# Patient Record
Sex: MALE | Race: WHITE | NOT HISPANIC OR LATINO | Employment: OTHER | URBAN - METROPOLITAN AREA
[De-identification: names, ages, dates, MRNs, and addresses within clinical notes are randomized per-mention and may not be internally consistent; named-entity substitution may affect disease eponyms.]

---

## 2020-12-09 ENCOUNTER — APPOINTMENT (OUTPATIENT)
Dept: RADIOLOGY | Facility: MEDICAL CENTER | Age: 75
DRG: 871 | End: 2020-12-09
Attending: EMERGENCY MEDICINE
Payer: MEDICARE

## 2020-12-09 ENCOUNTER — HOSPITAL ENCOUNTER (INPATIENT)
Facility: MEDICAL CENTER | Age: 75
LOS: 4 days | DRG: 871 | End: 2020-12-13
Attending: EMERGENCY MEDICINE | Admitting: STUDENT IN AN ORGANIZED HEALTH CARE EDUCATION/TRAINING PROGRAM
Payer: MEDICARE

## 2020-12-09 DIAGNOSIS — E86.0 SEVERE DEHYDRATION: ICD-10-CM

## 2020-12-09 DIAGNOSIS — R53.81 DEBILITY: ICD-10-CM

## 2020-12-09 DIAGNOSIS — U07.1 PNEUMONIA DUE TO COVID-19 VIRUS: ICD-10-CM

## 2020-12-09 DIAGNOSIS — R09.02 HYPOXEMIA: ICD-10-CM

## 2020-12-09 DIAGNOSIS — R41.0 CONFUSION: ICD-10-CM

## 2020-12-09 DIAGNOSIS — J12.82 PNEUMONIA DUE TO COVID-19 VIRUS: ICD-10-CM

## 2020-12-09 PROBLEM — A41.9 SEPSIS WITH ACUTE HYPOXIC RESPIRATORY FAILURE (HCC): Status: ACTIVE | Noted: 2020-12-09

## 2020-12-09 PROBLEM — A41.89 SEPSIS DUE TO COVID-19 (HCC): Status: ACTIVE | Noted: 2020-12-09

## 2020-12-09 PROBLEM — A41.9 SEPSIS WITH ACUTE HYPOXIC RESPIRATORY FAILURE (HCC): Status: RESOLVED | Noted: 2020-12-09 | Resolved: 2020-12-09

## 2020-12-09 PROBLEM — N17.9 ACUTE KIDNEY FAILURE (HCC): Status: ACTIVE | Noted: 2020-12-09

## 2020-12-09 PROBLEM — J96.01 SEPSIS WITH ACUTE HYPOXIC RESPIRATORY FAILURE (HCC): Status: ACTIVE | Noted: 2020-12-09

## 2020-12-09 PROBLEM — J96.01 SEPSIS WITH ACUTE HYPOXIC RESPIRATORY FAILURE (HCC): Status: RESOLVED | Noted: 2020-12-09 | Resolved: 2020-12-09

## 2020-12-09 PROBLEM — J96.91 RESPIRATORY FAILURE WITH HYPOXIA (HCC): Status: ACTIVE | Noted: 2020-12-09

## 2020-12-09 PROBLEM — R65.20 SEPSIS WITH ACUTE HYPOXIC RESPIRATORY FAILURE (HCC): Status: ACTIVE | Noted: 2020-12-09

## 2020-12-09 PROBLEM — R65.20 SEPSIS WITH ACUTE HYPOXIC RESPIRATORY FAILURE (HCC): Status: RESOLVED | Noted: 2020-12-09 | Resolved: 2020-12-09

## 2020-12-09 LAB
ALBUMIN SERPL BCP-MCNC: 3.6 G/DL (ref 3.2–4.9)
ALBUMIN/GLOB SERPL: 0.9 G/DL
ALP SERPL-CCNC: 93 U/L (ref 30–99)
ALT SERPL-CCNC: 90 U/L (ref 2–50)
ANION GAP SERPL CALC-SCNC: 17 MMOL/L (ref 7–16)
AST SERPL-CCNC: 68 U/L (ref 12–45)
BASOPHILS # BLD AUTO: 0.3 % (ref 0–1.8)
BASOPHILS # BLD: 0.05 K/UL (ref 0–0.12)
BILIRUB SERPL-MCNC: 1.4 MG/DL (ref 0.1–1.5)
BUN SERPL-MCNC: 78 MG/DL (ref 8–22)
CALCIUM SERPL-MCNC: 9.9 MG/DL (ref 8.5–10.5)
CHLORIDE SERPL-SCNC: 95 MMOL/L (ref 96–112)
CK SERPL-CCNC: 117 U/L (ref 0–154)
CO2 SERPL-SCNC: 23 MMOL/L (ref 20–33)
COVID ORDER STATUS COVID19: NORMAL
CREAT SERPL-MCNC: 2.02 MG/DL (ref 0.5–1.4)
CRP SERPL HS-MCNC: 5.98 MG/DL (ref 0–0.75)
D DIMER PPP IA.FEU-MCNC: 6.2 UG/ML (FEU) (ref 0–0.5)
EOSINOPHIL # BLD AUTO: 0.04 K/UL (ref 0–0.51)
EOSINOPHIL NFR BLD: 0.3 % (ref 0–6.9)
ERYTHROCYTE [DISTWIDTH] IN BLOOD BY AUTOMATED COUNT: 42.4 FL (ref 35.9–50)
FLUAV RNA SPEC QL NAA+PROBE: NEGATIVE
FLUBV RNA SPEC QL NAA+PROBE: NEGATIVE
GLOBULIN SER CALC-MCNC: 3.8 G/DL (ref 1.9–3.5)
GLUCOSE SERPL-MCNC: 195 MG/DL (ref 65–99)
HCT VFR BLD AUTO: 58 % (ref 42–52)
HGB BLD-MCNC: 20.2 G/DL (ref 14–18)
IMM GRANULOCYTES # BLD AUTO: 0.26 K/UL (ref 0–0.11)
IMM GRANULOCYTES NFR BLD AUTO: 1.8 % (ref 0–0.9)
INR PPP: 1.34 (ref 0.87–1.13)
LACTATE BLD-SCNC: 2 MMOL/L (ref 0.5–2)
LACTATE BLD-SCNC: 2.7 MMOL/L (ref 0.5–2)
LACTATE BLD-SCNC: 2.8 MMOL/L (ref 0.5–2)
LYMPHOCYTES # BLD AUTO: 0.75 K/UL (ref 1–4.8)
LYMPHOCYTES NFR BLD: 5.1 % (ref 22–41)
MAGNESIUM SERPL-MCNC: 2.6 MG/DL (ref 1.5–2.5)
MCH RBC QN AUTO: 31.7 PG (ref 27–33)
MCHC RBC AUTO-ENTMCNC: 34.8 G/DL (ref 33.7–35.3)
MCV RBC AUTO: 91.1 FL (ref 81.4–97.8)
MONOCYTES # BLD AUTO: 0.85 K/UL (ref 0–0.85)
MONOCYTES NFR BLD AUTO: 5.8 % (ref 0–13.4)
NEUTROPHILS # BLD AUTO: 12.68 K/UL (ref 1.82–7.42)
NEUTROPHILS NFR BLD: 86.7 % (ref 44–72)
NRBC # BLD AUTO: 0 K/UL
NRBC BLD-RTO: 0 /100 WBC
PHOSPHATE SERPL-MCNC: 3.7 MG/DL (ref 2.5–4.5)
PLATELET # BLD AUTO: 465 K/UL (ref 164–446)
PMV BLD AUTO: 10.5 FL (ref 9–12.9)
POTASSIUM SERPL-SCNC: 4.3 MMOL/L (ref 3.6–5.5)
PROT SERPL-MCNC: 7.4 G/DL (ref 6–8.2)
PROTHROMBIN TIME: 17 SEC (ref 12–14.6)
RBC # BLD AUTO: 6.37 M/UL (ref 4.7–6.1)
RSV RNA SPEC QL NAA+PROBE: NEGATIVE
SARS-COV-2 RNA RESP QL NAA+PROBE: DETECTED
SODIUM SERPL-SCNC: 135 MMOL/L (ref 135–145)
SPECIMEN SOURCE: ABNORMAL
WBC # BLD AUTO: 14.6 K/UL (ref 4.8–10.8)

## 2020-12-09 PROCEDURE — 0241U HCHG SARS-COV-2 COVID-19 NFCT DS RESP RNA 4 TRGT MIC: CPT

## 2020-12-09 PROCEDURE — 93005 ELECTROCARDIOGRAM TRACING: CPT | Performed by: STUDENT IN AN ORGANIZED HEALTH CARE EDUCATION/TRAINING PROGRAM

## 2020-12-09 PROCEDURE — 96375 TX/PRO/DX INJ NEW DRUG ADDON: CPT

## 2020-12-09 PROCEDURE — 86140 C-REACTIVE PROTEIN: CPT

## 2020-12-09 PROCEDURE — 82550 ASSAY OF CK (CPK): CPT

## 2020-12-09 PROCEDURE — 87040 BLOOD CULTURE FOR BACTERIA: CPT

## 2020-12-09 PROCEDURE — 85610 PROTHROMBIN TIME: CPT

## 2020-12-09 PROCEDURE — 96365 THER/PROPH/DIAG IV INF INIT: CPT

## 2020-12-09 PROCEDURE — 700111 HCHG RX REV CODE 636 W/ 250 OVERRIDE (IP): Performed by: EMERGENCY MEDICINE

## 2020-12-09 PROCEDURE — 96367 TX/PROPH/DG ADDL SEQ IV INF: CPT

## 2020-12-09 PROCEDURE — 84100 ASSAY OF PHOSPHORUS: CPT

## 2020-12-09 PROCEDURE — 83520 IMMUNOASSAY QUANT NOS NONAB: CPT

## 2020-12-09 PROCEDURE — 85379 FIBRIN DEGRADATION QUANT: CPT

## 2020-12-09 PROCEDURE — 84145 PROCALCITONIN (PCT): CPT

## 2020-12-09 PROCEDURE — 99285 EMERGENCY DEPT VISIT HI MDM: CPT

## 2020-12-09 PROCEDURE — 700105 HCHG RX REV CODE 258: Performed by: EMERGENCY MEDICINE

## 2020-12-09 PROCEDURE — 83605 ASSAY OF LACTIC ACID: CPT | Mod: 91

## 2020-12-09 PROCEDURE — 71045 X-RAY EXAM CHEST 1 VIEW: CPT

## 2020-12-09 PROCEDURE — 99223 1ST HOSP IP/OBS HIGH 75: CPT | Mod: AI | Performed by: STUDENT IN AN ORGANIZED HEALTH CARE EDUCATION/TRAINING PROGRAM

## 2020-12-09 PROCEDURE — C9803 HOPD COVID-19 SPEC COLLECT: HCPCS | Performed by: EMERGENCY MEDICINE

## 2020-12-09 PROCEDURE — 770021 HCHG ROOM/CARE - ISO PRIVATE

## 2020-12-09 PROCEDURE — 80053 COMPREHEN METABOLIC PANEL: CPT

## 2020-12-09 PROCEDURE — 36415 COLL VENOUS BLD VENIPUNCTURE: CPT

## 2020-12-09 PROCEDURE — 83735 ASSAY OF MAGNESIUM: CPT

## 2020-12-09 PROCEDURE — 85025 COMPLETE CBC W/AUTO DIFF WBC: CPT

## 2020-12-09 RX ORDER — SODIUM CHLORIDE, SODIUM LACTATE, POTASSIUM CHLORIDE, CALCIUM CHLORIDE 600; 310; 30; 20 MG/100ML; MG/100ML; MG/100ML; MG/100ML
1000 INJECTION, SOLUTION INTRAVENOUS ONCE
Status: COMPLETED | OUTPATIENT
Start: 2020-12-09 | End: 2020-12-10

## 2020-12-09 RX ORDER — ASCORBIC ACID 500 MG
1000 TABLET ORAL DAILY
Status: DISCONTINUED | OUTPATIENT
Start: 2020-12-10 | End: 2020-12-13 | Stop reason: HOSPADM

## 2020-12-09 RX ORDER — DEXAMETHASONE 6 MG/1
6 TABLET ORAL DAILY
Status: DISCONTINUED | OUTPATIENT
Start: 2020-12-10 | End: 2020-12-13 | Stop reason: HOSPADM

## 2020-12-09 RX ORDER — VITAMIN B COMPLEX
1000 TABLET ORAL DAILY
Status: DISCONTINUED | OUTPATIENT
Start: 2020-12-10 | End: 2020-12-13 | Stop reason: HOSPADM

## 2020-12-09 RX ORDER — ZINC SULFATE 50(220)MG
220 CAPSULE ORAL DAILY
Status: DISCONTINUED | OUTPATIENT
Start: 2020-12-10 | End: 2020-12-13 | Stop reason: HOSPADM

## 2020-12-09 RX ORDER — AZITHROMYCIN 500 MG/1
500 INJECTION, POWDER, LYOPHILIZED, FOR SOLUTION INTRAVENOUS ONCE
Status: COMPLETED | OUTPATIENT
Start: 2020-12-09 | End: 2020-12-09

## 2020-12-09 RX ORDER — ONDANSETRON 4 MG/1
4 TABLET, ORALLY DISINTEGRATING ORAL EVERY 6 HOURS PRN
Status: DISCONTINUED | OUTPATIENT
Start: 2020-12-09 | End: 2020-12-13 | Stop reason: HOSPADM

## 2020-12-09 RX ORDER — DEXAMETHASONE SODIUM PHOSPHATE 10 MG/ML
10 INJECTION, SOLUTION INTRAMUSCULAR; INTRAVENOUS ONCE
Status: COMPLETED | OUTPATIENT
Start: 2020-12-09 | End: 2020-12-09

## 2020-12-09 RX ORDER — ONDANSETRON 2 MG/ML
4 INJECTION INTRAMUSCULAR; INTRAVENOUS EVERY 6 HOURS PRN
Status: DISCONTINUED | OUTPATIENT
Start: 2020-12-09 | End: 2020-12-13 | Stop reason: HOSPADM

## 2020-12-09 RX ADMIN — DEXAMETHASONE SODIUM PHOSPHATE 10 MG: 10 INJECTION, SOLUTION INTRAMUSCULAR; INTRAVENOUS at 18:26

## 2020-12-09 RX ADMIN — AZITHROMYCIN MONOHYDRATE 500 MG: 500 INJECTION, POWDER, LYOPHILIZED, FOR SOLUTION INTRAVENOUS at 21:12

## 2020-12-09 RX ADMIN — SODIUM CHLORIDE 3 G: 900 INJECTION INTRAVENOUS at 20:36

## 2020-12-09 RX ADMIN — SODIUM CHLORIDE, POTASSIUM CHLORIDE, SODIUM LACTATE AND CALCIUM CHLORIDE 1000 ML: 600; 310; 30; 20 INJECTION, SOLUTION INTRAVENOUS at 21:09

## 2020-12-09 ASSESSMENT — COGNITIVE AND FUNCTIONAL STATUS - GENERAL
SUGGESTED CMS G CODE MODIFIER MOBILITY: CH
MOBILITY SCORE: 24
SUGGESTED CMS G CODE MODIFIER DAILY ACTIVITY: CH
DAILY ACTIVITIY SCORE: 24

## 2020-12-09 ASSESSMENT — ENCOUNTER SYMPTOMS
WEAKNESS: 1
DIZZINESS: 1
COUGH: 1
SHORTNESS OF BREATH: 1
DIARRHEA: 1

## 2020-12-09 ASSESSMENT — LIFESTYLE VARIABLES
ON A TYPICAL DAY WHEN YOU DRINK ALCOHOL HOW MANY DRINKS DO YOU HAVE: 1
AVERAGE NUMBER OF DAYS PER WEEK YOU HAVE A DRINK CONTAINING ALCOHOL: 2
EVER FELT BAD OR GUILTY ABOUT YOUR DRINKING: NO
HAVE PEOPLE ANNOYED YOU BY CRITICIZING YOUR DRINKING: NO
TOTAL SCORE: 0
EVER HAD A DRINK FIRST THING IN THE MORNING TO STEADY YOUR NERVES TO GET RID OF A HANGOVER: NO
ALCOHOL_USE: YES
CONSUMPTION TOTAL: NEGATIVE
TOTAL SCORE: 0
HOW MANY TIMES IN THE PAST YEAR HAVE YOU HAD 5 OR MORE DRINKS IN A DAY: 0
HAVE YOU EVER FELT YOU SHOULD CUT DOWN ON YOUR DRINKING: NO
DOES PATIENT WANT TO STOP DRINKING: NO
TOTAL SCORE: 0

## 2020-12-09 ASSESSMENT — PATIENT HEALTH QUESTIONNAIRE - PHQ9
SUM OF ALL RESPONSES TO PHQ9 QUESTIONS 1 AND 2: 0
1. LITTLE INTEREST OR PLEASURE IN DOING THINGS: NOT AT ALL
2. FEELING DOWN, DEPRESSED, IRRITABLE, OR HOPELESS: NOT AT ALL

## 2020-12-09 ASSESSMENT — FIBROSIS 4 INDEX: FIB4 SCORE: 1.13

## 2020-12-10 ENCOUNTER — APPOINTMENT (OUTPATIENT)
Dept: RADIOLOGY | Facility: MEDICAL CENTER | Age: 75
DRG: 871 | End: 2020-12-10
Attending: STUDENT IN AN ORGANIZED HEALTH CARE EDUCATION/TRAINING PROGRAM
Payer: MEDICARE

## 2020-12-10 LAB
ALBUMIN SERPL BCP-MCNC: 2.8 G/DL (ref 3.2–4.9)
ALBUMIN/GLOB SERPL: 0.9 G/DL
ALP SERPL-CCNC: 79 U/L (ref 30–99)
ALT SERPL-CCNC: 83 U/L (ref 2–50)
ANION GAP SERPL CALC-SCNC: 15 MMOL/L (ref 7–16)
APPEARANCE UR: CLEAR
AST SERPL-CCNC: 59 U/L (ref 12–45)
BILIRUB SERPL-MCNC: 1.1 MG/DL (ref 0.1–1.5)
BILIRUB UR QL STRIP.AUTO: NEGATIVE
BUN SERPL-MCNC: 77 MG/DL (ref 8–22)
CALCIUM SERPL-MCNC: 8.9 MG/DL (ref 8.5–10.5)
CHLORIDE SERPL-SCNC: 100 MMOL/L (ref 96–112)
CO2 SERPL-SCNC: 19 MMOL/L (ref 20–33)
COLOR UR: YELLOW
CREAT SERPL-MCNC: 1.76 MG/DL (ref 0.5–1.4)
CREAT UR-MCNC: 127.86 MG/DL
GLOBULIN SER CALC-MCNC: 3.2 G/DL (ref 1.9–3.5)
GLUCOSE SERPL-MCNC: 224 MG/DL (ref 65–99)
GLUCOSE UR STRIP.AUTO-MCNC: NEGATIVE MG/DL
KETONES UR STRIP.AUTO-MCNC: ABNORMAL MG/DL
LACTATE BLD-SCNC: 1.9 MMOL/L (ref 0.5–2)
LACTATE BLD-SCNC: 2.4 MMOL/L (ref 0.5–2)
LACTATE BLD-SCNC: 2.6 MMOL/L (ref 0.5–2)
LEUKOCYTE ESTERASE UR QL STRIP.AUTO: NEGATIVE
MICRO URNS: ABNORMAL
NITRITE UR QL STRIP.AUTO: NEGATIVE
PH UR STRIP.AUTO: 5 [PH] (ref 5–8)
POTASSIUM SERPL-SCNC: 4.5 MMOL/L (ref 3.6–5.5)
PROCALCITONIN SERPL-MCNC: 0.36 NG/ML
PROT SERPL-MCNC: 6 G/DL (ref 6–8.2)
PROT UR QL STRIP: NEGATIVE MG/DL
RBC UR QL AUTO: NEGATIVE
SODIUM SERPL-SCNC: 134 MMOL/L (ref 135–145)
SODIUM UR-SCNC: <20 MMOL/L
SP GR UR STRIP.AUTO: 1.02
UROBILINOGEN UR STRIP.AUTO-MCNC: 1 MG/DL

## 2020-12-10 PROCEDURE — 700111 HCHG RX REV CODE 636 W/ 250 OVERRIDE (IP): Performed by: STUDENT IN AN ORGANIZED HEALTH CARE EDUCATION/TRAINING PROGRAM

## 2020-12-10 PROCEDURE — 700111 HCHG RX REV CODE 636 W/ 250 OVERRIDE (IP): Performed by: INTERNAL MEDICINE

## 2020-12-10 PROCEDURE — 82570 ASSAY OF URINE CREATININE: CPT

## 2020-12-10 PROCEDURE — 93970 EXTREMITY STUDY: CPT

## 2020-12-10 PROCEDURE — 80053 COMPREHEN METABOLIC PANEL: CPT

## 2020-12-10 PROCEDURE — 700102 HCHG RX REV CODE 250 W/ 637 OVERRIDE(OP): Performed by: INTERNAL MEDICINE

## 2020-12-10 PROCEDURE — 76775 US EXAM ABDO BACK WALL LIM: CPT

## 2020-12-10 PROCEDURE — 93970 EXTREMITY STUDY: CPT | Mod: 26 | Performed by: INTERNAL MEDICINE

## 2020-12-10 PROCEDURE — 83605 ASSAY OF LACTIC ACID: CPT | Mod: 91

## 2020-12-10 PROCEDURE — 84300 ASSAY OF URINE SODIUM: CPT

## 2020-12-10 PROCEDURE — 770021 HCHG ROOM/CARE - ISO PRIVATE

## 2020-12-10 PROCEDURE — 700105 HCHG RX REV CODE 258: Performed by: STUDENT IN AN ORGANIZED HEALTH CARE EDUCATION/TRAINING PROGRAM

## 2020-12-10 PROCEDURE — 81003 URINALYSIS AUTO W/O SCOPE: CPT

## 2020-12-10 PROCEDURE — A9270 NON-COVERED ITEM OR SERVICE: HCPCS | Performed by: STUDENT IN AN ORGANIZED HEALTH CARE EDUCATION/TRAINING PROGRAM

## 2020-12-10 PROCEDURE — 36415 COLL VENOUS BLD VENIPUNCTURE: CPT

## 2020-12-10 PROCEDURE — A9270 NON-COVERED ITEM OR SERVICE: HCPCS | Performed by: INTERNAL MEDICINE

## 2020-12-10 PROCEDURE — 700102 HCHG RX REV CODE 250 W/ 637 OVERRIDE(OP): Performed by: STUDENT IN AN ORGANIZED HEALTH CARE EDUCATION/TRAINING PROGRAM

## 2020-12-10 PROCEDURE — 700105 HCHG RX REV CODE 258: Performed by: INTERNAL MEDICINE

## 2020-12-10 RX ORDER — SODIUM CHLORIDE, SODIUM LACTATE, POTASSIUM CHLORIDE, CALCIUM CHLORIDE 600; 310; 30; 20 MG/100ML; MG/100ML; MG/100ML; MG/100ML
INJECTION, SOLUTION INTRAVENOUS CONTINUOUS
Status: ACTIVE | OUTPATIENT
Start: 2020-12-10 | End: 2020-12-10

## 2020-12-10 RX ORDER — AZITHROMYCIN 250 MG/1
500 TABLET, FILM COATED ORAL EVERY EVENING
Status: COMPLETED | OUTPATIENT
Start: 2020-12-10 | End: 2020-12-11

## 2020-12-10 RX ADMIN — SODIUM CHLORIDE, POTASSIUM CHLORIDE, SODIUM LACTATE AND CALCIUM CHLORIDE: 600; 310; 30; 20 INJECTION, SOLUTION INTRAVENOUS at 04:08

## 2020-12-10 RX ADMIN — AZITHROMYCIN MONOHYDRATE 500 MG: 250 TABLET ORAL at 17:09

## 2020-12-10 RX ADMIN — CEFTRIAXONE 2 G: 2 INJECTION, POWDER, FOR SOLUTION INTRAMUSCULAR; INTRAVENOUS at 11:36

## 2020-12-10 RX ADMIN — SODIUM CHLORIDE, POTASSIUM CHLORIDE, SODIUM LACTATE AND CALCIUM CHLORIDE: 600; 310; 30; 20 INJECTION, SOLUTION INTRAVENOUS at 11:37

## 2020-12-10 RX ADMIN — ENOXAPARIN SODIUM 40 MG: 40 INJECTION SUBCUTANEOUS at 05:46

## 2020-12-10 RX ADMIN — OXYCODONE HYDROCHLORIDE AND ACETAMINOPHEN 1000 MG: 500 TABLET ORAL at 05:46

## 2020-12-10 RX ADMIN — ZINC SULFATE 220 MG (50 MG) CAPSULE 220 MG: CAPSULE at 05:46

## 2020-12-10 RX ADMIN — Medication 1000 UNITS: at 05:46

## 2020-12-10 RX ADMIN — DEXAMETHASONE 6 MG: 6 TABLET ORAL at 05:46

## 2020-12-10 ASSESSMENT — ENCOUNTER SYMPTOMS
PALPITATIONS: 0
COUGH: 0
NECK PAIN: 0
WEIGHT LOSS: 0
DIARRHEA: 0
DIZZINESS: 1
EYE PAIN: 0
FEVER: 0
VOMITING: 0
NAUSEA: 0
CHILLS: 0
DEPRESSION: 0
SEIZURES: 0
SHORTNESS OF BREATH: 0
ORTHOPNEA: 0
STRIDOR: 0
INSOMNIA: 0
BACK PAIN: 0
EYE DISCHARGE: 0
HEADACHES: 0
SPUTUM PRODUCTION: 0
ABDOMINAL PAIN: 0
EYE REDNESS: 0
FOCAL WEAKNESS: 0
NERVOUS/ANXIOUS: 0
BLURRED VISION: 0
HEARTBURN: 0
MYALGIAS: 0

## 2020-12-10 NOTE — ASSESSMENT & PLAN NOTE
This is Severe Sepsis Present on admission  SIRS criteria identified on my evaluation include: Tachycardia, with heart rate greater than 90 BPM, Tachypnea, with respirations greater than 20 per minute and Leukocytosis, with WBC greater than 12,000  Source of infection is Pulmonary COVID 19   Clinical indicators of end organ dysfunction include Creatinine >2.0 (Without ESRD or CKD)  Sepsis protocol initiated  Fluid resuscitation ordered per protocol  IV antibiotics as appropriate for source of sepsis  Reassessment: I have reassessed the patient's hemodynamic status  End organ dysfunction include(s):  Acute respiratory failure and Acute kidney failure   Check pro calcitonin if elevated start on IV antibiotics as appropriate for source of sepsis  While organ dysfunction may be noted elsewhere in this problem list or in the chart, degree of organ dysfunction does not meet CMS criteria for severe sepsis  Continue with COVID isolation   Encourage prone position   Continue with oxygen supplementation to keep O2 sat>92%   Blood cultures in process  Received 1 dose of Unasyn and Zithromax by ERP   Decadron 6 mg po daily for 10 days   Immune support   Avoid NSAID's   If patient develops respiratory distress place on high flow oxygen if no improvement will consult ICU for possible intubation   If patient becomes hypotensive avoid excessive fluid resuscitation- suggest starting pressors early.

## 2020-12-10 NOTE — ED PROVIDER NOTES
ED Provider Note    Scribed for Clifford Lagos M.D. by Filiberto Balderrama. 12/9/2020, 5:42 PM.    Primary care provider: Pcp Unknown  Means of arrival: EMS  History obtained from: Patient  History limited by: None    CHIEF COMPLAINT  Chief Complaint   Patient presents with   • Weakness     generalized weakness increasing over last few days   • Dizziness   • Coronavirus Screening     dx with covid 2 wks ago    • Loss of Appetite       HPI  Lewis Mohr is a 75 y.o. male who presents to the Emergency Department for shortness of breath. The patient states that he was prompted to come in when he began to get out of breath when waking to the bathroom. He was diagnosed with Covid two weeks ago and has felt increasingly worse since. At this time the patient endorses a productive cough, loss of appetite, and body aches, but denies any fever, vomiting, or chills. Patient is not on O2 at baseline. No exacerbating or alleviating factors were stated.  Cording the daughter the patient was much more confused today apparently has not been eating or drinking for the past couple of days.    REVIEW OF SYSTEMS  As above otherwise all other systems are negative per the patient.  There is no daughter at bedside.  This was obtained from the nurse    PAST MEDICAL HISTORY       SURGICAL HISTORY  patient denies any surgical history    SOCIAL HISTORY  Social History     Tobacco Use   • Smoking status: Never Smoker   • Smokeless tobacco: Never Used   Substance Use Topics   • Alcohol use: Yes   • Drug use: Never      Social History     Substance and Sexual Activity   Drug Use Never       FAMILY HISTORY  History reviewed. No pertinent family history.    CURRENT MEDICATIONS  Home Medications     Reviewed by Marleni Parada R.N. (Registered Nurse) on 12/09/20 at 1713  Med List Status: Complete   Medication Last Dose Status   Multiple Vitamins-Minerals (ICAPS AREDS 2 PO) 12/9/2020 Active                ALLERGIES  No Known Allergies    PHYSICAL  EXAM  VITAL SIGNS: /95   Pulse (!) 108   Temp 36.4 °C (97.5 °F) (Tympanic)   Resp (!) 24   Wt 86.2 kg (190 lb)   SpO2 98%   Nonhypoxic on 4 L via nasal cannula on room air he was 76% which is interpreted as hypoxemic  Constitutional: Well developed, Well nourished, No acute distress, Non-toxic appearance.   HENT: Normocephalic, Atraumatic, Bilateral external ears normal, oropharynx dry mucous membranes, No oral exudates, Nose normal.   Eyes:conjunctiva is normal, there are no signs of exudate.   Neck: Supple, no meningeal signs.  Lymphatic: No lymphadenopathy noted.   Cardiovascular: Regular rate and rhythm without murmurs gallops or rubs.   Thorax & Lungs: No respiratory distress. Breathing comfortably. Lungs diminished with rhonchi auscultation bilaterally, there are no wheezes no rales. Chest wall is nontender.  Abdomen: Soft, nontender, nondistended. Bowel sounds are present.   Skin: Warm, Dry, No erythema,   Back: No tenderness, No CVA tenderness.  Musculoskeletal: Good range of motion in all major joints. No tenderness to palpation or major deformities noted. Intact distal pulses, no clubbing, no cyanosis, no edema,   Neurologic: Alert & oriented x 3, Moving all extremities. No gross abnormalities.    Psychiatric: Affect normal, Judgment normal, Mood normal.     LABS  Results for orders placed or performed during the hospital encounter of 12/09/20   LACTIC ACID   Result Value Ref Range    Lactic Acid 2.8 (H) 0.5 - 2.0 mmol/L   LACTIC ACID   Result Value Ref Range    Lactic Acid 2.7 (H) 0.5 - 2.0 mmol/L   CBC WITH DIFFERENTIAL   Result Value Ref Range    WBC 14.6 (H) 4.8 - 10.8 K/uL    RBC 6.37 (H) 4.70 - 6.10 M/uL    Hemoglobin 20.2 (H) 14.0 - 18.0 g/dL    Hematocrit 58.0 (H) 42.0 - 52.0 %    MCV 91.1 81.4 - 97.8 fL    MCH 31.7 27.0 - 33.0 pg    MCHC 34.8 33.7 - 35.3 g/dL    RDW 42.4 35.9 - 50.0 fL    Platelet Count 465 (H) 164 - 446 K/uL    MPV 10.5 9.0 - 12.9 fL    Neutrophils-Polys 86.70 (H)  44.00 - 72.00 %    Lymphocytes 5.10 (L) 22.00 - 41.00 %    Monocytes 5.80 0.00 - 13.40 %    Eosinophils 0.30 0.00 - 6.90 %    Basophils 0.30 0.00 - 1.80 %    Immature Granulocytes 1.80 (H) 0.00 - 0.90 %    Nucleated RBC 0.00 /100 WBC    Neutrophils (Absolute) 12.68 (H) 1.82 - 7.42 K/uL    Lymphs (Absolute) 0.75 (L) 1.00 - 4.80 K/uL    Monos (Absolute) 0.85 0.00 - 0.85 K/uL    Eos (Absolute) 0.04 0.00 - 0.51 K/uL    Baso (Absolute) 0.05 0.00 - 0.12 K/uL    Immature Granulocytes (abs) 0.26 (H) 0.00 - 0.11 K/uL    NRBC (Absolute) 0.00 K/uL   COMP METABOLIC PANEL   Result Value Ref Range    Sodium 135 135 - 145 mmol/L    Potassium 4.3 3.6 - 5.5 mmol/L    Chloride 95 (L) 96 - 112 mmol/L    Co2 23 20 - 33 mmol/L    Anion Gap 17.0 (H) 7.0 - 16.0    Glucose 195 (H) 65 - 99 mg/dL    Bun 78 (HH) 8 - 22 mg/dL    Creatinine 2.02 (H) 0.50 - 1.40 mg/dL    Calcium 9.9 8.5 - 10.5 mg/dL    AST(SGOT) 68 (H) 12 - 45 U/L    ALT(SGPT) 90 (H) 2 - 50 U/L    Alkaline Phosphatase 93 30 - 99 U/L    Total Bilirubin 1.4 0.1 - 1.5 mg/dL    Albumin 3.6 3.2 - 4.9 g/dL    Total Protein 7.4 6.0 - 8.2 g/dL    Globulin 3.8 (H) 1.9 - 3.5 g/dL    A-G Ratio 0.9 g/dL   COVID/SARS CoV-2 PCR    Specimen: Nasopharyngeal; Respirate   Result Value Ref Range    COVID Order Status Received    ESTIMATED GFR   Result Value Ref Range    GFR If  39 (A) >60 mL/min/1.73 m 2    GFR If Non  32 (A) >60 mL/min/1.73 m 2   CoV-2, Flu A/B, And RSV by PCR   Result Value Ref Range    Influenza virus A RNA Negative Negative    Influenza virus B, PCR Negative Negative    RSV, PCR Negative Negative    SARS-CoV-2 by PCR DETECTED (AA)     SARS-CoV-2 Source NP Swab    Prothrombin time (INR)   Result Value Ref Range    PT 17.0 (H) 12.0 - 14.6 sec    INR 1.34 (H) 0.87 - 1.13   D-Dimer   Result Value Ref Range    D-Dimer Screen 6.20 (H) 0.00 - 0.50 ug/mL (FEU)   CRP Quantitative (Non-Cardiac)   Result Value Ref Range    Stat C-Reactive Protein 5.98 (H)  0.00 - 0.75 mg/dL      All labs reviewed by me.    RADIOLOGY  DX-CHEST-PORTABLE (1 VIEW)   Final Result      New patchy peripheral bilateral parenchymal opacities which could represent pneumonia.        The radiologist's interpretation of all radiological studies have been reviewed by me.    COURSE & MEDICAL DECISION MAKING  Pertinent Labs & Imaging studies reviewed. (See chart for details)    PPE Note: I personally donned full PPE for all patient encounters during this visit, including being clean-shaven with an N95 respirator mask, gloves, and goggles.     Scribe remained outside the patient's room and did not have any contact with the patient for the duration of patient encounter.      5:42 PM - Patient seen and examined at bedside. Patient will be treated with Decadron 10 mg and zithromax 500 mg. Ordered estimated GFR, Covid/Sars, blood culture, CMP, CBC with differential, lactic acid, CoV to evaluate his symptoms. The differential diagnoses include but are not limited to: Covid pneumonia with worsening hypoxemia    8:05 PM Patient will be treated with unasyn 3 g and lactated ringer bolus.    8:18 PM I discussed the patient's case and the above findings with Dr. Bauer (hospitalist) who agrees to evaluate the patient for hospitalization.      HYDRATION: Based on the patient's presentation of Dehydration the patient was given IV fluids. IV Hydration was used because oral hydration was not adequate alone. Upon recheck following hydration, the patient was About the same mental status wise tachycardia is improving.     Decision Making:  Presents for evaluation.  He did take off his oxygen when I was first evaluating him and did not realize that he did so.  The patient is slightly confused most likely from mild hypoxemia.  I did initiate after the chest x-ray with antibiotics because of potential for bacterial infection due to his elevated white blood cell count does not correlate consistent consistently with Covid  pneumonia but most likely this is Covid pneumonia causing his hypoxemia.  I started the patient on Decadron as well.  Laboratory studies did result with an elevated BUN and creatinine which appears to be more prerenal azotemia secondary to severe dehydration so start the patient with a liter bolus of lactated Ringer's.  At this point I do feel the patient should be admitted the hospital for further treatment and care.  DISPOSITION:  Patient will be hospitalized by Dr. Bauer in guarded condition.      FINAL IMPRESSION  1. Pneumonia due to COVID-19 virus    2. Severe dehydration    3. Hypoxemia    4. Confusion          Filiberto BAILEY (Scribe), am scribing for, and in the presence of, Clifford Lagos M.D..    Electronically signed by: Filiberto Balderrama (Scribe), 12/9/2020    IClifford M.D. personally performed the services described in this documentation, as scribed by Filiberto Balderrama in my presence, and it is both accurate and complete.  C  The note accurately reflects work and decisions made by me.  Clifford Lagos M.D.  12/9/2020  9:48 PM

## 2020-12-10 NOTE — ASSESSMENT & PLAN NOTE
Hypoxic respiratory failure secondary to COVID 19  Continue with oxygen supplementation to maintain spO2>90  Elevated procalcitonin  Started on IV ceftriaxone and azithromycin  Currently on 2-3 L oxygen  Plan as above

## 2020-12-10 NOTE — ASSESSMENT & PLAN NOTE
Likely pre renal   Avoid nephrotoxic medications   Urinary electrolytes   Improving  Follow CMP in the morning

## 2020-12-10 NOTE — ED TRIAGE NOTES
Pt to rm R2 .  Chief Complaint   Patient presents with   • Weakness     generalized weakness increasing over last few days   • Dizziness   • Coronavirus Screening     dx with covid 2 wks ago    pt 86%ra pta.

## 2020-12-10 NOTE — PROGRESS NOTES
Phone call received from felipa Coon's daughter. All her questions were answered to her satisfaction.

## 2020-12-10 NOTE — ED NOTES
Pharmacy Medication Reconciliation      Medication reconciliation updated and complete per pt  Allergies have been verified   No oral ABX within the last 14 days  Patient home pharmacy:[address]

## 2020-12-10 NOTE — PROGRESS NOTES
2 RN SKIN CHECK     2 RN skin check completed with 2nd RN.   Devices in place - NC  Skin assessed under devices - intact.  Confirmed pressure ulcers found on - none.  New potential pressure ulcers noted on - none. Wound consult placed - no.     The following interventions in place - education on prevention of skin breakdown provided.  Skin is CDI with generalized rash/redness he states started when his COVID symptoms started and has gradually improved.

## 2020-12-10 NOTE — PROGRESS NOTES
Hospital Medicine Daily Progress Note    Date of Service  12/10/2020    Chief Complaint  75 y.o. male admitted 12/9/2020 with dizziness, weakness, loss of appetite    Hospital Course  75 year old male diagnosed with covid 19 two weeks ago who presented 12/9/2020 with diarrhea that started two days ago non bloody and watery in nature approximately 4-5 episodes daily. Today he complained of feeling so weak that he almost passed out in the bathroom. He denies any one sick at home. He denies any loss of taste or smell. In the ED, he was found to be hypoxic saturating 86% on room air requiring oxygen supplementation. On review of his labs he had a leukocyte count of 14.6, hemoglobin of 20.2, hematocrit of 58 platelet of 465 consistent with dehydration. Furthermore, CMP showed sodium of 135, chloride of 95, anion gap of 17, BUN of 78, Creatine of 2.02, and elevated LFT's AST 68 and ALT of 90. Lactic acid was 2.8.      Patient was examined bedside is AAO x 4 not in any apparent distress. Breathing is unlabored however is requiring 4-5 liters of oxygen via nasal cannula.     Interval Problem Update  He stated that he is feeling a little bit better.  Currently on 4 L oxygen.    Consultants/Specialty  None    Code Status  Full Code    Disposition  Remain on the floor    Review of Systems  Review of Systems   Constitutional: Positive for malaise/fatigue. Negative for chills, fever and weight loss.   HENT: Negative for congestion and nosebleeds.    Eyes: Negative for blurred vision, pain, discharge and redness.   Respiratory: Negative for cough, sputum production, shortness of breath and stridor.    Cardiovascular: Negative for chest pain, palpitations and orthopnea.   Gastrointestinal: Negative for abdominal pain, diarrhea, heartburn, nausea and vomiting.   Genitourinary: Negative for dysuria, frequency and urgency.   Musculoskeletal: Negative for back pain, myalgias and neck pain.   Skin: Negative for itching and rash.    Neurological: Positive for dizziness. Negative for focal weakness, seizures and headaches.   Psychiatric/Behavioral: Negative for depression. The patient is not nervous/anxious and does not have insomnia.         Physical Exam  Temp:  [35.7 °C (96.3 °F)-36.4 °C (97.5 °F)] 35.7 °C (96.3 °F)  Pulse:  [] 71  Resp:  [18-24] 18  BP: (107-147)/(69-95) 141/84  SpO2:  [85 %-98 %] 94 %    Physical Exam  Vitals signs reviewed.   Constitutional:       General: He is not in acute distress.     Appearance: Normal appearance.   HENT:      Head: Normocephalic and atraumatic.      Nose: No congestion or rhinorrhea.   Eyes:      Extraocular Movements: Extraocular movements intact.      Pupils: Pupils are equal, round, and reactive to light.   Neck:      Musculoskeletal: Normal range of motion and neck supple.   Cardiovascular:      Rate and Rhythm: Normal rate and regular rhythm.      Pulses: Normal pulses.   Pulmonary:      Effort: Pulmonary effort is normal. No respiratory distress.      Breath sounds: Normal breath sounds.   Abdominal:      General: Bowel sounds are normal. There is no distension.      Palpations: Abdomen is soft.      Tenderness: There is no abdominal tenderness.   Musculoskeletal:         General: No swelling or tenderness.   Skin:     General: Skin is warm.      Findings: No erythema.   Neurological:      General: No focal deficit present.      Mental Status: He is alert.         Fluids    Intake/Output Summary (Last 24 hours) at 12/10/2020 0717  Last data filed at 12/10/2020 0600  Gross per 24 hour   Intake --   Output 300 ml   Net -300 ml       Laboratory  Recent Labs     12/09/20  1724   WBC 14.6*   RBC 6.37*   HEMOGLOBIN 20.2*   HEMATOCRIT 58.0*   MCV 91.1   MCH 31.7   MCHC 34.8   RDW 42.4   PLATELETCT 465*   MPV 10.5     Recent Labs     12/09/20  1724 12/10/20  0036   SODIUM 135 134*   POTASSIUM 4.3 4.5   CHLORIDE 95* 100   CO2 23 19*   GLUCOSE 195* 224*   BUN 78* 77*   CREATININE 2.02* 1.76*    CALCIUM 9.9 8.9     Recent Labs     12/09/20  1724   INR 1.34*               Imaging  US-RENAL   Final Result      1.  No hydronephrosis is identified.      2.  Simple and mildly complex left renal cysts. One of the cysts has a thin septation. No additional follow-up is recommended.      DX-CHEST-PORTABLE (1 VIEW)   Final Result      New patchy peripheral bilateral parenchymal opacities which could represent pneumonia.      US-EXTREMITY VENOUS LOWER BILAT    (Results Pending)        Assessment/Plan  * Sepsis due to COVID-19 (HCC)  Assessment & Plan  This is Severe Sepsis Present on admission  SIRS criteria identified on my evaluation include: Tachycardia, with heart rate greater than 90 BPM, Tachypnea, with respirations greater than 20 per minute and Leukocytosis, with WBC greater than 12,000  Source of infection is Pulmonary COVID 19   Clinical indicators of end organ dysfunction include Creatinine >2.0 (Without ESRD or CKD)  Sepsis protocol initiated  Fluid resuscitation ordered per protocol  IV antibiotics as appropriate for source of sepsis  Reassessment: I have reassessed the patient's hemodynamic status  End organ dysfunction include(s):  Acute respiratory failure and Acute kidney failure   Check pro calcitonin if elevated start on IV antibiotics as appropriate for source of sepsis  While organ dysfunction may be noted elsewhere in this problem list or in the chart, degree of organ dysfunction does not meet CMS criteria for severe sepsis  Continue with COVID isolation   Patient receiving 1 liter of LR, continue with IVF LR at 84 ml/hr, monitor for fluid overload   30 ml/kg deleterious in COVID 19   Encourage prone position   Continue with oxygen supplementation to keep O2 sat>92%   Trend Lactate   Check UA, send urine culture if positive   Blood cultures in process  Received 1 dose of Unasyn and Zithromax by ERP   Decadron 6 mg po daily for 10 days   Immune support   Zinc 220 mg daily   Vitamin C 1000 mg BID    Vitamin D 1000 mg BID   Tylenol every 6 hours for fever/myalgias   Avoid NSAID's   If patient develops respiratory distress place on high flow oxygen if no improvement will consult ICU for possible intubation   If patient becomes hypotensive avoid excessive fluid resuscitation- suggest starting pressors early.         Acute kidney failure (HCC)  Assessment & Plan  Likely pre renal   IVF 1 liter of LR, continue with IVF LR at 100 ml/hr, monitor for fluid overload   Monitor BMP daily   Avoid nephrotoxic medications   Urinary electrolytes   Improving  Follow CMP in the morning    Respiratory failure with hypoxia (HCC)  Assessment & Plan  Hypoxic respiratory failure secondary to COVID 19  Continue with oxygen supplementation to maintain spO2>90  Elevated procalcitonin  Started on IV ceftriaxone and azithromycin  Currently on 4 L oxygen  Plan as above       VTE prophylaxis: lovenox

## 2020-12-10 NOTE — PROGRESS NOTES
Pt arrived to unit, A&Ox4, calm and cooperative with care. Admission completed as documented. Steady gait, on 4L NC maintaining sats in the mid 90s. Oriented to room and call bell, denies further needs at this time.

## 2020-12-10 NOTE — H&P
Hospital Medicine History & Physical Note    Date of Service  12/9/2020    Primary Care Physician  Pcp Unknown    Consultants  ID for [email or web address]     Code Status  Full Code    Chief Complaint  Chief Complaint   Patient presents with   • Weakness     generalized weakness increasing over last few days   • Dizziness   • Coronavirus Screening     dx with covid 2 wks ago    • Loss of Appetite       History of Presenting Illness  75 year old male diagnosed with covid 19 two weeks ago who presented 12/9/2020 with diarrhea that started two days ago non bloody and watery in nature approximately 4-5 episodes daily. Today he complained of feeling so weak that he almost passed out in the bathroom. He denies any one sick at home. He denies any loss of taste or smell. In the ED, he was found to be hypoxic saturating 86% on room air requiring oxygen supplementation. On review of his labs he had a leukocyte count of 14.6, hemoglobin of 20.2, hematocrit of 58 platelet of 465 consistent with dehydration. Furthermore, CMP showed sodium of 135, chloride of 95, anion gap of 17, BUN of 78, Creatine of 2.02, and elevated LFT's AST 68 and ALT of 90. Lactic acid was 2.8.     Patient was examined bedside is AAO x 4 not in any apparent distress. Breathing is unlabored however is requiring 4-5 liters of oxygen via nasal cannula.         Review of Systems  Review of Systems   Constitutional: Positive for malaise/fatigue.   Respiratory: Positive for cough and shortness of breath.    Gastrointestinal: Positive for diarrhea.   Neurological: Positive for dizziness and weakness.   All other systems reviewed and are negative.      Past Medical History   has no past medical history on file.    Surgical History   has no past surgical history on file.     Family History  family history is not on file.     Social History   reports that he has never smoked. He has never used smokeless tobacco. He reports current alcohol use. He reports that he does not use  drugs.    Allergies  No Known Allergies    Medications  Prior to Admission Medications   Prescriptions Last Dose Informant Patient Reported? Taking?   Multiple Vitamins-Minerals (ICAPS AREDS 2 PO) 12/9/2020 at am Patient Yes Yes   Sig: Take 2 Tabs by mouth every morning.      Facility-Administered Medications: None       Physical Exam  Temp:  [36.4 °C (97.5 °F)] 36.4 °C (97.5 °F)  Pulse:  [106-108] 108  Resp:  [18-24] 24  BP: (147)/(95) 147/95  SpO2:  [88 %-98 %] 98 %    Physical Exam  Constitutional:       Appearance: Normal appearance.   HENT:      Head: Normocephalic and atraumatic.      Mouth/Throat:      Mouth: Mucous membranes are dry.   Eyes:      Extraocular Movements: Extraocular movements intact.      Pupils: Pupils are equal, round, and reactive to light.   Neck:      Musculoskeletal: Neck supple.   Cardiovascular:      Rate and Rhythm: Tachycardia present.      Heart sounds: Normal heart sounds.   Pulmonary:      Breath sounds: Normal breath sounds.      Comments: Tachypnea   Abdominal:      General: Bowel sounds are normal.      Palpations: Abdomen is soft.   Musculoskeletal:         General: No swelling or tenderness.   Skin:     General: Skin is dry.      Comments: Cool     Neurological:      General: No focal deficit present.      Mental Status: He is alert and oriented to person, place, and time.   Psychiatric:         Mood and Affect: Mood normal.         Behavior: Behavior normal.         Laboratory:  Recent Labs     12/09/20  1724   WBC 14.6*   RBC 6.37*   HEMOGLOBIN 20.2*   HEMATOCRIT 58.0*   MCV 91.1   MCH 31.7   MCHC 34.8   RDW 42.4   PLATELETCT 465*   MPV 10.5     Recent Labs     12/09/20  1724   SODIUM 135   POTASSIUM 4.3   CHLORIDE 95*   CO2 23   GLUCOSE 195*   BUN 78*   CREATININE 2.02*   CALCIUM 9.9     Recent Labs     12/09/20  1724   ALTSGPT 90*   ASTSGOT 68*   ALKPHOSPHAT 93   TBILIRUBIN 1.4   GLUCOSE 195*         No results for input(s): NTPROBNP in the last 72 hours.      No results  for input(s): TROPONINT in the last 72 hours.    Imaging:  DX-CHEST-PORTABLE (1 VIEW)   Final Result      New patchy peripheral bilateral parenchymal opacities which could represent pneumonia.        EKG:     Assessment/Plan:  I anticipate this patient will require at least two midnights for appropriate medical management, necessitating inpatient admission.    * Sepsis due to COVID-19 (HCC)  Assessment & Plan  This is Severe Sepsis Present on admission  SIRS criteria identified on my evaluation include: Tachycardia, with heart rate greater than 90 BPM, Tachypnea, with respirations greater than 20 per minute and Leukocytosis, with WBC greater than 12,000  Source of infection is Pulmonary COVID 19   Clinical indicators of end organ dysfunction include Creatinine >2.0 (Without ESRD or CKD)  Sepsis protocol initiated  Fluid resuscitation ordered per protocol  IV antibiotics as appropriate for source of sepsis  Reassessment: I have reassessed the patient's hemodynamic status  End organ dysfunction include(s):  Acute respiratory failure and Acute kidney failure   Check pro calcitonin if elevated start on IV antibiotics as appropriate for source of sepsis  While organ dysfunction may be noted elsewhere in this problem list or in the chart, degree of organ dysfunction does not meet CMS criteria for severe sepsis  Continue with COVID isolation   Patient receiving 1 liter of LR, continue with IVF LR at 84 ml/hr, monitor for fluid overload   30 ml/kg deleterious in COVID 19   Encourage prone position   Continue with oxygen supplementation to keep O2 sat>92%   Trend Lactate   Check UA, send urine culture if positive   Blood cultures in process  Received 1 dose of Unasyn and Zithromax by ERP   Check  Pro-calcitonin, if elevated continue with antibiotics   De escalate based on culture and sensitivity   CRP, D Dimer, PT/PTT, IL-6  CPK, magnesium, phosphorus   Decadron 6 mg po daily for 10 days   Immune support   Zinc 220 mg daily    Vitamin C 1000 mg BID   Vitamin D 1000 mg BID   Tylenol every 6 hours for fever/myalgias   Avoid NSAID's   If patient develops respiratory distress place on high flow oxygen if no improvement will consult ICU for possible intubation   If patient becomes hypotensive avoid excessive fluid resuscitation- suggest starting pressors early.         Acute kidney failure (HCC)  Assessment & Plan  Likely pre renal   IVF 1 liter of LR, continue with IVF LR at 100 ml/hr, monitor for fluid overload   Monitor BMP daily   Avoid nephrotoxic medications   Urinary electrolytes     Respiratory failure with hypoxia (HCC)  Assessment & Plan  Hypoxic respiratory failure secondary to COVID 19  Continue with oxygen supplementation to maintain spO2>90    VTE: Lovenox

## 2020-12-11 LAB
ALBUMIN SERPL BCP-MCNC: 2.9 G/DL (ref 3.2–4.9)
ALBUMIN/GLOB SERPL: 1.1 G/DL
ALP SERPL-CCNC: 66 U/L (ref 30–99)
ALT SERPL-CCNC: 79 U/L (ref 2–50)
ANION GAP SERPL CALC-SCNC: 7 MMOL/L (ref 7–16)
AST SERPL-CCNC: 37 U/L (ref 12–45)
BASOPHILS # BLD AUTO: 0.3 % (ref 0–1.8)
BASOPHILS # BLD: 0.04 K/UL (ref 0–0.12)
BILIRUB SERPL-MCNC: 0.6 MG/DL (ref 0.1–1.5)
BUN SERPL-MCNC: 48 MG/DL (ref 8–22)
CALCIUM SERPL-MCNC: 9 MG/DL (ref 8.5–10.5)
CHLORIDE SERPL-SCNC: 99 MMOL/L (ref 96–112)
CO2 SERPL-SCNC: 27 MMOL/L (ref 20–33)
CREAT SERPL-MCNC: 1.22 MG/DL (ref 0.5–1.4)
EOSINOPHIL # BLD AUTO: 0 K/UL (ref 0–0.51)
EOSINOPHIL NFR BLD: 0 % (ref 0–6.9)
ERYTHROCYTE [DISTWIDTH] IN BLOOD BY AUTOMATED COUNT: 39.9 FL (ref 35.9–50)
GLOBULIN SER CALC-MCNC: 2.7 G/DL (ref 1.9–3.5)
GLUCOSE SERPL-MCNC: 185 MG/DL (ref 65–99)
HCT VFR BLD AUTO: 44.3 % (ref 42–52)
HGB BLD-MCNC: 15 G/DL (ref 14–18)
IMM GRANULOCYTES # BLD AUTO: 0.3 K/UL (ref 0–0.11)
IMM GRANULOCYTES NFR BLD AUTO: 1.9 % (ref 0–0.9)
LYMPHOCYTES # BLD AUTO: 0.47 K/UL (ref 1–4.8)
LYMPHOCYTES NFR BLD: 3 % (ref 22–41)
MCH RBC QN AUTO: 30.7 PG (ref 27–33)
MCHC RBC AUTO-ENTMCNC: 33.9 G/DL (ref 33.7–35.3)
MCV RBC AUTO: 90.8 FL (ref 81.4–97.8)
MONOCYTES # BLD AUTO: 1.08 K/UL (ref 0–0.85)
MONOCYTES NFR BLD AUTO: 6.9 % (ref 0–13.4)
NEUTROPHILS # BLD AUTO: 13.75 K/UL (ref 1.82–7.42)
NEUTROPHILS NFR BLD: 87.9 % (ref 44–72)
NRBC # BLD AUTO: 0 K/UL
NRBC BLD-RTO: 0 /100 WBC
PLATELET # BLD AUTO: 430 K/UL (ref 164–446)
PMV BLD AUTO: 10.7 FL (ref 9–12.9)
POTASSIUM SERPL-SCNC: 4.3 MMOL/L (ref 3.6–5.5)
PROT SERPL-MCNC: 5.6 G/DL (ref 6–8.2)
RBC # BLD AUTO: 4.88 M/UL (ref 4.7–6.1)
SODIUM SERPL-SCNC: 133 MMOL/L (ref 135–145)
WBC # BLD AUTO: 15.6 K/UL (ref 4.8–10.8)

## 2020-12-11 PROCEDURE — A9270 NON-COVERED ITEM OR SERVICE: HCPCS | Performed by: INTERNAL MEDICINE

## 2020-12-11 PROCEDURE — 700102 HCHG RX REV CODE 250 W/ 637 OVERRIDE(OP): Performed by: INTERNAL MEDICINE

## 2020-12-11 PROCEDURE — 770021 HCHG ROOM/CARE - ISO PRIVATE

## 2020-12-11 PROCEDURE — 36415 COLL VENOUS BLD VENIPUNCTURE: CPT

## 2020-12-11 PROCEDURE — 80053 COMPREHEN METABOLIC PANEL: CPT

## 2020-12-11 PROCEDURE — 700111 HCHG RX REV CODE 636 W/ 250 OVERRIDE (IP): Performed by: INTERNAL MEDICINE

## 2020-12-11 PROCEDURE — 84145 PROCALCITONIN (PCT): CPT

## 2020-12-11 PROCEDURE — 700111 HCHG RX REV CODE 636 W/ 250 OVERRIDE (IP): Performed by: STUDENT IN AN ORGANIZED HEALTH CARE EDUCATION/TRAINING PROGRAM

## 2020-12-11 PROCEDURE — 85025 COMPLETE CBC W/AUTO DIFF WBC: CPT

## 2020-12-11 PROCEDURE — A9270 NON-COVERED ITEM OR SERVICE: HCPCS | Performed by: STUDENT IN AN ORGANIZED HEALTH CARE EDUCATION/TRAINING PROGRAM

## 2020-12-11 PROCEDURE — 700102 HCHG RX REV CODE 250 W/ 637 OVERRIDE(OP): Performed by: STUDENT IN AN ORGANIZED HEALTH CARE EDUCATION/TRAINING PROGRAM

## 2020-12-11 RX ADMIN — OXYCODONE HYDROCHLORIDE AND ACETAMINOPHEN 1000 MG: 500 TABLET ORAL at 05:46

## 2020-12-11 RX ADMIN — Medication 1000 UNITS: at 05:47

## 2020-12-11 RX ADMIN — AZITHROMYCIN MONOHYDRATE 500 MG: 250 TABLET ORAL at 16:53

## 2020-12-11 RX ADMIN — ENOXAPARIN SODIUM 40 MG: 40 INJECTION SUBCUTANEOUS at 05:47

## 2020-12-11 RX ADMIN — DEXAMETHASONE 6 MG: 6 TABLET ORAL at 05:46

## 2020-12-11 RX ADMIN — ZINC SULFATE 220 MG (50 MG) CAPSULE 220 MG: CAPSULE at 05:46

## 2020-12-11 RX ADMIN — CEFTRIAXONE 2 G: 2 INJECTION, POWDER, FOR SOLUTION INTRAMUSCULAR; INTRAVENOUS at 05:47

## 2020-12-11 ASSESSMENT — ENCOUNTER SYMPTOMS
ABDOMINAL PAIN: 0
HEADACHES: 0
ORTHOPNEA: 0
EYE PAIN: 0
VOMITING: 0
EYE REDNESS: 0
CHILLS: 0
SHORTNESS OF BREATH: 0
NECK PAIN: 0
FOCAL WEAKNESS: 0
BACK PAIN: 0
SPUTUM PRODUCTION: 0
PALPITATIONS: 0
DIZZINESS: 1
EYE DISCHARGE: 0
WEIGHT LOSS: 0
STRIDOR: 0

## 2020-12-11 NOTE — PROGRESS NOTES
Hospital Medicine Daily Progress Note    Date of Service  12/11/2020    Chief Complaint  75 y.o. male admitted 12/9/2020 with dizziness, weakness, loss of appetite    Hospital Course  75 year old male diagnosed with covid 19 two weeks ago who presented 12/9/2020 with diarrhea that started two days ago non bloody and watery in nature approximately 4-5 episodes daily. Today he complained of feeling so weak that he almost passed out in the bathroom. He denies any one sick at home. He denies any loss of taste or smell. In the ED, he was found to be hypoxic saturating 86% on room air requiring oxygen supplementation. On review of his labs he had a leukocyte count of 14.6, hemoglobin of 20.2, hematocrit of 58 platelet of 465 consistent with dehydration. Furthermore, CMP showed sodium of 135, chloride of 95, anion gap of 17, BUN of 78, Creatine of 2.02, and elevated LFT's AST 68 and ALT of 90. Lactic acid was 2.8.      Patient was examined bedside is AAO x 4 not in any apparent distress. Breathing is unlabored however is requiring 4-5 liters of oxygen via nasal cannula.     Interval Problem Update  He stated that he is feeling a little bit better.  Currently on 2-3 L oxygen.    Consultants/Specialty  None    Code Status  Full Code    Disposition  Remain on the floor    Review of Systems  Review of Systems   Constitutional: Positive for malaise/fatigue. Negative for chills and weight loss.   HENT: Negative for congestion and nosebleeds.    Eyes: Negative for pain, discharge and redness.   Respiratory: Negative for sputum production, shortness of breath and stridor.    Cardiovascular: Negative for palpitations and orthopnea.   Gastrointestinal: Negative for abdominal pain and vomiting.   Genitourinary: Negative for frequency and urgency.   Musculoskeletal: Negative for back pain and neck pain.   Skin: Negative for itching and rash.   Neurological: Positive for dizziness. Negative for focal weakness and headaches.         Physical Exam  Temp:  [35.8 °C (96.5 °F)-36.4 °C (97.6 °F)] 36.3 °C (97.3 °F)  Pulse:  [64-78] 66  Resp:  [18-20] 18  BP: (138-156)/() 156/86  SpO2:  [90 %-96 %] 95 %    Physical Exam  Vitals signs reviewed.   Constitutional:       General: He is not in acute distress.     Appearance: Normal appearance.   HENT:      Head: Normocephalic and atraumatic.      Nose: No congestion or rhinorrhea.   Eyes:      Extraocular Movements: Extraocular movements intact.      Pupils: Pupils are equal, round, and reactive to light.   Neck:      Musculoskeletal: Normal range of motion and neck supple.   Cardiovascular:      Rate and Rhythm: Normal rate.      Pulses: Normal pulses.   Pulmonary:      Effort: Pulmonary effort is normal.      Breath sounds: Normal breath sounds.   Abdominal:      General: Bowel sounds are normal.      Palpations: Abdomen is soft.   Musculoskeletal:         General: No swelling.   Skin:     General: Skin is warm.   Neurological:      General: No focal deficit present.      Mental Status: He is alert.         Fluids    Intake/Output Summary (Last 24 hours) at 12/11/2020 0737  Last data filed at 12/10/2020 2146  Gross per 24 hour   Intake 1990 ml   Output 100 ml   Net 1890 ml       Laboratory  Recent Labs     12/09/20  1724   WBC 14.6*   RBC 6.37*   HEMOGLOBIN 20.2*   HEMATOCRIT 58.0*   MCV 91.1   MCH 31.7   MCHC 34.8   RDW 42.4   PLATELETCT 465*   MPV 10.5     Recent Labs     12/09/20  1724 12/10/20  0036   SODIUM 135 134*   POTASSIUM 4.3 4.5   CHLORIDE 95* 100   CO2 23 19*   GLUCOSE 195* 224*   BUN 78* 77*   CREATININE 2.02* 1.76*   CALCIUM 9.9 8.9     Recent Labs     12/09/20  1724   INR 1.34*               Imaging  US-EXTREMITY VENOUS LOWER BILAT   Final Result      US-RENAL   Final Result      1.  No hydronephrosis is identified.      2.  Simple and mildly complex left renal cysts. One of the cysts has a thin septation. No additional follow-up is recommended.      DX-CHEST-PORTABLE (1 VIEW)    Final Result      New patchy peripheral bilateral parenchymal opacities which could represent pneumonia.           Assessment/Plan  * Sepsis due to COVID-19 (HCC)  Assessment & Plan  This is Severe Sepsis Present on admission  SIRS criteria identified on my evaluation include: Tachycardia, with heart rate greater than 90 BPM, Tachypnea, with respirations greater than 20 per minute and Leukocytosis, with WBC greater than 12,000  Source of infection is Pulmonary COVID 19   Clinical indicators of end organ dysfunction include Creatinine >2.0 (Without ESRD or CKD)  Sepsis protocol initiated  Fluid resuscitation ordered per protocol  IV antibiotics as appropriate for source of sepsis  Reassessment: I have reassessed the patient's hemodynamic status  End organ dysfunction include(s):  Acute respiratory failure and Acute kidney failure   Check pro calcitonin if elevated start on IV antibiotics as appropriate for source of sepsis  While organ dysfunction may be noted elsewhere in this problem list or in the chart, degree of organ dysfunction does not meet CMS criteria for severe sepsis  Continue with COVID isolation   Encourage prone position   Continue with oxygen supplementation to keep O2 sat>92%   Blood cultures in process  Received 1 dose of Unasyn and Zithromax by ERP   Decadron 6 mg po daily for 10 days   Immune support   Avoid NSAID's   If patient develops respiratory distress place on high flow oxygen if no improvement will consult ICU for possible intubation   If patient becomes hypotensive avoid excessive fluid resuscitation- suggest starting pressors early.         Acute kidney failure (HCC)  Assessment & Plan  Likely pre renal   Avoid nephrotoxic medications   Urinary electrolytes   Improving  Follow CMP in the morning    Respiratory failure with hypoxia (HCC)  Assessment & Plan  Hypoxic respiratory failure secondary to COVID 19  Continue with oxygen supplementation to maintain spO2>90  Elevated  procalcitonin  Started on IV ceftriaxone and azithromycin  Currently on 2-3 L oxygen  Plan as above       VTE prophylaxis: lovenox

## 2020-12-11 NOTE — DOCUMENTATION QUERY
[facility]                                                                       Query Response Note      PATIENT:               NADIR SIN  ACCT #:                  [id number]  MRN:                     [id number]  :                      1945  ADMIT DATE:       2020 5:01 PM  DISCH DATE:          RESPONDING  PROVIDER #:        [id number]           QUERY TEXT:    COVID pneumonia is documented in the ED MD Notes. Additional related findings including an elevated procalcitonin and the utilization of antibiotics in the treatment plan are also noted in the Medical Record. Can the diagnosis of pneumonia be clarified?     NOTE:  If an appropriate response is not listed below, please respond with a new note.    The patient's Clinical Indicators include:  Per ED MD Notes   -Pneumonia due to COVID-19 virus    Per Progress Notes  -Elevated procalcitonin   -Started on IV ceftriaxone and azithromycin   -Currently on 4 L oxygen    Results Review:   WBC 14.6  Lactic acid 2.8 - 1.9  Procalcitonin 0.36  CXR: New patchy peripheral bilateral parenchymal opacities which could represent pneumonia.    Treatment:   Unasyn & azithromycin in ED, IV LR 1000ml bolus, Rocephin, azithromycin, Decadron, vitamin D, Zinc, ascorbic acid, supplemental O2 at 2-4L via N/C, proning, close clinical monitoring, & trend inflammatory markers    Risk Factors:   Severe Sepsis, COVID 19 infection, acute respiratory failure, acute kidney injury, & elevated procalcitonin    Thank You,  Falguni Vela RN BSN  Clinical   Connect via [employer]  Options provided:   -- COVID 19 pneumonia with suspected/likely superimposed bacterial pneumonia   -- COVID 19 pneumonia with suspected/likely aspiration pneumonia   -- COVID 19 pneumonia with suspected/likely other type of pneumonia, (Please specify other type of pneumonia)   -- COVID 19 pneumonia only   --  Pneumonia ruled out   -- Finding of no clinical significance   -- Unable to determine      Query created by: Falguni Vela on 12/11/2020 8:13 AM    RESPONSE TEXT:    COVID 19 pneumonia with suspected/likely superimposed bacterial pneumonia          Electronically signed by:  MENA HAMILTON MD 12/11/2020 10:17 AM

## 2020-12-11 NOTE — PROGRESS NOTES
Spoke with pt's daughter, Kaleigh, and updated her regarding pt's condition. Her questions were answered to her satisfaction.

## 2020-12-12 LAB
ALBUMIN SERPL BCP-MCNC: 3 G/DL (ref 3.2–4.9)
ALBUMIN/GLOB SERPL: 1.1 G/DL
ALP SERPL-CCNC: 68 U/L (ref 30–99)
ALT SERPL-CCNC: 87 U/L (ref 2–50)
ANION GAP SERPL CALC-SCNC: 5 MMOL/L (ref 7–16)
AST SERPL-CCNC: 43 U/L (ref 12–45)
BASOPHILS # BLD AUTO: 0.1 % (ref 0–1.8)
BASOPHILS # BLD: 0.02 K/UL (ref 0–0.12)
BILIRUB SERPL-MCNC: 0.6 MG/DL (ref 0.1–1.5)
BUN SERPL-MCNC: 40 MG/DL (ref 8–22)
CALCIUM SERPL-MCNC: 9.2 MG/DL (ref 8.5–10.5)
CHLORIDE SERPL-SCNC: 100 MMOL/L (ref 96–112)
CO2 SERPL-SCNC: 30 MMOL/L (ref 20–33)
CREAT SERPL-MCNC: 1 MG/DL (ref 0.5–1.4)
EOSINOPHIL # BLD AUTO: 0 K/UL (ref 0–0.51)
EOSINOPHIL NFR BLD: 0 % (ref 0–6.9)
ERYTHROCYTE [DISTWIDTH] IN BLOOD BY AUTOMATED COUNT: 40.6 FL (ref 35.9–50)
GLOBULIN SER CALC-MCNC: 2.8 G/DL (ref 1.9–3.5)
GLUCOSE SERPL-MCNC: 159 MG/DL (ref 65–99)
HCT VFR BLD AUTO: 44.7 % (ref 42–52)
HGB BLD-MCNC: 15.2 G/DL (ref 14–18)
IL6 SERPL-MCNC: 28.4 PG/ML
IMM GRANULOCYTES # BLD AUTO: 0.24 K/UL (ref 0–0.11)
IMM GRANULOCYTES NFR BLD AUTO: 1.8 % (ref 0–0.9)
LYMPHOCYTES # BLD AUTO: 0.59 K/UL (ref 1–4.8)
LYMPHOCYTES NFR BLD: 4.4 % (ref 22–41)
MCH RBC QN AUTO: 31.4 PG (ref 27–33)
MCHC RBC AUTO-ENTMCNC: 34 G/DL (ref 33.7–35.3)
MCV RBC AUTO: 92.4 FL (ref 81.4–97.8)
MONOCYTES # BLD AUTO: 1.36 K/UL (ref 0–0.85)
MONOCYTES NFR BLD AUTO: 10.1 % (ref 0–13.4)
NEUTROPHILS # BLD AUTO: 11.28 K/UL (ref 1.82–7.42)
NEUTROPHILS NFR BLD: 83.6 % (ref 44–72)
NRBC # BLD AUTO: 0 K/UL
NRBC BLD-RTO: 0 /100 WBC
PLATELET # BLD AUTO: 428 K/UL (ref 164–446)
PMV BLD AUTO: 10.4 FL (ref 9–12.9)
POTASSIUM SERPL-SCNC: 4.4 MMOL/L (ref 3.6–5.5)
PROCALCITONIN SERPL-MCNC: 0.33 NG/ML
PROT SERPL-MCNC: 5.8 G/DL (ref 6–8.2)
RBC # BLD AUTO: 4.84 M/UL (ref 4.7–6.1)
SODIUM SERPL-SCNC: 135 MMOL/L (ref 135–145)
WBC # BLD AUTO: 13.5 K/UL (ref 4.8–10.8)

## 2020-12-12 PROCEDURE — 80053 COMPREHEN METABOLIC PANEL: CPT

## 2020-12-12 PROCEDURE — 700102 HCHG RX REV CODE 250 W/ 637 OVERRIDE(OP): Performed by: STUDENT IN AN ORGANIZED HEALTH CARE EDUCATION/TRAINING PROGRAM

## 2020-12-12 PROCEDURE — A9270 NON-COVERED ITEM OR SERVICE: HCPCS | Performed by: STUDENT IN AN ORGANIZED HEALTH CARE EDUCATION/TRAINING PROGRAM

## 2020-12-12 PROCEDURE — 36415 COLL VENOUS BLD VENIPUNCTURE: CPT

## 2020-12-12 PROCEDURE — 700111 HCHG RX REV CODE 636 W/ 250 OVERRIDE (IP): Performed by: STUDENT IN AN ORGANIZED HEALTH CARE EDUCATION/TRAINING PROGRAM

## 2020-12-12 PROCEDURE — 85025 COMPLETE CBC W/AUTO DIFF WBC: CPT

## 2020-12-12 PROCEDURE — 700111 HCHG RX REV CODE 636 W/ 250 OVERRIDE (IP): Performed by: INTERNAL MEDICINE

## 2020-12-12 PROCEDURE — 770021 HCHG ROOM/CARE - ISO PRIVATE

## 2020-12-12 RX ORDER — DEXAMETHASONE 6 MG/1
6 TABLET ORAL DAILY
Qty: 7 TAB | Refills: 0 | Status: SHIPPED | OUTPATIENT
Start: 2020-12-12 | End: 2020-12-15

## 2020-12-12 RX ORDER — CEFDINIR 300 MG/1
300 CAPSULE ORAL 2 TIMES DAILY
Qty: 4 CAP | Refills: 0 | Status: SHIPPED | OUTPATIENT
Start: 2020-12-12 | End: 2020-12-14

## 2020-12-12 RX ADMIN — OXYCODONE HYDROCHLORIDE AND ACETAMINOPHEN 1000 MG: 500 TABLET ORAL at 06:03

## 2020-12-12 RX ADMIN — ENOXAPARIN SODIUM 40 MG: 40 INJECTION SUBCUTANEOUS at 06:03

## 2020-12-12 RX ADMIN — Medication 1000 UNITS: at 06:03

## 2020-12-12 RX ADMIN — ZINC SULFATE 220 MG (50 MG) CAPSULE 220 MG: CAPSULE at 06:03

## 2020-12-12 RX ADMIN — CEFTRIAXONE 2 G: 2 INJECTION, POWDER, FOR SOLUTION INTRAMUSCULAR; INTRAVENOUS at 06:03

## 2020-12-12 RX ADMIN — DEXAMETHASONE 6 MG: 6 TABLET ORAL at 10:25

## 2020-12-12 NOTE — CARE PLAN
Problem: Respiratory:  Goal: Respiratory status will improve  Outcome: PROGRESSING SLOWER THAN EXPECTED  Intervention: Administer and titrate oxygen therapy  Note: Unable to wean pt off O2 as pt desaturates to 86% off of it. Plan was to go home today possibly but MD aware of condition and ok for pt to stay one more night to improve oxygenation. O2 qual tomorrow if applicable

## 2020-12-12 NOTE — DISCHARGE PLANNING
Anticipated Discharge Disposition:   Home with possible home oxygen, possible home monitoring    Action:   Discussed discharge planning needs during rounds. Per MD, possble need for home oxygen, pending ome oxygen eval and order.     Barriers to Discharge:   Medical clearance  DME order, pending home oxygen eval.    Plan:   Hospital Care Management will continue to follow and assist with discharge planning needs.

## 2020-12-12 NOTE — DISCHARGE SUMMARY
Discharge Summary    CHIEF COMPLAINT ON ADMISSION  Chief Complaint   Patient presents with   • Weakness     generalized weakness increasing over last few days   • Dizziness   • Coronavirus Screening     dx with covid 2 wks ago    • Loss of Appetite       Reason for Admission  EMS     Admission Date  12/9/2020    CODE STATUS  Full Code    HPI & HOSPITAL COURSE  75 year old male diagnosed with covid 19 two weeks ago who presented 12/9/2020 with diarrhea that started two days ago non bloody and watery in nature approximately 4-5 episodes daily. Today he complained of feeling so weak that he almost passed out in the bathroom. He denies any one sick at home. He denies any loss of taste or smell. In the ED, he was found to be hypoxic saturating 86% on room air requiring oxygen supplementation. On review of his labs he had a leukocyte count of 14.6, hemoglobin of 20.2, hematocrit of 58 platelet of 465 consistent with dehydration. Furthermore, CMP showed sodium of 135, chloride of 95, anion gap of 17, BUN of 78, Creatine of 2.02, and elevated LFT's AST 68 and ALT of 90. Lactic acid was 2.8.      Patient was examined bedside is AAO x 4 not in any apparent distress. Breathing is unlabored however is requiring 4-5 liters of oxygen via nasal cannula.   His pro calcitonin level came back high and he was treated with antibiotic for pneumonia.  His symptoms continue into improved on a daily basis and today he is satting well on room air at rest.  Home oxygen evaluation will be done and will provide the patient with oxygen if needed.  He stated that he felt like he is back to his baseline and would like to be discharged home.  He is instructed to come back to ER if his symptoms get worse.  He will complete his antibiotic for 2 more days.  I saw and examined the patient upon discharge.  Please call [phone] to schedule PCP appointment for patient.    Required specialty appointments include:     As below  Therefore, he is  discharged in fair and stable condition to home with close outpatient follow-up.    The patient met 2-midnight criteria for an inpatient stay at the time of discharge.    Discharge Date  12/13/20      FOLLOW UP ITEMS POST DISCHARGE      DISCHARGE DIAGNOSES  Principal Problem:    Sepsis due to COVID-19 (HCC) POA: Unknown  Active Problems:    Respiratory failure with hypoxia (HCC) POA: Unknown    Acute kidney failure (HCC) POA: Unknown  Resolved Problems:    Sepsis with acute hypoxic respiratory failure (HCC) POA: Unknown      FOLLOW UP  No future appointments.  PCP    In 1 week        MEDICATIONS ON DISCHARGE     Medication List      START taking these medications      Instructions   cefdinir 300 MG Caps  Commonly known as: OMNICEF   Take 1 Cap by mouth 2 times a day for 2 days.  Dose: 300 mg     dexamethasone 6 MG Tabs  Commonly known as: DECADRON   Take 1 Tab by mouth every day for 7 days.  Dose: 6 mg        CONTINUE taking these medications      Instructions   ICAPS AREDS 2 PO   Take 2 Tabs by mouth every morning.  Dose: 2 Tab            Allergies  No Known Allergies    DIET  Orders Placed This Encounter   Procedures   • Diet Order Diet: Regular     Standing Status:   Standing     Number of Occurrences:   1     Order Specific Question:   Diet:     Answer:   Regular [1]       ACTIVITY  As tolerated.  Weight bearing as tolerated    CONSULTATIONS      PROCEDURES      LABORATORY  Lab Results   Component Value Date    SODIUM 135 12/12/2020    POTASSIUM 4.4 12/12/2020    CHLORIDE 100 12/12/2020    CO2 30 12/12/2020    GLUCOSE 159 (H) 12/12/2020    BUN 40 (H) 12/12/2020    CREATININE 1.00 12/12/2020        Lab Results   Component Value Date    WBC 13.5 (H) 12/12/2020    HEMOGLOBIN 15.2 12/12/2020    HEMATOCRIT 44.7 12/12/2020    PLATELETCT 428 12/12/2020        Total time of the discharge process exceeds 39 minutes.

## 2020-12-13 VITALS
OXYGEN SATURATION: 96 % | RESPIRATION RATE: 16 BRPM | SYSTOLIC BLOOD PRESSURE: 131 MMHG | BODY MASS INDEX: 27.49 KG/M2 | TEMPERATURE: 96.9 F | DIASTOLIC BLOOD PRESSURE: 89 MMHG | WEIGHT: 192 LBS | HEIGHT: 70 IN | HEART RATE: 82 BPM

## 2020-12-13 PROCEDURE — A9270 NON-COVERED ITEM OR SERVICE: HCPCS | Performed by: INTERNAL MEDICINE

## 2020-12-13 PROCEDURE — 700111 HCHG RX REV CODE 636 W/ 250 OVERRIDE (IP): Performed by: STUDENT IN AN ORGANIZED HEALTH CARE EDUCATION/TRAINING PROGRAM

## 2020-12-13 PROCEDURE — 700102 HCHG RX REV CODE 250 W/ 637 OVERRIDE(OP): Performed by: STUDENT IN AN ORGANIZED HEALTH CARE EDUCATION/TRAINING PROGRAM

## 2020-12-13 PROCEDURE — 700102 HCHG RX REV CODE 250 W/ 637 OVERRIDE(OP): Performed by: INTERNAL MEDICINE

## 2020-12-13 PROCEDURE — A9270 NON-COVERED ITEM OR SERVICE: HCPCS | Performed by: STUDENT IN AN ORGANIZED HEALTH CARE EDUCATION/TRAINING PROGRAM

## 2020-12-13 RX ORDER — CEFDINIR 300 MG/1
300 CAPSULE ORAL EVERY 12 HOURS
Status: DISCONTINUED | OUTPATIENT
Start: 2020-12-13 | End: 2020-12-13 | Stop reason: HOSPADM

## 2020-12-13 RX ADMIN — ZINC SULFATE 220 MG (50 MG) CAPSULE 220 MG: CAPSULE at 05:49

## 2020-12-13 RX ADMIN — DEXAMETHASONE 6 MG: 6 TABLET ORAL at 05:49

## 2020-12-13 RX ADMIN — ENOXAPARIN SODIUM 40 MG: 40 INJECTION SUBCUTANEOUS at 05:50

## 2020-12-13 RX ADMIN — CEFDINIR 300 MG: 300 CAPSULE ORAL at 17:21

## 2020-12-13 RX ADMIN — Medication 1000 UNITS: at 05:49

## 2020-12-13 RX ADMIN — CEFDINIR 300 MG: 300 CAPSULE ORAL at 10:47

## 2020-12-13 RX ADMIN — OXYCODONE HYDROCHLORIDE AND ACETAMINOPHEN 1000 MG: 500 TABLET ORAL at 05:49

## 2020-12-13 NOTE — DISCHARGE PLANNING
Received Choice form at 0488  Agency[facility]Facility Name: [facility]  Referral sent per Choice form @ 8645

## 2020-12-13 NOTE — DISCHARGE PLANNING
Anticipated Discharge Disposition: home with 02    Action: obtained [facility] for dme: [facility]. Pt's discharge address is: [facility] [id number], [place] [id number]. Pt does not have PCP. Faxed choice form to [facility] at [phone].     Barriers to Discharge: 02 acceptance, delivery    Plan: follow up with [facility] for 02 tank delivery

## 2020-12-13 NOTE — FACE TO FACE
Face to Face Note  -  Durable Medical Equipment    Vel Downing M.D. - NPI: [id number]  I certify that this patient is under my care and that they had a durable medical equipment(DME)face to face encounter by myself that meets the physician DME face-to-face encounter requirements with this patient on:    Date of encounter:   Patient:                    MRN:                       YOB: 2018  Lewis Mohr  [id number]  1945     The encounter with the patient was in whole, or in part, for the following medical condition, which is the primary reason for durable medical equipment:  covid    I certify that, based on my findings, the following durable medical equipment is medically necessary:  Oxygen.    HOME O2 Saturation Measurements:(Values must be present for Home Oxygen orders)  Room air sat at rest: 88  Room air sat with amb: 86  With liters of O2: 1, O2 sat at rest with O2: 93  With Liters of O2: 2, O2 sat with amb with O2 : 91  Is the patient mobile?: Yes    My Clinical findings support the need for the above equipment due to:  Hypoxia    Supporting Symptoms: hypoxia    If patient feels more short of breath, they can go up to 6 liters per minute and contact healthcare provider.

## 2020-12-13 NOTE — DISCHARGE PLANNING
Agency/Facility Name: [facility]  Spoke To: Angie  Outcome: Quote for ambulatory transport from [facility] to home:   [address], [place] [address]  Quote is $82.55    @7424  Agency/Facility Name: [facility]  Spoke To: Mamie  Outcome: Order will be process next.

## 2020-12-14 LAB
BACTERIA BLD CULT: NORMAL
SIGNIFICANT IND 70042: NORMAL
SITE SITE: NORMAL
SOURCE SOURCE: NORMAL

## 2020-12-14 NOTE — DISCHARGE PLANNING
[facility] faxed the TCF and approved services to [facility] at [phone] via manual fax.  Fax #[phone]

## 2020-12-14 NOTE — DISCHARGE PLANNING
Received Transport Form @ 9412  Spoke to Angie @ [facility]    Transport is scheduled for 12/13/2020 @4889 - 3843 going to home: [address], [place] [address]    Reservation #[id number]    CHARLIE Leong notified of transport time via Teams.      CHARLIE Leong will call and cancel transport if oxygen is not delivered.

## 2020-12-14 NOTE — PROGRESS NOTES
Discharge instructions given and explained to patient. Patient is to call [facility] when he gets home so they can deliver oxygen concentrator.  Per gentleman that delivered oxygen tank, the tank will run out in 10 hours if kept at 1L/min.  Phone number given to patient and patient verbalized understanding.      Patient is to follow up with PCP or Urgent Care in 1 week.  Informed patient that his medications are ready to be picked up at [facility] and that both prescribed medications are due tomorrow morning.  [facility] given to patient as well.  Patiend to return to ED with any complications or SOB.  Patient verbalized understanding of all discharge instructions.

## 2020-12-14 NOTE — DISCHARGE INSTRUCTIONS
Follow up with a Primary Care Physician or go to Urgent Care in 1 week for follow up.    Set oxygen tank at 1-2 liters.    May increase to 6 liters if short of breath.    Patient may purchase pulse oximeter (sold at [employer] or other medical supply stores) to monitor oxygen saturation.    Contact [facility] to check when cleared to go back into public.          COVID-19  COVID-19 is a respiratory infection that is caused by a virus called severe acute respiratory syndrome coronavirus 2 (SARS-CoV-2). The disease is also known as coronavirus disease or novel coronavirus. In some people, the virus may not cause any symptoms. In others, it may cause a serious infection. The infection can get worse quickly and can lead to complications, such as:  · Pneumonia, or infection of the lungs.  · Acute respiratory distress syndrome or ARDS. This is fluid build-up in the lungs.  · Acute respiratory failure. This is a condition in which there is not enough oxygen passing from the lungs to the body.  · Sepsis or septic shock. This is a serious bodily reaction to an infection.  · Blood clotting problems.  · Secondary infections due to bacteria or fungus.  The virus that causes COVID-19 is contagious. This means that it can spread from person to person through droplets from coughs and sneezes (respiratory secretions).  What are the causes?  This illness is caused by a virus. You may catch the virus by:  · Breathing in droplets from an infected person's cough or sneeze.  · Touching something, like a table or a doorknob, that was exposed to the virus (contaminated) and then touching your mouth, nose, or eyes.  What increases the risk?  Risk for infection  You are more likely to be infected with this virus if you:  · Live in or travel to an area with a COVID-19 outbreak.  · Come in contact with a sick person who recently traveled to an area with a COVID-19 outbreak.  · Provide care for or live with a person who is  infected with COVID-19.  Risk for serious illness  You are more likely to become seriously ill from the virus if you:  · Are 65 years of age or older.  · Have a long-term disease that lowers your body's ability to fight infection (immunocompromised).  · Live in a nursing home or long-term care facility.  · Have a long-term (chronic) disease such as:  ? Chronic lung disease, including chronic obstructive pulmonary disease or asthma  ? Heart disease.  ? Diabetes.  ? Chronic kidney disease.  ? Liver disease.  · Are obese.  What are the signs or symptoms?  Symptoms of this condition can range from mild to severe. Symptoms may appear any time from 2 to 14 days after being exposed to the virus. They include:  · A fever.  · A cough.  · Difficulty breathing.  · Chills.  · Muscle pains.  · A sore throat.  · Loss of taste or smell.  Some people may also have stomach problems, such as nausea, vomiting, or diarrhea.  Other people may not have any symptoms of COVID-19.  How is this diagnosed?  This condition may be diagnosed based on:  · Your signs and symptoms, especially if:  ? You live in an area with a COVID-19 outbreak.  ? You recently traveled to or from an area where the virus is common.  ? You provide care for or live with a person who was diagnosed with COVID-19.  · A physical exam.  · Lab tests, which may include:  ? A nasal swab to take a sample of fluid from your nose.  ? A throat swab to take a sample of fluid from your throat.  ? A sample of mucus from your lungs (sputum).  ? Blood tests.  · Imaging tests, which may include, X-rays, CT scan, or ultrasound.  How is this treated?  At present, there is no medicine to treat COVID-19. Medicines that treat other diseases are being used on a trial basis to see if they are effective against COVID-19.  Your health care provider will talk with you about ways to treat your symptoms. For most people, the infection is mild and can be managed at home with rest, fluids, and  over-the-counter medicines.  Treatment for a serious infection usually takes places in a hospital intensive care unit (ICU). It may include one or more of the following treatments. These treatments are given until your symptoms improve.  · Receiving fluids and medicines through an IV.  · Supplemental oxygen. Extra oxygen is given through a tube in the nose, a face mask, or a anguiano.  · Positioning you to lie on your stomach (prone position). This makes it easier for oxygen to get into the lungs.  · Continuous positive airway pressure (CPAP) or bi-level positive airway pressure (BPAP) machine. This treatment uses mild air pressure to keep the airways open. A tube that is connected to a motor delivers oxygen to the body.  · Ventilator. This treatment moves air into and out of the lungs by using a tube that is placed in your windpipe.  · Tracheostomy. This is a procedure to create a hole in the neck so that a breathing tube can be inserted.  · Extracorporeal membrane oxygenation (ECMO). This procedure gives the lungs a chance to recover by taking over the functions of the heart and lungs. It supplies oxygen to the body and removes carbon dioxide.  Follow these instructions at home:  Lifestyle  · If you are sick, stay home except to get medical care. Your health care provider will tell you how long to stay home. Call your health care provider before you go for medical care.  · Rest at home as told by your health care provider.  · Do not use any products that contain nicotine or tobacco, such as cigarettes, e-cigarettes, and chewing tobacco. If you need help quitting, ask your health care provider.  · Return to your normal activities as told by your health care provider. Ask your health care provider what activities are safe for you.  General instructions  · Take over-the-counter and prescription medicines only as told by your health care provider.  · Drink enough fluid to keep your urine pale yellow.  · Keep all follow-up  visits as told by your health care provider. This is important.  How is this prevented?    There is no vaccine to help prevent COVID-19 infection. However, there are steps you can take to protect yourself and others from this virus.  To protect yourself:   · Do not travel to areas where COVID-19 is a risk. The areas where COVID-19 is reported change often. To identify high-risk areas and travel restrictions, check the CDC travel website: wwwnc.cdc.gov/travel/notices  · If you live in, or must travel to, an area where COVID-19 is a risk, take precautions to avoid infection.  ? Stay away from people who are sick.  ? Wash your hands often with soap and water for 20 seconds. If soap and water are not available, use an alcohol-based hand .  ? Avoid touching your mouth, face, eyes, or nose.  ? Avoid going out in public, follow guidance from your state and local health authorities.  ? If you must go out in public, wear a cloth face covering or face mask.  ? Disinfect objects and surfaces that are frequently touched every day. This may include:  § Counters and tables.  § Doorknobs and light switches.  § Sinks and faucets.  § Electronics, such as phones, remote controls, keyboards, computers, and tablets.  To protect others:  If you have symptoms of COVID-19, take steps to prevent the virus from spreading to others.  · If you think you have a COVID-19 infection, contact your health care provider right away. Tell your health care team that you think you may have a COVID-19 infection.  · Stay home. Leave your house only to seek medical care. Do not use public transport.  · Do not travel while you are sick.  · Wash your hands often with soap and water for 20 seconds. If soap and water are not available, use alcohol-based hand .  · Stay away from other members of your household. Let healthy household members care for children and pets, if possible. If you have to care for children or pets, wash your hands often  and wear a mask. If possible, stay in your own room, separate from others. Use a different bathroom.  · Make sure that all people in your household wash their hands well and often.  · Cough or sneeze into a tissue or your sleeve or elbow. Do not cough or sneeze into your hand or into the air.  · Wear a cloth face covering or face mask.  Where to find more information  · Centers for Disease Control and Prevention: www.cdc.gov/coronavirus/2019-ncov/index.html  · World Health Organization: www.who.int/health-topics/coronavirus  Contact a health care provider if:  · You live in or have traveled to an area where COVID-19 is a risk and you have symptoms of the infection.  · You have had contact with someone who has COVID-19 and you have symptoms of the infection.  Get help right away if:  · You have trouble breathing.  · You have pain or pressure in your chest.  · You have confusion.  · You have bluish lips and fingernails.  · You have difficulty waking from sleep.  · You have symptoms that get worse.  These symptoms may represent a serious problem that is an emergency. Do not wait to see if the symptoms will go away. Get medical help right away. Call your local emergency services (911 in the [place] Do not drive yourself to the hospital. Let the emergency medical personnel know if you think you have COVID-19.  Summary  · COVID-19 is a respiratory infection that is caused by a virus. It is also known as coronavirus disease or novel coronavirus. It can cause serious infections, such as pneumonia, acute respiratory distress syndrome, acute respiratory failure, or sepsis.  · The virus that causes COVID-19 is contagious. This means that it can spread from person to person through droplets from coughs and sneezes.  · You are more likely to develop a serious illness if you are 65 years of age or older, have a weak immunity, live in a nursing home, or have chronic disease.  · There is no medicine to treat COVID-19. Your health  care provider will talk with you about ways to treat your symptoms.  · Take steps to protect yourself and others from infection. Wash your hands often and disinfect objects and surfaces that are frequently touched every day. Stay away from people who are sick and wear a mask if you are sick.  This information is not intended to replace advice given to you by your health care provider. Make sure you discuss any questions you have with your health care provider.  Document Released: 01/23/2020 Document Revised: 05/14/2020 Document Reviewed: 01/23/2020  [employer]vier Patient Education © 2020 [employer]        COVID-19 Frequently Asked Questions  COVID-19 (coronavirus disease) is an infection that is caused by a large family of viruses. Some viruses cause illness in people and others cause illness in animals like camels, cats, and bats. In some cases, the viruses that cause illness in animals can spread to humans.  Where did the coronavirus come from?  In December 2019, [place] told the World Health Organization (WHO) of several cases of lung disease (human respiratory illness). These cases were linked to an open seafood and livestock market in the city of [facility]. The link to the seafood and livestock market suggests that the virus may have spread from animals to humans. However, since that first outbreak in December, the virus has also been shown to spread from person to person.  What is the name of the disease and the virus?  Disease name  Early on, this disease was called novel coronavirus. This is because scientists determined that the disease was caused by a new (novel) respiratory virus. The World Health Organization (WHO) has now named the disease COVID-19, or coronavirus disease.  Virus name  The virus that causes the disease is called severe acute respiratory syndrome coronavirus 2 (SARS-CoV-2).  More information on disease and virus naming  World Health Organization (WHO):  www.who.int/emergencies/diseases/novel-coronavirus-2019/technical-guidance/naming-the-coronavirus-disease-(covid-2019)-and-the-virus-that-causes-it  Who is at risk for complications from coronavirus disease?  Some people may be at higher risk for complications from coronavirus disease. This includes older adults and people who have chronic diseases, such as heart disease, diabetes, and lung disease.  If you are at higher risk for complications, take these extra precautions:  · Avoid close contact with people who are sick or have a fever or cough. Stay at least 3-6 ft (1-2 m) away from them, if possible.  · Wash your hands often with soap and water for at least 20 seconds.  · Avoid touching your face, mouth, nose, or eyes.  · Keep supplies on hand at home, such as food, medicine, and cleaning supplies.  · Stay home as much as possible.  · Avoid social gatherings and travel.  How does coronavirus disease spread?  The virus that causes coronavirus disease spreads easily from person to person (is contagious). There are also cases of community-spread disease. This means the disease has spread to:  · People who have no known contact with other infected people.  · People who have not traveled to areas where there are known cases.  It appears to spread from one person to another through droplets from coughing or sneezing.  Can I get the virus from touching surfaces or objects?  There is still a lot that we do not know about the virus that causes coronavirus disease. Scientists are basing a lot of information on what they know about similar viruses, such as:  · Viruses cannot generally survive on surfaces for long. They need a human body (host) to survive.  · It is more likely that the virus is spread by close contact with people who are sick (direct contact), such as through:  ? Shaking hands or hugging.  ? Breathing in respiratory droplets that travel through the air. This can happen when an infected person coughs or  sneezes on or near other people.  · It is less likely that the virus is spread when a person touches a surface or object that has the virus on it (indirect contact). The virus may be able to enter the body if the person touches a surface or object and then touches his or her face, eyes, nose, or mouth.  Can a person spread the virus without having symptoms of the disease?  It may be possible for the virus to spread before a person has symptoms of the disease, but this is most likely not the main way the virus is spreading. It is more likely for the virus to spread by being in close contact with people who are sick and breathing in the respiratory droplets of a sick person's cough or sneeze.  What are the symptoms of coronavirus disease?  Symptoms vary from person to person and can range from mild to severe. Symptoms may include:  · Fever.  · Cough.  · Tiredness, weakness, or fatigue.  · Fast breathing or feeling short of breath.  These symptoms can appear anywhere from 2 to 14 days after you have been exposed to the virus. If you develop symptoms, call your health care provider. People with severe symptoms may need hospital care.  If I am exposed to the virus, how long does it take before symptoms start?  Symptoms of coronavirus disease may appear anywhere from 2 to 14 days after a person has been exposed to the virus. If you develop symptoms, call your health care provider.  Should I be tested for this virus?  Your health care provider will decide whether to test you based on your symptoms, history of exposure, and your risk factors.  How does a health care provider test for this virus?  Health care providers will collect samples to send for testing. Samples may include:  · Taking a swab of fluid from the nose.  · Taking fluid from the lungs by having you cough up mucus (sputum) into a sterile cup.  · Taking a blood sample.  · Taking a stool or urine sample.  Is there a treatment or vaccine for this  virus?  Currently, there is no vaccine to prevent coronavirus disease. Also, there are no medicines like antibiotics or antivirals to treat the virus. A person who becomes sick is given supportive care, which means rest and fluids. A person may also relieve his or her symptoms by using over-the-counter medicines that treat sneezing, coughing, and runny nose. These are the same medicines that a person takes for the common cold.  If you develop symptoms, call your health care provider. People with severe symptoms may need hospital care.  What can I do to protect myself and my family from this virus?         You can protect yourself and your family by taking the same actions that you would take to prevent the spread of other viruses. Take the following actions:  · Wash your hands often with soap and water for at least 20 seconds. If soap and water are not available, use alcohol-based hand .  · Avoid touching your face, mouth, nose, or eyes.  · Cough or sneeze into a tissue, sleeve, or elbow. Do not cough or sneeze into your hand or the air.  ? If you cough or sneeze into a tissue, throw it away immediately and wash your hands.  · Disinfect objects and surfaces that you frequently touch every day.  · Avoid close contact with people who are sick or have a fever or cough. Stay at least 3-6 ft (1-2 m) away from them, if possible.  · Stay home if you are sick, except to get medical care. Call your health care provider before you get medical care.  · Make sure your vaccines are up to date. Ask your health care provider what vaccines you need.  What should I do if I need to travel?  Follow travel recommendations from your local health authority, the CDC, and WHO.  Travel information and advice  · Centers for Disease Control and Prevention (CDC): www.cdc.gov/coronavirus/2019-ncov/travelers/index.html  · World Health Organization (WHO): www.who.int/emergencies/diseases/novel-coronavirus-2019/travel-advice  Know the  risks and take action to protect your health  · You are at higher risk of getting coronavirus disease if you are traveling to areas with an outbreak or if you are exposed to travelers from areas with an outbreak.  · Wash your hands often and practice good hygiene to lower the risk of catching or spreading the virus.  What should I do if I am sick?  General instructions to stop the spread of infection  · Wash your hands often with soap and water for at least 20 seconds. If soap and water are not available, use alcohol-based hand .  · Cough or sneeze into a tissue, sleeve, or elbow. Do not cough or sneeze into your hand or the air.  · If you cough or sneeze into a tissue, throw it away immediately and wash your hands.  · Stay home unless you must get medical care. Call your health care provider or local health authority before you get medical care.  · Avoid public areas. Do not take public transportation, if possible.  · If you can, wear a mask if you must go out of the house or if you are in close contact with someone who is not sick.  Keep your home clean  · Disinfect objects and surfaces that are frequently touched every day. This may include:  ? Counters and tables.  ? Doorknobs and light switches.  ? Sinks and faucets.  ? Electronics such as phones, remote controls, keyboards, computers, and tablets.  · Wash dishes in hot, soapy water or use a . Air-dry your dishes.  · Wash laundry in hot water.  Prevent infecting other household members  · Let healthy household members care for children and pets, if possible. If you have to care for children or pets, wash your hands often and wear a mask.  · Sleep in a different bedroom or bed, if possible.  · Do not share personal items, such as razors, toothbrushes, deodorant, alonso, brushes, towels, and washcloths.  Where to find more information  Centers for Disease Control and Prevention (CDC)  · Information and news updates:  www.cdc.gov/coronavirus/2019-ncov  World Health Organization (WHO)  · Information and news updates: www.who.int/emergencies/diseases/novel-coronavirus-2019  · Coronavirus health topic: www.who.int/health-topics/coronavirus  · Questions and answers on COVID-19: www.who.int/news-room/q-a-detail/p-f-hoaxjasvmytvs  · Global tracker: [employer]  American Academy of Pediatrics (AAP)  · Information for families: www.healthychildren.org/English/health-issues/conditions/chest-lungs/Pages/2019-Novel-Coronavirus.aspx  The coronavirus situation is changing rapidly. Check your local health authority website or the CDC and WHO websites for updates and news.  When should I contact a health care provider?  · Contact your health care provider if you have symptoms of an infection, such as fever or cough, and you:  ? Have been near anyone who is known to have coronavirus disease.  ? Have come into contact with a person who is suspected to have coronavirus disease.  ? Have traveled outside of the country.  When should I get emergency medical care?  · Get help right away by calling your local emergency services (911 in the [place] if you have:  ? Trouble breathing.  ? Pain or pressure in your chest.  ? Confusion.  ? Blue-tinged lips and fingernails.  ? Difficulty waking from sleep.  ? Symptoms that get worse.  Let the emergency medical personnel know if you think you have coronavirus disease.  Summary  · A new respiratory virus is spreading from person to person and causing COVID-19 (coronavirus disease).  · The virus that causes COVID-19 appears to spread easily. It spreads from one person to another through droplets from coughing or sneezing.  · Older adults and those with chronic diseases are at higher risk of disease. If you are at higher risk for complications, take extra precautions.  · There is currently no vaccine to prevent coronavirus disease. There are no medicines, such as antibiotics or antivirals, to treat the  virus.  · You can protect yourself and your family by washing your hands often, avoiding touching your face, and covering your coughs and sneezes.  This information is not intended to replace advice given to you by your health care provider. Make sure you discuss any questions you have with your health care provider.  Document Released: 04/14/2020 Document Revised: 04/14/2020 Document Reviewed: 04/14/2020  [employer] Patient Education © 2020 [employer]      Discharge Instructions    Discharged to home by medical transportation with self. Discharged via wheelchair, hospital escort: Refused.  Special equipment needed: Oxygen    Be sure to schedule a follow-up appointment with your primary care doctor or any specialists as instructed.     Discharge Plan:   Diet Plan: Discussed  Activity Level: Discussed  Confirmed Follow up Appointment: Patient to Call and Schedule Appointment  Confirmed Symptoms Management: Discussed  Medication Reconciliation Updated: Yes  Influenza Vaccine Indication: Indicated: 65 years and older    I understand that a diet low in cholesterol, fat, and sodium is recommended for good health. Unless I have been given specific instructions below for another diet, I accept this instruction as my diet prescription.   Other diet: regular    Special Instructions: None    · Is patient discharged on Warfarin / Coumadin?   No     Depression / Suicide Risk    As [facility] are [facility], it is important to learn how to keep safe from harming yourself.    Recognize the warning signs:  · Abrupt changes in personality, positive or negative- including increase in energy   · Giving away possessions  · Change in eating patterns- significant weight changes-  positive or negative  · Change in sleeping patterns- unable to sleep or sleeping all the time   · Unwillingness or inability to communicate  · Depression  · Unusual sadness, discouragement and loneliness  · Talk of wanting to  die  · Neglect of personal appearance   · Rebelliousness- reckless behavior  · Withdrawal from people/activities they love  · Confusion- inability to concentrate     If you or a loved one observes any of these behaviors or has concerns about self-harm, here's what you can do:  · Talk about it- your feelings and reasons for harming yourself  · Remove any means that you might use to hurt yourself (examples: pills, rope, extension cords, firearm)  · Get professional help from the community (Mental Health, Substance Abuse, psychological counseling)  · Do not be alone:Call your Safe Contact- someone whom you trust who will be there for you.  · Call your local CRISIS HOTLINE 500-4295 or [phone]  · Call your local Children's Mobile Crisis Response Team [place] (353) 104-1211 or www.[employer] [facility]  · Call the toll free National Suicide Prevention Hotlines   · National Suicide Prevention Lifeline 407-521-WXZG (1115)  · National Hope Line Network 800-SUICIDE (832-8871)

## 2020-12-14 NOTE — CARE PLAN
Problem: Respiratory:  Goal: Respiratory status will improve  Outcome: MET     Problem: Fluid Volume:  Goal: Will maintain balanced intake and output  Outcome: MET     Problem: Communication  Goal: The ability to communicate needs accurately and effectively will improve  Outcome: MET     Problem: Safety  Goal: Will remain free from injury  Outcome: MET  Goal: Will remain free from falls  Outcome: MET     Problem: Infection  Goal: Will remain free from infection  Outcome: MET     Problem: Venous Thromboembolism (VTW)/Deep Vein Thrombosis (DVT) Prevention:  Goal: Patient will participate in Venous Thrombosis (VTE)/Deep Vein Thrombosis (DVT)Prevention Measures  Outcome: MET     Problem: Bowel/Gastric:  Goal: Normal bowel function is maintained or improved  Outcome: MET  Goal: Will not experience complications related to bowel motility  Outcome: MET     Problem: Knowledge Deficit  Goal: Knowledge of disease process/condition, treatment plan, diagnostic tests, and medications will improve  Outcome: MET  Goal: Knowledge of the prescribed therapeutic regimen will improve  Outcome: MET     Problem: Discharge Barriers/Planning  Goal: Patient's continuum of care needs will be met  Outcome: MET

## 2020-12-15 ENCOUNTER — HOSPITAL ENCOUNTER (INPATIENT)
Facility: MEDICAL CENTER | Age: 75
LOS: 1 days | DRG: 177 | End: 2020-12-16
Attending: EMERGENCY MEDICINE | Admitting: STUDENT IN AN ORGANIZED HEALTH CARE EDUCATION/TRAINING PROGRAM
Payer: MEDICARE

## 2020-12-15 ENCOUNTER — APPOINTMENT (OUTPATIENT)
Dept: RADIOLOGY | Facility: MEDICAL CENTER | Age: 75
DRG: 177 | End: 2020-12-15
Attending: EMERGENCY MEDICINE
Payer: MEDICARE

## 2020-12-15 DIAGNOSIS — R09.02 HYPOXIA: ICD-10-CM

## 2020-12-15 DIAGNOSIS — U07.1 COVID-19: ICD-10-CM

## 2020-12-15 DIAGNOSIS — D72.829 LEUKOCYTOSIS, UNSPECIFIED TYPE: ICD-10-CM

## 2020-12-15 LAB
ALBUMIN SERPL BCP-MCNC: 3.6 G/DL (ref 3.2–4.9)
ALBUMIN/GLOB SERPL: 1.1 G/DL
ALP SERPL-CCNC: 88 U/L (ref 30–99)
ALT SERPL-CCNC: 128 U/L (ref 2–50)
ANION GAP SERPL CALC-SCNC: 9 MMOL/L (ref 7–16)
AST SERPL-CCNC: 38 U/L (ref 12–45)
BASOPHILS # BLD AUTO: 0.4 % (ref 0–1.8)
BASOPHILS # BLD: 0.08 K/UL (ref 0–0.12)
BILIRUB SERPL-MCNC: 0.7 MG/DL (ref 0.1–1.5)
BUN SERPL-MCNC: 28 MG/DL (ref 8–22)
CALCIUM SERPL-MCNC: 9.3 MG/DL (ref 8.5–10.5)
CHLORIDE SERPL-SCNC: 100 MMOL/L (ref 96–112)
CK SERPL-CCNC: 50 U/L (ref 0–154)
CO2 SERPL-SCNC: 25 MMOL/L (ref 20–33)
CREAT SERPL-MCNC: 1.22 MG/DL (ref 0.5–1.4)
CRP SERPL HS-MCNC: 1.82 MG/DL (ref 0–0.75)
D DIMER PPP IA.FEU-MCNC: 1.96 UG/ML (FEU) (ref 0–0.5)
EOSINOPHIL # BLD AUTO: 0 K/UL (ref 0–0.51)
EOSINOPHIL NFR BLD: 0 % (ref 0–6.9)
ERYTHROCYTE [DISTWIDTH] IN BLOOD BY AUTOMATED COUNT: 41.1 FL (ref 35.9–50)
GLOBULIN SER CALC-MCNC: 3.2 G/DL (ref 1.9–3.5)
GLUCOSE SERPL-MCNC: 173 MG/DL (ref 65–99)
HCT VFR BLD AUTO: 53.1 % (ref 42–52)
HGB BLD-MCNC: 17.8 G/DL (ref 14–18)
IMM GRANULOCYTES # BLD AUTO: 0.44 K/UL (ref 0–0.11)
IMM GRANULOCYTES NFR BLD AUTO: 2.2 % (ref 0–0.9)
LACTATE BLD-SCNC: 1.6 MMOL/L (ref 0.5–2)
LYMPHOCYTES # BLD AUTO: 0.77 K/UL (ref 1–4.8)
LYMPHOCYTES NFR BLD: 3.8 % (ref 22–41)
MAGNESIUM SERPL-MCNC: 2.3 MG/DL (ref 1.5–2.5)
MCH RBC QN AUTO: 30.8 PG (ref 27–33)
MCHC RBC AUTO-ENTMCNC: 33.5 G/DL (ref 33.7–35.3)
MCV RBC AUTO: 92 FL (ref 81.4–97.8)
MONOCYTES # BLD AUTO: 1.17 K/UL (ref 0–0.85)
MONOCYTES NFR BLD AUTO: 5.7 % (ref 0–13.4)
NEUTROPHILS # BLD AUTO: 17.95 K/UL (ref 1.82–7.42)
NEUTROPHILS NFR BLD: 87.9 % (ref 44–72)
NRBC # BLD AUTO: 0 K/UL
NRBC BLD-RTO: 0 /100 WBC
PHOSPHATE SERPL-MCNC: 3 MG/DL (ref 2.5–4.5)
PLATELET # BLD AUTO: 590 K/UL (ref 164–446)
PMV BLD AUTO: 10.2 FL (ref 9–12.9)
POTASSIUM SERPL-SCNC: 4.1 MMOL/L (ref 3.6–5.5)
PROCALCITONIN SERPL-MCNC: 0.26 NG/ML
PROT SERPL-MCNC: 6.8 G/DL (ref 6–8.2)
RBC # BLD AUTO: 5.77 M/UL (ref 4.7–6.1)
SODIUM SERPL-SCNC: 134 MMOL/L (ref 135–145)
TROPONIN T SERPL-MCNC: 16 NG/L (ref 6–19)
WBC # BLD AUTO: 20.4 K/UL (ref 4.8–10.8)

## 2020-12-15 PROCEDURE — 84484 ASSAY OF TROPONIN QUANT: CPT

## 2020-12-15 PROCEDURE — 71045 X-RAY EXAM CHEST 1 VIEW: CPT

## 2020-12-15 PROCEDURE — 84145 PROCALCITONIN (PCT): CPT

## 2020-12-15 PROCEDURE — 96372 THER/PROPH/DIAG INJ SC/IM: CPT

## 2020-12-15 PROCEDURE — 99285 EMERGENCY DEPT VISIT HI MDM: CPT

## 2020-12-15 PROCEDURE — 87150 DNA/RNA AMPLIFIED PROBE: CPT

## 2020-12-15 PROCEDURE — 84100 ASSAY OF PHOSPHORUS: CPT

## 2020-12-15 PROCEDURE — 700102 HCHG RX REV CODE 250 W/ 637 OVERRIDE(OP): Performed by: STUDENT IN AN ORGANIZED HEALTH CARE EDUCATION/TRAINING PROGRAM

## 2020-12-15 PROCEDURE — 770014 HCHG ROOM/CARE - WARD (150)

## 2020-12-15 PROCEDURE — 85379 FIBRIN DEGRADATION QUANT: CPT

## 2020-12-15 PROCEDURE — 80053 COMPREHEN METABOLIC PANEL: CPT

## 2020-12-15 PROCEDURE — A9270 NON-COVERED ITEM OR SERVICE: HCPCS | Performed by: STUDENT IN AN ORGANIZED HEALTH CARE EDUCATION/TRAINING PROGRAM

## 2020-12-15 PROCEDURE — 87077 CULTURE AEROBIC IDENTIFY: CPT

## 2020-12-15 PROCEDURE — 86140 C-REACTIVE PROTEIN: CPT

## 2020-12-15 PROCEDURE — 87040 BLOOD CULTURE FOR BACTERIA: CPT

## 2020-12-15 PROCEDURE — 85025 COMPLETE CBC W/AUTO DIFF WBC: CPT

## 2020-12-15 PROCEDURE — 82550 ASSAY OF CK (CPK): CPT

## 2020-12-15 PROCEDURE — 99223 1ST HOSP IP/OBS HIGH 75: CPT | Mod: AI | Performed by: STUDENT IN AN ORGANIZED HEALTH CARE EDUCATION/TRAINING PROGRAM

## 2020-12-15 PROCEDURE — 83735 ASSAY OF MAGNESIUM: CPT

## 2020-12-15 PROCEDURE — C9803 HOPD COVID-19 SPEC COLLECT: HCPCS | Performed by: STUDENT IN AN ORGANIZED HEALTH CARE EDUCATION/TRAINING PROGRAM

## 2020-12-15 PROCEDURE — 700111 HCHG RX REV CODE 636 W/ 250 OVERRIDE (IP): Performed by: STUDENT IN AN ORGANIZED HEALTH CARE EDUCATION/TRAINING PROGRAM

## 2020-12-15 PROCEDURE — 36415 COLL VENOUS BLD VENIPUNCTURE: CPT

## 2020-12-15 PROCEDURE — 83605 ASSAY OF LACTIC ACID: CPT

## 2020-12-15 RX ORDER — ACETAMINOPHEN 325 MG/1
650 TABLET ORAL EVERY 6 HOURS PRN
Status: DISCONTINUED | OUTPATIENT
Start: 2020-12-15 | End: 2020-12-16 | Stop reason: HOSPADM

## 2020-12-15 RX ORDER — LABETALOL HYDROCHLORIDE 5 MG/ML
10 INJECTION, SOLUTION INTRAVENOUS EVERY 6 HOURS PRN
Status: DISCONTINUED | OUTPATIENT
Start: 2020-12-15 | End: 2020-12-16 | Stop reason: HOSPADM

## 2020-12-15 RX ORDER — ANTIOX #8/OM3/DHA/EPA/LUT/ZEAX 250-2.5 MG
1 CAPSULE ORAL 2 TIMES DAILY
Status: ON HOLD | COMMUNITY
End: 2020-12-16

## 2020-12-15 RX ORDER — DEXAMETHASONE 6 MG/1
6 TABLET ORAL DAILY
COMMUNITY
End: 2023-01-01

## 2020-12-15 RX ORDER — DEXAMETHASONE 4 MG/1
6 TABLET ORAL DAILY
Status: DISCONTINUED | OUTPATIENT
Start: 2020-12-15 | End: 2020-12-16 | Stop reason: HOSPADM

## 2020-12-15 RX ORDER — ONDANSETRON 4 MG/1
4 TABLET, ORALLY DISINTEGRATING ORAL EVERY 6 HOURS PRN
Status: DISCONTINUED | OUTPATIENT
Start: 2020-12-15 | End: 2020-12-16 | Stop reason: HOSPADM

## 2020-12-15 RX ORDER — CEFDINIR 300 MG/1
300 CAPSULE ORAL 2 TIMES DAILY
Status: ON HOLD | COMMUNITY
End: 2020-12-16

## 2020-12-15 RX ORDER — ONDANSETRON 2 MG/ML
4 INJECTION INTRAMUSCULAR; INTRAVENOUS EVERY 6 HOURS PRN
Status: DISCONTINUED | OUTPATIENT
Start: 2020-12-15 | End: 2020-12-16 | Stop reason: HOSPADM

## 2020-12-15 RX ADMIN — DEXAMETHASONE 6 MG: 4 TABLET ORAL at 16:37

## 2020-12-15 RX ADMIN — ENOXAPARIN SODIUM 40 MG: 40 INJECTION SUBCUTANEOUS at 16:39

## 2020-12-15 ASSESSMENT — ENCOUNTER SYMPTOMS
COUGH: 0
SPEECH CHANGE: 0
NAUSEA: 0
SHORTNESS OF BREATH: 1
VOMITING: 0
FEVER: 0
DIZZINESS: 0
MYALGIAS: 0
SENSORY CHANGE: 0
PALPITATIONS: 0

## 2020-12-15 ASSESSMENT — FIBROSIS 4 INDEX: FIB4 SCORE: 0.79

## 2020-12-15 NOTE — H&P
Hospital Medicine History & Physical Note    Date of Service  12/15/2020    Primary Care Physician  Pcp Unknown        Code Status  Prior    Chief Complaint  Chief Complaint   Patient presents with   • Shortness of Breath     pt bib ems diagnosed w/covid on 12/9 has sob since then but still not better. able to speak in full sentences.       History of Presenting Illness  75 y.o. male with recent COVID-19 infection on 12/9 who presented 12/15/2020 with worsening shortness of breath.  Patient reports since discharge from the hospital he is progressively feeling short of breath and he couldn't use oxygen as it doesn't work.  Patient denies fever, cough, nausea/vomiting, diarrhea, dizziness.  He denies weakness and numbness.  As per chart review patient was discharged on 12/13 with home oxygen and on oral antibiotics and steroid.  On arrival to ED labs significant for neutrophil leukocytosis 20.4.  BMP noted for sodium 134, BUN/creatinine 28/1.22, lactate 1.6, troponin 16.  Chest x-ray noted with bilateral peripheral opacities consistent with COVID-19 pneumonia.    Admitted for hypoxic respiratory failure secondary to COVID-19 infection.    Review of Systems  Review of Systems   Constitutional: Negative for fever.   Respiratory: Positive for shortness of breath. Negative for cough.    Cardiovascular: Negative for chest pain and palpitations.   Gastrointestinal: Negative for nausea and vomiting.   Musculoskeletal: Negative for myalgias.   Neurological: Negative for dizziness, sensory change and speech change.       Past Medical History   has no past medical history on file.    Surgical History   has no past surgical history on file.     Family History  family history is not on file.     Social History   reports that he has never smoked. He has never used smokeless tobacco. He reports current alcohol use. He reports that he does not use drugs.    Allergies  No Known Allergies    Medications  Prior to Admission Medications    Prescriptions Last Dose Informant Patient Reported? Taking?   Multiple Vitamins-Minerals (PRESERVISION AREDS 2) Cap 12/15/2020 at 0800  Yes Yes   Sig: Take 1 Cap by mouth 2 Times a Day.   cefdinir (OMNICEF) 300 MG Cap 12/15/2020 at finished  Yes Yes   Sig: Take 300 mg by mouth 2 times a day.   dexamethasone (DECADRON) 6 MG Tab 12/15/2020 at 0800  Yes Yes   Sig: Take 6 mg by mouth every day.      Facility-Administered Medications: None       Physical Exam  Temp:  [36.1 °C (97 °F)-36.9 °C (98.4 °F)] 36.9 °C (98.4 °F)  Pulse:  [83-94] 84  Resp:  [14-16] 14  BP: (121-133)/(81-89) 121/84  SpO2:  [95 %-100 %] 95 %    Physical Exam  Constitutional:       Appearance: Normal appearance.   HENT:      Head: Normocephalic.      Right Ear: Tympanic membrane normal.      Nose: Nose normal.      Mouth/Throat:      Mouth: Mucous membranes are moist.   Eyes:      Pupils: Pupils are equal, round, and reactive to light.   Cardiovascular:      Rate and Rhythm: Normal rate and regular rhythm.      Pulses: Normal pulses.      Heart sounds: Normal heart sounds. No murmur.   Pulmonary:      Effort: No respiratory distress.      Breath sounds: Normal breath sounds.   Abdominal:      General: Abdomen is flat. There is no distension.   Musculoskeletal:         General: No swelling.   Skin:     General: Skin is warm.   Neurological:      General: No focal deficit present.      Mental Status: He is alert and oriented to person, place, and time.         Laboratory:  Recent Labs     12/15/20  1143   WBC 20.4*   RBC 5.77   HEMOGLOBIN 17.8   HEMATOCRIT 53.1*   MCV 92.0   MCH 30.8   MCHC 33.5*   RDW 41.1   PLATELETCT 590*   MPV 10.2     Recent Labs     12/15/20  1143   SODIUM 134*   POTASSIUM 4.1   CHLORIDE 100   CO2 25   GLUCOSE 173*   BUN 28*   CREATININE 1.22   CALCIUM 9.3     Recent Labs     12/15/20  1143   ALTSGPT 128*   ASTSGOT 38   ALKPHOSPHAT 88   TBILIRUBIN 0.7   GLUCOSE 173*         No results for input(s): NTPROBNP in the last 72  hours.      Recent Labs     12/15/20  1317   TROPONINT 16       Imaging:  DX-CHEST-PORTABLE (1 VIEW)   Final Result      1.  There is no significant radiographic interval change in the bilateral peripheral parenchymal opacities which would be consistent with Covid 19 pneumonia.            Assessment/Plan:  I anticipate this patient will require at least two midnights for appropriate medical management, necessitating inpatient admission.    Hypoxic respiratory rate secondary to COVID-19 pneumonia  Assessment & Plan  Patient with recent COVID-19 infection  COVID Isolation  NC @ 2 LPM keep O2 Sat >92%  CXR bilateral interstitial infiltrates-.  Follow COVID result   Follow blood cultures,Sputum culture,UA  Check  Lactate,procalcitonin, CRP, ESR,Diff sonny,interleukin 6 ,D-Dimer, Troponin ,PT/PTT, CMP,mag,phos,cpk  Send UA with reflex culture, Urine Antigens for Strep PNA and Legionella    Continue supportive care with oxygen supplementation, proning, judicious use of IV fluids   will Start empirically on Rocephin and Zithromax if there is elevated procalcitonin  dexamethasone 6 mg PO daily   Currently does not meet criteria for remdesivir    If patient develops respiratory distress recommend HFNC if possible.  If patient becomes hypotensive avoid excessive fluid resuscitation- suggest starting pressors early.  Vital signs as per unit protocol  Monitor inflammatory markers every 48 hours as indicated  Labs in the morning - CBC, BMP,LFTS  DVT Prophylaxis: Lovenox 40 mg once daily(Prophylactic anticoagulation not recommended unless high risk )    Leucocytosis  Assessment & Plan  Afebrile  Significant leukocytosis likely secondary to Decadron  No new changes on chest x-ray  We will check UA  We will follow procalcitonin level if elevated will start on broad-spectrum antibiotic    Acute kidney failure (HCC)- (present on admission)  Assessment & Plan  Mild elevation in BUN/continue  We will continue to monitor kidney  function

## 2020-12-15 NOTE — ED PROVIDER NOTES
ED Provider Note    Scribed for Kacy Salinas M.D. by Isi Hinton. 12/15/2020, 12:49 PM.    Primary care provider: Pcp Unknown  Means of arrival: Walk In  History obtained from: Patient  History limited by: None     CHIEF COMPLAINT  Chief Complaint   Patient presents with   • Shortness of Breath     pt bib ems diagnosed w/covid on 12/9 has sob since then but still not better. able to speak in full sentences.       JAYLIN Mohr is a 75 y.o. male who presents to the Emergency Department for shortness of breath onset 6 days ago. The patient states that he was exposed to Covid-19 and tested positive on 12/09. He reports he was hospitalized to the [facility] ED on the day of his diagnosis, and was discharged with supplemental oxygen to use at home. Patient says over the last few days he has been experiencing worsening shortness of breath and no longer has anymore oxygen, so he was prompted to come into the [facility] ED this afternoon to have his symptoms further evaluated. No alleviating or exacerbating factors were noted. Patient has associated decreased appetite and diarrhea, but denies nausea, vomiting, chest pain, or syncope. He also denies taking any recent travels outside of the country. The patient drinks alcohol, but does not use drugs or smoke. Patient has no history of lung or heart problems.  Patient is requesting antiviral medication with remdesivir and also wants an mRNA vaccine.    REVIEW OF SYSTEMS  Pertinent positives include shortness of breath, decreased appetite and diarrhea. Pertinent negatives include no nausea, vomiting, diarrhea. All other systems reviewed and negative.     PAST MEDICAL HISTORY   None noted     SURGICAL HISTORY  patient denies any surgical history    SOCIAL HISTORY  Social History     Tobacco Use   • Smoking status: Never Smoker   • Smokeless tobacco: Never Used   Substance Use Topics   • Alcohol use: Yes   • Drug use: Never      Social History     Substance and Sexual Activity  "  Drug Use Never       FAMILY HISTORY  No family history noted.    CURRENT MEDICATIONS  Home Medications     Reviewed by Aria Jones on 12/15/20 at 1503  Med List Status: Complete   Medication Last Dose Status   cefdinir (OMNICEF) 300 MG Cap 12/15/2020 Active   dexamethasone (DECADRON) 6 MG Tab 12/15/2020 Active   Multiple Vitamins-Minerals (PRESERVISION AREDS 2) Cap 12/15/2020 Active                ALLERGIES  No Known Allergies    PHYSICAL EXAM  VITAL SIGNS: /81   Pulse 94   Temp 36.7 °C (98.1 °F) (Oral)   Resp 16   Ht 1.803 m (5' 11\")   Wt 87.1 kg (192 lb)   SpO2 100%   BMI 26.78 kg/m²     Constitutional: Well developed, No acute distress, Non-toxic appearance. Upset   HENT: Normocephalic, Atraumatic, Bilateral external ears normal, Nose normal.   Eyes: PERRL, EOMI, Conjunctiva normal  Neck: Normal range of motion, No tenderness, Supple, No stridor.   Cardiovascular: Normal heart rate, Normal rhythm  Thorax & Lungs: Coarse breath sounds, Mild respiratory distress, No wheezing, No chest tenderness.   Abdomen: Benign abdominal exam, no rebound, no tenderness, no distention  Skin: Warm, Dry, No erythema, No rash.   Back: No tenderness, No CVA tenderness.   Extremities: Intact distal pulses, No edema, No calf tenderness   Neurologic: Alert & oriented x 3, Normal motor function, Normal sensory function, No focal deficits noted.  Psychiatric: Appropriate                                           DIAGNOSTIC STUDIES / PROCEDURES\    LABS  Results for orders placed or performed during the hospital encounter of 12/15/20   CBC WITH DIFFERENTIAL   Result Value Ref Range    WBC 20.4 (H) 4.8 - 10.8 K/uL    RBC 5.77 4.70 - 6.10 M/uL    Hemoglobin 17.8 14.0 - 18.0 g/dL    Hematocrit 53.1 (H) 42.0 - 52.0 %    MCV 92.0 81.4 - 97.8 fL    MCH 30.8 27.0 - 33.0 pg    MCHC 33.5 (L) 33.7 - 35.3 g/dL    RDW 41.1 35.9 - 50.0 fL    Platelet Count 590 (H) 164 - 446 K/uL    MPV 10.2 9.0 - 12.9 fL    Neutrophils-Polys 87.90 " (H) 44.00 - 72.00 %    Lymphocytes 3.80 (L) 22.00 - 41.00 %    Monocytes 5.70 0.00 - 13.40 %    Eosinophils 0.00 0.00 - 6.90 %    Basophils 0.40 0.00 - 1.80 %    Immature Granulocytes 2.20 (H) 0.00 - 0.90 %    Nucleated RBC 0.00 /100 WBC    Neutrophils (Absolute) 17.95 (H) 1.82 - 7.42 K/uL    Lymphs (Absolute) 0.77 (L) 1.00 - 4.80 K/uL    Monos (Absolute) 1.17 (H) 0.00 - 0.85 K/uL    Eos (Absolute) 0.00 0.00 - 0.51 K/uL    Baso (Absolute) 0.08 0.00 - 0.12 K/uL    Immature Granulocytes (abs) 0.44 (H) 0.00 - 0.11 K/uL    NRBC (Absolute) 0.00 K/uL   COMP METABOLIC PANEL   Result Value Ref Range    Sodium 134 (L) 135 - 145 mmol/L    Potassium 4.1 3.6 - 5.5 mmol/L    Chloride 100 96 - 112 mmol/L    Co2 25 20 - 33 mmol/L    Anion Gap 9.0 7.0 - 16.0    Glucose 173 (H) 65 - 99 mg/dL    Bun 28 (H) 8 - 22 mg/dL    Creatinine 1.22 0.50 - 1.40 mg/dL    Calcium 9.3 8.5 - 10.5 mg/dL    AST(SGOT) 38 12 - 45 U/L    ALT(SGPT) 128 (H) 2 - 50 U/L    Alkaline Phosphatase 88 30 - 99 U/L    Total Bilirubin 0.7 0.1 - 1.5 mg/dL    Albumin 3.6 3.2 - 4.9 g/dL    Total Protein 6.8 6.0 - 8.2 g/dL    Globulin 3.2 1.9 - 3.5 g/dL    A-G Ratio 1.1 g/dL   ESTIMATED GFR   Result Value Ref Range    GFR If African American >60 >60 mL/min/1.73 m 2    GFR If Non African American 58 (A) >60 mL/min/1.73 m 2   Troponin   Result Value Ref Range    Troponin T 16 6 - 19 ng/L   Lactic Acid   Result Value Ref Range    Lactic Acid 1.6 0.5 - 2.0 mmol/L   CRP Quantitative (Non-Cardiac)   Result Value Ref Range    Stat C-Reactive Protein 1.82 (H) 0.00 - 0.75 mg/dL      All labs reviewed by me.    EKG  12 lead EKG interpreted by me.     RADIOLOGY  DX-CHEST-PORTABLE (1 VIEW)   Final Result      1.  There is no significant radiographic interval change in the bilateral peripheral parenchymal opacities which would be consistent with Covid 19 pneumonia.        The radiologist's interpretation of all radiological studies have been reviewed by me.    COURSE & MEDICAL  DECISION MAKING  Nursing notes, VS, PMSFHx reviewed in chart.     Patient with a history of Covid presents with worsening difficulty breathing.  Patient is now out of oxygen at home.  Patient is found to be hypoxic when ambulating.  Plan at this time is repeat x-ray and laboratory studies.  Patient is currently taking antibiotics as they thought there was a possible coinfection and he states has been taking his medication as directed.    12:49 PM Patient seen and examined at bedside. Discussed plan of care with patient. I informed them that labs and imaging will be ordered to evaluate symptoms. The patient is understanding and agreeable with plan of care. The patient presents with shortness of breath and the differential diagnosis includes but is not limited to Covid-19 vs. Secondary Infection vs. Sepsis. Ordered for DX-Chest-Portable (1 View), Lactic Acid, Blood Culture, CRP Quantitative (Non-Cardiac), Procalcitonin, Troponin, Estimated GFR, CBC w/ Differential, and CMP to evaluate.     Laboratory studies returned and show an elevated white count.  Is unclear to me if this leukocytosis is related to Decadron versus a worsening of a secondary lung infection.  Fortunately chest x-ray does not show any significant interval changes.  Laboratory studies do not show any significant dehydration or electrolyte abnormalities.  Lactic acid was normal and troponin was normal.     The patient requiring oxygen with ambulation and being out of oxygen at home patient will be hospitalized for further management of his Covid.  I also feel his white count needs to be trended to ensure he is not developing a secondary infection.  At this point I will hold antibiotics and will consult the hospitalist.    2:54 PM Paged Hospitalist    2:55 PM Patient was reevaluated at bedside. Discussed lab and radiology results with the patient and informed them that there were acute and abnormal findings as noted above. The plan of care was discussed  with the patient. He is understanding and agreeable to the plan of care. The patient had the opportunity to ask any questions. The plan for hospitalization was discussed with the patient due to their current condition. Patient is understanding and agreeable to the plan for hospitalization.    3:03 PM I discussed the patient's case and the above findings with Dr. Carmona (Hospitalist) who agrees to the plan of       PPE Note: I verified that the patient was wearing a mask and I was wearing appropriate PPE every time I entered the room. The patient's mask was on the patient at all times during my encounter except for a brief view of the oropharynx.     DISPOSITION:  Patient will be hospitalized by Dr. Carmona in guarded condition.      FINAL IMPRESSION  1. COVID-19    2. Hypoxia    3. Leukocytosis, unspecified type          I, Isi Hinton (Scribe), am scribing for, and in the presence of, Kacy Salinas M.D..    Electronically signed by: Isi Hinton (Scribe), 12/15/2020    IKacy M.D. personally performed the services described in this documentation, as scribed by Isi Hinton in my presence, and it is both accurate and complete. C    The note accurately reflects work and decisions made by me.  Kacy Salinas M.D.  12/15/2020  7:41 PM

## 2020-12-15 NOTE — ED NOTES
ambualted patient approx 50ft on room air. The monitor registered oxygen levels from 85%-95%, patient heartrate sustained in the 105-110 range, denies any symptoms while walking.  Steady gait, back in chair.     While sitting patient on room air at 88%-93%; HR in the mid 80's

## 2020-12-15 NOTE — ASSESSMENT & PLAN NOTE
Afebrile  Significant leukocytosis likely secondary to Decadron  No new changes on chest x-ray  We will check UA  We will follow procalcitonin level if elevated will start on broad-spectrum antibiotic

## 2020-12-15 NOTE — ED NOTES
Med rec updated and complete. Allergies reviewed. Pt stated that he finished a 2 day course of cefdinir today.      Home pharmacy [address]

## 2020-12-15 NOTE — ED TRIAGE NOTES
"Chief Complaint   Patient presents with   • Shortness of Breath     pt bib ems diagnosed w/covid on 12/9 has sob since then but still not better. able to speak in full sentences.     Denies chest pain/cough. Arrived w/supplemental 02 @ 2l/nc sating 100% but pt sating 93% on ra. Pt asking for \"Covid shot\". Placed by hallway near [facility].  "

## 2020-12-15 NOTE — ED NOTES
Patient roomed in tent.  Agree with triage note.  Lewis Mohr     Pt is alert and oriented, speaking in full sentences, follows commands and responds appropriately to questions. NAD. Resp are even and unlabored.     Patient and staff wearing appropriate PPE

## 2020-12-15 NOTE — ASSESSMENT & PLAN NOTE
Patient with recent COVID-19 infection  COVID Isolation  NC @ 2 LPM keep O2 Sat >92%  CXR bilateral interstitial infiltrates-.  Follow COVID result   Follow blood cultures,Sputum culture,UA  Check  Lactate,procalcitonin, CRP, ESR,Diff sonny,interleukin 6 ,D-Dimer, Troponin ,PT/PTT, CMP,mag,phos,cpk  Send UA with reflex culture, Urine Antigens for Strep PNA and Legionella    Continue supportive care with oxygen supplementation, proning, judicious use of IV fluids   will Start empirically on Rocephin and Zithromax if there is elevated procalcitonin  dexamethasone 6 mg PO daily   Currently does not meet criteria for remdesivir    If patient develops respiratory distress recommend HFNC if possible.  If patient becomes hypotensive avoid excessive fluid resuscitation- suggest starting pressors early.  Vital signs as per unit protocol  Monitor inflammatory markers every 48 hours as indicated  Labs in the morning - CBC, BMP,LFTS  DVT Prophylaxis: Lovenox 40 mg once daily(Prophylactic anticoagulation not recommended unless high risk )

## 2020-12-16 VITALS
RESPIRATION RATE: 18 BRPM | DIASTOLIC BLOOD PRESSURE: 87 MMHG | WEIGHT: 192 LBS | HEART RATE: 65 BPM | HEIGHT: 71 IN | TEMPERATURE: 97 F | BODY MASS INDEX: 26.88 KG/M2 | OXYGEN SATURATION: 94 % | SYSTOLIC BLOOD PRESSURE: 129 MMHG

## 2020-12-16 PROBLEM — U07.1 COVID-19: Status: RESOLVED | Noted: 2020-12-09 | Resolved: 2020-12-16

## 2020-12-16 LAB
ALBUMIN SERPL BCP-MCNC: 3.3 G/DL (ref 3.2–4.9)
ALBUMIN/GLOB SERPL: 1.1 G/DL
ALP SERPL-CCNC: 82 U/L (ref 30–99)
ALT SERPL-CCNC: 105 U/L (ref 2–50)
ANION GAP SERPL CALC-SCNC: 10 MMOL/L (ref 7–16)
AST SERPL-CCNC: 29 U/L (ref 12–45)
BILIRUB SERPL-MCNC: 0.6 MG/DL (ref 0.1–1.5)
BUN SERPL-MCNC: 30 MG/DL (ref 8–22)
CALCIUM SERPL-MCNC: 9.7 MG/DL (ref 8.5–10.5)
CHLORIDE SERPL-SCNC: 97 MMOL/L (ref 96–112)
CO2 SERPL-SCNC: 24 MMOL/L (ref 20–33)
CREAT SERPL-MCNC: 0.97 MG/DL (ref 0.5–1.4)
ERYTHROCYTE [DISTWIDTH] IN BLOOD BY AUTOMATED COUNT: 42 FL (ref 35.9–50)
GLOBULIN SER CALC-MCNC: 3 G/DL (ref 1.9–3.5)
GLUCOSE SERPL-MCNC: 217 MG/DL (ref 65–99)
HCT VFR BLD AUTO: 49.6 % (ref 42–52)
HGB BLD-MCNC: 16.3 G/DL (ref 14–18)
MCH RBC QN AUTO: 31 PG (ref 27–33)
MCHC RBC AUTO-ENTMCNC: 32.9 G/DL (ref 33.7–35.3)
MCV RBC AUTO: 94.3 FL (ref 81.4–97.8)
PLATELET # BLD AUTO: 489 K/UL (ref 164–446)
PMV BLD AUTO: 11.6 FL (ref 9–12.9)
POTASSIUM SERPL-SCNC: 4.7 MMOL/L (ref 3.6–5.5)
PROT SERPL-MCNC: 6.3 G/DL (ref 6–8.2)
RBC # BLD AUTO: 5.26 M/UL (ref 4.7–6.1)
SODIUM SERPL-SCNC: 131 MMOL/L (ref 135–145)
WBC # BLD AUTO: 20.2 K/UL (ref 4.8–10.8)

## 2020-12-16 PROCEDURE — 90662 IIV NO PRSV INCREASED AG IM: CPT | Performed by: STUDENT IN AN ORGANIZED HEALTH CARE EDUCATION/TRAINING PROGRAM

## 2020-12-16 PROCEDURE — 700102 HCHG RX REV CODE 250 W/ 637 OVERRIDE(OP): Performed by: STUDENT IN AN ORGANIZED HEALTH CARE EDUCATION/TRAINING PROGRAM

## 2020-12-16 PROCEDURE — 99239 HOSP IP/OBS DSCHRG MGMT >30: CPT | Performed by: STUDENT IN AN ORGANIZED HEALTH CARE EDUCATION/TRAINING PROGRAM

## 2020-12-16 PROCEDURE — 85027 COMPLETE CBC AUTOMATED: CPT

## 2020-12-16 PROCEDURE — A9270 NON-COVERED ITEM OR SERVICE: HCPCS | Performed by: STUDENT IN AN ORGANIZED HEALTH CARE EDUCATION/TRAINING PROGRAM

## 2020-12-16 PROCEDURE — 87040 BLOOD CULTURE FOR BACTERIA: CPT

## 2020-12-16 PROCEDURE — 80053 COMPREHEN METABOLIC PANEL: CPT

## 2020-12-16 PROCEDURE — 3E02340 INTRODUCTION OF INFLUENZA VACCINE INTO MUSCLE, PERCUTANEOUS APPROACH: ICD-10-PCS | Performed by: STUDENT IN AN ORGANIZED HEALTH CARE EDUCATION/TRAINING PROGRAM

## 2020-12-16 PROCEDURE — 700111 HCHG RX REV CODE 636 W/ 250 OVERRIDE (IP): Performed by: STUDENT IN AN ORGANIZED HEALTH CARE EDUCATION/TRAINING PROGRAM

## 2020-12-16 PROCEDURE — 36415 COLL VENOUS BLD VENIPUNCTURE: CPT

## 2020-12-16 PROCEDURE — 90471 IMMUNIZATION ADMIN: CPT

## 2020-12-16 RX ADMIN — DEXAMETHASONE 6 MG: 4 TABLET ORAL at 04:16

## 2020-12-16 RX ADMIN — ENOXAPARIN SODIUM 40 MG: 40 INJECTION SUBCUTANEOUS at 04:17

## 2020-12-16 RX ADMIN — INFLUENZA A VIRUS A/MICHIGAN/45/2015 X-275 (H1N1) ANTIGEN (FORMALDEHYDE INACTIVATED), INFLUENZA A VIRUS A/SINGAPORE/INFIMH-16-0019/2016 IVR-186 (H3N2) ANTIGEN (FORMALDEHYDE INACTIVATED), INFLUENZA B VIRUS B/PHUKET/3073/2013 ANTIGEN (FORMALDEHYDE INACTIVATED), AND INFLUENZA B VIRUS B/MARYLAND/15/2016 BX-69A ANTIGEN (FORMALDEHYDE INACTIVATED) 0.7 ML: 60; 60; 60; 60 INJECTION, SUSPENSION INTRAMUSCULAR at 12:39

## 2020-12-16 NOTE — PROGRESS NOTES
Assessment completed.  Pt A&Ox4. Respirations even, unlabored on 1L nasal cannula, SpO2 94%. Pt denies any pain at this time.  POC discussed. Belongings within reach.  Needs met, will continue to monitor.

## 2020-12-16 NOTE — PROGRESS NOTES
Pt arrived to unit via wheelchair with ER tech. Pt ambulated from wheelchair to bed with steady gait. Pt oriented to unit and staff

## 2020-12-16 NOTE — DISCHARGE SUMMARY
Discharge Summary    CHIEF COMPLAINT ON ADMISSION  Chief Complaint   Patient presents with   • Shortness of Breath     pt bib ems diagnosed w/covid on 12/9 has sob since then but still not better. able to speak in full sentences.       Reason for Admission  EMS     Admission Date  12/15/2020    CODE STATUS  Full Code    HPI & HOSPITAL COURSE  75 y.o. male with recent COVID-19 infection on 12/9 who presented 12/15/2020 with worsening shortness of breath.  Patient reports since discharge from the hospital he is progressively feeling short of breath and he couldn't use oxygen as it doesn't work.  Patient denies fever, cough, nausea/vomiting, diarrhea, dizziness.  He denies weakness and numbness.  As per chart review patient was discharged on 12/13 with home oxygen and on oral antibiotics and steroid.  On arrival to ED labs significant for neutrophil leukocytosis .Chest x-ray noted with bilateral peripheral opacities consistent with COVID-19 pneumonia.  During hospital course pt noted saturating above 94% on ambulation. Remain afebrile and asymptomatic .  Repeated labs white count remained stable likely secondary to Decadron, no active infection.  Advised to follow up with PCP and come  to ED if develop any new symptoms. Discharge plan was discussed with patient . Patient agree with discharge plan .    Admitted for hypoxic respiratory failure secondary to COVID-19 infection      Therefore, he is discharged in good and stable condition to home with close outpatient follow-up.    The patient recovered much more quickly than anticipated on admission.    Discharge Date  12/16/2020      DISCHARGE DIAGNOSES  Active Problems:    Acute kidney failure (HCC) POA: Yes    Leucocytosis secondary to steroid use .  Resolved Problems:    Hypoxic respiratory rate secondary to COVID-19 pneumonia POA: Unknown      FOLLOW UP  Advised to follow up with Primary care   MEDICATIONS ON DISCHARGE     Medication List      CONTINUE taking these  medications      Instructions   dexamethasone 6 MG Tabs  Commonly known as: DECADRON   Take 6 mg by mouth every day.  Dose: 6 mg        STOP taking these medications    cefdinir 300 MG Caps  Commonly known as: OMNICEF     PreserVision AREDS 2 Caps            Allergies  No Known Allergies    DIET  Orders Placed This Encounter   Procedures   • Diet Order Diet: Regular     Standing Status:   Standing     Number of Occurrences:   1     Order Specific Question:   Diet:     Answer:   Regular [1]       ACTIVITY  As tolerated.  Weight bearing as tolerated          LABORATORY  Lab Results   Component Value Date    SODIUM 134 (L) 12/15/2020    POTASSIUM 4.1 12/15/2020    CHLORIDE 100 12/15/2020    CO2 25 12/15/2020    GLUCOSE 173 (H) 12/15/2020    BUN 28 (H) 12/15/2020    CREATININE 1.22 12/15/2020        Lab Results   Component Value Date    WBC 20.4 (H) 12/15/2020    HEMOGLOBIN 17.8 12/15/2020    HEMATOCRIT 53.1 (H) 12/15/2020    PLATELETCT 590 (H) 12/15/2020        Total time of the discharge process exceeds  31minutes.

## 2020-12-16 NOTE — PROGRESS NOTES
Assessment completed.  Pt A&Ox4.  Respirations even, unlabored on 2L O2, SpO2 96%.  Pt denies any pain at this time.  POC discussed. Belongings within reach.  Needs met, will continue to monitor.

## 2020-12-16 NOTE — PROGRESS NOTES
CBC resulted, physician notified. IV dc'd.  Discharge instructions given to patient; patient verbalizes understanding, all questions answered.  Copy of DC summary provided, signed copy in chart. Pt awaiting ride

## 2020-12-16 NOTE — DISCHARGE INSTRUCTIONS
COVID-19  COVID-19 is a respiratory infection that is caused by a virus called severe acute respiratory syndrome coronavirus 2 (SARS-CoV-2). The disease is also known as coronavirus disease or novel coronavirus. In some people, the virus may not cause any symptoms. In others, it may cause a serious infection. The infection can get worse quickly and can lead to complications, such as:  · Pneumonia, or infection of the lungs.  · Acute respiratory distress syndrome or ARDS. This is fluid build-up in the lungs.  · Acute respiratory failure. This is a condition in which there is not enough oxygen passing from the lungs to the body.  · Sepsis or septic shock. This is a serious bodily reaction to an infection.  · Blood clotting problems.  · Secondary infections due to bacteria or fungus.  The virus that causes COVID-19 is contagious. This means that it can spread from person to person through droplets from coughs and sneezes (respiratory secretions).  What are the causes?  This illness is caused by a virus. You may catch the virus by:  · Breathing in droplets from an infected person's cough or sneeze.  · Touching something, like a table or a doorknob, that was exposed to the virus (contaminated) and then touching your mouth, nose, or eyes.  What increases the risk?  Risk for infection  You are more likely to be infected with this virus if you:  · Live in or travel to an area with a COVID-19 outbreak.  · Come in contact with a sick person who recently traveled to an area with a COVID-19 outbreak.  · Provide care for or live with a person who is infected with COVID-19.  Risk for serious illness  You are more likely to become seriously ill from the virus if you:  · Are 65 years of age or older.  · Have a long-term disease that lowers your body's ability to fight infection (immunocompromised).  · Live in a nursing home or long-term care facility.  · Have a long-term (chronic) disease such as:  ? Chronic lung disease, including  chronic obstructive pulmonary disease or asthma  ? Heart disease.  ? Diabetes.  ? Chronic kidney disease.  ? Liver disease.  · Are obese.  What are the signs or symptoms?  Symptoms of this condition can range from mild to severe. Symptoms may appear any time from 2 to 14 days after being exposed to the virus. They include:  · A fever.  · A cough.  · Difficulty breathing.  · Chills.  · Muscle pains.  · A sore throat.  · Loss of taste or smell.  Some people may also have stomach problems, such as nausea, vomiting, or diarrhea.  Other people may not have any symptoms of COVID-19.  How is this diagnosed?  This condition may be diagnosed based on:  · Your signs and symptoms, especially if:  ? You live in an area with a COVID-19 outbreak.  ? You recently traveled to or from an area where the virus is common.  ? You provide care for or live with a person who was diagnosed with COVID-19.  · A physical exam.  · Lab tests, which may include:  ? A nasal swab to take a sample of fluid from your nose.  ? A throat swab to take a sample of fluid from your throat.  ? A sample of mucus from your lungs (sputum).  ? Blood tests.  · Imaging tests, which may include, X-rays, CT scan, or ultrasound.  How is this treated?  At present, there is no medicine to treat COVID-19. Medicines that treat other diseases are being used on a trial basis to see if they are effective against COVID-19.  Your health care provider will talk with you about ways to treat your symptoms. For most people, the infection is mild and can be managed at home with rest, fluids, and over-the-counter medicines.  Treatment for a serious infection usually takes places in a hospital intensive care unit (ICU). It may include one or more of the following treatments. These treatments are given until your symptoms improve.  · Receiving fluids and medicines through an IV.  · Supplemental oxygen. Extra oxygen is given through a tube in the nose, a face mask, or a  anguiano.  · Positioning you to lie on your stomach (prone position). This makes it easier for oxygen to get into the lungs.  · Continuous positive airway pressure (CPAP) or bi-level positive airway pressure (BPAP) machine. This treatment uses mild air pressure to keep the airways open. A tube that is connected to a motor delivers oxygen to the body.  · Ventilator. This treatment moves air into and out of the lungs by using a tube that is placed in your windpipe.  · Tracheostomy. This is a procedure to create a hole in the neck so that a breathing tube can be inserted.  · Extracorporeal membrane oxygenation (ECMO). This procedure gives the lungs a chance to recover by taking over the functions of the heart and lungs. It supplies oxygen to the body and removes carbon dioxide.  Follow these instructions at home:  Lifestyle  · If you are sick, stay home except to get medical care. Your health care provider will tell you how long to stay home. Call your health care provider before you go for medical care.  · Rest at home as told by your health care provider.  · Do not use any products that contain nicotine or tobacco, such as cigarettes, e-cigarettes, and chewing tobacco. If you need help quitting, ask your health care provider.  · Return to your normal activities as told by your health care provider. Ask your health care provider what activities are safe for you.  General instructions  · Take over-the-counter and prescription medicines only as told by your health care provider.  · Drink enough fluid to keep your urine pale yellow.  · Keep all follow-up visits as told by your health care provider. This is important.  How is this prevented?    There is no vaccine to help prevent COVID-19 infection. However, there are steps you can take to protect yourself and others from this virus.  To protect yourself:   · Do not travel to areas where COVID-19 is a risk. The areas where COVID-19 is reported change often. To identify  high-risk areas and travel restrictions, check the [facility] travel website: wwwnc.cdc.gov/travel/notices  · If you live in, or must travel to, an area where COVID-19 is a risk, take precautions to avoid infection.  ? Stay away from people who are sick.  ? Wash your hands often with soap and water for 20 seconds. If soap and water are not available, use an alcohol-based hand .  ? Avoid touching your mouth, face, eyes, or nose.  ? Avoid going out in public, follow guidance from your state and local health authorities.  ? If you must go out in public, wear a cloth face covering or face mask.  ? Disinfect objects and surfaces that are frequently touched every day. This may include:  § Counters and tables.  § Doorknobs and light switches.  § Sinks and faucets.  § Electronics, such as phones, remote controls, keyboards, computers, and tablets.  To protect others:  If you have symptoms of COVID-19, take steps to prevent the virus from spreading to others.  · If you think you have a COVID-19 infection, contact your health care provider right away. Tell your health care team that you think you may have a COVID-19 infection.  · Stay home. Leave your house only to seek medical care. Do not use public transport.  · Do not travel while you are sick.  · Wash your hands often with soap and water for 20 seconds. If soap and water are not available, use alcohol-based hand .  · Stay away from other members of your household. Let healthy household members care for children and pets, if possible. If you have to care for children or pets, wash your hands often and wear a mask. If possible, stay in your own room, separate from others. Use a different bathroom.  · Make sure that all people in your household wash their hands well and often.  · Cough or sneeze into a tissue or your sleeve or elbow. Do not cough or sneeze into your hand or into the air.  · Wear a cloth face covering or face mask.  Where to find more  information  · Centers for Disease Control and Prevention: www.cdc.gov/coronavirus/2019-ncov/index.html  · World Health Organization: www.who.int/health-topics/coronavirus  Contact a health care provider if:  · You live in or have traveled to an area where COVID-19 is a risk and you have symptoms of the infection.  · You have had contact with someone who has COVID-19 and you have symptoms of the infection.  Get help right away if:  · You have trouble breathing.  · You have pain or pressure in your chest.  · You have confusion.  · You have bluish lips and fingernails.  · You have difficulty waking from sleep.  · You have symptoms that get worse.  These symptoms may represent a serious problem that is an emergency. Do not wait to see if the symptoms will go away. Get medical help right away. Call your local emergency services (911 in the [place] Do not drive yourself to the hospital. Let the emergency medical personnel know if you think you have COVID-19.  Summary  · COVID-19 is a respiratory infection that is caused by a virus. It is also known as coronavirus disease or novel coronavirus. It can cause serious infections, such as pneumonia, acute respiratory distress syndrome, acute respiratory failure, or sepsis.  · The virus that causes COVID-19 is contagious. This means that it can spread from person to person through droplets from coughs and sneezes.  · You are more likely to develop a serious illness if you are 65 years of age or older, have a weak immunity, live in a nursing home, or have chronic disease.  · There is no medicine to treat COVID-19. Your health care provider will talk with you about ways to treat your symptoms.  · Take steps to protect yourself and others from infection. Wash your hands often and disinfect objects and surfaces that are frequently touched every day. Stay away from people who are sick and wear a mask if you are sick.  This information is not intended to replace advice given to you by  your health care provider. Make sure you discuss any questions you have with your health care provider.  Document Released: 01/23/2020 Document Revised: 05/14/2020 Document Reviewed: 01/23/2020  [employer] Patient Education © 2020 [employer]          COVID-19 Frequently Asked Questions  COVID-19 (coronavirus disease) is an infection that is caused by a large family of viruses. Some viruses cause illness in people and others cause illness in animals like camels, cats, and bats. In some cases, the viruses that cause illness in animals can spread to humans.  Where did the coronavirus come from?  In December 2019, [place] told the World Health Organization (WHO) of several cases of lung disease (human respiratory illness). These cases were linked to an open seafood and livestock market in the city of [facility]. The link to the seafood and livestock market suggests that the virus may have spread from animals to humans. However, since that first outbreak in December, the virus has also been shown to spread from person to person.  What is the name of the disease and the virus?  Disease name  Early on, this disease was called novel coronavirus. This is because scientists determined that the disease was caused by a new (novel) respiratory virus. The World Health Organization (WHO) has now named the disease COVID-19, or coronavirus disease.  Virus name  The virus that causes the disease is called severe acute respiratory syndrome coronavirus 2 (SARS-CoV-2).  More information on disease and virus naming  World Health Organization (WHO): www.who.int/emergencies/diseases/novel-coronavirus-2019/technical-guidance/naming-the-coronavirus-disease-(covid-2019)-and-the-virus-that-causes-it  Who is at risk for complications from coronavirus disease?  Some people may be at higher risk for complications from coronavirus disease. This includes older adults and people who have chronic diseases, such as heart disease, diabetes, and lung  disease.  If you are at higher risk for complications, take these extra precautions:  · Avoid close contact with people who are sick or have a fever or cough. Stay at least 3-6 ft (1-2 m) away from them, if possible.  · Wash your hands often with soap and water for at least 20 seconds.  · Avoid touching your face, mouth, nose, or eyes.  · Keep supplies on hand at home, such as food, medicine, and cleaning supplies.  · Stay home as much as possible.  · Avoid social gatherings and travel.  How does coronavirus disease spread?  The virus that causes coronavirus disease spreads easily from person to person (is contagious). There are also cases of community-spread disease. This means the disease has spread to:  · People who have no known contact with other infected people.  · People who have not traveled to areas where there are known cases.  It appears to spread from one person to another through droplets from coughing or sneezing.  Can I get the virus from touching surfaces or objects?  There is still a lot that we do not know about the virus that causes coronavirus disease. Scientists are basing a lot of information on what they know about similar viruses, such as:  · Viruses cannot generally survive on surfaces for long. They need a human body (host) to survive.  · It is more likely that the virus is spread by close contact with people who are sick (direct contact), such as through:  ? Shaking hands or hugging.  ? Breathing in respiratory droplets that travel through the air. This can happen when an infected person coughs or sneezes on or near other people.  · It is less likely that the virus is spread when a person touches a surface or object that has the virus on it (indirect contact). The virus may be able to enter the body if the person touches a surface or object and then touches his or her face, eyes, nose, or mouth.  Can a person spread the virus without having symptoms of the disease?  It may be possible for  the virus to spread before a person has symptoms of the disease, but this is most likely not the main way the virus is spreading. It is more likely for the virus to spread by being in close contact with people who are sick and breathing in the respiratory droplets of a sick person's cough or sneeze.  What are the symptoms of coronavirus disease?  Symptoms vary from person to person and can range from mild to severe. Symptoms may include:  · Fever.  · Cough.  · Tiredness, weakness, or fatigue.  · Fast breathing or feeling short of breath.  These symptoms can appear anywhere from 2 to 14 days after you have been exposed to the virus. If you develop symptoms, call your health care provider. People with severe symptoms may need hospital care.  If I am exposed to the virus, how long does it take before symptoms start?  Symptoms of coronavirus disease may appear anywhere from 2 to 14 days after a person has been exposed to the virus. If you develop symptoms, call your health care provider.  Should I be tested for this virus?  Your health care provider will decide whether to test you based on your symptoms, history of exposure, and your risk factors.  How does a health care provider test for this virus?  Health care providers will collect samples to send for testing. Samples may include:  · Taking a swab of fluid from the nose.  · Taking fluid from the lungs by having you cough up mucus (sputum) into a sterile cup.  · Taking a blood sample.  · Taking a stool or urine sample.  Is there a treatment or vaccine for this virus?  Currently, there is no vaccine to prevent coronavirus disease. Also, there are no medicines like antibiotics or antivirals to treat the virus. A person who becomes sick is given supportive care, which means rest and fluids. A person may also relieve his or her symptoms by using over-the-counter medicines that treat sneezing, coughing, and runny nose. These are the same medicines that a person takes for  the common cold.  If you develop symptoms, call your health care provider. People with severe symptoms may need hospital care.  What can I do to protect myself and my family from this virus?         You can protect yourself and your family by taking the same actions that you would take to prevent the spread of other viruses. Take the following actions:  · Wash your hands often with soap and water for at least 20 seconds. If soap and water are not available, use alcohol-based hand .  · Avoid touching your face, mouth, nose, or eyes.  · Cough or sneeze into a tissue, sleeve, or elbow. Do not cough or sneeze into your hand or the air.  ? If you cough or sneeze into a tissue, throw it away immediately and wash your hands.  · Disinfect objects and surfaces that you frequently touch every day.  · Avoid close contact with people who are sick or have a fever or cough. Stay at least 3-6 ft (1-2 m) away from them, if possible.  · Stay home if you are sick, except to get medical care. Call your health care provider before you get medical care.  · Make sure your vaccines are up to date. Ask your health care provider what vaccines you need.  What should I do if I need to travel?  Follow travel recommendations from your local health authority, the CDC, and WHO.  Travel information and advice  · Centers for Disease Control and Prevention (CDC): www.cdc.gov/coronavirus/2019-ncov/travelers/index.html  · World Health Organization (WHO): www.who.int/emergencies/diseases/novel-coronavirus-2019/travel-advice  Know the risks and take action to protect your health  · You are at higher risk of getting coronavirus disease if you are traveling to areas with an outbreak or if you are exposed to travelers from areas with an outbreak.  · Wash your hands often and practice good hygiene to lower the risk of catching or spreading the virus.  What should I do if I am sick?  General instructions to stop the spread of infection  · Wash your  hands often with soap and water for at least 20 seconds. If soap and water are not available, use alcohol-based hand .  · Cough or sneeze into a tissue, sleeve, or elbow. Do not cough or sneeze into your hand or the air.  · If you cough or sneeze into a tissue, throw it away immediately and wash your hands.  · Stay home unless you must get medical care. Call your health care provider or local health authority before you get medical care.  · Avoid public areas. Do not take public transportation, if possible.  · If you can, wear a mask if you must go out of the house or if you are in close contact with someone who is not sick.  Keep your home clean  · Disinfect objects and surfaces that are frequently touched every day. This may include:  ? Counters and tables.  ? Doorknobs and light switches.  ? Sinks and faucets.  ? Electronics such as phones, remote controls, keyboards, computers, and tablets.  · Wash dishes in hot, soapy water or use a . Air-dry your dishes.  · Wash laundry in hot water.  Prevent infecting other household members  · Let healthy household members care for children and pets, if possible. If you have to care for children or pets, wash your hands often and wear a mask.  · Sleep in a different bedroom or bed, if possible.  · Do not share personal items, such as razors, toothbrushes, deodorant, alonso, brushes, towels, and washcloths.  Where to find more information  Centers for Disease Control and Prevention (CDC)  · Information and news updates: www.cdc.gov/coronavirus/2019-ncov  World Health Organization (WHO)  · Information and news updates: www.who.int/emergencies/diseases/novel-coronavirus-2019  · Coronavirus health topic: www.who.int/health-topics/coronavirus  · Questions and answers on COVID-19: www.who.int/news-room/q-a-detail/e-u-tiaecpyveffem  · Global tracker: who.TripsByTips  American Academy of Pediatrics (AAP)  · Information for families:  www.healthychildren.org/English/health-issues/conditions/chest-lungs/Pages/2019-Novel-Coronavirus.aspx  The coronavirus situation is changing rapidly. Check your local health authority website or the CDC and WHO websites for updates and news.  When should I contact a health care provider?  · Contact your health care provider if you have symptoms of an infection, such as fever or cough, and you:  ? Have been near anyone who is known to have coronavirus disease.  ? Have come into contact with a person who is suspected to have coronavirus disease.  ? Have traveled outside of the country.  When should I get emergency medical care?  · Get help right away by calling your local emergency services (911 in the [place] if you have:  ? Trouble breathing.  ? Pain or pressure in your chest.  ? Confusion.  ? Blue-tinged lips and fingernails.  ? Difficulty waking from sleep.  ? Symptoms that get worse.  Let the emergency medical personnel know if you think you have coronavirus disease.  Summary  · A new respiratory virus is spreading from person to person and causing COVID-19 (coronavirus disease).  · The virus that causes COVID-19 appears to spread easily. It spreads from one person to another through droplets from coughing or sneezing.  · Older adults and those with chronic diseases are at higher risk of disease. If you are at higher risk for complications, take extra precautions.  · There is currently no vaccine to prevent coronavirus disease. There are no medicines, such as antibiotics or antivirals, to treat the virus.  · You can protect yourself and your family by washing your hands often, avoiding touching your face, and covering your coughs and sneezes.  This information is not intended to replace advice given to you by your health care provider. Make sure you discuss any questions you have with your health care provider.  Document Released: 04/14/2020 Document Revised: 04/14/2020 Document Reviewed: 04/14/2020  [place]  Patient Education © 2020 [employer]      COVID-19: How to Protect Yourself and Others  Know how it spreads  · There is currently no vaccine to prevent coronavirus disease 2019 (COVID-19).  · The best way to prevent illness is to avoid being exposed to this virus.  · The virus is thought to spread mainly from person-to-person.  ? Between people who are in close contact with one another (within about 6 feet).  ? Through respiratory droplets produced when an infected person coughs, sneezes or talks.  ? These droplets can land in the mouths or noses of people who are nearby or possibly be inhaled into the lungs.  ? Some recent studies have suggested that COVID-19 may be spread by people who are not showing symptoms.  Everyone should  Clean your hands often  · Wash your hands often with soap and water for at least 20 seconds especially after you have been in a public place, or after blowing your nose, coughing, or sneezing.  · If soap and water are not readily available, use a hand  that contains at least 60% alcohol. Cover all surfaces of your hands and rub them together until they feel dry.  · Avoid touching your eyes, nose, and mouth with unwashed hands.  Avoid close contact  · Stay home if you are sick.  · Avoid close contact with people who are sick.  · Put distance between yourself and other people.  ? Remember that some people without symptoms may be able to spread virus.  ? This is especially important for people who are at higher risk of getting very sick.www.cdc.gov/coronavirus/2019-ncov/need-extra-precautions/people-at-higher-risk.html  Cover your mouth and nose with a cloth face cover when around others  · You could spread COVID-19 to others even if you do not feel sick.  · Everyone should wear a cloth face cover when they have to go out in public, for example to the grocery store or to  other necessities.  ? Cloth face coverings should not be placed on young children under age 2, anyone  who has trouble breathing, or is unconscious, incapacitated or otherwise unable to remove the mask without assistance.  · The cloth face cover is meant to protect other people in case you are infected.  · Do NOT use a facemask meant for a healthcare worker.  · Continue to keep about 6 feet between yourself and others. The cloth face cover is not a substitute for social distancing.  Cover coughs and sneezes  · If you are in a private setting and do not have on your cloth face covering, remember to always cover your mouth and nose with a tissue when you cough or sneeze or use the inside of your elbow.  · Throw used tissues in the trash.  · Immediately wash your hands with soap and water for at least 20 seconds. If soap and water are not readily available, clean your hands with a hand  that contains at least 60% alcohol.  Clean and disinfect  · Clean AND disinfect frequently touched surfaces daily. This includes tables, doorknobs, light switches, countertops, handles, desks, phones, keyboards, toilets, faucets, and sinks. www.cdc.gov/coronavirus/2019-ncov/prevent-getting-sick/disinfecting-your-home.html  · If surfaces are dirty, clean them: Use detergent or soap and water prior to disinfection.  · Then, use a household disinfectant. You can see a list of EPA-registered household disinfectants here.  cdc.gov/coronavirus  05/05/2020  This information is not intended to replace advice given to you by your health care provider. Make sure you discuss any questions you have with your health care provider.  Document Released: 04/14/2020 Document Revised: 05/13/2020 Document Reviewed: 04/14/2020  Elsevier Patient Education © 2020 [employer]          Discharge Instructions    Discharged to home by car with relative. Discharged via wheelchair, hospital escort: Yes.  Special equipment needed: Not Applicable    Be sure to schedule a follow-up appointment with your primary care doctor or any specialists as instructed.      Discharge Plan:   Diet Plan: Discussed  Activity Level: Discussed  Confirmed Follow up Appointment: No (Comments)  Confirmed Symptoms Management: Discussed  Medication Reconciliation Updated: Yes  Influenza Vaccine Indication: Indicated: 65 years and older    I understand that a diet low in cholesterol, fat, and sodium is recommended for good health. Unless I have been given specific instructions below for another diet, I accept this instruction as my diet prescription.   Other diet: Heart healthy     Special Instructions: Chronic Obstructive Pulmonary Disease (COPD) is a long-term, progressive lung disease that makes it harder to breathe. It includes chronic bronchitis, emphysema, and refractory (non-reversible) asthma. With COPD, the airways in your lungs become inflamed and thicken, and the tissue where oxygen is exchanged is destroyed. The flow of air in and out of your lungs decreases. When that happens, less oxygen gets into your body tissues, and it becomes harder to get rid of the waste gas carbon dioxide. As the disease gets worse, shortness of breath makes it harder to remain active. There is no cure for COPD, but it is preventable and treatable.    COPD Patient Discharge Instructions    • Diet  o Follow a low fat, low cholesterol, low salt diet unless instructed otherwise by your Doctor. Read the labels on the back of food products and track your intake of fat, cholesterol and salt.  • No smoking  o Discontinuing smoking will have the biggest impact on preventing progression of disease.  o To participate in [employer]’s Quit Tobacco Program, call 714-4373 or visit [email or web address][email or web address]/QuitTobacco  • Oxygen  o If your doctor has order that you wear oxygen at home, it is important to wear it as ordered.  o Do not smoke, vape, or use e-cigarettes with oxygen on.  o Store in an appropriate location: upright in its lopez or laid flat down, away from open flames and stoves.   o Do not use oil-based creams and  moisturizers (ie: petroleum products, oil-based lip moisturizers) or aerosol sprays (ie: hair sprays or deodorants) when using your oxygen equipment.  o Be careful with tubing placement to prevent yourself and others from tripping.  • Medications  o Refer to your personalized Action Plan to manage your symptoms.  • Warning signs of an exacerbation  o Breathing fast and shallow, worsening shortness of breath (like you just finished exercising)  o Chest tightness  o Increases in sputum production  o Changes in sputum color  o Lower oxygen levels than baseline  • When to call your doctor  o If the warning signs of an exacerbation do not improve  o Refer to your personalized Action Plan   • Pulmonary Rehab  o Your doctor has ordered you a referral to Pulmonary Rehab. Call [phone] to schedule an appointment  • Attend your follow-up appointment with your PCP and/or Pulmonologist  See the educational handout provided by your COPD Navigator for more information. This also explains more about COPD, symptoms of an exacerbation, and some of the tests that you have undergone. and None    · Is patient discharged on Warfarin / Coumadin?   No     Depression / Suicide Risk    As you are discharged from this [facility] facility, it is important to learn how to keep safe from harming yourself.    Recognize the warning signs:  · Abrupt changes in personality, positive or negative- including increase in energy   · Giving away possessions  · Change in eating patterns- significant weight changes-  positive or negative  · Change in sleeping patterns- unable to sleep or sleeping all the time   · Unwillingness or inability to communicate  · Depression  · Unusual sadness, discouragement and loneliness  · Talk of wanting to die  · Neglect of personal appearance   · Rebelliousness- reckless behavior  · Withdrawal from people/activities they love  · Confusion- inability to concentrate     If you or a loved one observes any of these  behaviors or has concerns about self-harm, here's what you can do:  · Talk about it- your feelings and reasons for harming yourself  · Remove any means that you might use to hurt yourself (examples: pills, rope, extension cords, firearm)  · Get professional help from the community (Mental Health, Substance Abuse, psychological counseling)  · Do not be alone:Call your Safe Contact- someone whom you trust who will be there for you.  · Call your local CRISIS HOTLINE 831-0610 or 191-452-9598  · Call your local Children's Mobile Crisis Response Team [place] (074) 047-3052 or www.Well.ca  · Call the toll free National Suicide Prevention Hotlines   · National Suicide Prevention Lifeline 489-863-JPQU (3607)  · National Hope Line Network 800-SUICIDE (810-2540)

## 2020-12-17 ENCOUNTER — TELEPHONE (OUTPATIENT)
Dept: HOSPITALIST | Facility: MEDICAL CENTER | Age: 75
End: 2020-12-17

## 2020-12-17 NOTE — TELEPHONE ENCOUNTER
-received a call from microbiology lab about a positive blood culutre for gram positive cocci in 1 out of 4 bottles. PCR was negative for Stap aureus or MRSA. Other 3 bottles still incubating but has remained negative.   - I tried to call contacts for patient (patient himself, and emergency contact, Jaymierafi Banda) to check on him, but both did not  and only goes through voicemail.   - suspect 1 of 4 positive blood cultures with GPC, likely coagulase-negative staph and is contaminant. Follow rest of blood cultures.     UPDATE:  - I was able to reach pt's contact - Jaymie Solo. She states patient is stable, although remains winded with exertion but about the same as when he got discharged from the [facility]. No fevers.   - I counseled her to keep monitoring, and to seek immediate medical attention, or return to the ED for recurrent or worsening symptoms.

## 2020-12-18 LAB
BACTERIA BLD CULT: ABNORMAL
BACTERIA BLD CULT: ABNORMAL
SIGNIFICANT IND 70042: ABNORMAL
SITE SITE: ABNORMAL
SOURCE SOURCE: ABNORMAL

## 2020-12-21 LAB
BACTERIA BLD CULT: NORMAL
SIGNIFICANT IND 70042: NORMAL
SITE SITE: NORMAL
SOURCE SOURCE: NORMAL

## 2021-01-15 DIAGNOSIS — Z23 NEED FOR VACCINATION: ICD-10-CM

## 2021-09-03 NOTE — PROGRESS NOTES
Patient called in for med refill on traMADol Noreen James tablet 50 mg         Please send to [address] [place] [address] [address] [facility], [facility] [id number] - F [phone]       Please call to advise Report received from Elliott GUERRA. Assumed care, assessment complete at [id number]. Pt is A & O x 4.  Pt denies having any pain at this time. Fall precautions and appropriate signs in place. Pt oriented to unit routine, call light/phone system and RN extension number provided. Pt educated regarding fall precautions. Pt denies any additional needs at this time. Call light within reach.

## 2023-01-01 ENCOUNTER — HOSPITAL ENCOUNTER (OUTPATIENT)
Dept: RADIOLOGY | Facility: MEDICAL CENTER | Age: 78
End: 2023-12-27
Attending: STUDENT IN AN ORGANIZED HEALTH CARE EDUCATION/TRAINING PROGRAM

## 2023-01-01 ENCOUNTER — APPOINTMENT (OUTPATIENT)
Dept: RADIOLOGY | Facility: MEDICAL CENTER | Age: 78
DRG: 871 | End: 2023-01-01
Attending: EMERGENCY MEDICINE
Payer: COMMERCIAL

## 2023-01-01 ENCOUNTER — APPOINTMENT (OUTPATIENT)
Dept: RADIOLOGY | Facility: MEDICAL CENTER | Age: 78
DRG: 025 | End: 2023-01-01
Attending: NEUROLOGICAL SURGERY
Payer: COMMERCIAL

## 2023-01-01 ENCOUNTER — ANESTHESIA (OUTPATIENT)
Dept: SURGERY | Facility: MEDICAL CENTER | Age: 78
DRG: 025 | End: 2023-01-01
Payer: COMMERCIAL

## 2023-01-01 ENCOUNTER — APPOINTMENT (OUTPATIENT)
Dept: CARDIOLOGY | Facility: MEDICAL CENTER | Age: 78
DRG: 025 | End: 2023-01-01
Attending: INTERNAL MEDICINE
Payer: COMMERCIAL

## 2023-01-01 ENCOUNTER — PHARMACY VISIT (OUTPATIENT)
Dept: PHARMACY | Facility: MEDICAL CENTER | Age: 78
End: 2023-01-01
Payer: COMMERCIAL

## 2023-01-01 ENCOUNTER — APPOINTMENT (OUTPATIENT)
Dept: RADIOLOGY | Facility: MEDICAL CENTER | Age: 78
DRG: 025 | End: 2023-01-01
Attending: STUDENT IN AN ORGANIZED HEALTH CARE EDUCATION/TRAINING PROGRAM
Payer: COMMERCIAL

## 2023-01-01 ENCOUNTER — APPOINTMENT (OUTPATIENT)
Dept: MEDICAL GROUP | Facility: MEDICAL CENTER | Age: 78
End: 2023-01-01
Attending: NURSE PRACTITIONER
Payer: COMMERCIAL

## 2023-01-01 ENCOUNTER — HOSPITAL ENCOUNTER (INPATIENT)
Facility: MEDICAL CENTER | Age: 78
LOS: 6 days | DRG: 025 | End: 2024-01-02
Attending: STUDENT IN AN ORGANIZED HEALTH CARE EDUCATION/TRAINING PROGRAM | Admitting: INTERNAL MEDICINE
Payer: COMMERCIAL

## 2023-01-01 ENCOUNTER — HOSPITAL ENCOUNTER (INPATIENT)
Facility: MEDICAL CENTER | Age: 78
LOS: 2 days | DRG: 871 | End: 2023-04-20
Attending: EMERGENCY MEDICINE | Admitting: HOSPITALIST
Payer: COMMERCIAL

## 2023-01-01 ENCOUNTER — ANESTHESIA EVENT (OUTPATIENT)
Dept: SURGERY | Facility: MEDICAL CENTER | Age: 78
DRG: 025 | End: 2023-01-01
Payer: COMMERCIAL

## 2023-01-01 ENCOUNTER — HOSPITAL ENCOUNTER (INPATIENT)
Facility: REHABILITATION | Age: 78
End: 2023-01-01
Attending: PHYSICAL MEDICINE & REHABILITATION | Admitting: PHYSICAL MEDICINE & REHABILITATION
Payer: COMMERCIAL

## 2023-01-01 ENCOUNTER — APPOINTMENT (OUTPATIENT)
Dept: RADIOLOGY | Facility: MEDICAL CENTER | Age: 78
DRG: 025 | End: 2023-01-01
Attending: INTERNAL MEDICINE
Payer: COMMERCIAL

## 2023-01-01 VITALS
TEMPERATURE: 97.5 F | HEART RATE: 80 BPM | SYSTOLIC BLOOD PRESSURE: 164 MMHG | OXYGEN SATURATION: 95 % | HEIGHT: 70 IN | RESPIRATION RATE: 17 BRPM | DIASTOLIC BLOOD PRESSURE: 93 MMHG | BODY MASS INDEX: 28.88 KG/M2 | WEIGHT: 201.72 LBS

## 2023-01-01 DIAGNOSIS — R42 LIGHTHEADEDNESS: ICD-10-CM

## 2023-01-01 DIAGNOSIS — J18.9 PNEUMONIA OF RIGHT LUNG DUE TO INFECTIOUS ORGANISM, UNSPECIFIED PART OF LUNG: ICD-10-CM

## 2023-01-01 DIAGNOSIS — J96.01 ACUTE RESPIRATORY FAILURE WITH HYPOXIA (HCC): ICD-10-CM

## 2023-01-01 DIAGNOSIS — I10 PRIMARY HYPERTENSION: ICD-10-CM

## 2023-01-01 DIAGNOSIS — J18.9 PNEUMONIA OF RIGHT LOWER LOBE DUE TO INFECTIOUS ORGANISM: ICD-10-CM

## 2023-01-01 DIAGNOSIS — S00.83XA TRAUMATIC HEMATOMA OF FOREHEAD, INITIAL ENCOUNTER: ICD-10-CM

## 2023-01-01 DIAGNOSIS — I61.4 INTRACEREBELLAR AND POSTERIOR FOSSA HEMORRHAGE (HCC): ICD-10-CM

## 2023-01-01 DIAGNOSIS — I62.9 INTRACRANIAL HEMORRHAGE (HCC): ICD-10-CM

## 2023-01-01 DIAGNOSIS — I61.8 INTRACEREBELLAR AND POSTERIOR FOSSA HEMORRHAGE (HCC): ICD-10-CM
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
## 2023-01-01 DIAGNOSIS — E87.6 HYPOKALEMIA: ICD-10-CM

## 2023-01-01 DIAGNOSIS — A41.9 SEPSIS, DUE TO UNSPECIFIED ORGANISM, UNSPECIFIED WHETHER ACUTE ORGAN DYSFUNCTION PRESENT (HCC): ICD-10-CM

## 2023-01-01 LAB
ALBUMIN SERPL BCP-MCNC: 3.3 G/DL (ref 3.2–4.9)
ALBUMIN SERPL BCP-MCNC: 3.4 G/DL (ref 3.2–4.9)
ALBUMIN SERPL BCP-MCNC: 3.6 G/DL (ref 3.2–4.9)
ALBUMIN SERPL BCP-MCNC: 3.7 G/DL (ref 3.2–4.9)
ALBUMIN SERPL BCP-MCNC: 3.7 G/DL (ref 3.2–4.9)
ALBUMIN SERPL BCP-MCNC: 3.9 G/DL (ref 3.2–4.9)
ALBUMIN/GLOB SERPL: 0.9 G/DL
ALBUMIN/GLOB SERPL: 1.3 G/DL
ALBUMIN/GLOB SERPL: 1.3 G/DL
ALBUMIN/GLOB SERPL: 1.4 G/DL
ALBUMIN/GLOB SERPL: 1.4 G/DL
ALBUMIN/GLOB SERPL: 1.5 G/DL
ALP SERPL-CCNC: 102 U/L (ref 30–99)
ALP SERPL-CCNC: 119 U/L (ref 30–99)
ALP SERPL-CCNC: 88 U/L (ref 30–99)
ALP SERPL-CCNC: 90 U/L (ref 30–99)
ALP SERPL-CCNC: 93 U/L (ref 30–99)
ALP SERPL-CCNC: 98 U/L (ref 30–99)
ALT SERPL-CCNC: 14 U/L (ref 2–50)
ALT SERPL-CCNC: 15 U/L (ref 2–50)
ALT SERPL-CCNC: 16 U/L (ref 2–50)
ALT SERPL-CCNC: 19 U/L (ref 2–50)
ALT SERPL-CCNC: 30 U/L (ref 2–50)
ALT SERPL-CCNC: 38 U/L (ref 2–50)
AMPHET UR QL SCN: NEGATIVE
ANION GAP SERPL CALC-SCNC: 10 MMOL/L (ref 7–16)
ANION GAP SERPL CALC-SCNC: 11 MMOL/L (ref 7–16)
ANION GAP SERPL CALC-SCNC: 12 MMOL/L (ref 7–16)
ANION GAP SERPL CALC-SCNC: 13 MMOL/L (ref 7–16)
ANION GAP SERPL CALC-SCNC: 15 MMOL/L (ref 7–16)
ANION GAP SERPL CALC-SCNC: 7 MMOL/L (ref 7–16)
ANION GAP SERPL CALC-SCNC: 8 MMOL/L (ref 7–16)
ANION GAP SERPL CALC-SCNC: 9 MMOL/L (ref 7–16)
ANION GAP SERPL CALC-SCNC: 9 MMOL/L (ref 7–16)
APTT PPP: 27.9 SEC (ref 24.7–36)
AST SERPL-CCNC: 13 U/L (ref 12–45)
AST SERPL-CCNC: 20 U/L (ref 12–45)
AST SERPL-CCNC: 20 U/L (ref 12–45)
AST SERPL-CCNC: 21 U/L (ref 12–45)
AST SERPL-CCNC: 28 U/L (ref 12–45)
AST SERPL-CCNC: 28 U/L (ref 12–45)
BACTERIA BLD CULT: ABNORMAL
BACTERIA BLD CULT: NORMAL
BACTERIA BLD CULT: NORMAL
BACTERIA UR CULT: NORMAL
BARBITURATES UR QL SCN: NEGATIVE
BASE EXCESS BLDA CALC-SCNC: -3 MMOL/L (ref -4–3)
BASOPHILS # BLD AUTO: 0.1 % (ref 0–1.8)
BASOPHILS # BLD AUTO: 0.2 % (ref 0–1.8)
BASOPHILS # BLD AUTO: 0.3 % (ref 0–1.8)
BASOPHILS # BLD AUTO: 0.5 % (ref 0–1.8)
BASOPHILS # BLD: 0.02 K/UL (ref 0–0.12)
BASOPHILS # BLD: 0.03 K/UL (ref 0–0.12)
BASOPHILS # BLD: 0.04 K/UL (ref 0–0.12)
BASOPHILS # BLD: 0.05 K/UL (ref 0–0.12)
BENZODIAZ UR QL SCN: NEGATIVE
BILIRUB SERPL-MCNC: 0.4 MG/DL (ref 0.1–1.5)
BILIRUB SERPL-MCNC: 0.4 MG/DL (ref 0.1–1.5)
BILIRUB SERPL-MCNC: 0.5 MG/DL (ref 0.1–1.5)
BILIRUB SERPL-MCNC: 0.6 MG/DL (ref 0.1–1.5)
BILIRUB SERPL-MCNC: 0.7 MG/DL (ref 0.1–1.5)
BILIRUB SERPL-MCNC: 1.2 MG/DL (ref 0.1–1.5)
BODY TEMPERATURE: ABNORMAL DEGREES
BUN SERPL-MCNC: 15 MG/DL (ref 8–22)
BUN SERPL-MCNC: 16 MG/DL (ref 8–22)
BUN SERPL-MCNC: 18 MG/DL (ref 8–22)
BUN SERPL-MCNC: 18 MG/DL (ref 8–22)
BUN SERPL-MCNC: 19 MG/DL (ref 8–22)
BUN SERPL-MCNC: 20 MG/DL (ref 8–22)
BUN SERPL-MCNC: 23 MG/DL (ref 8–22)
BUN SERPL-MCNC: 24 MG/DL (ref 8–22)
BUN SERPL-MCNC: 27 MG/DL (ref 8–22)
BZE UR QL SCN: NEGATIVE
CA-I BLD ISE-SCNC: 1.26 MMOL/L (ref 1.1–1.3)
CALCIUM ALBUM COR SERPL-MCNC: 10.1 MG/DL (ref 8.5–10.5)
CALCIUM ALBUM COR SERPL-MCNC: 8.8 MG/DL (ref 8.5–10.5)
CALCIUM ALBUM COR SERPL-MCNC: 8.9 MG/DL (ref 8.5–10.5)
CALCIUM ALBUM COR SERPL-MCNC: 9.1 MG/DL (ref 8.5–10.5)
CALCIUM ALBUM COR SERPL-MCNC: 9.3 MG/DL (ref 8.5–10.5)
CALCIUM ALBUM COR SERPL-MCNC: 9.9 MG/DL (ref 8.5–10.5)
CALCIUM SERPL-MCNC: 8.6 MG/DL (ref 8.5–10.5)
CALCIUM SERPL-MCNC: 8.6 MG/DL (ref 8.5–10.5)
CALCIUM SERPL-MCNC: 8.8 MG/DL (ref 8.5–10.5)
CALCIUM SERPL-MCNC: 8.8 MG/DL (ref 8.5–10.5)
CALCIUM SERPL-MCNC: 9.1 MG/DL (ref 8.5–10.5)
CALCIUM SERPL-MCNC: 9.3 MG/DL (ref 8.5–10.5)
CALCIUM SERPL-MCNC: 9.4 MG/DL (ref 8.5–10.5)
CALCIUM SERPL-MCNC: 9.4 MG/DL (ref 8.5–10.5)
CALCIUM SERPL-MCNC: 9.5 MG/DL (ref 8.5–10.5)
CANNABINOIDS UR QL SCN: NEGATIVE
CFT BLD TEG: 4.6 MIN (ref 4.6–9.1)
CFT P HPASE BLD TEG: 4 MIN (ref 4.3–8.3)
CHLORIDE SERPL-SCNC: 101 MMOL/L (ref 96–112)
CHLORIDE SERPL-SCNC: 103 MMOL/L (ref 96–112)
CHLORIDE SERPL-SCNC: 103 MMOL/L (ref 96–112)
CHLORIDE SERPL-SCNC: 105 MMOL/L (ref 96–112)
CHLORIDE SERPL-SCNC: 107 MMOL/L (ref 96–112)
CHLORIDE SERPL-SCNC: 112 MMOL/L (ref 96–112)
CHLORIDE SERPL-SCNC: 114 MMOL/L (ref 96–112)
CHOLEST SERPL-MCNC: 284 MG/DL (ref 100–199)
CLOT ANGLE BLD TEG: 74.8 DEGREES (ref 63–78)
CLOT LYSIS 30M P MA LENFR BLD TEG: 0 % (ref 0–2.6)
CO2 BLDA-SCNC: 26 MMOL/L (ref 20–33)
CO2 SERPL-SCNC: 20 MMOL/L (ref 20–33)
CO2 SERPL-SCNC: 21 MMOL/L (ref 20–33)
CO2 SERPL-SCNC: 22 MMOL/L (ref 20–33)
CO2 SERPL-SCNC: 24 MMOL/L (ref 20–33)
CO2 SERPL-SCNC: 24 MMOL/L (ref 20–33)
CO2 SERPL-SCNC: 27 MMOL/L (ref 20–33)
CO2 SERPL-SCNC: 27 MMOL/L (ref 20–33)
CREAT SERPL-MCNC: 0.94 MG/DL (ref 0.5–1.4)
CREAT SERPL-MCNC: 0.95 MG/DL (ref 0.5–1.4)
CREAT SERPL-MCNC: 1.02 MG/DL (ref 0.5–1.4)
CREAT SERPL-MCNC: 1.04 MG/DL (ref 0.5–1.4)
CREAT SERPL-MCNC: 1.05 MG/DL (ref 0.5–1.4)
CREAT SERPL-MCNC: 1.08 MG/DL (ref 0.5–1.4)
CREAT SERPL-MCNC: 1.18 MG/DL (ref 0.5–1.4)
CT.EXTRINSIC BLD ROTEM: 1.1 MIN (ref 0.8–2.1)
D DIMER PPP IA.FEU-MCNC: 1 UG/ML (FEU) (ref 0–0.5)
EKG IMPRESSION: NORMAL
EKG IMPRESSION: NORMAL
EOSINOPHIL # BLD AUTO: 0 K/UL (ref 0–0.51)
EOSINOPHIL # BLD AUTO: 0.01 K/UL (ref 0–0.51)
EOSINOPHIL # BLD AUTO: 0.02 K/UL (ref 0–0.51)
EOSINOPHIL # BLD AUTO: 0.09 K/UL (ref 0–0.51)
EOSINOPHIL NFR BLD: 0 % (ref 0–6.9)
EOSINOPHIL NFR BLD: 0.1 % (ref 0–6.9)
EOSINOPHIL NFR BLD: 0.2 % (ref 0–6.9)
EOSINOPHIL NFR BLD: 0.4 % (ref 0–6.9)
ERYTHROCYTE [DISTWIDTH] IN BLOOD BY AUTOMATED COUNT: 42.1 FL (ref 35.9–50)
ERYTHROCYTE [DISTWIDTH] IN BLOOD BY AUTOMATED COUNT: 42.6 FL (ref 35.9–50)
ERYTHROCYTE [DISTWIDTH] IN BLOOD BY AUTOMATED COUNT: 43.2 FL (ref 35.9–50)
ERYTHROCYTE [DISTWIDTH] IN BLOOD BY AUTOMATED COUNT: 44.1 FL (ref 35.9–50)
ERYTHROCYTE [DISTWIDTH] IN BLOOD BY AUTOMATED COUNT: 45.3 FL (ref 35.9–50)
ERYTHROCYTE [DISTWIDTH] IN BLOOD BY AUTOMATED COUNT: 46.2 FL (ref 35.9–50)
ERYTHROCYTE [DISTWIDTH] IN BLOOD BY AUTOMATED COUNT: 46.7 FL (ref 35.9–50)
ERYTHROCYTE [DISTWIDTH] IN BLOOD BY AUTOMATED COUNT: 46.7 FL (ref 35.9–50)
ETEST SENSITIVITY ETEST: NORMAL
ETHANOL BLD-MCNC: <10.1 MG/DL
FENTANYL UR QL: POSITIVE
GFR SERPLBLD CREATININE-BSD FMLA CKD-EPI: 64 ML/MIN/1.73 M 2
GFR SERPLBLD CREATININE-BSD FMLA CKD-EPI: 71 ML/MIN/1.73 M 2
GFR SERPLBLD CREATININE-BSD FMLA CKD-EPI: 73 ML/MIN/1.73 M 2
GFR SERPLBLD CREATININE-BSD FMLA CKD-EPI: 74 ML/MIN/1.73 M 2
GFR SERPLBLD CREATININE-BSD FMLA CKD-EPI: 76 ML/MIN/1.73 M 2
GFR SERPLBLD CREATININE-BSD FMLA CKD-EPI: 83 ML/MIN/1.73 M 2
GFR SERPLBLD CREATININE-BSD FMLA CKD-EPI: 84 ML/MIN/1.73 M 2
GLOBULIN SER CALC-MCNC: 2.4 G/DL (ref 1.9–3.5)
GLOBULIN SER CALC-MCNC: 2.7 G/DL (ref 1.9–3.5)
GLOBULIN SER CALC-MCNC: 2.7 G/DL (ref 1.9–3.5)
GLOBULIN SER CALC-MCNC: 2.8 G/DL (ref 1.9–3.5)
GLOBULIN SER CALC-MCNC: 2.8 G/DL (ref 1.9–3.5)
GLOBULIN SER CALC-MCNC: 3.6 G/DL (ref 1.9–3.5)
GLUCOSE BLD STRIP.AUTO-MCNC: 101 MG/DL (ref 65–99)
GLUCOSE BLD STRIP.AUTO-MCNC: 106 MG/DL (ref 65–99)
GLUCOSE BLD STRIP.AUTO-MCNC: 112 MG/DL (ref 65–99)
GLUCOSE BLD STRIP.AUTO-MCNC: 118 MG/DL (ref 65–99)
GLUCOSE BLD STRIP.AUTO-MCNC: 120 MG/DL (ref 65–99)
GLUCOSE BLD STRIP.AUTO-MCNC: 121 MG/DL (ref 65–99)
GLUCOSE BLD STRIP.AUTO-MCNC: 124 MG/DL (ref 65–99)
GLUCOSE BLD STRIP.AUTO-MCNC: 125 MG/DL (ref 65–99)
GLUCOSE BLD STRIP.AUTO-MCNC: 131 MG/DL (ref 65–99)
GLUCOSE BLD STRIP.AUTO-MCNC: 133 MG/DL (ref 65–99)
GLUCOSE BLD STRIP.AUTO-MCNC: 133 MG/DL (ref 65–99)
GLUCOSE BLD STRIP.AUTO-MCNC: 134 MG/DL (ref 65–99)
GLUCOSE BLD STRIP.AUTO-MCNC: 138 MG/DL (ref 65–99)
GLUCOSE BLD STRIP.AUTO-MCNC: 141 MG/DL (ref 65–99)
GLUCOSE BLD STRIP.AUTO-MCNC: 146 MG/DL (ref 65–99)
GLUCOSE BLD STRIP.AUTO-MCNC: 148 MG/DL (ref 65–99)
GLUCOSE BLD STRIP.AUTO-MCNC: 150 MG/DL (ref 65–99)
GLUCOSE BLD STRIP.AUTO-MCNC: 159 MG/DL (ref 65–99)
GLUCOSE BLD STRIP.AUTO-MCNC: 179 MG/DL (ref 65–99)
GLUCOSE BLD STRIP.AUTO-MCNC: 184 MG/DL (ref 65–99)
GLUCOSE BLD STRIP.AUTO-MCNC: 204 MG/DL (ref 65–99)
GLUCOSE SERPL-MCNC: 112 MG/DL (ref 65–99)
GLUCOSE SERPL-MCNC: 121 MG/DL (ref 65–99)
GLUCOSE SERPL-MCNC: 123 MG/DL (ref 65–99)
GLUCOSE SERPL-MCNC: 124 MG/DL (ref 65–99)
GLUCOSE SERPL-MCNC: 127 MG/DL (ref 65–99)
GLUCOSE SERPL-MCNC: 150 MG/DL (ref 65–99)
GLUCOSE SERPL-MCNC: 154 MG/DL (ref 65–99)
GLUCOSE SERPL-MCNC: 191 MG/DL (ref 65–99)
GLUCOSE SERPL-MCNC: 221 MG/DL (ref 65–99)
HCO3 BLDA-SCNC: 24.2 MMOL/L (ref 17–25)
HCT VFR BLD AUTO: 38.4 % (ref 42–52)
HCT VFR BLD AUTO: 41.3 % (ref 42–52)
HCT VFR BLD AUTO: 41.8 % (ref 42–52)
HCT VFR BLD AUTO: 42.6 % (ref 42–52)
HCT VFR BLD AUTO: 42.9 % (ref 42–52)
HCT VFR BLD AUTO: 44 % (ref 42–52)
HCT VFR BLD AUTO: 45 % (ref 42–52)
HCT VFR BLD AUTO: 45.5 % (ref 42–52)
HCT VFR BLD CALC: 47 % (ref 42–52)
HDLC SERPL-MCNC: 46 MG/DL
HGB BLD-MCNC: 12.6 G/DL (ref 14–18)
HGB BLD-MCNC: 13.2 G/DL (ref 14–18)
HGB BLD-MCNC: 13.8 G/DL (ref 14–18)
HGB BLD-MCNC: 13.9 G/DL (ref 14–18)
HGB BLD-MCNC: 14.4 G/DL (ref 14–18)
HGB BLD-MCNC: 14.4 G/DL (ref 14–18)
HGB BLD-MCNC: 15.4 G/DL (ref 14–18)
HGB BLD-MCNC: 15.5 G/DL (ref 14–18)
HGB BLD-MCNC: 16 G/DL (ref 14–18)
IMM GRANULOCYTES # BLD AUTO: 0.04 K/UL (ref 0–0.11)
IMM GRANULOCYTES # BLD AUTO: 0.07 K/UL (ref 0–0.11)
IMM GRANULOCYTES # BLD AUTO: 0.08 K/UL (ref 0–0.11)
IMM GRANULOCYTES # BLD AUTO: 0.32 K/UL (ref 0–0.11)
IMM GRANULOCYTES NFR BLD AUTO: 0.3 % (ref 0–0.9)
IMM GRANULOCYTES NFR BLD AUTO: 0.4 % (ref 0–0.9)
IMM GRANULOCYTES NFR BLD AUTO: 0.6 % (ref 0–0.9)
IMM GRANULOCYTES NFR BLD AUTO: 1.4 % (ref 0–0.9)
INR PPP: 1.13 (ref 0.87–1.13)
LACTATE SERPL-SCNC: 2.3 MMOL/L (ref 0.5–2)
LACTATE SERPL-SCNC: 2.6 MMOL/L (ref 0.5–2)
LDLC SERPL CALC-MCNC: 221 MG/DL
LIPASE SERPL-CCNC: 19 U/L (ref 11–82)
LV EJECT FRACT  99904: 62
LV EJECT FRACT MOD 2C 99903: 60.93
LV EJECT FRACT MOD 4C 99902: 62.79
LV EJECT FRACT MOD BP 99901: 61.96
LYMPHOCYTES # BLD AUTO: 0.55 K/UL (ref 1–4.8)
LYMPHOCYTES # BLD AUTO: 1.18 K/UL (ref 1–4.8)
LYMPHOCYTES # BLD AUTO: 1.27 K/UL (ref 1–4.8)
LYMPHOCYTES # BLD AUTO: 1.64 K/UL (ref 1–4.8)
LYMPHOCYTES # BLD AUTO: 1.67 K/UL (ref 1–4.8)
LYMPHOCYTES # BLD AUTO: 1.83 K/UL (ref 1–4.8)
LYMPHOCYTES NFR BLD: 13.4 % (ref 22–41)
LYMPHOCYTES NFR BLD: 16.9 % (ref 22–41)
LYMPHOCYTES NFR BLD: 17.6 % (ref 22–41)
LYMPHOCYTES NFR BLD: 4.1 % (ref 22–41)
LYMPHOCYTES NFR BLD: 5.1 % (ref 22–41)
LYMPHOCYTES NFR BLD: 7.2 % (ref 22–41)
MAGNESIUM SERPL-MCNC: 2 MG/DL (ref 1.5–2.5)
MAGNESIUM SERPL-MCNC: 2.1 MG/DL (ref 1.5–2.5)
MCF BLD TEG: 63.4 MM (ref 52–69)
MCF.PLATELET INHIB BLD ROTEM: 23.6 MM (ref 15–32)
MCH RBC QN AUTO: 29.6 PG (ref 27–33)
MCH RBC QN AUTO: 29.7 PG (ref 27–33)
MCH RBC QN AUTO: 29.8 PG (ref 27–33)
MCH RBC QN AUTO: 29.9 PG (ref 27–33)
MCH RBC QN AUTO: 30.1 PG (ref 27–33)
MCH RBC QN AUTO: 30.4 PG (ref 27–33)
MCH RBC QN AUTO: 30.7 PG (ref 27–33)
MCH RBC QN AUTO: 30.9 PG (ref 27–33)
MCHC RBC AUTO-ENTMCNC: 32 G/DL (ref 32.3–36.5)
MCHC RBC AUTO-ENTMCNC: 32.4 G/DL (ref 33.7–35.3)
MCHC RBC AUTO-ENTMCNC: 32.7 G/DL (ref 32.3–36.5)
MCHC RBC AUTO-ENTMCNC: 32.8 G/DL (ref 33.7–35.3)
MCHC RBC AUTO-ENTMCNC: 33.3 G/DL (ref 32.3–36.5)
MCHC RBC AUTO-ENTMCNC: 33.6 G/DL (ref 33.7–35.3)
MCHC RBC AUTO-ENTMCNC: 34.1 G/DL (ref 32.3–36.5)
MCHC RBC AUTO-ENTMCNC: 34.2 G/DL (ref 32.3–36.5)
MCV RBC AUTO: 88.1 FL (ref 81.4–97.8)
MCV RBC AUTO: 90.1 FL (ref 81.4–97.8)
MCV RBC AUTO: 90.2 FL (ref 81.4–97.8)
MCV RBC AUTO: 90.8 FL (ref 81.4–97.8)
MCV RBC AUTO: 91.5 FL (ref 81.4–97.8)
MCV RBC AUTO: 91.6 FL (ref 81.4–97.8)
MCV RBC AUTO: 92.1 FL (ref 81.4–97.8)
MCV RBC AUTO: 93.4 FL (ref 81.4–97.8)
METHADONE UR QL SCN: NEGATIVE
MONOCYTES # BLD AUTO: 0.5 K/UL (ref 0–0.85)
MONOCYTES # BLD AUTO: 0.82 K/UL (ref 0–0.85)
MONOCYTES # BLD AUTO: 1.05 K/UL (ref 0–0.85)
MONOCYTES # BLD AUTO: 1.1 K/UL (ref 0–0.85)
MONOCYTES # BLD AUTO: 1.13 K/UL (ref 0–0.85)
MONOCYTES # BLD AUTO: 1.53 K/UL (ref 0–0.85)
MONOCYTES NFR BLD AUTO: 3.7 % (ref 0–13.4)
MONOCYTES NFR BLD AUTO: 4.8 % (ref 0–13.4)
MONOCYTES NFR BLD AUTO: 8.6 % (ref 0–13.4)
MONOCYTES NFR BLD AUTO: 8.6 % (ref 0–13.4)
MONOCYTES NFR BLD AUTO: 9.2 % (ref 0–13.4)
MONOCYTES NFR BLD AUTO: 9.7 % (ref 0–13.4)
NEUTROPHILS # BLD AUTO: 12.25 K/UL (ref 1.82–7.42)
NEUTROPHILS # BLD AUTO: 14.82 K/UL (ref 1.82–7.42)
NEUTROPHILS # BLD AUTO: 20.39 K/UL (ref 1.82–7.42)
NEUTROPHILS # BLD AUTO: 6.91 K/UL (ref 1.82–7.42)
NEUTROPHILS # BLD AUTO: 7.84 K/UL (ref 1.82–7.42)
NEUTROPHILS # BLD AUTO: 9.38 K/UL (ref 1.82–7.42)
NEUTROPHILS NFR BLD: 72.4 % (ref 44–72)
NEUTROPHILS NFR BLD: 73 % (ref 44–72)
NEUTROPHILS NFR BLD: 76.7 % (ref 44–72)
NEUTROPHILS NFR BLD: 83.6 % (ref 44–72)
NEUTROPHILS NFR BLD: 88.1 % (ref 44–72)
NEUTROPHILS NFR BLD: 91.4 % (ref 44–72)
NRBC # BLD AUTO: 0 K/UL
NRBC BLD-RTO: 0 /100 WBC
NRBC BLD-RTO: 0 /100 WBC (ref 0–0.2)
NT-PROBNP SERPL IA-MCNC: 2078 PG/ML (ref 0–125)
NT-PROBNP SERPL IA-MCNC: 2202 PG/ML (ref 0–125)
OPIATES UR QL SCN: NEGATIVE
OXYCODONE UR QL SCN: NEGATIVE
PA AA BLD-ACNC: 6.7 % (ref 0–11)
PA ADP BLD-ACNC: 42.9 % (ref 0–17)
PCO2 BLDA: 51 MMHG (ref 26–37)
PCP UR QL SCN: NEGATIVE
PH BLDA: 7.29 [PH] (ref 7.4–7.5)
PLATELET # BLD AUTO: 252 K/UL (ref 164–446)
PLATELET # BLD AUTO: 253 K/UL (ref 164–446)
PLATELET # BLD AUTO: 260 K/UL (ref 164–446)
PLATELET # BLD AUTO: 274 K/UL (ref 164–446)
PLATELET # BLD AUTO: 274 K/UL (ref 164–446)
PLATELET # BLD AUTO: 281 K/UL (ref 164–446)
PLATELET # BLD AUTO: 294 K/UL (ref 164–446)
PLATELET # BLD AUTO: 377 K/UL (ref 164–446)
PMV BLD AUTO: 10 FL (ref 9–12.9)
PMV BLD AUTO: 10.1 FL (ref 9–12.9)
PMV BLD AUTO: 10.1 FL (ref 9–12.9)
PMV BLD AUTO: 10.2 FL (ref 9–12.9)
PMV BLD AUTO: 10.2 FL (ref 9–12.9)
PMV BLD AUTO: 10.4 FL (ref 9–12.9)
PMV BLD AUTO: 10.4 FL (ref 9–12.9)
PMV BLD AUTO: 9.4 FL (ref 9–12.9)
PO2 BLDA: 217 MMHG (ref 64–87)
POTASSIUM BLD-SCNC: 4.1 MMOL/L (ref 3.6–5.5)
POTASSIUM SERPL-SCNC: 3.2 MMOL/L (ref 3.6–5.5)
POTASSIUM SERPL-SCNC: 3.6 MMOL/L (ref 3.6–5.5)
POTASSIUM SERPL-SCNC: 3.6 MMOL/L (ref 3.6–5.5)
POTASSIUM SERPL-SCNC: 3.7 MMOL/L (ref 3.6–5.5)
POTASSIUM SERPL-SCNC: 3.8 MMOL/L (ref 3.6–5.5)
POTASSIUM SERPL-SCNC: 3.8 MMOL/L (ref 3.6–5.5)
POTASSIUM SERPL-SCNC: 4.2 MMOL/L (ref 3.6–5.5)
POTASSIUM SERPL-SCNC: 4.2 MMOL/L (ref 3.6–5.5)
POTASSIUM SERPL-SCNC: 4.5 MMOL/L (ref 3.6–5.5)
PROPOXYPH UR QL SCN: NEGATIVE
PROT SERPL-MCNC: 6 G/DL (ref 6–8.2)
PROT SERPL-MCNC: 6.1 G/DL (ref 6–8.2)
PROT SERPL-MCNC: 6.4 G/DL (ref 6–8.2)
PROT SERPL-MCNC: 6.5 G/DL (ref 6–8.2)
PROT SERPL-MCNC: 6.7 G/DL (ref 6–8.2)
PROT SERPL-MCNC: 6.9 G/DL (ref 6–8.2)
PROTHROMBIN TIME: 14.6 SEC (ref 12–14.6)
RBC # BLD AUTO: 4.23 M/UL (ref 4.7–6.1)
RBC # BLD AUTO: 4.42 M/UL (ref 4.7–6.1)
RBC # BLD AUTO: 4.57 M/UL (ref 4.7–6.1)
RBC # BLD AUTO: 4.65 M/UL (ref 4.7–6.1)
RBC # BLD AUTO: 4.78 M/UL (ref 4.7–6.1)
RBC # BLD AUTO: 4.87 M/UL (ref 4.7–6.1)
RBC # BLD AUTO: 4.99 M/UL (ref 4.7–6.1)
RBC # BLD AUTO: 5.05 M/UL (ref 4.7–6.1)
SAO2 % BLDA: 100 % (ref 93–99)
SIGNIFICANT IND 70042: ABNORMAL
SIGNIFICANT IND 70042: ABNORMAL
SIGNIFICANT IND 70042: NORMAL
SITE SITE: ABNORMAL
SITE SITE: ABNORMAL
SITE SITE: NORMAL
SODIUM BLD-SCNC: 137 MMOL/L (ref 135–145)
SODIUM SERPL-SCNC: 134 MMOL/L (ref 135–145)
SODIUM SERPL-SCNC: 136 MMOL/L (ref 135–145)
SODIUM SERPL-SCNC: 138 MMOL/L (ref 135–145)
SODIUM SERPL-SCNC: 138 MMOL/L (ref 135–145)
SODIUM SERPL-SCNC: 139 MMOL/L (ref 135–145)
SODIUM SERPL-SCNC: 139 MMOL/L (ref 135–145)
SODIUM SERPL-SCNC: 140 MMOL/L (ref 135–145)
SODIUM SERPL-SCNC: 141 MMOL/L (ref 135–145)
SODIUM SERPL-SCNC: 145 MMOL/L (ref 135–145)
SODIUM SERPL-SCNC: 145 MMOL/L (ref 135–145)
SODIUM SERPL-SCNC: 146 MMOL/L (ref 135–145)
SODIUM SERPL-SCNC: 146 MMOL/L (ref 135–145)
SODIUM SERPL-SCNC: 147 MMOL/L (ref 135–145)
SODIUM SERPL-SCNC: 147 MMOL/L (ref 135–145)
SODIUM SERPL-SCNC: 148 MMOL/L (ref 135–145)
SODIUM SERPL-SCNC: 149 MMOL/L (ref 135–145)
SOURCE SOURCE: ABNORMAL
SOURCE SOURCE: ABNORMAL
SOURCE SOURCE: NORMAL
SPECIMEN DRAWN FROM PATIENT: ABNORMAL
TEG ALGORITHM TGALG: ABNORMAL
TRIGL SERPL-MCNC: 85 MG/DL (ref 0–149)
TROPONIN T SERPL-MCNC: 27 NG/L (ref 6–19)
WBC # BLD AUTO: 10.8 K/UL (ref 4.8–10.8)
WBC # BLD AUTO: 12.2 K/UL (ref 4.8–10.8)
WBC # BLD AUTO: 13.4 K/UL (ref 4.8–10.8)
WBC # BLD AUTO: 14.8 K/UL (ref 4.8–10.8)
WBC # BLD AUTO: 17.7 K/UL (ref 4.8–10.8)
WBC # BLD AUTO: 23.1 K/UL (ref 4.8–10.8)
WBC # BLD AUTO: 9.1 K/UL (ref 4.8–10.8)
WBC # BLD AUTO: 9.5 K/UL (ref 4.8–10.8)

## 2023-01-01 PROCEDURE — 700105 HCHG RX REV CODE 258: Performed by: NURSE PRACTITIONER

## 2023-01-01 PROCEDURE — 96365 THER/PROPH/DIAG IV INF INIT: CPT

## 2023-01-01 PROCEDURE — 02HV33Z INSERTION OF INFUSION DEVICE INTO SUPERIOR VENA CAVA, PERCUTANEOUS APPROACH: ICD-10-PCS | Performed by: INTERNAL MEDICINE

## 2023-01-01 PROCEDURE — 99239 HOSP IP/OBS DSCHRG MGMT >30: CPT | Performed by: NURSE PRACTITIONER

## 2023-01-01 PROCEDURE — 700102 HCHG RX REV CODE 250 W/ 637 OVERRIDE(OP): Performed by: NURSE PRACTITIONER

## 2023-01-01 PROCEDURE — 99233 SBSQ HOSP IP/OBS HIGH 50: CPT | Performed by: NURSE PRACTITIONER

## 2023-01-01 PROCEDURE — 99291 CRITICAL CARE FIRST HOUR: CPT | Performed by: INTERNAL MEDICINE

## 2023-01-01 PROCEDURE — 74177 CT ABD & PELVIS W/CONTRAST: CPT

## 2023-01-01 PROCEDURE — 700102 HCHG RX REV CODE 250 W/ 637 OVERRIDE(OP): Performed by: HOSPITALIST

## 2023-01-01 PROCEDURE — 96375 TX/PRO/DX INJ NEW DRUG ADDON: CPT

## 2023-01-01 PROCEDURE — 700101 HCHG RX REV CODE 250: Performed by: STUDENT IN AN ORGANIZED HEALTH CARE EDUCATION/TRAINING PROGRAM

## 2023-01-01 PROCEDURE — A9270 NON-COVERED ITEM OR SERVICE: HCPCS | Performed by: HOSPITALIST

## 2023-01-01 PROCEDURE — 94669 MECHANICAL CHEST WALL OSCILL: CPT

## 2023-01-01 PROCEDURE — 700105 HCHG RX REV CODE 258: Performed by: INTERNAL MEDICINE

## 2023-01-01 PROCEDURE — 700102 HCHG RX REV CODE 250 W/ 637 OVERRIDE(OP): Performed by: EMERGENCY MEDICINE

## 2023-01-01 PROCEDURE — 160009 HCHG ANES TIME/MIN: Performed by: NEUROLOGICAL SURGERY

## 2023-01-01 PROCEDURE — 85025 COMPLETE CBC W/AUTO DIFF WBC: CPT

## 2023-01-01 PROCEDURE — 770006 HCHG ROOM/CARE - MED/SURG/GYN SEMI*

## 2023-01-01 PROCEDURE — 700102 HCHG RX REV CODE 250 W/ 637 OVERRIDE(OP): Performed by: INTERNAL MEDICINE

## 2023-01-01 PROCEDURE — 85027 COMPLETE CBC AUTOMATED: CPT

## 2023-01-01 PROCEDURE — 700101 HCHG RX REV CODE 250: Performed by: ANESTHESIOLOGY

## 2023-01-01 PROCEDURE — 700111 HCHG RX REV CODE 636 W/ 250 OVERRIDE (IP): Performed by: NURSE PRACTITIONER

## 2023-01-01 PROCEDURE — 72125 CT NECK SPINE W/O DYE: CPT

## 2023-01-01 PROCEDURE — 700101 HCHG RX REV CODE 250: Performed by: NEUROLOGICAL SURGERY

## 2023-01-01 PROCEDURE — 99291 CRITICAL CARE FIRST HOUR: CPT | Performed by: PSYCHIATRY & NEUROLOGY

## 2023-01-01 PROCEDURE — 92526 ORAL FUNCTION THERAPY: CPT

## 2023-01-01 PROCEDURE — 36556 INSERT NON-TUNNEL CV CATH: CPT | Performed by: INTERNAL MEDICINE

## 2023-01-01 PROCEDURE — 700111 HCHG RX REV CODE 636 W/ 250 OVERRIDE (IP): Performed by: HOSPITALIST

## 2023-01-01 PROCEDURE — 85730 THROMBOPLASTIN TIME PARTIAL: CPT

## 2023-01-01 PROCEDURE — 51798 US URINE CAPACITY MEASURE: CPT

## 2023-01-01 PROCEDURE — 93005 ELECTROCARDIOGRAM TRACING: CPT | Performed by: EMERGENCY MEDICINE

## 2023-01-01 PROCEDURE — 700117 HCHG RX CONTRAST REV CODE 255: Performed by: EMERGENCY MEDICINE

## 2023-01-01 PROCEDURE — 99291 CRITICAL CARE FIRST HOUR: CPT | Mod: 25 | Performed by: INTERNAL MEDICINE

## 2023-01-01 PROCEDURE — 700105 HCHG RX REV CODE 258: Performed by: STUDENT IN AN ORGANIZED HEALTH CARE EDUCATION/TRAINING PROGRAM

## 2023-01-01 PROCEDURE — 700105 HCHG RX REV CODE 258: Performed by: NEUROLOGICAL SURGERY

## 2023-01-01 PROCEDURE — 99292 CRITICAL CARE ADDL 30 MIN: CPT | Performed by: INTERNAL MEDICINE

## 2023-01-01 PROCEDURE — A9270 NON-COVERED ITEM OR SERVICE: HCPCS | Performed by: INTERNAL MEDICINE

## 2023-01-01 PROCEDURE — 700101 HCHG RX REV CODE 250: Performed by: INTERNAL MEDICINE

## 2023-01-01 PROCEDURE — A9270 NON-COVERED ITEM OR SERVICE: HCPCS | Performed by: STUDENT IN AN ORGANIZED HEALTH CARE EDUCATION/TRAINING PROGRAM

## 2023-01-01 PROCEDURE — 83735 ASSAY OF MAGNESIUM: CPT

## 2023-01-01 PROCEDURE — 700111 HCHG RX REV CODE 636 W/ 250 OVERRIDE (IP): Performed by: EMERGENCY MEDICINE

## 2023-01-01 PROCEDURE — 71275 CT ANGIOGRAPHY CHEST: CPT

## 2023-01-01 PROCEDURE — 0WC10ZZ EXTIRPATION OF MATTER FROM CRANIAL CAVITY, OPEN APPROACH: ICD-10-PCS | Performed by: NEUROLOGICAL SURGERY

## 2023-01-01 PROCEDURE — 110454 HCHG SHELL REV 250: Performed by: NEUROLOGICAL SURGERY

## 2023-01-01 PROCEDURE — 85384 FIBRINOGEN ACTIVITY: CPT | Mod: 91

## 2023-01-01 PROCEDURE — 85014 HEMATOCRIT: CPT

## 2023-01-01 PROCEDURE — 97167 OT EVAL HIGH COMPLEX 60 MIN: CPT

## 2023-01-01 PROCEDURE — 770022 HCHG ROOM/CARE - ICU (200)

## 2023-01-01 PROCEDURE — 84132 ASSAY OF SERUM POTASSIUM: CPT

## 2023-01-01 PROCEDURE — 92612 ENDOSCOPY SWALLOW (FEES) VID: CPT

## 2023-01-01 PROCEDURE — 82962 GLUCOSE BLOOD TEST: CPT | Mod: 91

## 2023-01-01 PROCEDURE — 80048 BASIC METABOLIC PNL TOTAL CA: CPT

## 2023-01-01 PROCEDURE — 700117 HCHG RX CONTRAST REV CODE 255: Performed by: STUDENT IN AN ORGANIZED HEALTH CARE EDUCATION/TRAINING PROGRAM

## 2023-01-01 PROCEDURE — 700105 HCHG RX REV CODE 258: Performed by: ANESTHESIOLOGY

## 2023-01-01 PROCEDURE — 87077 CULTURE AEROBIC IDENTIFY: CPT

## 2023-01-01 PROCEDURE — 80307 DRUG TEST PRSMV CHEM ANLYZR: CPT

## 2023-01-01 PROCEDURE — 80053 COMPREHEN METABOLIC PANEL: CPT

## 2023-01-01 PROCEDURE — 36415 COLL VENOUS BLD VENIPUNCTURE: CPT

## 2023-01-01 PROCEDURE — 93306 TTE W/DOPPLER COMPLETE: CPT | Mod: 26 | Performed by: INTERNAL MEDICINE

## 2023-01-01 PROCEDURE — 99233 SBSQ HOSP IP/OBS HIGH 50: CPT | Performed by: PSYCHIATRY & NEUROLOGY

## 2023-01-01 PROCEDURE — 83880 ASSAY OF NATRIURETIC PEPTIDE: CPT

## 2023-01-01 PROCEDURE — 93005 ELECTROCARDIOGRAM TRACING: CPT | Performed by: STUDENT IN AN ORGANIZED HEALTH CARE EDUCATION/TRAINING PROGRAM

## 2023-01-01 PROCEDURE — 700102 HCHG RX REV CODE 250 W/ 637 OVERRIDE(OP): Performed by: STUDENT IN AN ORGANIZED HEALTH CARE EDUCATION/TRAINING PROGRAM

## 2023-01-01 PROCEDURE — 92610 EVALUATE SWALLOWING FUNCTION: CPT

## 2023-01-01 PROCEDURE — 97163 PT EVAL HIGH COMPLEX 45 MIN: CPT

## 2023-01-01 PROCEDURE — 99223 1ST HOSP IP/OBS HIGH 75: CPT | Performed by: HOSPITALIST

## 2023-01-01 PROCEDURE — 82803 BLOOD GASES ANY COMBINATION: CPT

## 2023-01-01 PROCEDURE — 84484 ASSAY OF TROPONIN QUANT: CPT

## 2023-01-01 PROCEDURE — 160029 HCHG SURGERY MINUTES - 1ST 30 MINS LEVEL 4: Performed by: NEUROLOGICAL SURGERY

## 2023-01-01 PROCEDURE — 99232 SBSQ HOSP IP/OBS MODERATE 35: CPT | Performed by: PSYCHIATRY & NEUROLOGY

## 2023-01-01 PROCEDURE — 700105 HCHG RX REV CODE 258: Performed by: HOSPITALIST

## 2023-01-01 PROCEDURE — 80061 LIPID PANEL: CPT

## 2023-01-01 PROCEDURE — 87181 SC STD AGAR DILUTION PER AGT: CPT

## 2023-01-01 PROCEDURE — 99233 SBSQ HOSP IP/OBS HIGH 50: CPT | Performed by: INTERNAL MEDICINE

## 2023-01-01 PROCEDURE — 82962 GLUCOSE BLOOD TEST: CPT

## 2023-01-01 PROCEDURE — 700105 HCHG RX REV CODE 258: Performed by: EMERGENCY MEDICINE

## 2023-01-01 PROCEDURE — 84295 ASSAY OF SERUM SODIUM: CPT

## 2023-01-01 PROCEDURE — 160002 HCHG RECOVERY MINUTES (STAT): Performed by: NEUROLOGICAL SURGERY

## 2023-01-01 PROCEDURE — 92523 SPEECH SOUND LANG COMPREHEN: CPT

## 2023-01-01 PROCEDURE — 99291 CRITICAL CARE FIRST HOUR: CPT

## 2023-01-01 PROCEDURE — 94760 N-INVAS EAR/PLS OXIMETRY 1: CPT

## 2023-01-01 PROCEDURE — 93005 ELECTROCARDIOGRAM TRACING: CPT

## 2023-01-01 PROCEDURE — C1751 CATH, INF, PER/CENT/MIDLINE: HCPCS

## 2023-01-01 PROCEDURE — 70486 CT MAXILLOFACIAL W/O DYE: CPT

## 2023-01-01 PROCEDURE — 93306 TTE W/DOPPLER COMPLETE: CPT

## 2023-01-01 PROCEDURE — A9270 NON-COVERED ITEM OR SERVICE: HCPCS | Performed by: EMERGENCY MEDICINE

## 2023-01-01 PROCEDURE — 82330 ASSAY OF CALCIUM: CPT

## 2023-01-01 PROCEDURE — 700111 HCHG RX REV CODE 636 W/ 250 OVERRIDE (IP): Performed by: STUDENT IN AN ORGANIZED HEALTH CARE EDUCATION/TRAINING PROGRAM

## 2023-01-01 PROCEDURE — 160041 HCHG SURGERY MINUTES - EA ADDL 1 MIN LEVEL 4: Performed by: NEUROLOGICAL SURGERY

## 2023-01-01 PROCEDURE — 700111 HCHG RX REV CODE 636 W/ 250 OVERRIDE (IP): Mod: JZ | Performed by: NEUROLOGICAL SURGERY

## 2023-01-01 PROCEDURE — 99223 1ST HOSP IP/OBS HIGH 75: CPT | Performed by: PHYSICAL MEDICINE & REHABILITATION

## 2023-01-01 PROCEDURE — 99285 EMERGENCY DEPT VISIT HI MDM: CPT

## 2023-01-01 PROCEDURE — 160048 HCHG OR STATISTICAL LEVEL 1-5: Performed by: NEUROLOGICAL SURGERY

## 2023-01-01 PROCEDURE — 97530 THERAPEUTIC ACTIVITIES: CPT

## 2023-01-01 PROCEDURE — 83605 ASSAY OF LACTIC ACID: CPT

## 2023-01-01 PROCEDURE — A9270 NON-COVERED ITEM OR SERVICE: HCPCS | Performed by: NURSE PRACTITIONER

## 2023-01-01 PROCEDURE — 84295 ASSAY OF SERUM SODIUM: CPT | Mod: 91

## 2023-01-01 PROCEDURE — 87040 BLOOD CULTURE FOR BACTERIA: CPT

## 2023-01-01 PROCEDURE — C1713 ANCHOR/SCREW BN/BN,TIS/BN: HCPCS | Performed by: NEUROLOGICAL SURGERY

## 2023-01-01 PROCEDURE — 36556 INSERT NON-TUNNEL CV CATH: CPT

## 2023-01-01 PROCEDURE — 96368 THER/DIAG CONCURRENT INF: CPT

## 2023-01-01 PROCEDURE — 160035 HCHG PACU - 1ST 60 MINS PHASE I: Performed by: NEUROLOGICAL SURGERY

## 2023-01-01 PROCEDURE — RXMED WILLOW AMBULATORY MEDICATION CHARGE: Performed by: NURSE PRACTITIONER

## 2023-01-01 PROCEDURE — 83690 ASSAY OF LIPASE: CPT

## 2023-01-01 PROCEDURE — 99232 SBSQ HOSP IP/OBS MODERATE 35: CPT | Performed by: STUDENT IN AN ORGANIZED HEALTH CARE EDUCATION/TRAINING PROGRAM

## 2023-01-01 PROCEDURE — 85576 BLOOD PLATELET AGGREGATION: CPT | Mod: 91

## 2023-01-01 PROCEDURE — 70496 CT ANGIOGRAPHY HEAD: CPT

## 2023-01-01 PROCEDURE — 85610 PROTHROMBIN TIME: CPT

## 2023-01-01 PROCEDURE — 87086 URINE CULTURE/COLONY COUNT: CPT

## 2023-01-01 PROCEDURE — 82077 ASSAY SPEC XCP UR&BREATH IA: CPT

## 2023-01-01 PROCEDURE — 700111 HCHG RX REV CODE 636 W/ 250 OVERRIDE (IP): Performed by: ANESTHESIOLOGY

## 2023-01-01 PROCEDURE — G0390 TRAUMA RESPONS W/HOSP CRITI: HCPCS

## 2023-01-01 PROCEDURE — 71045 X-RAY EXAM CHEST 1 VIEW: CPT

## 2023-01-01 PROCEDURE — 85347 COAGULATION TIME ACTIVATED: CPT

## 2023-01-01 PROCEDURE — 85379 FIBRIN DEGRADATION QUANT: CPT

## 2023-01-01 PROCEDURE — 70450 CT HEAD/BRAIN W/O DYE: CPT

## 2023-01-01 DEVICE — IMPLANTABLE DEVICE: Type: IMPLANTABLE DEVICE | Site: CRANIAL | Status: FUNCTIONAL

## 2023-01-01 DEVICE — SCREW STRYK NC 1.5X5MM (6NCX40=240) (80EA/PK) CONSIGNED QTY 240 PRE-LOAD: Type: IMPLANTABLE DEVICE | Site: CRANIAL | Status: FUNCTIONAL

## 2023-01-01 DEVICE — DURASEAL SEALANT SYSTEM 5ML - (5/BX): Type: IMPLANTABLE DEVICE | Site: CRANIAL | Status: FUNCTIONAL

## 2023-01-01 DEVICE — GRAFT DURAMATRIX ONLAY PLUS 1IN X 3IN OR 2.75CM X 7.5CM: Type: IMPLANTABLE DEVICE | Site: CRANIAL | Status: FUNCTIONAL

## 2023-01-01 RX ORDER — HYDRALAZINE HYDROCHLORIDE 20 MG/ML
10 INJECTION INTRAMUSCULAR; INTRAVENOUS EVERY 6 HOURS PRN
Status: DISCONTINUED | OUTPATIENT
Start: 2023-01-01 | End: 2023-01-01 | Stop reason: HOSPADM

## 2023-01-01 RX ORDER — CEFTRIAXONE 2 G/1
2000 INJECTION, POWDER, FOR SOLUTION INTRAMUSCULAR; INTRAVENOUS ONCE
Status: COMPLETED | OUTPATIENT
Start: 2023-01-01 | End: 2023-01-01

## 2023-01-01 RX ORDER — ONDANSETRON 2 MG/ML
4 INJECTION INTRAMUSCULAR; INTRAVENOUS EVERY 4 HOURS PRN
Status: DISCONTINUED | OUTPATIENT
Start: 2023-01-01 | End: 2023-01-01 | Stop reason: HOSPADM

## 2023-01-01 RX ORDER — LABETALOL HYDROCHLORIDE 5 MG/ML
10 INJECTION, SOLUTION INTRAVENOUS EVERY 4 HOURS PRN
Status: DISCONTINUED | OUTPATIENT
Start: 2023-01-01 | End: 2024-01-01 | Stop reason: HOSPADM

## 2023-01-01 RX ORDER — SODIUM CHLORIDE, SODIUM LACTATE, POTASSIUM CHLORIDE, AND CALCIUM CHLORIDE .6; .31; .03; .02 G/100ML; G/100ML; G/100ML; G/100ML
1000 INJECTION, SOLUTION INTRAVENOUS
Status: COMPLETED | OUTPATIENT
Start: 2023-01-01 | End: 2023-01-01

## 2023-01-01 RX ORDER — DOCUSATE SODIUM 100 MG/1
100 CAPSULE, LIQUID FILLED ORAL 2 TIMES DAILY
Status: DISCONTINUED | OUTPATIENT
Start: 2023-01-01 | End: 2024-01-01 | Stop reason: HOSPADM

## 2023-01-01 RX ORDER — ONDANSETRON 2 MG/ML
4 INJECTION INTRAMUSCULAR; INTRAVENOUS ONCE
Status: COMPLETED | OUTPATIENT
Start: 2023-01-01 | End: 2023-01-01

## 2023-01-01 RX ORDER — IBUPROFEN 200 MG
400 TABLET ORAL EVERY 6 HOURS PRN
COMMUNITY
End: 2024-01-01

## 2023-01-01 RX ORDER — AZITHROMYCIN 250 MG/1
500 TABLET, FILM COATED ORAL DAILY
Status: DISCONTINUED | OUTPATIENT
Start: 2023-01-01 | End: 2023-01-01

## 2023-01-01 RX ORDER — SODIUM CHLORIDE 9 MG/ML
1000 INJECTION, SOLUTION INTRAVENOUS ONCE
Status: ACTIVE | OUTPATIENT
Start: 2023-01-01 | End: 2023-01-01

## 2023-01-01 RX ORDER — SODIUM CHLORIDE, SODIUM LACTATE, POTASSIUM CHLORIDE, CALCIUM CHLORIDE 600; 310; 30; 20 MG/100ML; MG/100ML; MG/100ML; MG/100ML
INJECTION, SOLUTION INTRAVENOUS
Status: DISCONTINUED | OUTPATIENT
Start: 2023-01-01 | End: 2023-01-01 | Stop reason: SURG

## 2023-01-01 RX ORDER — ACETAMINOPHEN 650 MG/1
650 SUPPOSITORY RECTAL EVERY 4 HOURS PRN
Status: DISCONTINUED | OUTPATIENT
Start: 2023-01-01 | End: 2024-01-01 | Stop reason: HOSPADM

## 2023-01-01 RX ORDER — SODIUM CHLORIDE 9 MG/ML
1000 INJECTION, SOLUTION INTRAVENOUS ONCE
Status: COMPLETED | OUTPATIENT
Start: 2023-01-01 | End: 2023-01-01

## 2023-01-01 RX ORDER — AMLODIPINE BESYLATE 10 MG/1
10 TABLET ORAL DAILY
Qty: 30 TABLET | Refills: 0 | Status: SHIPPED | OUTPATIENT
Start: 2023-01-01 | End: 2024-01-01

## 2023-01-01 RX ORDER — KETOROLAC TROMETHAMINE 30 MG/ML
15 INJECTION, SOLUTION INTRAMUSCULAR; INTRAVENOUS EVERY 6 HOURS PRN
Status: DISCONTINUED | OUTPATIENT
Start: 2023-01-01 | End: 2023-01-01 | Stop reason: HOSPADM

## 2023-01-01 RX ORDER — LIDOCAINE HYDROCHLORIDE 20 MG/ML
INJECTION, SOLUTION EPIDURAL; INFILTRATION; INTRACAUDAL; PERINEURAL PRN
Status: DISCONTINUED | OUTPATIENT
Start: 2023-01-01 | End: 2023-01-01 | Stop reason: SURG

## 2023-01-01 RX ORDER — SODIUM CHLORIDE, SODIUM LACTATE, POTASSIUM CHLORIDE, CALCIUM CHLORIDE 600; 310; 30; 20 MG/100ML; MG/100ML; MG/100ML; MG/100ML
INJECTION, SOLUTION INTRAVENOUS CONTINUOUS
Status: DISCONTINUED | OUTPATIENT
Start: 2023-01-01 | End: 2023-01-01 | Stop reason: HOSPADM

## 2023-01-01 RX ORDER — HYDRALAZINE HYDROCHLORIDE 20 MG/ML
10-20 INJECTION INTRAMUSCULAR; INTRAVENOUS EVERY 6 HOURS PRN
Status: DISCONTINUED | OUTPATIENT
Start: 2023-01-01 | End: 2024-01-01 | Stop reason: HOSPADM

## 2023-01-01 RX ORDER — DIPHENHYDRAMINE HYDROCHLORIDE 50 MG/ML
12.5 INJECTION INTRAMUSCULAR; INTRAVENOUS
Status: DISCONTINUED | OUTPATIENT
Start: 2023-01-01 | End: 2023-01-01 | Stop reason: HOSPADM

## 2023-01-01 RX ORDER — POLYETHYLENE GLYCOL 3350 17 G/17G
1 POWDER, FOR SOLUTION ORAL
Status: DISCONTINUED | OUTPATIENT
Start: 2023-01-01 | End: 2023-01-01 | Stop reason: HOSPADM

## 2023-01-01 RX ORDER — AMLODIPINE BESYLATE 5 MG/1
5 TABLET ORAL
Status: DISCONTINUED | OUTPATIENT
Start: 2023-01-01 | End: 2023-01-01

## 2023-01-01 RX ORDER — LISINOPRIL 20 MG/1
40 TABLET ORAL
Status: DISCONTINUED | OUTPATIENT
Start: 2023-01-01 | End: 2024-01-01 | Stop reason: HOSPADM

## 2023-01-01 RX ORDER — 3% SODIUM CHLORIDE 3 G/100ML
500 INJECTION, SOLUTION INTRAVENOUS CONTINUOUS
Status: DISCONTINUED | OUTPATIENT
Start: 2023-01-01 | End: 2023-01-01

## 2023-01-01 RX ORDER — AMLODIPINE BESYLATE 10 MG/1
10 TABLET ORAL
Status: DISCONTINUED | OUTPATIENT
Start: 2023-01-01 | End: 2024-01-01 | Stop reason: HOSPADM

## 2023-01-01 RX ORDER — SODIUM CHLORIDE, SODIUM LACTATE, POTASSIUM CHLORIDE, AND CALCIUM CHLORIDE .6; .31; .03; .02 G/100ML; G/100ML; G/100ML; G/100ML
30 INJECTION, SOLUTION INTRAVENOUS ONCE
Status: COMPLETED | OUTPATIENT
Start: 2023-01-01 | End: 2023-01-01

## 2023-01-01 RX ORDER — PROCHLORPERAZINE EDISYLATE 5 MG/ML
10 INJECTION INTRAMUSCULAR; INTRAVENOUS EVERY 6 HOURS PRN
Status: DISCONTINUED | OUTPATIENT
Start: 2023-01-01 | End: 2024-01-01 | Stop reason: HOSPADM

## 2023-01-01 RX ORDER — PHENYLEPHRINE HCL IN 0.9% NACL 0.5 MG/5ML
SYRINGE (ML) INTRAVENOUS PRN
Status: DISCONTINUED | OUTPATIENT
Start: 2023-01-01 | End: 2023-01-01 | Stop reason: SURG

## 2023-01-01 RX ORDER — CEFAZOLIN SODIUM 1 G/3ML
INJECTION, POWDER, FOR SOLUTION INTRAMUSCULAR; INTRAVENOUS
Status: DISCONTINUED | OUTPATIENT
Start: 2023-01-01 | End: 2023-01-01 | Stop reason: HOSPADM

## 2023-01-01 RX ORDER — AMOXICILLIN AND CLAVULANATE POTASSIUM 875; 125 MG/1; MG/1
1 TABLET, FILM COATED ORAL 2 TIMES DAILY
Qty: 14 TABLET | Refills: 0 | Status: ACTIVE | OUTPATIENT
Start: 2023-01-01 | End: 2024-01-01

## 2023-01-01 RX ORDER — ONDANSETRON 2 MG/ML
4 INJECTION INTRAMUSCULAR; INTRAVENOUS EVERY 4 HOURS PRN
Status: DISCONTINUED | OUTPATIENT
Start: 2023-01-01 | End: 2024-01-01 | Stop reason: HOSPADM

## 2023-01-01 RX ORDER — 3% SODIUM CHLORIDE 3 G/100ML
0-60 INJECTION, SOLUTION INTRAVENOUS CONTINUOUS
Status: DISCONTINUED | OUTPATIENT
Start: 2023-01-01 | End: 2023-01-01

## 2023-01-01 RX ORDER — POLYETHYLENE GLYCOL 3350 17 G/17G
1 POWDER, FOR SOLUTION ORAL 2 TIMES DAILY
Status: DISCONTINUED | OUTPATIENT
Start: 2023-01-01 | End: 2024-01-01 | Stop reason: HOSPADM

## 2023-01-01 RX ORDER — EPHEDRINE SULFATE 50 MG/ML
5 INJECTION, SOLUTION INTRAVENOUS
Status: DISCONTINUED | OUTPATIENT
Start: 2023-01-01 | End: 2023-01-01 | Stop reason: HOSPADM

## 2023-01-01 RX ORDER — POTASSIUM CHLORIDE 20 MEQ/1
40 TABLET, EXTENDED RELEASE ORAL DAILY
Status: DISCONTINUED | OUTPATIENT
Start: 2023-01-01 | End: 2023-01-01 | Stop reason: HOSPADM

## 2023-01-01 RX ORDER — HYDROMORPHONE HYDROCHLORIDE 1 MG/ML
0.4 INJECTION, SOLUTION INTRAMUSCULAR; INTRAVENOUS; SUBCUTANEOUS
Status: DISCONTINUED | OUTPATIENT
Start: 2023-01-01 | End: 2023-01-01 | Stop reason: HOSPADM

## 2023-01-01 RX ORDER — OXYCODONE HYDROCHLORIDE 5 MG/1
5 TABLET ORAL
Status: DISCONTINUED | OUTPATIENT
Start: 2023-01-01 | End: 2023-01-01 | Stop reason: HOSPADM

## 2023-01-01 RX ORDER — ONDANSETRON 4 MG/1
4 TABLET, ORALLY DISINTEGRATING ORAL EVERY 4 HOURS PRN
Status: DISCONTINUED | OUTPATIENT
Start: 2023-01-01 | End: 2024-01-01 | Stop reason: HOSPADM

## 2023-01-01 RX ORDER — POTASSIUM CHLORIDE 20 MEQ/1
40 TABLET, EXTENDED RELEASE ORAL DAILY
Qty: 6 TABLET | Refills: 0 | Status: SHIPPED | OUTPATIENT
Start: 2023-01-01 | End: 2024-01-01

## 2023-01-01 RX ORDER — DEXAMETHASONE SODIUM PHOSPHATE 4 MG/ML
INJECTION, SOLUTION INTRA-ARTICULAR; INTRALESIONAL; INTRAMUSCULAR; INTRAVENOUS; SOFT TISSUE PRN
Status: DISCONTINUED | OUTPATIENT
Start: 2023-01-01 | End: 2023-01-01 | Stop reason: SURG

## 2023-01-01 RX ORDER — ONDANSETRON 4 MG/1
4 TABLET, ORALLY DISINTEGRATING ORAL EVERY 4 HOURS PRN
Status: DISCONTINUED | OUTPATIENT
Start: 2023-01-01 | End: 2023-01-01 | Stop reason: HOSPADM

## 2023-01-01 RX ORDER — ONDANSETRON 2 MG/ML
INJECTION INTRAMUSCULAR; INTRAVENOUS PRN
Status: DISCONTINUED | OUTPATIENT
Start: 2023-01-01 | End: 2023-01-01 | Stop reason: SURG

## 2023-01-01 RX ORDER — LISINOPRIL 20 MG/1
20 TABLET ORAL
Status: DISCONTINUED | OUTPATIENT
Start: 2023-01-01 | End: 2023-01-01

## 2023-01-01 RX ORDER — BUPIVACAINE HYDROCHLORIDE AND EPINEPHRINE 5; 5 MG/ML; UG/ML
INJECTION, SOLUTION EPIDURAL; INTRACAUDAL; PERINEURAL
Status: DISCONTINUED | OUTPATIENT
Start: 2023-01-01 | End: 2023-01-01 | Stop reason: HOSPADM

## 2023-01-01 RX ORDER — OXYCODONE HCL 5 MG/5 ML
5 SOLUTION, ORAL ORAL
Status: DISCONTINUED | OUTPATIENT
Start: 2023-01-01 | End: 2023-01-01 | Stop reason: HOSPADM

## 2023-01-01 RX ORDER — CEFAZOLIN SODIUM 1 G/3ML
INJECTION, POWDER, FOR SOLUTION INTRAMUSCULAR; INTRAVENOUS PRN
Status: DISCONTINUED | OUTPATIENT
Start: 2023-01-01 | End: 2023-01-01 | Stop reason: SURG

## 2023-01-01 RX ORDER — LISINOPRIL 10 MG/1
10 TABLET ORAL TWICE DAILY
Status: DISCONTINUED | OUTPATIENT
Start: 2023-01-01 | End: 2023-01-01

## 2023-01-01 RX ORDER — AZITHROMYCIN 250 MG/1
500 TABLET, FILM COATED ORAL ONCE
Status: DISCONTINUED | OUTPATIENT
Start: 2023-01-01 | End: 2023-01-01

## 2023-01-01 RX ORDER — LIDOCAINE HYDROCHLORIDE 40 MG/ML
SOLUTION TOPICAL PRN
Status: DISCONTINUED | OUTPATIENT
Start: 2023-01-01 | End: 2023-01-01 | Stop reason: SURG

## 2023-01-01 RX ORDER — ENALAPRILAT 1.25 MG/ML
1.25 INJECTION INTRAVENOUS EVERY 6 HOURS PRN
Status: DISCONTINUED | OUTPATIENT
Start: 2023-01-01 | End: 2024-01-01 | Stop reason: HOSPADM

## 2023-01-01 RX ORDER — ACETAMINOPHEN 325 MG/1
650 TABLET ORAL EVERY 4 HOURS PRN
Status: DISCONTINUED | OUTPATIENT
Start: 2023-01-01 | End: 2024-01-01 | Stop reason: HOSPADM

## 2023-01-01 RX ORDER — ONDANSETRON 2 MG/ML
4 INJECTION INTRAMUSCULAR; INTRAVENOUS
Status: DISCONTINUED | OUTPATIENT
Start: 2023-01-01 | End: 2023-01-01 | Stop reason: HOSPADM

## 2023-01-01 RX ORDER — HEPARIN SODIUM 5000 [USP'U]/ML
5000 INJECTION, SOLUTION INTRAVENOUS; SUBCUTANEOUS EVERY 8 HOURS
Status: DISCONTINUED | OUTPATIENT
Start: 2023-01-01 | End: 2023-01-01 | Stop reason: HOSPADM

## 2023-01-01 RX ORDER — HALOPERIDOL 5 MG/ML
1 INJECTION INTRAMUSCULAR
Status: DISCONTINUED | OUTPATIENT
Start: 2023-01-01 | End: 2023-01-01 | Stop reason: HOSPADM

## 2023-01-01 RX ORDER — ROCURONIUM BROMIDE 10 MG/ML
INJECTION, SOLUTION INTRAVENOUS PRN
Status: DISCONTINUED | OUTPATIENT
Start: 2023-01-01 | End: 2023-01-01 | Stop reason: SURG

## 2023-01-01 RX ORDER — BISACODYL 10 MG
10 SUPPOSITORY, RECTAL RECTAL
Status: DISCONTINUED | OUTPATIENT
Start: 2023-01-01 | End: 2023-01-01 | Stop reason: HOSPADM

## 2023-01-01 RX ORDER — POTASSIUM CHLORIDE 20 MEQ/1
60 TABLET, EXTENDED RELEASE ORAL ONCE
Status: COMPLETED | OUTPATIENT
Start: 2023-01-01 | End: 2023-01-01

## 2023-01-01 RX ORDER — ACETAMINOPHEN 325 MG/1
650 TABLET ORAL EVERY 6 HOURS PRN
Status: DISCONTINUED | OUTPATIENT
Start: 2023-01-01 | End: 2023-01-01 | Stop reason: HOSPADM

## 2023-01-01 RX ORDER — AMLODIPINE BESYLATE 5 MG/1
5 TABLET ORAL ONCE
Status: COMPLETED | OUTPATIENT
Start: 2023-01-01 | End: 2023-01-01

## 2023-01-01 RX ORDER — OXYCODONE HCL 5 MG/5 ML
10 SOLUTION, ORAL ORAL
Status: DISCONTINUED | OUTPATIENT
Start: 2023-01-01 | End: 2023-01-01 | Stop reason: HOSPADM

## 2023-01-01 RX ORDER — AMOXICILLIN 250 MG
2 CAPSULE ORAL 2 TIMES DAILY
Status: DISCONTINUED | OUTPATIENT
Start: 2023-01-01 | End: 2023-01-01 | Stop reason: HOSPADM

## 2023-01-01 RX ORDER — AMLODIPINE BESYLATE 10 MG/1
10 TABLET ORAL
Status: DISCONTINUED | OUTPATIENT
Start: 2023-01-01 | End: 2023-01-01 | Stop reason: HOSPADM

## 2023-01-01 RX ORDER — HYDROMORPHONE HYDROCHLORIDE 1 MG/ML
0.2 INJECTION, SOLUTION INTRAMUSCULAR; INTRAVENOUS; SUBCUTANEOUS
Status: DISCONTINUED | OUTPATIENT
Start: 2023-01-01 | End: 2023-01-01 | Stop reason: HOSPADM

## 2023-01-01 RX ORDER — HYDROMORPHONE HYDROCHLORIDE 1 MG/ML
0.1 INJECTION, SOLUTION INTRAMUSCULAR; INTRAVENOUS; SUBCUTANEOUS
Status: DISCONTINUED | OUTPATIENT
Start: 2023-01-01 | End: 2023-01-01 | Stop reason: HOSPADM

## 2023-01-01 RX ADMIN — HEPARIN SODIUM 5000 UNITS: 5000 INJECTION, SOLUTION INTRAVENOUS; SUBCUTANEOUS at 18:47

## 2023-01-01 RX ADMIN — CEFAZOLIN 2 G: 2 INJECTION, POWDER, FOR SOLUTION INTRAMUSCULAR; INTRAVENOUS at 23:20

## 2023-01-01 RX ADMIN — HYDRALAZINE HYDROCHLORIDE 10 MG: 20 INJECTION INTRAMUSCULAR; INTRAVENOUS at 18:25

## 2023-01-01 RX ADMIN — SODIUM CHLORIDE 5 MG/HR: 9 INJECTION, SOLUTION INTRAVENOUS at 00:25

## 2023-01-01 RX ADMIN — ROCURONIUM BROMIDE 100 MG: 50 INJECTION, SOLUTION INTRAVENOUS at 02:17

## 2023-01-01 RX ADMIN — ACETAMINOPHEN 650 MG: 325 TABLET, FILM COATED ORAL at 04:19

## 2023-01-01 RX ADMIN — ACETAMINOPHEN 650 MG: 325 TABLET, FILM COATED ORAL at 21:24

## 2023-01-01 RX ADMIN — AMLODIPINE BESYLATE 10 MG: 10 TABLET ORAL at 04:20

## 2023-01-01 RX ADMIN — POLYETHYLENE GLYCOL 3350 1 PACKET: 17 POWDER, FOR SOLUTION ORAL at 11:37

## 2023-01-01 RX ADMIN — AZITHROMYCIN MONOHYDRATE 500 MG: 250 TABLET ORAL at 12:31

## 2023-01-01 RX ADMIN — OXYCODONE 5 MG: 5 TABLET ORAL at 17:03

## 2023-01-01 RX ADMIN — LISINOPRIL 40 MG: 20 TABLET ORAL at 05:04

## 2023-01-01 RX ADMIN — FENTANYL CITRATE 50 MCG: 50 INJECTION, SOLUTION INTRAMUSCULAR; INTRAVENOUS at 03:24

## 2023-01-01 RX ADMIN — LIDOCAINE HYDROCHLORIDE 60 MG: 20 INJECTION, SOLUTION EPIDURAL; INFILTRATION; INTRACAUDAL at 02:17

## 2023-01-01 RX ADMIN — SODIUM CHLORIDE 1000 ML: 9 INJECTION, SOLUTION INTRAVENOUS at 07:54

## 2023-01-01 RX ADMIN — SODIUM CHLORIDE 7.5 MG/HR: 9 INJECTION, SOLUTION INTRAVENOUS at 21:00

## 2023-01-01 RX ADMIN — SODIUM CHLORIDE: 3 INJECTION, SOLUTION INTRAVENOUS at 13:10

## 2023-01-01 RX ADMIN — AMLODIPINE BESYLATE 10 MG: 10 TABLET ORAL at 05:16

## 2023-01-01 RX ADMIN — SODIUM CHLORIDE 7.5 MG/HR: 9 INJECTION, SOLUTION INTRAVENOUS at 07:02

## 2023-01-01 RX ADMIN — AMLODIPINE BESYLATE 10 MG: 10 TABLET ORAL at 04:50

## 2023-01-01 RX ADMIN — HYDRALAZINE HYDROCHLORIDE 20 MG: 20 INJECTION, SOLUTION INTRAMUSCULAR; INTRAVENOUS at 19:11

## 2023-01-01 RX ADMIN — ACETAMINOPHEN 650 MG: 325 TABLET, FILM COATED ORAL at 14:01

## 2023-01-01 RX ADMIN — AZITHROMYCIN MONOHYDRATE 500 MG: 250 TABLET ORAL at 05:03

## 2023-01-01 RX ADMIN — SODIUM CHLORIDE 7.5 MG/HR: 9 INJECTION, SOLUTION INTRAVENOUS at 10:22

## 2023-01-01 RX ADMIN — ACETAMINOPHEN 650 MG: 325 TABLET, FILM COATED ORAL at 21:33

## 2023-01-01 RX ADMIN — FENTANYL CITRATE 100 MCG: 50 INJECTION, SOLUTION INTRAMUSCULAR; INTRAVENOUS at 02:15

## 2023-01-01 RX ADMIN — SODIUM CHLORIDE, POTASSIUM CHLORIDE, SODIUM LACTATE AND CALCIUM CHLORIDE: 600; 310; 30; 20 INJECTION, SOLUTION INTRAVENOUS at 02:08

## 2023-01-01 RX ADMIN — DOCUSATE SODIUM 50 MG AND SENNOSIDES 8.6 MG 2 TABLET: 8.6; 5 TABLET, FILM COATED ORAL at 17:03

## 2023-01-01 RX ADMIN — AMPICILLIN AND SULBACTAM 3 G: 1; 2 INJECTION, POWDER, FOR SOLUTION INTRAMUSCULAR; INTRAVENOUS at 12:32

## 2023-01-01 RX ADMIN — INSULIN HUMAN 2 UNITS: 100 INJECTION, SOLUTION PARENTERAL at 06:05

## 2023-01-01 RX ADMIN — AMPICILLIN AND SULBACTAM 3 G: 1; 2 INJECTION, POWDER, FOR SOLUTION INTRAMUSCULAR; INTRAVENOUS at 21:16

## 2023-01-01 RX ADMIN — LISINOPRIL 10 MG: 10 TABLET ORAL at 04:20

## 2023-01-01 RX ADMIN — FENTANYL CITRATE 50 MCG: 50 INJECTION, SOLUTION INTRAMUSCULAR; INTRAVENOUS at 03:32

## 2023-01-01 RX ADMIN — ACETAMINOPHEN 650 MG: 325 TABLET, FILM COATED ORAL at 18:08

## 2023-01-01 RX ADMIN — Medication 100 MCG: at 02:40

## 2023-01-01 RX ADMIN — DOCUSATE SODIUM 50 MG AND SENNOSIDES 8.6 MG 2 TABLET: 8.6; 5 TABLET, FILM COATED ORAL at 05:02

## 2023-01-01 RX ADMIN — AMLODIPINE BESYLATE 5 MG: 5 TABLET ORAL at 14:56

## 2023-01-01 RX ADMIN — HYDRALAZINE HYDROCHLORIDE 10 MG: 20 INJECTION, SOLUTION INTRAMUSCULAR; INTRAVENOUS at 02:34

## 2023-01-01 RX ADMIN — POTASSIUM CHLORIDE 60 MEQ: 1500 TABLET, EXTENDED RELEASE ORAL at 08:08

## 2023-01-01 RX ADMIN — HEPARIN SODIUM 5000 UNITS: 5000 INJECTION, SOLUTION INTRAVENOUS; SUBCUTANEOUS at 05:16

## 2023-01-01 RX ADMIN — AMPICILLIN AND SULBACTAM 3 G: 1; 2 INJECTION, POWDER, FOR SOLUTION INTRAMUSCULAR; INTRAVENOUS at 17:11

## 2023-01-01 RX ADMIN — LIDOCAINE HYDROCHLORIDE 4 ML: 40 SOLUTION TOPICAL at 02:18

## 2023-01-01 RX ADMIN — LISINOPRIL 10 MG: 10 TABLET ORAL at 15:55

## 2023-01-01 RX ADMIN — KETOROLAC TROMETHAMINE 15 MG: 30 INJECTION, SOLUTION INTRAMUSCULAR; INTRAVENOUS at 21:30

## 2023-01-01 RX ADMIN — DOCUSATE SODIUM 100 MG: 100 CAPSULE, LIQUID FILLED ORAL at 17:54

## 2023-01-01 RX ADMIN — SODIUM CHLORIDE 2.5 MG/HR: 9 INJECTION, SOLUTION INTRAVENOUS at 01:27

## 2023-01-01 RX ADMIN — SODIUM CHLORIDE 500 ML: 3 INJECTION, SOLUTION INTRAVENOUS at 21:08

## 2023-01-01 RX ADMIN — SODIUM CHLORIDE 500 ML: 3 INJECTION, SOLUTION INTRAVENOUS at 15:10

## 2023-01-01 RX ADMIN — AMLODIPINE BESYLATE 10 MG: 10 TABLET ORAL at 10:21

## 2023-01-01 RX ADMIN — SODIUM CHLORIDE: 3 INJECTION, SOLUTION INTRAVENOUS at 02:08

## 2023-01-01 RX ADMIN — POTASSIUM CHLORIDE 40 MEQ: 1500 TABLET, EXTENDED RELEASE ORAL at 14:02

## 2023-01-01 RX ADMIN — SODIUM CHLORIDE 5 MG/HR: 9 INJECTION, SOLUTION INTRAVENOUS at 06:15

## 2023-01-01 RX ADMIN — SODIUM CHLORIDE, POTASSIUM CHLORIDE, SODIUM LACTATE AND CALCIUM CHLORIDE 1000 ML: 600; 310; 30; 20 INJECTION, SOLUTION INTRAVENOUS at 15:00

## 2023-01-01 RX ADMIN — INSULIN HUMAN 1 UNITS: 100 INJECTION, SOLUTION PARENTERAL at 12:33

## 2023-01-01 RX ADMIN — SODIUM CHLORIDE 7.5 MG/HR: 9 INJECTION, SOLUTION INTRAVENOUS at 02:08

## 2023-01-01 RX ADMIN — OXYCODONE 5 MG: 5 TABLET ORAL at 03:42

## 2023-01-01 RX ADMIN — HYDRALAZINE HYDROCHLORIDE 20 MG: 20 INJECTION, SOLUTION INTRAMUSCULAR; INTRAVENOUS at 10:08

## 2023-01-01 RX ADMIN — POLYETHYLENE GLYCOL 3350 1 PACKET: 17 POWDER, FOR SOLUTION ORAL at 17:54

## 2023-01-01 RX ADMIN — ONDANSETRON 4 MG: 2 INJECTION INTRAMUSCULAR; INTRAVENOUS at 03:30

## 2023-01-01 RX ADMIN — HEPARIN SODIUM 5000 UNITS: 5000 INJECTION, SOLUTION INTRAVENOUS; SUBCUTANEOUS at 14:00

## 2023-01-01 RX ADMIN — INSULIN HUMAN 1 UNITS: 100 INJECTION, SOLUTION PARENTERAL at 18:14

## 2023-01-01 RX ADMIN — ACETAMINOPHEN 650 MG: 325 TABLET, FILM COATED ORAL at 13:46

## 2023-01-01 RX ADMIN — CEFAZOLIN 2 G: 1 INJECTION, POWDER, FOR SOLUTION INTRAMUSCULAR; INTRAVENOUS at 02:30

## 2023-01-01 RX ADMIN — SODIUM CHLORIDE, POTASSIUM CHLORIDE, SODIUM LACTATE AND CALCIUM CHLORIDE 2322 ML: 600; 310; 30; 20 INJECTION, SOLUTION INTRAVENOUS at 12:33

## 2023-01-01 RX ADMIN — SODIUM CHLORIDE 2.5 MG/HR: 9 INJECTION, SOLUTION INTRAVENOUS at 18:07

## 2023-01-01 RX ADMIN — AMPICILLIN AND SULBACTAM 3 G: 1; 2 INJECTION, POWDER, FOR SOLUTION INTRAMUSCULAR; INTRAVENOUS at 23:19

## 2023-01-01 RX ADMIN — HYDRALAZINE HYDROCHLORIDE 10 MG: 20 INJECTION INTRAMUSCULAR; INTRAVENOUS at 07:52

## 2023-01-01 RX ADMIN — AMPICILLIN AND SULBACTAM 3 G: 1; 2 INJECTION, POWDER, FOR SOLUTION INTRAMUSCULAR; INTRAVENOUS at 10:15

## 2023-01-01 RX ADMIN — IOHEXOL 80 ML: 350 INJECTION, SOLUTION INTRAVENOUS at 00:21

## 2023-01-01 RX ADMIN — SODIUM CHLORIDE 7.5 MG/HR: 9 INJECTION, SOLUTION INTRAVENOUS at 14:00

## 2023-01-01 RX ADMIN — AMPICILLIN AND SULBACTAM 3 G: 1; 2 INJECTION, POWDER, FOR SOLUTION INTRAMUSCULAR; INTRAVENOUS at 04:56

## 2023-01-01 RX ADMIN — ENALAPRILAT 1.25 MG: 2.5 INJECTION INTRAVENOUS at 16:16

## 2023-01-01 RX ADMIN — SODIUM CHLORIDE 500 ML: 3 INJECTION, SOLUTION INTRAVENOUS at 03:52

## 2023-01-01 RX ADMIN — ACETAMINOPHEN 650 MG: 325 TABLET, FILM COATED ORAL at 03:53

## 2023-01-01 RX ADMIN — IOHEXOL 100 ML: 350 INJECTION, SOLUTION INTRAVENOUS at 10:03

## 2023-01-01 RX ADMIN — RIVAROXABAN 10 MG: 10 TABLET, FILM COATED ORAL at 17:03

## 2023-01-01 RX ADMIN — SUGAMMADEX 200 MG: 100 INJECTION, SOLUTION INTRAVENOUS at 03:35

## 2023-01-01 RX ADMIN — AMLODIPINE BESYLATE 10 MG: 10 TABLET ORAL at 05:44

## 2023-01-01 RX ADMIN — AMPICILLIN AND SULBACTAM 3 G: 1; 2 INJECTION, POWDER, FOR SOLUTION INTRAMUSCULAR; INTRAVENOUS at 04:13

## 2023-01-01 RX ADMIN — CEFAZOLIN 2 G: 2 INJECTION, POWDER, FOR SOLUTION INTRAMUSCULAR; INTRAVENOUS at 12:40

## 2023-01-01 RX ADMIN — DOCUSATE SODIUM 100 MG: 100 CAPSULE, LIQUID FILLED ORAL at 11:38

## 2023-01-01 RX ADMIN — SODIUM CHLORIDE 7.5 MG/HR: 9 INJECTION, SOLUTION INTRAVENOUS at 17:33

## 2023-01-01 RX ADMIN — AMLODIPINE BESYLATE 5 MG: 5 TABLET ORAL at 13:47

## 2023-01-01 RX ADMIN — PROPOFOL 120 MG: 10 INJECTION, EMULSION INTRAVENOUS at 02:17

## 2023-01-01 RX ADMIN — SODIUM CHLORIDE 30 ML/HR: 3 INJECTION, SOLUTION INTRAVENOUS at 01:13

## 2023-01-01 RX ADMIN — DEXAMETHASONE SODIUM PHOSPHATE 8 MG: 4 INJECTION INTRA-ARTICULAR; INTRALESIONAL; INTRAMUSCULAR; INTRAVENOUS; SOFT TISSUE at 02:55

## 2023-01-01 RX ADMIN — AMPICILLIN AND SULBACTAM 3 G: 1; 2 INJECTION, POWDER, FOR SOLUTION INTRAMUSCULAR; INTRAVENOUS at 16:32

## 2023-01-01 RX ADMIN — CEFTRIAXONE SODIUM 2000 MG: 2 INJECTION, POWDER, FOR SOLUTION INTRAMUSCULAR; INTRAVENOUS at 10:09

## 2023-01-01 RX ADMIN — AMLODIPINE BESYLATE 5 MG: 5 TABLET ORAL at 05:17

## 2023-01-01 RX ADMIN — LISINOPRIL 10 MG: 10 TABLET ORAL at 17:53

## 2023-01-01 RX ADMIN — ONDANSETRON 4 MG: 2 INJECTION INTRAMUSCULAR; INTRAVENOUS at 00:45

## 2023-01-01 SDOH — HEALTH STABILITY: MENTAL HEALTH
STRESS IS WHEN SOMEONE FEELS TENSE, NERVOUS, ANXIOUS, OR CAN'T SLEEP AT NIGHT BECAUSE THEIR MIND IS TROUBLED. HOW STRESSED ARE YOU?: ONLY A LITTLE

## 2023-01-01 SDOH — ECONOMIC STABILITY: HOUSING INSECURITY: IN THE LAST 12 MONTHS, HOW MANY PLACES HAVE YOU LIVED?: 1

## 2023-01-01 SDOH — ECONOMIC STABILITY: HOUSING INSECURITY
IN THE LAST 12 MONTHS, WAS THERE A TIME WHEN YOU DID NOT HAVE A STEADY PLACE TO SLEEP OR SLEPT IN A SHELTER (INCLUDING NOW)?: NO

## 2023-01-01 SDOH — HEALTH STABILITY: PHYSICAL HEALTH: ON AVERAGE, HOW MANY MINUTES DO YOU ENGAGE IN EXERCISE AT THIS LEVEL?: 30 MIN

## 2023-01-01 SDOH — ECONOMIC STABILITY: HOUSING INSECURITY
IN THE LAST 12 MONTHS, WAS THERE A TIME WHEN YOU DID NOT HAVE A STEADY PLACE TO SLEEP OR SLEPT IN A SHELTER (INCLUDING NOW)?: YES

## 2023-01-01 SDOH — ECONOMIC STABILITY: TRANSPORTATION INSECURITY

## 2023-01-01 SDOH — ECONOMIC STABILITY: INCOME INSECURITY: IN THE LAST 12 MONTHS, WAS THERE A TIME WHEN YOU WERE NOT ABLE TO PAY THE MORTGAGE OR RENT ON TIME?: YES

## 2023-01-01 SDOH — HEALTH STABILITY: PHYSICAL HEALTH: ON AVERAGE, HOW MANY DAYS PER WEEK DO YOU ENGAGE IN MODERATE TO STRENUOUS EXERCISE (LIKE A BRISK WALK)?: 2 DAYS

## 2023-01-01 ASSESSMENT — SOCIAL DETERMINANTS OF HEALTH (SDOH)
HOW OFTEN DO YOU ATTEND CHURCH OR RELIGIOUS SERVICES?: NEVER
HOW OFTEN DO YOU HAVE A DRINK CONTAINING ALCOHOL: 2-4 TIMES A MONTH
HOW OFTEN DO YOU ATTEND CHURCH OR RELIGIOUS SERVICES?: NEVER
HOW MANY DRINKS CONTAINING ALCOHOL DO YOU HAVE ON A TYPICAL DAY WHEN YOU ARE DRINKING: 3 OR 4
HOW OFTEN DO YOU ATTENT MEETINGS OF THE CLUB OR ORGANIZATION YOU BELONG TO?: NEVER
HOW OFTEN DO YOU GET TOGETHER WITH FRIENDS OR RELATIVES?: THREE TIMES A WEEK
IN A TYPICAL WEEK, HOW MANY TIMES DO YOU TALK ON THE PHONE WITH FAMILY, FRIENDS, OR NEIGHBORS?: THREE TIMES A WEEK
ARE YOU MARRIED, WIDOWED, DIVORCED, SEPARATED, NEVER MARRIED, OR LIVING WITH A PARTNER?: PATIENT DECLINED
HOW OFTEN DO YOU HAVE SIX OR MORE DRINKS ON ONE OCCASION: LESS THAN MONTHLY
ARE YOU MARRIED, WIDOWED, DIVORCED, SEPARATED, NEVER MARRIED, OR LIVING WITH A PARTNER?: PATIENT DECLINED
DO YOU BELONG TO ANY CLUBS OR ORGANIZATIONS SUCH AS CHURCH GROUPS UNIONS, FRATERNAL OR ATHLETIC GROUPS, OR SCHOOL GROUPS?: NO
DO YOU BELONG TO ANY CLUBS OR ORGANIZATIONS SUCH AS CHURCH GROUPS UNIONS, FRATERNAL OR ATHLETIC GROUPS, OR SCHOOL GROUPS?: NO
HOW OFTEN DO YOU GET TOGETHER WITH FRIENDS OR RELATIVES?: THREE TIMES A WEEK
HOW OFTEN DO YOU ATTENT MEETINGS OF THE CLUB OR ORGANIZATION YOU BELONG TO?: NEVER
IN A TYPICAL WEEK, HOW MANY TIMES DO YOU TALK ON THE PHONE WITH FAMILY, FRIENDS, OR NEIGHBORS?: THREE TIMES A WEEK

## 2023-01-01 ASSESSMENT — PAIN DESCRIPTION - PAIN TYPE
TYPE: ACUTE PAIN
TYPE: ACUTE PAIN;SURGICAL PAIN
TYPE: ACUTE PAIN

## 2023-01-01 ASSESSMENT — COGNITIVE AND FUNCTIONAL STATUS - GENERAL
EATING MEALS: A LITTLE
SUGGESTED CMS G CODE MODIFIER DAILY ACTIVITY: CK
WALKING IN HOSPITAL ROOM: TOTAL
MOBILITY SCORE: 24
DRESSING REGULAR LOWER BODY CLOTHING: A LOT
MOBILITY SCORE: 7
CLIMB 3 TO 5 STEPS WITH RAILING: TOTAL
TOILETING: A LOT
DAILY ACTIVITIY SCORE: 24
CLIMB 3 TO 5 STEPS WITH RAILING: TOTAL
TURNING FROM BACK TO SIDE WHILE IN FLAT BAD: A LOT
MOVING FROM LYING ON BACK TO SITTING ON SIDE OF FLAT BED: UNABLE
STANDING UP FROM CHAIR USING ARMS: A LOT
SUGGESTED CMS G CODE MODIFIER MOBILITY: CM
MOVING TO AND FROM BED TO CHAIR: UNABLE
SUGGESTED CMS G CODE MODIFIER MOBILITY: CM
DRESSING REGULAR UPPER BODY CLOTHING: A LITTLE
PERSONAL GROOMING: A LITTLE
DAILY ACTIVITIY SCORE: 15
STANDING UP FROM CHAIR USING ARMS: TOTAL
MOVING TO AND FROM BED TO CHAIR: A LOT
SUGGESTED CMS G CODE MODIFIER DAILY ACTIVITY: CH
HELP NEEDED FOR BATHING: A LOT
WALKING IN HOSPITAL ROOM: TOTAL
MOBILITY SCORE: 9
SUGGESTED CMS G CODE MODIFIER MOBILITY: CH
MOVING FROM LYING ON BACK TO SITTING ON SIDE OF FLAT BED: UNABLE
TURNING FROM BACK TO SIDE WHILE IN FLAT BAD: A LOT

## 2023-01-01 ASSESSMENT — ENCOUNTER SYMPTOMS
EYES NEGATIVE: 1
CHILLS: 0
NEUROLOGICAL NEGATIVE: 1
COUGH: 0
GASTROINTESTINAL NEGATIVE: 1
SHORTNESS OF BREATH: 0
CHILLS: 0
HEMOPTYSIS: 0
SPUTUM PRODUCTION: 0
FEVER: 0
MUSCULOSKELETAL NEGATIVE: 1
WEAKNESS: 1
WEAKNESS: 0
NAUSEA: 0
WEAKNESS: 0
PSYCHIATRIC NEGATIVE: 1
DIZZINESS: 0
DIZZINESS: 0
SHORTNESS OF BREATH: 0
NAUSEA: 0
FEVER: 0
ABDOMINAL PAIN: 0
PALPITATIONS: 0
VOMITING: 0
NECK PAIN: 0
ABDOMINAL PAIN: 0
HEADACHES: 1
FOCAL WEAKNESS: 1
PALPITATIONS: 0
NERVOUS/ANXIOUS: 0
FEVER: 1
WEAKNESS: 0
SHORTNESS OF BREATH: 1
CHILLS: 0
PALPITATIONS: 0
CARDIOVASCULAR NEGATIVE: 1
COUGH: 1
SHORTNESS OF BREATH: 0
SPUTUM PRODUCTION: 0
COUGH: 0
BACK PAIN: 0
DIZZINESS: 0
SENSORY CHANGE: 0
CHILLS: 1
VOMITING: 0
SHORTNESS OF BREATH: 0
NAUSEA: 0
MYALGIAS: 0
MYALGIAS: 0
FEVER: 0
FEVER: 0
BLURRED VISION: 0
VOMITING: 0
HEADACHES: 1
SHORTNESS OF BREATH: 0
CHILLS: 0
DIAPHORESIS: 0
SHORTNESS OF BREATH: 0
ABDOMINAL PAIN: 0
CHILLS: 1
WEAKNESS: 1
VOMITING: 0
CHILLS: 0
WEAKNESS: 1
NAUSEA: 0
VOMITING: 0
COUGH: 0
VOMITING: 0
HEADACHES: 1
NAUSEA: 0
ABDOMINAL PAIN: 0
ABDOMINAL PAIN: 0

## 2023-01-01 ASSESSMENT — GAIT ASSESSMENTS
GAIT LEVEL OF ASSIST: UNABLE TO PARTICIPATE
GAIT LEVEL OF ASSIST: UNABLE TO PARTICIPATE

## 2023-01-01 ASSESSMENT — ACTIVITIES OF DAILY LIVING (ADL): TOILETING: INDEPENDENT

## 2023-01-01 ASSESSMENT — PATIENT HEALTH QUESTIONNAIRE - PHQ9
2. FEELING DOWN, DEPRESSED, IRRITABLE, OR HOPELESS: NOT AT ALL
SUM OF ALL RESPONSES TO PHQ9 QUESTIONS 1 AND 2: 0
1. LITTLE INTEREST OR PLEASURE IN DOING THINGS: NOT AT ALL

## 2023-01-01 ASSESSMENT — COPD QUESTIONNAIRES
DURING THE PAST 4 WEEKS HOW MUCH DID YOU FEEL SHORT OF BREATH: NONE/LITTLE OF THE TIME
DO YOU EVER COUGH UP ANY MUCUS OR PHLEGM?: YES, EVERY DAY
HAVE YOU SMOKED AT LEAST 100 CIGARETTES IN YOUR ENTIRE LIFE: YES
DO YOU EVER COUGH UP ANY MUCUS OR PHLEGM?: YES, A FEW DAYS A WEEK OR MONTH
DURING THE PAST 4 WEEKS HOW MUCH DID YOU FEEL SHORT OF BREATH: SOME OF THE TIME
HAVE YOU SMOKED AT LEAST 100 CIGARETTES IN YOUR ENTIRE LIFE: YES
COPD SCREENING SCORE: 5
COPD SCREENING SCORE: 7

## 2023-01-01 ASSESSMENT — PAIN SCALES - WONG BAKER
WONGBAKER_NUMERICALRESPONSE: HURTS A LITTLE MORE
WONGBAKER_NUMERICALRESPONSE: HURTS JUST A LITTLE BIT

## 2023-01-01 ASSESSMENT — LIFESTYLE VARIABLES
AUDIT-C TOTAL SCORE: 4
HOW MANY STANDARD DRINKS CONTAINING ALCOHOL DO YOU HAVE ON A TYPICAL DAY: 3 OR 4
SKIP TO QUESTIONS 9-10: 0
HOW OFTEN DO YOU HAVE SIX OR MORE DRINKS ON ONE OCCASION: LESS THAN MONTHLY
HOW OFTEN DO YOU HAVE A DRINK CONTAINING ALCOHOL: 2-4 TIMES A MONTH

## 2023-01-01 ASSESSMENT — FIBROSIS 4 INDEX
FIB4 SCORE: 1.635259635065353796
FIB4 SCORE: 0.83
FIB4 SCORE: 1.22

## 2023-04-18 PROBLEM — E27.8 ADRENAL MASS, LEFT (HCC): Status: ACTIVE | Noted: 2023-01-01

## 2023-04-18 PROBLEM — J96.01 ACUTE RESPIRATORY FAILURE WITH HYPOXIA (HCC): Status: ACTIVE | Noted: 2023-01-01

## 2023-04-18 PROBLEM — A41.9 SEPSIS (HCC): Status: ACTIVE | Noted: 2023-01-01

## 2023-04-18 PROBLEM — J18.9 PNEUMONIA: Status: ACTIVE | Noted: 2023-01-01

## 2023-04-18 NOTE — ASSESSMENT & PLAN NOTE
This is Sepsis Present on admission  SIRS criteria identified on my evaluation include: Leukocytosis, with WBC greater than 12,000  Source is pneumonia  Sepsis protocol initiated  Fluid resuscitation ordered per protocol  Crystalloid Fluid Administration: ordered  IV antibiotics as appropriate for source of sepsis  Reassessment: I have reassessed the patient's hemodynamic status      Sepsis resolved

## 2023-04-18 NOTE — H&P
"Hospital Medicine History & Physical Note    Date of Service  4/18/2023    Primary Care Physician  He does not have one    Consultants  none    Code Status  Full Code    Chief Complaint  Chief Complaint   Patient presents with    RUQ Pain     Started this morning       History of Presenting Illness  Lewis Mohr is a 77 y.o. male who presented 4/18/2023 with right sided pleuritic chest pain.  Mr. Mohr has a remote past medical history of kidney stones otherwise no known chronic conditions and does not take any prescription medications.  He was admitted here in December 2020 for COVID infection.  He has been his usual state of excellent health until this morning after he got up he noticed a \"aching\" in the right chest which progressed and became a \"serious pain\".  He states the pain in the right chest was worse with breathing.  He denied cough, shortness of breath, fever, and sweating.  He states he has not had troubles with choking or coughing with eating either.  He presented to emergency room with these complaints here a CT of the chest was negative for pulmonary embolism though did show diffuse pneumonitis in the right lower lung base and his white blood cell count is markedly elevated at 23,000.  He is hypoxic at 86% and usually is on room air.  He will be admitted for working diagnosis of sepsis with respiratory failure secondary to pneumonia will be treated with IV antibiotics and supplemental oxygen.    I discussed the plan of care with Dr. Dodson.    Review of Systems  Review of Systems   Constitutional:  Negative for chills, diaphoresis and fever.   Respiratory:  Negative for cough, hemoptysis, sputum production and shortness of breath.    Cardiovascular:  Positive for chest pain. Negative for leg swelling.   Gastrointestinal:  Negative for vomiting.        No choking nor cough with swallowing   All other systems reviewed and are negative.    Past Medical History  Remote history of kidney " stones    Surgical History  He has never had a surgery     Family History  No family hx of coronary disease    Social History   reports that he has never smoked. He has never used smokeless tobacco. He reports current alcohol use. He reports that he does not use drugs.    Allergies  No Known Allergies    Medications  Prior to Admission Medications   Prescriptions Last Dose Informant Patient Reported? Taking?   aspirin EC (ECOTRIN) 81 MG Tablet Delayed Response 4/17/2023 at AM Patient Yes Yes   Sig: Take 81 mg by mouth every day.   ibuprofen (MOTRIN) 200 MG Tab 4/17/2023 at 0400 Patient Yes Yes   Sig: Take 400 mg by mouth every 6 hours as needed. Indications: Pain   multivitamin Tab 4/17/2023 at AM Patient Yes Yes   Sig: Take 1 Tablet by mouth every day.      Facility-Administered Medications: None       Physical Exam  Temp:  [36.9 °C (98.4 °F)] 36.9 °C (98.4 °F)  Pulse:  [76-79] 76  Resp:  [21-35] 21  BP: (133-158)/(62-77) 133/62  SpO2:  [88 %-97 %] 97 %  Blood Pressure : 133/62   Temperature: 36.9 °C (98.4 °F)   Pulse: 76   Respiration: (!) 21   Pulse Oximetry: 97 %       Physical Exam  Vitals and nursing note reviewed.   Constitutional:       General: He is not in acute distress.     Appearance: He is ill-appearing.   HENT:      Mouth/Throat:      Mouth: Mucous membranes are dry.   Cardiovascular:      Rate and Rhythm: Normal rate and regular rhythm.   Pulmonary:      Comments: Nasal cannula oxygen  Normal work of breathing  Few crackles right base  Abdominal:      General: There is no distension.      Palpations: Abdomen is soft.      Tenderness: There is no abdominal tenderness.   Musculoskeletal:      Cervical back: Neck supple.      Right lower leg: No edema.      Left lower leg: No edema.   Skin:     General: Skin is warm and dry.   Neurological:      General: No focal deficit present.      Mental Status: He is alert and oriented to person, place, and time.   Psychiatric:         Mood and Affect: Mood normal.          Behavior: Behavior normal.         Thought Content: Thought content normal.         Judgment: Judgment normal.       Laboratory:  Recent Labs     04/18/23  0813   WBC 23.1*   RBC 4.65*   HEMOGLOBIN 13.8*   HEMATOCRIT 42.6   MCV 91.6   MCH 29.7   MCHC 32.4*   RDW 46.2   PLATELETCT 294   MPV 10.0     Recent Labs     04/18/23  0813   SODIUM 136   POTASSIUM 4.2   CHLORIDE 101   CO2 20   GLUCOSE 150*   BUN 27*   CREATININE 1.18   CALCIUM 9.5     Recent Labs     04/18/23  0813   ALTSGPT 16   ASTSGOT 13   ALKPHOSPHAT 119*   TBILIRUBIN 1.2   LIPASE 19   GLUCOSE 150*         Recent Labs     04/18/23  0813   NTPROBNP 2202*         Recent Labs     04/18/23  0813   TROPONINT 27*       Imaging:  CT-ABDOMEN-PELVIS WITH   Final Result      1.  Right lower lobe opacity suspicious for pneumonia.   2.  Hepatic steatosis.   3.  Left adrenal nodule measuring 2.1 cm. Consider biochemical assays to determine functional status and exclude pheochromocytoma. Recommend further evaluation with adrenal protocol CT, although chemical-shift MRI may be considered.      CT-CTA CHEST PULMONARY ARTERY W/ RECONS   Final Result      1.  No CT evidence of pulmonary emboli.      2.  Extensive pneumonitis throughout the right lower lobe and to a lesser extent the right middle lobe.            DX-CHEST-PORTABLE (1 VIEW)   Final Result      1.  Possible small right pleural effusion with basilar atelectasis and/or consolidation.   2.  Cardiomegaly and probable mild vascular congestion.              Assessment/Plan:  Justification for Admission Status  I anticipate this patient will require at least two midnights for appropriate medical management, necessitating inpatient admission because IV antibiotics until 48-72 hour blood cultures come back.    Patient will need a Med/Surg bed on MEDICAL service .  The need is secondary to as above.    * Sepsis (HCC)- (present on admission)  Assessment & Plan  This is Sepsis Present on admission  SIRS criteria  identified on my evaluation include: Leukocytosis, with WBC greater than 12,000  Source is pneumonia  Sepsis protocol initiated  Fluid resuscitation ordered per protocol  Crystalloid Fluid Administration: ordered  IV antibiotics as appropriate for source of sepsis  Reassessment: I have reassessed the patient's hemodynamic status          Pneumonia- (present on admission)  Assessment & Plan  Right lower lobe pneumonia seen on CT of the chest.  IV Unasyn and Azithromycin.  With sepsis and hypoxia.      Acute respiratory failure with hypoxia (HCC)- (present on admission)  Assessment & Plan  86% on room air  Secondary to pneumonia and sepsis  Supplemental oxygen ordered    Adrenal mass, left (HCC)- (present on admission)  Assessment & Plan  This should be followed up as an outpatient.        VTE prophylaxis: Xarelto 10 mg daily as prophylaxis

## 2023-04-18 NOTE — PROGRESS NOTES
4 Eyes Skin Assessment Completed by Dez RN and CHARLIE Dean.    Head WDL  Ears WDL  Nose WDL  Mouth WDL  Neck WDL  Breast/Chest WDL  Shoulder Blades WDL  Spine WDL  (R) Arm/Elbow/Hand WDL  (L) Arm/Elbow/Hand WDL  Abdomen WDL  Groin WDL  Scrotum/Coccyx/Buttocks WDL  (R) Leg WDL  (L) Leg WDL  (R) Heel/Foot/Toe WDL  (L) Heel/Foot/Toe WDL          Devices In Places n/a        Interventions In Place N/A    Possible Skin Injury No    Pictures Uploaded Into Epic N/A  Wound Consult Placed N/A  RN Wound Prevention Protocol Ordered No

## 2023-04-18 NOTE — ED PROVIDER NOTES
ED Provider Note    CHIEF COMPLAINT  Chief Complaint   Patient presents with    RUQ Pain     Started this morning       EXTERNAL RECORDS REVIEWED  Previous hospitalization ED records.    HPI/ROS  LIMITATION TO HISTORY   Select: : None  OUTSIDE HISTORIAN(S):  History obtained from EMS who brought the patient to the emergency department.  I spoke directly to the paramedic.    Lewis Mohr is a 77 y.o. male who presents to the emergency department with right-sided chest pain, abdominal pain, and shortness of breath.  Patient states he had a headache yesterday but otherwise felt okay.  He went to the office today and had all of a sudden right-sided chest pain.  This is associated with pain in his abdomen.  No shortness of breath.  It hurts to take a deep breath.  He denies any antecedent cough or cold symptoms.  No fever.  No nausea or vomiting.  No sputum production.  Denies any history of PE or DVT.  No travel surgery or immobilization.  The patient denies any history of fevers or chills.  Denies high blood pressure.  Denies any tearing pain ration of the back.  No pain or swelling in his legs.  No other acute concerns or complaints.    PAST MEDICAL HISTORY     Patient denies any history, COPD by chart review.    SURGICAL HISTORY  patient denies any surgical history    FAMILY HISTORY  No family history on file.    SOCIAL HISTORY  Social History     Tobacco Use    Smoking status: Never    Smokeless tobacco: Never   Vaping Use    Vaping Use: Never used   Substance and Sexual Activity    Alcohol use: Yes    Drug use: Never    Sexual activity: Not on file       CURRENT MEDICATIONS  Home Medications       Reviewed by Dagmar Fernandez R.N. (Registered Nurse) on 04/18/23 at 0824  Med List Status: Partial     Medication Last Dose Status   ibuprofen (MOTRIN) 200 MG Tab 4/17/2023 Active                    ALLERGIES  No Known Allergies    PHYSICAL EXAM  VITAL SIGNS: BP (!) 158/77   Pulse 79   Temp 36.9 °C (98.4 °F)  "(Temporal)   Resp (!) 35   Ht 1.778 m (5' 10\")   Wt 83.9 kg (185 lb)   SpO2 95%   BMI 26.54 kg/m²    Constitutional: Awake alert ill-appearing moderate distress.  HENT: Normocephalic, Atraumatic   Eyes: PERRL, EOMI, Conjunctiva normal, No discharge.   Neck: Normal range of motion, No tenderness, Supple, No stridor.   Cardiovascular: Normal heart rate, Normal rhythm, No murmurs, No rubs, No gallops.   Thorax & Lungs: Decreased breath sounds in both bases more on the right than left.  Crackles in the right.  Abdomen: Bowel sounds normal, Soft, No tenderness  Skin: Warm, Dry, No erythema, No rash.   Back: No tenderness, No CVA tenderness.   Musculoskeletal: Good range of motion in all major joints.  Asymmetric edema good pulses.  Neurologic: Alert, No focal deficits noted.   Psychiatric: Affect normal      DIAGNOSTIC STUDIES / PROCEDURES    Results for orders placed or performed during the hospital encounter of 04/18/23   CBC WITH DIFFERENTIAL   Result Value Ref Range    WBC 23.1 (H) 4.8 - 10.8 K/uL    RBC 4.65 (L) 4.70 - 6.10 M/uL    Hemoglobin 13.8 (L) 14.0 - 18.0 g/dL    Hematocrit 42.6 42.0 - 52.0 %    MCV 91.6 81.4 - 97.8 fL    MCH 29.7 27.0 - 33.0 pg    MCHC 32.4 (L) 33.7 - 35.3 g/dL    RDW 46.2 35.9 - 50.0 fL    Platelet Count 294 164 - 446 K/uL    MPV 10.0 9.0 - 12.9 fL    Neutrophils-Polys 88.10 (H) 44.00 - 72.00 %    Lymphocytes 5.10 (L) 22.00 - 41.00 %    Monocytes 4.80 0.00 - 13.40 %    Eosinophils 0.40 0.00 - 6.90 %    Basophils 0.20 0.00 - 1.80 %    Immature Granulocytes 1.40 (H) 0.00 - 0.90 %    Nucleated RBC 0.00 /100 WBC    Neutrophils (Absolute) 20.39 (H) 1.82 - 7.42 K/uL    Lymphs (Absolute) 1.18 1.00 - 4.80 K/uL    Monos (Absolute) 1.10 (H) 0.00 - 0.85 K/uL    Eos (Absolute) 0.09 0.00 - 0.51 K/uL    Baso (Absolute) 0.04 0.00 - 0.12 K/uL    Immature Granulocytes (abs) 0.32 (H) 0.00 - 0.11 K/uL    NRBC (Absolute) 0.00 K/uL   COMP METABOLIC PANEL   Result Value Ref Range    Sodium 136 135 - 145 " mmol/L    Potassium 4.2 3.6 - 5.5 mmol/L    Chloride 101 96 - 112 mmol/L    Co2 20 20 - 33 mmol/L    Anion Gap 15.0 7.0 - 16.0    Glucose 150 (H) 65 - 99 mg/dL    Bun 27 (H) 8 - 22 mg/dL    Creatinine 1.18 0.50 - 1.40 mg/dL    Calcium 9.5 8.5 - 10.5 mg/dL    AST(SGOT) 13 12 - 45 U/L    ALT(SGPT) 16 2 - 50 U/L    Alkaline Phosphatase 119 (H) 30 - 99 U/L    Total Bilirubin 1.2 0.1 - 1.5 mg/dL    Albumin 3.3 3.2 - 4.9 g/dL    Total Protein 6.9 6.0 - 8.2 g/dL    Globulin 3.6 (H) 1.9 - 3.5 g/dL    A-G Ratio 0.9 g/dL   LIPASE   Result Value Ref Range    Lipase 19 11 - 82 U/L   LACTIC ACID   Result Value Ref Range    Lactic Acid 2.6 (H) 0.5 - 2.0 mmol/L   D-DIMER   Result Value Ref Range    D-Dimer 1.00 (H) 0.00 - 0.50 ug/mL (FEU)   proBrain Natriuretic Peptide, NT   Result Value Ref Range    NT-proBNP 2202 (H) 0 - 125 pg/mL   TROPONIN   Result Value Ref Range    Troponin T 27 (H) 6 - 19 ng/L   CORRECTED CALCIUM   Result Value Ref Range    Correct Calcium 10.1 8.5 - 10.5 mg/dL   ESTIMATED GFR   Result Value Ref Range    GFR (CKD-EPI) 64 >60 mL/min/1.73 m 2   EKG   Result Value Ref Range    Report       [facility] Emergency Dept.    Test Date:  2023  Pt Name:    NADIR SIN                  Department: ER  MRN:        [id number]                      Room:        04  Gender:     Male                         Technician: [id number]  :        1945                   Requested By:ER TRIAGE PROTOCOL  Order #:    [id number]                    Judith MD: RAZA HOWELL. AMD    Measurements  Intervals                                Axis  Rate:       73                           P:          33  IL:         195                          QRS:        7  QRSD:       107                          T:          38  QT:         406  QTc:        448    Interpretive Statements  Sinus rhythm  Probable left atrial enlargement  No previous ECG available for comparison  Electronically Signed On 2023 8:27:32 PDT by  RAZA HOWELL. [facility]             RADIOLOGY  I have independently interpreted the diagnostic imaging associated with this visit and am waiting the final reading from the radiologist.   My preliminary interpretation is as follows: Chest x-ray shows poor inspiratory effort.  There is some atherosclerosis of the aorta and widening mediastinum suspect this is based on his respiration.  This with is a right lower lobe opacity.  Radiologist interpretation:     CT-ABDOMEN-PELVIS WITH   Final Result      1.  Right lower lobe opacity suspicious for pneumonia.   2.  Hepatic steatosis.   3.  Left adrenal nodule measuring 2.1 cm. Consider biochemical assays to determine functional status and exclude pheochromocytoma. Recommend further evaluation with adrenal protocol CT, although chemical-shift MRI may be considered.      CT-CTA CHEST PULMONARY ARTERY W/ RECONS   Final Result      1.  No CT evidence of pulmonary emboli.      2.  Extensive pneumonitis throughout the right lower lobe and to a lesser extent the right middle lobe.            DX-CHEST-PORTABLE (1 VIEW)   Final Result      1.  Possible small right pleural effusion with basilar atelectasis and/or consolidation.   2.  Cardiomegaly and probable mild vascular congestion.            COURSE & MEDICAL DECISION MAKING    ED Observation Status? Yes; I am placing the patient in to an observation status due to a diagnostic uncertainty as well as therapeutic intensity. Patient placed in observation status at 8:28 AM, 4/18/2023.     Observation plan is as follows: The patient is admitted to ED opts was worked up for his chest pain and abdominal pain.  Disposition is unclear.    Upon Reevaluation, the patient's condition has: not improved; and will be escalated to hospitalization.    Patient discharged from ED Observation status at 1030 (Time) 4/8 (Date).     INITIAL ASSESSMENT, COURSE AND PLAN  Care Narrative:     Patient presents emergency department via EMS for chest pain and  abdominal pain.  The patient had abrupt onset right-sided chest pain and abdominal pain.  The patient denies any cough or fever or PE risk factors.    Broad differential diagnosis was considered including but not limited to ACS, pneumonia, pneumothorax, pleural effusion, pulmonary embolism, dissection, as well as intra-abdominal causes like biliary causes, perforated peptic ulcer, perforated viscus.      Patient was hospitalized a couple years ago for COVID.  Chart was reviewed based on labs and previous imaging for comparison.      Patient seen and examined worked up for the above differential diagnosis with labs, EKG chest x-ray and imaging.  If his D-dimer is positive he will get a chest CT for PE.      The patient has a white count 23,000 he has been cultured and started on antibiotics.  Initially Rocephin.  At the time of this order both intra-abdominal and intra thoracic causes have been considered.  CTs have been ordered.    Chest CT does not show pulmonary embolism but does show a large infiltrate.  Of added azithromycin.  Intra-abdominal sources were considered and an abdominal CT was performed.  This shows pneumonia but really nothing else.    The patient will be hospitalized for further work-up and treatment.  Discussed case with Dr. Puga and care transfer the time he is treated for sepsis with fluids, antibiotics per protocol.      ADDITIONAL PROBLEM LIST  COPD.  History of tobacco use.    DISPOSITION AND DISCUSSIONS  I have discussed management of the patient with the following physicians and WILTON's: Dr. Puga from the hospitalist.    Discussion of management with other [facility] or appropriate source(s): Miladis from the pharmacy.      FINAL DIAGNOSIS  1. Pneumonia of right lung due to infectious organism, unspecified part of lung    2. Acute respiratory failure with hypoxia (HCC)    3. Sepsis, due to unspecified organism, unspecified whether acute organ dysfunction present (HCC)           Electronically  signed by: Oliver Dodson M.D., 4/18/2023 8:25 AM

## 2023-04-18 NOTE — ED NOTES
"Pt is feeling \"more relaxed,\" O2 turned down to 2L and is 96%, breathing easier and with less pain. Going to CT.  "

## 2023-04-18 NOTE — ED TRIAGE NOTES
BIB [facility] to rm 4  Chief Complaint   Patient presents with    RUQ Pain     Started this morning     Pt has increased pain with breathing and on palpation. Denies any n/v, denies any black or bloody stools, has not eaten anything since yesterday morning, but has been drinking liquids.   86% on RA, placed on 4L O2 and is now 94%. Was given 50mcg fentanyl and 2mg morphin by EMS.

## 2023-04-18 NOTE — ASSESSMENT & PLAN NOTE
Right lower lobe pneumonia seen on CT of the chest.  IV Unasyn and Azithromycin.  With sepsis and hypoxia.    Cultures positive for strep pneumonia  Continue IV Unasyn  Sensitivities pending

## 2023-04-19 PROBLEM — R07.81 PLEURITIC CHEST PAIN: Status: ACTIVE | Noted: 2023-01-01

## 2023-04-19 NOTE — PROGRESS NOTES
Microbiology called in about 2210 hr saying that in both bottle for blood culture, grew Strepto, but they have not idea of what kind of Streptococci. Patient is receiving Unasyn iv . Q6 hr.

## 2023-04-19 NOTE — PROGRESS NOTES
Report received from NOC RN and assumed patient care at 0700. Patient is A&Ox 4 and on room air.  Patient reporting a pain level of 0/10. Call light within reach and bed in lowest position. Reinforced the need to call for assistance. Plan of care discussed and patient does not have any further needs at this time.

## 2023-04-19 NOTE — PROGRESS NOTES
"Hospital Medicine Daily Progress Note    Date of Service  4/19/2023    Chief Complaint  Lewis Mohr is a 77 y.o. male admitted 4/18/2023 with right-sided pleuritic chest pain    Hospital Course  Lewis Mohr is a 77 y.o. male who presented 4/18/2023 with right sided pleuritic chest pain.  Mr. Mohr has a remote past medical history of kidney stones otherwise no known chronic conditions and does not take any prescription medications.  He was admitted here in December 2020 for COVID infection.  He has been his usual state of excellent health until this morning after he got up he noticed a \"aching\" in the right chest which progressed and became a \"serious pain\".  He states the pain in the right chest was worse with breathing.  He denied cough, shortness of breath, fever, and sweating.  He states he has not had troubles with choking or coughing with eating either.  He presented to emergency room with these complaints here a CT of the chest was negative for pulmonary embolism though did show diffuse pneumonitis in the right lower lung base and his white blood cell count is markedly elevated at 23,000.  He is hypoxic at 86% and usually is on room air.  He will be admitted for working diagnosis of sepsis with respiratory failure secondary to pneumonia will be treated with IV antibiotics and supplemental oxygen    Interval Problem Update  4/19  Blood cultures back positive for strep pneumoniae  Continue IV Unasyn every 6 hours  New blood cultures ordered   Still endorses pleuritic chest pain  Pressure remains elevated increase Norvasc to 10 mg daily  Refusing Xarelto Will order heparin for DVT Profy    I have discussed this patient's plan of care and discharge plan at IDT rounds today with Case Management, Nursing, Nursing leadership, and other members of the IDT team.    Consultants/Specialty  None    Code Status  Full Code    Disposition  Patient is not medically cleared for discharge.   Anticipate discharge to to home with " close outpatient follow-up.  I have placed the appropriate orders for post-discharge needs.    Review of Systems  Review of Systems   Constitutional:  Positive for chills and fever.   HENT: Negative.     Eyes: Negative.    Respiratory:  Positive for cough and shortness of breath. Negative for sputum production.         Pleuritic chest pain   Cardiovascular: Negative.    Gastrointestinal: Negative.    Genitourinary: Negative.    Musculoskeletal: Negative.    Skin: Negative.    Neurological: Negative.    Endo/Heme/Allergies: Negative.    Psychiatric/Behavioral: Negative.     All other systems reviewed and are negative.     Physical Exam  Temp:  [36.1 °C (96.9 °F)-36.6 °C (97.8 °F)] 36.4 °C (97.5 °F)  Pulse:  [64-78] 69  Resp:  [17-19] 17  BP: (158-199)/(83-99) 158/89  SpO2:  [90 %-93 %] 93 %    Physical Exam  Vitals and nursing note reviewed.   Constitutional:       Appearance: Normal appearance.   HENT:      Head: Normocephalic and atraumatic.   Eyes:      Extraocular Movements: Extraocular movements intact.      Pupils: Pupils are equal, round, and reactive to light.   Cardiovascular:      Rate and Rhythm: Normal rate and regular rhythm.      Pulses: Normal pulses.      Heart sounds: Normal heart sounds.   Pulmonary:      Effort: Pulmonary effort is normal.      Breath sounds: Normal breath sounds. No wheezing, rhonchi or rales.      Comments: Crackly at right base  Abdominal:      General: Abdomen is flat. Bowel sounds are normal.      Tenderness: There is no guarding or rebound.   Musculoskeletal:         General: Normal range of motion.      Cervical back: Normal range of motion.   Skin:     General: Skin is warm and dry.   Neurological:      General: No focal deficit present.      Mental Status: He is alert and oriented to person, place, and time.   Psychiatric:         Mood and Affect: Mood normal.         Behavior: Behavior normal.      Comments: Unable to determine at this time.    Patient makes many  statements that seem highly unlikely       Fluids    Intake/Output Summary (Last 24 hours) at 4/19/2023 1327  Last data filed at 4/19/2023 1000  Gross per 24 hour   Intake 120 ml   Output --   Net 120 ml       Laboratory  Recent Labs     04/18/23  0813 04/19/23  0737   WBC 23.1* 14.8*   RBC 4.65* 4.23*   HEMOGLOBIN 13.8* 12.6*   HEMATOCRIT 42.6 38.4*   MCV 91.6 90.8   MCH 29.7 29.8   MCHC 32.4* 32.8*   RDW 46.2 44.1   PLATELETCT 294 281   MPV 10.0 10.1     Recent Labs     04/18/23  0813 04/19/23  0737   SODIUM 136 134*   POTASSIUM 4.2 3.6   CHLORIDE 101 103   CO2 20 22   GLUCOSE 150* 127*   BUN 27* 18   CREATININE 1.18 1.05   CALCIUM 9.5 9.4                   Imaging  CT-ABDOMEN-PELVIS WITH   Final Result      1.  Right lower lobe opacity suspicious for pneumonia.   2.  Hepatic steatosis.   3.  Left adrenal nodule measuring 2.1 cm. Consider biochemical assays to determine functional status and exclude pheochromocytoma. Recommend further evaluation with adrenal protocol CT, although chemical-shift MRI may be considered.      CT-CTA CHEST PULMONARY ARTERY W/ RECONS   Final Result      1.  No CT evidence of pulmonary emboli.      2.  Extensive pneumonitis throughout the right lower lobe and to a lesser extent the right middle lobe.            DX-CHEST-PORTABLE (1 VIEW)   Final Result      1.  Possible small right pleural effusion with basilar atelectasis and/or consolidation.   2.  Cardiomegaly and probable mild vascular congestion.           Assessment/Plan  * Streptococcal pneumonia- (present on admission)  Assessment & Plan  Right lower lobe pneumonia seen on CT of the chest.  IV Unasyn and Azithromycin.  With sepsis and hypoxia.    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    Acute respiratory failure with hypoxia (HCC)- (present on admission)  Assessment & Plan  86% on room air  Secondary to pneumonia and sepsis  Supplemental oxygen ordered    Sepsis (HCC)- (present on admission)  Assessment & Plan  This is Sepsis Present on admission  SIRS criteria identified on my evaluation include: Leukocytosis, with WBC greater than 12,000  Source is pneumonia  Sepsis protocol initiated  Fluid resuscitation ordered per protocol  Crystalloid Fluid Administration: ordered  IV antibiotics as appropriate for source of sepsis  Reassessment: I have reassessed the patient's hemodynamic status      Sepsis resolved             VTE prophylaxis: heparin ppx    I have performed a physical exam and reviewed and updated ROS and Plan today (4/19/2023). In review of yesterday's note (4/18/2023), there are no changes except as documented above.

## 2023-04-19 NOTE — ASSESSMENT & PLAN NOTE
Pleuritic chest pain  Patient points to his mid sternal area more to the right  Seems to be some element of costochondritis with the chest pain  He is pressing on his pain to relieve  Ordered Toradol

## 2023-04-19 NOTE — CARE PLAN
Problem: Care Map:  Optimal Outcome for the Pneumonia Patient  Goal: Collection and monitoring of appropriate tests and labs  4/18/2023 2148 by Jan Peterson R.N.  Outcome: Progressing  4/18/2023 2147 by Jan Peterson R.N.  Outcome: Progressing     Problem: Respiratory  Goal: Patient will achieve/maintain optimum respiratory ventilation and gas exchange  4/18/2023 2148 by Jan Peterson R.N.  Outcome: Progressing  4/18/2023 2147 by Jan Peterson R.N.  Outcome: Progressing     Problem: Risk for Aspiration  Goal: Patient's risk for aspiration will be absent or decrease  4/18/2023 2148 by Jan Peterson R.N.  Outcome: Progressing  4/18/2023 2147 by Jan Peterson R.N.  Outcome: Progressing     Problem: Hemodynamics - Pneumonia  Goal: Patient's hemodynamics, fluid balance and neurologic status will be stable or improve  4/18/2023 2148 by Jan Peterson R.N.  Outcome: Progressing  4/18/2023 2147 by Jan Peterson R.N.  Outcome: Progressing     Problem: Self Care  Goal: Patient will have the ability to perform ADLs independently or with assistance (bathe, groom, dress, toilet and feed)  Outcome: Progressing     Problem: Discharge Planning - Pneumonia  Goal: Patient will verbalize understanding of lifestyle changes and therapeutic needs post discharge  4/18/2023 2148 by Jan Peterson R.N.  Outcome: Progressing  4/18/2023 2147 by Jan Peterson R.N.  Outcome: Progressing   The patient is Stable - Low risk of patient condition declining or worsening    Shift Goals  Clinical Goals: Abx iv, fluids  Patient Goals: feel better, go home soon  Family Goals: NA    Progress made toward(s) clinical / shift goals:  Patient is alert and oriented and wanting to go home tomorrow morning. Day RN explained that is up to the physician to decide when he could go home. Right now, patient just finished receiving a bolus of LR and also, scheduled IV antibiotics. Bed in lowest and locked  position.      P:

## 2023-04-19 NOTE — PROGRESS NOTES
"Patient was saying that \"he really needs to leave this afternoon because he needs to be in a meeting that worth a million\" advised to speak to the attending physician  if he can leave on a pass.  "

## 2023-04-20 PROBLEM — R07.81 PLEURITIC CHEST PAIN: Status: RESOLVED | Noted: 2023-01-01 | Resolved: 2023-01-01

## 2023-04-20 PROBLEM — J96.01 ACUTE RESPIRATORY FAILURE WITH HYPOXIA (HCC): Status: RESOLVED | Noted: 2023-01-01 | Resolved: 2023-01-01

## 2023-04-20 PROBLEM — A41.9 SEPSIS (HCC): Status: RESOLVED | Noted: 2023-01-01 | Resolved: 2023-01-01

## 2023-04-20 PROBLEM — J18.9 PNEUMONIA DUE TO INFECTIOUS ORGANISM: Status: RESOLVED | Noted: 2023-01-01 | Resolved: 2023-01-01

## 2023-04-20 NOTE — CARE PLAN
The patient is Stable - Low risk of patient condition declining or worsening    Shift Goals  Clinical Goals: IV antibiotics, comfort, safety  Patient Goals: dicharge, comfort, rest  Family Goals: NA    Progress made toward(s) clinical / shift goals: patient remains free from falls, patient maintains tolerable pain level      Problem: Respiratory  Goal: Patient will achieve/maintain optimum respiratory ventilation and gas exchange  Outcome: Progressing     Problem: Self Care  Goal: Patient will have the ability to perform ADLs independently or with assistance (bathe, groom, dress, toilet and feed)  Outcome: Progressing     Problem: Pain - Standard  Goal: Alleviation of pain or a reduction in pain to the patient’s comfort goal  Outcome: Progressing       Patient is not progressing towards the following goals:

## 2023-04-20 NOTE — PROGRESS NOTES
"Received alert and oriented x 4. Check vitals sign and recorded accordingly and due med given per MAR. Monitor sign and symptoms of respiratory distress and treatment given accordingly per MAR.Medicated per MAR and reassessed every 2 hours per protocol. Call light within reach. Needs attended. Will continue to monitor.BP (!) 159/83   Pulse 80   Temp 37.2 °C (99 °F) (Temporal)   Resp 17   Ht 1.778 m (5' 10\")   Wt 91.5 kg (201 lb 11.5 oz)   SpO2 92%   BMI 28.94 kg/m² . C/o headache throbbing pain and right chest pain when inhaling. Meds given as ordered and will observed.  "

## 2023-04-20 NOTE — DISCHARGE INSTRUCTIONS
We have set you up with a [facility] call them to follow-up in 1 week.  Also please request pneumonia vaccine from primary care.  This will protect you in the future!

## 2023-04-20 NOTE — PROGRESS NOTES
Discharge Instructions    Discharged to home by car with friend. Discharged via walking, hospital escort: Yes.  Special equipment needed: Not Applicable    Be sure to schedule a follow-up appointment with your primary care doctor or any specialists as instructed.     Discharge Plan:   Diet Plan: Discussed  Activity Level: Discussed  Confirmed Follow up Appointment: Patient to Call and Schedule Appointment  Confirmed Symptoms Management: Discussed  Medication Reconciliation Updated: Yes    I understand that a diet low in cholesterol, fat, and sodium is recommended for good health. Unless I have been given specific instructions below for another diet, I accept this instruction as my diet prescription.   Other diet: regular  regular  Special Instructions: None    -Is this patient being discharged with medication to prevent blood clots?  Yes, Aspirin   Aspirin, ASA oral tablets  What is this medicine?  ASPIRIN (AS pir in) is a pain reliever. It is used to treat mild pain and fever. This medicine is also used as directed by a doctor to prevent and to treat heart attacks, to prevent strokes and blood clots, and to treat arthritis or inflammation.  This medicine may be used for other purposes; ask your health care provider or pharmacist if you have questions.  COMMON BRAND NAME(S): Aspir-Low, Aspir-Kathy, Aspirtab, Nicky Advanced Aspirin, Nicky Aspirin, Nicky Aspirin Extra Strength, Nicky Aspirin Plus, Nicky Extra Strength, Nicky Extra Strength Plus, Nicky Genuine Aspirin, Nicky Womens Aspirin, Bufferin, Bufferin Extra Strength, Bufferin Low Dose  What should I tell my health care provider before I take this medicine?  They need to know if you have any of these conditions:  anemia  asthma  bleeding problems  child with chickenpox, the flu, or other viral infection  diabetes  gout  if you frequently drink alcohol containing drinks  kidney disease  liver disease  low level of vitamin K  lupus  smoke tobacco  stomach ulcers or  other problems  an unusual or allergic reaction to aspirin, tartrazine dye, other medicines, dyes, or preservatives  pregnant or trying to get pregnant  breast-feeding  How should I use this medicine?  Take this medicine by mouth with a glass of water. Follow the directions on the package or prescription label. You can take this medicine with or without food. If it upsets your stomach, take it with food. Do not take your medicine more often than directed.  Talk to your pediatrician regarding the use of this medicine in children. While this drug may be prescribed for children as young as 12 years of age for selected conditions, precautions do apply. Children and teenagers should not use this medicine to treat chicken pox or flu symptoms unless directed by a doctor.  Patients over 65 years old may have a stronger reaction and need a smaller dose.  Overdosage: If you think you have taken too much of this medicine contact a poison control center or emergency room at once.  NOTE: This medicine is only for you. Do not share this medicine with others.  What if I miss a dose?  If you are taking this medicine on a regular schedule and miss a dose, take it as soon as you can. If it is almost time for your next dose, take only that dose. Do not take double or extra doses.  What may interact with this medicine?  Do not take this medicine with any of the following medications:  cidofovir  ketorolac  probenecid  This medicine may also interact with the following medications:  alcohol  alendronate  bismuth subsalicylate  flavocoxid  herbal supplements like feverfew, garlic, aylin, ginkgo biloba, horse chestnut  medicines for diabetes or glaucoma like acetazolamide, methazolamide  medicines for gout  medicines that treat or prevent blood clots like enoxaparin, heparin, ticlopidine, warfarin  other aspirin and aspirin-like medicines  NSAIDs, medicines for pain and inflammation, like ibuprofen or  naproxen  pemetrexed  sulfinpyrazone  varicella live vaccine  This list may not describe all possible interactions. Give your health care provider a list of all the medicines, herbs, non-prescription drugs, or dietary supplements you use. Also tell them if you smoke, drink alcohol, or use illegal drugs. Some items may interact with your medicine.  What should I watch for while using this medicine?  If you are treating yourself for pain, tell your doctor or health care professional if the pain lasts more than 10 days, if it gets worse, or if there is a new or different kind of pain. Tell your doctor if you see redness or swelling. Also, check with your doctor if you have a fever that lasts for more than 3 days. Only take this medicine to prevent heart attacks or blood clotting if prescribed by your doctor or health care professional.  Do not take aspirin or aspirin-like medicines with this medicine. Too much aspirin can be dangerous. Always read the labels carefully.  This medicine can irritate your stomach or cause bleeding problems. Do not smoke cigarettes or drink alcohol while taking this medicine. Do not lie down for 30 minutes after taking this medicine to prevent irritation to your throat.  If you are scheduled for any medical or dental procedure, tell your healthcare provider that you are taking this medicine. You may need to stop taking this medicine before the procedure.  This medicine may be used to treat migraines. If you take migraine medicines for 10 or more days a month, your migraines may get worse. Keep a diary of headache days and medicine use. Contact your healthcare professional if your migraine attacks occur more frequently.  What side effects may I notice from receiving this medicine?  Side effects that you should report to your doctor or health care professional as soon as possible:  allergic reactions like skin rash, itching or hives, swelling of the face, lips, or tongue  breathing  problems  changes in hearing, ringing in the ears  confusion  general ill feeling or flu-like symptoms  pain on swallowing  redness, blistering, peeling or loosening of the skin, including inside the mouth or nose  signs and symptoms of bleeding such as bloody or black, tarry stools; red or dark-brown urine; spitting up blood or brown material that looks like coffee grounds; red spots on the skin; unusual bruising or bleeding from the eye, gums, or nose  trouble passing urine or change in the amount of urine  unusually weak or tired  yellowing of the eyes or skin  Side effects that usually do not require medical attention (report to your doctor or health care professional if they continue or are bothersome):  diarrhea or constipation  headache  nausea, vomiting  stomach gas, heartburn  This list may not describe all possible side effects. Call your doctor for medical advice about side effects. You may report side effects to FDA at 2-319-FDA-7007.  Where should I keep my medicine?  Keep out of the reach of children.  Store at room temperature between 15 and 30 degrees C (59 and 86 degrees F). Protect from heat and moisture. Do not use this medicine if it has a strong vinegar smell. Throw away any unused medicine after the expiration date.  NOTE: This sheet is a summary. It may not cover all possible information. If you have questions about this medicine, talk to your doctor, pharmacist, or health care provider.  © 2020 [employer]/Gold Standard (2018-01-30 10:42:13)    Is patient discharged on Warfarin / Coumadin?   No         Patient discharge with his meds to beds, all questions answered, all belongings given to patient including phone and ipad.

## 2023-04-20 NOTE — CARE PLAN
The patient is Stable - Low risk of patient condition declining or worsening    Shift Goals  Clinical Goals: iv abx continued and pain control  Patient Goals: pain control and sleep  Family Goals: NA    Progress made toward(s) clinical / shift goals:  IV abx continued and encouraged IS as instructed by RT  Problem: Care Map:  Optimal Outcome for the Pneumonia Patient  Goal: Collection and monitoring of appropriate tests and labs  Description: Target End Date:  end of day 1  Outcome: Progressing     Problem: Respiratory  Goal: Patient will achieve/maintain optimum respiratory ventilation and gas exchange  Description: Target End Date:  Prior to discharge or change in level of care    Document on Assessment flowsheet    1.  Assess and monitor rate, rhythm, depth and effort of respiration  2.  Breath sounds assessed qshift and/or as needed  3.  Assess O2 saturation, administer/titrate oxygen as ordered  4.  Position patient for maximum ventilatory efficiency  5.  Turn, cough, and deep breath with splinting to improve effectiveness  6.  Collaborate with RT to administer medication/treatments per order  7.  Encourage use of incentive spirometer and encourage patient to cough after use and utilize splinting techniques if applicable  8.  Airway suctioning  9.  Monitor sputum production for changes in color, consistency and frequency  10. Perform frequent oral hygiene  11. Alternate physical activity with rest periods  Outcome: Progressing     Problem: Risk for Aspiration  Goal: Patient's risk for aspiration will be absent or decrease  Description: Target End Date:  Prior to discharge or change in level of care    1.   Complete dysphagia screening on admission  2.   NPO until dysphagia screening complete or medically cleared  3.   Collaborate with Speech Therapy, Clinical Dietitian and interdisciplinary team  4.   Implement aspiration precautions  5.   Assist patient up to chair for meals  6.   Elevate head of bed 90  degrees if patient is unable to get out of bed  7.   Encourage small bites  8.   Ensure foods/liquids are of appropriate consistency  9.   Assess for any signs/symptoms of aspiration  10. Assess breath sounds and vital signs after oral intake  Outcome: Progressing     Problem: Hemodynamics - Pneumonia  Goal: Patient's hemodynamics, fluid balance and neurologic status will be stable or improve  Description: Target End Date:  Prior to discharge or change in level of care    Document on Assessment and I/O flowsheet templates    1.  Monitor vital signs, pulse oximetry and cardiac monitor per provider order and/or policy  2.  Manage IV fluids and IV infusions  3.  Monitor intake and output  4.  Daily weights per unit policy or provider order  5.  Assess peripheral pulses and capillary refill  6.  Monitor body temperature and assist with comfort measures to reduce fever and chills  7.  Position patient for maximum circulation/cardiac output  8.  Assess for peripheral, sacral, periorbital and abdominal edema  9. Assess for signs of deterioration - tachycardia, tachypnea, dyspnea, hypertension, hypoxemia, pallor, changes in consciousness or restlessness  Outcome: Progressing     Problem: Self Care  Goal: Patient will have the ability to perform ADLs independently or with assistance (bathe, groom, dress, toilet and feed)  Description: Target End Date:  Prior to discharge or change in level of care    Document on ADL flowsheet    1.  Assess the capability and level of deficiency to perform ADLs  2.  Encourage family/care giver involvement  3.  Provide assistive devices  4.  Consider PT/OT evaluations  5.  Maintain support, give positive feedback, encourage self-care allowing extra time and verbal cuing as needed  6.  Avoid doing something for patients they can do themselves, but provide assistance as needed  7.  Assist in anticipating/planning individual needs  8.  Collaborate with Case Management and  to meet  discharge needs  Outcome: Progressing     Problem: Hemodynamics  Goal: Patient's hemodynamics, fluid balance and neurologic status will be stable or improve  Description: Target End Date:  Prior to discharge or change in level of care    Document on Assessment and I/O flowsheet templates    1.  Monitor vital signs, pulse oximetry and cardiac monitor per provider order and/or policy  2.  Maintain blood pressure per provider order  3.  Hemodynamic monitoring per provider order  4.  Manage IV fluids and IV infusions  5.  Monitor intake and output  6.  Daily weights per unit policy or provider order  7.  Assess peripheral pulses and capillary refill  8.  Assess color and body temperature  9.  Position patient for maximum circulation/cardiac output  10. Monitor for signs/symptoms of excessive bleeding  11. Assess mental status, restlessness and changes in level of consciousness  12. Monitor temperature and report fever or hypothermia to provider immediately. Consideration of targeted temperature management.  Outcome: Progressing       Patient is not progressing towards the following goals:

## 2023-04-20 NOTE — DISCHARGE SUMMARY
"Discharge Summary    CHIEF COMPLAINT ON ADMISSION  Chief Complaint   Patient presents with    RUQ Pain     Started this morning       Reason for Admission  EMS     Admission Date  4/18/2023    CODE STATUS  Full Code    HPI & HOSPITAL COURSE  Lewis Mohr is a 77 y.o. male who presented 4/18/2023 with right sided pleuritic chest pain.  Mr. Mohr has a remote past medical history of kidney stones otherwise no known chronic conditions and does not take any prescription medications.  He was admitted here in December 2020 for COVID infection.  He has been his usual state of excellent health until this morning after he got up he noticed a \"aching\" in the right chest which progressed and became a \"serious pain\".  He states the pain in the right chest was worse with breathing.  He denied cough, shortness of breath, fever, and sweating.  He states he has not had troubles with choking or coughing with eating either.  He presented to emergency room with these complaints here a CT of the chest was negative for pulmonary embolism though did show diffuse pneumonitis in the right lower lung base and his white blood cell count is markedly elevated at 23,000.  He is hypoxic at 86% and usually is on room air.  He will be admitted for working diagnosis of sepsis with respiratory failure secondary to pneumonia will be treated with IV antibiotics and supplemental oxygen.    Patient was treated aggressively with IV antibiotics and supplemental oxygen.  His respiratory status improved subsequent blood cultures came back  came back negative.  Leukocytosis improved and patient no longer had an elevated white count.    On day of discharge patient did have hypokalemia of 3.2.  He was replaced with p.o. K-Dur.  I have provided him a prescription for 3 days of 40 mg potassium and instructed him to follow-up with primary care to get repeat laboratory analysis.     Therefore, he is discharged in good and stable condition to home with close " outpatient follow-up.    The patient met 2-midnight criteria for an inpatient stay at the time of discharge.    Discharge Date  4/20/2023    FOLLOW UP ITEMS POST DISCHARGE  Make appointment for primary care and follow-up on lab work and get pneumococcal vaccine    DISCHARGE DIAGNOSES  Principal Problem (Resolved):    Streptococcal pneumonia POA: Yes  Active Problems:    Adrenal mass, left (HCC) POA: Yes  Resolved Problems:    Sepsis (HCC) POA: Yes    Acute respiratory failure with hypoxia (HCC) POA: Yes    Pleuritic chest pain POA: Unknown      FOLLOW UP  No future appointments.  No follow-up provider specified.    MEDICATIONS ON DISCHARGE     Medication List        START taking these medications        Instructions   amLODIPine 10 MG Tabs  Start taking on: April 21, 2023  Commonly known as: NORVASC   Take 1 Tablet by mouth every day.  Dose: 10 mg     amoxicillin-clavulanate 875-125 MG Tabs  Commonly known as: AUGMENTIN   Take 1 Tablet by mouth 2 times a day.  Dose: 1 Tablet     potassium chloride SA 20 MEQ Tbcr  Start taking on: April 21, 2023  Commonly known as: Kdur   Take 2 Tablets by mouth every day.  Dose: 40 mEq            CONTINUE taking these medications        Instructions   aspirin EC 81 MG Tbec  Commonly known as: ECOTRIN   Take 81 mg by mouth every day.  Dose: 81 mg     ibuprofen 200 MG Tabs  Commonly known as: MOTRIN   Take 400 mg by mouth every 6 hours as needed. Indications: Pain  Dose: 400 mg     multivitamin Tabs   Take 1 Tablet by mouth every day.  Dose: 1 Tablet              Allergies  No Known Allergies    DIET  Orders Placed This Encounter   Procedures    Diet Order Diet: Regular     Standing Status:   Standing     Number of Occurrences:   1     Order Specific Question:   Diet:     Answer:   Regular [1]    Discontinue Diet Tray     Standing Status:   Standing     Number of Occurrences:   1       ACTIVITY  As tolerated.  Weight bearing as  tolerated    CONSULTATIONS  None    PROCEDURES  None    LABORATORY  Lab Results   Component Value Date    SODIUM 139 04/20/2023    POTASSIUM 3.2 (L) 04/20/2023    CHLORIDE 105 04/20/2023    CO2 21 04/20/2023    GLUCOSE 112 (H) 04/20/2023    BUN 20 04/20/2023    CREATININE 1.05 04/20/2023        Lab Results   Component Value Date    WBC 9.1 04/20/2023    HEMOGLOBIN 14.4 04/20/2023    HEMATOCRIT 42.9 04/20/2023    PLATELETCT 377 04/20/2023        Total time of the discharge process was 43 minutes.

## 2023-12-27 PROBLEM — I62.9 INTRACRANIAL HEMORRHAGE (HCC): Status: ACTIVE | Noted: 2023-01-01

## 2023-12-27 PROBLEM — I10 PRIMARY HYPERTENSION: Status: ACTIVE | Noted: 2023-01-01

## 2023-12-27 NOTE — THERAPY
Physical Therapy   Initial Evaluation     Patient Name: Marty Mohr  Age:  78 y.o., Sex:  male  Medical Record #: [id number]  Today's Date: 12/27/2023     Precautions  Precautions: Fall Risk  Comments: SBP <160    Assessment  Patient is a 78 y.o. 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 PT eval complete, pt currently presents below his functional baseline due to impaired strength, coordination, balance, alertness, activity tolerance, vision, and overall mobility. Pt may be a poor historian, however he states that he was fully independent at baseline with mobility. Pt is now at Max A for bed mobility with standing activity deferred due to poor OOB tolerance including nausea. Pt with LLE deficits consistent throughout examination. Pt has a roommate he lives with per EMR, however he will benefit from post acute placement at this time to maximize functional independence and decrease caregiver burden. Will follow while admitted for skilled PT intervention.     Plan    Physical Therapy Initial Treatment Plan   Treatment Plan : Bed Mobility, Gait Training, Neuro Re-Education / Balance, Self Care / Home Evaluation, Stair Training, Therapeutic Activities, Therapeutic Exercise  Treatment Frequency: 4 Times per Week  Duration: Until Therapy Goals Met    DC Equipment Recommendations: Unable to determine at this time  Discharge Recommendations: Recommend post-acute placement for additional physical therapy services prior to discharge home         Vitals   Vitals Comments BP within parameters throughout   Pain 0 - 10 Group   Location Head   Location Orientation Right;Posterior   Therapist Pain Assessment During Activity   Prior Living Situation   Housing / Facility 2 Story Apartment / Condo   Lives with - Patient's Self Care Capacity Unrelated Adult  (roommate)   Comments pt unable to provide clear living hx, lives with roommate per EMR    Prior Level of Functional Mobility   Bed Mobility Independent   Transfer Status Independent   Ambulation Independent   Ambulation Distance community distances   Assistive Devices Used None   Stairs Independent   Comments pt states he was independent with mobility at baseline, will need to confirm with roommate or pt's dtr   Cognition    Cognition / Consciousness X   Speech/ Communication   (mumbled, difficult to understand at times)   Level of Consciousness Responds to voice   Ability To Follow Commands 1 Step   Safety Awareness Impaired   New Learning Impaired   Attention Impaired   Sequencing Impaired   Comments needing consistent cues at EOB to open his eyes and remain alert   Active ROM Lower Body    Active ROM Lower Body  WDL   Strength Lower Body   Lower Body Strength  X   Comments RLE 5/5, LLE 4/5   Sensation Lower Body   Lower Extremity Sensation   WDL   Comments pt reports normal, slight possibility of pt not understanding the question   Coordination Lower Body    Coordination Lower Body  X   Comments LLE delayed/impaired with heel to shin test   Vision   Vision Comments pt unable to see out of his R eye. pt with difficulty tracking with his L eye   Balance Assessment   Sitting Balance (Static) Poor +   Sitting Balance (Dynamic) Poor   Weight Shift Sitting Poor   Comments standing NT due to poor OOB tolerance   Bed Mobility    Supine to Sit Maximal Assist   Sit to Supine Maximal Assist   Scooting Maximal Assist   Gait Analysis   Gait Level Of Assist Unable to Participate   Level of Assist with Stairs Unable to Participate   Functional Mobility   Sit to Stand Unable to Participate   Bed, Chair, Wheelchair Transfer Unable to Participate   Mobility EOB only   ICU Target Mobility Level   ICU Mobility - Targeted Level Level 2   How much difficulty does the patient currently have...   Turning over in bed (including adjusting bedclothes, sheets and blankets)? 2   Sitting down on and standing up from a chair with  arms (e.g., wheelchair, bedside commode, etc.) 1   Moving from lying on back to sitting on the side of the bed? 1   How much help from another person does the patient currently need...   Moving to and from a bed to a chair (including a wheelchair)? 1   Need to walk in a hospital room? 1   Climbing 3-5 steps with a railing? 1   6 clicks Mobility Score 7   Activity Tolerance   Sitting Edge of Bed 7 min   Short Term Goals    Short Term Goal # 1 pt will move supine<>eob with spv in 6 tx for bed mobility   Short Term Goal # 2 pt will complete spt with fww and spv in 6 tx for functional mobility.   Short Term Goal # 3 pt will ambulate 150 ft with fww and spv in 6 tx for household distances.   Education Group   Education Provided Role of Physical Therapist   Role of Physical Therapist Patient Response Patient;Acceptance;Explanation;Verbal Demonstration   Physical Therapy Initial Treatment Plan    Treatment Plan  Bed Mobility;Gait Training;Neuro Re-Education / Balance;Self Care / Home Evaluation;Stair Training;Therapeutic Activities;Therapeutic Exercise   Treatment Frequency 4 Times per Week   Duration Until Therapy Goals Met   Problem List    Problems Impaired Bed Mobility;Impaired Transfers;Impaired Ambulation;Functional ROM Deficit;Functional Strength Deficit;Impaired Balance;Impaired Coordination;Decreased Activity Tolerance   Anticipated Discharge Equipment and Recommendations   DC Equipment Recommendations Unable to determine at this time   Discharge Recommendations Recommend post-acute placement for additional physical therapy services prior to discharge home   Interdisciplinary Plan of Care Collaboration   IDT Collaboration with  Nursing   Patient Position at End of Therapy In Bed;Bed Alarm On;Call Light within Reach   Collaboration Comments RN updated   Session Information   Date / Session Number  12/27- 1 (1/4, 1/2)

## 2023-12-27 NOTE — OR NURSING
Awake to touch, responded with nods of yes to some of MD Patten's questions. When asked to squeeze and and wiggle toes pt responding appropriately. Pupils PERRL.  Dressings clean, dry and intact.  Vitals stable.  On monitors with alarms audible.      Update provided by MD Patten and CHARLIE Henriquez. Number added in demographics.

## 2023-12-27 NOTE — ANESTHESIA PROCEDURE NOTES
Arterial Line    Performed by: Clifford Gibbons M.D.  Authorized by: Clifford Gibbons M.D.    Start Time:  12/27/2023 2:21 AM  End Time:  12/27/2023 2:23 AM  Localization: ultrasound guidance  Image captured, interpreted and electronically stored.  Patient Location:  OR  Indication: continuous blood pressure monitoring        Catheter Size:  20 G  Seldinger Technique?: Yes    Laterality:  Left  Site:  Radial artery  Line Secured:  Antimicrobial disc, tape and transparent dressing  Events: patient tolerated procedure well with no complications     1 attempt, wire and catheter advanced easily without resistance, wire removed. No apparent complications.

## 2023-12-27 NOTE — ASSESSMENT & PLAN NOTE
Nicardipine gtt for SBP < 160  12/28 started on norvasc 10mg daily   Off nicardipine  UDS negative

## 2023-12-27 NOTE — PROGRESS NOTES
Neurosurgery Progress Note    Subjective:  Left EO to voice, right eye sig ecchymosis/swelling    Exam:  Awakens to voice  Oriented x 2, slurred/mumbles  FC x 4, =   Inc c/d    BP  Min: 128/77  Max: 228/113  Pulse  Av.2  Min: 60  Max: 81  Resp  Av  Min: 8  Max: 53  Temp  Av.1 °C (96.9 °F)  Min: 35.2 °C (95.4 °F)  Max: 37 °C (98.6 °F)  Monitored Temp 2  Av.6 °C (96.1 °F)  Min: 35.4 °C (95.7 °F)  Max: 35.9 °C (96.6 °F)  SpO2  Av %  Min: 92 %  Max: 99 %    No data recorded    Recent Labs     23  0021   WBC 13.4*   RBC 5.05   HEMOGLOBIN 15.5   HEMATOCRIT 45.5   MCV 90.1   MCH 30.7   MCHC 34.1   RDW 43.2   PLATELETCT 274   MPV 10.4     Recent Labs     23  0406 23  0600   SODIUM 139 138   POTASSIUM 4.5 4.2   CHLORIDE 105 105   CO2 22 22   GLUCOSE 191* 221*   BUN 18 19   CREATININE 1.02 1.05   CALCIUM 8.8 8.6     Recent Labs     23  0021   APTT 27.9   INR 1.13     Recent Labs     23  0021   REACTMIN 4.6   CLOTKINET 1.1   CLOTANGL 74.8   MAXCLOTS 63.4   [id number] 0.0   PRCINADP 42.9*   PRCINAA 6.7       Intake/Output                         23 0700 - 23 0659 23 07 - 23 0659     [id number] 7359-9105 Total 8921-9909 9565-6419 Total                 Intake    I.V.  --  1430.7 1430.7  --  -- --    Pre-Hospital Volume -- 0 0 -- -- --    Trauma Resuscitation Volume -- 0 0 -- -- --    Cardene Volume -- 245.1 245.1 -- -- --    Volume (mL) (3% sodium chloride (Hypertonic Saline) 500mL infusion) -- 111.3 111.3 -- -- --    Volume (mL) (3% sodium chloride (Hypertonic Saline) 500mL infusion 500 mL) -- 116 116 -- -- --    Volume (mL) (Lactated Ringers) -- 900 900 -- -- --    Volume (mL) (lactated ringers infusion) -- 58.3 58.3 -- -- --    Blood  --  0 0  --  -- --    PRBC Total Volume (Non-Barcoded) -- 0 0 -- -- --    FFP Total Volume (Non-Barcoded) -- 0 0 -- -- --    Platelets Total Volume (Non-Barcoded) -- 0 0 -- -- --    Cryoprecipitate (Pooled) Total Volume  (Non-Barcoded) -- 0 0 -- -- --    Total Intake -- 1430.7 1430.7 -- -- --       Output    Urine  --  125 125  --  -- --    Output (mL) (Urethral Catheter Temperature probe) -- 125 125 -- -- --    Other  --  0 0  --  -- --    Pre-Hospital Output -- 0 0 -- -- --    Trauma Resuscitation Output -- 0 0 -- -- --    Blood  --  100 100  --  -- --    Est. Blood Loss -- 100 100 -- -- --    Total Output -- 225 225 -- -- --       Net I/O     -- 1205.7 1205.7 -- -- --              Intake/Output Summary (Last 24 hours) at 12/27/2023 0830  Last data filed at 12/27/2023 0600  Gross per 24 hour   Intake 1430.74 ml   Output 225 ml   Net 1205.74 ml             Respiratory Therapy Consult   Continuous RT    acetaminophen  650 mg Q4HRS PRN    Or    acetaminophen  650 mg Q4HRS PRN    ondansetron  4 mg Q4HRS PRN    Or    ondansetron  4 mg Q4HRS PRN    MD Alert...Adult ICU Electrolyte Replacement per Pharmacy   PHARMACY TO DOSE    niCARdipine (Cardene) 25 mg in  mL Standard Infusion  0-15 mg/hr Continuous    3% sodium chloride  500 mL Continuous    insulin regular  1-6 Units Q6HRS    And    dextrose bolus  25 g Q15 MIN PRN    ceFAZolin  2 g Q8HRS    NS  1,000 mL Once       Assessment and Plan:  Hospital day # 1 ICH  POD# zero Rt SOC and evac ICH   Chemical prophylactic DVT therapy: No  Start date/time: TBD    Neuro as above  Q1 hr neuro checks  Keep SBP <160  Pt/ot/mobilize as justo  3% gtt

## 2023-12-27 NOTE — ANESTHESIA PREPROCEDURE EVALUATION
Case: [id number] Anesthesia Start Date/Time: 12/27/23 0208    Procedure: CRANIOTOMY (Right)    Location: [facility] OR 05 / SURGERY [facility]    Surgeons: Alex Patten M.D.            Relevant Problems   CARDIAC   (positive) Primary hypertension      Other   (positive) Cephalohematoma   (positive) Intracranial hemorrhage (HCC)       Physical Exam    Airway   Mallampati: II  TM distance: >3 FB  Neck ROM: full       Cardiovascular - normal exam  Rhythm: regular  Rate: normal  (-) murmur     Dental - normal exam           Pulmonary - normal exam  Breath sounds clear to auscultation     Abdominal    Neurological - abnormal exam    Comments: Drowsy, slightly confused.                Anesthesia Plan    ASA 4- EMERGENT   ASA physical status 4 criteria: CVA or TIA - recent (< 3 months)ASA physical status emergent criteria: neurologic compromise requiring immediate intervention    Plan - general       Airway plan will be ETT          Induction: intravenous    Postoperative Plan: Postoperative administration of opioids is intended.    Pertinent diagnostic labs and testing reviewed    Informed Consent:  Emergent - Consent given by clinician  Anesthetic plan and risks discussed with patient.    Use of blood products discussed with: patient whom consented to blood products.

## 2023-12-27 NOTE — ED NOTES
Second PIV est, all labs collected and sent  Nicardipine infusion started, awaiting 3% from pharm at this time  Pt sleeping in NAD, BP running Q10 minutes  Fall risk precautions in place, bed alarm on and armed, rails up, bed in locked position and in direct view of nurses station  Pt pending admission at this time  Urinal placed to void

## 2023-12-27 NOTE — CONSULTS
Critical Care Consultation    Date of consult: 12/27/2023    Referring Physician  Yordan Bojorquez    Reason for Consultation  Cerebellar ICH    History of Presenting Illness  75 y.o. male who presented 12/26/2023 with no significant pass medical hx. That was found in vomit and fell forward striking his face with bruising to right orbit area. He was confused and brought in by EMS to [facility]. He was found to be hypertensive to 200's and CT scan of the head found a cerebellar ICH 4.6x2.9x.6cm with surrounding edema and effacement of 4th ventricle, CTA with no vascular malformation, CT maxillofacial with right frontal hematoma without fracture and CT spine with no fracture. His INR was normal pending teg. He had neurology and Dr Patten of neurosurgery has been consulted. He has requested to be full code. He will be admitted to ICU for hypertension rx and hypertonic saline. History is limited by his dysarthric speech.     Code Status  Full Code    Review of Systems  Review of Systems   Unable to perform ROS: Language       Past Medical History   has a past medical history of Patient denies medical problems.    Surgical History   has no past surgical history on file.    Family History  family history is not on file.    Social History   reports current alcohol use. He reports that he does not currently use drugs.    Medications  Home Medications       Reviewed by Dee Reyna R.N. (Registered Nurse) on 12/27/23 at 0038  Med List Status: Not Addressed     Medication Last Dose Status        Patient Hany Taking any Medications                         Current Facility-Administered Medications   Medication Dose Route Frequency Provider Last Rate Last Admin    Respiratory Therapy Consult   Nebulization Continuous RT Donnie Babin M.D.        acetaminophen (Tylenol) tablet 650 mg  650 mg Oral Q4HRS PRN Donnie Babin M.D.        Or    acetaminophen (Tylenol) suppository 650 mg  650 mg Rectal Q4HRS PRN Donnie Babin  M.D.        ondansetron (Zofran ODT) dispertab 4 mg  4 mg Oral Q4HRS PRN Donnie Babin M.D.        Or    ondansetron (Zofran) syringe/vial injection 4 mg  4 mg Intravenous Q4HRS PRN Donnie Babin M.D. MD Alert...ICU Electrolyte Replacement per Pharmacy   Other PHARMACY TO DOSE Donnie Babin M.D.        niCARdipine (Cardene) 25 mg in  mL Standard Infusion  0-15 mg/hr Intravenous Continuous Donnie Babin M.D.        3% sodium chloride (Hypertonic Saline) 500mL infusion 500 mL  500 mL Intravenous Continuous Donnie Babin M.D.        insulin regular (HumuLIN R,NovoLIN R) injection  1-6 Units Subcutaneous Q6HRS Donnie Babin M.D.        And    dextrose 10 % BOLUS 25 g  25 g Intravenous Q15 MIN PRN Donnie Babin M.D.        NS (Bolus) 0.9 % infusion 1,000 mL  1,000 mL Intravenous Jerrod Bojorquez         No current outpatient medications on file.       Allergies  No Known Allergies    Vital Signs last 24 hours  Temp:  [35.2 °C (95.4 °F)] 35.2 °C (95.4 °F)  Pulse:  [60-76] 76  Resp:  [14-24] 20  BP: (183-228)/() 183/91  SpO2:  [92 %-96 %] 92 %    Physical Exam  Physical Exam  Vitals and nursing note reviewed.   Constitutional:       Comments: Patient laying in blue 14 w/ c collar on with obvious right orbital hematoma   HENT:      Nose: Nose normal.      Mouth/Throat:      Mouth: Mucous membranes are moist.   Eyes:      Pupils: Pupils are equal, round, and reactive to light.      Comments: Right upper lid echymosis and swelling   Neck:      Comments: No midline tenderness full range of movement normal  Cardiovascular:      Rate and Rhythm: Normal rate.      Heart sounds: No murmur heard.  Pulmonary:      Effort: No respiratory distress.      Breath sounds: No stridor. No wheezing or rhonchi.   Abdominal:      General: There is no distension.      Palpations: There is no mass.      Tenderness: There is no abdominal tenderness.      Hernia: No hernia is present.    Musculoskeletal:         General: No swelling or tenderness.      Cervical back: No rigidity or tenderness.   Skin:     Coloration: Skin is pale. Skin is not jaundiced.      Comments: Vomit on chest   Neurological:      Comments: Patient is alert he opens eyes and follows commands, has reactive symmetric pupils, no facial droop, dysartharic speech, no pronator drift, no limb drift, sensory intact.    Psychiatric:         Mood and Affect: Mood normal.         Fluids    Intake/Output Summary (Last 24 hours) at 12/27/2023 0129  Last data filed at 12/27/2023 0025  Gross per 24 hour   Intake 0 ml   Output 0 ml   Net 0 ml       Laboratory  Recent Results (from the past 48 hour(s))   CBC WITH DIFFERENTIAL    Collection Time: 12/27/23 12:21 AM   Result Value Ref Range    WBC 13.4 (H) 4.8 - 10.8 K/uL    RBC 5.05 4.70 - 6.10 M/uL    Hemoglobin 15.5 14.0 - 18.0 g/dL    Hematocrit 45.5 42.0 - 52.0 %    MCV 90.1 81.4 - 97.8 fL    MCH 30.7 27.0 - 33.0 pg    MCHC 34.1 32.3 - 36.5 g/dL    RDW 43.2 35.9 - 50.0 fL    Platelet Count 274 164 - 446 K/uL    MPV 10.4 9.0 - 12.9 fL    Neutrophils-Polys 91.40 (H) 44.00 - 72.00 %    Lymphocytes 4.10 (L) 22.00 - 41.00 %    Monocytes 3.70 0.00 - 13.40 %    Eosinophils 0.00 0.00 - 6.90 %    Basophils 0.20 0.00 - 1.80 %    Immature Granulocytes 0.60 0.00 - 0.90 %    Nucleated RBC 0.00 0.00 - 0.20 /100 WBC    Neutrophils (Absolute) 12.25 (H) 1.82 - 7.42 K/uL    Lymphs (Absolute) 0.55 (L) 1.00 - 4.80 K/uL    Monos (Absolute) 0.50 0.00 - 0.85 K/uL    Eos (Absolute) 0.00 0.00 - 0.51 K/uL    Baso (Absolute) 0.03 0.00 - 0.12 K/uL    Immature Granulocytes (abs) 0.08 0.00 - 0.11 K/uL    NRBC (Absolute) 0.00 K/uL   APTT    Collection Time: 12/27/23 12:21 AM   Result Value Ref Range    APTT 27.9 24.7 - 36.0 sec   Prothrombin Time    Collection Time: 12/27/23 12:21 AM   Result Value Ref Range    PT 14.6 12.0 - 14.6 sec    INR 1.13 0.87 - 1.13       Imaging  CT-CTA HEAD WITH & W/O-POST PROCESS   Final  Result      1.  Large RIGHT cerebellar intraparenchymal hemorrhage with associated mass effect and 5 mm posterior fossa midline shift.   2.  No intracranial aneurysm, focal high-grade stenosis, or abrupt large vessel cut off.   3.  RIGHT forehead scalp hematoma.      These findings were electronically conveyed to and received by EMELY FRIAS on 12/27/2023 12:27 AM.         CT-MAXILLOFACIAL W/O PLUS RECONS   Final Result      1.  Large RIGHT forehead scalp hematoma.   2.  No facial fracture.      CT-CSPINE WITHOUT PLUS RECONS   Final Result      1.  Moderate degenerative change of cervical spine.   2.  No fracture or subluxation.      DX-CHEST-LIMITED (1 VIEW)    (Results Pending)       Assessment/Plan  * Intracranial hemorrhage (HCC)- (present on admission)  Assessment & Plan  Neurosurgery consult: Dr Patten  Correct and reverse coagulopathy  Avoid platelet transfusion (aspirin/plavix) unless planned neurosurgery (patch trial) consider DDAVP  SBP goal < 140 unless chronic hypertension and or poor exam < 160  Aspiration precautions  Head of bed 30 degress   Maintain CPP > 60-70  Neuro check Q 1 and pupilometer  Seizure prophylaxis: n/a  Maintain euvolemia  Sodium goal: 150-155    ICH score: 2 is not used to predict mortality  CTA with no vascular portion  Patient hypertensive likely etiology of bleed  Patient will be going to OR tonight for occipital crani and decompression      Cephalohematoma  Assessment & Plan  Due to fall  Monitor, consider ace wrap  No occult fracture seen    Primary hypertension  Assessment & Plan  Nicardipine gtt for SBP < 160  Check UDS and alcohol level        Discussed patient condition and risk of morbidity and/or mortality with RN, RT, Pharmacy, Code status disscussed, Charge nurse / hot rounds, and Patient.      The patient remains critically ill ICH titration of hypertonic saline and nicardipine gtt.  Critical care time = 110 minutes in directly providing and coordinating critical  care and extensive data review.  No time overlap and excludes procedures.

## 2023-12-27 NOTE — ED NOTES
Pt BIBA from his place of work after he did not return home on time and roommate found him at work, unconscious in a puddle of vomit. Per EMS, pt was awake but confused on their arrival. Per pt, he suddenly felt very lightheaded before he fell face forward and struck his head. Pt hypertensive in the field and on arrival. Pt initially activated as a TBI alert but upgraded to trauma green due to positive intracranial hemorrhage on CT. Pt is lethargic but responsive to questions.

## 2023-12-27 NOTE — CARE PLAN
The patient is Watcher - Medium risk of patient condition declining or worsening    Shift Goals  Clinical Goals: SBP<160, q1 neuro  Patient Goals: get better, rest  Family Goals: polo    Progress made toward(s) clinical / shift goals:        Problem: Pain - Standard  Goal: Alleviation of pain or a reduction in pain to the patient’s comfort goal  Outcome: Progressing     Problem: Fall Risk  Goal: Patient will remain free from falls  Outcome: Progressing     Problem: Knowledge Deficit - Standard  Goal: Patient and family/care givers will demonstrate understanding of plan of care, disease process/condition, diagnostic tests and medications  Outcome: Progressing     Problem: Skin Integrity  Goal: Skin integrity is maintained or improved  Outcome: Progressing       Patient is not progressing towards the following goals:

## 2023-12-27 NOTE — CONSULTS
Neurology initial consultation note  Neurohospitalist Service, [facility]    Referring Physician: Dr. Yordan Bojorquez    STROKE:   Chief Complaint   Patient presents with    Trauma Green         HPI: Marty Mohr is a 75 y.o. male with no significant past medical history who was found down in his vomit with bruises in the right orbital area.  He was confused, lethargic and hypertensive in the range of above 200s.  He underwent a brain CT which revealed relatively large right cerebellar hemorrhage with surrounding edema and effacement of fourth ventricle.  His trauma workup including CT maxillofacial and CT of the spine were unrevealing.  He is not on any blood thinner.      Review of systems: In addition to what is detailed in the HPI above, all other systems reviewed and are negative.    Past Medical History:    has a past medical history of Patient denies medical problems.    FHx:  family history is not on file.    SHx:   reports current alcohol use. He reports that he does not currently use drugs.    Allergies:  No Known Allergies    Medications:    Current Facility-Administered Medications:     niCARdipine (Cardene) 25 mg in  mL Standard Infusion, 0-15 mg/hr, Intravenous, Continuous, Yordan Bojorquez, Last Rate: 75 mL/hr at 12/27/23 0107, 7.5 mg/hr at 12/27/23 0107    3% sodium chloride (Hypertonic Saline) 500mL infusion, 0-60 mL/hr, Intravenous, Continuous, Yordan Bojorquez    Respiratory Therapy Consult, , Nebulization, Continuous RT, Donnie Babin M.D.    acetaminophen (Tylenol) tablet 650 mg, 650 mg, Oral, Q4HRS PRN **OR** acetaminophen (Tylenol) suppository 650 mg, 650 mg, Rectal, Q4HRS PRN, Donnie Babin M.D.    ondansetron (Zofran ODT) dispertab 4 mg, 4 mg, Oral, Q4HRS PRN **OR** ondansetron (Zofran) syringe/vial injection 4 mg, 4 mg, Intravenous, Q4HRS PRN, Donnie Babin M.D.    MD Alert...ICU Electrolyte Replacement per Pharmacy, , Other, PHARMACY TO DOSE, Donnie  LAUREN Babin M.D.    niCARdipine (Cardene) 25 mg in  mL Standard Infusion, 0-15 mg/hr, Intravenous, Continuous, Donnie Babin M.D.    3% sodium chloride (Hypertonic Saline) 500mL infusion 500 mL, 500 mL, Intravenous, Continuous, Donnie Babin M.D.    insulin regular (HumuLIN R,NovoLIN R) injection, 1-6 Units, Subcutaneous, Q6HRS **AND** POC blood glucose manual result, , , Q6H **AND** NOTIFY MD and PharmD, , , Once **AND** Administer 20 grams of glucose (approximately 8 ounces of fruit juice) every 15 minutes PRN FSBG less than 70 mg/dL, , , PRN **AND** dextrose 10 % BOLUS 25 g, 25 g, Intravenous, Q15 MIN PRN, Donnie Babin M.D.    NS (Bolus) 0.9 % infusion 1,000 mL, 1,000 mL, Intravenous, Once, Yordan Bojorquez  No current outpatient medications on file.    Physical Examination:    Vitals:    12/27/23 0050 12/27/23 0054 12/27/23 0101 12/27/23 0104   BP:  (!) 191/101  (!) 183/91   Pulse: 72 74 76    Resp:  (!) 24 20    Temp:       SpO2: 95% 93% 92%    Weight:       Height:           General:   Patient is awake but somewhat lethargic and in no acute distress  Neck: Full range of motion  Eyes: Midline, Pupils reactive to light.  Right frontal orbital hematoma noted.  CV: RRR  Lungs: No respiratory distress  Extremities: No cyanosis, warm, no significant edema.    NEUROLOGICAL EXAM:   Mental status: Awake, alert and oriented to place and year but he thinks it is January, follows commands  Speech and language: speech is slow and fluent. The patient is able to name and repeat.  Cranial nerve exam: Pupils are equal, round and reactive to light bilaterally. Visual fields are full. Extraocular muscles are intact. Sensation in the face is intact to light touch. Face is symmetric. Hearing to finger rub equal. Palate elevates symmetrically. Shoulder shrug is full. Tongue is midline.  Motor exam: Sustain antigravity in all 4 extremities with no downward drift. Tone is normal. No abnormal movements were seen on  exam.  Sensory exam: No sensory deficits identified   Coordination: no gross ataxia noted on exam  Plantar reflexes: Equivocal  Gait: deferred given patient preference    NIH Stroke Scale:    1a. Level of Consciousness (Alert, drowsy, etc): 1= Drowsy    1b. LOC Questions (Month, age): 0= Answers both correctly    1c. LOC Commands (Open/close eyes make fist/let go): 0= Obeys both correctly    2.   Best Gaze (Eyes open - patient follows examiner's finger on face): 0= Normal    3.   Visual Fields (introduce visual stimulus/threat to patient's field quadrants): 0= No visual loss  4.   Facial Paresis (Show teeth, raise eyebrows and squeeze eyes shut): 0= Normal     5a. Motor Arm - Left (Elevate arm to 90 degrees if patient is sitting, 45 degrees if  supine): 0= No drift    5b. Motor Arm - Right (Elevate arm to 90 degrees if patient is sitting, 45 degrees if supine): 0= No drift    6a. Motor Leg - Left (Elevate leg 30 degrees with patient supine): 0= No drift    6b. Motor Leg - Right  (Elevate leg 30 degrees with patient supine): 0= No drift    7.   Limb Ataxia (Finger-nose, heel down shin): 0= No ataxia    8.   Sensory (Pin prick to face, arm, trunk and leg - compare side to side): 0= Normal    9.  Best Language (Name item, describe a picture and read sentences): 0= No aphasia    10. Dysarthria (Evaluate speech clarity by patient repeating listed words): 0= Normal articulation    11. Extinction and Inattention (Use information from prior testing to identify neglect or  double simultaneous stimuli testing): 0= No neglect    Total NIH Score: 1    Baseline modified Manuela Scale (MRS): 0 = No symptoms    INTRACEREBRAL HEMORRHAGE SCORE:    ICH SCORE:  Maida Coma Score:  13  Age:  75  ICH Volume (using ABC/2 Formula) greater than 30cc =no= 0   Intraventricular Hemorrhage:  yes  Infratentorial Origin of Hemorrhage:  yes  ICH TOTAL SCORE:   2    Objective Data:    Labs:  Lab Results   Component Value Date/Time    PROTHROMBTM  "14.6 12/27/2023 12:21 AM    INR 1.13 12/27/2023 12:21 AM      Lab Results   Component Value Date/Time    WBC 13.4 (H) 12/27/2023 12:21 AM    RBC 5.05 12/27/2023 12:21 AM    HEMOGLOBIN 15.5 12/27/2023 12:21 AM    HEMATOCRIT 45.5 12/27/2023 12:21 AM    MCV 90.1 12/27/2023 12:21 AM    MCH 30.7 12/27/2023 12:21 AM    MCHC 34.1 12/27/2023 12:21 AM    MPV 10.4 12/27/2023 12:21 AM    NEUTSPOLYS 91.40 (H) 12/27/2023 12:21 AM    LYMPHOCYTES 4.10 (L) 12/27/2023 12:21 AM    MONOCYTES 3.70 12/27/2023 12:21 AM    EOSINOPHILS 0.00 12/27/2023 12:21 AM    BASOPHILS 0.20 12/27/2023 12:21 AM      No results found for: \"SODIUM\", \"POTASSIUM\", \"CHLORIDE\", \"CO2\", \"GLUCOSE\", \"BUN\", \"CREATININE\", \"BUNCREATRAT\", \"GLOMRATE\"   No results found for: \"CHOLSTRLTOT\", \"LDL\", \"HDL\", \"TRIGLYCERIDE\"    No results found for: \"ALKPHOSPHAT\", \"ASTSGOT\", \"ALTSGPT\", \"TBILIRUBIN\"     Imaging/Testing:    I interpreted and/or reviewed the patient's neuroimaging    CT-CTA HEAD WITH & W/O-POST PROCESS   Final Result      1.  Large RIGHT cerebellar intraparenchymal hemorrhage with associated mass effect and 5 mm posterior fossa midline shift.   2.  No intracranial aneurysm, focal high-grade stenosis, or abrupt large vessel cut off.   3.  RIGHT forehead scalp hematoma.      These findings were electronically conveyed to and received by EMELY FRIAS on 12/27/2023 12:27 AM.         CT-MAXILLOFACIAL W/O PLUS RECONS   Final Result      1.  Large RIGHT forehead scalp hematoma.   2.  No facial fracture.      CT-CSPINE WITHOUT PLUS RECONS   Final Result      1.  Moderate degenerative change of cervical spine.   2.  No fracture or subluxation.      DX-CHEST-LIMITED (1 VIEW)    (Results Pending)       Assessment:  Marty Mohr is a 75 y.o. male with no significant past medical history who was found down in his vomit with bruises in the right orbital area.  He was confused, lethargic and hypertensive in the range of above 200s.  He underwent a brain CT which revealed " relatively large right cerebellar hemorrhage with surrounding edema and effacement of fourth ventricle.  His trauma workup including CT maxillofacial and CT of the spine were unrevealing.  He is not on any blood thinner.      Plan:  -Admit to ICU for close neuro monitoring  -q1h and PRN neuro assessment. VS per nursing/unit protocol.   -No known underlying coagulopathy, maintain INR < 1.5, platelets > 100 K  -Maintain systolic blood pressure less than 160, antihypertensives per primary team.  Cardene drip is preferred.  -Repeat brain CT in 6 hours to make sure hematoma is stable.  -Obtain stat neurosurgical consultation.  -Obtain MRI Brain w/wo contrast to rule out underlying lesion. MRA head without.  -No antiplatelet or anticoagulant.  -Keep head of bed elevated at 30 degrees.  -Hypertonic Na with the goal of Na 150-160  -Maintain bowel regimen to avoid Valsalva and increase intracranial pressure.  -Recommend aggressive BG management per primary team. Avoid IVF with Dextrose. -BG goal .   -Maintain normothermia, and normoglycemia.  -If no seizure, no need for prophylactic antiepileptic medication from neurology perspective.  -PT/OT/SLP eval and treat for early mobilization if blood pressure remains stable.  -All other medical management per primary team.   -DVT PPX: SCDs.      The plan of care above has been discussed with Dr. Yordan Bojorquez      Upon my evaluation, this patient had a high probability of imminent or life-threatening deterioration due to cerebrovascular accident which required my direct attention, intervention, and personal management.  I personally provided 52 minutes of total critical care time outside of time spent on separately billable/documented procedures. Time includes: review of laboratory data, review of radiology studies, discussion with consultants, discussion with family/patient, monitoring for potential decompensation.  Interventions were performed as documented in the chart.        Please note that this dictation was created using voice recognition software. I have made every reasonable attempt to correct obvious errors, but I expect that there are errors of grammar and possibly content that I did not discover before finalizing the note.       Scarlett Duffy MD  Acute Care Neurology Services

## 2023-12-27 NOTE — ED PROVIDER NOTES
CHIEF COMPLAINT  Chief Complaint   Patient presents with    Trauma Green       LIMITATION TO HISTORY   Select:     JAYLIN Mohr is a 75 y.o. male who presents to the Emergency Department by ambulance for evaluation of a ground-level fall patient was found minimally conscious in a puddle of vomit by roommate EMS reports they arrived patient was awake but mildly confused patient reports that he felt very lightheaded and he fell forward to the ground striking the front of his face on the ground reports headache and pain ongoing for several weeks does not report his headache is worse than normal today.    OUTSIDE HISTORIAN(S):  Select:    EXTERNAL RECORDS REVIEWED  Select:       PAST MEDICAL HISTORY  Past Medical History:   Diagnosis Date    Patient denies medical problems      .    SURGICAL HISTORY  History reviewed. No pertinent surgical history.      FAMILY HISTORY  History reviewed. No pertinent family history.       SOCIAL HISTORY  Social History     Socioeconomic History    Marital status: Single     Spouse name: Not on file    Number of children: Not on file    Years of education: Not on file    Highest education level: Not on file   Occupational History    Not on file   Tobacco Use    Smoking status: Unknown    Smokeless tobacco: Not on file   Vaping Use    Vaping Use: Never used   Substance and Sexual Activity    Alcohol use: Yes    Drug use: Not Currently    Sexual activity: Not on file   Other Topics Concern    Not on file   Social History Narrative    Not on file     Social Determinants of Health     Financial Resource Strain: Not on file   Food Insecurity: Not on file   Transportation Needs: Not on file   Physical Activity: Not on file   Stress: Not on file   Social Connections: Not on file   Intimate Partner Violence: Not on file   Housing Stability: Not on file         CURRENT MEDICATIONS  No current facility-administered medications on file prior to encounter.     No current outpatient medications  "on file prior to encounter.           ALLERGIES  No Known Allergies    PHYSICAL EXAM  VITAL SIGNS:BP (!) 183/91   Pulse 76   Temp (!) 35.2 °C (95.4 °F)   Resp 20   Ht 1.854 m (6' 1\")   Wt 90.7 kg (200 lb)   SpO2 92%   BMI 26.39 kg/m²       GENERAL: Awake and alert  HEAD: Normocephalic and there is a 3 x 3 cm hematoma right supraorbital  NECK: Cervical collar in place EYES: Pupils Equal, Round, Reactive to Light, extraocular movements intact, conjunctiva white  ENT: Mucous membranes moist, oropharynx clear  PULMONARY: Normal effort, clear to auscultation  CARDIOVASCULAR: No murmurs, clicks or rubs, peripheral pulses 2+  ABDOMINAL: Soft, non-tender, no guarding or rigidity present, no pulsatile masses  BACK: no midline tenderness, no costovertebral tenderness  NEUROLOGICAL: Patient is somnolent but arousable oriented to person place and time and recent events strength 5 out of 5 throughout speech is normal and fluent   EXTREMITIES: No edema, normal to inspection  SKIN: Warm and dry.  PSYCHIATRIC: Affect is appropriate    DIAGNOSTIC STUDIES / PROCEDURES  EKG  CRITICAL CARE  The very real possibilty of a deterioration of this patient's condition required the highest level of my preparedness for sudden, emergent intervention.  I provided critical care services, which included medication orders, frequent reevaluations of the patient's condition and response to treatment, ordering and reviewing test results, and discussing the case with various consultants.  The critical care time associated with the care of the patient was 40 minutes. Review chart for interventions. This time is exclusive of any other billable procedures.     EKG Interpretation: I independently reviewed the below EKG and did not see signs of a STEMI  12:40 AM  Rate of 69  Normal sinus rhythm  Prolonged MI interval  Otherwise normal ECG          LABS  Labs Reviewed   CBC WITH DIFFERENTIAL - Abnormal; Notable for the following components:       " Result Value    WBC 13.4 (*)     Neutrophils-Polys 91.40 (*)     Lymphocytes 4.10 (*)     Neutrophils (Absolute) 12.25 (*)     Lymphs (Absolute) 0.55 (*)     All other components within normal limits   APTT    Narrative:     Indicate which anticoagulants the patient is on:->UNKNOWN   PROTHROMBIN TIME    Narrative:     Indicate which anticoagulants the patient is on:->UNKNOWN   COMP METABOLIC PANEL   PLATELET MAPPING WITH BASIC TEG   LIPID PROFILE   SODIUM SERUM (NA)   BASIC METABOLIC PANEL   DIAGNOSTIC ALCOHOL   URINE DRUG SCREEN         RADIOLOGY  I independently reviewed and interpreted the images obtained today in the ER.  Large intraparenchymal hemorrhage    Radiologist interpretation:   CT-CTA HEAD WITH & W/O-POST PROCESS   Final Result      1.  Large RIGHT cerebellar intraparenchymal hemorrhage with associated mass effect and 5 mm posterior fossa midline shift.   2.  No intracranial aneurysm, focal high-grade stenosis, or abrupt large vessel cut off.   3.  RIGHT forehead scalp hematoma.      These findings were electronically conveyed to and received by EMELY FRIAS on 12/27/2023 12:27 AM.         CT-MAXILLOFACIAL W/O PLUS RECONS   Final Result      1.  Large RIGHT forehead scalp hematoma.   2.  No facial fracture.      CT-CSPINE WITHOUT PLUS RECONS   Final Result      1.  Moderate degenerative change of cervical spine.   2.  No fracture or subluxation.      DX-CHEST-LIMITED (1 VIEW)    (Results Pending)        COURSE & MEDICAL DECISION MAKING    ED COURSE:        INTERVENTIONS BY ME:  Medications   NS (Bolus) 0.9 % infusion 1,000 mL (has no administration in time range)   Respiratory Therapy Consult (has no administration in time range)   acetaminophen (Tylenol) tablet 650 mg (has no administration in time range)     Or   acetaminophen (Tylenol) suppository 650 mg (has no administration in time range)   ondansetron (Zofran ODT) dispertab 4 mg (has no administration in time range)     Or   ondansetron  (Zofran) syringe/vial injection 4 mg (has no administration in time range)   MD Alert...ICU Electrolyte Replacement per Pharmacy (has no administration in time range)   niCARdipine (Cardene) 25 mg in  mL Standard Infusion (has no administration in time range)   3% sodium chloride (Hypertonic Saline) 500mL infusion 500 mL (has no administration in time range)   insulin regular (HumuLIN R,NovoLIN R) injection (has no administration in time range)     And   dextrose 10 % BOLUS 25 g (has no administration in time range)   iohexol (OMNIPAQUE) 350 mg/mL (IV) (80 mL Intravenous Given 12/27/23 0021)   ondansetron (Zofran) syringe/vial injection 4 mg (4 mg Intravenous Given 12/27/23 0045)       Response on recheck:  12:20 AM discussed results with patient, he states he would want intubation and CPR if medically and indicated, this he has no spouse no living children he does have siblings   12:50 AM cervical collar removed   1:04 AM neurologic examination unchanged blood pressure 183/91, patient on Cardene drip  1:37 AM neurosurgery planning on taking patient to the OR        INITIAL ASSESSMENT, COURSE AND PLAN  Care Narrative:     Patient presenting as a TBI activation as he was found down and had a hematoma on his right side of his forehead, history and CT scan is most consistent with a spontaneous intraparenchymal hemorrhage is unknown when his last known well, his neurologic examination was largely intact given his large intraparenchymal hemorrhage he had no signs of herniation his GCS is 14 he is oriented to person place and time and demonstrated capacity to make medical decisions, patient placed on Cardene drip, hypertonic saline started neurosurgeon consulted who evaluate patient for surgery and patient admitted to the intensive care unit at time of dictation patient is maintaining his airway is not hypoxic do not see indication for intubation which is considered.  Head to toe trauma evaluation notable only for  trauma to the right side of the forehead     ADDITIONAL PROBLEM LIST    DISPOSITION AND DISCUSSIONS  I have discussed management of the patient with the following physicians and WILTON's: Spoke to neurologist Dr. JASMIN Angel at 12:30 AM who agreed this looks spontaneous and nontraumatic recommended antihypertensives and hypertonic saline with goal sodium 1 50-1 55.  Spoke to neurosurgeon Dr. Patten , evaluate the patient regarding operative intervention at 12:40 AM     Discussion of management with other [facility] or appropriate source(s): Pharmacy regarding Cardene        FINAL DIAGNOSIS  1. Intracerebellar and posterior fossa hemorrhage (HCC)    2. Intracranial hemorrhage (HCC)    3. Fall from ground level    4. Lightheadedness    5. Traumatic hematoma of forehead, initial encounter             Electronically signed by: Yordan Bojorquez DO ,1:16 AM 12/27/23

## 2023-12-27 NOTE — CONSULTS
DATE OF SERVICE:  12/27/2023     NEUROSURGICAL CONSULTATION     HISTORY OF PRESENT ILLNESS:  The patient was found down, lethargic, brought to   the ER.  CT scan of the head shows a sizeable right cerebellar hematoma with   mass effect on the fourth ventricle.  The patient denies a history of bleeding   disorder or being on blood thinners.     PHYSICAL EXAMINATION:    NEUROLOGIC:  The patient is lethargic, but will open his eyes.  He will follow   commands.  Moves everything, oriented x2.     IMPRESSION AND PLAN:  The right posterior fossa hemorrhage with mass effect on   the fourth ventricle.  The patient is at significant risk for neurologic   deterioration.  I told him to keep that from happening, to keep him from   progressing to coma.  The patient's hematoma should be evacuated in the   posterior fossa, decompressed.  The patient has a reasonable understanding and   agrees to proceed, understanding major risks and complications such as death,   paralysis, recurrent hemorrhage of 1-2%, which in my opinion is much less   than the risk of doing nothing. The procedure will be undertaken as emergency   procedure.        ______________________________  MD GEOVANNA DOSS/KARLA/BREANA    DD:  12/27/2023 02:16  DT:  12/27/2023 03:08    Job#:  [id number]

## 2023-12-27 NOTE — ASSESSMENT & PLAN NOTE
Etiology likely hypertensive  12/27  S/p suboccipital craniectomy, decompression posterior fossa by Dr. Patten.   Pt was extubated prior to ICU arrival, doing well from resp standpoint.   12/29 off hypertonic saline.   Goal eunatremia 140-145.   Continue water fluid restriction 1200cc/day  Goal -160  Neuro check Q4H  Tolerating oral intake.   Mobility PT/OT  Maintain euvolemia

## 2023-12-27 NOTE — DISCHARGE PLANNING
[facility] Transitional [facility] Coordination    Referral from: Dr. Babin    Insurance Provider on Facesheet: None listed @ this time.  Please reach out to a PFA for a screening.    Potential Rehab Diagnosis: ICH    Chart review indicates patient may have on going medical management and may have therapy needs to possibly meet inpatient rehab facility criteria with the goal of returning to community.    D/C support will need to be verified: Daughter    Physiatry consultation pended per protocol.  Operative day.  TX pending.     Thank you for the referral.

## 2023-12-27 NOTE — OP REPORT
DATE OF SERVICE:  12/27/2023     PREOPERATIVE DIAGNOSIS:  Large right cerebellar intracerebral hematoma, likely   hypertensive.     POSTOPERATIVE DIAGNOSIS:  Large right cerebellar intracerebral hematoma,   likely hypertensive.     OPERATIONS:    1.  Right suboccipital craniectomy and evacuation of a right sizable   cerebellar intracerebral hematoma, decompression of posterior fossa contents.  2.  A 4 cm titanium mesh cranioplasty.     SURGEON:  Alex Patten MD     ASSISTANT:  No assistant.     ANESTHESIA:  General endotracheal.     ANESTHESIOLOGIST:  Clifford Gibbons MD     PREPARATION:  ChloraPrep.     MEDICATIONS:  The patient given Ancef prior to incision.     INDICATIONS:  The patient presented with a large right posterior fossa   hematoma with mass effect on the fourth ventricle.  The patient did not have   overt hydrocephalus, but had some early dilatation of the temporal horns.  The   patient was lethargic.  He was felt to be at significant risk for neurologic   deterioration. To prevent a catastrophic deterioration, the patient was felt   to be a candidate for emergency evacuation of the clot.  I discussed this with   the patient. He was agreeable to proceed.  Procedure was undertaken as   emergency procedure.     DESCRIPTION OF PROCEDURE:  The patient was brought to the operating room.    Peripheral venous lines in place.  General anesthesia was induced.  The   patient intubated.  Schwartz catheter was placed.  Radial arterial line was   placed.  The patient was placed in left lateral decubitus position.  Axillary   roll was placed.  Head was maintained in 3-point fixation with the Prabhakar   head lopez with the right suboccipital area most superiorly.  Head was   slightly elevated.  A linear incision was made midway between the posterior   midline and the external auditory meatus at the level of the transverse sinus   was marked.  Linear incision was made after double prep by myself with   ChloraPrep  and draped.  A planned incision was infiltrated with local and   incised with a scalpel.  Ginger clips were applied to the scalp margins for   hemostasis.  Dissection was carried down to the bone.  Self-retaining   retractor was placed.  I made several bur holes just below the level of the   transverse sinus with the  then using the M2 acorn bit, bone was   further thinned and then elevated with Kerrisons and the large Leksell.    Ultrasound was brought in and the clot was identified.  The dura was then   opened in a cruciate fashion.  A small cortical incision was made, carried   down to the clot, which evacuated quite well with suction and biopsy forceps.    A large amount of clot was removed.  Once this was accomplished, the   posterior fossa cerebellum was sunken, decompressed and nicely pulsatile.  I   did not see any obvious areas of abnormality, no evidence of vascular   malformation or tumor.  Minor bleeders controlled with bipolar electrocautery.    Irrigation was placed in the resection cavity.  Ultrasound was brought in,   which showed essentially complete removal of the clot. Hemostasis was perfect.    The dura was then closed with a 4-0 Nurolon in a loose fashion.  Then, we   placed a piece of Duragen epidurally and then a layer of fibrin glue.    Cranioplasty was secured with 5 mm screws.  The suboccipital muscle fascia was   closed with 2-0 Vicryl, subcuticular layer closed with 2-0 Vicryl, galea was   closed with 2-0 Vicryl, scalp closed with staples.  Sterile dressing was   placed.  The patient was laid supine on the bed, head of bed elevated,   extubated, taken to recovery room in guarded condition.  The patient tolerated   the procedure well without apparent complication.     ESTIMATED BLOOD LOSS:  Less than 100 mL.        ______________________________  MD GEOVANNA DOSS/BAO/YURIY    DD:  12/27/2023 03:57  DT:  12/27/2023 05:23    Job#:  [id number]

## 2023-12-27 NOTE — PROGRESS NOTES
Critical Care Progress Note    Date of admission  2023    Chief Complaint  75 y.o. male who presented 2023 with no significant pass medical hx. That was found in vomit and fell forward striking his face with bruising to right orbit area. He was confused and brought in by EMS to [facility]. He was found to be hypertensive to 200's and CT scan of the head found a cerebellar ICH 4.6x2.9x.6cm with surrounding edema and effacement of 4th ventricle, CTA with no vascular malformation, CT maxillofacial with right frontal hematoma without fracture and CT spine with no fracture. His INR was normal pending teg. He had neurology and Dr Patten of neurosurgery has been consulted. He has requested to be full code. He will be admitted to ICU for hypertension rx and hypertonic saline. History is limited by his dysarthric speech.      Hospital Course  No notes on file    Interval Problem Update  Reviewed last 24 hour events:  Remains critically ill   Alert, oriented, RASS 0 to -1  On 3% hypertonic saline, sodium goal 150-160  On cardene gtt for goal SBP <160  On 5L NC, sat >92%  Afebrile  NPO, pending speech eval   Creatinine 1.05  Art line was removed this am.   Mobility - PT/OT as tolerated.     Pt evidently also has 2 MRNs due to wrong  entered at admission     Review of Systems  Review of Systems   Constitutional:  Positive for chills.   Respiratory:  Negative for shortness of breath.    Cardiovascular:  Negative for chest pain.   Gastrointestinal:  Negative for abdominal pain, nausea and vomiting.   Neurological:  Negative for weakness.   All other systems reviewed and are negative.       Vital Signs for last 24 hours   Temp:  [35.2 °C (95.4 °F)-37 °C (98.6 °F)] 35.8 °C (96.5 °F)  Pulse:  [60-81] 67  Resp:  [8-25] 21  BP: (128-228)/() 141/75  SpO2:  [92 %-99 %] 96 %    Hemodynamic parameters for last 24 hours       Respiratory Information for the last 24 hours       Physical Exam   Physical Exam  Vitals and nursing  note reviewed.   Constitutional:       General: He is not in acute distress.     Appearance: He is ill-appearing. He is not toxic-appearing.      Comments: drowsy   HENT:      Head: Normocephalic.      Mouth/Throat:      Mouth: Mucous membranes are moist.   Eyes:      Comments: Right eye ecchymosis   Cardiovascular:      Rate and Rhythm: Normal rate and regular rhythm.      Pulses: Normal pulses.      Heart sounds: Normal heart sounds. No murmur heard.  Pulmonary:      Effort: Pulmonary effort is normal. No respiratory distress.      Breath sounds: Normal breath sounds. No wheezing, rhonchi or rales.   Abdominal:      General: Bowel sounds are normal. There is no distension.      Palpations: Abdomen is soft.      Tenderness: There is no abdominal tenderness. There is no guarding.   Musculoskeletal:         General: No swelling or tenderness.      Right lower leg: No edema.      Left lower leg: No edema.   Skin:     General: Skin is warm and dry.      Capillary Refill: Capillary refill takes less than 2 seconds.      Coloration: Skin is not jaundiced.   Neurological:      General: No focal deficit present.      Mental Status: He is alert and oriented to person, place, and time.      Cranial Nerves: No cranial nerve deficit.         Medications  Current Facility-Administered Medications   Medication Dose Route Frequency Provider Last Rate Last Admin    Respiratory Therapy Consult   Nebulization Continuous RT Donnie Babin M.D.        acetaminophen (Tylenol) tablet 650 mg  650 mg Oral Q4HRS PRN Donnie Babin M.D.        Or    acetaminophen (Tylenol) suppository 650 mg  650 mg Rectal Q4HRS PRN Donnie Babin M.D.        ondansetron (Zofran ODT) dispertab 4 mg  4 mg Oral Q4HRS PRN Donnie Babin M.D.        Or    ondansetron (Zofran) syringe/vial injection 4 mg  4 mg Intravenous Q4HRS PRN Donnie Babin M.D.        MD Alert...ICU Electrolyte Replacement per Pharmacy   Other PHARMACY TO DOSE Donnie AGUILAR  JANNETH Babin        niCARdipine (Cardene) 25 mg in  mL Standard Infusion  0-15 mg/hr Intravenous Continuous Donnie Babin M.D. 50 mL/hr at 12/27/23 0615 5 mg/hr at 12/27/23 0615    3% sodium chloride (Hypertonic Saline) 500mL infusion 500 mL  500 mL Intravenous Continuous Donnie Babin M.D. 40 mL/hr at 12/27/23 0710 Rate Change at 12/27/23 0710    insulin regular (HumuLIN R,NovoLIN R) injection  1-6 Units Subcutaneous Q6HRS Donnie Babin M.D.   2 Units at 12/27/23 0605    And    dextrose 10 % BOLUS 25 g  25 g Intravenous Q15 MIN PRN Donnie Babin M.D.        ceFAZolin (Ancef) 2 g in  mL IVPB  2 g Intravenous Q8HRS Alex Patten M.D.        NS (Bolus) 0.9 % infusion 1,000 mL  1,000 mL Intravenous Once Yordan Bojorquez           Fluids    Intake/Output Summary (Last 24 hours) at 12/27/2023 0721  Last data filed at 12/27/2023 0600  Gross per 24 hour   Intake 1430.74 ml   Output 225 ml   Net 1205.74 ml       Laboratory          Recent Labs     12/27/23  0406 12/27/23  0600   SODIUM 139 138   POTASSIUM 4.5 4.2   CHLORIDE 105 105   CO2 22 22   BUN 18 19   CREATININE 1.02 1.05   CALCIUM 8.8 8.6     Recent Labs     12/27/23  0406 12/27/23  0600   ALTSGPT 19  --    ASTSGOT 20  --    ALKPHOSPHAT 102*  --    TBILIRUBIN 0.4  --    GLUCOSE 191* 221*     Recent Labs     12/27/23  0021 12/27/23  0406   WBC 13.4*  --    NEUTSPOLYS 91.40*  --    LYMPHOCYTES 4.10*  --    MONOCYTES 3.70  --    EOSINOPHILS 0.00  --    BASOPHILS 0.20  --    ASTSGOT  --  20   ALTSGPT  --  19   ALKPHOSPHAT  --  102*   TBILIRUBIN  --  0.4     Recent Labs     12/27/23  0021   RBC 5.05   HEMOGLOBIN 15.5   HEMATOCRIT 45.5   PLATELETCT 274   PROTHROMBTM 14.6   APTT 27.9   INR 1.13       Imaging  X-Ray:  I have personally reviewed the images and compared with prior images.    Assessment/Plan  * Intracranial hemorrhage (HCC)- (present on admission)  Assessment & Plan  12/27  S/p suboccipital craniectomy, decompression posterior fossa  by Dr. Patten.   Pt was extubated prior to ICU arrival   On 3% hypertonic to keep sodium 150-160  Goal -160  Neurocheck Q1H   Aspiration precautions  Head of bed 30 degress   Maintain CPP > 60-70  Neuro check Q 1  Maintain euvolemia    CTA with no vascular portion  Patient hypertensive likely etiology of bleed      Cephalohematoma  Assessment & Plan  Due to fall  Monitor, consider ace wrap  No occult fracture seen    Primary hypertension  Assessment & Plan  Nicardipine gtt for SBP < 160  Check UDS and alcohol level         VTE:  Contraindicated  Ulcer: Not Indicated  Lines: Schwartz Catheter  Ongoing indication addressed    I have performed a physical exam and reviewed and updated ROS and Plan today (12/27/2023). In review of yesterday's note (12/26/2023), there are no changes except as documented above.     Discussed patient condition and risk of morbidity and/or mortality with RN, RT, and Pharmacy  The patient remains critically ill.  Critical care time = 65 minutes in directly providing and coordinating critical care and extensive data review.  No time overlap and excludes procedures.

## 2023-12-27 NOTE — ANESTHESIA PROCEDURE NOTES
Airway    Date/Time: 12/27/2023 2:18 AM    Performed by: Clifford Gibbons M.D.  Authorized by: Clifford Gibbons M.D.    Location:  OR  Urgency:  Elective  Indications for Airway Management:  Anesthesia      Spontaneous Ventilation: absent    Sedation Level:  Deep  Preoxygenated: Yes    Patient Position:  Sniffing  Mask Difficulty Assessment:  0 - not attempted  Final Airway Type:  Endotracheal airway  Final Endotracheal Airway:  ETT  Cuffed: Yes    Technique Used for Successful ETT Placement:  Direct laryngoscopy    Insertion Site:  Oral  Blade Type:  Faiza  Laryngoscope Blade/Videolaryngoscope Blade Size:  3  ETT Size (mm):  7.5  Measured from:  Lips  Placement Verified by: auscultation and capnometry    Cormack-Lehane Classification:  Grade I - full view of glottis  Number of Attempts at Approach:  1  Number of Other Approaches Attempted:  0

## 2023-12-27 NOTE — ANESTHESIA TIME REPORT
Anesthesia Start and Stop Event Times       Date Time Event    12/27/2023 0200 Ready for Procedure     0208 Anesthesia Start     0352 Anesthesia Stop          Responsible Staff  12/27/23      Name Role Begin End    Clifford Gibbons M.D. Anesth [phone]          Overtime Reason:  no overtime (within assigned shift)    Comments:

## 2023-12-27 NOTE — DISCHARGE PLANNING
"Medical Social Work    MSW received a voalte message from nursing staff requesting assistance locating family for pt as he's in the OR.  MSW reviewed pt's chart and located minimal information.  Per RN pt stated that his daughter is Jaymie Banda but no contact information was provided for her.  MSW located a possible number for pt's daughter: [phone]; however, there was no answer; general voice message left requesting a return phone call.  MSW attempted a next of kin search with \"no results found\".  MSW contacted KJ with [employer] who states that pt was picked up at [address] [place] [address] and  who called 911 called from: [phone].  MSW contacted the number and spoke with pt's roommate, Varun.  Varun states that he attempted to call pt's daughter on the number listed above and e-mailed her but hasn't heard back from her.  Varun was provided with this workers number to have pt's daughter call.  Emotional support provided to pt's friend.  Update provided to [facility] nursing staff.       Daughter: Jaymie Banda ([phone])    "

## 2023-12-27 NOTE — THERAPY
"Occupational Therapy   Initial Evaluation     Patient Name: Marty Mohr  Age:  78 y.o., Sex:  male  Medical Record #: [id number]  Today's Date: 12/27/2023     Precautions  Precautions: (P) Fall Risk  Comments: (P) SBP <160    Assessment  Patient is 78 y.o. male who presents to acute w/ large R cerebellar ICH s/p R suboccipital craniectomy, hematoma, evacuation and mesh cranioplasty 12/27. Pt w/ R eye swollen shut impacting coordination and vision. Pt required max A for bed mobility, mod A seated grooming. B UE's appears to be WFL for AROM. Pt demo'd impaired balance, functional mobility, and activity tolerance impacting functional independence.     Plan    Occupational Therapy Initial Treatment Plan   Treatment Interventions: (P) Self Care / Activities of Daily Living, Neuro Re-Education / Balance, Therapeutic Exercises, Therapeutic Activity, Adaptive Equipment  Treatment Frequency: (P) 4 Times per Week  Duration: (P) Until Therapy Goals Met    DC Equipment Recommendations: (P) Unable to determine at this time  Discharge Recommendations: (P) Recommend post-acute placement for additional occupational therapy services prior to discharge home     Subjective    \"Why does my head hurt?\"     Objective       12/27/23 1128   Charge Group   OT Evaluation OT Evaluation High   Total Time Spent   OT Evaluation (Minutes) 18   Initial Contact Note    Initial Contact Note Order Received and Verified, Occupational Therapy Evaluation in Progress with Full Report to Follow.   Prior Living Situation   Housing / Facility 2 Story Apartment / Condo   Lives with - Patient's Self Care Capacity Unrelated Adult   Comments Per chart pt lives w/ a roommate, at this time pt is questionable historian   Prior Level of ADL Function   Self Feeding Independent   Grooming / Hygiene Independent   Bathing Independent   Dressing Independent   Toileting Independent   Prior Level of IADL Function   Comments will need to verify IADL participation "   Precautions   Precautions Fall Risk   Comments SBP <160   Vitals   Vitals Comments BP within parpameters throughout session   Pain 0 - 10 Group   Therapist Pain Assessment Post Activity Pain Same as Prior to Activity;Nurse Notified  (c/o headache and eye pain, agreeable to session)   Cognition    Cognition / Consciousness X   Speech/ Communication Slurred;Delayed Responses   Level of Consciousness Responds to voice   Ability To Follow Commands 1 Step   Safety Awareness Impaired   New Learning Impaired   Attention Impaired   Sequencing Impaired   Comments pt mumbled words, cooperative and pleasent, cues to open eyes   Active ROM Upper Body   Active ROM Upper Body  WDL   Strength Upper Body   Comments R UE 4/5, L UE 5/5   Coordination Upper Body   Comments impaired on bilateral UE's may be from  impaired vision as R eye was swollen   Balance Assessment   Sitting Balance (Static) Poor +   Sitting Balance (Dynamic) Poor   Weight Shift Sitting Poor   Bed Mobility    Supine to Sit Maximal Assist   Sit to Supine Maximal Assist   Scooting Maximal Assist   ADL Assessment   Grooming Moderate Assist;Seated  (cues to not poke at incision or eye)   Upper Body Dressing Moderate Assist   Lower Body Dressing Maximal Assist   Toileting Maximal Assist   How much help from another person does the patient currently need...   Putting on and taking off regular lower body clothing? 2   Bathing (including washing, rinsing, and drying)? 2   Toileting, which includes using a toilet, bedpan, or urinal? 2   Putting on and taking off regular upper body clothing? 3   Taking care of personal grooming such as brushing teeth? 3   Eating meals? 3   6 Clicks Daily Activity Score 15   Functional Mobility   Sit to Stand Unable to Participate   Bed, Chair, Wheelchair Transfer Unable to Participate   Mobility EOB only   ICU Target Mobility Level   ICU Mobility - Targeted Level Level 2   Activity Tolerance   Sitting Edge of Bed 7 min   Patient / Family  Goals   Patient / Family Goal #1 To go home   Short Term Goals   Short Term Goal # 1 Pt will demo LB dressing w/ min A   Short Term Goal # 2 Pt will demo seated grooming CGA   Short Term Goal # 3 Pt will demo ADL txf w/ min A   Education Group   Role of Occupational Therapist Patient Response Patient;Acceptance;Explanation;Demonstration;Verbal Demonstration;Action Demonstration   Occupational Therapy Initial Treatment Plan    Treatment Interventions Self Care / Activities of Daily Living;Neuro Re-Education / Balance;Therapeutic Exercises;Therapeutic Activity;Adaptive Equipment   Treatment Frequency 4 Times per Week   Duration Until Therapy Goals Met   Problem List   Problem List Decreased Active Daily Living Skills;Decreased Homemaking Skills;Decreased Functional Mobility;Decreased Activity Tolerance;Impaired Postural Control / Balance   Anticipated Discharge Equipment and Recommendations   DC Equipment Recommendations Unable to determine at this time   Discharge Recommendations Recommend post-acute placement for additional occupational therapy services prior to discharge home   Interdisciplinary Plan of Care Collaboration   IDT Collaboration with  Nursing   Patient Position at End of Therapy In Bed;Bed Alarm On;Call Light within Reach;Tray Table within Reach;Phone within Reach   Collaboration Comments report given   Session Information   Date / Session Number  12/27, 1 (1/3, 1/2)

## 2023-12-27 NOTE — THERAPY
"Speech Language Pathology   Clinical Swallow Evaluation     Patient Name: Marty Mohr  AGE:  78 y.o., SEX:  male  Medical Record #: [id number]  Date of Service: 12/27/2023      History of Present Illness  79 y/o M admit 12/27 w/ a large R cerebellar ICH s/p R suboccipital craniectomy, hematoma evacuation, and mesh cranioplasty 12/27. Pt extubated this AM @ 8. Other circumstances of hospitalization: GLF w/ bruising to the right orbit area.    Head CT w/ \"Large RIGHT cerebellar intraparenchymal hemorrhage with associated mass effect and 5 mm posterior fossa midline shift\"      PMHx: HTN    General Information:  Vitals  O2 (LPM): 5  O2 Delivery Device: Oxymask  Level of Consciousness: Drowsy (fluctuating ANABELLE)  Patient Behaviors: Confused  Orientation: Self, General place  Follows Directives: Yes - simple commands only    Prior Living Situation & Level of Function:  Housing / Facility: 2 Story Apartment / Condo  Comments: Per chart pt lives w/ a roommate, at this time pt is questionable historian     Communication: unknown, pt is a poor historian  Swallowing: unknown, pt is a poor historian     Oral Mechanism Evaluation:  Dentition: Natural dentition, Some missing dentition   Facial Symmetry: Equal (Impression impacted by right eye ecchymosis and swelling, but no gross facial droop appreciated)  Facial Sensation: Pt did not follow commands to assess     Labial Observations:  (no gross droop, weak labial seal)   Lingual Observations: Pt did not follow commands to assess (Appeared to have generalized lingual weakness)  Motor Speech: Moderate-severe flaccid dsyarthria  Comments: pt's waxing and waning ANABELLE impacting assessment      Laryngeal Function:  Secretion Management: Excess secretions, suctioning required  Voice Quality:  (Mod dysphonia c/b reduced vocal intensity and hoarse quality)  Max Phonation Time (seconds): not assessed d/t pt mentation  S/Z Ratio: not assessed  Cough: Perceptually weak     Subjective  Pt seen " A&Ox2, alert for brief periods but overall w/ waxing and waning alertness. Pt noted w/ mod dysphonia and mild dystussia- using suction for productive cough. Speech was dysarthric    Assessment  Current Method of Nutrition: NPO until cleared by speech pathology (No NG)  Positioning: Espino's (60-90 degrees)  Bolus Administration: SLP  O2 (LPM): 5 O2 Delivery Device: Oxymask  Factor(s) Affecting Performance: Impaired endurance, Impaired mental status, Impaired command following  Tracheostomy : No     Swallowing Trials:  Swallowing Trials  Ice: WFL  Thin Liquid (TN0): WFL (Further trials deferred for instrumentation)    Comments: Pt restless in bed largely unintelligible d/t dsyarthria but redirected w/ mod verbal cueing. Oral acceptance c/b disorganized bolus acceptance. A suspected swallow trigger was appreciated to palpation. No upper airway wetness, throat clearing or coughing was noted, however pt is at elevated risk for aspiration d/t suspected laryngeal injury in the context of dysphonia and poor ANABELLE. Further trials deferred for instrumentation.     Clinical Impressions  Clinical signs of acute oropharyngeal dysphagia in the context of neurological deficits s/p large R cerebellar ICH. Pt also w/ laryngeal dysfunction as evidenced by dysphonia & dystussia, as well as fluctuating ANABELLE and confusion impacting swallow safety. Would recommend for him to remain NPO, w/ bedside re-evaluations. Consider instrumentation (e.g., FEES) if symptoms of dysphagia persist and pt's participation improves for same.   Pt also pending a Speech-Language/Cognitive Evaluation     Recommendations  Diet Consistency: NPO w/ alternative nutrition/hydration, ice chips appropriate for oral gratification and swallow rehabilitation  Instrumentation: FEES  Medication: Crush with applesauce, as appropriate  Supervision: 1:1 feeding with constant supervision (for ice chips)  Positioning: Fully upright and midline during oral intake, Meals sitting  "upright in a chair, as tolerated  Risk Management : Small bites/sips, Slow rate of intake  Oral Care: Q4h  Consult Referral(s): Dietician    SLP Treatment Plan  Treatment Plan: Dysphagia Treatment  SLP Frequency: 4x Per Week  Estimated Duration: Until Therapy Goals Met    Anticipated Discharge Needs  Discharge Recommendations: Recommend post-acute placement for additional speech therapy services prior to discharge home   Therapy Recommendations Upon DC: Dysphagia Training, Cognitive-Linguistic Training      Patient / Family Goals  Patient / Family Goal #1: \"I need to take this home\" (referring to the Francis)  Short Term Goals  Short Term Goal # 1: Pt will participate in prefeeding trials and demo sustained alertness for 2-3 minute at a time  Short Term Goal # 2: If still demo'ing signs of dysphagia as alertness improves, pt will participate in instrumentation to define swallow physiology and determine [facility]   "

## 2023-12-27 NOTE — PROGRESS NOTES
4 Eyes Skin Assessment Completed by CHARLIE Shay and CHARLIE Olvera.    Head Bruising, Swelling, Edema, and Incision  Ears WDL  Nose Redness and Blanching  Mouth Discoloration  Neck WDL  Breast/Chest WDL  Shoulder Blades WDL  Spine WDL  (R) Arm/Elbow/Hand Redness and Blanching  (L) Arm/Elbow/Hand Redness and Blanching  Abdomen WDL  Groin WDL  Scrotum/Coccyx/Buttocks WDL  (R) Leg Redness, Non-Blanching, and Scab (right knee non-blanching)  (L) Leg WDL  (R) Heel/Foot/Toe WDL  (L) Heel/Foot/Toe WDL          Devices In Places ECG, Blood Pressure Cuff, Pulse Ox, Schwartz, Arterial Line, SCD's, and Oxy Mask      Interventions In Place Heel Mepilex, Sacral Mepilex, TAP System, Q2 Turns, and Low Air Loss Mattress    Possible Skin Injury Yes    Pictures Uploaded Into Epic Yes  Wound Consult Placed Yes  RN Wound Prevention Protocol Ordered Yes

## 2023-12-28 NOTE — DISCHARGE PLANNING
Insurance Provider on Facesheet: None listed @ this time.  Please reach out to a PFA for a screening.  Gtts are a barrier as well.     1017-Physiatry to consult.

## 2023-12-28 NOTE — CONSULTS
Physical Medicine and Rehabilitation Consultation          Date of initial consultation: 12/28/2023  Consulting provider: Donnie Babin M.D.   Reason for consultation: assess for acute inpatient rehab appropriateness  LOS: 1 Day(s)    Chief complaint: Dysarthria    HPI: The patient is a 78 y.o. right hand dominant male with no known past medical history;  who presented on 12/26/2023 11:49 PM minimally conscious, found in a puddle of vomit by his roommates.  Patient admitted to ground-level fall with frontal head strike, and presented with headache.  Patient was seen by neurology, found to have NIH score of 1.  CTA found large right cerebellar intraparenchymal hemorrhage with associated mass effect and 5 mm posterior fossa midline shift, as well as a right scalp hematoma on his forehead.  Patient was seen by neurosurgery who placed the patient at significant risk for neurologic deterioration and he was taken to the OR by Dr. Alex Patten MD on 12/27 for right suboccipital craniectomy and evacuation of right cerebellar intracerebral hematoma with decompression of posterior fossa contents.    The patient currently reports difficulty speaking, appears to have some altered mentation.  Patient reports that he is in the process of buying a house and able to begin toilet soon, but is unable to provide additional details.  Patient's responses have questionable accuracy.  Patient denies all other ROS including headache.  No timeline has been established for cranioplasty.    ROS  Pertinent positives are mentioned in the HPI, all others reviewed and are negative.    Social Hx:  1 SH  15 ERIC  With: Roommate.  Second floor apartment.    THERAPY:  Restrictions: Fall risk, swallow precautions  PT: Functional mobility   12/27: Unable to participate in gait, sit to stand or transfers    OT: ADLs  12/27: Max assist lower body dressing and toileting    SLP:   12/28: N.p.o. pending fees    IMAGING:  CTA head 12/27/2023  1.  Large  Discussed lid hygiene, warm compress and eyelid wash. "RIGHT cerebellar intraparenchymal hemorrhage with associated mass effect and 5 mm posterior fossa midline shift.  2.  No intracranial aneurysm, focal high-grade stenosis, or abrupt large vessel cut off.  3.  RIGHT forehead scalp hematoma.    PROCEDURES:  Alex Patten MD 12/27/2023  1.  Right suboccipital craniectomy and evacuation of a right sizable   cerebellar intracerebral hematoma, decompression of posterior fossa contents.  2.  A 4 cm titanium mesh cranioplasty.    PMH:  Past Medical History:   Diagnosis Date    Patient denies medical problems        PSH:  Past Surgical History:   Procedure Laterality Date    CRANIOTOMY Right 12/27/2023    Procedure: CRANIOTOMY;  Surgeon: Alex Patten M.D.;  Location: SURGERY [facility];  Service: Neurosurgery       FHX:  Non-pertinent to today's issues    Medications:  Current Facility-Administered Medications   Medication Dose    prochlorperazine (Compazine) injection 10 mg  10 mg    hydrALAZINE (Apresoline) injection 10-20 mg  10-20 mg    amLODIPine (Norvasc) tablet 10 mg  10 mg    Respiratory Therapy Consult      acetaminophen (Tylenol) tablet 650 mg  650 mg    Or    acetaminophen (Tylenol) suppository 650 mg  650 mg    ondansetron (Zofran ODT) dispertab 4 mg  4 mg    Or    ondansetron (Zofran) syringe/vial injection 4 mg  4 mg    MD Alert...ICU Electrolyte Replacement per Pharmacy      niCARdipine (Cardene) 25 mg in  mL Standard Infusion  0-15 mg/hr    3% sodium chloride (Hypertonic Saline) 500mL infusion 500 mL  500 mL    insulin regular (HumuLIN R,NovoLIN R) injection  1-6 Units    And    dextrose 10 % BOLUS 25 g  25 g       Allergies:  No Known Allergies      Physical Exam:  Vitals: BP (!) 154/71   Pulse 78   Temp 35.8 °C (96.5 °F) (Temporal)   Resp (!) 24   Ht 1.854 m (6' 1\")   Wt 96 kg (211 lb 10.3 oz)   SpO2 93%   Gen: NAD  Head: Surgical head, right posterior area shaved and covered with surgical dressings.  Right periocular ecchymoses  Eyes/ Nose/ " Mouth: PERRLA, moist mucous membranes  Cardio: RRR, good distal perfusion, warm extremities  Pulm: normal respiratory effort, no cyanosis   Abd: Soft NTND, negative borborygmi   Ext: No peripheral edema. No calf tenderness. No clubbing.    Mental status: Follows commands  Speech: Dysarthric speech, mumbles frequently    Motor:      Upper Extremity  Myotome R L   Shoulder flexion C5 5 5   Elbow flexion C5 5 5   Wrist extension C6 5 5   Elbow extension C7 5 5   Finger flexion C8 5 5   Finger abduction T1 5 5     Lower Extremity Myotome R L   Hip flexion L2 5 5   Knee extension L3 5 5   Ankle dorsiflexion L4 5 5   Toe extension L5 5 5   Ankle plantarflexion S1 5 5       Coordination:   Impaired to finger severo bilaterally, right worse than left.    Labs: Reviewed and significant for   Recent Labs     12/27/23  0021 12/28/23  0600   RBC 5.05 4.78   HEMOGLOBIN 15.5 14.4   HEMATOCRIT 45.5 44.0   PLATELETCT 274 260   PROTHROMBTM 14.6  --    APTT 27.9  --    INR 1.13  --      Recent Labs     12/27/23  0406 12/27/23  0600 12/27/23  1223 12/27/23  1758 12/28/23  0000 12/28/23  0600   SODIUM 139 138   < > 145 146* 146*   POTASSIUM 4.5 4.2  --   --   --  3.8   CHLORIDE 105 105  --   --   --  112   CO2 22 22  --   --   --  24   GLUCOSE 191* 221*  --   --   --  154*   BUN 18 19  --   --   --  16   CREATININE 1.02 1.05  --   --   --  1.08   CALCIUM 8.8 8.6  --   --   --  8.6    < > = values in this interval not displayed.     Recent Results (from the past 24 hour(s))   Sodium Serum (NA)    Collection Time: 12/27/23 12:23 PM   Result Value Ref Range    Sodium 141 135 - 145 mmol/L   POCT glucose device results    Collection Time: 12/27/23  5:53 PM   Result Value Ref Range    POC Glucose, Blood 131 (H) 65 - 99 mg/dL   Sodium Serum (NA)    Collection Time: 12/27/23  5:58 PM   Result Value Ref Range    Sodium 145 135 - 145 mmol/L   Sodium Serum (NA)    Collection Time: 12/28/23 12:00 AM   Result Value Ref Range    Sodium 146 (H) 135 -  145 mmol/L   POCT glucose device results    Collection Time: 12/28/23 12:07 AM   Result Value Ref Range    POC Glucose, Blood 134 (H) 65 - 99 mg/dL   POCT glucose device results    Collection Time: 12/28/23  5:59 AM   Result Value Ref Range    POC Glucose, Blood 148 (H) 65 - 99 mg/dL   Magnesium    Collection Time: 12/28/23  6:00 AM   Result Value Ref Range    Magnesium 2.0 1.5 - 2.5 mg/dL   Complete Metabolic Panel    Collection Time: 12/28/23  6:00 AM   Result Value Ref Range    Sodium 146 (H) 135 - 145 mmol/L    Potassium 3.8 3.6 - 5.5 mmol/L    Chloride 112 96 - 112 mmol/L    Co2 24 20 - 33 mmol/L    Anion Gap 10.0 7.0 - 16.0    Glucose 154 (H) 65 - 99 mg/dL    Bun 16 8 - 22 mg/dL    Creatinine 1.08 0.50 - 1.40 mg/dL    Calcium 8.6 8.5 - 10.5 mg/dL    Correct Calcium 8.8 8.5 - 10.5 mg/dL    AST(SGOT) 20 12 - 45 U/L    ALT(SGPT) 14 2 - 50 U/L    Alkaline Phosphatase 93 30 - 99 U/L    Total Bilirubin 0.4 0.1 - 1.5 mg/dL    Albumin 3.7 3.2 - 4.9 g/dL    Total Protein 6.4 6.0 - 8.2 g/dL    Globulin 2.7 1.9 - 3.5 g/dL    A-G Ratio 1.4 g/dL   CBC with Differential    Collection Time: 12/28/23  6:00 AM   Result Value Ref Range    WBC 17.7 (H) 4.8 - 10.8 K/uL    RBC 4.78 4.70 - 6.10 M/uL    Hemoglobin 14.4 14.0 - 18.0 g/dL    Hematocrit 44.0 42.0 - 52.0 %    MCV 92.1 81.4 - 97.8 fL    MCH 30.1 27.0 - 33.0 pg    MCHC 32.7 32.3 - 36.5 g/dL    RDW 46.7 35.9 - 50.0 fL    Platelet Count 260 164 - 446 K/uL    MPV 10.4 9.0 - 12.9 fL    Neutrophils-Polys 83.60 (H) 44.00 - 72.00 %    Lymphocytes 7.20 (L) 22.00 - 41.00 %    Monocytes 8.60 0.00 - 13.40 %    Eosinophils 0.10 0.00 - 6.90 %    Basophils 0.10 0.00 - 1.80 %    Immature Granulocytes 0.40 0.00 - 0.90 %    Nucleated RBC 0.00 0.00 - 0.20 /100 WBC    Neutrophils (Absolute) 14.82 (H) 1.82 - 7.42 K/uL    Lymphs (Absolute) 1.27 1.00 - 4.80 K/uL    Monos (Absolute) 1.53 (H) 0.00 - 0.85 K/uL    Eos (Absolute) 0.01 0.00 - 0.51 K/uL    Baso (Absolute) 0.02 0.00 - 0.12 K/uL     Immature Granulocytes (abs) 0.07 0.00 - 0.11 K/uL    NRBC (Absolute) 0.00 K/uL   ESTIMATED GFR    Collection Time: 12/28/23  6:00 AM   Result Value Ref Range    GFR (CKD-EPI) 71 >60 mL/min/1.73 m 2         ASSESSMENT:  Patient is a 78 y.o. male admitted with large right cerebellar intraparenchymal hemorrhage now s/p right suboccipital neurectomy on 12/27    [facility] Code / Diagnosis to Support: 0001.4 - Stroke: No Paresis    Rehabilitation: Impaired ADLs and mobility  Potential candidate for IPR, however largely depends on this discharge situation.  Patient lives up a flight of stairs with a roommate clear if this person can provide support on discharge    All cases are subject to administrative review and recommendations may change    Disposition recommendations:  -Potential candidate for IPR, versus SNF.  -TCC to investigate discharge support.  -PMR to follow in the periphery for rehab appropriateness, please reach out with questions or request for medical management    Medical Complexity:    Right cerebellar intraparenchymal hemorrhage  -Status post right suboccipital craniectomy by Dr. Alex Patten MD on 12/27  -Currently with residual functional deficits of right worse than left coordination intermittent, moderate dysarthria, altered mental status, but no strength deficit.  -Systolic BP goal less than 160  -On nicardipine  -On 3% sodium drip  -Continue PT OT and SLP while in house  -Unclear if cranioplasty will occur on this admission, or at a later date    Hypertension  -Amlodipine 10 daily  -Nicardipine drip  -Hydralazine as needed    Leukocytosis  -Potential stress reaction, primary team monitoring closely.  Patient is not on steroids  -Not currently on antibiotics    DVT PPX: SCDs      Thank you for allowing us to participate in the care of this patient.     Patient was seen for >80 minutes on unit/floor of which > 50% of time was spent on counseling and coordination of care regarding the above, including  prognosis, risk reduction, benefits of treatment, and options for next stage of care.    Juan A Wilson, DO   Physical Medicine and Rehabilitation     Please note that this dictation was created using voice recognition software. I have made every reasonable attempt to correct obvious errors, but there may be errors of grammar and possibly content that I did not discover before finalizing the note.

## 2023-12-28 NOTE — PROGRESS NOTES
Agitated this morning, refusing to answer orientation questions. Moving all extremities 4/5 strength.

## 2023-12-28 NOTE — THERAPY
Speech Language Pathology   Daily Treatment     Patient Name: Marty Mohr  AGE:  78 y.o., SEX:  male  Medical Record #: [id number]  Date of Service: 12/28/2023      Precautions:  Precautions: Fall Risk         Subjective  Pt confused, awake, followed directions, impulsive.       Assessment  Pt seen this date for swallow reassessment. Pt presenting with left labial weakness. PO trials of ice, thins by tsp/cup, liquidized, puree and regular assessed. Timely swallow initiation and presumed complete hyolaryngeal elevation to palpation appreciated. Functional mastication and mild oral residue appreciated with regular. Wet vocal quality appreciated with thins by cup, wet vocal quality and throat clearing appreciated with consecutive thins by cup, which is concerning for airway invasion.       Clinical Impressions  Pt presenting with s/sx of oropharyngeal dysphagia characterized by lingual residue, wet vocal quality and throat clearing with thins. Given cerebellar ICH and s/sx of aspiration, recommend diagnostic swallow study prior to initiation of a PO diet.     Recommendations  Treatment Completed: Dysphagia Treatment       Dysphagia Treatment  Diet Consistency: NPO pending FEES, okay for ice chips after oral care  Instrumentation: FEES  Medication: Crush with applesauce, as appropriate  Supervision: 1:1 feeding with constant supervision  Positioning: Fully upright and midline during oral intake  Risk Management : Small bites/sips, No straws, Slow rate of intake  Oral Care: Q2h       SLP Treatment Plan  Treatment Plan: Dysphagia Treatment, Patient/Family/Caregiver Training  SLP Frequency: 4x Per Week  Estimated Duration: Until Therapy Goals Met      Anticipated Discharge Needs  Discharge Recommendations: Recommend post-acute placement for additional speech therapy services prior to discharge home  Therapy Recommendations Upon DC: Dysphagia Training, Cognitive-Linguistic Training, Patient / Family / Caregiver  "Education      Patient / Family Goals  Patient / Family Goal #1: \"I need to take this home\" (referring to the [facility])  Goal #1 Outcome: Goal not met  Short Term Goals  Short Term Goal # 1: Pt will participate in prefeeding trials and demo sustained alertness for 2-3 minute at a time  Goal Outcome # 1: Goal met, new goal added  Short Term Goal # 2: If still demo'ing signs of dysphagia as alertness improves, pt will participate in instrumentation to define swallow physiology and determine ST POC      Tony Sung, SLP  "

## 2023-12-28 NOTE — PROGRESS NOTES
Critical Care Progress Note    Date of admission  2023    Chief Complaint  75 y.o. male who presented 2023 with no significant pass medical hx. That was found in vomit and fell forward striking his face with bruising to right orbit area. He was confused and brought in by EMS to [facility]. He was found to be hypertensive to 200's and CT scan of the head found a cerebellar ICH 4.6x2.9x.6cm with surrounding edema and effacement of 4th ventricle, CTA with no vascular malformation, CT maxillofacial with right frontal hematoma without fracture and CT spine with no fracture. His INR was normal pending teg. He had neurology and Dr Patten of neurosurgery has been consulted. He has requested to be full code. He will be admitted to ICU for hypertension rx and hypertonic saline. History is limited by his dysarthric speech.      Hospital Course  No notes on file    Interval Problem Update  Reviewed last 24 hour events:  Remains critically ill   Alert, oriented, RASS 0  On 3% hypertonic saline and increasing requiring to 60cc/hr. , sodium goal 150-160  Central line in place  Remains on cardene gtt for goal SBP <160  On 5L NC, sat >92%  Afebrile  Speech approves for ice chips pending FEES  NPO, pending speech eval   Creatinine 1.05  Art line was removed this am.   Mobility - PT/OT as tolerated.     Pt evidently also has 2 MRNs due to wrong  entered at admission     Review of Systems  Review of Systems   Constitutional:  Negative for chills.   Respiratory:  Negative for shortness of breath.    Cardiovascular:  Negative for chest pain.   Gastrointestinal:  Negative for abdominal pain, nausea and vomiting.   Neurological:  Negative for weakness.   All other systems reviewed and are negative.       Vital Signs for last 24 hours   Pulse:  [63-85] 85  Resp:  [0-67] 21  BP: (122-183)/(60-95) 151/77  SpO2:  [88 %-100 %] 94 %    Hemodynamic parameters for last 24 hours       Respiratory Information for the last 24 hours        Physical Exam   Physical Exam  Vitals and nursing note reviewed.   Constitutional:       General: He is not in acute distress.     Appearance: He is ill-appearing. He is not toxic-appearing.   HENT:      Head: Normocephalic.      Mouth/Throat:      Mouth: Mucous membranes are moist.   Eyes:      Comments: Right periorbital ecchymosis   Cardiovascular:      Rate and Rhythm: Normal rate and regular rhythm.      Pulses: Normal pulses.      Heart sounds: Normal heart sounds. No murmur heard.  Pulmonary:      Effort: Pulmonary effort is normal. No respiratory distress.      Breath sounds: Normal breath sounds. No wheezing, rhonchi or rales.   Abdominal:      General: Bowel sounds are normal. There is no distension.      Palpations: Abdomen is soft.      Tenderness: There is no abdominal tenderness. There is no guarding.   Musculoskeletal:         General: No swelling or tenderness.      Right lower leg: No edema.      Left lower leg: No edema.   Skin:     General: Skin is warm and dry.      Capillary Refill: Capillary refill takes less than 2 seconds.      Coloration: Skin is not jaundiced.   Neurological:      General: No focal deficit present.      Mental Status: He is alert and oriented to person, place, and time.      Cranial Nerves: No cranial nerve deficit.         Medications  Current Facility-Administered Medications   Medication Dose Route Frequency Provider Last Rate Last Admin    Respiratory Therapy Consult   Nebulization Continuous RT Donnie Babin M.D.        acetaminophen (Tylenol) tablet 650 mg  650 mg Oral Q4HRS PRN Donnie Babin M.D.   650 mg at 12/28/23 0353    Or    acetaminophen (Tylenol) suppository 650 mg  650 mg Rectal Q4HRS PRN Donnie Babin M.D.        ondansetron (Zofran ODT) dispertab 4 mg  4 mg Oral Q4HRS PRN Donnie Babin M.D.        Or    ondansetron (Zofran) syringe/vial injection 4 mg  4 mg Intravenous Q4HRS PRN Donnie Babin M.D.        MD Alert...ICU Electrolyte  Replacement per Pharmacy   Other PHARMACY TO DOSE Donnie Babin M.D.        niCARdipine (Cardene) 25 mg in  mL Standard Infusion  0-15 mg/hr Intravenous Continuous Donnie Babin M.D. 75 mL/hr at 12/28/23 0702 7.5 mg/hr at 12/28/23 0702    3% sodium chloride (Hypertonic Saline) 500mL infusion 500 mL  500 mL Intravenous Continuous Donnie Babin M.D. 60 mL/hr at 12/28/23 0721 Rate Change at 12/28/23 0721    insulin regular (HumuLIN R,NovoLIN R) injection  1-6 Units Subcutaneous Q6HRS Donnie Babin M.D.   1 Units at 12/27/23 1233    And    dextrose 10 % BOLUS 25 g  25 g Intravenous Q15 MIN PRN Donnie Babin M.D.           Fluids    Intake/Output Summary (Last 24 hours) at 12/28/2023 0922  Last data filed at 12/28/2023 0612  Gross per 24 hour   Intake 1942.18 ml   Output 2250 ml   Net -307.82 ml         Laboratory  Recent Labs     12/27/23  0254   ISTATAPH 7.285*   ISTATAPCO2 51.0*   ISTATAPO2 217*   ISTATATCO2 26   UHRDJPI0SCI 100*   ISTATARTHCO3 24.2   ISTATARTBE -3   ISTATTEMP see below   ISTATSPEC Arterial         Recent Labs     12/27/23  0406 12/27/23  0600 12/27/23  1223 12/27/23  1758 12/28/23  0000 12/28/23  0600   SODIUM 139 138   < > 145 146* 146*   POTASSIUM 4.5 4.2  --   --   --  3.8   CHLORIDE 105 105  --   --   --  112   CO2 22 22  --   --   --  24   BUN 18 19  --   --   --  16   CREATININE 1.02 1.05  --   --   --  1.08   MAGNESIUM  --   --   --   --   --  2.0   CALCIUM 8.8 8.6  --   --   --  8.6    < > = values in this interval not displayed.       Recent Labs     12/27/23  0406 12/27/23  0600 12/28/23  0600   ALTSGPT 19  --  14   ASTSGOT 20  --  20   ALKPHOSPHAT 102*  --  93   TBILIRUBIN 0.4  --  0.4   GLUCOSE 191* 221* 154*       Recent Labs     12/27/23  0021 12/27/23  0406 12/28/23  0600   WBC 13.4*  --  17.7*   NEUTSPOLYS 91.40*  --  83.60*   LYMPHOCYTES 4.10*  --  7.20*   MONOCYTES 3.70  --  8.60   EOSINOPHILS 0.00  --  0.10   BASOPHILS 0.20  --  0.10   ASTSGOT  --  20 20    ALTSGPT  --  19 14   ALKPHOSPHAT  --  102* 93   TBILIRUBIN  --  0.4 0.4       Recent Labs     12/27/23  0021 12/28/23  0600   RBC 5.05 4.78   HEMOGLOBIN 15.5 14.4   HEMATOCRIT 45.5 44.0   PLATELETCT 274 260   PROTHROMBTM 14.6  --    APTT 27.9  --    INR 1.13  --          Imaging  X-Ray:  I have personally reviewed the images and compared with prior images.    Assessment/Plan  * Intracranial hemorrhage (HCC)- (present on admission)  Assessment & Plan  12/27  S/p suboccipital craniectomy, decompression posterior fossa by Dr. Patten.   Pt was extubated prior to ICU arrival, doing well from resp standpoint.   On 3% hypertonic to keep sodium 150-160. Will keep on hypertonic for duration of edema window.   Goal -160  Neurocheck Q1H   Aspiration precautions  Head of bed 30 degress   Maintain CPP > 60-70  Neuro check Q 1H  Maintain euvolemia    CTA with no vascular portion  Patient hypertensive likely etiology of bleed      Cephalohematoma  Assessment & Plan  Due to fall  Monitor, consider ace wrap  No occult fracture seen    Primary hypertension  Assessment & Plan  Nicardipine gtt for SBP < 160  Start norvasc 10mg daily   Uptitrate oral meds to wean off nicardipine.   UDS negative          VTE:  Contraindicated  Ulcer: Not Indicated  Lines: Schwartz Catheter  Ongoing indication addressed    I have performed a physical exam and reviewed and updated ROS and Plan today (12/28/2023). In review of yesterday's note (12/27/2023), there are no changes except as documented above.     Discussed patient condition and risk of morbidity and/or mortality with RN, RT, and Pharmacy  The patient remains critically ill.  Critical care time = 60 minutes in directly providing and coordinating critical care and extensive data review.  No time overlap and excludes procedures.

## 2023-12-28 NOTE — DISCHARGE PLANNING
Case Management Discharge Planning    Patient initially listed as self-pay; Per PFA, he has active [facility] coverage & chart updated.

## 2023-12-28 NOTE — CARE PLAN
The patient is Watcher - Medium risk of patient condition declining or worsening    Shift Goals  Clinical Goals: SBP < 160, CT  Patient Goals: Drink water  Family Goals: Updates    Progress made toward(s) clinical / shift goals:    Problem: Pain - Standard  Goal: Alleviation of pain or a reduction in pain to the patient’s comfort goal  Outcome: Progressing     Problem: Fall Risk  Goal: Patient will remain free from falls  Outcome: Progressing     Problem: Knowledge Deficit - Standard  Goal: Patient and family/care givers will demonstrate understanding of plan of care, disease process/condition, diagnostic tests and medications  Outcome: Progressing     Problem: Skin Integrity  Goal: Skin integrity is maintained or improved  Outcome: Progressing

## 2023-12-28 NOTE — DOCUMENTATION QUERY
"                                                                         Formerly [facility]                                                                       Query Response Note      PATIENT:               SHAKIRA SIN  ACCT #:                  [id number]  MRN:                     [id number]  :                      1945  ADMIT DATE:       2023 11:49 PM  DISCH DATE:          RESPONDING  PROVIDER #:        [id number]           QUERY TEXT:    After further study, please indicate documentation clarification of the following:    The patient's Clinical Indicators include:  77yo with dx of right cerebellar hematoma with mass effect, hypertensive,     PN \"CT scan of the head found a cerebellar ICH 4.6x2.9x.6cm with surrounding edema and effacement of 4th ventricle\"   Op report \"The patient did not have overt hydrocephalus but had some early dilatation of the temporal horns.  The patient was lethargic.\"      Risk factors: Large right cerebellar intracerebral hematoma, hypertensive  Treatments: specialty consultation, craniectomy and evacuation of hematoma, decompression    Contact me with questions.     Thank you,  Shena Manzo, SALVADORP, CDI  [email or web address]  Options provided:   -- Traumatic brain compression without herniation, POA   -- Traumatic brain compression with herniation, POA   -- Other explanation:other explanation-, please specify   -- Unable to determine      Query created by: Shena Manzo on 2023 9:02 AM    RESPONSE TEXT:    Traumatic brain compression without herniation, POA          Electronically signed by:  FERNANDO REED DO 2023 9:21 AM              "

## 2023-12-28 NOTE — PROGRESS NOTES
Neurosurgery Progress Note    Subjective:  Left EO to voice, right eye sig ecchymosis/swelling    Exam:  Awakens to voice  Oriented x 2-3, slurred/mumbles  FC x 4, Perrl 3mm bilat  Inc c/d    BP  Min: 122/60  Max: 183/79  Pulse  Av.1  Min: 63  Max: 85  Resp  Av.5  Min: 0  Max: 67  Monitored Temp 2  Av.1 °C (98.7 °F)  Min: 36.5 °C (97.7 °F)  Max: 37.5 °C (99.5 °F)  SpO2  Av.8 %  Min: 88 %  Max: 100 %    No data recorded    Recent Labs     23  0021 23  0600   WBC 13.4* 17.7*   RBC 5.05 4.78   HEMOGLOBIN 15.5 14.4   HEMATOCRIT 45.5 44.0   MCV 90.1 92.1   MCH 30.7 30.1   MCHC 34.1 32.7   RDW 43.2 46.7   PLATELETCT 274 260   MPV 10.4 10.4       Recent Labs     23  0406 23  0600 23  1223 23  1758 23  0000 23  0600   SODIUM 139 138   < > 145 146* 146*   POTASSIUM 4.5 4.2  --   --   --  3.8   CHLORIDE 105 105  --   --   --  112   CO2 22 22  --   --   --  24   GLUCOSE 191* 221*  --   --   --  154*   BUN 18 19  --   --   --  16   CREATININE 1.02 1.05  --   --   --  1.08   CALCIUM 8.8 8.6  --   --   --  8.6    < > = values in this interval not displayed.       Recent Labs     23  0021   APTT 27.9   INR 1.13       Recent Labs     23  0021   REACTMIN 4.6   CLOTKINET 1.1   CLOTANGL 74.8   MAXCLOTS 63.4   CGV37BMS 0.0   PRCINADP 42.9*   PRCINAA 6.7         Intake/Output                         23 0700 - 23 0659 23 0700 - 23 0659     [id number] 2130-8571 Total 9927-5745 6821-8923 Total                 Intake    I.V.  208  1538.8 1746.7  --  -- --    Cardene Volume 208 558.9 766.9 -- -- --    Volume (mL) (3% sodium chloride (Hypertonic Saline) 500mL infusion 500 mL) -- 979.9 979.9 -- -- --    IV Piggyback  97.3  98.1 195.5  --  -- --    Volume (mL) (ceFAZolin (Ancef) 2 g in  mL IVPB) 97.3 98.1 195.5 -- -- --    Total Intake 305.3 1636.9 1942.2 -- -- --       Output    Urine  450  1800 2250  --  -- --    Output (mL) (Urethral  Catheter Temperature probe) 450 1800 2250 -- -- --    Total Output 450 1800 2250 -- -- --       Net I/O     -144.7 -163.1 -307.8 -- -- --              Intake/Output Summary (Last 24 hours) at 12/28/2023 1016  Last data filed at 12/28/2023 0612  Gross per 24 hour   Intake 1942.18 ml   Output 2250 ml   Net -307.82 ml               prochlorperazine  10 mg Q6HRS PRN    hydrALAZINE  10-20 mg Q6HRS PRN    amLODIPine  10 mg Q DAY    Respiratory Therapy Consult   Continuous RT    acetaminophen  650 mg Q4HRS PRN    Or    acetaminophen  650 mg Q4HRS PRN    ondansetron  4 mg Q4HRS PRN    Or    ondansetron  4 mg Q4HRS PRN    MD Alert...Adult ICU Electrolyte Replacement per Pharmacy   PHARMACY TO DOSE    niCARdipine (Cardene) 25 mg in  mL Standard Infusion  0-15 mg/hr Continuous    3% sodium chloride  500 mL Continuous    insulin regular  1-6 Units Q6HRS    And    dextrose bolus  25 g Q15 MIN PRN       Assessment and Plan:  Hospital day # 2 ICH  POD# 1 Rt SOC and evac ICH   Chemical prophylactic DVT therapy: No  Start date/time: TBD      Q1 hr neuro checks  Keep SBP <160- on nicardipine  Pt/ot/mobilize as justo  3% gtt  CT this am stable

## 2023-12-28 NOTE — THERAPY
"Speech Language Pathology   Flexible Endoscopic Evaluation of Swallowing (FEES)        Patient Name: Marty Mohr  AGE:  78 y.o., SEX:  male  Medical Record #: [id number]  Date of Service: 12/28/2023      History of Present Illness  79 YO male admitted 12/26 2/2 GLF.    PMHx: HTN. No SLP hx at [facility].     CMHx: intracranial hemorrhage, cephalohematoma, primary HTN.     CXR: 12/28 \"New right subclavian line tip overlies the cavoatrial junction and no pneumothorax is identified. Similar hypoventilatory appearance of the chest with basilar atelectasis, some areas of scarring probable.\"    Head CT 12/27 \"1.  Large RIGHT cerebellar intraparenchymal hemorrhage with associated mass effect and 5 mm posterior fossa midline shift.  2.  No intracranial aneurysm, focal high-grade stenosis, or abrupt large vessel cut off.  3.  RIGHT forehead scalp hematoma.\"    Head CT 12/28 \"1. Postop right cerebellar hemorrhage evacuation. Only a minimal amount of blood product and edema remains. 2. Resolved shift of the cerebellum.\"    Pertinent Information  Current Method of Nutrition: NPO until cleared by speech pathology  Patient Behaviors: Confused   Dentition: Natural dentition, Some missing dentition   Feeding Tube: None   Tracheostomy: No        Factor(s) Affecting Performance: Impaired mental status, Impaired command following     Discussed the risks, benefits, and alternatives of the FEES procedure. Patient/family acknowledged and agreed to proceed.      Assessment  Flexible Endoscopic Evaluation of Swallowing (FEES) completed at bedside today. The endoscope was passed transnasally via Right nare to evaluate the anatomy and physiology of swallowing. Pt tolerated the procedure with no apparent distress (Of note, did attempt to \"blow\" scope out of nose throughout diagnostic.).    Anatomic Findings: Significant diffuse erythema and edema. 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   Vocal Fold " Motion: Bilateral movement  Secretion Management: Adequate  PO Trials: Ice Chips, Thin Liquid, Pudding, Regular Solid      Consistency PAS Score Timing Residue Comments   Thin Liquid 4 During swallow Vallecular Residue: Mild (5%-25%)  Pyriform Sinus Residue: Mild (5%-25%) PAS 1: tsp x1, straw x3, cup x1  PAS 2: cup x1  PAS 4: tsp x1   Pudding 1 N/A Vallecular Residue: Mild (5%-25%)  Pyriform Sinus Residue: Trace (1%-5%)    Regular Solid 1 N/A Vallecular Residue: Mild (5%-25%)  Piriform Sinus Residue: None (0%)      Penetration-Aspiration Scale (PAS)  1     No contrast enters airway  2     Contrast enters the airway, remains above the vocal folds, and is ejected from the airway (not seen in the airway at the end of the swallow).  3     Contrast enters the airway, remains above the vocal folds, and is not ejected from the airway (is seen in the airway after the swallow).  4     Contrast enters the airway, contacts the vocal folds, and is ejected from the airway.  5     Contrast enters the airway, contacts the vocal folds, and is not ejected from the airway  6     Contrast enters the airway, crosses the plane of the vocal folds, and is ejected from the airway.  7     Contrast enters the airway, crosses the plane of the vocal folds, and is not ejected from the airway despite effort.  8     Contrast enters the airway, crosses the plane of the vocal folds, is not ejected from the airway and there is no response to aspiration.    Oral phase:  Adequate oral acceptance and containment. Functional mastication with trace-mild oral residue appreciated. Swallow initiation at the level of the vallecula.     Pharyngeal phase:  Timely vocal fold adduction with delayed epiglottal inversion resulted in intermittent penetration during the swallow. Pt appeared sensate to this and either coughed or swallowed again in response. Mild vallecular residue appreciated with pudding and regular, indicating reduced base of tongue retraction.  "    Compensatory Strategies:  - Cleansing swallow was ineffective in reducing vallecular residue  - Liquid wash was effective in reducing vallecular residue   - Cough/throat clear was effective in ejecting penetrated material from laryngeal vestibule.     Severity Rating:  Severity Rating: HORACE  HORACE: Functional      Clinical Impressions  The pt presents with a functional oropharyngeal swallow. Swallow safety and efficiency are grossly intact.     Recommendations  Diet Consistency: Regular Solids/Thin Liquids  Medication: Whole with liquid, As tolerated  Supervision: Distant supervision - check on patient 2-3 times per meal  Positioning: Fully upright and midline during oral intake, Meals sitting upright in a chair, as tolerated  Strategies: Small bites/sips, Slow rate of intake, Reduce environmental distractions, alternate liquids and solids, straws okay   Oral Care: BID  Additional Instrumentation: None  Formal cognitive-linguistic evaluation      SLP Treatment Plan  Treatment Plan: Dysphagia Treatment, Patient/Family/Caregiver Training  SLP Frequency: 4x Per Week  Estimated Duration: Until Therapy Goals Met      Anticipated Discharge Needs  Discharge Recommendations: Recommend post-acute placement for additional speech therapy services prior to discharge home   Therapy Recommendations Upon DC: Dysphagia Training, Cognitive-Linguistic Training, Patient / Family / Caregiver Education       Patient / Family Goals  Patient / Family Goal #1: \"I need to take this home\" (referring to the Francis)  Goal #1 Outcome: Goal not met  Short Term Goal # 1: Pt will participate in prefeeding trials and demo sustained alertness for 2-3 minute at a time  Goal Outcome # 1: Goal met, new goal added  Short Term Goal # 2: If still demo'ing signs of dysphagia as alertness improves, pt will participate in instrumentation to define swallow physiology and determine ST POC  Goal Outcome # 2 : Goal met, new goal aded  Short Term Goal # 2 B " : Pt will consume a diet of reg/thins with no s/sx of aspiration and min cues.      Tony Sung, SLP

## 2023-12-28 NOTE — PROCEDURES
Central Line Insertion    Date/Time: 12/28/2023 8:22 AM    Performed by: Dani Bautista D.O.  Authorized by: Dani Bautista D.O.    Consent:     Consent obtained:  Verbal    Consent given by:  Patient    Risks discussed:  Arterial puncture, incorrect placement, infection, bleeding, pneumothorax and nerve damage    Alternatives discussed:  Alternative treatment  Pre-procedure details:     Hand hygiene: Hand hygiene performed prior to insertion      Sterile barrier technique: All elements of maximal sterile technique followed      Skin preparation:  2% chlorhexidine    Skin preparation agent: Skin preparation agent completely dried prior to procedure    Sedation:     Sedation type:  None  Anesthesia:     Anesthesia method:  Local infiltration    Local anesthetic:  Lidocaine 1% w/o epi  Procedure details:     Location:  R subclavian    Patient position:  Trendelenburg    Procedural supplies:  Triple lumen    Catheter size:  7 Fr    Landmarks identified: yes      Ultrasound guidance: yes      Sterile ultrasound techniques: Sterile gel and sterile probe covers were used      Number of attempts:  1    Successful placement: yes    Post-procedure details:     Post-procedure:  Dressing applied and line sutured    Guidewire: guidewire removal confirmed      Assessment:  Blood return through all ports, free fluid flow, no pneumothorax on x-ray and placement verified by x-ray    Patient tolerance of procedure:  Tolerated well, no immediate complications

## 2023-12-28 NOTE — THERAPY
Physical Therapy   Daily Treatment     Patient Name: Marty Mohr  Age:  78 y.o., Sex:  male  Medical Record #: [id number]  Today's Date: 12/28/2023     Precautions  Precautions: Fall Risk  Comments: SBP <160    Assessment  Pt seen for PT tx session with mobility detailed down below. Pt is more alert today and able to open both as well as sit up straight. Pt also progressed to standing activity, however he did fatigue somewhat quickly with OOB activity. Pt is tangential at times, needing to be cued to stay on task, although he is receptive to cues and participates as best he can. Pt stated that he lives upstairs in his apartment and will need to practice stair negotiation prior to DC home. Continue to recommend placement, will follow.     Plan    Treatment Plan Status: Continue Current Treatment Plan  Type of Treatment: Bed Mobility, Gait Training, Neuro Re-Education / Balance, Self Care / Home Evaluation, Stair Training, Therapeutic Activities, Therapeutic Exercise  Treatment Frequency: 4 Times per Week  Treatment Duration: Until Therapy Goals Met    DC Equipment Recommendations: Unable to determine at this time  Discharge Recommendations: Recommend post-acute placement for additional physical therapy services prior to discharge home        Vitals   O2 (LPM) 4   O2 Delivery Device Silicone Nasal Cannula   Vitals Comments BP within parameters throughout   Pain 0 - 10 Group   Location Head   Location Orientation Right;Posterior   Pain Rating Scale (NPRS)   (not quantified)   Description Aching   Therapist Pain Assessment During Activity   Cognition    Cognition / Consciousness X   Speech/ Communication Slurred  (tangential)   Level of Consciousness Alert   Ability To Follow Commands 1 Step   Safety Awareness Impaired   New Learning Impaired   Attention Impaired   Sequencing Impaired   Comments needing intermittent cueing to reorient pt to stay on task   Sitting Lower Body Exercises   Sitting Lower Body Exercises Yes    Long Arc Quad 1 set of 10   Balance   Sitting Balance (Static) Poor +   Sitting Balance (Dynamic) Poor   Standing Balance (Static) Trace +   Standing Balance (Dynamic) Trace +   Weight Shift Sitting Poor   Weight Shift Standing Poor   Bed Mobility    Supine to Sit Minimal Assist   Sit to Supine Minimal Assist   Scooting Minimal Assist   Skilled Intervention Verbal Cuing   Gait Analysis   Gait Level Of Assist Unable to Participate   Level of Assist with Stairs Unable to Participate   Functional Mobility   Sit to Stand Moderate Assist   Mobility STS x3 from EOB with 2 person assist   Skilled Intervention Verbal Cuing;Tactile Cuing;Sequencing   Comments cues for hand positioning and sequencing. pt fatigued with increased time OOB, however he was able to sidestep at EOB to reposition   ICU Target Mobility Level   ICU Mobility - Targeted Level Level 3A   How much difficulty does the patient currently have...   Turning over in bed (including adjusting bedclothes, sheets and blankets)? 2   Sitting down on and standing up from a chair with arms (e.g., wheelchair, bedside commode, etc.) 1   Moving from lying on back to sitting on the side of the bed? 2   How much help from another person does the patient currently need...   Moving to and from a bed to a chair (including a wheelchair)? 2   Need to walk in a hospital room? 1   Climbing 3-5 steps with a railing? 1   6 clicks Mobility Score 9   Activity Tolerance   Sitting Edge of Bed 10 min   Standing 2 min   Short Term Goals    Short Term Goal # 1 pt will move supine<>eob with spv in 6 tx for bed mobility   Goal Outcome # 1 Progressing as expected   Short Term Goal # 2 pt will complete spt with fww and spv in 6 tx for functional mobility.   Goal Outcome # 2 Progressing as expected   Short Term Goal # 3 pt will ambulate 150 ft with fww and spv in 6 tx for household distances.   Goal Outcome # 3 Goal not met   Short Term Goal # 4 pt will negotiate a FOS with spv in 6 tx for  home access.   Physical Therapy Treatment Plan   Physical Therapy Treatment Plan Continue Current Treatment Plan   Anticipated Discharge Equipment and Recommendations   DC Equipment Recommendations Unable to determine at this time   Discharge Recommendations Recommend post-acute placement for additional physical therapy services prior to discharge home   Interdisciplinary Plan of Care Collaboration   IDT Collaboration with  Nursing;Therapy Tech   Patient Position at End of Therapy In Bed;Bed Alarm On;Call Light within Reach   Collaboration Comments RN updated   Session Information   Date / Session Number  12/28- 2 (2/4, 1/2)

## 2023-12-29 PROBLEM — E87.0 HYPERNATREMIA: Status: ACTIVE | Noted: 2023-01-01

## 2023-12-29 PROBLEM — I61.4 CEREBELLAR HEMORRHAGE (HCC): Status: ACTIVE | Noted: 2023-01-01

## 2023-12-29 PROBLEM — E78.5 DYSLIPIDEMIA: Status: ACTIVE | Noted: 2023-01-01

## 2023-12-29 NOTE — THERAPY
"Speech Language Pathology   Communication Evaluation     Patient Name: Marty Mohr  AGE:  78 y.o., SEX:  male  Medical Record #: [id number]  Date of Service: 12/29/2023      History of Present Illness   Pt admitted following ICH. CT Scan this admit  1. Postop right cerebellar hemorrhage evacuation. Only a minimal amount of blood product and edema remains.  2. Resolved shift of the cerebellum.      PMHx:  See EMR.     General Information  Vitals  O2 Delivery Device: (P) Silicone Nasal Cannula  Level of Consciousness: (P) Alert  Patient Behaviors: (P)  (WNL)  Orientation: (P) Situation, General place, Current month, Current year         Prior Living Situation & Level of Function  Comments: (P) Pt stated lives indep.      Subjective  (P) \"I can't remember.\"      Communication Domain(s)  Expressive Language: (P) Moderate  Receptive Language: (P) Mild  Cognitive-Linguistic: (P) Mild (severe STM deficit)  Reading: (P) WFL         Assessment  The patient was seen this date for a cognitive linguistic evaluation. Cognitstat scoring: Orientation-Average 12/12; Attention-Average 8/8; Xygrbsywat-Oklqncd-18/12; Memory-2/12-Below Severe; Similarities-Mild 4/8, and Judgement-Average 6/6. Initially, pt with some word finding difficulty that improved as eval progressed. Pt accurate naming 10/10 pictures of common objects. Pt not able to write his name probably r/t ataxia of his RUE. Pt orally reading 1/4 inch bold printed phrases and sentences. Speech is difficult to understand with approx 50% intelligibility with imprecise articulation. Intensity of speech is WNL.      Clinical Impressions  Possible fluctuating cognition that is improving per notes. Pt with significant and severe memory difficulty and recalled 2 out of 4 words with choice of 2 cues after 10 minutes. Pt with possible suspiciousness stating \"You are trying to trick me. You didn't give me those words.\" SLP provided educ and support. Pt with HA at \"4\" after the eval with " "RN.       NOTE: It is not within the scope of practice of Speech-Language Pathologists to determine patient capacity. Please defer to the physician or psych to complete this assessment.       Recommendations  Supervision Needs Upons Discharge: (P) Direct assistance with IADLs (see below)  IADLs: (P) Medication management, Financial management         SLP Treatment Plan  Treatment Plan: (P) Cognitive Treatment, Speech-Language Treatment  SLP Frequency: (P) 4x Per Week  Estimated Duration: (P) Until Therapy Goals Met      Anticipated Discharge Needs  Discharge Recommendations: (P) Recommend post-acute placement for additional speech therapy services prior to discharge home  Therapy Recommendations Upon DC: (P) Cognitive-Linguistic Training, Writing Training, Expression Training      Patient / Family Goals  Patient / Family Goal #1: (P) \"I can't remember.\"  Goal #1 Outcome: (P) Goal not met  Short Term Goal # 1: (P) 12/29 Pt will trace at the letter level with RUE with fair legibility.  Goal Outcome # 1: (P) Goal not met  Short Term Goal # 2: (P) 12/29/23 Pt will use strategies for STM recall, with assist for writing, to recall func information r/t hospitalization with max assist. (goal not met)  Goal Outcome # 2 : Goal met, new goal aded  Short Term Goal # 2 B : Pt will consume a diet of reg/thins with no s/sx of aspiration and min cues.      Esther Chavez, SLP  "

## 2023-12-29 NOTE — DISCHARGE PLANNING
Case Management Discharge Planning    Case Management Discharge Planning    Admission Date: 12/26/2023  GMLOS: 6.6  ALOS: 2    6-Clicks ADL Score: 15  6-Clicks Mobility Score: 9  PT and/or OT Eval ordered: Yes  Post-acute Referrals Ordered: Yes  Post-acute Choice Obtained: No  Has referral(s) been sent to post-acute provider:  Yes ([facility], per protocol)      Anticipated Discharge Disposition: D/T to SNF with Medicare cert in anticipation of skilled care (03)    DME Needed: No    Action(s) Taken: Chart reviewed & case discussed in ICU rounds:  POD#2 s/p suboccipital craniectomy for evacuation of ICH and titanium mesh cranioplasty.   AAOx4, following commands, +expressive aphasia.   +O2 4L nc/Q4H neurochecks.  Neurology & Neurosurgery following.   Possible transfer out of ICU later today.    PT/OT/SLP recommending post-acute placement.    Per Brad, Clinical Admission Coord @ [facility], patient not appropriate for IRF 2/2 lack of DC support.    CM met at bedside with patient to complete assessment and discuss DCP.   Patient requested for CM to return when he is feeling better or call his daughter, Jaymie, for any questions.    T/C placed to Jaymie/daughter #[phone] who provided following info:   Patient lives with a roommate, Varun #[phone] and uses a PO Box for mailing address; Jaymie unable to provide home address & re-directed CM to call Varun for home address.   Prior to admission he was independent with ADLs, driving, and ambulating w/o assistive devices.  No DME used.  No Hx of ETOH or drug abuse.   Patient is retired, collecting [employer] benefits (amount not known to Jaymie).   Local support consist of daughter/Jaymie and friends.   Patient has another daughter, Kendra Neri #[phone], who lives in [place].    No PCP.    PT/OT/SLP recommendation of post-acute placement discussed with Jamyie who is agreeable to SNF placement however, patient being AAOx4 would be the one deciding.    [place]  referrals submitted via Epic.    CM called patient's roommate, Varun #[phone], who provided home address:  [address] [place] [address] [address], [place] [address]  (2nd floor apartment w/o elevator access).     Escalations Completed: None    Medically Clear: No    Next Steps: CM will f/u on medical clearance, SNF referrals statuses    Barriers to Discharge: Medical clearance, pending SNF acceptance    Is the patient up for discharge tomorrow: No    **1215 Hrs - PASRR completed; PASRR [id number]      Care Transition Team Assessment    Information Source  Orientation Level: Oriented X4  Information Given By: Relative, Other (Comments) (Daughter & chart review)  Informant's Name: Jaymie Banda  Who is responsible for making decisions for patient? : Patient    Readmission Evaluation  Is this a readmission?: No    Elopement Risk  Legal Hold: No  Ambulatory or Self Mobile in Wheelchair: No-Not an Elopement Risk  Elopement Risk: Not at Risk for Elopement    Interdisciplinary Discharge Planning  Lives with - Patient's Self Care Capacity: Unrelated Adult  Housing / Facility: 2 Story Apartment / Condo    Discharge Preparedness  What is your plan after discharge?: Skilled nursing facility  What are your discharge supports?: Child, Other (comment) (Daughter & friends)  Prior Functional Level: Ambulatory, Drives Self, Independent with Activities of Daily Living    Functional Assesment  Prior Functional Level: Ambulatory, Drives Self, Independent with Activities of Daily Living    Finances  Financial Barriers to Discharge: No  Prescription Coverage: Yes    Vision / Hearing Impairment  Vision Impairment : Yes  Right Eye Vision: Wears Glasses, Impaired  Left Eye Vision: Wears Glasses, Impaired  Hearing Impairment : Yes  Hearing Impairment: Hearing Device Not Available  Does Pt Need Special Equipment for the Hearing Impaired?: No     Domestic Abuse  Have you ever been the victim of abuse or violence?: No  Physical Abuse or Sexual Abuse:  No  Verbal Abuse or Emotional Abuse: No  Possible Abuse/Neglect Reported to:: Not Applicable    Psychological Assessment  History of Substance Abuse: None  History of Psychiatric Problems: No  Non-compliant with Treatment: No  Newly Diagnosed Illness: Yes    Discharge Risks or Barriers  Discharge risks or barriers?: Complex medical needs  Patient risk factors: Complex medical needs    Anticipated Discharge Information  Discharge Disposition: D/T to SNF with Medicare cert in anticipation of skilled care (03)

## 2023-12-29 NOTE — CONSULTS
Hospital Medicine Consultation    Date of Service  2023    Referring Physician  Dani Bautista D.O.    Consulting Physician  Dez Kimball D.O.    Reason for Consultation  Transfer of care out of ICU    History of Presenting Illness  78 y.o. male who presented 2023 after a room mate found him down on the floor with vomit around him.  Apparently he had fallen forward and hit his head.  He was found to be hypertensive to 200's and CT scan of the head found a cerebellar ICH 4.6x2.9x.6cm with surrounding edema and effacement of 4th ventricle, CTA with no vascular malformation, CT maxillofacial with right frontal hematoma without fracture and CT spine with no fracture.  Neurosurgeon consulted and on  took the patient for right suboccipital craniotomy for evacuation and decompression.    : Patient with mild word finding difficulty.  States some difficulty with his leg coordination.  Has good fine motor skills, strength and sensory to bilateral hands.      Review of Systems  Review of Systems   Constitutional:  Negative for fever and malaise/fatigue.   HENT:  Negative for congestion and hearing loss.    Eyes:  Negative for blurred vision.   Respiratory:  Negative for shortness of breath.    Cardiovascular:  Negative for chest pain, palpitations and leg swelling.   Gastrointestinal:  Negative for abdominal pain and nausea.   Genitourinary:  Negative for dysuria.   Musculoskeletal:  Negative for back pain and neck pain.   Neurological:  Positive for focal weakness, weakness (coordination of legs) and headaches. Negative for dizziness and sensory change.   Psychiatric/Behavioral:  The patient is not nervous/anxious.        Past Medical History   has a past medical history of Patient denies medical problems.    Surgical History   has a past surgical history that includes craniotomy (Right, 2023).    Family History  Parents  in their 80's.     Social History   reports current alcohol use. He  reports that he does not currently use drugs. Prior smoker.  Worked in construction.  Has two daughters, one here locally and one in [place]. He is single.    Medications  Current Facility-Administered Medications   Medication Dose Route Frequency Provider Last Rate Last Admin    lisinopril (Prinivil) tablet 10 mg  10 mg Oral TWICE DAILY Dez Kimball D.O.        enalaprilat (Vasotec) injection 1.25 mg 1 mL  1.25 mg Intravenous Q6HRS PRN Dez Kimball D.O.        prochlorperazine (Compazine) injection 10 mg  10 mg Intravenous Q6HRS PRN Kailee Granda, A.P.R.N.        hydrALAZINE (Apresoline) injection 10-20 mg  10-20 mg Intravenous Q6HRS PRN Kailee Granda, A.P.R.N.   20 mg at 12/29/23 1008    amLODIPine (Norvasc) tablet 10 mg  10 mg Oral Q DAY Dani Bautista D.O.   10 mg at 12/29/23 0544    Respiratory Therapy Consult   Nebulization Continuous RT Donnie Babin M.D.        acetaminophen (Tylenol) tablet 650 mg  650 mg Oral Q4HRS PRN Donnie Babin M.D.   650 mg at 12/28/23 0353    Or    acetaminophen (Tylenol) suppository 650 mg  650 mg Rectal Q4HRS PRN Donnie Babin M.D.        ondansetron (Zofran ODT) dispertab 4 mg  4 mg Oral Q4HRS PRN Donnie Babin M.D.        Or    ondansetron (Zofran) syringe/vial injection 4 mg  4 mg Intravenous Q4HRS PRN Donnie Babin M.D. MD Alert...ICU Electrolyte Replacement per Pharmacy   Other PHARMACY TO DOSE Donnie Babin M.D.        niCARdipine (Cardene) 25 mg in  mL Standard Infusion  0-15 mg/hr Intravenous Continuous Donnie Babin M.D.   Stopped at 12/28/23 2235    insulin regular (HumuLIN R,NovoLIN R) injection  1-6 Units Subcutaneous Q6HRS Donnie Babin M.D.   1 Units at 12/28/23 1814    And    dextrose 10 % BOLUS 25 g  25 g Intravenous Q15 MIN PRN Donnie Babin M.D.           Allergies  No Known Allergies    Physical Exam  Pulse:  [62-93] 91  Resp:  [12-59] 55  BP: (122-172)/(56-99) 158/73  SpO2:  [90 %-98 %] 95 %    Physical  Exam  Vitals reviewed.   Constitutional:       Appearance: Normal appearance. He is not diaphoretic.   HENT:      Head: Normocephalic.      Comments: Right periorbital hematoma.  Right occipital surgical site with clean dressing and no swelling.     Nose: Nose normal.      Mouth/Throat:      Mouth: Mucous membranes are moist.      Pharynx: No oropharyngeal exudate.   Eyes:      General: No scleral icterus.        Right eye: No discharge.         Left eye: No discharge.      Extraocular Movements: Extraocular movements intact.      Conjunctiva/sclera: Conjunctivae normal.   Cardiovascular:      Rate and Rhythm: Normal rate and regular rhythm.      Pulses:           Radial pulses are 2+ on the right side and 2+ on the left side.        Dorsalis pedis pulses are 2+ on the right side and 2+ on the left side.      Heart sounds: No murmur heard.  Pulmonary:      Effort: Pulmonary effort is normal. No respiratory distress.      Breath sounds: Normal breath sounds. No wheezing or rales.   Abdominal:      General: Bowel sounds are normal. There is no distension.      Palpations: Abdomen is soft.      Tenderness: There is no abdominal tenderness.   Musculoskeletal:         General: No swelling or tenderness.      Cervical back: Neck supple. No muscular tenderness.      Right lower leg: No edema.      Left lower leg: No edema.   Lymphadenopathy:      Cervical: No cervical adenopathy.   Skin:     Coloration: Skin is not jaundiced or pale.   Neurological:      General: No focal deficit present.      Mental Status: He is alert and oriented to person, place, and time. Mental status is at baseline.      Cranial Nerves: No cranial nerve deficit.      Coordination: Coordination abnormal.   Psychiatric:         Mood and Affect: Mood normal.         Behavior: Behavior normal.         Fluids  Date 12/29/23 0700 - 12/30/23 0659   Shift 4345-2688 8008-8464 4962-3374 24 Hour Total   INTAKE   P.O. 130   130   Shift Total 130   130   OUTPUT    Urine 1300   1300   Shift Total 1300   1300   Weight (kg) 96 96 96 96       Laboratory  Recent Labs     12/27/23  0021 12/28/23  0600 12/29/23  0422   WBC 13.4* 17.7* 12.2*   RBC 5.05 4.78 4.42*   HEMOGLOBIN 15.5 14.4 13.2*   HEMATOCRIT 45.5 44.0 41.3*   MCV 90.1 92.1 93.4   MCH 30.7 30.1 29.9   MCHC 34.1 32.7 32.0*   RDW 43.2 46.7 46.7   PLATELETCT 274 260 252   MPV 10.4 10.4 10.2     Recent Labs     12/27/23  0600 12/27/23  1223 12/28/23  0600 12/28/23  1305 12/29/23  0027 12/29/23  0422 12/29/23  0559   SODIUM 138   < > 146*   < > 149* 148* 147*   POTASSIUM 4.2  --  3.8  --   --  3.6  --    CHLORIDE 105  --  112  --   --  114*  --    CO2 22  --  24  --   --  27  --    GLUCOSE 221*  --  154*  --   --  124*  --    BUN 19  --  16  --   --  15  --    CREATININE 1.05  --  1.08  --   --  0.95  --    CALCIUM 8.6  --  8.6  --   --  8.8  --     < > = values in this interval not displayed.     Recent Labs     12/27/23  0021   APTT 27.9   INR 1.13          Recent Labs     12/27/23  0406   TRIGLYCERIDE 85   HDL 46   *        Imaging  EC-ECHOCARDIOGRAM COMPLETE W/O CONT   Final Result      DX-CHEST-PORTABLE (1 VIEW)   Final Result      New right subclavian line tip overlies the cavoatrial junction and no pneumothorax is identified      Similar hypoventilatory appearance of the chest with basilar atelectasis, some areas of scarring probable.      CT-HEAD W/O   Final Result         1. Postop right cerebellar hemorrhage evacuation. Only a minimal amount of blood product and edema remains.   2. Resolved shift of the cerebellum.         DX-CHEST-LIMITED (1 VIEW)   Final Result      1.  Hypoinflation and mild pulmonary vascular congestion.   2.  No pneumonia or pneumothorax.      CT-CTA HEAD WITH & W/O-POST PROCESS   Final Result      1.  Large RIGHT cerebellar intraparenchymal hemorrhage with associated mass effect and 5 mm posterior fossa midline shift.   2.  No intracranial aneurysm, focal high-grade stenosis, or abrupt  large vessel cut off.   3.  RIGHT forehead scalp hematoma.      These findings were electronically conveyed to and received by EMELY FRIAS on 12/27/2023 12:27 AM.         CT-MAXILLOFACIAL W/O PLUS RECONS   Final Result      1.  Large RIGHT forehead scalp hematoma.   2.  No facial fracture.      CT-CSPINE WITHOUT PLUS RECONS   Final Result      1.  Moderate degenerative change of cervical spine.   2.  No fracture or subluxation.          Assessment/Plan  * Intracranial hemorrhage (HCC)- (present on admission)  Assessment & Plan  S/p craniotomy  BP control  PT/OT/ST  Physiatry consulting  neurochecks    Dyslipidemia  Assessment & Plan  LDL:221  Diet and lifestyle changes need to occur  Recommend statin therapy    Hypernatremia  Assessment & Plan  12/29 Na:148  Allowing for some hypernatremia to help prevent cerebral edema.  Will loosen his fluid restriction slightly.    Primary hypertension  Assessment & Plan  12/29 added lisinopril 10mg BID  Continue amlodipine 10mg  PRN BP to keep SBP <160  Monitor vitals.

## 2023-12-29 NOTE — CARE PLAN
The patient is Stable - Low risk of patient condition declining or worsening    Shift Goals  Clinical Goals: stable neuro, sbp <160  Patient Goals: sleep  Family Goals: polo    Progress made toward(s) clinical / shift goals:    Problem: Knowledge Deficit - Standard  Goal: Patient and family/care givers will demonstrate understanding of plan of care, disease process/condition, diagnostic tests and medications  Outcome: Progressing     Problem: Skin Integrity  Goal: Skin integrity is maintained or improved  Outcome: Progressing     Problem: Neuro Status  Goal: Neuro status will remain stable or improve  Outcome: Progressing     Problem: Pain - Standard  Goal: Alleviation of pain or a reduction in pain to the patient’s comfort goal  Outcome: Met     Problem: Fall Risk  Goal: Patient will remain free from falls  Outcome: Met       Patient is not progressing towards the following goals:

## 2023-12-29 NOTE — PROGRESS NOTES
Neurosurgery Progress Note    Subjective:  No changes overnight    Exam:  Awakens to voice  Oriented x 3-4, slurred/mumbled speech  FC x 4, Perrl 3mm bilat  Inc c/d    BP  Min: 122/67  Max: 160/75  Pulse  Av.2  Min: 62  Max: 94  Resp  Av.4  Min: 12  Max: 61  Monitored Temp 2  Av.1 °C (98.8 °F)  Min: 36.6 °C (97.9 °F)  Max: 37.6 °C (99.7 °F)  SpO2  Av.3 %  Min: 88 %  Max: 98 %    No data recorded    Recent Labs     23  0021 23  0600 23  0422   WBC 13.4* 17.7* 12.2*   RBC 5.05 4.78 4.42*   HEMOGLOBIN 15.5 14.4 13.2*   HEMATOCRIT 45.5 44.0 41.3*   MCV 90.1 92.1 93.4   MCH 30.7 30.1 29.9   MCHC 34.1 32.7 32.0*   RDW 43.2 46.7 46.7   PLATELETCT 274 260 252   MPV 10.4 10.4 10.2       Recent Labs     23  0600 23  1223 23  0600 23  1305 23  0027 23  0422 23  0559   SODIUM 138   < > 146*   < > 149* 148* 147*   POTASSIUM 4.2  --  3.8  --   --  3.6  --    CHLORIDE 105  --  112  --   --  114*  --    CO2 22  --  24  --   --  27  --    GLUCOSE 221*  --  154*  --   --  124*  --    BUN 19  --  16  --   --  15  --    CREATININE 1.05  --  1.08  --   --  0.95  --    CALCIUM 8.6  --  8.6  --   --  8.8  --     < > = values in this interval not displayed.       Recent Labs     23  002   APTT 27.9   INR 1.13       Recent Labs     23   REACTMIN 4.6   CLOTKINET 1.1   CLOTANGL 74.8   MAXCLOTS 63.4   USO39RUZ 0.0   PRCINADP 42.9*   PRCINAA 6.7         Intake/Output                         23 07 - 23 0659 23 - 23 Total  Total                 Intake    P.O.  1000  500 1500  100  -- 100    P.O. 3647 680 0104 100 -- 100    I.V.  1776.1  829.5 2605.6  --  -- --    Cardene Volume 697.3 432.2 1129.4 -- -- --    Volume (mL) (NS (Bolus) 0.9 % infusion 1,000 mL) 500 -- 500 -- -- --    Volume (mL) (3% sodium chloride (Hypertonic Saline) 500mL infusion 500 mL) 578.8 397.4 976.2  -- -- --    Total Intake 2776.1 1329.5 4105.6 100 -- 100       Output    Urine  1995  2675 4676  400  -- 400    Output (mL) (Urethral Catheter Temperature probe) 1995 2670 4630 400 -- 400    Stool  --  -- --  --  -- --    Number of Times Stooled -- 0 x 0 x 0 x -- 0 x    Total Output 1995 2679 4613 400 -- 400       Net I/O     781.1 -1345.5 -564.4 -300 -- -300              Intake/Output Summary (Last 24 hours) at 12/29/2023 0844  Last data filed at 12/29/2023 0800  Gross per 24 hour   Intake 3497.46 ml   Output 4770 ml   Net -1272.54 ml               prochlorperazine  10 mg Q6HRS PRN    hydrALAZINE  10-20 mg Q6HRS PRN    amLODIPine  10 mg Q DAY    Respiratory Therapy Consult   Continuous RT    acetaminophen  650 mg Q4HRS PRN    Or    acetaminophen  650 mg Q4HRS PRN    ondansetron  4 mg Q4HRS PRN    Or    ondansetron  4 mg Q4HRS PRN    MD Alert...Adult ICU Electrolyte Replacement per Pharmacy   PHARMACY TO DOSE    niCARdipine (Cardene) 25 mg in  mL Standard Infusion  0-15 mg/hr Continuous    insulin regular  1-6 Units Q6HRS    And    dextrose bolus  25 g Q15 MIN PRN       Assessment and Plan:  Hospital day # 3 ICH  POD# 2 Rt SOC and evac ICH   Chemical prophylactic DVT therapy: No  Start date/time: TBD      Q4 hour neuro checks  Keep SBP <160- on nicardipine  Pt/ot/mobilize as justo  CT yesterday stable- see Dr Patten note

## 2023-12-29 NOTE — DISCHARGE PLANNING
Spoke with Jaymie, daughter regarding D/C resources/support.  She states that [facility] does not have anyone that is able to provide 24/7 physical assistance.  [facility] will no longer follow.  Please reach out to myself with any questions.

## 2023-12-29 NOTE — PROGRESS NOTES
Patient has undergone excellent neurosurgical resection of ICH and hematoma evacuation. As the patient has minimal blood and thus minimal inflammatory cascade and resolution of shift and edema within the surgical bed and surgical blood brain barrier disruption will stop hypertonic saline gtt to prevent paradoxical cerebral edema in this very area and allow for slow trend to normal sodium levels.     Donnie Babin MD  Critical Care Medicine

## 2023-12-29 NOTE — PROGRESS NOTES
Neurology Progress Note  Neurohospitalist Service, [facility]    Referring Physician: Dani Bautista D.O.    Chief Complaint   Patient presents with    Trauma Green       HPI: Refer to initial documented Neurology H&P, as detailed in the patient's chart.    Interval History: No acute events overnight.  No new complaints, no headache or visual impairment.  He feels much better he says.    Review of systems: In addition to what is detailed in the HPI and/or updated in the interval history, all other systems reviewed and are negative.    Past Medical History:    has a past medical history of Patient denies medical problems.    FHx:  family history is not on file.    SHx:   reports current alcohol use. He reports that he does not currently use drugs.    Medications:    Current Facility-Administered Medications:     prochlorperazine (Compazine) injection 10 mg, 10 mg, Intravenous, Q6HRS PRN, Kailee Granda, A.P.R.N.    hydrALAZINE (Apresoline) injection 10-20 mg, 10-20 mg, Intravenous, Q6HRS PRN, Kailee Coleer, A.P.R.N.    amLODIPine (Norvasc) tablet 10 mg, 10 mg, Oral, Q DAY, Dani Bautista D.O., 10 mg at 12/28/23 1021    Respiratory Therapy Consult, , Nebulization, Continuous RT, Donnie Babin M.D.    acetaminophen (Tylenol) tablet 650 mg, 650 mg, Oral, Q4HRS PRN, 650 mg at 12/28/23 0353 **OR** acetaminophen (Tylenol) suppository 650 mg, 650 mg, Rectal, Q4HRS PRN, Donnie Babin M.D.    ondansetron (Zofran ODT) dispertab 4 mg, 4 mg, Oral, Q4HRS PRN **OR** ondansetron (Zofran) syringe/vial injection 4 mg, 4 mg, Intravenous, Q4HRS PRN, Donnie Babin M.D.    MD Alert...ICU Electrolyte Replacement per Pharmacy, , Other, PHARMACY TO DOSE, Donnie Babin M.D.    niCARdipine (Cardene) 25 mg in  mL Standard Infusion, 0-15 mg/hr, Intravenous, Continuous, Donnie Babin M.D., Last Rate: 75 mL/hr at 12/28/23 1400, 7.5 mg/hr at 12/28/23 1400    3% sodium chloride (Hypertonic Saline) 500mL  infusion 500 mL, 500 mL, Intravenous, Continuous, Donnie Babin M.D., Last Rate: 60 mL/hr at 12/28/23 1310, New Bag at 12/28/23 1310    insulin regular (HumuLIN R,NovoLIN R) injection, 1-6 Units, Subcutaneous, Q6HRS, 1 Units at 12/27/23 1233 **AND** POC blood glucose manual result, , , Q6H **AND** NOTIFY MD and PharmD, , , Once **AND** Administer 20 grams of glucose (approximately 8 ounces of fruit juice) every 15 minutes PRN FSBG less than 70 mg/dL, , , PRN **AND** dextrose 10 % BOLUS 25 g, 25 g, Intravenous, Q15 MIN PRN, Donnie Babin M.D.    Physical Examination:    Vitals:    12/28/23 1545 12/28/23 1600 12/28/23 1615 12/28/23 1630   BP: (!) 159/78 138/65 122/67 (!) 154/99   Pulse: 88 81 87 84   Resp: 14 (!) 27 (!) 46 (!) 34   Temp:       TempSrc:  Bladder     SpO2: 93% 92% 92% 92%   Weight:       Height:           General:   Patient is awake, alert and in no acute distress  Neck: Full range of motion  Eyes: Midline, Pupils reactive to light.  Significant ecchymosis in the right frontal orbital  CV: RRR  Lungs: No respiratory distress  Extremities: No cyanosis, warm, no significant edema.     NEUROLOGICAL EXAM:   Mental status: Awake, alert and fully oriented, follows commands  Speech and language: speech is slightly dysarthric but completely comprehendible with occasional mumbling. The patient is able to name and repeat.  Cranial nerve exam: Pupils are equal, round and reactive to light bilaterally. Visual fields are full. Extraocular muscles are intact. Sensation in the face is intact to light touch. Face is symmetric. Hearing to finger rub equal. Palate elevates symmetrically. Shoulder shrug is full. Tongue is midline.  Motor exam: Sustain antigravity in all 4 extremities with no downward drift. Tone is normal. No abnormal movements were seen on exam.  Sensory exam: No sensory deficits identified   Coordination: no gross ataxia noted on exam  Plantar reflexes: Equivocal  Gait: deferred given patient  preference    Objective Data:    Labs:  Lab Results   Component Value Date/Time    PROTHROMBTM 14.6 12/27/2023 12:21 AM    INR 1.13 12/27/2023 12:21 AM      Lab Results   Component Value Date/Time    WBC 17.7 (H) 12/28/2023 06:00 AM    RBC 4.78 12/28/2023 06:00 AM    HEMOGLOBIN 14.4 12/28/2023 06:00 AM    HEMATOCRIT 44.0 12/28/2023 06:00 AM    MCV 92.1 12/28/2023 06:00 AM    MCH 30.1 12/28/2023 06:00 AM    MCHC 32.7 12/28/2023 06:00 AM    MPV 10.4 12/28/2023 06:00 AM    NEUTSPOLYS 83.60 (H) 12/28/2023 06:00 AM    LYMPHOCYTES 7.20 (L) 12/28/2023 06:00 AM    MONOCYTES 8.60 12/28/2023 06:00 AM    EOSINOPHILS 0.10 12/28/2023 06:00 AM    BASOPHILS 0.10 12/28/2023 06:00 AM      Lab Results   Component Value Date/Time    SODIUM 147 (H) 12/28/2023 01:05 PM    POTASSIUM 3.8 12/28/2023 06:00 AM    CHLORIDE 112 12/28/2023 06:00 AM    CO2 24 12/28/2023 06:00 AM    GLUCOSE 154 (H) 12/28/2023 06:00 AM    BUN 16 12/28/2023 06:00 AM    CREATININE 1.08 12/28/2023 06:00 AM      Lab Results   Component Value Date/Time    CHOLSTRLTOT 284 (H) 12/27/2023 04:06 AM     (H) 12/27/2023 04:06 AM    HDL 46 12/27/2023 04:06 AM    TRIGLYCERIDE 85 12/27/2023 04:06 AM       Lab Results   Component Value Date/Time    ALKPHOSPHAT 93 12/28/2023 06:00 AM    ASTSGOT 20 12/28/2023 06:00 AM    ALTSGPT 14 12/28/2023 06:00 AM    TBILIRUBIN 0.4 12/28/2023 06:00 AM        Imaging/Testing:    I interpreted and/or reviewed the patient's neuroimaging    DX-CHEST-PORTABLE (1 VIEW)   Final Result      New right subclavian line tip overlies the cavoatrial junction and no pneumothorax is identified      Similar hypoventilatory appearance of the chest with basilar atelectasis, some areas of scarring probable.      CT-HEAD W/O   Final Result         1. Postop right cerebellar hemorrhage evacuation. Only a minimal amount of blood product and edema remains.   2. Resolved shift of the cerebellum.         DX-CHEST-LIMITED (1 VIEW)   Final Result      1.   Hypoinflation and mild pulmonary vascular congestion.   2.  No pneumonia or pneumothorax.      CT-CTA HEAD WITH & W/O-POST PROCESS   Final Result      1.  Large RIGHT cerebellar intraparenchymal hemorrhage with associated mass effect and 5 mm posterior fossa midline shift.   2.  No intracranial aneurysm, focal high-grade stenosis, or abrupt large vessel cut off.   3.  RIGHT forehead scalp hematoma.      These findings were electronically conveyed to and received by EMELY FRIAS on 12/27/2023 12:27 AM.         CT-MAXILLOFACIAL W/O PLUS RECONS   Final Result      1.  Large RIGHT forehead scalp hematoma.   2.  No facial fracture.      CT-CSPINE WITHOUT PLUS RECONS   Final Result      1.  Moderate degenerative change of cervical spine.   2.  No fracture or subluxation.      EC-ECHOCARDIOGRAM COMPLETE W/O CONT    (Results Pending)       Assessment and Plan:  Marty Mohr is a 75 y.o. male with no significant past medical history who was found down in his vomit with bruises in the right orbital area.  He was confused, lethargic and hypertensive in the range of above 200s.    He underwent a brain CT which revealed relatively large right cerebellar hemorrhage with surrounding edema and effacement of fourth ventricle.    Etiology likely hypertensive hemorrhage.  CTA head with no vascular lesion, ICH score is 2.  His trauma workup including CT maxillofacial and CT of the spine were unrevealing.  He is status post suboccipital craniectomy.  He is post bleed day #2  He is postop day #1.  Repeat brain CT 12/2080/23 much improved with minimal blood products and resolved midline shift in cerebellum.      Plan:  -Continue close neuro monitoring in ICU  -q2h and PRN neuro assessment. VS per nursing/unit protocol.   -No known underlying coagulopathy, maintain INR < 1.5, platelets > 100 K  -Maintain systolic blood pressure less than 160, antihypertensives per primary team.  Cardene drip is preferred.  -No antiplatelet or  anticoagulant.  -Keep head of bed elevated at 30 degrees.  -Hypertonic Na with the goal of Na 145-155  -Maintain bowel regimen to avoid Valsalva and increase intracranial pressure.  -Recommend aggressive BG management per primary team. Avoid IVF with Dextrose. -BG goal .   -Maintain normothermia, and normoglycemia.  -If no seizure, no need for prophylactic antiepileptic medication from neurology perspective.  -PT/OT/SLP eval and treat for early mobilization if blood pressure remains stable.  -All other medical management per primary team.   -DVT PPX: SCDs.     The evaluation of the patient, and recommended management, was discussed with Dani Bautista D.O.    Please note that this dictation was created using voice recognition software. I have made every reasonable attempt to correct obvious errors, but I expect that there are errors of grammar and possibly content that I did not discover before finalizing the note.      Scarlett Duffy MD  [facility]

## 2023-12-29 NOTE — ASSESSMENT & PLAN NOTE
Right cerebellar hemorrhagic infarct  Neurosurgery consulted, s/p craniotomy and evacuation of hematoma 12/27 by Dr. Patten  Neurology consulted  PT/OT/ST recommend post-acute placement  IPR consulted, not a candidate due to lack of discharge support  SNF referrals in progress  LTAC referral placed due to ICU stay s/p craniotomy but minimal acute needs

## 2023-12-29 NOTE — PROGRESS NOTES
Critical Care Progress Note    Date of admission  2023    Chief Complaint  75 y.o. male who presented 2023 with no significant pass medical hx. That was found in vomit and fell forward striking his face with bruising to right orbit area. He was confused and brought in by EMS to [facility]. He was found to be hypertensive to 200's and CT scan of the head found a cerebellar ICH 4.6x2.9x.6cm with surrounding edema and effacement of 4th ventricle, CTA with no vascular malformation, CT maxillofacial with right frontal hematoma without fracture and CT spine with no fracture. His INR was normal pending teg. He had neurology and Dr Patten of neurosurgery has been consulted. He has requested to be full code. He will be admitted to ICU for hypertension rx and hypertonic saline. History is limited by his dysarthric speech.      Hospital Course  No notes on file    Interval Problem Update  Reviewed last 24 hour events:  No acute events overnight  Alert, oriented, RASS 0  Off hypertonic and cardene gtt.   -150s within goal   HR 60-80s  On 4L NC, sat >92%  Afebrile  Tolerating diabetic diet  Creatinine 1.05  Water fluid restriction  Mobility - stood up yesterday. PT/OT as tolerated.     Pt evidently also has 2 MRNs due to wrong  entered at admission     Review of Systems  Review of Systems   Constitutional:  Negative for chills.   Respiratory:  Negative for shortness of breath.    Cardiovascular:  Negative for chest pain.   Gastrointestinal:  Negative for abdominal pain, nausea and vomiting.   Neurological:  Negative for weakness.   All other systems reviewed and are negative.       Vital Signs for last 24 hours   Pulse:  [62-94] 74  Resp:  [12-67] 24  BP: (122-159)/() 159/76  SpO2:  [88 %-98 %] 93 %    Hemodynamic parameters for last 24 hours       Respiratory Information for the last 24 hours       Physical Exam   Physical Exam  Vitals and nursing note reviewed.   Constitutional:       General: He is not in  acute distress.     Appearance: He is ill-appearing. He is not toxic-appearing.   HENT:      Head: Normocephalic.      Mouth/Throat:      Mouth: Mucous membranes are moist.   Eyes:      Comments: Right periorbital ecchymosis   Cardiovascular:      Rate and Rhythm: Normal rate and regular rhythm.      Pulses: Normal pulses.      Heart sounds: Normal heart sounds. No murmur heard.  Pulmonary:      Effort: Pulmonary effort is normal. No respiratory distress.      Breath sounds: Normal breath sounds. No wheezing, rhonchi or rales.   Abdominal:      General: Bowel sounds are normal. There is no distension.      Palpations: Abdomen is soft.      Tenderness: There is no abdominal tenderness. There is no guarding.   Musculoskeletal:         General: No swelling or tenderness.      Right lower leg: No edema.      Left lower leg: No edema.   Skin:     General: Skin is warm and dry.      Capillary Refill: Capillary refill takes less than 2 seconds.      Coloration: Skin is not jaundiced.   Neurological:      General: No focal deficit present.      Mental Status: He is alert and oriented to person, place, and time.      Cranial Nerves: No cranial nerve deficit.       Medications  Current Facility-Administered Medications   Medication Dose Route Frequency Provider Last Rate Last Admin    prochlorperazine (Compazine) injection 10 mg  10 mg Intravenous Q6HRS PRN Kailee Granda, A.P.R.N.        hydrALAZINE (Apresoline) injection 10-20 mg  10-20 mg Intravenous Q6HRS PRN Kailee Granda, A.P.R.N.        amLODIPine (Norvasc) tablet 10 mg  10 mg Oral Q DAY Dani Bautista D.O.   10 mg at 12/29/23 0544    Respiratory Therapy Consult   Nebulization Continuous RT Donnie Babin M.D.        acetaminophen (Tylenol) tablet 650 mg  650 mg Oral Q4HRS PRN Donnie Babin M.D.   650 mg at 12/28/23 0353    Or    acetaminophen (Tylenol) suppository 650 mg  650 mg Rectal Q4HRS PRN Donnie Babin M.D.        ondansetron (Zofran ODT) dispertab 4  mg  4 mg Oral Q4HRS PRN Donnie Babin M.D.        Or    ondansetron (Zofran) syringe/vial injection 4 mg  4 mg Intravenous Q4HRS PRN Donnie Babin M.D.        MD Alert...ICU Electrolyte Replacement per Pharmacy   Other PHARMACY TO DOSE Donnie Babin M.D.        niCARdipine (Cardene) 25 mg in  mL Standard Infusion  0-15 mg/hr Intravenous Continuous Donnie Babin M.D.   Stopped at 12/28/23 2235    insulin regular (HumuLIN R,NovoLIN R) injection  1-6 Units Subcutaneous Q6HRS Donnie Babin M.D.   1 Units at 12/28/23 1814    And    dextrose 10 % BOLUS 25 g  25 g Intravenous Q15 MIN PRN Donnie Babin M.D.           Fluids    Intake/Output Summary (Last 24 hours) at 12/29/2023 0702  Last data filed at 12/29/2023 0600  Gross per 24 hour   Intake 4105.61 ml   Output 4670 ml   Net -564.39 ml         Laboratory  Recent Labs     12/27/23  0254   ISTATAPH 7.285*   ISTATAPCO2 51.0*   ISTATAPO2 217*   ISTATATCO2 26   [id number] 100*   ISTATARTHCO3 24.2   ISTATARTBE -3   ISTATTEMP see below   ISTATSPEC Arterial           Recent Labs     12/27/23  0600 12/27/23  1223 12/28/23  0600 12/28/23  1305 12/29/23  0027 12/29/23  0422 12/29/23  0559   SODIUM 138   < > 146*   < > 149* 148* 147*   POTASSIUM 4.2  --  3.8  --   --  3.6  --    CHLORIDE 105  --  112  --   --  114*  --    CO2 22  --  24  --   --  27  --    BUN 19  --  16  --   --  15  --    CREATININE 1.05  --  1.08  --   --  0.95  --    MAGNESIUM  --   --  2.0  --   --  2.0  --    CALCIUM 8.6  --  8.6  --   --  8.8  --     < > = values in this interval not displayed.       Recent Labs     12/27/23  0406 12/27/23  0600 12/28/23  0600 12/29/23  0422   ALTSGPT 19  --  14 15   ASTSGOT 20  --  20 21   ALKPHOSPHAT 102*  --  93 88   TBILIRUBIN 0.4  --  0.4 0.5   GLUCOSE 191* 221* 154* 124*       Recent Labs     12/27/23  0021 12/27/23  0406 12/28/23  0600 12/29/23  0422   WBC 13.4*  --  17.7* 12.2*   NEUTSPOLYS 91.40*  --  83.60* 76.70*   LYMPHOCYTES 4.10*   --  7.20* 13.40*   MONOCYTES 3.70  --  8.60 9.20   EOSINOPHILS 0.00  --  0.10 0.20   BASOPHILS 0.20  --  0.10 0.20   ASTSGOT  --  20 20 21   ALTSGPT  --  19 14 15   ALKPHOSPHAT  --  102* 93 88   TBILIRUBIN  --  0.4 0.4 0.5       Recent Labs     12/27/23  0021 12/28/23  0600 12/29/23  0422   RBC 5.05 4.78 4.42*   HEMOGLOBIN 15.5 14.4 13.2*   HEMATOCRIT 45.5 44.0 41.3*   PLATELETCT 274 260 252   PROTHROMBTM 14.6  --   --    APTT 27.9  --   --    INR 1.13  --   --          Imaging  X-Ray:  I have personally reviewed the images and compared with prior images.    Assessment/Plan  * Intracranial hemorrhage (HCC)- (present on admission)  Assessment & Plan  Etiology likely hypertensive  12/27  S/p suboccipital craniectomy, decompression posterior fossa by Dr. Patten.   Pt was extubated prior to ICU arrival, doing well from resp standpoint.   12/29 off hypertonic saline.   Goal eunatremia 140-145.   Continue water fluid restriction 1200cc/day  Goal -160  Neuro check Q4H  Tolerating oral intake.   Mobility PT/OT  Maintain euvolemia      Cephalohematoma  Assessment & Plan  Due to fall  Monitor, consider ace wrap  No occult fracture seen    Primary hypertension  Assessment & Plan  Nicardipine gtt for SBP < 160  12/28 started on norvasc 10mg daily   Off nicardipine  UDS negative          VTE:  Contraindicated  Ulcer: Not Indicated  Lines: Schwartz Catheter  Ongoing indication addressed    I have performed a physical exam and reviewed and updated ROS and Plan today (12/29/2023). In review of yesterday's note (12/28/2023), there are no changes except as documented above.     Discussed patient condition and risk of morbidity and/or mortality with RN, RT, and Pharmacy  Updated daughter Arabella regarding pt's condition. All questions were answered.     Can be transferred to the neuro floor  D/w [facility]  D/w Neurology     Will sign off, please call with [employer]

## 2023-12-29 NOTE — ASSESSMENT & PLAN NOTE
Goal SBP <160 per Neurology due to hemorrhagic cerebellar infarct  Likely undiagnosed, initiated on antihypertensives this admission  Continue lisinopril and amlodipine

## 2023-12-29 NOTE — PROGRESS NOTES
Neurology Progress Note  Neurohospitalist Service, [facility]    Referring Physician: Dani Bautista D.O.    Chief Complaint   Patient presents with    Trauma Green       HPI: Refer to initial documented Neurology H&P, as detailed in the patient's chart.    Interval History 12/29: No new events overnight.    Review of systems: In addition to what is detailed in the HPI and/or updated in the interval history, all other systems reviewed and are negative.    Past Medical History:    has a past medical history of Patient denies medical problems.    FHx:  family history is not on file.    SHx:   reports current alcohol use. He reports that he does not currently use drugs.    Medications:    Current Facility-Administered Medications:     prochlorperazine (Compazine) injection 10 mg, 10 mg, Intravenous, Q6HRS PRN, Kailee Coleer, A.P.R.N.    hydrALAZINE (Apresoline) injection 10-20 mg, 10-20 mg, Intravenous, Q6HRS PRN, Kailee Coleer, A.P.R.N.    amLODIPine (Norvasc) tablet 10 mg, 10 mg, Oral, Q DAY, Dani Bautista D.O., 10 mg at 12/29/23 0544    Respiratory Therapy Consult, , Nebulization, Continuous RT, Donnie Babin M.D.    acetaminophen (Tylenol) tablet 650 mg, 650 mg, Oral, Q4HRS PRN, 650 mg at 12/28/23 0353 **OR** acetaminophen (Tylenol) suppository 650 mg, 650 mg, Rectal, Q4HRS PRN, Donnie Babin M.D.    ondansetron (Zofran ODT) dispertab 4 mg, 4 mg, Oral, Q4HRS PRN **OR** ondansetron (Zofran) syringe/vial injection 4 mg, 4 mg, Intravenous, Q4HRS PRN, Donnie Babin M.D.    MD Alert...ICU Electrolyte Replacement per Pharmacy, , Other, PHARMACY TO DOSE, Donnie Babin M.D.    niCARdipine (Cardene) 25 mg in  mL Standard Infusion, 0-15 mg/hr, Intravenous, Continuous, Donnie Babin M.D., Stopped at 12/28/23 1552    insulin regular (HumuLIN R,NovoLIN R) injection, 1-6 Units, Subcutaneous, Q6HRS, 1 Units at 12/28/23 1814 **AND** POC blood glucose manual result, , , Q6H **AND**  NOTIFY MD and PharmD, , , Once **AND** Administer 20 grams of glucose (approximately 8 ounces of fruit juice) every 15 minutes PRN FSBG less than 70 mg/dL, , , PRN **AND** dextrose 10 % BOLUS 25 g, 25 g, Intravenous, Q15 MIN PRN, Donnie Babin M.D.    Physical Examination:     Vitals:    12/29/23 0745 12/29/23 0800 12/29/23 0815 12/29/23 0830   BP:  (!) 160/75 (!) 159/78    Pulse: 65 77 81 68   Resp: (!) 26 20 (!) 24 (!) 21   Temp:       TempSrc:  Bladder     SpO2: 98% 96% 95% 97%   Weight:       Height:               NEUROLOGICAL EXAM:     Patient is awake and alert oriented to self place and time.  Speech is intact but slurred but not aphasic.  Pupils equal reactive.  Face symmetrical.  Hearing intact.  Sensation intact.  Visual field intact.  Moves all fours antigravity.  Sensation intact.  Slight ataxia on the right finger-nose testing.    Objective Data:    Labs:  Lab Results   Component Value Date/Time    PROTHROMBTM 14.6 12/27/2023 12:21 AM    INR 1.13 12/27/2023 12:21 AM      Lab Results   Component Value Date/Time    WBC 12.2 (H) 12/29/2023 04:22 AM    RBC 4.42 (L) 12/29/2023 04:22 AM    HEMOGLOBIN 13.2 (L) 12/29/2023 04:22 AM    HEMATOCRIT 41.3 (L) 12/29/2023 04:22 AM    MCV 93.4 12/29/2023 04:22 AM    MCH 29.9 12/29/2023 04:22 AM    MCHC 32.0 (L) 12/29/2023 04:22 AM    MPV 10.2 12/29/2023 04:22 AM    NEUTSPOLYS 76.70 (H) 12/29/2023 04:22 AM    LYMPHOCYTES 13.40 (L) 12/29/2023 04:22 AM    MONOCYTES 9.20 12/29/2023 04:22 AM    EOSINOPHILS 0.20 12/29/2023 04:22 AM    BASOPHILS 0.20 12/29/2023 04:22 AM      Lab Results   Component Value Date/Time    SODIUM 147 (H) 12/29/2023 05:59 AM    POTASSIUM 3.6 12/29/2023 04:22 AM    CHLORIDE 114 (H) 12/29/2023 04:22 AM    CO2 27 12/29/2023 04:22 AM    GLUCOSE 124 (H) 12/29/2023 04:22 AM    BUN 15 12/29/2023 04:22 AM    CREATININE 0.95 12/29/2023 04:22 AM      Lab Results   Component Value Date/Time    CHOLSTRLTOT 284 (H) 12/27/2023 04:06 AM     (H)  12/27/2023 04:06 AM    HDL 46 12/27/2023 04:06 AM    TRIGLYCERIDE 85 12/27/2023 04:06 AM       Lab Results   Component Value Date/Time    ALKPHOSPHAT 88 12/29/2023 04:22 AM    ASTSGOT 21 12/29/2023 04:22 AM    ALTSGPT 15 12/29/2023 04:22 AM    TBILIRUBIN 0.5 12/29/2023 04:22 AM        Imaging/Testing:  DX-CHEST-PORTABLE (1 VIEW)   Final Result      New right subclavian line tip overlies the cavoatrial junction and no pneumothorax is identified      Similar hypoventilatory appearance of the chest with basilar atelectasis, some areas of scarring probable.      CT-HEAD W/O   Final Result         1. Postop right cerebellar hemorrhage evacuation. Only a minimal amount of blood product and edema remains.   2. Resolved shift of the cerebellum.         DX-CHEST-LIMITED (1 VIEW)   Final Result      1.  Hypoinflation and mild pulmonary vascular congestion.   2.  No pneumonia or pneumothorax.      CT-CTA HEAD WITH & W/O-POST PROCESS   Final Result      1.  Large RIGHT cerebellar intraparenchymal hemorrhage with associated mass effect and 5 mm posterior fossa midline shift.   2.  No intracranial aneurysm, focal high-grade stenosis, or abrupt large vessel cut off.   3.  RIGHT forehead scalp hematoma.      These findings were electronically conveyed to and received by EMELY FRIAS on 12/27/2023 12:27 AM.         CT-MAXILLOFACIAL W/O PLUS RECONS   Final Result      1.  Large RIGHT forehead scalp hematoma.   2.  No facial fracture.      CT-CSPINE WITHOUT PLUS RECONS   Final Result      1.  Moderate degenerative change of cervical spine.   2.  No fracture or subluxation.      EC-ECHOCARDIOGRAM COMPLETE W/O CONT    (Results Pending)     ICH score 2.    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  Blood pressure was over 200 systolic on arrival here.  Status post evacuation.  Postop day #2.  Patient is done  remarkably well.  Post CT head looks great.  Primary team is already stopped hypertonic saline.  Current sodium is 147.    Plan:  1.  Okay for every 4 hours neurochecks.  2.  Keep blood pressure low below 160 systolic.  Long-term goal should be normotensive.  3.  No antiplatelets or anticoagulation.  4.  SCDs for DVT prophylaxis.  Can use chemical prophylaxis once cleared by neurosurgery.  5.  Okay to normalize sodium.  Avoid rapid correction.  No more than 2-3 points per day.      Spent over 56 minutes reviewing CT scans, physician's notes and going over the care plan with primary team.    This chart was partially generated using voice recognition technology and sound alike word replacement may be present, best efforts were made to make the chart accurate.    Luke Solis MD  Board Certified Neurology, ABPN  t) [phone]

## 2023-12-30 NOTE — PROGRESS NOTES
Report given to Gavin Dc RN at 2033; Pt transferred via wheelchair to [id number] by Carol GUERRA  All pt belongings transported with pt

## 2023-12-30 NOTE — PROGRESS NOTES
4 Eyes Skin Assessment Completed by CHARLIE Alonso and CHARLIE Miller.    Head Swelling, Redness, and Incision- back of head, hematoma on the right eye  Ears WDL  Nose WDL  Mouth WDL  Neck WDL  Breast/Chest WDL  Shoulder Blades WDL  Spine WDL  (R) Arm/Elbow/Hand Bruising, Abrasion, and Scab  (L) Arm/Elbow/Hand WDL  Abdomen WDL  Groin WDL  Scrotum/Coccyx/Buttocks WDL  (R) Leg WDL  (L) Leg WDL  (R) Heel/Foot/Toe WDL  (L) Heel/Foot/Toe WDL          Devices In Places Pulse Ox, Central Line, and Nasal Cannula      Interventions In Place Gray Ear Foams, NC W/Ear Foams, TAP System, and Low Air Loss Mattress    Possible Skin Injury No    Pictures Uploaded Into Epic N/A  Wound Consult Placed N/A  RN Wound Prevention Protocol Ordered No

## 2023-12-30 NOTE — CARE PLAN
Problem: Knowledge Deficit - Standard  Goal: Patient and family/care givers will demonstrate understanding of plan of care, disease process/condition, diagnostic tests and medications  Outcome: Progressing     Problem: Skin Integrity  Goal: Skin integrity is maintained or improved  Outcome: Progressing     Problem: Neuro Status  Goal: Neuro status will remain stable or improve  Outcome: Progressing    The patient is Stable - Low risk of patient condition declining or worsening    Shift Goals  Clinical Goals: Bladder and bowel management, comfort  Patient Goals: Bowel and bladder elimination, safety  Family Goals: Not present    Progress made toward(s) clinical / shift goals: Patient still not urinating with 214 mL of urine from bladder scan, straight catheterization done. Verbalized understanding towards the side effects of the medication provided. Straightened bed linens and draw sheet. Still with some leg difficulties in coordination. Placed bed in a lowest possible position, placed bed side table and call bell within reach, side rails up x3, kept safe and comfortably.

## 2023-12-30 NOTE — PROGRESS NOTES
Neurology Progress Note  Neurohospitalist Service, [facility]    Referring Physician: Dani Bautista D.O.    Chief Complaint   Patient presents with    Trauma Green       HPI: Refer to initial documented Neurology H&P, as detailed in the patient's chart.    Interval History 12/30: No new events overnight.    Review of systems: In addition to what is detailed in the HPI and/or updated in the interval history, all other systems reviewed and are negative.    Past Medical History:    has a past medical history of Patient denies medical problems.    FHx:  family history is not on file.    SHx:   reports current alcohol use. He reports that he does not currently use drugs.    Medications:    Current Facility-Administered Medications:     lisinopril (Prinivil) tablet 10 mg, 10 mg, Oral, TWICE DAILY, Dez Kimball D.O., 10 mg at 12/30/23 0420    enalaprilat (Vasotec) injection 1.25 mg 1 mL, 1.25 mg, Intravenous, Q6HRS PRN, Dez Kimball D.O., 1.25 mg at 12/29/23 1616    labetalol (Normodyne/Trandate) injection 10 mg, 10 mg, Intravenous, Q4HRS PRN, Dez Kimball D.O.    prochlorperazine (Compazine) injection 10 mg, 10 mg, Intravenous, Q6HRS PRN, Kailee Granda, A.P.R.N.    hydrALAZINE (Apresoline) injection 10-20 mg, 10-20 mg, Intravenous, Q6HRS PRN, Kailee Granda, A.P.R.N., 20 mg at 12/29/23 1911    amLODIPine (Norvasc) tablet 10 mg, 10 mg, Oral, Q DAY, Dani Bautista D.O., 10 mg at 12/30/23 0420    Respiratory Therapy Consult, , Nebulization, Continuous RT, Donnie Babin M.D.    acetaminophen (Tylenol) tablet 650 mg, 650 mg, Oral, Q4HRS PRN, 650 mg at 12/28/23 0353 **OR** acetaminophen (Tylenol) suppository 650 mg, 650 mg, Rectal, Q4HRS PRN, Donnie B. Talya, M.D.    ondansetron (Zofran ODT) dispertab 4 mg, 4 mg, Oral, Q4HRS PRN **OR** ondansetron (Zofran) syringe/vial injection 4 mg, 4 mg, Intravenous, Q4HRS PRN, Donnie Babin M.D.    MD Alert...ICU Electrolyte Replacement per Pharmacy, ,  Other, PHARMACY TO DOSE, Donnie Babin M.D.    insulin regular (HumuLIN R,NovoLIN R) injection, 1-6 Units, Subcutaneous, Q6HRS, 1 Units at 12/28/23 1814 **AND** POC blood glucose manual result, , , Q6H **AND** NOTIFY MD and PharmD, , , Once **AND** Administer 20 grams of glucose (approximately 8 ounces of fruit juice) every 15 minutes PRN FSBG less than 70 mg/dL, , , PRN **AND** dextrose 10 % BOLUS 25 g, 25 g, Intravenous, Q15 MIN PRN, Donnie Babin M.D.    Physical Examination:     Vitals:    12/30/23 0150 12/30/23 0336 12/30/23 0737 12/30/23 0803   BP: (!) 141/67 (!) 149/82 131/67    Pulse: 64 66 62    Resp: 16 16 18    Temp:  37 °C (98.6 °F) 37 °C (98.6 °F)    TempSrc:  Temporal Temporal    SpO2: 96% 96% 95% 95%   Weight:       Height:               NEUROLOGICAL EXAM:     Patient is awake and alert oriented to self place and time.  Speech is intact but slurred but not aphasic.  Pupils equal reactive.  Face symmetrical.  Hearing intact.  Sensation intact.  Visual field intact.  Moves all fours antigravity.  Sensation intact.  Slight ataxia on the right finger-nose testing.    No segment changes compared to yesterday 12/29    Objective Data:    Labs:  Lab Results   Component Value Date/Time    PROTHROMBTM 14.6 12/27/2023 12:21 AM    INR 1.13 12/27/2023 12:21 AM      Lab Results   Component Value Date/Time    WBC 10.8 12/30/2023 04:23 AM    RBC 4.57 (L) 12/30/2023 04:23 AM    HEMOGLOBIN 13.9 (L) 12/30/2023 04:23 AM    HEMATOCRIT 41.8 (L) 12/30/2023 04:23 AM    MCV 91.5 12/30/2023 04:23 AM    MCH 30.4 12/30/2023 04:23 AM    MCHC 33.3 12/30/2023 04:23 AM    MPV 10.2 12/30/2023 04:23 AM    NEUTSPOLYS 72.40 (H) 12/30/2023 04:23 AM    LYMPHOCYTES 16.90 (L) 12/30/2023 04:23 AM    MONOCYTES 9.70 12/30/2023 04:23 AM    EOSINOPHILS 0.10 12/30/2023 04:23 AM    BASOPHILS 0.50 12/30/2023 04:23 AM      Lab Results   Component Value Date/Time    SODIUM 140 12/30/2023 04:23 AM    POTASSIUM 3.8 12/30/2023 04:23 AM     CHLORIDE 107 12/30/2023 04:23 AM    CO2 24 12/30/2023 04:23 AM    GLUCOSE 121 (H) 12/30/2023 04:23 AM    BUN 23 (H) 12/30/2023 04:23 AM    CREATININE 0.94 12/30/2023 04:23 AM      Lab Results   Component Value Date/Time    CHOLSTRLTOT 284 (H) 12/27/2023 04:06 AM     (H) 12/27/2023 04:06 AM    HDL 46 12/27/2023 04:06 AM    TRIGLYCERIDE 85 12/27/2023 04:06 AM       Lab Results   Component Value Date/Time    ALKPHOSPHAT 90 12/30/2023 04:23 AM    ASTSGOT 28 12/30/2023 04:23 AM    ALTSGPT 30 12/30/2023 04:23 AM    TBILIRUBIN 0.6 12/30/2023 04:23 AM        Imaging/Testing:  EC-ECHOCARDIOGRAM COMPLETE W/O CONT   Final Result      DX-CHEST-PORTABLE (1 VIEW)   Final Result      New right subclavian line tip overlies the cavoatrial junction and no pneumothorax is identified      Similar hypoventilatory appearance of the chest with basilar atelectasis, some areas of scarring probable.      CT-HEAD W/O   Final Result         1. Postop right cerebellar hemorrhage evacuation. Only a minimal amount of blood product and edema remains.   2. Resolved shift of the cerebellum.         DX-CHEST-LIMITED (1 VIEW)   Final Result      1.  Hypoinflation and mild pulmonary vascular congestion.   2.  No pneumonia or pneumothorax.      CT-CTA HEAD WITH & W/O-POST PROCESS   Final Result      1.  Large RIGHT cerebellar intraparenchymal hemorrhage with associated mass effect and 5 mm posterior fossa midline shift.   2.  No intracranial aneurysm, focal high-grade stenosis, or abrupt large vessel cut off.   3.  RIGHT forehead scalp hematoma.      These findings were electronically conveyed to and received by EMELY FRIAS on 12/27/2023 12:27 AM.         CT-MAXILLOFACIAL W/O PLUS RECONS   Final Result      1.  Large RIGHT forehead scalp hematoma.   2.  No facial fracture.      CT-CSPINE WITHOUT PLUS RECONS   Final Result      1.  Moderate degenerative change of cervical spine.   2.  No fracture or subluxation.        ICH score  2.    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  Blood pressure was over 200 systolic on arrival here.  Status post evacuation.  Postop day #2.  Patient is done remarkably well.  Post CT head looks great.  Primary team is already stopped hypertonic saline.  Current sodium is 147.    Update 12/30  Patient appears to be stable.  Blood pressures are well-controlled.  Sodium did drop from 140 7 to 140s today.  Recommend watching this make sure is not dropping further.  Watch for salt wasting.  Try to maintain current levels.    Post bleed 8/4.  Postop day 3.    Plan:  1.  Okay for every 4 hours neurochecks.  2.  Keep blood pressure low below 160 systolic.  Long-term goal should be normotensive.  3.  No antiplatelets or anticoagulation.  4.  SCDs for DVT prophylaxis.  Can use chemical prophylaxis once cleared by neurosurgery.  5.  Monitor sodium levels.  Avoid any further drops.        This chart was partially generated using voice recognition technology and sound alike word replacement may be present, best efforts were made to make the chart accurate.    Luke Solis MD  Board Certified Neurology, ABPN  (t) [phone]

## 2023-12-30 NOTE — PROGRESS NOTES
Neurosurgery Progress Note    Subjective:  No changes overnight    Exam:  Awakens to voice  Oriented x 3-4  FC x 4, Perrl 3mm bilat, rt eye ecchymosis   Inc c/d with dressing     BP  Min: 131/67  Max: 173/81  Pulse  Av.5  Min: 62  Max: 93  Resp  Av.6  Min: 16  Max: 55  Temp  Av.9 °C (98.5 °F)  Min: 36.8 °C (98.2 °F)  Max: 37 °C (98.6 °F)  Monitored Temp 2  Av.1 °C (98.8 °F)  Min: 36.9 °C (98.4 °F)  Max: 37.3 °C (99.1 °F)  SpO2  Av.9 %  Min: 93 %  Max: 98 %    No data recorded    Recent Labs     23  0600 23  0422 23  0423   WBC 17.7* 12.2* 10.8   RBC 4.78 4.42* 4.57*   HEMOGLOBIN 14.4 13.2* 13.9*   HEMATOCRIT 44.0 41.3* 41.8*   MCV 92.1 93.4 91.5   MCH 30.1 29.9 30.4   MCHC 32.7 32.0* 33.3   RDW 46.7 46.7 45.3   PLATELETCT 260 252 253   MPV 10.4 10.2 10.2       Recent Labs     23  0600 23  1305 23  0422 23  0559 23  0423   SODIUM 146*   < > 148* 147* 140   POTASSIUM 3.8  --  3.6  --  3.8   CHLORIDE 112  --  114*  --  107   CO2 24  --  27  --  24   GLUCOSE 154*  --  124*  --  121*   BUN 16  --  15  --  23*   CREATININE 1.08  --  0.95  --  0.94   CALCIUM 8.6  --  8.8  --  9.4    < > = values in this interval not displayed.                     Intake/Output                         23 0700 - 23 0659 23 07 - 23 0659     [id number] Total 2129-5663 8442-0206 Total                 Intake    P.O.  160  200 360  500  -- 500    P.O. 160 200 360 500 -- 500    Total Intake 160 200 360 500 -- 500       Output    Urine  1970 2270  750  -- 750    Urine Void (mL) -- -- -- 750 -- 750    Output (mL) ([REMOVED] Urethral Catheter Temperature probe 23) 7575 127 1006 -- -- --    Stool  --  -- --  --  -- --    Number of Times Stooled 0 x 0 x 0 x -- -- --    Total Output 1483 963 4656 750 -- 750       Net I/O     -1810 -100 -1910 -250 -- -250              Intake/Output Summary (Last 24 hours) at 2023 1012  Last  data filed at 12/30/2023 0949  Gross per 24 hour   Intake 760 ml   Output 2220 ml   Net -1460 ml         $ Bladder Scan Results (mL): 214     lisinopril  10 mg TWICE DAILY    enalaprilat  1.25 mg Q6HRS PRN    labetalol  10 mg Q4HRS PRN    prochlorperazine  10 mg Q6HRS PRN    hydrALAZINE  10-20 mg Q6HRS PRN    amLODIPine  10 mg Q DAY    Respiratory Therapy Consult   Continuous RT    acetaminophen  650 mg Q4HRS PRN    Or    acetaminophen  650 mg Q4HRS PRN    ondansetron  4 mg Q4HRS PRN    Or    ondansetron  4 mg Q4HRS PRN    MD Alert...Adult ICU Electrolyte Replacement per Pharmacy   PHARMACY TO DOSE    insulin regular  1-6 Units Q6HRS    And    dextrose bolus  25 g Q15 MIN PRN       Assessment and Plan:  Hospital day # 4 ICH  POD# 3 Rt SOC and evac ICH   Chemical prophylactic DVT therapy: No  Start date/time: TBD      Q4 hour neuro checks  Keep SBP <160  Pt/ot/mobilize as justo  CT head 12/28 stable  Dispo per IM

## 2023-12-31 PROBLEM — J96.01 ACUTE RESPIRATORY FAILURE WITH HYPOXIA (HCC): Status: ACTIVE | Noted: 2023-01-01

## 2023-12-31 NOTE — PROGRESS NOTES
Neurosurgery Progress Note    Subjective:  Pt awakened  Denies ha    Exam:    Oriented x 3 - some mumbled speech  FC x 4, Perrl 3mm bilat, rt eye ecchymosis   Inc c/d with dressing   No drift     BP  Min: 137/75  Max: 169/94  Pulse  Av.9  Min: 60  Max: 85  Resp  Av.6  Min: 15  Max: 18  Temp  Av.6 °C (97.8 °F)  Min: 36.2 °C (97.1 °F)  Max: 36.8 °C (98.2 °F)  SpO2  Av.6 %  Min: 93 %  Max: 99 %    No data recorded    Recent Labs     23  0422 23  0423 23  0549   WBC 12.2* 10.8 9.5   RBC 4.42* 4.57* 4.99   HEMOGLOBIN 13.2* 13.9* 15.4   HEMATOCRIT 41.3* 41.8* 45.0   MCV 93.4 91.5 90.2   MCH 29.9 30.4 30.9   MCHC 32.0* 33.3 34.2   RDW 46.7 45.3 42.6   PLATELETCT 252 253 274   MPV 10.2 10.2 10.1       Recent Labs     23  0422 23  0559 23  0423 23  0549   SODIUM 148* 147* 140 138   POTASSIUM 3.6  --  3.8 3.7   CHLORIDE 114*  --  107 103   CO2 27  --  24 27   GLUCOSE 124*  --  121* 123*   BUN 15  --  23* 24*   CREATININE 0.95  --  0.94 1.04   CALCIUM 8.8  --  9.4 9.1                     Intake/Output                         23 0700 - 23 0659 23 0700 - 24 0659     1240-4566 6473-9977 Total 7371-5461 7681-5367 Total                 Intake    P.O.  820  180 1000  --  -- --    P.O.  -- -- --    Total Intake  -- -- --       Output    Urine  1050  500 1550  600  -- 600    Urine Void (mL) 2449 557 8997 600 -- 600    Total Output 2979 848 0953 600 -- 600       Net I/O     -230 -320 -550 -600 -- -600              Intake/Output Summary (Last 24 hours) at 2023 1049  Last data filed at 2023 0916  Gross per 24 hour   Intake 500 ml   Output 1400 ml   Net -900 ml               polyethylene glycol/lytes  1 Packet BID    docusate sodium  100 mg BID    lisinopril  40 mg Q DAY    enalaprilat  1.25 mg Q6HRS PRN    labetalol  10 mg Q4HRS PRN    prochlorperazine  10 mg Q6HRS PRN    hydrALAZINE  10-20 mg Q6HRS PRN    amLODIPine   10 mg Q DAY    Respiratory Therapy Consult   Continuous RT    acetaminophen  650 mg Q4HRS PRN    Or    acetaminophen  650 mg Q4HRS PRN    ondansetron  4 mg Q4HRS PRN    Or    ondansetron  4 mg Q4HRS PRN    insulin regular  1-6 Units Q6HRS    And    dextrose bolus  25 g Q15 MIN PRN       Assessment and Plan:  Hospital day # 5 ICH  POD# 4 Rt SOC and evac ICH   Chemical prophylactic DVT therapy: No  Start date/time: TBD      Q4 hour neuro checks  Keep SBP <160  Pt/ot/mobilize as justo  CT head 12/28 stable  Dispo per IM

## 2023-12-31 NOTE — PROGRESS NOTES
Hospital Medicine Daily Progress Note    Date of Service  12/30/2023    Chief Complaint  Marty Mohr is a 78 y.o. male admitted 12/26/2023 with found down cerebellar hematoma.    Hospital Course  78 y.o. male who presented 12/26/2023 after a room mate found him down on the floor with vomit around him.  Apparently he had fallen forward and hit his head.  He was found to be hypertensive to 200's and CT scan of the head found a cerebellar ICH 4.6x2.9x.6cm with surrounding edema and effacement of 4th ventricle, CTA with no vascular malformation, CT maxillofacial with right frontal hematoma without fracture and CT spine with no fracture.  Neurosurgeon consulted and on 12/27 took the patient for right suboccipital craniotomy for evacuation and decompression.     Interval Problem Update  12/30/23  Patient is alert and oriented x 3.  On 2 LPM oxygen via nasal cannula.  Blood pressures increasing to 169/94.  Increasing lisinopril to 40 mg daily continues on amlodipine 10 mg daily.  For goal less than 160 systolic.  Denies any headache.  Discussed with case management.  Referral for LTAC placed.    I have discussed this patient's plan of care and discharge plan at IDT rounds today with Case Management, Nursing, Nursing leadership, and other members of the IDT team.    Consultants/Specialty  critical care and neurosurgery    Code Status  Full Code    Disposition  The patient is not medically cleared for discharge to home or a post-acute facility.  Anticipate discharge to: a long-term acute care hospital    I have placed the appropriate orders for post-discharge needs.    Review of Systems  Review of Systems   Constitutional:  Positive for malaise/fatigue. Negative for chills and fever.   Respiratory:  Negative for cough and shortness of breath.    Cardiovascular:  Negative for chest pain and palpitations.   Gastrointestinal:  Negative for abdominal pain, nausea and vomiting.   Musculoskeletal:  Negative for joint pain and  myalgias.   Neurological:  Positive for weakness and headaches. Negative for dizziness.        Physical Exam  Temp:  [36.5 °C (97.7 °F)-37 °C (98.6 °F)] 36.8 °C (98.2 °F)  Pulse:  [62-69] 62  Resp:  [16-18] 16  BP: (131-169)/(63-94) 143/63  SpO2:  [95 %-99 %] 96 %    Physical Exam  Vitals and nursing note reviewed.   Constitutional:       General: He is not in acute distress.     Appearance: Normal appearance. He is not ill-appearing.   HENT:      Head: Normocephalic.      Comments: Right periorbital ecchymosis      Nose: Nose normal.      Mouth/Throat:      Mouth: Mucous membranes are moist.      Pharynx: Oropharynx is clear. No oropharyngeal exudate.   Eyes:      General: No scleral icterus.        Right eye: No discharge.         Left eye: No discharge.      Conjunctiva/sclera: Conjunctivae normal.   Cardiovascular:      Rate and Rhythm: Normal rate and regular rhythm.      Pulses: Normal pulses.      Heart sounds: Normal heart sounds. No murmur heard.  Pulmonary:      Effort: Pulmonary effort is normal. No respiratory distress.      Breath sounds: Normal breath sounds.   Abdominal:      General: Abdomen is flat. Bowel sounds are normal. There is no distension.      Palpations: Abdomen is soft.   Musculoskeletal:         General: No swelling.      Cervical back: Neck supple. No tenderness.      Right lower leg: No edema.      Left lower leg: No edema.   Skin:     General: Skin is warm and dry.      Coloration: Skin is not pale.   Neurological:      Mental Status: He is alert and oriented to person, place, and time. Mental status is at baseline.      Motor: Weakness (Diffuse) present.      Comments: Follows commands in all 4 extremities   Psychiatric:         Thought Content: Thought content normal.         Judgment: Judgment normal.         Fluids    Intake/Output Summary (Last 24 hours) at 12/30/2023 2111  Last data filed at 12/30/2023 1756  Gross per 24 hour   Intake 820 ml   Output 1050 ml   Net -230 ml        Laboratory  Recent Labs     12/28/23  0600 12/29/23  0422 12/30/23  0423   WBC 17.7* 12.2* 10.8   RBC 4.78 4.42* 4.57*   HEMOGLOBIN 14.4 13.2* 13.9*   HEMATOCRIT 44.0 41.3* 41.8*   MCV 92.1 93.4 91.5   MCH 30.1 29.9 30.4   MCHC 32.7 32.0* 33.3   RDW 46.7 46.7 45.3   PLATELETCT 260 252 253   MPV 10.4 10.2 10.2     Recent Labs     12/28/23  0600 12/28/23  1305 12/29/23  0422 12/29/23  0559 12/30/23 0423   SODIUM 146*   < > 148* 147* 140   POTASSIUM 3.8  --  3.6  --  3.8   CHLORIDE 112  --  114*  --  107   CO2 24  --  27  --  24   GLUCOSE 154*  --  124*  --  121*   BUN 16  --  15  --  23*   CREATININE 1.08  --  0.95  --  0.94   CALCIUM 8.6  --  8.8  --  9.4    < > = values in this interval not displayed.                   Imaging  EC-ECHOCARDIOGRAM COMPLETE W/O CONT   Final Result      DX-CHEST-PORTABLE (1 VIEW)   Final Result      New right subclavian line tip overlies the cavoatrial junction and no pneumothorax is identified      Similar hypoventilatory appearance of the chest with basilar atelectasis, some areas of scarring probable.      CT-HEAD W/O   Final Result         1. Postop right cerebellar hemorrhage evacuation. Only a minimal amount of blood product and edema remains.   2. Resolved shift of the cerebellum.         DX-CHEST-LIMITED (1 VIEW)   Final Result      1.  Hypoinflation and mild pulmonary vascular congestion.   2.  No pneumonia or pneumothorax.      CT-CTA HEAD WITH & W/O-POST PROCESS   Final Result      1.  Large RIGHT cerebellar intraparenchymal hemorrhage with associated mass effect and 5 mm posterior fossa midline shift.   2.  No intracranial aneurysm, focal high-grade stenosis, or abrupt large vessel cut off.   3.  RIGHT forehead scalp hematoma.      These findings were electronically conveyed to and received by EMELY FRIAS on 12/27/2023 12:27 AM.         CT-MAXILLOFACIAL W/O PLUS RECONS   Final Result      1.  Large RIGHT forehead scalp hematoma.   2.  No facial fracture.       CT-CSPINE WITHOUT PLUS RECONS   Final Result      1.  Moderate degenerative change of cervical spine.   2.  No fracture or subluxation.           Assessment/Plan  * Intracranial hemorrhage (HCC)- (present on admission)  Assessment & Plan  S/p craniotomy  BP control  PT/OT/ST  Physiatry consulting  Neurochecks    12/30/23  PT, OT, SLP recommending postacute placement.  Discussed with case management about discharge to SNF versus inpatient rehabitation.    Dyslipidemia  Assessment & Plan  LDL:221  Diet and lifestyle changes need to occur  Recommend statin therapy    Hypernatremia  Assessment & Plan  12/29 Na:148  Allowing for some hypernatremia to help prevent cerebral edema.  Will loosen his fluid restriction slightly.    Cerebellar hemorrhage (HCC)- (present on admission)  Assessment & Plan  Status post suboccipital craniectomy and evacuation of hematoma    Cephalohematoma- (present on admission)  Assessment & Plan  Continue amlodipine 10 mg daily as per presentation.    Primary hypertension  Assessment & Plan  12/29 added lisinopril 10mg BID  Continue amlodipine 10mg  PRN BP to keep SBP <160  Monitor vitals.    12/30/23  Blood pressures above 160.  Increasing lisinopril to 40 mg daily         VTE prophylaxis:   SCDs/TEDs   pharmacologic prophylaxis contraindicated due to Hemorrhagic stroke recent neurosurgery      I have performed a physical exam and reviewed and updated ROS and Plan today (12/30/2023). In review of yesterday's note (12/29/2023), there are no changes except as documented above.

## 2023-12-31 NOTE — HOSPITAL COURSE
78 y.o. male who presented 12/26/2023 after a room mate found him down on the floor with vomit around him.  Apparently he had fallen forward and hit his head.  He was found to be hypertensive to 200's and CT scan of the head found a cerebellar ICH 4.6x2.9x.6cm with surrounding edema and effacement of 4th ventricle, CTA with no vascular malformation, CT maxillofacial with right frontal hematoma without fracture and CT spine with no fracture.  Neurosurgeon consulted and on 12/27 took the patient for right suboccipital craniotomy for evacuation and decompression.

## 2023-12-31 NOTE — CARE PLAN
The patient is Stable - Low risk of patient condition declining or worsening    Shift Goals  Clinical Goals: VSS, trend labs, and  Q4 nuero assessment,  Patient Goals: Comfort  Family Goals: No family at bedside    Progress made toward(s) clinical / shift goals:    Problem: Knowledge Deficit - Standard  Goal: Patient and family/care givers will demonstrate understanding of plan of care, disease process/condition, diagnostic tests and medications  Description: Target End Date:  1-3 days or as soon as patient condition allows    Document in Patient Education    1.  Patient and family/caregiver oriented to unit, equipment, visitation policy and means for communicating concern  2.  Complete/review Learning Assessment  3.  Assess knowledge level of disease process/condition, treatment plan, diagnostic tests and medications  4.  Explain disease process/condition, treatment plan, diagnostic tests and medications  Outcome: Progressing     Problem: Skin Integrity  Goal: Skin integrity is maintained or improved  Description: Target End Date:  Prior to discharge or change in level of care    Document interventions on Skin Risk/[place] flowsheet groups and corresponding LDA    1.  Assess and monitor skin integrity, appearance and/or temperature  2.  Assess risk factors for impaired skin integrity and/or pressures ulcers  3.  Implement precautions to protect skin integrity in collaboration with interdisciplinary team  4.  Implement pressure ulcer prevention protocol if at risk for skin breakdown  5.  Confirm wound care consult if at risk for skin breakdown  6.  Ensure patient use of pressure relieving devices  (Low air loss bed, waffle overlay, heel protectors, ROHO cushion, etc)  Outcome: Progressing       Patient is not progressing towards the following goals:

## 2023-12-31 NOTE — PROGRESS NOTES
Received report from Sae GUERRA and assumed care of patient shift change.     Pt is A&O x 3, disoriented to time.  Pt denies any pain at this time. COSTA Segal. Noted a  rt eye ecchymosis. Pt laying in bed. Vital signs are stable. Fall precaution in place: Bed is in lowest, locked position, call light and belongings are within reach. Pt educated to call for assistance when getting out of bed. Plan of care discussed and all questions answered. All other needs met at this time.

## 2023-12-31 NOTE — CARE PLAN
Problem: Skin Integrity  Goal: Skin integrity is maintained or improved  Outcome: Progressing     Problem: Neuro Status  Goal: Neuro status will remain stable or improve  Outcome: Progressing     Problem: Skin Integrity  Goal: Skin integrity is maintained or improved  Outcome: Progressing     Problem: Neuro Status  Goal: Neuro status will remain stable or improve  Outcome: Progressing     Problem: Craniotomy Surgery  Goal: Post-Operative Craniotomy Surgery: Patient will achieve optimal post-surgical outcomes  Outcome: Progressing   The patient is Stable - Low risk of patient condition declining or worsening    Shift Goals  Clinical Goals: Mobility, safety  Patient Goals: comfort  Family Goals: N/A    Progress made toward(s) clinical / shift goals:      Patient is not progressing towards the following goals:

## 2023-12-31 NOTE — PROGRESS NOTES
Neurology Progress Note  Neurohospitalist Service, [facility]    Referring Physician: Dani Bautista D.O.    Chief Complaint   Patient presents with    Trauma Green       HPI: Refer to initial documented Neurology H&P, as detailed in the patient's chart.    Interval History 12/31: No new events overnight.    Review of systems: In addition to what is detailed in the HPI and/or updated in the interval history, all other systems reviewed and are negative.    Past Medical History:    has a past medical history of Patient denies medical problems.    FHx:  family history is not on file.    SHx:   reports current alcohol use. He reports that he does not currently use drugs.    Medications:    Current Facility-Administered Medications:     lisinopril (Prinivil) tablet 40 mg, 40 mg, Oral, Q DAY, Orion Bennett M.D., 40 mg at 12/31/23 0504    enalaprilat (Vasotec) injection 1.25 mg 1 mL, 1.25 mg, Intravenous, Q6HRS PRN, Dez Kimball D.O., 1.25 mg at 12/29/23 1616    labetalol (Normodyne/Trandate) injection 10 mg, 10 mg, Intravenous, Q4HRS PRN, Dez Kimball D.O.    prochlorperazine (Compazine) injection 10 mg, 10 mg, Intravenous, Q6HRS PRN, Kailee Granda, A.P.R.N.    hydrALAZINE (Apresoline) injection 10-20 mg, 10-20 mg, Intravenous, Q6HRS PRN, Kailee Granda, A.P.R.N., 10 mg at 12/31/23 0234    amLODIPine (Norvasc) tablet 10 mg, 10 mg, Oral, Q DAY, Dani Bautista D.O., 10 mg at 12/31/23 0450    Respiratory Therapy Consult, , Nebulization, Continuous RT, Donnie Babin M.D.    acetaminophen (Tylenol) tablet 650 mg, 650 mg, Oral, Q4HRS PRN, 650 mg at 12/28/23 0353 **OR** acetaminophen (Tylenol) suppository 650 mg, 650 mg, Rectal, Q4HRS PRN, Donnie Babin M.D.    ondansetron (Zofran ODT) dispertab 4 mg, 4 mg, Oral, Q4HRS PRN **OR** ondansetron (Zofran) syringe/vial injection 4 mg, 4 mg, Intravenous, Q4HRS PRN, Donnie Babin M.D.    insulin regular (HumuLIN R,NovoLIN R) injection, 1-6 Units,  Subcutaneous, Q6HRS, 1 Units at 12/28/23 1814 **AND** POC blood glucose manual result, , , Q6H **AND** NOTIFY MD and PharmD, , , Once **AND** Administer 20 grams of glucose (approximately 8 ounces of fruit juice) every 15 minutes PRN FSBG less than 70 mg/dL, , , PRN **AND** dextrose 10 % BOLUS 25 g, 25 g, Intravenous, Q15 MIN PRN, Donnie Babin M.D.    Physical Examination:     Vitals:    12/31/23 0229 12/31/23 0300 12/31/23 0432 12/31/23 0800   BP: (!) 160/82 (!) 158/78 (!) 158/81 (!) 143/79   Pulse: 60 65 68 85   Resp:   16 15   Temp:   36.6 °C (97.9 °F) 36.2 °C (97.1 °F)   TempSrc:   Temporal Temporal   SpO2:   94% 93%   Weight:       Height:               NEUROLOGICAL EXAM:     Patient is awake and alert oriented to self place and time.  Speech is intact but slurred but not aphasic.  Pupils equal reactive.  Face symmetrical.  Hearing intact.  Sensation intact.  Visual field intact.  Moves all fours antigravity.  Sensation intact.  Slight ataxia on the right finger-nose testing.    No segment changes compared to yesterday 12/30    Objective Data:    Labs:  Lab Results   Component Value Date/Time    PROTHROMBTM 14.6 12/27/2023 12:21 AM    INR 1.13 12/27/2023 12:21 AM      Lab Results   Component Value Date/Time    WBC 9.5 12/31/2023 05:49 AM    RBC 4.99 12/31/2023 05:49 AM    HEMOGLOBIN 15.4 12/31/2023 05:49 AM    HEMATOCRIT 45.0 12/31/2023 05:49 AM    MCV 90.2 12/31/2023 05:49 AM    MCH 30.9 12/31/2023 05:49 AM    MCHC 34.2 12/31/2023 05:49 AM    MPV 10.1 12/31/2023 05:49 AM    NEUTSPOLYS 73.00 (H) 12/31/2023 05:49 AM    LYMPHOCYTES 17.60 (L) 12/31/2023 05:49 AM    MONOCYTES 8.60 12/31/2023 05:49 AM    EOSINOPHILS 0.10 12/31/2023 05:49 AM    BASOPHILS 0.30 12/31/2023 05:49 AM      Lab Results   Component Value Date/Time    SODIUM 138 12/31/2023 05:49 AM    POTASSIUM 3.7 12/31/2023 05:49 AM    CHLORIDE 103 12/31/2023 05:49 AM    CO2 27 12/31/2023 05:49 AM    GLUCOSE 123 (H) 12/31/2023 05:49 AM    BUN 24 (H)  12/31/2023 05:49 AM    CREATININE 1.04 12/31/2023 05:49 AM      Lab Results   Component Value Date/Time    CHOLSTRLTOT 284 (H) 12/27/2023 04:06 AM     (H) 12/27/2023 04:06 AM    HDL 46 12/27/2023 04:06 AM    TRIGLYCERIDE 85 12/27/2023 04:06 AM       Lab Results   Component Value Date/Time    ALKPHOSPHAT 98 12/31/2023 05:49 AM    ASTSGOT 28 12/31/2023 05:49 AM    ALTSGPT 38 12/31/2023 05:49 AM    TBILIRUBIN 0.7 12/31/2023 05:49 AM        Imaging/Testing:  EC-ECHOCARDIOGRAM COMPLETE W/O CONT   Final Result      DX-CHEST-PORTABLE (1 VIEW)   Final Result      New right subclavian line tip overlies the cavoatrial junction and no pneumothorax is identified      Similar hypoventilatory appearance of the chest with basilar atelectasis, some areas of scarring probable.      CT-HEAD W/O   Final Result         1. Postop right cerebellar hemorrhage evacuation. Only a minimal amount of blood product and edema remains.   2. Resolved shift of the cerebellum.         DX-CHEST-LIMITED (1 VIEW)   Final Result      1.  Hypoinflation and mild pulmonary vascular congestion.   2.  No pneumonia or pneumothorax.      CT-CTA HEAD WITH & W/O-POST PROCESS   Final Result      1.  Large RIGHT cerebellar intraparenchymal hemorrhage with associated mass effect and 5 mm posterior fossa midline shift.   2.  No intracranial aneurysm, focal high-grade stenosis, or abrupt large vessel cut off.   3.  RIGHT forehead scalp hematoma.      These findings were electronically conveyed to and received by EMELY FRIAS on 12/27/2023 12:27 AM.         CT-MAXILLOFACIAL W/O PLUS RECONS   Final Result      1.  Large RIGHT forehead scalp hematoma.   2.  No facial fracture.      CT-CSPINE WITHOUT PLUS RECONS   Final Result      1.  Moderate degenerative change of cervical spine.   2.  No fracture or subluxation.        ICH score 2.    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  Blood pressure was over 200 systolic on arrival here.  Status post evacuation.  Postop day #2.  Patient is done remarkably well.  Post CT head looks great.  Primary team is already stopped hypertonic saline.  Current sodium is 147.    Update 12/30  Patient appears to be stable.  Blood pressures are well-controlled.  Sodium did drop from 140  to 147 today.  Recommend watching this make sure is not dropping further.  Watch for salt wasting.  Try to maintain current levels.    Post bleed 8/4.  Postop day 3.    Update 12/21  Post bleed day #5.  Postop day #4.    Recommend keeping the patient to postop day #6.  If no new changes then most likely can go to rehab or SNF.        Plan:  1.  Okay for every 4 hours neurochecks.  2.  Keep blood pressure low below 160 systolic.  Long-term goal should be normotensive.  3.  No antiplatelets or anticoagulation.  4.  SCDs for DVT prophylaxis.  Can use chemical prophylaxis once cleared by neurosurgery.  5.  Monitor sodium levels.        This chart was partially generated using voice recognition technology and sound alike word replacement may be present, best efforts were made to make the chart accurate.    Luke Solis MD  Board Certified Neurology, ABPN  (t) [phone]

## 2024-01-01 ENCOUNTER — APPOINTMENT (OUTPATIENT)
Dept: RADIOLOGY | Facility: MEDICAL CENTER | Age: 79
DRG: 163 | End: 2024-01-01
Attending: STUDENT IN AN ORGANIZED HEALTH CARE EDUCATION/TRAINING PROGRAM
Payer: COMMERCIAL

## 2024-01-01 ENCOUNTER — APPOINTMENT (OUTPATIENT)
Dept: RADIOLOGY | Facility: MEDICAL CENTER | Age: 79
DRG: 163 | End: 2024-01-01
Attending: INTERNAL MEDICINE
Payer: COMMERCIAL

## 2024-01-01 ENCOUNTER — HOSPITAL ENCOUNTER (EMERGENCY)
Facility: MEDICAL CENTER | Age: 79
End: 2024-01-03
Attending: EMERGENCY MEDICINE
Payer: COMMERCIAL

## 2024-01-01 ENCOUNTER — APPOINTMENT (OUTPATIENT)
Dept: RADIOLOGY | Facility: MEDICAL CENTER | Age: 79
DRG: 163 | End: 2024-01-01
Attending: HOSPITALIST
Payer: COMMERCIAL

## 2024-01-01 ENCOUNTER — APPOINTMENT (OUTPATIENT)
Dept: RADIOLOGY | Facility: MEDICAL CENTER | Age: 79
DRG: 163 | End: 2024-01-01
Attending: NURSE PRACTITIONER
Payer: COMMERCIAL

## 2024-01-01 ENCOUNTER — HOSPITAL ENCOUNTER (INPATIENT)
Facility: MEDICAL CENTER | Age: 79
LOS: 41 days | DRG: 163 | End: 2024-02-14
Attending: EMERGENCY MEDICINE | Admitting: HOSPITALIST
Payer: COMMERCIAL

## 2024-01-01 ENCOUNTER — APPOINTMENT (OUTPATIENT)
Dept: RADIOLOGY | Facility: MEDICAL CENTER | Age: 79
End: 2024-01-01
Attending: EMERGENCY MEDICINE
Payer: COMMERCIAL

## 2024-01-01 ENCOUNTER — APPOINTMENT (OUTPATIENT)
Dept: RADIOLOGY | Facility: MEDICAL CENTER | Age: 79
DRG: 163 | End: 2024-01-01
Attending: EMERGENCY MEDICINE
Payer: COMMERCIAL

## 2024-01-01 VITALS
RESPIRATION RATE: 20 BRPM | HEART RATE: 100 BPM | DIASTOLIC BLOOD PRESSURE: 54 MMHG | OXYGEN SATURATION: 99 % | BODY MASS INDEX: 27.14 KG/M2 | TEMPERATURE: 96.8 F | SYSTOLIC BLOOD PRESSURE: 103 MMHG | WEIGHT: 189.6 LBS | HEIGHT: 70 IN

## 2024-01-01 VITALS
BODY MASS INDEX: 29.35 KG/M2 | WEIGHT: 205 LBS | HEART RATE: 81 BPM | HEIGHT: 70 IN | OXYGEN SATURATION: 93 % | SYSTOLIC BLOOD PRESSURE: 161 MMHG | DIASTOLIC BLOOD PRESSURE: 83 MMHG | RESPIRATION RATE: 19 BRPM | TEMPERATURE: 97.8 F

## 2024-01-01 VITALS
HEART RATE: 89 BPM | OXYGEN SATURATION: 96 % | HEIGHT: 70 IN | RESPIRATION RATE: 17 BRPM | TEMPERATURE: 98.2 F | DIASTOLIC BLOOD PRESSURE: 72 MMHG | WEIGHT: 203.71 LBS | BODY MASS INDEX: 29.16 KG/M2 | SYSTOLIC BLOOD PRESSURE: 110 MMHG

## 2024-01-01 DIAGNOSIS — J96.91 RESPIRATORY FAILURE WITH HYPOXIA, UNSPECIFIED CHRONICITY (HCC): ICD-10-CM

## 2024-01-01 DIAGNOSIS — N17.9 ACUTE RENAL FAILURE, UNSPECIFIED ACUTE RENAL FAILURE TYPE (HCC): ICD-10-CM

## 2024-01-01 DIAGNOSIS — Z86.79 HISTORY OF INTRACRANIAL HEMORRHAGE: ICD-10-CM

## 2024-01-01 DIAGNOSIS — E87.0 HYPERNATREMIA: ICD-10-CM

## 2024-01-01 DIAGNOSIS — D72.829 LEUKOCYTOSIS, UNSPECIFIED TYPE: ICD-10-CM

## 2024-01-01 DIAGNOSIS — R79.89 ELEVATED TROPONIN: ICD-10-CM

## 2024-01-01 DIAGNOSIS — R09.02 HYPOXIA: ICD-10-CM

## 2024-01-01 DIAGNOSIS — G93.40 ENCEPHALOPATHY: ICD-10-CM

## 2024-01-01 DIAGNOSIS — Z13.9 ENCOUNTER FOR MEDICAL SCREENING EXAMINATION: ICD-10-CM

## 2024-01-01 DIAGNOSIS — J96.01 ACUTE RESPIRATORY FAILURE WITH HYPOXIA (HCC): ICD-10-CM

## 2024-01-01 DIAGNOSIS — J69.0 ASPIRATION PNEUMONIA, UNSPECIFIED ASPIRATION PNEUMONIA TYPE, UNSPECIFIED LATERALITY, UNSPECIFIED PART OF LUNG (HCC): ICD-10-CM

## 2024-01-01 DIAGNOSIS — N17.9 AKI (ACUTE KIDNEY INJURY) (HCC): ICD-10-CM

## 2024-01-01 DIAGNOSIS — R55 SYNCOPE, UNSPECIFIED SYNCOPE TYPE: ICD-10-CM

## 2024-01-01 DIAGNOSIS — E86.0 DEHYDRATION: ICD-10-CM

## 2024-01-01 LAB
ALBUMIN SERPL BCP-MCNC: 1.6 G/DL (ref 3.2–4.9)
ALBUMIN SERPL BCP-MCNC: 2 G/DL (ref 3.2–4.9)
ALBUMIN SERPL BCP-MCNC: 2 G/DL (ref 3.2–4.9)
ALBUMIN SERPL BCP-MCNC: 2.2 G/DL (ref 3.2–4.9)
ALBUMIN SERPL BCP-MCNC: 2.3 G/DL (ref 3.2–4.9)
ALBUMIN SERPL BCP-MCNC: 2.5 G/DL (ref 3.2–4.9)
ALBUMIN SERPL BCP-MCNC: 2.6 G/DL (ref 3.2–4.9)
ALBUMIN SERPL BCP-MCNC: 2.7 G/DL (ref 3.2–4.9)
ALBUMIN SERPL BCP-MCNC: 2.8 G/DL (ref 3.2–4.9)
ALBUMIN SERPL BCP-MCNC: 2.8 G/DL (ref 3.2–4.9)
ALBUMIN SERPL BCP-MCNC: 2.9 G/DL (ref 3.2–4.9)
ALBUMIN SERPL BCP-MCNC: 2.9 G/DL (ref 3.2–4.9)
ALBUMIN SERPL BCP-MCNC: 3 G/DL (ref 3.2–4.9)
ALBUMIN SERPL BCP-MCNC: 3.1 G/DL (ref 3.2–4.9)
ALBUMIN SERPL BCP-MCNC: 3.2 G/DL (ref 3.2–4.9)
ALBUMIN SERPL BCP-MCNC: 3.6 G/DL (ref 3.2–4.9)
ALBUMIN SERPL BCP-MCNC: 3.6 G/DL (ref 3.2–4.9)
ALBUMIN SERPL BCP-MCNC: 3.7 G/DL (ref 3.2–4.9)
ALBUMIN SERPL BCP-MCNC: 4.4 G/DL (ref 3.2–4.9)
ALBUMIN/GLOB SERPL: 0.4 G/DL
ALBUMIN/GLOB SERPL: 0.5 G/DL
ALBUMIN/GLOB SERPL: 0.6 G/DL
ALBUMIN/GLOB SERPL: 0.7 G/DL
ALBUMIN/GLOB SERPL: 0.8 G/DL
ALBUMIN/GLOB SERPL: 0.9 G/DL
ALBUMIN/GLOB SERPL: 0.9 G/DL
ALBUMIN/GLOB SERPL: 1.1 G/DL
ALBUMIN/GLOB SERPL: 1.1 G/DL
ALBUMIN/GLOB SERPL: 1.2 G/DL
ALBUMIN/GLOB SERPL: 1.2 G/DL
ALP SERPL-CCNC: 104 U/L (ref 30–99)
ALP SERPL-CCNC: 113 U/L (ref 30–99)
ALP SERPL-CCNC: 114 U/L (ref 30–99)
ALP SERPL-CCNC: 117 U/L (ref 30–99)
ALP SERPL-CCNC: 117 U/L (ref 30–99)
ALP SERPL-CCNC: 118 U/L (ref 30–99)
ALP SERPL-CCNC: 121 U/L (ref 30–99)
ALP SERPL-CCNC: 124 U/L (ref 30–99)
ALP SERPL-CCNC: 125 U/L (ref 30–99)
ALP SERPL-CCNC: 129 U/L (ref 30–99)
ALP SERPL-CCNC: 130 U/L (ref 30–99)
ALP SERPL-CCNC: 133 U/L (ref 30–99)
ALP SERPL-CCNC: 133 U/L (ref 30–99)
ALP SERPL-CCNC: 137 U/L (ref 30–99)
ALP SERPL-CCNC: 137 U/L (ref 30–99)
ALP SERPL-CCNC: 139 U/L (ref 30–99)
ALP SERPL-CCNC: 142 U/L (ref 30–99)
ALP SERPL-CCNC: 144 U/L (ref 30–99)
ALP SERPL-CCNC: 145 U/L (ref 30–99)
ALP SERPL-CCNC: 165 U/L (ref 30–99)
ALP SERPL-CCNC: 98 U/L (ref 30–99)
ALT SERPL-CCNC: 102 U/L (ref 2–50)
ALT SERPL-CCNC: 134 U/L (ref 2–50)
ALT SERPL-CCNC: 138 U/L (ref 2–50)
ALT SERPL-CCNC: 152 U/L (ref 2–50)
ALT SERPL-CCNC: 228 U/L (ref 2–50)
ALT SERPL-CCNC: 33 U/L (ref 2–50)
ALT SERPL-CCNC: 35 U/L (ref 2–50)
ALT SERPL-CCNC: 37 U/L (ref 2–50)
ALT SERPL-CCNC: 37 U/L (ref 2–50)
ALT SERPL-CCNC: 39 U/L (ref 2–50)
ALT SERPL-CCNC: 42 U/L (ref 2–50)
ALT SERPL-CCNC: 46 U/L (ref 2–50)
ALT SERPL-CCNC: 46 U/L (ref 2–50)
ALT SERPL-CCNC: 48 U/L (ref 2–50)
ALT SERPL-CCNC: 49 U/L (ref 2–50)
ALT SERPL-CCNC: 52 U/L (ref 2–50)
ALT SERPL-CCNC: 55 U/L (ref 2–50)
ALT SERPL-CCNC: 66 U/L (ref 2–50)
ALT SERPL-CCNC: 87 U/L (ref 2–50)
ALT SERPL-CCNC: 88 U/L (ref 2–50)
ALT SERPL-CCNC: 92 U/L (ref 2–50)
AMMONIA PLAS-SCNC: 48 UMOL/L (ref 11–45)
ANION GAP SERPL CALC-SCNC: 10 MMOL/L (ref 7–16)
ANION GAP SERPL CALC-SCNC: 11 MMOL/L (ref 7–16)
ANION GAP SERPL CALC-SCNC: 12 MMOL/L (ref 7–16)
ANION GAP SERPL CALC-SCNC: 13 MMOL/L (ref 7–16)
ANION GAP SERPL CALC-SCNC: 14 MMOL/L (ref 7–16)
ANION GAP SERPL CALC-SCNC: 14 MMOL/L (ref 7–16)
ANION GAP SERPL CALC-SCNC: 15 MMOL/L (ref 7–16)
ANION GAP SERPL CALC-SCNC: 18 MMOL/L (ref 7–16)
ANION GAP SERPL CALC-SCNC: 6 MMOL/L (ref 7–16)
ANION GAP SERPL CALC-SCNC: 7 MMOL/L (ref 7–16)
ANION GAP SERPL CALC-SCNC: 8 MMOL/L (ref 7–16)
ANION GAP SERPL CALC-SCNC: 9 MMOL/L (ref 7–16)
ANISOCYTOSIS BLD QL SMEAR: ABNORMAL
APPEARANCE UR: CLEAR
ARTERIAL PATENCY WRIST A: ABNORMAL
AST SERPL-CCNC: 113 U/L (ref 12–45)
AST SERPL-CCNC: 16 U/L (ref 12–45)
AST SERPL-CCNC: 17 U/L (ref 12–45)
AST SERPL-CCNC: 19 U/L (ref 12–45)
AST SERPL-CCNC: 21 U/L (ref 12–45)
AST SERPL-CCNC: 23 U/L (ref 12–45)
AST SERPL-CCNC: 24 U/L (ref 12–45)
AST SERPL-CCNC: 24 U/L (ref 12–45)
AST SERPL-CCNC: 25 U/L (ref 12–45)
AST SERPL-CCNC: 27 U/L (ref 12–45)
AST SERPL-CCNC: 28 U/L (ref 12–45)
AST SERPL-CCNC: 30 U/L (ref 12–45)
AST SERPL-CCNC: 39 U/L (ref 12–45)
AST SERPL-CCNC: 44 U/L (ref 12–45)
AST SERPL-CCNC: 49 U/L (ref 12–45)
AST SERPL-CCNC: 51 U/L (ref 12–45)
AST SERPL-CCNC: 55 U/L (ref 12–45)
AST SERPL-CCNC: 76 U/L (ref 12–45)
AST SERPL-CCNC: 81 U/L (ref 12–45)
BACTERIA #/AREA URNS HPF: NEGATIVE /HPF
BACTERIA BLD CULT: NORMAL
BACTERIA BRONCH AEROBE CULT: ABNORMAL
BACTERIA BRONCH AEROBE CULT: ABNORMAL
BACTERIA BRONCH AEROBE CULT: NORMAL
BACTERIA SPEC RESP CULT: ABNORMAL
BACTERIA SPEC RESP CULT: ABNORMAL
BACTERIA SPEC RESP CULT: NORMAL
BACTERIA SPEC RESP CULT: NORMAL
BACTERIA UR CULT: NORMAL
BASE EXCESS BLDA CALC-SCNC: 0 MMOL/L (ref -4–3)
BASE EXCESS BLDA CALC-SCNC: 0 MMOL/L (ref -4–3)
BASE EXCESS BLDA CALC-SCNC: 1 MMOL/L (ref -4–3)
BASE EXCESS BLDA CALC-SCNC: 2 MMOL/L (ref -4–3)
BASE EXCESS BLDA CALC-SCNC: 3 MMOL/L (ref -4–3)
BASE EXCESS BLDA CALC-SCNC: 6 MMOL/L (ref -4–3)
BASOPHILS # BLD AUTO: 0.2 % (ref 0–1.8)
BASOPHILS # BLD AUTO: 0.3 % (ref 0–1.8)
BASOPHILS # BLD AUTO: 0.4 % (ref 0–1.8)
BASOPHILS # BLD AUTO: 0.5 % (ref 0–1.8)
BASOPHILS # BLD AUTO: 0.6 % (ref 0–1.8)
BASOPHILS # BLD AUTO: 0.6 % (ref 0–1.8)
BASOPHILS # BLD AUTO: 0.7 % (ref 0–1.8)
BASOPHILS # BLD: 0.02 K/UL (ref 0–0.12)
BASOPHILS # BLD: 0.03 K/UL (ref 0–0.12)
BASOPHILS # BLD: 0.04 K/UL (ref 0–0.12)
BASOPHILS # BLD: 0.05 K/UL (ref 0–0.12)
BASOPHILS # BLD: 0.06 K/UL (ref 0–0.12)
BASOPHILS # BLD: 0.07 K/UL (ref 0–0.12)
BASOPHILS # BLD: 0.08 K/UL (ref 0–0.12)
BASOPHILS # BLD: 0.09 K/UL (ref 0–0.12)
BASOPHILS # BLD: 0.09 K/UL (ref 0–0.12)
BILIRUB CONJ SERPL-MCNC: 0.3 MG/DL (ref 0.1–0.5)
BILIRUB INDIRECT SERPL-MCNC: 0.5 MG/DL (ref 0–1)
BILIRUB SERPL-MCNC: 0.2 MG/DL (ref 0.1–1.5)
BILIRUB SERPL-MCNC: 0.3 MG/DL (ref 0.1–1.5)
BILIRUB SERPL-MCNC: 0.6 MG/DL (ref 0.1–1.5)
BILIRUB SERPL-MCNC: 0.7 MG/DL (ref 0.1–1.5)
BILIRUB SERPL-MCNC: 0.7 MG/DL (ref 0.1–1.5)
BILIRUB SERPL-MCNC: 0.8 MG/DL (ref 0.1–1.5)
BILIRUB SERPL-MCNC: 0.8 MG/DL (ref 0.1–1.5)
BILIRUB SERPL-MCNC: 0.9 MG/DL (ref 0.1–1.5)
BILIRUB SERPL-MCNC: 0.9 MG/DL (ref 0.1–1.5)
BILIRUB SERPL-MCNC: <0.2 MG/DL (ref 0.1–1.5)
BILIRUB SERPL-MCNC: <0.2 MG/DL (ref 0.1–1.5)
BILIRUB UR QL STRIP.AUTO: NEGATIVE
BODY TEMPERATURE: 36.2 CENTIGRADE
BODY TEMPERATURE: 38.2 CENTIGRADE
BODY TEMPERATURE: ABNORMAL DEGREES
BREATHS SETTING VENT: 16
BREATHS SETTING VENT: 18
BREATHS SETTING VENT: 22
BREATHS SETTING VENT: 24
BUN SERPL-MCNC: 18 MG/DL (ref 8–22)
BUN SERPL-MCNC: 20 MG/DL (ref 8–22)
BUN SERPL-MCNC: 21 MG/DL (ref 8–22)
BUN SERPL-MCNC: 22 MG/DL (ref 8–22)
BUN SERPL-MCNC: 23 MG/DL (ref 8–22)
BUN SERPL-MCNC: 25 MG/DL (ref 8–22)
BUN SERPL-MCNC: 25 MG/DL (ref 8–22)
BUN SERPL-MCNC: 26 MG/DL (ref 8–22)
BUN SERPL-MCNC: 27 MG/DL (ref 8–22)
BUN SERPL-MCNC: 28 MG/DL (ref 8–22)
BUN SERPL-MCNC: 28 MG/DL (ref 8–22)
BUN SERPL-MCNC: 29 MG/DL (ref 8–22)
BUN SERPL-MCNC: 30 MG/DL (ref 8–22)
BUN SERPL-MCNC: 31 MG/DL (ref 8–22)
BUN SERPL-MCNC: 31 MG/DL (ref 8–22)
BUN SERPL-MCNC: 32 MG/DL (ref 8–22)
BUN SERPL-MCNC: 33 MG/DL (ref 8–22)
BUN SERPL-MCNC: 33 MG/DL (ref 8–22)
BUN SERPL-MCNC: 34 MG/DL (ref 8–22)
BUN SERPL-MCNC: 36 MG/DL (ref 8–22)
BUN SERPL-MCNC: 37 MG/DL (ref 8–22)
BUN SERPL-MCNC: 38 MG/DL (ref 8–22)
BUN SERPL-MCNC: 38 MG/DL (ref 8–22)
BUN SERPL-MCNC: 39 MG/DL (ref 8–22)
BUN SERPL-MCNC: 40 MG/DL (ref 8–22)
BUN SERPL-MCNC: 40 MG/DL (ref 8–22)
BUN SERPL-MCNC: 41 MG/DL (ref 8–22)
BUN SERPL-MCNC: 42 MG/DL (ref 8–22)
BUN SERPL-MCNC: 46 MG/DL (ref 8–22)
BUN SERPL-MCNC: 53 MG/DL (ref 8–22)
BUN SERPL-MCNC: 59 MG/DL (ref 8–22)
BUN SERPL-MCNC: 60 MG/DL (ref 8–22)
C DIFF DNA SPEC QL NAA+PROBE: NEGATIVE
C DIFF TOX A+B STL QL IA: NEGATIVE
C DIFF TOX GENS STL QL NAA+PROBE: NORMAL
CALCIUM ALBUM COR SERPL-MCNC: 10 MG/DL (ref 8.5–10.5)
CALCIUM ALBUM COR SERPL-MCNC: 10.1 MG/DL (ref 8.5–10.5)
CALCIUM ALBUM COR SERPL-MCNC: 10.2 MG/DL (ref 8.5–10.5)
CALCIUM ALBUM COR SERPL-MCNC: 10.2 MG/DL (ref 8.5–10.5)
CALCIUM ALBUM COR SERPL-MCNC: 10.3 MG/DL (ref 8.5–10.5)
CALCIUM ALBUM COR SERPL-MCNC: 10.3 MG/DL (ref 8.5–10.5)
CALCIUM ALBUM COR SERPL-MCNC: 10.4 MG/DL (ref 8.5–10.5)
CALCIUM ALBUM COR SERPL-MCNC: 10.5 MG/DL (ref 8.5–10.5)
CALCIUM ALBUM COR SERPL-MCNC: 10.6 MG/DL (ref 8.5–10.5)
CALCIUM ALBUM COR SERPL-MCNC: 10.7 MG/DL (ref 8.5–10.5)
CALCIUM ALBUM COR SERPL-MCNC: 10.8 MG/DL (ref 8.5–10.5)
CALCIUM ALBUM COR SERPL-MCNC: 10.8 MG/DL (ref 8.5–10.5)
CALCIUM ALBUM COR SERPL-MCNC: 9.8 MG/DL (ref 8.5–10.5)
CALCIUM SERPL-MCNC: 10 MG/DL (ref 8.5–10.5)
CALCIUM SERPL-MCNC: 10 MG/DL (ref 8.5–10.5)
CALCIUM SERPL-MCNC: 10.1 MG/DL (ref 8.5–10.5)
CALCIUM SERPL-MCNC: 10.2 MG/DL (ref 8.5–10.5)
CALCIUM SERPL-MCNC: 10.3 MG/DL (ref 8.5–10.5)
CALCIUM SERPL-MCNC: 10.9 MG/DL (ref 8.5–10.5)
CALCIUM SERPL-MCNC: 8.5 MG/DL (ref 8.5–10.5)
CALCIUM SERPL-MCNC: 8.5 MG/DL (ref 8.5–10.5)
CALCIUM SERPL-MCNC: 8.7 MG/DL (ref 8.5–10.5)
CALCIUM SERPL-MCNC: 8.8 MG/DL (ref 8.5–10.5)
CALCIUM SERPL-MCNC: 8.8 MG/DL (ref 8.5–10.5)
CALCIUM SERPL-MCNC: 8.9 MG/DL (ref 8.5–10.5)
CALCIUM SERPL-MCNC: 9 MG/DL (ref 8.5–10.5)
CALCIUM SERPL-MCNC: 9 MG/DL (ref 8.5–10.5)
CALCIUM SERPL-MCNC: 9.1 MG/DL (ref 8.5–10.5)
CALCIUM SERPL-MCNC: 9.3 MG/DL (ref 8.5–10.5)
CALCIUM SERPL-MCNC: 9.4 MG/DL (ref 8.5–10.5)
CALCIUM SERPL-MCNC: 9.5 MG/DL (ref 8.5–10.5)
CALCIUM SERPL-MCNC: 9.6 MG/DL (ref 8.5–10.5)
CALCIUM SERPL-MCNC: 9.7 MG/DL (ref 8.5–10.5)
CALCIUM SERPL-MCNC: 9.8 MG/DL (ref 8.5–10.5)
CALCIUM SERPL-MCNC: 9.9 MG/DL (ref 8.5–10.5)
CALCIUM SERPL-MCNC: 9.9 MG/DL (ref 8.5–10.5)
CHLORIDE SERPL-SCNC: 102 MMOL/L (ref 96–112)
CHLORIDE SERPL-SCNC: 102 MMOL/L (ref 96–112)
CHLORIDE SERPL-SCNC: 103 MMOL/L (ref 96–112)
CHLORIDE SERPL-SCNC: 103 MMOL/L (ref 96–112)
CHLORIDE SERPL-SCNC: 104 MMOL/L (ref 96–112)
CHLORIDE SERPL-SCNC: 105 MMOL/L (ref 96–112)
CHLORIDE SERPL-SCNC: 105 MMOL/L (ref 96–112)
CHLORIDE SERPL-SCNC: 106 MMOL/L (ref 96–112)
CHLORIDE SERPL-SCNC: 107 MMOL/L (ref 96–112)
CHLORIDE SERPL-SCNC: 108 MMOL/L (ref 96–112)
CHLORIDE SERPL-SCNC: 109 MMOL/L (ref 96–112)
CHLORIDE SERPL-SCNC: 110 MMOL/L (ref 96–112)
CHLORIDE SERPL-SCNC: 111 MMOL/L (ref 96–112)
CHLORIDE SERPL-SCNC: 112 MMOL/L (ref 96–112)
CHLORIDE SERPL-SCNC: 112 MMOL/L (ref 96–112)
CHLORIDE SERPL-SCNC: 113 MMOL/L (ref 96–112)
CHLORIDE SERPL-SCNC: 114 MMOL/L (ref 96–112)
CHLORIDE SERPL-SCNC: 115 MMOL/L (ref 96–112)
CHLORIDE SERPL-SCNC: 116 MMOL/L (ref 96–112)
CHLORIDE SERPL-SCNC: 117 MMOL/L (ref 96–112)
CHLORIDE SERPL-SCNC: 117 MMOL/L (ref 96–112)
CK SERPL-CCNC: 27 U/L (ref 0–154)
CO2 BLDA-SCNC: 26 MMOL/L (ref 20–33)
CO2 BLDA-SCNC: 27 MMOL/L (ref 20–33)
CO2 BLDA-SCNC: 28 MMOL/L (ref 20–33)
CO2 BLDA-SCNC: 29 MMOL/L (ref 20–33)
CO2 BLDA-SCNC: 29 MMOL/L (ref 20–33)
CO2 BLDA-SCNC: 30 MMOL/L (ref 20–33)
CO2 BLDA-SCNC: 31 MMOL/L (ref 20–33)
CO2 BLDA-SCNC: 31 MMOL/L (ref 20–33)
CO2 SERPL-SCNC: 20 MMOL/L (ref 20–33)
CO2 SERPL-SCNC: 21 MMOL/L (ref 20–33)
CO2 SERPL-SCNC: 22 MMOL/L (ref 20–33)
CO2 SERPL-SCNC: 23 MMOL/L (ref 20–33)
CO2 SERPL-SCNC: 24 MMOL/L (ref 20–33)
CO2 SERPL-SCNC: 25 MMOL/L (ref 20–33)
CO2 SERPL-SCNC: 26 MMOL/L (ref 20–33)
CO2 SERPL-SCNC: 27 MMOL/L (ref 20–33)
CO2 SERPL-SCNC: 28 MMOL/L (ref 20–33)
CO2 SERPL-SCNC: 29 MMOL/L (ref 20–33)
CO2 SERPL-SCNC: 30 MMOL/L (ref 20–33)
CO2 SERPL-SCNC: 30 MMOL/L (ref 20–33)
CO2 SERPL-SCNC: 31 MMOL/L (ref 20–33)
CO2 SERPL-SCNC: 32 MMOL/L (ref 20–33)
CO2 SERPL-SCNC: 33 MMOL/L (ref 20–33)
COLOR UR: YELLOW
COMMENT 1642: NORMAL
CORTIS SERPL-MCNC: 13.7 UG/DL (ref 0–23)
CORTIS SERPL-MCNC: 24.6 UG/DL (ref 0–23)
CREAT SERPL-MCNC: 0.91 MG/DL (ref 0.5–1.4)
CREAT SERPL-MCNC: 1.1 MG/DL (ref 0.5–1.4)
CREAT SERPL-MCNC: 1.1 MG/DL (ref 0.5–1.4)
CREAT SERPL-MCNC: 1.13 MG/DL (ref 0.5–1.4)
CREAT SERPL-MCNC: 1.14 MG/DL (ref 0.5–1.4)
CREAT SERPL-MCNC: 1.16 MG/DL (ref 0.5–1.4)
CREAT SERPL-MCNC: 1.18 MG/DL (ref 0.5–1.4)
CREAT SERPL-MCNC: 1.18 MG/DL (ref 0.5–1.4)
CREAT SERPL-MCNC: 1.19 MG/DL (ref 0.5–1.4)
CREAT SERPL-MCNC: 1.21 MG/DL (ref 0.5–1.4)
CREAT SERPL-MCNC: 1.22 MG/DL (ref 0.5–1.4)
CREAT SERPL-MCNC: 1.23 MG/DL (ref 0.5–1.4)
CREAT SERPL-MCNC: 1.25 MG/DL (ref 0.5–1.4)
CREAT SERPL-MCNC: 1.26 MG/DL (ref 0.5–1.4)
CREAT SERPL-MCNC: 1.27 MG/DL (ref 0.5–1.4)
CREAT SERPL-MCNC: 1.27 MG/DL (ref 0.5–1.4)
CREAT SERPL-MCNC: 1.3 MG/DL (ref 0.5–1.4)
CREAT SERPL-MCNC: 1.31 MG/DL (ref 0.5–1.4)
CREAT SERPL-MCNC: 1.31 MG/DL (ref 0.5–1.4)
CREAT SERPL-MCNC: 1.33 MG/DL (ref 0.5–1.4)
CREAT SERPL-MCNC: 1.33 MG/DL (ref 0.5–1.4)
CREAT SERPL-MCNC: 1.34 MG/DL (ref 0.5–1.4)
CREAT SERPL-MCNC: 1.34 MG/DL (ref 0.5–1.4)
CREAT SERPL-MCNC: 1.35 MG/DL (ref 0.5–1.4)
CREAT SERPL-MCNC: 1.36 MG/DL (ref 0.5–1.4)
CREAT SERPL-MCNC: 1.36 MG/DL (ref 0.5–1.4)
CREAT SERPL-MCNC: 1.38 MG/DL (ref 0.5–1.4)
CREAT SERPL-MCNC: 1.38 MG/DL (ref 0.5–1.4)
CREAT SERPL-MCNC: 1.39 MG/DL (ref 0.5–1.4)
CREAT SERPL-MCNC: 1.43 MG/DL (ref 0.5–1.4)
CREAT SERPL-MCNC: 1.44 MG/DL (ref 0.5–1.4)
CREAT SERPL-MCNC: 1.45 MG/DL (ref 0.5–1.4)
CREAT SERPL-MCNC: 1.47 MG/DL (ref 0.5–1.4)
CREAT SERPL-MCNC: 1.48 MG/DL (ref 0.5–1.4)
CREAT SERPL-MCNC: 1.49 MG/DL (ref 0.5–1.4)
CREAT SERPL-MCNC: 1.53 MG/DL (ref 0.5–1.4)
CREAT SERPL-MCNC: 1.56 MG/DL (ref 0.5–1.4)
CREAT SERPL-MCNC: 1.58 MG/DL (ref 0.5–1.4)
CREAT SERPL-MCNC: 1.59 MG/DL (ref 0.5–1.4)
CREAT SERPL-MCNC: 1.6 MG/DL (ref 0.5–1.4)
CREAT SERPL-MCNC: 1.61 MG/DL (ref 0.5–1.4)
CREAT SERPL-MCNC: 1.62 MG/DL (ref 0.5–1.4)
CREAT SERPL-MCNC: 1.64 MG/DL (ref 0.5–1.4)
CREAT SERPL-MCNC: 1.69 MG/DL (ref 0.5–1.4)
CREAT SERPL-MCNC: 1.74 MG/DL (ref 0.5–1.4)
CREAT SERPL-MCNC: 1.74 MG/DL (ref 0.5–1.4)
CREAT SERPL-MCNC: 1.75 MG/DL (ref 0.5–1.4)
CREAT SERPL-MCNC: 1.88 MG/DL (ref 0.5–1.4)
CRP SERPL HS-MCNC: 12.54 MG/DL (ref 0–0.75)
CRP SERPL HS-MCNC: 19.61 MG/DL (ref 0–0.75)
CRP SERPL HS-MCNC: 2.72 MG/DL (ref 0–0.75)
CRP SERPL HS-MCNC: 4 MG/DL (ref 0–0.75)
CRP SERPL HS-MCNC: 5.9 MG/DL (ref 0–0.75)
CRP SERPL HS-MCNC: <0.3 MG/DL (ref 0–0.75)
CYTOLOGY REG CYTOL: NORMAL
DELSYS IDSYS: ABNORMAL
EKG IMPRESSION: NORMAL
END TIDAL CARBON DIOXIDE IECO2: 29 MMHG
END TIDAL CARBON DIOXIDE IECO2: 29 MMHG
END TIDAL CARBON DIOXIDE IECO2: 30 MMHG
END TIDAL CARBON DIOXIDE IECO2: 31 MMHG
END TIDAL CARBON DIOXIDE IECO2: 33 MMHG
END TIDAL CARBON DIOXIDE IECO2: 35 MMHG
END TIDAL CARBON DIOXIDE IECO2: 38 MMHG
END TIDAL CARBON DIOXIDE IECO2: 39 MMHG
END TIDAL CARBON DIOXIDE IECO2: 44 MMHG
EOSINOPHIL # BLD AUTO: 0 K/UL (ref 0–0.51)
EOSINOPHIL # BLD AUTO: 0.01 K/UL (ref 0–0.51)
EOSINOPHIL # BLD AUTO: 0.02 K/UL (ref 0–0.51)
EOSINOPHIL # BLD AUTO: 0.03 K/UL (ref 0–0.51)
EOSINOPHIL # BLD AUTO: 0.03 K/UL (ref 0–0.51)
EOSINOPHIL # BLD AUTO: 0.05 K/UL (ref 0–0.51)
EOSINOPHIL # BLD AUTO: 0.07 K/UL (ref 0–0.51)
EOSINOPHIL # BLD AUTO: 0.1 K/UL (ref 0–0.51)
EOSINOPHIL # BLD AUTO: 0.11 K/UL (ref 0–0.51)
EOSINOPHIL # BLD AUTO: 0.11 K/UL (ref 0–0.51)
EOSINOPHIL # BLD AUTO: 0.14 K/UL (ref 0–0.51)
EOSINOPHIL # BLD AUTO: 0.21 K/UL (ref 0–0.51)
EOSINOPHIL # BLD AUTO: 0.22 K/UL (ref 0–0.51)
EOSINOPHIL # BLD AUTO: 0.24 K/UL (ref 0–0.51)
EOSINOPHIL # BLD AUTO: 0.36 K/UL (ref 0–0.51)
EOSINOPHIL NFR BLD: 0 % (ref 0–6.9)
EOSINOPHIL NFR BLD: 0.1 % (ref 0–6.9)
EOSINOPHIL NFR BLD: 0.2 % (ref 0–6.9)
EOSINOPHIL NFR BLD: 0.2 % (ref 0–6.9)
EOSINOPHIL NFR BLD: 0.3 % (ref 0–6.9)
EOSINOPHIL NFR BLD: 0.5 % (ref 0–6.9)
EOSINOPHIL NFR BLD: 0.6 % (ref 0–6.9)
EOSINOPHIL NFR BLD: 0.6 % (ref 0–6.9)
EOSINOPHIL NFR BLD: 0.7 % (ref 0–6.9)
EOSINOPHIL NFR BLD: 1.4 % (ref 0–6.9)
EOSINOPHIL NFR BLD: 2.3 % (ref 0–6.9)
EOSINOPHIL NFR BLD: 2.6 % (ref 0–6.9)
EOSINOPHIL NFR BLD: 3.6 % (ref 0–6.9)
EPI CELLS #/AREA URNS HPF: NEGATIVE /HPF
ERYTHROCYTE [DISTWIDTH] IN BLOOD BY AUTOMATED COUNT: 41.7 FL (ref 35.9–50)
ERYTHROCYTE [DISTWIDTH] IN BLOOD BY AUTOMATED COUNT: 42 FL (ref 35.9–50)
ERYTHROCYTE [DISTWIDTH] IN BLOOD BY AUTOMATED COUNT: 43.8 FL (ref 35.9–50)
ERYTHROCYTE [DISTWIDTH] IN BLOOD BY AUTOMATED COUNT: 44.4 FL (ref 35.9–50)
ERYTHROCYTE [DISTWIDTH] IN BLOOD BY AUTOMATED COUNT: 44.5 FL (ref 35.9–50)
ERYTHROCYTE [DISTWIDTH] IN BLOOD BY AUTOMATED COUNT: 44.6 FL (ref 35.9–50)
ERYTHROCYTE [DISTWIDTH] IN BLOOD BY AUTOMATED COUNT: 45.1 FL (ref 35.9–50)
ERYTHROCYTE [DISTWIDTH] IN BLOOD BY AUTOMATED COUNT: 45.1 FL (ref 35.9–50)
ERYTHROCYTE [DISTWIDTH] IN BLOOD BY AUTOMATED COUNT: 45.6 FL (ref 35.9–50)
ERYTHROCYTE [DISTWIDTH] IN BLOOD BY AUTOMATED COUNT: 45.7 FL (ref 35.9–50)
ERYTHROCYTE [DISTWIDTH] IN BLOOD BY AUTOMATED COUNT: 45.7 FL (ref 35.9–50)
ERYTHROCYTE [DISTWIDTH] IN BLOOD BY AUTOMATED COUNT: 45.8 FL (ref 35.9–50)
ERYTHROCYTE [DISTWIDTH] IN BLOOD BY AUTOMATED COUNT: 46 FL (ref 35.9–50)
ERYTHROCYTE [DISTWIDTH] IN BLOOD BY AUTOMATED COUNT: 46 FL (ref 35.9–50)
ERYTHROCYTE [DISTWIDTH] IN BLOOD BY AUTOMATED COUNT: 46.2 FL (ref 35.9–50)
ERYTHROCYTE [DISTWIDTH] IN BLOOD BY AUTOMATED COUNT: 46.3 FL (ref 35.9–50)
ERYTHROCYTE [DISTWIDTH] IN BLOOD BY AUTOMATED COUNT: 46.4 FL (ref 35.9–50)
ERYTHROCYTE [DISTWIDTH] IN BLOOD BY AUTOMATED COUNT: 46.6 FL (ref 35.9–50)
ERYTHROCYTE [DISTWIDTH] IN BLOOD BY AUTOMATED COUNT: 46.7 FL (ref 35.9–50)
ERYTHROCYTE [DISTWIDTH] IN BLOOD BY AUTOMATED COUNT: 46.8 FL (ref 35.9–50)
ERYTHROCYTE [DISTWIDTH] IN BLOOD BY AUTOMATED COUNT: 47.1 FL (ref 35.9–50)
ERYTHROCYTE [DISTWIDTH] IN BLOOD BY AUTOMATED COUNT: 47.1 FL (ref 35.9–50)
ERYTHROCYTE [DISTWIDTH] IN BLOOD BY AUTOMATED COUNT: 47.2 FL (ref 35.9–50)
ERYTHROCYTE [DISTWIDTH] IN BLOOD BY AUTOMATED COUNT: 47.5 FL (ref 35.9–50)
ERYTHROCYTE [DISTWIDTH] IN BLOOD BY AUTOMATED COUNT: 47.7 FL (ref 35.9–50)
ERYTHROCYTE [DISTWIDTH] IN BLOOD BY AUTOMATED COUNT: 47.7 FL (ref 35.9–50)
ERYTHROCYTE [DISTWIDTH] IN BLOOD BY AUTOMATED COUNT: 47.8 FL (ref 35.9–50)
ERYTHROCYTE [DISTWIDTH] IN BLOOD BY AUTOMATED COUNT: 48.2 FL (ref 35.9–50)
ERYTHROCYTE [DISTWIDTH] IN BLOOD BY AUTOMATED COUNT: 48.4 FL (ref 35.9–50)
ERYTHROCYTE [DISTWIDTH] IN BLOOD BY AUTOMATED COUNT: 48.5 FL (ref 35.9–50)
ERYTHROCYTE [DISTWIDTH] IN BLOOD BY AUTOMATED COUNT: 48.8 FL (ref 35.9–50)
ERYTHROCYTE [DISTWIDTH] IN BLOOD BY AUTOMATED COUNT: 48.9 FL (ref 35.9–50)
ERYTHROCYTE [DISTWIDTH] IN BLOOD BY AUTOMATED COUNT: 49 FL (ref 35.9–50)
ERYTHROCYTE [DISTWIDTH] IN BLOOD BY AUTOMATED COUNT: 49.4 FL (ref 35.9–50)
ERYTHROCYTE [DISTWIDTH] IN BLOOD BY AUTOMATED COUNT: 49.5 FL (ref 35.9–50)
ERYTHROCYTE [DISTWIDTH] IN BLOOD BY AUTOMATED COUNT: 49.6 FL (ref 35.9–50)
ERYTHROCYTE [DISTWIDTH] IN BLOOD BY AUTOMATED COUNT: 50.2 FL (ref 35.9–50)
ERYTHROCYTE [DISTWIDTH] IN BLOOD BY AUTOMATED COUNT: 51.1 FL (ref 35.9–50)
EST. AVERAGE GLUCOSE BLD GHB EST-MCNC: 128 MG/DL
EST. AVERAGE GLUCOSE BLD GHB EST-MCNC: 146 MG/DL
FLUAV RNA SPEC QL NAA+PROBE: NEGATIVE
FLUBV RNA SPEC QL NAA+PROBE: NEGATIVE
FUNGUS SPEC CULT: NORMAL
FUNGUS SPEC CULT: NORMAL
FUNGUS SPEC FUNGUS STN: NORMAL
GFR SERPLBLD CREATININE-BSD FMLA CKD-EPI: 36 ML/MIN/1.73 M 2
GFR SERPLBLD CREATININE-BSD FMLA CKD-EPI: 40 ML/MIN/1.73 M 2
GFR SERPLBLD CREATININE-BSD FMLA CKD-EPI: 41 ML/MIN/1.73 M 2
GFR SERPLBLD CREATININE-BSD FMLA CKD-EPI: 43 ML/MIN/1.73 M 2
GFR SERPLBLD CREATININE-BSD FMLA CKD-EPI: 44 ML/MIN/1.73 M 2
GFR SERPLBLD CREATININE-BSD FMLA CKD-EPI: 45 ML/MIN/1.73 M 2
GFR SERPLBLD CREATININE-BSD FMLA CKD-EPI: 45 ML/MIN/1.73 M 2
GFR SERPLBLD CREATININE-BSD FMLA CKD-EPI: 46 ML/MIN/1.73 M 2
GFR SERPLBLD CREATININE-BSD FMLA CKD-EPI: 47 ML/MIN/1.73 M 2
GFR SERPLBLD CREATININE-BSD FMLA CKD-EPI: 48 ML/MIN/1.73 M 2
GFR SERPLBLD CREATININE-BSD FMLA CKD-EPI: 49 ML/MIN/1.73 M 2
GFR SERPLBLD CREATININE-BSD FMLA CKD-EPI: 49 ML/MIN/1.73 M 2
GFR SERPLBLD CREATININE-BSD FMLA CKD-EPI: 50 ML/MIN/1.73 M 2
GFR SERPLBLD CREATININE-BSD FMLA CKD-EPI: 50 ML/MIN/1.73 M 2
GFR SERPLBLD CREATININE-BSD FMLA CKD-EPI: 51 ML/MIN/1.73 M 2
GFR SERPLBLD CREATININE-BSD FMLA CKD-EPI: 52 ML/MIN/1.73 M 2
GFR SERPLBLD CREATININE-BSD FMLA CKD-EPI: 53 ML/MIN/1.73 M 2
GFR SERPLBLD CREATININE-BSD FMLA CKD-EPI: 53 ML/MIN/1.73 M 2
GFR SERPLBLD CREATININE-BSD FMLA CKD-EPI: 54 ML/MIN/1.73 M 2
GFR SERPLBLD CREATININE-BSD FMLA CKD-EPI: 55 ML/MIN/1.73 M 2
GFR SERPLBLD CREATININE-BSD FMLA CKD-EPI: 56 ML/MIN/1.73 M 2
GFR SERPLBLD CREATININE-BSD FMLA CKD-EPI: 56 ML/MIN/1.73 M 2
GFR SERPLBLD CREATININE-BSD FMLA CKD-EPI: 57 ML/MIN/1.73 M 2
GFR SERPLBLD CREATININE-BSD FMLA CKD-EPI: 58 ML/MIN/1.73 M 2
GFR SERPLBLD CREATININE-BSD FMLA CKD-EPI: 58 ML/MIN/1.73 M 2
GFR SERPLBLD CREATININE-BSD FMLA CKD-EPI: 59 ML/MIN/1.73 M 2
GFR SERPLBLD CREATININE-BSD FMLA CKD-EPI: 59 ML/MIN/1.73 M 2
GFR SERPLBLD CREATININE-BSD FMLA CKD-EPI: 61 ML/MIN/1.73 M 2
GFR SERPLBLD CREATININE-BSD FMLA CKD-EPI: 61 ML/MIN/1.73 M 2
GFR SERPLBLD CREATININE-BSD FMLA CKD-EPI: 62 ML/MIN/1.73 M 2
GFR SERPLBLD CREATININE-BSD FMLA CKD-EPI: 63 ML/MIN/1.73 M 2
GFR SERPLBLD CREATININE-BSD FMLA CKD-EPI: 64 ML/MIN/1.73 M 2
GFR SERPLBLD CREATININE-BSD FMLA CKD-EPI: 64 ML/MIN/1.73 M 2
GFR SERPLBLD CREATININE-BSD FMLA CKD-EPI: 65 ML/MIN/1.73 M 2
GFR SERPLBLD CREATININE-BSD FMLA CKD-EPI: 66 ML/MIN/1.73 M 2
GFR SERPLBLD CREATININE-BSD FMLA CKD-EPI: 67 ML/MIN/1.73 M 2
GFR SERPLBLD CREATININE-BSD FMLA CKD-EPI: 69 ML/MIN/1.73 M 2
GFR SERPLBLD CREATININE-BSD FMLA CKD-EPI: 69 ML/MIN/1.73 M 2
GFR SERPLBLD CREATININE-BSD FMLA CKD-EPI: 87 ML/MIN/1.73 M 2
GLOBULIN SER CALC-MCNC: 3 G/DL (ref 1.9–3.5)
GLOBULIN SER CALC-MCNC: 3.1 G/DL (ref 1.9–3.5)
GLOBULIN SER CALC-MCNC: 3.2 G/DL (ref 1.9–3.5)
GLOBULIN SER CALC-MCNC: 3.2 G/DL (ref 1.9–3.5)
GLOBULIN SER CALC-MCNC: 3.4 G/DL (ref 1.9–3.5)
GLOBULIN SER CALC-MCNC: 3.5 G/DL (ref 1.9–3.5)
GLOBULIN SER CALC-MCNC: 3.6 G/DL (ref 1.9–3.5)
GLOBULIN SER CALC-MCNC: 3.7 G/DL (ref 1.9–3.5)
GLOBULIN SER CALC-MCNC: 3.8 G/DL (ref 1.9–3.5)
GLOBULIN SER CALC-MCNC: 3.8 G/DL (ref 1.9–3.5)
GLOBULIN SER CALC-MCNC: 3.9 G/DL (ref 1.9–3.5)
GLOBULIN SER CALC-MCNC: 4.1 G/DL (ref 1.9–3.5)
GLOBULIN SER CALC-MCNC: 4.2 G/DL (ref 1.9–3.5)
GLOBULIN SER CALC-MCNC: 4.3 G/DL (ref 1.9–3.5)
GLOBULIN SER CALC-MCNC: 4.5 G/DL (ref 1.9–3.5)
GLOBULIN SER CALC-MCNC: 4.6 G/DL (ref 1.9–3.5)
GLUCOSE BLD STRIP.AUTO-MCNC: 106 MG/DL (ref 65–99)
GLUCOSE BLD STRIP.AUTO-MCNC: 108 MG/DL (ref 65–99)
GLUCOSE BLD STRIP.AUTO-MCNC: 108 MG/DL (ref 65–99)
GLUCOSE BLD STRIP.AUTO-MCNC: 110 MG/DL (ref 65–99)
GLUCOSE BLD STRIP.AUTO-MCNC: 112 MG/DL (ref 65–99)
GLUCOSE BLD STRIP.AUTO-MCNC: 113 MG/DL (ref 65–99)
GLUCOSE BLD STRIP.AUTO-MCNC: 115 MG/DL (ref 65–99)
GLUCOSE BLD STRIP.AUTO-MCNC: 116 MG/DL (ref 65–99)
GLUCOSE BLD STRIP.AUTO-MCNC: 117 MG/DL (ref 65–99)
GLUCOSE BLD STRIP.AUTO-MCNC: 118 MG/DL (ref 65–99)
GLUCOSE BLD STRIP.AUTO-MCNC: 118 MG/DL (ref 65–99)
GLUCOSE BLD STRIP.AUTO-MCNC: 121 MG/DL (ref 65–99)
GLUCOSE BLD STRIP.AUTO-MCNC: 121 MG/DL (ref 65–99)
GLUCOSE BLD STRIP.AUTO-MCNC: 122 MG/DL (ref 65–99)
GLUCOSE BLD STRIP.AUTO-MCNC: 123 MG/DL (ref 65–99)
GLUCOSE BLD STRIP.AUTO-MCNC: 123 MG/DL (ref 65–99)
GLUCOSE BLD STRIP.AUTO-MCNC: 124 MG/DL (ref 65–99)
GLUCOSE BLD STRIP.AUTO-MCNC: 128 MG/DL (ref 65–99)
GLUCOSE BLD STRIP.AUTO-MCNC: 129 MG/DL (ref 65–99)
GLUCOSE BLD STRIP.AUTO-MCNC: 130 MG/DL (ref 65–99)
GLUCOSE BLD STRIP.AUTO-MCNC: 131 MG/DL (ref 65–99)
GLUCOSE BLD STRIP.AUTO-MCNC: 132 MG/DL (ref 65–99)
GLUCOSE BLD STRIP.AUTO-MCNC: 132 MG/DL (ref 65–99)
GLUCOSE BLD STRIP.AUTO-MCNC: 134 MG/DL (ref 65–99)
GLUCOSE BLD STRIP.AUTO-MCNC: 137 MG/DL (ref 65–99)
GLUCOSE BLD STRIP.AUTO-MCNC: 137 MG/DL (ref 65–99)
GLUCOSE BLD STRIP.AUTO-MCNC: 138 MG/DL (ref 65–99)
GLUCOSE BLD STRIP.AUTO-MCNC: 139 MG/DL (ref 65–99)
GLUCOSE BLD STRIP.AUTO-MCNC: 139 MG/DL (ref 65–99)
GLUCOSE BLD STRIP.AUTO-MCNC: 140 MG/DL (ref 65–99)
GLUCOSE BLD STRIP.AUTO-MCNC: 141 MG/DL (ref 65–99)
GLUCOSE BLD STRIP.AUTO-MCNC: 142 MG/DL (ref 65–99)
GLUCOSE BLD STRIP.AUTO-MCNC: 142 MG/DL (ref 65–99)
GLUCOSE BLD STRIP.AUTO-MCNC: 143 MG/DL (ref 65–99)
GLUCOSE BLD STRIP.AUTO-MCNC: 144 MG/DL (ref 65–99)
GLUCOSE BLD STRIP.AUTO-MCNC: 145 MG/DL (ref 65–99)
GLUCOSE BLD STRIP.AUTO-MCNC: 147 MG/DL (ref 65–99)
GLUCOSE BLD STRIP.AUTO-MCNC: 148 MG/DL (ref 65–99)
GLUCOSE BLD STRIP.AUTO-MCNC: 149 MG/DL (ref 65–99)
GLUCOSE BLD STRIP.AUTO-MCNC: 150 MG/DL (ref 65–99)
GLUCOSE BLD STRIP.AUTO-MCNC: 150 MG/DL (ref 65–99)
GLUCOSE BLD STRIP.AUTO-MCNC: 151 MG/DL (ref 65–99)
GLUCOSE BLD STRIP.AUTO-MCNC: 152 MG/DL (ref 65–99)
GLUCOSE BLD STRIP.AUTO-MCNC: 154 MG/DL (ref 65–99)
GLUCOSE BLD STRIP.AUTO-MCNC: 155 MG/DL (ref 65–99)
GLUCOSE BLD STRIP.AUTO-MCNC: 156 MG/DL (ref 65–99)
GLUCOSE BLD STRIP.AUTO-MCNC: 156 MG/DL (ref 65–99)
GLUCOSE BLD STRIP.AUTO-MCNC: 157 MG/DL (ref 65–99)
GLUCOSE BLD STRIP.AUTO-MCNC: 158 MG/DL (ref 65–99)
GLUCOSE BLD STRIP.AUTO-MCNC: 159 MG/DL (ref 65–99)
GLUCOSE BLD STRIP.AUTO-MCNC: 160 MG/DL (ref 65–99)
GLUCOSE BLD STRIP.AUTO-MCNC: 161 MG/DL (ref 65–99)
GLUCOSE BLD STRIP.AUTO-MCNC: 163 MG/DL (ref 65–99)
GLUCOSE BLD STRIP.AUTO-MCNC: 163 MG/DL (ref 65–99)
GLUCOSE BLD STRIP.AUTO-MCNC: 164 MG/DL (ref 65–99)
GLUCOSE BLD STRIP.AUTO-MCNC: 165 MG/DL (ref 65–99)
GLUCOSE BLD STRIP.AUTO-MCNC: 166 MG/DL (ref 65–99)
GLUCOSE BLD STRIP.AUTO-MCNC: 167 MG/DL (ref 65–99)
GLUCOSE BLD STRIP.AUTO-MCNC: 167 MG/DL (ref 65–99)
GLUCOSE BLD STRIP.AUTO-MCNC: 168 MG/DL (ref 65–99)
GLUCOSE BLD STRIP.AUTO-MCNC: 169 MG/DL (ref 65–99)
GLUCOSE BLD STRIP.AUTO-MCNC: 170 MG/DL (ref 65–99)
GLUCOSE BLD STRIP.AUTO-MCNC: 170 MG/DL (ref 65–99)
GLUCOSE BLD STRIP.AUTO-MCNC: 171 MG/DL (ref 65–99)
GLUCOSE BLD STRIP.AUTO-MCNC: 172 MG/DL (ref 65–99)
GLUCOSE BLD STRIP.AUTO-MCNC: 173 MG/DL (ref 65–99)
GLUCOSE BLD STRIP.AUTO-MCNC: 175 MG/DL (ref 65–99)
GLUCOSE BLD STRIP.AUTO-MCNC: 177 MG/DL (ref 65–99)
GLUCOSE BLD STRIP.AUTO-MCNC: 178 MG/DL (ref 65–99)
GLUCOSE BLD STRIP.AUTO-MCNC: 178 MG/DL (ref 65–99)
GLUCOSE BLD STRIP.AUTO-MCNC: 179 MG/DL (ref 65–99)
GLUCOSE BLD STRIP.AUTO-MCNC: 180 MG/DL (ref 65–99)
GLUCOSE BLD STRIP.AUTO-MCNC: 181 MG/DL (ref 65–99)
GLUCOSE BLD STRIP.AUTO-MCNC: 181 MG/DL (ref 65–99)
GLUCOSE BLD STRIP.AUTO-MCNC: 182 MG/DL (ref 65–99)
GLUCOSE BLD STRIP.AUTO-MCNC: 183 MG/DL (ref 65–99)
GLUCOSE BLD STRIP.AUTO-MCNC: 185 MG/DL (ref 65–99)
GLUCOSE BLD STRIP.AUTO-MCNC: 185 MG/DL (ref 65–99)
GLUCOSE BLD STRIP.AUTO-MCNC: 186 MG/DL (ref 65–99)
GLUCOSE BLD STRIP.AUTO-MCNC: 186 MG/DL (ref 65–99)
GLUCOSE BLD STRIP.AUTO-MCNC: 188 MG/DL (ref 65–99)
GLUCOSE BLD STRIP.AUTO-MCNC: 190 MG/DL (ref 65–99)
GLUCOSE BLD STRIP.AUTO-MCNC: 190 MG/DL (ref 65–99)
GLUCOSE BLD STRIP.AUTO-MCNC: 191 MG/DL (ref 65–99)
GLUCOSE BLD STRIP.AUTO-MCNC: 196 MG/DL (ref 65–99)
GLUCOSE BLD STRIP.AUTO-MCNC: 197 MG/DL (ref 65–99)
GLUCOSE BLD STRIP.AUTO-MCNC: 197 MG/DL (ref 65–99)
GLUCOSE BLD STRIP.AUTO-MCNC: 210 MG/DL (ref 65–99)
GLUCOSE BLD STRIP.AUTO-MCNC: 214 MG/DL (ref 65–99)
GLUCOSE BLD STRIP.AUTO-MCNC: 220 MG/DL (ref 65–99)
GLUCOSE BLD STRIP.AUTO-MCNC: 275 MG/DL (ref 65–99)
GLUCOSE BLD STRIP.AUTO-MCNC: 290 MG/DL (ref 65–99)
GLUCOSE BLD STRIP.AUTO-MCNC: 304 MG/DL (ref 65–99)
GLUCOSE BLD STRIP.AUTO-MCNC: 326 MG/DL (ref 65–99)
GLUCOSE BLD STRIP.AUTO-MCNC: 92 MG/DL (ref 65–99)
GLUCOSE BLD STRIP.AUTO-MCNC: 97 MG/DL (ref 65–99)
GLUCOSE SERPL-MCNC: 110 MG/DL (ref 65–99)
GLUCOSE SERPL-MCNC: 118 MG/DL (ref 65–99)
GLUCOSE SERPL-MCNC: 121 MG/DL (ref 65–99)
GLUCOSE SERPL-MCNC: 123 MG/DL (ref 65–99)
GLUCOSE SERPL-MCNC: 123 MG/DL (ref 65–99)
GLUCOSE SERPL-MCNC: 125 MG/DL (ref 65–99)
GLUCOSE SERPL-MCNC: 129 MG/DL (ref 65–99)
GLUCOSE SERPL-MCNC: 130 MG/DL (ref 65–99)
GLUCOSE SERPL-MCNC: 131 MG/DL (ref 65–99)
GLUCOSE SERPL-MCNC: 132 MG/DL (ref 65–99)
GLUCOSE SERPL-MCNC: 138 MG/DL (ref 65–99)
GLUCOSE SERPL-MCNC: 139 MG/DL (ref 65–99)
GLUCOSE SERPL-MCNC: 143 MG/DL (ref 65–99)
GLUCOSE SERPL-MCNC: 146 MG/DL (ref 65–99)
GLUCOSE SERPL-MCNC: 148 MG/DL (ref 65–99)
GLUCOSE SERPL-MCNC: 150 MG/DL (ref 65–99)
GLUCOSE SERPL-MCNC: 150 MG/DL (ref 65–99)
GLUCOSE SERPL-MCNC: 151 MG/DL (ref 65–99)
GLUCOSE SERPL-MCNC: 151 MG/DL (ref 65–99)
GLUCOSE SERPL-MCNC: 155 MG/DL (ref 65–99)
GLUCOSE SERPL-MCNC: 156 MG/DL (ref 65–99)
GLUCOSE SERPL-MCNC: 156 MG/DL (ref 65–99)
GLUCOSE SERPL-MCNC: 159 MG/DL (ref 65–99)
GLUCOSE SERPL-MCNC: 160 MG/DL (ref 65–99)
GLUCOSE SERPL-MCNC: 160 MG/DL (ref 65–99)
GLUCOSE SERPL-MCNC: 161 MG/DL (ref 65–99)
GLUCOSE SERPL-MCNC: 163 MG/DL (ref 65–99)
GLUCOSE SERPL-MCNC: 164 MG/DL (ref 65–99)
GLUCOSE SERPL-MCNC: 167 MG/DL (ref 65–99)
GLUCOSE SERPL-MCNC: 171 MG/DL (ref 65–99)
GLUCOSE SERPL-MCNC: 172 MG/DL (ref 65–99)
GLUCOSE SERPL-MCNC: 175 MG/DL (ref 65–99)
GLUCOSE SERPL-MCNC: 177 MG/DL (ref 65–99)
GLUCOSE SERPL-MCNC: 178 MG/DL (ref 65–99)
GLUCOSE SERPL-MCNC: 179 MG/DL (ref 65–99)
GLUCOSE SERPL-MCNC: 183 MG/DL (ref 65–99)
GLUCOSE SERPL-MCNC: 185 MG/DL (ref 65–99)
GLUCOSE SERPL-MCNC: 187 MG/DL (ref 65–99)
GLUCOSE SERPL-MCNC: 189 MG/DL (ref 65–99)
GLUCOSE SERPL-MCNC: 191 MG/DL (ref 65–99)
GLUCOSE SERPL-MCNC: 193 MG/DL (ref 65–99)
GLUCOSE SERPL-MCNC: 195 MG/DL (ref 65–99)
GLUCOSE SERPL-MCNC: 198 MG/DL (ref 65–99)
GLUCOSE SERPL-MCNC: 199 MG/DL (ref 65–99)
GLUCOSE SERPL-MCNC: 206 MG/DL (ref 65–99)
GLUCOSE SERPL-MCNC: 214 MG/DL (ref 65–99)
GLUCOSE SERPL-MCNC: 216 MG/DL (ref 65–99)
GLUCOSE SERPL-MCNC: 232 MG/DL (ref 65–99)
GLUCOSE SERPL-MCNC: 365 MG/DL (ref 65–99)
GLUCOSE SERPL-MCNC: 94 MG/DL (ref 65–99)
GLUCOSE UR STRIP.AUTO-MCNC: NEGATIVE MG/DL
GRAM STN SPEC: ABNORMAL
GRAM STN SPEC: NORMAL
HBA1C MFR BLD: 6.1 % (ref 4–5.6)
HBA1C MFR BLD: 6.7 % (ref 4–5.6)
HCO3 BLDA-SCNC: 24.9 MMOL/L (ref 17–25)
HCO3 BLDA-SCNC: 25.8 MMOL/L (ref 17–25)
HCO3 BLDA-SCNC: 25.9 MMOL/L (ref 17–25)
HCO3 BLDA-SCNC: 26 MMOL/L (ref 17–25)
HCO3 BLDA-SCNC: 26.1 MMOL/L (ref 17–25)
HCO3 BLDA-SCNC: 26.1 MMOL/L (ref 17–25)
HCO3 BLDA-SCNC: 26.4 MMOL/L (ref 17–25)
HCO3 BLDA-SCNC: 26.5 MMOL/L (ref 17–25)
HCO3 BLDA-SCNC: 26.7 MMOL/L (ref 17–25)
HCO3 BLDA-SCNC: 27.1 MMOL/L (ref 17–25)
HCO3 BLDA-SCNC: 27.6 MMOL/L (ref 17–25)
HCO3 BLDA-SCNC: 27.8 MMOL/L (ref 17–25)
HCO3 BLDA-SCNC: 28.1 MMOL/L (ref 17–25)
HCO3 BLDA-SCNC: 29 MMOL/L (ref 17–25)
HCO3 BLDA-SCNC: 29.7 MMOL/L (ref 17–25)
HCO3 BLDA-SCNC: 30.2 MMOL/L (ref 17–25)
HCT VFR BLD AUTO: 27.7 % (ref 42–52)
HCT VFR BLD AUTO: 28.4 % (ref 42–52)
HCT VFR BLD AUTO: 28.6 % (ref 42–52)
HCT VFR BLD AUTO: 28.9 % (ref 42–52)
HCT VFR BLD AUTO: 29 % (ref 42–52)
HCT VFR BLD AUTO: 29.5 % (ref 42–52)
HCT VFR BLD AUTO: 29.8 % (ref 42–52)
HCT VFR BLD AUTO: 30.3 % (ref 42–52)
HCT VFR BLD AUTO: 30.3 % (ref 42–52)
HCT VFR BLD AUTO: 30.5 % (ref 42–52)
HCT VFR BLD AUTO: 30.6 % (ref 42–52)
HCT VFR BLD AUTO: 30.9 % (ref 42–52)
HCT VFR BLD AUTO: 31.5 % (ref 42–52)
HCT VFR BLD AUTO: 31.6 % (ref 42–52)
HCT VFR BLD AUTO: 32 % (ref 42–52)
HCT VFR BLD AUTO: 32.2 % (ref 42–52)
HCT VFR BLD AUTO: 32.3 % (ref 42–52)
HCT VFR BLD AUTO: 32.4 % (ref 42–52)
HCT VFR BLD AUTO: 32.5 % (ref 42–52)
HCT VFR BLD AUTO: 32.9 % (ref 42–52)
HCT VFR BLD AUTO: 32.9 % (ref 42–52)
HCT VFR BLD AUTO: 33.1 % (ref 42–52)
HCT VFR BLD AUTO: 33.8 % (ref 42–52)
HCT VFR BLD AUTO: 34.2 % (ref 42–52)
HCT VFR BLD AUTO: 34.5 % (ref 42–52)
HCT VFR BLD AUTO: 34.6 % (ref 42–52)
HCT VFR BLD AUTO: 34.9 % (ref 42–52)
HCT VFR BLD AUTO: 35.5 % (ref 42–52)
HCT VFR BLD AUTO: 35.9 % (ref 42–52)
HCT VFR BLD AUTO: 37.1 % (ref 42–52)
HCT VFR BLD AUTO: 38.7 % (ref 42–52)
HCT VFR BLD AUTO: 40 % (ref 42–52)
HCT VFR BLD AUTO: 41.1 % (ref 42–52)
HCT VFR BLD AUTO: 43 % (ref 42–52)
HCT VFR BLD AUTO: 43.8 % (ref 42–52)
HCT VFR BLD AUTO: 45 % (ref 42–52)
HCT VFR BLD AUTO: 45.8 % (ref 42–52)
HCT VFR BLD AUTO: 45.9 % (ref 42–52)
HCT VFR BLD AUTO: 46.4 % (ref 42–52)
HCT VFR BLD AUTO: 47.8 % (ref 42–52)
HCT VFR BLD AUTO: 48.7 % (ref 42–52)
HCT VFR BLD AUTO: 51.9 % (ref 42–52)
HGB BLD-MCNC: 10.2 G/DL (ref 14–18)
HGB BLD-MCNC: 10.3 G/DL (ref 14–18)
HGB BLD-MCNC: 10.4 G/DL (ref 14–18)
HGB BLD-MCNC: 10.5 G/DL (ref 14–18)
HGB BLD-MCNC: 10.7 G/DL (ref 14–18)
HGB BLD-MCNC: 10.7 G/DL (ref 14–18)
HGB BLD-MCNC: 10.8 G/DL (ref 14–18)
HGB BLD-MCNC: 10.9 G/DL (ref 14–18)
HGB BLD-MCNC: 11.5 G/DL (ref 14–18)
HGB BLD-MCNC: 11.6 G/DL (ref 14–18)
HGB BLD-MCNC: 11.6 G/DL (ref 14–18)
HGB BLD-MCNC: 11.8 G/DL (ref 14–18)
HGB BLD-MCNC: 12.1 G/DL (ref 14–18)
HGB BLD-MCNC: 12.3 G/DL (ref 14–18)
HGB BLD-MCNC: 12.8 G/DL (ref 14–18)
HGB BLD-MCNC: 13.7 G/DL (ref 14–18)
HGB BLD-MCNC: 14.1 G/DL (ref 14–18)
HGB BLD-MCNC: 14.7 G/DL (ref 14–18)
HGB BLD-MCNC: 14.8 G/DL (ref 14–18)
HGB BLD-MCNC: 14.9 G/DL (ref 14–18)
HGB BLD-MCNC: 16.1 G/DL (ref 14–18)
HGB BLD-MCNC: 16.3 G/DL (ref 14–18)
HGB BLD-MCNC: 16.4 G/DL (ref 14–18)
HGB BLD-MCNC: 17.5 G/DL (ref 14–18)
HGB BLD-MCNC: 8.9 G/DL (ref 14–18)
HGB BLD-MCNC: 8.9 G/DL (ref 14–18)
HGB BLD-MCNC: 9 G/DL (ref 14–18)
HGB BLD-MCNC: 9.3 G/DL (ref 14–18)
HGB BLD-MCNC: 9.4 G/DL (ref 14–18)
HGB BLD-MCNC: 9.5 G/DL (ref 14–18)
HGB BLD-MCNC: 9.7 G/DL (ref 14–18)
HGB BLD-MCNC: 9.8 G/DL (ref 14–18)
HGB BLD-MCNC: 9.8 G/DL (ref 14–18)
HGB BLD-MCNC: 9.9 G/DL (ref 14–18)
HOROWITZ INDEX BLDA+IHG-RTO: 100 MM[HG]
HOROWITZ INDEX BLDA+IHG-RTO: 106 MM[HG]
HOROWITZ INDEX BLDA+IHG-RTO: 110 MM[HG]
HOROWITZ INDEX BLDA+IHG-RTO: 140 MM[HG]
HOROWITZ INDEX BLDA+IHG-RTO: 150 MM[HG]
HOROWITZ INDEX BLDA+IHG-RTO: 183 MM[HG]
HOROWITZ INDEX BLDA+IHG-RTO: 196 MM[HG]
HOROWITZ INDEX BLDA+IHG-RTO: 203 MM[HG]
HOROWITZ INDEX BLDA+IHG-RTO: 215 MM[HG]
HOROWITZ INDEX BLDA+IHG-RTO: 246 MM[HG]
HOROWITZ INDEX BLDA+IHG-RTO: 268 MM[HG]
HOROWITZ INDEX BLDA+IHG-RTO: 287 MM[HG]
HOROWITZ INDEX BLDA+IHG-RTO: 60 MM[HG]
IMM GRANULOCYTES # BLD AUTO: 0.02 K/UL (ref 0–0.11)
IMM GRANULOCYTES # BLD AUTO: 0.03 K/UL (ref 0–0.11)
IMM GRANULOCYTES # BLD AUTO: 0.05 K/UL (ref 0–0.11)
IMM GRANULOCYTES # BLD AUTO: 0.06 K/UL (ref 0–0.11)
IMM GRANULOCYTES # BLD AUTO: 0.07 K/UL (ref 0–0.11)
IMM GRANULOCYTES # BLD AUTO: 0.07 K/UL (ref 0–0.11)
IMM GRANULOCYTES # BLD AUTO: 0.08 K/UL (ref 0–0.11)
IMM GRANULOCYTES # BLD AUTO: 0.08 K/UL (ref 0–0.11)
IMM GRANULOCYTES # BLD AUTO: 0.1 K/UL (ref 0–0.11)
IMM GRANULOCYTES # BLD AUTO: 0.11 K/UL (ref 0–0.11)
IMM GRANULOCYTES # BLD AUTO: 0.11 K/UL (ref 0–0.11)
IMM GRANULOCYTES # BLD AUTO: 0.12 K/UL (ref 0–0.11)
IMM GRANULOCYTES # BLD AUTO: 0.13 K/UL (ref 0–0.11)
IMM GRANULOCYTES # BLD AUTO: 0.13 K/UL (ref 0–0.11)
IMM GRANULOCYTES # BLD AUTO: 0.14 K/UL (ref 0–0.11)
IMM GRANULOCYTES # BLD AUTO: 0.14 K/UL (ref 0–0.11)
IMM GRANULOCYTES # BLD AUTO: 0.16 K/UL (ref 0–0.11)
IMM GRANULOCYTES # BLD AUTO: 0.17 K/UL (ref 0–0.11)
IMM GRANULOCYTES # BLD AUTO: 0.17 K/UL (ref 0–0.11)
IMM GRANULOCYTES # BLD AUTO: 0.18 K/UL (ref 0–0.11)
IMM GRANULOCYTES # BLD AUTO: 0.19 K/UL (ref 0–0.11)
IMM GRANULOCYTES # BLD AUTO: 0.2 K/UL (ref 0–0.11)
IMM GRANULOCYTES # BLD AUTO: 0.22 K/UL (ref 0–0.11)
IMM GRANULOCYTES # BLD AUTO: 0.27 K/UL (ref 0–0.11)
IMM GRANULOCYTES # BLD AUTO: 0.28 K/UL (ref 0–0.11)
IMM GRANULOCYTES # BLD AUTO: 0.3 K/UL (ref 0–0.11)
IMM GRANULOCYTES # BLD AUTO: 0.34 K/UL (ref 0–0.11)
IMM GRANULOCYTES # BLD AUTO: 0.35 K/UL (ref 0–0.11)
IMM GRANULOCYTES # BLD AUTO: 0.43 K/UL (ref 0–0.11)
IMM GRANULOCYTES # BLD AUTO: 0.43 K/UL (ref 0–0.11)
IMM GRANULOCYTES # BLD AUTO: 0.47 K/UL (ref 0–0.11)
IMM GRANULOCYTES # BLD AUTO: 0.5 K/UL (ref 0–0.11)
IMM GRANULOCYTES # BLD AUTO: 0.52 K/UL (ref 0–0.11)
IMM GRANULOCYTES # BLD AUTO: 0.59 K/UL (ref 0–0.11)
IMM GRANULOCYTES NFR BLD AUTO: 0.3 % (ref 0–0.9)
IMM GRANULOCYTES NFR BLD AUTO: 0.5 % (ref 0–0.9)
IMM GRANULOCYTES NFR BLD AUTO: 0.5 % (ref 0–0.9)
IMM GRANULOCYTES NFR BLD AUTO: 0.6 % (ref 0–0.9)
IMM GRANULOCYTES NFR BLD AUTO: 0.7 % (ref 0–0.9)
IMM GRANULOCYTES NFR BLD AUTO: 0.8 % (ref 0–0.9)
IMM GRANULOCYTES NFR BLD AUTO: 0.9 % (ref 0–0.9)
IMM GRANULOCYTES NFR BLD AUTO: 1.1 % (ref 0–0.9)
IMM GRANULOCYTES NFR BLD AUTO: 1.1 % (ref 0–0.9)
IMM GRANULOCYTES NFR BLD AUTO: 1.4 % (ref 0–0.9)
IMM GRANULOCYTES NFR BLD AUTO: 1.6 % (ref 0–0.9)
IMM GRANULOCYTES NFR BLD AUTO: 1.6 % (ref 0–0.9)
IMM GRANULOCYTES NFR BLD AUTO: 1.7 % (ref 0–0.9)
IMM GRANULOCYTES NFR BLD AUTO: 1.9 % (ref 0–0.9)
IMM GRANULOCYTES NFR BLD AUTO: 2.1 % (ref 0–0.9)
IMM GRANULOCYTES NFR BLD AUTO: 2.4 % (ref 0–0.9)
IMM GRANULOCYTES NFR BLD AUTO: 2.5 % (ref 0–0.9)
IMM GRANULOCYTES NFR BLD AUTO: 2.7 % (ref 0–0.9)
IMM GRANULOCYTES NFR BLD AUTO: 3.2 % (ref 0–0.9)
IMM GRANULOCYTES NFR BLD AUTO: 3.7 % (ref 0–0.9)
IMM GRANULOCYTES NFR BLD AUTO: 4.2 % (ref 0–0.9)
IMM GRANULOCYTES NFR BLD AUTO: 4.3 % (ref 0–0.9)
INHALED O2 FLOW RATE: 10 L/MIN (ref 2–10)
INHALED O2 FLOW RATE: 6 L/MIN
INHALED O2 FLOW RATE: ABNORMAL L/MIN
INR PPP: 1.34 (ref 0.87–1.13)
KETONES UR STRIP.AUTO-MCNC: ABNORMAL MG/DL
LACTATE BLD-SCNC: 0.5 MMOL/L (ref 0.5–2)
LACTATE BLD-SCNC: 0.9 MMOL/L (ref 0.5–2)
LACTATE BLD-SCNC: 1 MMOL/L (ref 0.5–2)
LACTATE BLD-SCNC: 1.2 MMOL/L (ref 0.5–2)
LACTATE BLD-SCNC: 1.3 MMOL/L (ref 0.5–2)
LACTATE SERPL-SCNC: 1.3 MMOL/L (ref 0.5–2)
LACTATE SERPL-SCNC: 1.4 MMOL/L (ref 0.5–2)
LACTATE SERPL-SCNC: 1.6 MMOL/L (ref 0.5–2)
LACTATE SERPL-SCNC: 1.7 MMOL/L (ref 0.5–2)
LACTATE SERPL-SCNC: 1.9 MMOL/L (ref 0.5–2)
LACTATE SERPL-SCNC: 2 MMOL/L (ref 0.5–2)
LACTATE SERPL-SCNC: 2 MMOL/L (ref 0.5–2)
LACTATE SERPL-SCNC: 2.2 MMOL/L (ref 0.5–2)
LACTATE SERPL-SCNC: 2.3 MMOL/L (ref 0.5–2)
LACTATE SERPL-SCNC: 2.7 MMOL/L (ref 0.5–2)
LEUKOCYTE ESTERASE UR QL STRIP.AUTO: ABNORMAL
LPM ILPM: 10 LPM
LPM ILPM: 60 LPM
LYMPHOCYTES # BLD AUTO: 0.65 K/UL (ref 1–4.8)
LYMPHOCYTES # BLD AUTO: 0.74 K/UL (ref 1–4.8)
LYMPHOCYTES # BLD AUTO: 0.88 K/UL (ref 1–4.8)
LYMPHOCYTES # BLD AUTO: 0.92 K/UL (ref 1–4.8)
LYMPHOCYTES # BLD AUTO: 0.93 K/UL (ref 1–4.8)
LYMPHOCYTES # BLD AUTO: 0.96 K/UL (ref 1–4.8)
LYMPHOCYTES # BLD AUTO: 1.01 K/UL (ref 1–4.8)
LYMPHOCYTES # BLD AUTO: 1.02 K/UL (ref 1–4.8)
LYMPHOCYTES # BLD AUTO: 1.07 K/UL (ref 1–4.8)
LYMPHOCYTES # BLD AUTO: 1.07 K/UL (ref 1–4.8)
LYMPHOCYTES # BLD AUTO: 1.12 K/UL (ref 1–4.8)
LYMPHOCYTES # BLD AUTO: 1.17 K/UL (ref 1–4.8)
LYMPHOCYTES # BLD AUTO: 1.18 K/UL (ref 1–4.8)
LYMPHOCYTES # BLD AUTO: 1.19 K/UL (ref 1–4.8)
LYMPHOCYTES # BLD AUTO: 1.25 K/UL (ref 1–4.8)
LYMPHOCYTES # BLD AUTO: 1.27 K/UL (ref 1–4.8)
LYMPHOCYTES # BLD AUTO: 1.31 K/UL (ref 1–4.8)
LYMPHOCYTES # BLD AUTO: 1.35 K/UL (ref 1–4.8)
LYMPHOCYTES # BLD AUTO: 1.42 K/UL (ref 1–4.8)
LYMPHOCYTES # BLD AUTO: 1.46 K/UL (ref 1–4.8)
LYMPHOCYTES # BLD AUTO: 1.5 K/UL (ref 1–4.8)
LYMPHOCYTES # BLD AUTO: 1.52 K/UL (ref 1–4.8)
LYMPHOCYTES # BLD AUTO: 1.53 K/UL (ref 1–4.8)
LYMPHOCYTES # BLD AUTO: 1.53 K/UL (ref 1–4.8)
LYMPHOCYTES # BLD AUTO: 1.6 K/UL (ref 1–4.8)
LYMPHOCYTES # BLD AUTO: 1.71 K/UL (ref 1–4.8)
LYMPHOCYTES # BLD AUTO: 1.8 K/UL (ref 1–4.8)
LYMPHOCYTES # BLD AUTO: 1.84 K/UL (ref 1–4.8)
LYMPHOCYTES # BLD AUTO: 1.84 K/UL (ref 1–4.8)
LYMPHOCYTES # BLD AUTO: 1.89 K/UL (ref 1–4.8)
LYMPHOCYTES # BLD AUTO: 1.96 K/UL (ref 1–4.8)
LYMPHOCYTES # BLD AUTO: 2.03 K/UL (ref 1–4.8)
LYMPHOCYTES # BLD AUTO: 2.07 K/UL (ref 1–4.8)
LYMPHOCYTES # BLD AUTO: 2.43 K/UL (ref 1–4.8)
LYMPHOCYTES NFR BLD: 10 % (ref 22–41)
LYMPHOCYTES NFR BLD: 10.1 % (ref 22–41)
LYMPHOCYTES NFR BLD: 10.1 % (ref 22–41)
LYMPHOCYTES NFR BLD: 10.5 % (ref 22–41)
LYMPHOCYTES NFR BLD: 10.6 % (ref 22–41)
LYMPHOCYTES NFR BLD: 11.2 % (ref 22–41)
LYMPHOCYTES NFR BLD: 12.2 % (ref 22–41)
LYMPHOCYTES NFR BLD: 12.7 % (ref 22–41)
LYMPHOCYTES NFR BLD: 13 % (ref 22–41)
LYMPHOCYTES NFR BLD: 13.8 % (ref 22–41)
LYMPHOCYTES NFR BLD: 14.9 % (ref 22–41)
LYMPHOCYTES NFR BLD: 15.5 % (ref 22–41)
LYMPHOCYTES NFR BLD: 15.7 % (ref 22–41)
LYMPHOCYTES NFR BLD: 16 % (ref 22–41)
LYMPHOCYTES NFR BLD: 16.9 % (ref 22–41)
LYMPHOCYTES NFR BLD: 18.7 % (ref 22–41)
LYMPHOCYTES NFR BLD: 2.6 % (ref 22–41)
LYMPHOCYTES NFR BLD: 20 % (ref 22–41)
LYMPHOCYTES NFR BLD: 20.6 % (ref 22–41)
LYMPHOCYTES NFR BLD: 23.9 % (ref 22–41)
LYMPHOCYTES NFR BLD: 3.1 % (ref 22–41)
LYMPHOCYTES NFR BLD: 4.4 % (ref 22–41)
LYMPHOCYTES NFR BLD: 4.9 % (ref 22–41)
LYMPHOCYTES NFR BLD: 5.1 % (ref 22–41)
LYMPHOCYTES NFR BLD: 5.8 % (ref 22–41)
LYMPHOCYTES NFR BLD: 5.8 % (ref 22–41)
LYMPHOCYTES NFR BLD: 6.8 % (ref 22–41)
LYMPHOCYTES NFR BLD: 6.9 % (ref 22–41)
LYMPHOCYTES NFR BLD: 7.4 % (ref 22–41)
LYMPHOCYTES NFR BLD: 8.5 % (ref 22–41)
LYMPHOCYTES NFR BLD: 9.4 % (ref 22–41)
LYMPHOCYTES NFR BLD: 9.6 % (ref 22–41)
LYMPHOCYTES NFR BLD: 9.7 % (ref 22–41)
LYMPHOCYTES NFR BLD: 9.9 % (ref 22–41)
MAGNESIUM SERPL-MCNC: 2.1 MG/DL (ref 1.5–2.5)
MAGNESIUM SERPL-MCNC: 2.2 MG/DL (ref 1.5–2.5)
MAGNESIUM SERPL-MCNC: 2.2 MG/DL (ref 1.5–2.5)
MAGNESIUM SERPL-MCNC: 2.3 MG/DL (ref 1.5–2.5)
MAGNESIUM SERPL-MCNC: 2.4 MG/DL (ref 1.5–2.5)
MAGNESIUM SERPL-MCNC: 2.5 MG/DL (ref 1.5–2.5)
MAGNESIUM SERPL-MCNC: 2.6 MG/DL (ref 1.5–2.5)
MAGNESIUM SERPL-MCNC: 2.7 MG/DL (ref 1.5–2.5)
MAGNESIUM SERPL-MCNC: 2.9 MG/DL (ref 1.5–2.5)
MCH RBC QN AUTO: 29.2 PG (ref 27–33)
MCH RBC QN AUTO: 29.5 PG (ref 27–33)
MCH RBC QN AUTO: 29.6 PG (ref 27–33)
MCH RBC QN AUTO: 29.7 PG (ref 27–33)
MCH RBC QN AUTO: 29.9 PG (ref 27–33)
MCH RBC QN AUTO: 29.9 PG (ref 27–33)
MCH RBC QN AUTO: 30 PG (ref 27–33)
MCH RBC QN AUTO: 30 PG (ref 27–33)
MCH RBC QN AUTO: 30.1 PG (ref 27–33)
MCH RBC QN AUTO: 30.3 PG (ref 27–33)
MCH RBC QN AUTO: 30.4 PG (ref 27–33)
MCH RBC QN AUTO: 30.4 PG (ref 27–33)
MCH RBC QN AUTO: 30.5 PG (ref 27–33)
MCH RBC QN AUTO: 30.5 PG (ref 27–33)
MCH RBC QN AUTO: 30.6 PG (ref 27–33)
MCH RBC QN AUTO: 30.7 PG (ref 27–33)
MCH RBC QN AUTO: 30.9 PG (ref 27–33)
MCH RBC QN AUTO: 31.1 PG (ref 27–33)
MCH RBC QN AUTO: 31.1 PG (ref 27–33)
MCH RBC QN AUTO: 31.3 PG (ref 27–33)
MCHC RBC AUTO-ENTMCNC: 29.5 G/DL (ref 32.3–36.5)
MCHC RBC AUTO-ENTMCNC: 29.7 G/DL (ref 32.3–36.5)
MCHC RBC AUTO-ENTMCNC: 30.3 G/DL (ref 32.3–36.5)
MCHC RBC AUTO-ENTMCNC: 30.5 G/DL (ref 32.3–36.5)
MCHC RBC AUTO-ENTMCNC: 30.7 G/DL (ref 32.3–36.5)
MCHC RBC AUTO-ENTMCNC: 31.1 G/DL (ref 32.3–36.5)
MCHC RBC AUTO-ENTMCNC: 31.2 G/DL (ref 32.3–36.5)
MCHC RBC AUTO-ENTMCNC: 31.2 G/DL (ref 32.3–36.5)
MCHC RBC AUTO-ENTMCNC: 31.3 G/DL (ref 32.3–36.5)
MCHC RBC AUTO-ENTMCNC: 31.5 G/DL (ref 32.3–36.5)
MCHC RBC AUTO-ENTMCNC: 31.8 G/DL (ref 32.3–36.5)
MCHC RBC AUTO-ENTMCNC: 31.8 G/DL (ref 32.3–36.5)
MCHC RBC AUTO-ENTMCNC: 31.9 G/DL (ref 32.3–36.5)
MCHC RBC AUTO-ENTMCNC: 32 G/DL (ref 32.3–36.5)
MCHC RBC AUTO-ENTMCNC: 32.1 G/DL (ref 32.3–36.5)
MCHC RBC AUTO-ENTMCNC: 32.1 G/DL (ref 32.3–36.5)
MCHC RBC AUTO-ENTMCNC: 32.2 G/DL (ref 32.3–36.5)
MCHC RBC AUTO-ENTMCNC: 32.3 G/DL (ref 32.3–36.5)
MCHC RBC AUTO-ENTMCNC: 32.4 G/DL (ref 32.3–36.5)
MCHC RBC AUTO-ENTMCNC: 32.4 G/DL (ref 32.3–36.5)
MCHC RBC AUTO-ENTMCNC: 32.5 G/DL (ref 32.3–36.5)
MCHC RBC AUTO-ENTMCNC: 32.6 G/DL (ref 32.3–36.5)
MCHC RBC AUTO-ENTMCNC: 32.7 G/DL (ref 32.3–36.5)
MCHC RBC AUTO-ENTMCNC: 32.7 G/DL (ref 32.3–36.5)
MCHC RBC AUTO-ENTMCNC: 32.9 G/DL (ref 32.3–36.5)
MCHC RBC AUTO-ENTMCNC: 33.2 G/DL (ref 32.3–36.5)
MCHC RBC AUTO-ENTMCNC: 33.7 G/DL (ref 32.3–36.5)
MCHC RBC AUTO-ENTMCNC: 33.7 G/DL (ref 32.3–36.5)
MCHC RBC AUTO-ENTMCNC: 34.1 G/DL (ref 32.3–36.5)
MCHC RBC AUTO-ENTMCNC: 34.7 G/DL (ref 32.3–36.5)
MCV RBC AUTO: 100 FL (ref 81.4–97.8)
MCV RBC AUTO: 100.6 FL (ref 81.4–97.8)
MCV RBC AUTO: 88 FL (ref 81.4–97.8)
MCV RBC AUTO: 90 FL (ref 81.4–97.8)
MCV RBC AUTO: 90.3 FL (ref 81.4–97.8)
MCV RBC AUTO: 90.6 FL (ref 81.4–97.8)
MCV RBC AUTO: 90.7 FL (ref 81.4–97.8)
MCV RBC AUTO: 91.8 FL (ref 81.4–97.8)
MCV RBC AUTO: 92.3 FL (ref 81.4–97.8)
MCV RBC AUTO: 92.5 FL (ref 81.4–97.8)
MCV RBC AUTO: 92.6 FL (ref 81.4–97.8)
MCV RBC AUTO: 92.6 FL (ref 81.4–97.8)
MCV RBC AUTO: 92.8 FL (ref 81.4–97.8)
MCV RBC AUTO: 92.9 FL (ref 81.4–97.8)
MCV RBC AUTO: 93 FL (ref 81.4–97.8)
MCV RBC AUTO: 93 FL (ref 81.4–97.8)
MCV RBC AUTO: 93.2 FL (ref 81.4–97.8)
MCV RBC AUTO: 93.3 FL (ref 81.4–97.8)
MCV RBC AUTO: 93.6 FL (ref 81.4–97.8)
MCV RBC AUTO: 93.8 FL (ref 81.4–97.8)
MCV RBC AUTO: 94.1 FL (ref 81.4–97.8)
MCV RBC AUTO: 94.3 FL (ref 81.4–97.8)
MCV RBC AUTO: 94.3 FL (ref 81.4–97.8)
MCV RBC AUTO: 94.5 FL (ref 81.4–97.8)
MCV RBC AUTO: 94.7 FL (ref 81.4–97.8)
MCV RBC AUTO: 94.9 FL (ref 81.4–97.8)
MCV RBC AUTO: 95.2 FL (ref 81.4–97.8)
MCV RBC AUTO: 95.2 FL (ref 81.4–97.8)
MCV RBC AUTO: 95.4 FL (ref 81.4–97.8)
MCV RBC AUTO: 95.6 FL (ref 81.4–97.8)
MCV RBC AUTO: 95.8 FL (ref 81.4–97.8)
MCV RBC AUTO: 96.3 FL (ref 81.4–97.8)
MCV RBC AUTO: 96.4 FL (ref 81.4–97.8)
MCV RBC AUTO: 96.7 FL (ref 81.4–97.8)
MCV RBC AUTO: 97.4 FL (ref 81.4–97.8)
MCV RBC AUTO: 97.6 FL (ref 81.4–97.8)
MCV RBC AUTO: 98.6 FL (ref 81.4–97.8)
MICRO URNS: ABNORMAL
MICROCYTES BLD QL SMEAR: ABNORMAL
MODE IMODE: ABNORMAL
MONOCYTES # BLD AUTO: 0.37 K/UL (ref 0–0.85)
MONOCYTES # BLD AUTO: 0.4 K/UL (ref 0–0.85)
MONOCYTES # BLD AUTO: 0.68 K/UL (ref 0–0.85)
MONOCYTES # BLD AUTO: 0.75 K/UL (ref 0–0.85)
MONOCYTES # BLD AUTO: 0.77 K/UL (ref 0–0.85)
MONOCYTES # BLD AUTO: 0.78 K/UL (ref 0–0.85)
MONOCYTES # BLD AUTO: 0.78 K/UL (ref 0–0.85)
MONOCYTES # BLD AUTO: 0.82 K/UL (ref 0–0.85)
MONOCYTES # BLD AUTO: 0.85 K/UL (ref 0–0.85)
MONOCYTES # BLD AUTO: 0.85 K/UL (ref 0–0.85)
MONOCYTES # BLD AUTO: 0.92 K/UL (ref 0–0.85)
MONOCYTES # BLD AUTO: 0.92 K/UL (ref 0–0.85)
MONOCYTES # BLD AUTO: 0.93 K/UL (ref 0–0.85)
MONOCYTES # BLD AUTO: 0.97 K/UL (ref 0–0.85)
MONOCYTES # BLD AUTO: 0.99 K/UL (ref 0–0.85)
MONOCYTES # BLD AUTO: 1.05 K/UL (ref 0–0.85)
MONOCYTES # BLD AUTO: 1.09 K/UL (ref 0–0.85)
MONOCYTES # BLD AUTO: 1.09 K/UL (ref 0–0.85)
MONOCYTES # BLD AUTO: 1.1 K/UL (ref 0–0.85)
MONOCYTES # BLD AUTO: 1.11 K/UL (ref 0–0.85)
MONOCYTES # BLD AUTO: 1.11 K/UL (ref 0–0.85)
MONOCYTES # BLD AUTO: 1.14 K/UL (ref 0–0.85)
MONOCYTES # BLD AUTO: 1.17 K/UL (ref 0–0.85)
MONOCYTES # BLD AUTO: 1.2 K/UL (ref 0–0.85)
MONOCYTES # BLD AUTO: 1.22 K/UL (ref 0–0.85)
MONOCYTES # BLD AUTO: 1.26 K/UL (ref 0–0.85)
MONOCYTES # BLD AUTO: 1.28 K/UL (ref 0–0.85)
MONOCYTES # BLD AUTO: 1.36 K/UL (ref 0–0.85)
MONOCYTES # BLD AUTO: 1.45 K/UL (ref 0–0.85)
MONOCYTES # BLD AUTO: 1.48 K/UL (ref 0–0.85)
MONOCYTES # BLD AUTO: 1.54 K/UL (ref 0–0.85)
MONOCYTES # BLD AUTO: 1.66 K/UL (ref 0–0.85)
MONOCYTES # BLD AUTO: 1.91 K/UL (ref 0–0.85)
MONOCYTES # BLD AUTO: 2.04 K/UL (ref 0–0.85)
MONOCYTES # BLD AUTO: 2.05 K/UL (ref 0–0.85)
MONOCYTES # BLD AUTO: 2.15 K/UL (ref 0–0.85)
MONOCYTES NFR BLD AUTO: 10 % (ref 0–13.4)
MONOCYTES NFR BLD AUTO: 10.1 % (ref 0–13.4)
MONOCYTES NFR BLD AUTO: 10.3 % (ref 0–13.4)
MONOCYTES NFR BLD AUTO: 10.4 % (ref 0–13.4)
MONOCYTES NFR BLD AUTO: 10.7 % (ref 0–13.4)
MONOCYTES NFR BLD AUTO: 12.6 % (ref 0–13.4)
MONOCYTES NFR BLD AUTO: 3.7 % (ref 0–13.4)
MONOCYTES NFR BLD AUTO: 6 % (ref 0–13.4)
MONOCYTES NFR BLD AUTO: 6.1 % (ref 0–13.4)
MONOCYTES NFR BLD AUTO: 6.3 % (ref 0–13.4)
MONOCYTES NFR BLD AUTO: 6.7 % (ref 0–13.4)
MONOCYTES NFR BLD AUTO: 6.7 % (ref 0–13.4)
MONOCYTES NFR BLD AUTO: 6.9 % (ref 0–13.4)
MONOCYTES NFR BLD AUTO: 7.1 % (ref 0–13.4)
MONOCYTES NFR BLD AUTO: 7.1 % (ref 0–13.4)
MONOCYTES NFR BLD AUTO: 7.2 % (ref 0–13.4)
MONOCYTES NFR BLD AUTO: 7.2 % (ref 0–13.4)
MONOCYTES NFR BLD AUTO: 7.5 % (ref 0–13.4)
MONOCYTES NFR BLD AUTO: 7.5 % (ref 0–13.4)
MONOCYTES NFR BLD AUTO: 7.6 % (ref 0–13.4)
MONOCYTES NFR BLD AUTO: 7.7 % (ref 0–13.4)
MONOCYTES NFR BLD AUTO: 7.8 % (ref 0–13.4)
MONOCYTES NFR BLD AUTO: 7.9 % (ref 0–13.4)
MONOCYTES NFR BLD AUTO: 8 % (ref 0–13.4)
MONOCYTES NFR BLD AUTO: 8.1 % (ref 0–13.4)
MONOCYTES NFR BLD AUTO: 8.1 % (ref 0–13.4)
MONOCYTES NFR BLD AUTO: 8.2 % (ref 0–13.4)
MONOCYTES NFR BLD AUTO: 8.2 % (ref 0–13.4)
MONOCYTES NFR BLD AUTO: 8.3 % (ref 0–13.4)
MONOCYTES NFR BLD AUTO: 8.4 % (ref 0–13.4)
MONOCYTES NFR BLD AUTO: 8.7 % (ref 0–13.4)
MONOCYTES NFR BLD AUTO: 8.8 % (ref 0–13.4)
MONOCYTES NFR BLD AUTO: 9.1 % (ref 0–13.4)
MONOCYTES NFR BLD AUTO: 9.3 % (ref 0–13.4)
MONOCYTES NFR BLD AUTO: 9.5 % (ref 0–13.4)
MONOCYTES NFR BLD AUTO: 9.6 % (ref 0–13.4)
MORPHOLOGY BLD-IMP: NORMAL
NEUTROPHILS # BLD AUTO: 10.27 K/UL (ref 1.82–7.42)
NEUTROPHILS # BLD AUTO: 10.33 K/UL (ref 1.82–7.42)
NEUTROPHILS # BLD AUTO: 10.79 K/UL (ref 1.82–7.42)
NEUTROPHILS # BLD AUTO: 11.57 K/UL (ref 1.82–7.42)
NEUTROPHILS # BLD AUTO: 11.72 K/UL (ref 1.82–7.42)
NEUTROPHILS # BLD AUTO: 11.79 K/UL (ref 1.82–7.42)
NEUTROPHILS # BLD AUTO: 12.64 K/UL (ref 1.82–7.42)
NEUTROPHILS # BLD AUTO: 13.91 K/UL (ref 1.82–7.42)
NEUTROPHILS # BLD AUTO: 14.34 K/UL (ref 1.82–7.42)
NEUTROPHILS # BLD AUTO: 14.52 K/UL (ref 1.82–7.42)
NEUTROPHILS # BLD AUTO: 14.63 K/UL (ref 1.82–7.42)
NEUTROPHILS # BLD AUTO: 16.34 K/UL (ref 1.82–7.42)
NEUTROPHILS # BLD AUTO: 16.57 K/UL (ref 1.82–7.42)
NEUTROPHILS # BLD AUTO: 17.62 K/UL (ref 1.82–7.42)
NEUTROPHILS # BLD AUTO: 17.78 K/UL (ref 1.82–7.42)
NEUTROPHILS # BLD AUTO: 18.11 K/UL (ref 1.82–7.42)
NEUTROPHILS # BLD AUTO: 19.07 K/UL (ref 1.82–7.42)
NEUTROPHILS # BLD AUTO: 20.44 K/UL (ref 1.82–7.42)
NEUTROPHILS # BLD AUTO: 21.51 K/UL (ref 1.82–7.42)
NEUTROPHILS # BLD AUTO: 4.29 K/UL (ref 1.82–7.42)
NEUTROPHILS # BLD AUTO: 5.11 K/UL (ref 1.82–7.42)
NEUTROPHILS # BLD AUTO: 5.71 K/UL (ref 1.82–7.42)
NEUTROPHILS # BLD AUTO: 6.31 K/UL (ref 1.82–7.42)
NEUTROPHILS # BLD AUTO: 6.5 K/UL (ref 1.82–7.42)
NEUTROPHILS # BLD AUTO: 6.65 K/UL (ref 1.82–7.42)
NEUTROPHILS # BLD AUTO: 7.07 K/UL (ref 1.82–7.42)
NEUTROPHILS # BLD AUTO: 7.17 K/UL (ref 1.82–7.42)
NEUTROPHILS # BLD AUTO: 7.57 K/UL (ref 1.82–7.42)
NEUTROPHILS # BLD AUTO: 7.73 K/UL (ref 1.82–7.42)
NEUTROPHILS # BLD AUTO: 8.53 K/UL (ref 1.82–7.42)
NEUTROPHILS # BLD AUTO: 8.54 K/UL (ref 1.82–7.42)
NEUTROPHILS # BLD AUTO: 8.58 K/UL (ref 1.82–7.42)
NEUTROPHILS # BLD AUTO: 8.64 K/UL (ref 1.82–7.42)
NEUTROPHILS # BLD AUTO: 9.03 K/UL (ref 1.82–7.42)
NEUTROPHILS # BLD AUTO: 9.23 K/UL (ref 1.82–7.42)
NEUTROPHILS # BLD AUTO: 9.43 K/UL (ref 1.82–7.42)
NEUTROPHILS NFR BLD: 64 % (ref 44–72)
NEUTROPHILS NFR BLD: 67.1 % (ref 44–72)
NEUTROPHILS NFR BLD: 68.8 % (ref 44–72)
NEUTROPHILS NFR BLD: 68.9 % (ref 44–72)
NEUTROPHILS NFR BLD: 70 % (ref 44–72)
NEUTROPHILS NFR BLD: 70.1 % (ref 44–72)
NEUTROPHILS NFR BLD: 71.7 % (ref 44–72)
NEUTROPHILS NFR BLD: 73.4 % (ref 44–72)
NEUTROPHILS NFR BLD: 75 % (ref 44–72)
NEUTROPHILS NFR BLD: 75.2 % (ref 44–72)
NEUTROPHILS NFR BLD: 76.4 % (ref 44–72)
NEUTROPHILS NFR BLD: 76.6 % (ref 44–72)
NEUTROPHILS NFR BLD: 77.2 % (ref 44–72)
NEUTROPHILS NFR BLD: 77.3 % (ref 44–72)
NEUTROPHILS NFR BLD: 77.4 % (ref 44–72)
NEUTROPHILS NFR BLD: 78.6 % (ref 44–72)
NEUTROPHILS NFR BLD: 78.7 % (ref 44–72)
NEUTROPHILS NFR BLD: 78.9 % (ref 44–72)
NEUTROPHILS NFR BLD: 79.6 % (ref 44–72)
NEUTROPHILS NFR BLD: 79.8 % (ref 44–72)
NEUTROPHILS NFR BLD: 80.2 % (ref 44–72)
NEUTROPHILS NFR BLD: 80.6 % (ref 44–72)
NEUTROPHILS NFR BLD: 81.5 % (ref 44–72)
NEUTROPHILS NFR BLD: 81.9 % (ref 44–72)
NEUTROPHILS NFR BLD: 82.1 % (ref 44–72)
NEUTROPHILS NFR BLD: 82.2 % (ref 44–72)
NEUTROPHILS NFR BLD: 83.2 % (ref 44–72)
NEUTROPHILS NFR BLD: 83.4 % (ref 44–72)
NEUTROPHILS NFR BLD: 83.9 % (ref 44–72)
NEUTROPHILS NFR BLD: 84.3 % (ref 44–72)
NEUTROPHILS NFR BLD: 84.6 % (ref 44–72)
NEUTROPHILS NFR BLD: 84.6 % (ref 44–72)
NEUTROPHILS NFR BLD: 85.2 % (ref 44–72)
NEUTROPHILS NFR BLD: 87.1 % (ref 44–72)
NEUTROPHILS NFR BLD: 87.2 % (ref 44–72)
NEUTROPHILS NFR BLD: 87.6 % (ref 44–72)
NITRITE UR QL STRIP.AUTO: NEGATIVE
NRBC # BLD AUTO: 0 K/UL
NRBC # BLD AUTO: 0.02 K/UL
NRBC BLD-RTO: 0 /100 WBC (ref 0–0.2)
NRBC BLD-RTO: 0.1 /100 WBC (ref 0–0.2)
NT-PROBNP SERPL IA-MCNC: 399 PG/ML (ref 0–125)
NT-PROBNP SERPL IA-MCNC: 698 PG/ML (ref 0–125)
O2/TOTAL GAS SETTING VFR VENT: 100 %
O2/TOTAL GAS SETTING VFR VENT: 30 %
O2/TOTAL GAS SETTING VFR VENT: 40 %
O2/TOTAL GAS SETTING VFR VENT: 50 %
O2/TOTAL GAS SETTING VFR VENT: 50 %
O2/TOTAL GAS SETTING VFR VENT: 60 %
O2/TOTAL GAS SETTING VFR VENT: 70 %
O2/TOTAL GAS SETTING VFR VENT: 80 %
PCO2 BLDA: 33.8 MMHG (ref 26–37)
PCO2 BLDA: 37.1 MMHG (ref 26–37)
PCO2 BLDA: 39.2 MMHG (ref 26–37)
PCO2 BLDA: 39.6 MMHG (ref 26–37)
PCO2 BLDA: 40.4 MMHG (ref 26–37)
PCO2 BLDA: 41.7 MMHG (ref 26–37)
PCO2 BLDA: 41.7 MMHG (ref 26–37)
PCO2 BLDA: 42 MMHG (ref 26–37)
PCO2 BLDA: 43.9 MMHG (ref 26–37)
PCO2 BLDA: 44 MMHG (ref 26–37)
PCO2 BLDA: 44.1 MMHG (ref 26–37)
PCO2 BLDA: 44.2 MMHG (ref 26–37)
PCO2 BLDA: 46.9 MMHG (ref 26–37)
PCO2 BLDA: 52.9 MMHG (ref 26–37)
PCO2 BLDA: 58.9 MMHG (ref 26–37)
PCO2 BLDA: 72.2 MMHG (ref 26–37)
PCO2 TEMP ADJ BLDA: 33.8 MMHG (ref 26–37)
PCO2 TEMP ADJ BLDA: 35.1 MMHG (ref 26–37)
PCO2 TEMP ADJ BLDA: 36.9 MMHG (ref 26–37)
PCO2 TEMP ADJ BLDA: 38.3 MMHG (ref 26–37)
PCO2 TEMP ADJ BLDA: 39.4 MMHG (ref 26–37)
PCO2 TEMP ADJ BLDA: 40 MMHG (ref 26–37)
PCO2 TEMP ADJ BLDA: 40.1 MMHG (ref 26–37)
PCO2 TEMP ADJ BLDA: 41.3 MMHG (ref 26–37)
PCO2 TEMP ADJ BLDA: 43.1 MMHG (ref 26–37)
PCO2 TEMP ADJ BLDA: 43.7 MMHG (ref 26–37)
PCO2 TEMP ADJ BLDA: 43.9 MMHG (ref 26–37)
PCO2 TEMP ADJ BLDA: 44.4 MMHG (ref 26–37)
PCO2 TEMP ADJ BLDA: 45.1 MMHG (ref 26–37)
PCO2 TEMP ADJ BLDA: 50.9 MMHG (ref 26–37)
PCO2 TEMP ADJ BLDA: 55 MMHG (ref 26–37)
PCO2 TEMP ADJ BLDA: 69.7 MMHG (ref 26–37)
PEEP END EXPIRATORY PRESSURE IPEEP: 10 CMH20
PEEP END EXPIRATORY PRESSURE IPEEP: 10 CMH20
PEEP END EXPIRATORY PRESSURE IPEEP: 12 CMH20
PEEP END EXPIRATORY PRESSURE IPEEP: 12 CMH20
PEEP END EXPIRATORY PRESSURE IPEEP: 14 CMH20
PEEP END EXPIRATORY PRESSURE IPEEP: 8 CMH20
PH BLDA: 7.23 [PH] (ref 7.4–7.5)
PH BLDA: 7.29 [PH] (ref 7.4–7.5)
PH BLDA: 7.36 [PH] (ref 7.4–7.5)
PH BLDA: 7.38 [PH] (ref 7.4–7.5)
PH BLDA: 7.38 [PH] (ref 7.4–7.5)
PH BLDA: 7.39 [PH] (ref 7.4–7.5)
PH BLDA: 7.41 [PH] (ref 7.4–7.5)
PH BLDA: 7.42 [PH] (ref 7.4–7.5)
PH BLDA: 7.43 [PH] (ref 7.4–7.5)
PH BLDA: 7.43 [PH] (ref 7.4–7.5)
PH BLDA: 7.44 [PH] (ref 7.4–7.5)
PH BLDA: 7.5 [PH] (ref 7.4–7.5)
PH TEMP ADJ BLDA: 7.24 [PH] (ref 7.4–7.5)
PH TEMP ADJ BLDA: 7.31 [PH] (ref 7.4–7.5)
PH TEMP ADJ BLDA: 7.37 [PH] (ref 7.4–7.5)
PH TEMP ADJ BLDA: 7.38 [PH] (ref 7.4–7.5)
PH TEMP ADJ BLDA: 7.39 [PH] (ref 7.4–7.5)
PH TEMP ADJ BLDA: 7.39 [PH] (ref 7.4–7.5)
PH TEMP ADJ BLDA: 7.41 [PH] (ref 7.4–7.5)
PH TEMP ADJ BLDA: 7.42 [PH] (ref 7.4–7.5)
PH TEMP ADJ BLDA: 7.42 [PH] (ref 7.4–7.5)
PH TEMP ADJ BLDA: 7.43 [PH] (ref 7.4–7.5)
PH TEMP ADJ BLDA: 7.43 [PH] (ref 7.4–7.5)
PH TEMP ADJ BLDA: 7.44 [PH] (ref 7.4–7.5)
PH TEMP ADJ BLDA: 7.45 [PH] (ref 7.4–7.5)
PH TEMP ADJ BLDA: 7.5 [PH] (ref 7.4–7.5)
PH UR STRIP.AUTO: 5 [PH] (ref 5–8)
PHOSPHATE SERPL-MCNC: 2.2 MG/DL (ref 2.5–4.5)
PHOSPHATE SERPL-MCNC: 2.4 MG/DL (ref 2.5–4.5)
PHOSPHATE SERPL-MCNC: 2.5 MG/DL (ref 2.5–4.5)
PHOSPHATE SERPL-MCNC: 2.6 MG/DL (ref 2.5–4.5)
PHOSPHATE SERPL-MCNC: 2.7 MG/DL (ref 2.5–4.5)
PHOSPHATE SERPL-MCNC: 3 MG/DL (ref 2.5–4.5)
PHOSPHATE SERPL-MCNC: 3 MG/DL (ref 2.5–4.5)
PHOSPHATE SERPL-MCNC: 3.1 MG/DL (ref 2.5–4.5)
PHOSPHATE SERPL-MCNC: 3.2 MG/DL (ref 2.5–4.5)
PHOSPHATE SERPL-MCNC: 3.3 MG/DL (ref 2.5–4.5)
PHOSPHATE SERPL-MCNC: 3.4 MG/DL (ref 2.5–4.5)
PHOSPHATE SERPL-MCNC: 3.5 MG/DL (ref 2.5–4.5)
PLATELET # BLD AUTO: 243 K/UL (ref 164–446)
PLATELET # BLD AUTO: 245 K/UL (ref 164–446)
PLATELET # BLD AUTO: 253 K/UL (ref 164–446)
PLATELET # BLD AUTO: 263 K/UL (ref 164–446)
PLATELET # BLD AUTO: 284 K/UL (ref 164–446)
PLATELET # BLD AUTO: 285 K/UL (ref 164–446)
PLATELET # BLD AUTO: 285 K/UL (ref 164–446)
PLATELET # BLD AUTO: 295 K/UL (ref 164–446)
PLATELET # BLD AUTO: 303 K/UL (ref 164–446)
PLATELET # BLD AUTO: 305 K/UL (ref 164–446)
PLATELET # BLD AUTO: 311 K/UL (ref 164–446)
PLATELET # BLD AUTO: 322 K/UL (ref 164–446)
PLATELET # BLD AUTO: 329 K/UL (ref 164–446)
PLATELET # BLD AUTO: 333 K/UL (ref 164–446)
PLATELET # BLD AUTO: 348 K/UL (ref 164–446)
PLATELET # BLD AUTO: 361 K/UL (ref 164–446)
PLATELET # BLD AUTO: 367 K/UL (ref 164–446)
PLATELET # BLD AUTO: 369 K/UL (ref 164–446)
PLATELET # BLD AUTO: 374 K/UL (ref 164–446)
PLATELET # BLD AUTO: 374 K/UL (ref 164–446)
PLATELET # BLD AUTO: 384 K/UL (ref 164–446)
PLATELET # BLD AUTO: 390 K/UL (ref 164–446)
PLATELET # BLD AUTO: 416 K/UL (ref 164–446)
PLATELET # BLD AUTO: 437 K/UL (ref 164–446)
PLATELET # BLD AUTO: 448 K/UL (ref 164–446)
PLATELET # BLD AUTO: 451 K/UL (ref 164–446)
PLATELET # BLD AUTO: 455 K/UL (ref 164–446)
PLATELET # BLD AUTO: 461 K/UL (ref 164–446)
PLATELET # BLD AUTO: 487 K/UL (ref 164–446)
PLATELET # BLD AUTO: 498 K/UL (ref 164–446)
PLATELET # BLD AUTO: 510 K/UL (ref 164–446)
PLATELET # BLD AUTO: 518 K/UL (ref 164–446)
PLATELET # BLD AUTO: 532 K/UL (ref 164–446)
PLATELET # BLD AUTO: 537 K/UL (ref 164–446)
PLATELET # BLD AUTO: 581 K/UL (ref 164–446)
PLATELET # BLD AUTO: 597 K/UL (ref 164–446)
PLATELET # BLD AUTO: 619 K/UL (ref 164–446)
PLATELET # BLD AUTO: 643 K/UL (ref 164–446)
PLATELET # BLD AUTO: 673 K/UL (ref 164–446)
PLATELET # BLD AUTO: 747 K/UL (ref 164–446)
PLATELET # BLD AUTO: 754 K/UL (ref 164–446)
PLATELET # BLD AUTO: 943 K/UL (ref 164–446)
PLATELET BLD QL SMEAR: NORMAL
PMV BLD AUTO: 10 FL (ref 9–12.9)
PMV BLD AUTO: 10 FL (ref 9–12.9)
PMV BLD AUTO: 10.2 FL (ref 9–12.9)
PMV BLD AUTO: 10.2 FL (ref 9–12.9)
PMV BLD AUTO: 10.3 FL (ref 9–12.9)
PMV BLD AUTO: 10.4 FL (ref 9–12.9)
PMV BLD AUTO: 10.5 FL (ref 9–12.9)
PMV BLD AUTO: 10.6 FL (ref 9–12.9)
PMV BLD AUTO: 10.7 FL (ref 9–12.9)
PMV BLD AUTO: 10.8 FL (ref 9–12.9)
PMV BLD AUTO: 10.8 FL (ref 9–12.9)
PMV BLD AUTO: 10.9 FL (ref 9–12.9)
PMV BLD AUTO: 11 FL (ref 9–12.9)
PMV BLD AUTO: 11.1 FL (ref 9–12.9)
PMV BLD AUTO: 11.2 FL (ref 9–12.9)
PMV BLD AUTO: 11.3 FL (ref 9–12.9)
PMV BLD AUTO: 11.3 FL (ref 9–12.9)
PMV BLD AUTO: 11.4 FL (ref 9–12.9)
PMV BLD AUTO: 11.6 FL (ref 9–12.9)
PMV BLD AUTO: 11.7 FL (ref 9–12.9)
PMV BLD AUTO: 9.9 FL (ref 9–12.9)
PMV BLD AUTO: 9.9 FL (ref 9–12.9)
PO2 BLDA: 100 MMHG (ref 64–87)
PO2 BLDA: 107 MMHG (ref 64–87)
PO2 BLDA: 110 MMHG (ref 64–87)
PO2 BLDA: 112 MMHG (ref 64–87)
PO2 BLDA: 123 MMHG (ref 64–87)
PO2 BLDA: 196 MMHG (ref 64–87)
PO2 BLDA: 53 MMHG (ref 64–87)
PO2 BLDA: 59 MMHG (ref 64–87)
PO2 BLDA: 60 MMHG (ref 64–87)
PO2 BLDA: 60 MMHG (ref 64–87)
PO2 BLDA: 63.6 MMHG (ref 64–87)
PO2 BLDA: 69 MMHG (ref 64–87)
PO2 BLDA: 77 MMHG (ref 64–87)
PO2 BLDA: 81 MMHG (ref 64–87)
PO2 BLDA: 86 MMHG (ref 64–87)
PO2 BLDA: 86 MMHG (ref 64–87)
PO2 TEMP ADJ BLDA: 105 MMHG (ref 64–87)
PO2 TEMP ADJ BLDA: 106 MMHG (ref 64–87)
PO2 TEMP ADJ BLDA: 107 MMHG (ref 64–87)
PO2 TEMP ADJ BLDA: 123 MMHG (ref 64–87)
PO2 TEMP ADJ BLDA: 196 MMHG (ref 64–87)
PO2 TEMP ADJ BLDA: 53 MMHG (ref 64–87)
PO2 TEMP ADJ BLDA: 55 MMHG (ref 64–87)
PO2 TEMP ADJ BLDA: 55 MMHG (ref 64–87)
PO2 TEMP ADJ BLDA: 60.2 MMHG (ref 64–87)
PO2 TEMP ADJ BLDA: 62 MMHG (ref 64–87)
PO2 TEMP ADJ BLDA: 64.1 MMHG (ref 64–87)
PO2 TEMP ADJ BLDA: 72 MMHG (ref 64–87)
PO2 TEMP ADJ BLDA: 78 MMHG (ref 64–87)
PO2 TEMP ADJ BLDA: 78 MMHG (ref 64–87)
PO2 TEMP ADJ BLDA: 79 MMHG (ref 64–87)
PO2 TEMP ADJ BLDA: 91 MMHG (ref 64–87)
POTASSIUM SERPL-SCNC: 3.1 MMOL/L (ref 3.6–5.5)
POTASSIUM SERPL-SCNC: 3.3 MMOL/L (ref 3.6–5.5)
POTASSIUM SERPL-SCNC: 3.4 MMOL/L (ref 3.6–5.5)
POTASSIUM SERPL-SCNC: 3.4 MMOL/L (ref 3.6–5.5)
POTASSIUM SERPL-SCNC: 3.5 MMOL/L (ref 3.6–5.5)
POTASSIUM SERPL-SCNC: 3.6 MMOL/L (ref 3.6–5.5)
POTASSIUM SERPL-SCNC: 3.7 MMOL/L (ref 3.6–5.5)
POTASSIUM SERPL-SCNC: 3.8 MMOL/L (ref 3.6–5.5)
POTASSIUM SERPL-SCNC: 3.9 MMOL/L (ref 3.6–5.5)
POTASSIUM SERPL-SCNC: 4 MMOL/L (ref 3.6–5.5)
POTASSIUM SERPL-SCNC: 4.1 MMOL/L (ref 3.6–5.5)
POTASSIUM SERPL-SCNC: 4.2 MMOL/L (ref 3.6–5.5)
POTASSIUM SERPL-SCNC: 4.2 MMOL/L (ref 3.6–5.5)
POTASSIUM SERPL-SCNC: 4.3 MMOL/L (ref 3.6–5.5)
POTASSIUM SERPL-SCNC: 4.4 MMOL/L (ref 3.6–5.5)
POTASSIUM SERPL-SCNC: 4.6 MMOL/L (ref 3.6–5.5)
POTASSIUM SERPL-SCNC: 4.9 MMOL/L (ref 3.6–5.5)
PREALB SERPL-MCNC: 10.3 MG/DL (ref 18–38)
PREALB SERPL-MCNC: 15.8 MG/DL (ref 18–38)
PREALB SERPL-MCNC: 16.1 MG/DL (ref 18–38)
PREALB SERPL-MCNC: 18.3 MG/DL (ref 18–38)
PREALB SERPL-MCNC: 18.8 MG/DL (ref 18–38)
PREALB SERPL-MCNC: 6.4 MG/DL (ref 18–38)
PRESSURE SUPPORT SETTING VENT: 5 CM[H2O]
PROCALCITONIN SERPL-MCNC: 0.1 NG/ML
PROCALCITONIN SERPL-MCNC: 0.13 NG/ML
PROCALCITONIN SERPL-MCNC: 0.14 NG/ML
PROCALCITONIN SERPL-MCNC: 0.2 NG/ML
PROCALCITONIN SERPL-MCNC: 0.3 NG/ML
PROCALCITONIN SERPL-MCNC: 0.61 NG/ML
PROT SERPL-MCNC: 5.5 G/DL (ref 6–8.2)
PROT SERPL-MCNC: 5.6 G/DL (ref 6–8.2)
PROT SERPL-MCNC: 5.8 G/DL (ref 6–8.2)
PROT SERPL-MCNC: 5.9 G/DL (ref 6–8.2)
PROT SERPL-MCNC: 6 G/DL (ref 6–8.2)
PROT SERPL-MCNC: 6 G/DL (ref 6–8.2)
PROT SERPL-MCNC: 6.2 G/DL (ref 6–8.2)
PROT SERPL-MCNC: 6.4 G/DL (ref 6–8.2)
PROT SERPL-MCNC: 6.5 G/DL (ref 6–8.2)
PROT SERPL-MCNC: 6.7 G/DL (ref 6–8.2)
PROT SERPL-MCNC: 6.8 G/DL (ref 6–8.2)
PROT SERPL-MCNC: 6.9 G/DL (ref 6–8.2)
PROT SERPL-MCNC: 7 G/DL (ref 6–8.2)
PROT SERPL-MCNC: 7 G/DL (ref 6–8.2)
PROT SERPL-MCNC: 7.2 G/DL (ref 6–8.2)
PROT SERPL-MCNC: 7.5 G/DL (ref 6–8.2)
PROT SERPL-MCNC: 8.2 G/DL (ref 6–8.2)
PROT UR QL STRIP: 30 MG/DL
PROTHROMBIN TIME: 16.7 SEC (ref 12–14.6)
RBC # BLD AUTO: 2.97 M/UL (ref 4.7–6.1)
RBC # BLD AUTO: 2.98 M/UL (ref 4.7–6.1)
RBC # BLD AUTO: 3.03 M/UL (ref 4.7–6.1)
RBC # BLD AUTO: 3.1 M/UL (ref 4.7–6.1)
RBC # BLD AUTO: 3.12 M/UL (ref 4.7–6.1)
RBC # BLD AUTO: 3.13 M/UL (ref 4.7–6.1)
RBC # BLD AUTO: 3.13 M/UL (ref 4.7–6.1)
RBC # BLD AUTO: 3.16 M/UL (ref 4.7–6.1)
RBC # BLD AUTO: 3.16 M/UL (ref 4.7–6.1)
RBC # BLD AUTO: 3.2 M/UL (ref 4.7–6.1)
RBC # BLD AUTO: 3.21 M/UL (ref 4.7–6.1)
RBC # BLD AUTO: 3.26 M/UL (ref 4.7–6.1)
RBC # BLD AUTO: 3.26 M/UL (ref 4.7–6.1)
RBC # BLD AUTO: 3.29 M/UL (ref 4.7–6.1)
RBC # BLD AUTO: 3.41 M/UL (ref 4.7–6.1)
RBC # BLD AUTO: 3.43 M/UL (ref 4.7–6.1)
RBC # BLD AUTO: 3.44 M/UL (ref 4.7–6.1)
RBC # BLD AUTO: 3.48 M/UL (ref 4.7–6.1)
RBC # BLD AUTO: 3.49 M/UL (ref 4.7–6.1)
RBC # BLD AUTO: 3.55 M/UL (ref 4.7–6.1)
RBC # BLD AUTO: 3.61 M/UL (ref 4.7–6.1)
RBC # BLD AUTO: 3.61 M/UL (ref 4.7–6.1)
RBC # BLD AUTO: 3.65 M/UL (ref 4.7–6.1)
RBC # BLD AUTO: 3.71 M/UL (ref 4.7–6.1)
RBC # BLD AUTO: 3.73 M/UL (ref 4.7–6.1)
RBC # BLD AUTO: 3.75 M/UL (ref 4.7–6.1)
RBC # BLD AUTO: 3.8 M/UL (ref 4.7–6.1)
RBC # BLD AUTO: 3.89 M/UL (ref 4.7–6.1)
RBC # BLD AUTO: 4.08 M/UL (ref 4.7–6.1)
RBC # BLD AUTO: 4.1 M/UL (ref 4.7–6.1)
RBC # BLD AUTO: 4.17 M/UL (ref 4.7–6.1)
RBC # BLD AUTO: 4.46 M/UL (ref 4.7–6.1)
RBC # BLD AUTO: 4.53 M/UL (ref 4.7–6.1)
RBC # BLD AUTO: 4.82 M/UL (ref 4.7–6.1)
RBC # BLD AUTO: 4.85 M/UL (ref 4.7–6.1)
RBC # BLD AUTO: 4.91 M/UL (ref 4.7–6.1)
RBC # BLD AUTO: 5.27 M/UL (ref 4.7–6.1)
RBC # BLD AUTO: 5.31 M/UL (ref 4.7–6.1)
RBC # BLD AUTO: 5.37 M/UL (ref 4.7–6.1)
RBC # BLD AUTO: 5.75 M/UL (ref 4.7–6.1)
RBC # URNS HPF: ABNORMAL /HPF
RBC BLD AUTO: PRESENT
RBC UR QL AUTO: ABNORMAL
RHODAMINE-AURAMINE STN SPEC: NORMAL
RHODAMINE-AURAMINE STN SPEC: NORMAL
RSV RNA SPEC QL NAA+PROBE: NEGATIVE
SAO2 % BLDA: 100 % (ref 93–99)
SAO2 % BLDA: 87.4 % (ref 93–99)
SAO2 % BLDA: 90 % (ref 93–99)
SAO2 % BLDA: 90.8 % (ref 93–99)
SAO2 % BLDA: 91 % (ref 93–99)
SAO2 % BLDA: 91 % (ref 93–99)
SAO2 % BLDA: 93 % (ref 93–99)
SAO2 % BLDA: 95 % (ref 93–99)
SAO2 % BLDA: 96 % (ref 93–99)
SAO2 % BLDA: 97 % (ref 93–99)
SAO2 % BLDA: 98 % (ref 93–99)
SAO2 % BLDA: 99 % (ref 93–99)
SARS-COV-2 RNA RESP QL NAA+PROBE: NOTDETECTED
SCCMEC + MECA PNL NOSE NAA+PROBE: NEGATIVE
SCCMEC + MECA PNL NOSE NAA+PROBE: NEGATIVE
SIGNIFICANT IND 70042: ABNORMAL
SIGNIFICANT IND 70042: NORMAL
SITE SITE: ABNORMAL
SITE SITE: NORMAL
SODIUM SERPL-SCNC: 136 MMOL/L (ref 135–145)
SODIUM SERPL-SCNC: 136 MMOL/L (ref 135–145)
SODIUM SERPL-SCNC: 137 MMOL/L (ref 135–145)
SODIUM SERPL-SCNC: 140 MMOL/L (ref 135–145)
SODIUM SERPL-SCNC: 141 MMOL/L (ref 135–145)
SODIUM SERPL-SCNC: 142 MMOL/L (ref 135–145)
SODIUM SERPL-SCNC: 143 MMOL/L (ref 135–145)
SODIUM SERPL-SCNC: 144 MMOL/L (ref 135–145)
SODIUM SERPL-SCNC: 144 MMOL/L (ref 135–145)
SODIUM SERPL-SCNC: 145 MMOL/L (ref 135–145)
SODIUM SERPL-SCNC: 146 MMOL/L (ref 135–145)
SODIUM SERPL-SCNC: 147 MMOL/L (ref 135–145)
SODIUM SERPL-SCNC: 148 MMOL/L (ref 135–145)
SODIUM SERPL-SCNC: 149 MMOL/L (ref 135–145)
SODIUM SERPL-SCNC: 150 MMOL/L (ref 135–145)
SODIUM SERPL-SCNC: 150 MMOL/L (ref 135–145)
SODIUM SERPL-SCNC: 151 MMOL/L (ref 135–145)
SODIUM SERPL-SCNC: 151 MMOL/L (ref 135–145)
SODIUM SERPL-SCNC: 152 MMOL/L (ref 135–145)
SODIUM SERPL-SCNC: 153 MMOL/L (ref 135–145)
SODIUM SERPL-SCNC: 153 MMOL/L (ref 135–145)
SOURCE SOURCE: ABNORMAL
SOURCE SOURCE: NORMAL
SP GR UR STRIP.AUTO: 1.02
SPECIMEN DRAWN FROM PATIENT: ABNORMAL
SPECIMEN SOURCE: NORMAL
T4 FREE SERPL-MCNC: 1.51 NG/DL (ref 0.93–1.7)
TIDAL VOLUME IVT: 440 ML
TIDAL VOLUME IVT: 450 ML
TROPONIN T SERPL-MCNC: 30 NG/L (ref 6–19)
TROPONIN T SERPL-MCNC: 34 NG/L (ref 6–19)
TSH SERPL DL<=0.005 MIU/L-ACNC: 0.28 UIU/ML (ref 0.38–5.33)
UROBILINOGEN UR STRIP.AUTO-MCNC: 0.2 MG/DL
WBC # BLD AUTO: 10.1 K/UL (ref 4.8–10.8)
WBC # BLD AUTO: 10.2 K/UL (ref 4.8–10.8)
WBC # BLD AUTO: 10.9 K/UL (ref 4.8–10.8)
WBC # BLD AUTO: 11 K/UL (ref 4.8–10.8)
WBC # BLD AUTO: 11.5 K/UL (ref 4.8–10.8)
WBC # BLD AUTO: 11.5 K/UL (ref 4.8–10.8)
WBC # BLD AUTO: 11.6 K/UL (ref 4.8–10.8)
WBC # BLD AUTO: 11.8 K/UL (ref 4.8–10.8)
WBC # BLD AUTO: 12 K/UL (ref 4.8–10.8)
WBC # BLD AUTO: 12 K/UL (ref 4.8–10.8)
WBC # BLD AUTO: 13.1 K/UL (ref 4.8–10.8)
WBC # BLD AUTO: 13.6 K/UL (ref 4.8–10.8)
WBC # BLD AUTO: 13.7 K/UL (ref 4.8–10.8)
WBC # BLD AUTO: 13.8 K/UL (ref 4.8–10.8)
WBC # BLD AUTO: 14.5 K/UL (ref 4.8–10.8)
WBC # BLD AUTO: 14.6 K/UL (ref 4.8–10.8)
WBC # BLD AUTO: 14.7 K/UL (ref 4.8–10.8)
WBC # BLD AUTO: 15.2 K/UL (ref 4.8–10.8)
WBC # BLD AUTO: 15.4 K/UL (ref 4.8–10.8)
WBC # BLD AUTO: 16.3 K/UL (ref 4.8–10.8)
WBC # BLD AUTO: 17 K/UL (ref 4.8–10.8)
WBC # BLD AUTO: 17.2 K/UL (ref 4.8–10.8)
WBC # BLD AUTO: 17.3 K/UL (ref 4.8–10.8)
WBC # BLD AUTO: 17.5 K/UL (ref 4.8–10.8)
WBC # BLD AUTO: 19.5 K/UL (ref 4.8–10.8)
WBC # BLD AUTO: 20.3 K/UL (ref 4.8–10.8)
WBC # BLD AUTO: 20.5 K/UL (ref 4.8–10.8)
WBC # BLD AUTO: 20.9 K/UL (ref 4.8–10.8)
WBC # BLD AUTO: 21.7 K/UL (ref 4.8–10.8)
WBC # BLD AUTO: 22.9 K/UL (ref 4.8–10.8)
WBC # BLD AUTO: 23.3 K/UL (ref 4.8–10.8)
WBC # BLD AUTO: 23.5 K/UL (ref 4.8–10.8)
WBC # BLD AUTO: 24.7 K/UL (ref 4.8–10.8)
WBC # BLD AUTO: 5.9 K/UL (ref 4.8–10.8)
WBC # BLD AUTO: 6.6 K/UL (ref 4.8–10.8)
WBC # BLD AUTO: 7.1 K/UL (ref 4.8–10.8)
WBC # BLD AUTO: 8.3 K/UL (ref 4.8–10.8)
WBC # BLD AUTO: 8.3 K/UL (ref 4.8–10.8)
WBC # BLD AUTO: 9.1 K/UL (ref 4.8–10.8)
WBC # BLD AUTO: 9.2 K/UL (ref 4.8–10.8)
WBC # BLD AUTO: 9.5 K/UL (ref 4.8–10.8)
WBC # BLD AUTO: 9.8 K/UL (ref 4.8–10.8)
WBC #/AREA URNS HPF: ABNORMAL /HPF

## 2024-01-01 PROCEDURE — 700111 HCHG RX REV CODE 636 W/ 250 OVERRIDE (IP): Mod: JZ | Performed by: EMERGENCY MEDICINE

## 2024-01-01 PROCEDURE — 84100 ASSAY OF PHOSPHORUS: CPT

## 2024-01-01 PROCEDURE — 92950 HEART/LUNG RESUSCITATION CPR: CPT

## 2024-01-01 PROCEDURE — 82962 GLUCOSE BLOOD TEST: CPT | Mod: 91

## 2024-01-01 PROCEDURE — 700102 HCHG RX REV CODE 250 W/ 637 OVERRIDE(OP): Performed by: INTERNAL MEDICINE

## 2024-01-01 PROCEDURE — A9270 NON-COVERED ITEM OR SERVICE: HCPCS | Performed by: INTERNAL MEDICINE

## 2024-01-01 PROCEDURE — 92610 EVALUATE SWALLOWING FUNCTION: CPT

## 2024-01-01 PROCEDURE — 97530 THERAPEUTIC ACTIVITIES: CPT

## 2024-01-01 PROCEDURE — 85025 COMPLETE CBC W/AUTO DIFF WBC: CPT

## 2024-01-01 PROCEDURE — 700111 HCHG RX REV CODE 636 W/ 250 OVERRIDE (IP): Mod: JZ | Performed by: INTERNAL MEDICINE

## 2024-01-01 PROCEDURE — 86140 C-REACTIVE PROTEIN: CPT

## 2024-01-01 PROCEDURE — 84145 PROCALCITONIN (PCT): CPT

## 2024-01-01 PROCEDURE — 700101 HCHG RX REV CODE 250: Performed by: INTERNAL MEDICINE

## 2024-01-01 PROCEDURE — 82803 BLOOD GASES ANY COMBINATION: CPT

## 2024-01-01 PROCEDURE — 70551 MRI BRAIN STEM W/O DYE: CPT

## 2024-01-01 PROCEDURE — 94640 AIRWAY INHALATION TREATMENT: CPT

## 2024-01-01 PROCEDURE — 700111 HCHG RX REV CODE 636 W/ 250 OVERRIDE (IP): Performed by: HOSPITALIST

## 2024-01-01 PROCEDURE — 84295 ASSAY OF SERUM SODIUM: CPT

## 2024-01-01 PROCEDURE — 99291 CRITICAL CARE FIRST HOUR: CPT | Performed by: INTERNAL MEDICINE

## 2024-01-01 PROCEDURE — 99233 SBSQ HOSP IP/OBS HIGH 50: CPT | Performed by: HOSPITALIST

## 2024-01-01 PROCEDURE — 700102 HCHG RX REV CODE 250 W/ 637 OVERRIDE(OP): Performed by: STUDENT IN AN ORGANIZED HEALTH CARE EDUCATION/TRAINING PROGRAM

## 2024-01-01 PROCEDURE — 99232 SBSQ HOSP IP/OBS MODERATE 35: CPT | Performed by: PSYCHIATRY & NEUROLOGY

## 2024-01-01 PROCEDURE — 80048 BASIC METABOLIC PNL TOTAL CA: CPT

## 2024-01-01 PROCEDURE — 700105 HCHG RX REV CODE 258: Performed by: INTERNAL MEDICINE

## 2024-01-01 PROCEDURE — 82803 BLOOD GASES ANY COMBINATION: CPT | Mod: 91

## 2024-01-01 PROCEDURE — 97116 GAIT TRAINING THERAPY: CPT

## 2024-01-01 PROCEDURE — 71045 X-RAY EXAM CHEST 1 VIEW: CPT

## 2024-01-01 PROCEDURE — 770022 HCHG ROOM/CARE - ICU (200)

## 2024-01-01 PROCEDURE — 31624 DX BRONCHOSCOPE/LAVAGE: CPT | Performed by: INTERNAL MEDICINE

## 2024-01-01 PROCEDURE — 82962 GLUCOSE BLOOD TEST: CPT

## 2024-01-01 PROCEDURE — 99223 1ST HOSP IP/OBS HIGH 75: CPT | Mod: AI | Performed by: HOSPITALIST

## 2024-01-01 PROCEDURE — 700111 HCHG RX REV CODE 636 W/ 250 OVERRIDE (IP): Mod: JZ | Performed by: STUDENT IN AN ORGANIZED HEALTH CARE EDUCATION/TRAINING PROGRAM

## 2024-01-01 PROCEDURE — 36600 WITHDRAWAL OF ARTERIAL BLOOD: CPT

## 2024-01-01 PROCEDURE — 700111 HCHG RX REV CODE 636 W/ 250 OVERRIDE (IP): Mod: JZ

## 2024-01-01 PROCEDURE — 770020 HCHG ROOM/CARE - TELE (206)

## 2024-01-01 PROCEDURE — 94150 VITAL CAPACITY TEST: CPT

## 2024-01-01 PROCEDURE — 93005 ELECTROCARDIOGRAM TRACING: CPT | Performed by: EMERGENCY MEDICINE

## 2024-01-01 PROCEDURE — 83735 ASSAY OF MAGNESIUM: CPT

## 2024-01-01 PROCEDURE — 83605 ASSAY OF LACTIC ACID: CPT

## 2024-01-01 PROCEDURE — 92612 ENDOSCOPY SWALLOW (FEES) VID: CPT

## 2024-01-01 PROCEDURE — A9270 NON-COVERED ITEM OR SERVICE: HCPCS | Performed by: STUDENT IN AN ORGANIZED HEALTH CARE EDUCATION/TRAINING PROGRAM

## 2024-01-01 PROCEDURE — 31645 BRNCHSC W/THER ASPIR 1ST: CPT | Performed by: INTERNAL MEDICINE

## 2024-01-01 PROCEDURE — 0BCJ8ZZ EXTIRPATION OF MATTER FROM LEFT LOWER LUNG LOBE, VIA NATURAL OR ARTIFICIAL OPENING ENDOSCOPIC: ICD-10-PCS | Performed by: INTERNAL MEDICINE

## 2024-01-01 PROCEDURE — 92526 ORAL FUNCTION THERAPY: CPT

## 2024-01-01 PROCEDURE — 80053 COMPREHEN METABOLIC PANEL: CPT

## 2024-01-01 PROCEDURE — 70450 CT HEAD/BRAIN W/O DYE: CPT

## 2024-01-01 PROCEDURE — 700102 HCHG RX REV CODE 250 W/ 637 OVERRIDE(OP): Performed by: HOSPITALIST

## 2024-01-01 PROCEDURE — 700111 HCHG RX REV CODE 636 W/ 250 OVERRIDE (IP): Mod: JG | Performed by: INTERNAL MEDICINE

## 2024-01-01 PROCEDURE — 84443 ASSAY THYROID STIM HORMONE: CPT

## 2024-01-01 PROCEDURE — 94003 VENT MGMT INPAT SUBQ DAY: CPT

## 2024-01-01 PROCEDURE — 700111 HCHG RX REV CODE 636 W/ 250 OVERRIDE (IP): Performed by: INTERNAL MEDICINE

## 2024-01-01 PROCEDURE — 36415 COLL VENOUS BLD VENIPUNCTURE: CPT

## 2024-01-01 PROCEDURE — 302978 HCHG BRONCHOSCOPY-DIAGNOSTIC

## 2024-01-01 PROCEDURE — 84134 ASSAY OF PREALBUMIN: CPT

## 2024-01-01 PROCEDURE — 302977 HCHG BRONCHOSCOPY PROC-THERAPEUTIC

## 2024-01-01 PROCEDURE — 99497 ADVNCD CARE PLAN 30 MIN: CPT | Mod: 25 | Performed by: STUDENT IN AN ORGANIZED HEALTH CARE EDUCATION/TRAINING PROGRAM

## 2024-01-01 PROCEDURE — 99222 1ST HOSP IP/OBS MODERATE 55: CPT | Performed by: STUDENT IN AN ORGANIZED HEALTH CARE EDUCATION/TRAINING PROGRAM

## 2024-01-01 PROCEDURE — 71275 CT ANGIOGRAPHY CHEST: CPT

## 2024-01-01 PROCEDURE — 302136 NUTRITION PUMP: Performed by: HOSPITALIST

## 2024-01-01 PROCEDURE — 87040 BLOOD CULTURE FOR BACTERIA: CPT | Mod: 91

## 2024-01-01 PROCEDURE — 94799 UNLISTED PULMONARY SVC/PX: CPT

## 2024-01-01 PROCEDURE — 94669 MECHANICAL CHEST WALL OSCILL: CPT

## 2024-01-01 PROCEDURE — 84484 ASSAY OF TROPONIN QUANT: CPT

## 2024-01-01 PROCEDURE — 700102 HCHG RX REV CODE 250 W/ 637 OVERRIDE(OP)

## 2024-01-01 PROCEDURE — 0241U HCHG SARS-COV-2 COVID-19 NFCT DS RESP RNA 4 TRGT MIC: CPT

## 2024-01-01 PROCEDURE — 700111 HCHG RX REV CODE 636 W/ 250 OVERRIDE (IP): Performed by: STUDENT IN AN ORGANIZED HEALTH CARE EDUCATION/TRAINING PROGRAM

## 2024-01-01 PROCEDURE — 84132 ASSAY OF SERUM POTASSIUM: CPT

## 2024-01-01 PROCEDURE — 99239 HOSP IP/OBS DSCHRG MGMT >30: CPT | Performed by: STUDENT IN AN ORGANIZED HEALTH CARE EDUCATION/TRAINING PROGRAM

## 2024-01-01 PROCEDURE — 700105 HCHG RX REV CODE 258: Performed by: STUDENT IN AN ORGANIZED HEALTH CARE EDUCATION/TRAINING PROGRAM

## 2024-01-01 PROCEDURE — 99232 SBSQ HOSP IP/OBS MODERATE 35: CPT | Performed by: STUDENT IN AN ORGANIZED HEALTH CARE EDUCATION/TRAINING PROGRAM

## 2024-01-01 PROCEDURE — 51798 US URINE CAPACITY MEASURE: CPT

## 2024-01-01 PROCEDURE — 82533 TOTAL CORTISOL: CPT

## 2024-01-01 PROCEDURE — 02HV33Z INSERTION OF INFUSION DEVICE INTO SUPERIOR VENA CAVA, PERCUTANEOUS APPROACH: ICD-10-PCS | Performed by: INTERNAL MEDICINE

## 2024-01-01 PROCEDURE — 94668 MNPJ CHEST WALL SBSQ: CPT

## 2024-01-01 PROCEDURE — 99291 CRITICAL CARE FIRST HOUR: CPT | Mod: 25 | Performed by: INTERNAL MEDICINE

## 2024-01-01 PROCEDURE — 99222 1ST HOSP IP/OBS MODERATE 55: CPT | Mod: 25 | Performed by: STUDENT IN AN ORGANIZED HEALTH CARE EDUCATION/TRAINING PROGRAM

## 2024-01-01 PROCEDURE — 700105 HCHG RX REV CODE 258: Performed by: HOSPITALIST

## 2024-01-01 PROCEDURE — 5A1955Z RESPIRATORY VENTILATION, GREATER THAN 96 CONSECUTIVE HOURS: ICD-10-PCS | Performed by: INTERNAL MEDICINE

## 2024-01-01 PROCEDURE — 87205 SMEAR GRAM STAIN: CPT | Mod: 91

## 2024-01-01 PROCEDURE — 700102 HCHG RX REV CODE 250 W/ 637 OVERRIDE(OP): Performed by: NURSE PRACTITIONER

## 2024-01-01 PROCEDURE — 700111 HCHG RX REV CODE 636 W/ 250 OVERRIDE (IP): Performed by: NURSE PRACTITIONER

## 2024-01-01 PROCEDURE — A9270 NON-COVERED ITEM OR SERVICE: HCPCS | Performed by: HOSPITALIST

## 2024-01-01 PROCEDURE — 87116 MYCOBACTERIA CULTURE: CPT

## 2024-01-01 PROCEDURE — 87324 CLOSTRIDIUM AG IA: CPT

## 2024-01-01 PROCEDURE — 99233 SBSQ HOSP IP/OBS HIGH 50: CPT | Performed by: STUDENT IN AN ORGANIZED HEALTH CARE EDUCATION/TRAINING PROGRAM

## 2024-01-01 PROCEDURE — 83605 ASSAY OF LACTIC ACID: CPT | Mod: 91

## 2024-01-01 PROCEDURE — 87641 MR-STAPH DNA AMP PROBE: CPT

## 2024-01-01 PROCEDURE — 87205 SMEAR GRAM STAIN: CPT

## 2024-01-01 PROCEDURE — 0B9F8ZX DRAINAGE OF RIGHT LOWER LUNG LOBE, VIA NATURAL OR ARTIFICIAL OPENING ENDOSCOPIC, DIAGNOSTIC: ICD-10-PCS | Performed by: INTERNAL MEDICINE

## 2024-01-01 PROCEDURE — 97602 WOUND(S) CARE NON-SELECTIVE: CPT

## 2024-01-01 PROCEDURE — 99152 MOD SED SAME PHYS/QHP 5/>YRS: CPT

## 2024-01-01 PROCEDURE — 94667 MNPJ CHEST WALL 1ST: CPT

## 2024-01-01 PROCEDURE — 05H933Z INSERTION OF INFUSION DEVICE INTO RIGHT BRACHIAL VEIN, PERCUTANEOUS APPROACH: ICD-10-PCS | Performed by: HOSPITALIST

## 2024-01-01 PROCEDURE — 770006 HCHG ROOM/CARE - MED/SURG/GYN SEMI*

## 2024-01-01 PROCEDURE — 93005 ELECTROCARDIOGRAM TRACING: CPT | Performed by: INTERNAL MEDICINE

## 2024-01-01 PROCEDURE — 87070 CULTURE OTHR SPECIMN AEROBIC: CPT

## 2024-01-01 PROCEDURE — 85027 COMPLETE CBC AUTOMATED: CPT

## 2024-01-01 PROCEDURE — 87077 CULTURE AEROBIC IDENTIFY: CPT

## 2024-01-01 PROCEDURE — 95700 EEG CONT REC W/VID EEG TECH: CPT | Performed by: PSYCHIATRY & NEUROLOGY

## 2024-01-01 PROCEDURE — A9270 NON-COVERED ITEM OR SERVICE: HCPCS

## 2024-01-01 PROCEDURE — 700111 HCHG RX REV CODE 636 W/ 250 OVERRIDE (IP): Mod: JZ,JG | Performed by: NURSE PRACTITIONER

## 2024-01-01 PROCEDURE — 87015 SPECIMEN INFECT AGNT CONCNTJ: CPT

## 2024-01-01 PROCEDURE — 97535 SELF CARE MNGMENT TRAINING: CPT

## 2024-01-01 PROCEDURE — 87077 CULTURE AEROBIC IDENTIFY: CPT | Mod: 91

## 2024-01-01 PROCEDURE — 94002 VENT MGMT INPAT INIT DAY: CPT

## 2024-01-01 PROCEDURE — 700111 HCHG RX REV CODE 636 W/ 250 OVERRIDE (IP)

## 2024-01-01 PROCEDURE — 770000 HCHG ROOM/CARE - INTERMEDIATE ICU *

## 2024-01-01 PROCEDURE — 93005 ELECTROCARDIOGRAM TRACING: CPT | Performed by: STUDENT IN AN ORGANIZED HEALTH CARE EDUCATION/TRAINING PROGRAM

## 2024-01-01 PROCEDURE — 700111 HCHG RX REV CODE 636 W/ 250 OVERRIDE (IP): Mod: JZ | Performed by: HOSPITALIST

## 2024-01-01 PROCEDURE — 99222 1ST HOSP IP/OBS MODERATE 55: CPT | Performed by: INTERNAL MEDICINE

## 2024-01-01 PROCEDURE — 306565 RIGID MIT RESTRAINT(PAIR): Performed by: INTERNAL MEDICINE

## 2024-01-01 PROCEDURE — 700111 HCHG RX REV CODE 636 W/ 250 OVERRIDE (IP): Mod: JZ | Performed by: NURSE PRACTITIONER

## 2024-01-01 PROCEDURE — 87493 C DIFF AMPLIFIED PROBE: CPT

## 2024-01-01 PROCEDURE — 99233 SBSQ HOSP IP/OBS HIGH 50: CPT | Performed by: INTERNAL MEDICINE

## 2024-01-01 PROCEDURE — 0BC28ZZ EXTIRPATION OF MATTER FROM CARINA, VIA NATURAL OR ARTIFICIAL OPENING ENDOSCOPIC: ICD-10-PCS | Performed by: INTERNAL MEDICINE

## 2024-01-01 PROCEDURE — 83880 ASSAY OF NATRIURETIC PEPTIDE: CPT

## 2024-01-01 PROCEDURE — 88305 TISSUE EXAM BY PATHOLOGIST: CPT | Mod: 59

## 2024-01-01 PROCEDURE — 31500 INSERT EMERGENCY AIRWAY: CPT

## 2024-01-01 PROCEDURE — 93010 ELECTROCARDIOGRAM REPORT: CPT | Performed by: INTERNAL MEDICINE

## 2024-01-01 PROCEDURE — 31500 INSERT EMERGENCY AIRWAY: CPT | Performed by: INTERNAL MEDICINE

## 2024-01-01 PROCEDURE — 87206 SMEAR FLUORESCENT/ACID STAI: CPT

## 2024-01-01 PROCEDURE — 31720 CLEARANCE OF AIRWAYS: CPT

## 2024-01-01 PROCEDURE — 70498 CT ANGIOGRAPHY NECK: CPT

## 2024-01-01 PROCEDURE — 302214 INTUBATION BOX: Performed by: INTERNAL MEDICINE

## 2024-01-01 PROCEDURE — 95714 VEEG EA 12-26 HR UNMNTR: CPT | Performed by: PSYCHIATRY & NEUROLOGY

## 2024-01-01 PROCEDURE — A9270 NON-COVERED ITEM OR SERVICE: HCPCS | Performed by: NURSE PRACTITIONER

## 2024-01-01 PROCEDURE — 97163 PT EVAL HIGH COMPLEX 45 MIN: CPT

## 2024-01-01 PROCEDURE — 82140 ASSAY OF AMMONIA: CPT

## 2024-01-01 PROCEDURE — 93970 EXTREMITY STUDY: CPT

## 2024-01-01 PROCEDURE — 700117 HCHG RX CONTRAST REV CODE 255: Performed by: HOSPITALIST

## 2024-01-01 PROCEDURE — 306565 RIGID MIT RESTRAINT(PAIR): Performed by: HOSPITALIST

## 2024-01-01 PROCEDURE — 93005 ELECTROCARDIOGRAM TRACING: CPT | Performed by: HOSPITALIST

## 2024-01-01 PROCEDURE — 88112 CYTOPATH CELL ENHANCE TECH: CPT | Mod: 91

## 2024-01-01 PROCEDURE — C1751 CATH, INF, PER/CENT/MIDLINE: HCPCS

## 2024-01-01 PROCEDURE — 0BH17EZ INSERTION OF ENDOTRACHEAL AIRWAY INTO TRACHEA, VIA NATURAL OR ARTIFICIAL OPENING: ICD-10-PCS | Performed by: INTERNAL MEDICINE

## 2024-01-01 PROCEDURE — 70544 MR ANGIOGRAPHY HEAD W/O DYE: CPT

## 2024-01-01 PROCEDURE — 0BCF8ZZ EXTIRPATION OF MATTER FROM RIGHT LOWER LUNG LOBE, VIA NATURAL OR ARTIFICIAL OPENING ENDOSCOPIC: ICD-10-PCS | Performed by: INTERNAL MEDICINE

## 2024-01-01 PROCEDURE — 95720 EEG PHY/QHP EA INCR W/VEEG: CPT | Performed by: PSYCHIATRY & NEUROLOGY

## 2024-01-01 PROCEDURE — 99233 SBSQ HOSP IP/OBS HIGH 50: CPT | Mod: 25 | Performed by: HOSPITALIST

## 2024-01-01 PROCEDURE — 99232 SBSQ HOSP IP/OBS MODERATE 35: CPT | Performed by: NURSE PRACTITIONER

## 2024-01-01 PROCEDURE — 83036 HEMOGLOBIN GLYCOSYLATED A1C: CPT

## 2024-01-01 PROCEDURE — 31646 BRNCHSC W/THER ASPIR SBSQ: CPT | Performed by: INTERNAL MEDICINE

## 2024-01-01 PROCEDURE — 96365 THER/PROPH/DIAG IV INF INIT: CPT

## 2024-01-01 PROCEDURE — 93970 EXTREMITY STUDY: CPT | Mod: 26 | Performed by: INTERNAL MEDICINE

## 2024-01-01 PROCEDURE — 36556 INSERT NON-TUNNEL CV CATH: CPT

## 2024-01-01 PROCEDURE — 82550 ASSAY OF CK (CPK): CPT

## 2024-01-01 PROCEDURE — 0B9D8ZX DRAINAGE OF RIGHT MIDDLE LUNG LOBE, VIA NATURAL OR ARTIFICIAL OPENING ENDOSCOPIC, DIAGNOSTIC: ICD-10-PCS | Performed by: INTERNAL MEDICINE

## 2024-01-01 PROCEDURE — 87086 URINE CULTURE/COLONY COUNT: CPT

## 2024-01-01 PROCEDURE — 80076 HEPATIC FUNCTION PANEL: CPT

## 2024-01-01 PROCEDURE — 0B9J8ZX DRAINAGE OF LEFT LOWER LUNG LOBE, VIA NATURAL OR ARTIFICIAL OPENING ENDOSCOPIC, DIAGNOSTIC: ICD-10-PCS | Performed by: INTERNAL MEDICINE

## 2024-01-01 PROCEDURE — 99497 ADVNCD CARE PLAN 30 MIN: CPT | Performed by: HOSPITALIST

## 2024-01-01 PROCEDURE — 99285 EMERGENCY DEPT VISIT HI MDM: CPT

## 2024-01-01 PROCEDURE — 81001 URINALYSIS AUTO W/SCOPE: CPT

## 2024-01-01 PROCEDURE — 99292 CRITICAL CARE ADDL 30 MIN: CPT | Performed by: INTERNAL MEDICINE

## 2024-01-01 PROCEDURE — 99222 1ST HOSP IP/OBS MODERATE 55: CPT | Mod: 25,FS | Performed by: NURSE PRACTITIONER

## 2024-01-01 PROCEDURE — 85610 PROTHROMBIN TIME: CPT

## 2024-01-01 PROCEDURE — 99284 EMERGENCY DEPT VISIT MOD MDM: CPT | Mod: 25

## 2024-01-01 PROCEDURE — 99418 PROLNG IP/OBS E/M EA 15 MIN: CPT | Performed by: HOSPITALIST

## 2024-01-01 PROCEDURE — 700105 HCHG RX REV CODE 258: Performed by: EMERGENCY MEDICINE

## 2024-01-01 PROCEDURE — 97112 NEUROMUSCULAR REEDUCATION: CPT

## 2024-01-01 PROCEDURE — 99239 HOSP IP/OBS DSCHRG MGMT >30: CPT | Performed by: HOSPITALIST

## 2024-01-01 PROCEDURE — 93010 ELECTROCARDIOGRAM REPORT: CPT | Mod: 77 | Performed by: INTERNAL MEDICINE

## 2024-01-01 PROCEDURE — 99292 CRITICAL CARE ADDL 30 MIN: CPT | Mod: 25 | Performed by: INTERNAL MEDICINE

## 2024-01-01 PROCEDURE — 87102 FUNGUS ISOLATION CULTURE: CPT

## 2024-01-01 PROCEDURE — 5A12012 PERFORMANCE OF CARDIAC OUTPUT, SINGLE, MANUAL: ICD-10-PCS | Performed by: INTERNAL MEDICINE

## 2024-01-01 PROCEDURE — 80048 BASIC METABOLIC PNL TOTAL CA: CPT | Mod: 91

## 2024-01-01 PROCEDURE — 97535 SELF CARE MNGMENT TRAINING: CPT | Mod: CO

## 2024-01-01 PROCEDURE — 76937 US GUIDE VASCULAR ACCESS: CPT

## 2024-01-01 PROCEDURE — 70496 CT ANGIOGRAPHY HEAD: CPT

## 2024-01-01 PROCEDURE — 770001 HCHG ROOM/CARE - MED/SURG/GYN PRIV*

## 2024-01-01 PROCEDURE — 5A1945Z RESPIRATORY VENTILATION, 24-96 CONSECUTIVE HOURS: ICD-10-PCS | Performed by: INTERNAL MEDICINE

## 2024-01-01 PROCEDURE — 99152 MOD SED SAME PHYS/QHP 5/>YRS: CPT | Performed by: INTERNAL MEDICINE

## 2024-01-01 PROCEDURE — 97167 OT EVAL HIGH COMPLEX 60 MIN: CPT

## 2024-01-01 PROCEDURE — 92950 HEART/LUNG RESUSCITATION CPR: CPT | Performed by: INTERNAL MEDICINE

## 2024-01-01 PROCEDURE — 84439 ASSAY OF FREE THYROXINE: CPT

## 2024-01-01 PROCEDURE — 99497 ADVNCD CARE PLAN 30 MIN: CPT | Performed by: STUDENT IN AN ORGANIZED HEALTH CARE EDUCATION/TRAINING PROGRAM

## 2024-01-01 PROCEDURE — 0BCD8ZZ EXTIRPATION OF MATTER FROM RIGHT MIDDLE LUNG LOBE, VIA NATURAL OR ARTIFICIAL OPENING ENDOSCOPIC: ICD-10-PCS | Performed by: INTERNAL MEDICINE

## 2024-01-01 PROCEDURE — 4A10X4Z MONITORING OF CENTRAL NERVOUS ELECTRICAL ACTIVITY, EXTERNAL APPROACH: ICD-10-PCS | Performed by: PSYCHIATRY & NEUROLOGY

## 2024-01-01 PROCEDURE — 0042T CT-CEREBRAL PERFUSION ANALYSIS: CPT

## 2024-01-01 PROCEDURE — 36556 INSERT NON-TUNNEL CV CATH: CPT | Performed by: INTERNAL MEDICINE

## 2024-01-01 RX ORDER — AMOXICILLIN 250 MG
2 CAPSULE ORAL 2 TIMES DAILY
Status: DISCONTINUED | OUTPATIENT
Start: 2024-01-01 | End: 2024-01-01

## 2024-01-01 RX ORDER — BISACODYL 10 MG
10 SUPPOSITORY, RECTAL RECTAL
Status: DISCONTINUED | OUTPATIENT
Start: 2024-01-01 | End: 2024-01-01

## 2024-01-01 RX ORDER — LISINOPRIL 40 MG/1
40 TABLET ORAL DAILY
Status: SHIPPED | COMMUNITY
End: 2024-01-01

## 2024-01-01 RX ORDER — FAMOTIDINE 20 MG/1
20 TABLET, FILM COATED ORAL DAILY
Status: DISCONTINUED | OUTPATIENT
Start: 2024-01-01 | End: 2024-01-01 | Stop reason: HOSPADM

## 2024-01-01 RX ORDER — ACETAMINOPHEN 325 MG/1
650 TABLET ORAL EVERY 6 HOURS PRN
Status: DISCONTINUED | OUTPATIENT
Start: 2024-01-01 | End: 2024-01-01

## 2024-01-01 RX ORDER — POLYETHYLENE GLYCOL 3350 17 G/17G
1 POWDER, FOR SOLUTION ORAL
Status: DISCONTINUED | OUTPATIENT
Start: 2024-01-01 | End: 2024-01-01

## 2024-01-01 RX ORDER — FAMOTIDINE 20 MG/1
20 TABLET, FILM COATED ORAL 2 TIMES DAILY
Status: DISCONTINUED | OUTPATIENT
Start: 2024-01-01 | End: 2024-01-01

## 2024-01-01 RX ORDER — BISACODYL 10 MG
10 SUPPOSITORY, RECTAL RECTAL
Status: SHIPPED | COMMUNITY
End: 2024-01-01

## 2024-01-01 RX ORDER — LINEZOLID 2 MG/ML
600 INJECTION, SOLUTION INTRAVENOUS EVERY 12 HOURS
Status: DISCONTINUED | OUTPATIENT
Start: 2024-01-01 | End: 2024-01-01

## 2024-01-01 RX ORDER — HYDRALAZINE HYDROCHLORIDE 20 MG/ML
10 INJECTION INTRAMUSCULAR; INTRAVENOUS
Status: DISCONTINUED | OUTPATIENT
Start: 2024-01-01 | End: 2024-01-01

## 2024-01-01 RX ORDER — PSEUDOEPHEDRINE HCL 30 MG
100 TABLET ORAL 2 TIMES DAILY
Qty: 60 CAPSULE | Status: SHIPPED
Start: 2024-01-01

## 2024-01-01 RX ORDER — POTASSIUM CHLORIDE 14.9 MG/ML
20 INJECTION INTRAVENOUS ONCE
Status: COMPLETED | OUTPATIENT
Start: 2024-01-01 | End: 2024-01-01

## 2024-01-01 RX ORDER — MIDAZOLAM HYDROCHLORIDE 1 MG/ML
1-2 INJECTION INTRAMUSCULAR; INTRAVENOUS ONCE
Status: COMPLETED | OUTPATIENT
Start: 2024-01-01 | End: 2024-01-01

## 2024-01-01 RX ORDER — METOPROLOL TARTRATE 1 MG/ML
5 INJECTION, SOLUTION INTRAVENOUS ONCE
Status: COMPLETED | OUTPATIENT
Start: 2024-01-01 | End: 2024-01-01

## 2024-01-01 RX ORDER — IPRATROPIUM BROMIDE AND ALBUTEROL SULFATE 2.5; .5 MG/3ML; MG/3ML
3 SOLUTION RESPIRATORY (INHALATION)
Status: DISCONTINUED | OUTPATIENT
Start: 2024-01-01 | End: 2024-01-01

## 2024-01-01 RX ORDER — SODIUM CHLORIDE, SODIUM LACTATE, POTASSIUM CHLORIDE, AND CALCIUM CHLORIDE .6; .31; .03; .02 G/100ML; G/100ML; G/100ML; G/100ML
1000 INJECTION, SOLUTION INTRAVENOUS ONCE
Status: COMPLETED | OUTPATIENT
Start: 2024-01-01 | End: 2024-01-01

## 2024-01-01 RX ORDER — PHENYLEPHRINE HCL IN 0.9% NACL 0.5 MG/5ML
100 SYRINGE (ML) INTRAVENOUS
Status: ACTIVE | OUTPATIENT
Start: 2024-01-01 | End: 2024-01-01

## 2024-01-01 RX ORDER — OXYCODONE HYDROCHLORIDE 5 MG/1
2.5 TABLET ORAL EVERY 6 HOURS PRN
Status: DISCONTINUED | OUTPATIENT
Start: 2024-01-01 | End: 2024-01-01

## 2024-01-01 RX ORDER — AMOXICILLIN 250 MG
2 CAPSULE ORAL 2 TIMES DAILY
Status: DISCONTINUED | OUTPATIENT
Start: 2024-01-01 | End: 2024-01-01 | Stop reason: HOSPADM

## 2024-01-01 RX ORDER — FUROSEMIDE 10 MG/ML
10 INJECTION INTRAMUSCULAR; INTRAVENOUS
Status: DISCONTINUED | OUTPATIENT
Start: 2024-01-01 | End: 2024-01-01

## 2024-01-01 RX ORDER — ATROPINE SULFATE 10 MG/ML
2 SOLUTION/ DROPS OPHTHALMIC EVERY 4 HOURS PRN
Status: DISCONTINUED | OUTPATIENT
Start: 2024-01-01 | End: 2024-01-01 | Stop reason: HOSPADM

## 2024-01-01 RX ORDER — BISACODYL 10 MG
10 SUPPOSITORY, RECTAL RECTAL
Status: DISCONTINUED | OUTPATIENT
Start: 2024-01-01 | End: 2024-01-01 | Stop reason: HOSPADM

## 2024-01-01 RX ORDER — SODIUM CHLORIDE, SODIUM LACTATE, POTASSIUM CHLORIDE, AND CALCIUM CHLORIDE .6; .31; .03; .02 G/100ML; G/100ML; G/100ML; G/100ML
500 INJECTION, SOLUTION INTRAVENOUS ONCE
Status: COMPLETED | OUTPATIENT
Start: 2024-01-01 | End: 2024-01-01

## 2024-01-01 RX ORDER — ACETAMINOPHEN 650 MG/1
650 SUPPOSITORY RECTAL EVERY 4 HOURS PRN
Status: DISCONTINUED | OUTPATIENT
Start: 2024-01-01 | End: 2024-01-01 | Stop reason: HOSPADM

## 2024-01-01 RX ORDER — SODIUM CHLORIDE, SODIUM LACTATE, POTASSIUM CHLORIDE, CALCIUM CHLORIDE 600; 310; 30; 20 MG/100ML; MG/100ML; MG/100ML; MG/100ML
1000 INJECTION, SOLUTION INTRAVENOUS ONCE
Status: COMPLETED | OUTPATIENT
Start: 2024-01-01 | End: 2024-01-01

## 2024-01-01 RX ORDER — SODIUM CHLORIDE 9 MG/ML
1000 INJECTION, SOLUTION INTRAVENOUS ONCE
Status: COMPLETED | OUTPATIENT
Start: 2024-01-01 | End: 2024-01-01

## 2024-01-01 RX ORDER — EPINEPHRINE 0.1 MG/ML
SYRINGE (ML) INJECTION
Status: COMPLETED | OUTPATIENT
Start: 2024-01-01 | End: 2024-01-01

## 2024-01-01 RX ORDER — ACETAMINOPHEN 325 MG/1
650 TABLET ORAL EVERY 4 HOURS PRN
Qty: 30 TABLET | Refills: 0 | Status: SHIPPED
Start: 2024-01-01

## 2024-01-01 RX ORDER — SODIUM CHLORIDE, SODIUM LACTATE, POTASSIUM CHLORIDE, CALCIUM CHLORIDE 600; 310; 30; 20 MG/100ML; MG/100ML; MG/100ML; MG/100ML
INJECTION, SOLUTION INTRAVENOUS ONCE
Status: COMPLETED | OUTPATIENT
Start: 2024-01-01 | End: 2024-01-01

## 2024-01-01 RX ORDER — AMOXICILLIN 250 MG
2 CAPSULE ORAL 2 TIMES DAILY
Status: SHIPPED | COMMUNITY
End: 2024-01-01

## 2024-01-01 RX ORDER — FAMOTIDINE 20 MG/1
20 TABLET, FILM COATED ORAL EVERY 12 HOURS
Status: DISCONTINUED | OUTPATIENT
Start: 2024-01-01 | End: 2024-01-01

## 2024-01-01 RX ORDER — LORAZEPAM 2 MG/ML
1 INJECTION INTRAMUSCULAR
Status: DISCONTINUED | OUTPATIENT
Start: 2024-01-01 | End: 2024-01-01 | Stop reason: HOSPADM

## 2024-01-01 RX ORDER — LABETALOL HYDROCHLORIDE 5 MG/ML
20 INJECTION, SOLUTION INTRAVENOUS EVERY 4 HOURS PRN
Status: DISCONTINUED | OUTPATIENT
Start: 2024-01-01 | End: 2024-01-01

## 2024-01-01 RX ORDER — LACTULOSE 20 G/30ML
10 SOLUTION ORAL
Status: DISCONTINUED | OUTPATIENT
Start: 2024-01-01 | End: 2024-01-01 | Stop reason: HOSPADM

## 2024-01-01 RX ORDER — DEXTROSE MONOHYDRATE 50 MG/ML
INJECTION, SOLUTION INTRAVENOUS CONTINUOUS
Status: DISCONTINUED | OUTPATIENT
Start: 2024-01-01 | End: 2024-01-01

## 2024-01-01 RX ORDER — HALOPERIDOL 5 MG/ML
2.5 INJECTION INTRAMUSCULAR ONCE
Status: COMPLETED | OUTPATIENT
Start: 2024-01-01 | End: 2024-01-01

## 2024-01-01 RX ORDER — ETOMIDATE 2 MG/ML
14 INJECTION INTRAVENOUS ONCE
Status: COMPLETED | OUTPATIENT
Start: 2024-01-01 | End: 2024-01-01

## 2024-01-01 RX ORDER — ONDANSETRON 4 MG/1
8 TABLET, ORALLY DISINTEGRATING ORAL EVERY 8 HOURS PRN
Status: DISCONTINUED | OUTPATIENT
Start: 2024-01-01 | End: 2024-01-01 | Stop reason: HOSPADM

## 2024-01-01 RX ORDER — ACETAMINOPHEN 325 MG/1
650 TABLET ORAL EVERY 4 HOURS PRN
Status: DISCONTINUED | OUTPATIENT
Start: 2024-01-01 | End: 2024-01-01 | Stop reason: HOSPADM

## 2024-01-01 RX ORDER — POTASSIUM CHLORIDE 7.45 MG/ML
10 INJECTION INTRAVENOUS
Status: COMPLETED | OUTPATIENT
Start: 2024-01-01 | End: 2024-01-01

## 2024-01-01 RX ORDER — AZITHROMYCIN 250 MG/1
500 TABLET, FILM COATED ORAL ONCE
Status: DISPENSED | OUTPATIENT
Start: 2024-01-01 | End: 2024-01-01

## 2024-01-01 RX ORDER — ADENOSINE 3 MG/ML
6 INJECTION, SOLUTION INTRAVENOUS ONCE
Status: COMPLETED | OUTPATIENT
Start: 2024-01-01 | End: 2024-01-01

## 2024-01-01 RX ORDER — METOCLOPRAMIDE HYDROCHLORIDE 5 MG/ML
5 INJECTION INTRAMUSCULAR; INTRAVENOUS EVERY 6 HOURS
Status: DISCONTINUED | OUTPATIENT
Start: 2024-01-01 | End: 2024-01-01 | Stop reason: HOSPADM

## 2024-01-01 RX ORDER — DEXTROSE AND SODIUM CHLORIDE 5; .45 G/100ML; G/100ML
INJECTION, SOLUTION INTRAVENOUS CONTINUOUS
Status: DISCONTINUED | OUTPATIENT
Start: 2024-01-01 | End: 2024-01-01

## 2024-01-01 RX ORDER — ONDANSETRON 2 MG/ML
8 INJECTION INTRAMUSCULAR; INTRAVENOUS EVERY 8 HOURS PRN
Status: DISCONTINUED | OUTPATIENT
Start: 2024-01-01 | End: 2024-01-01 | Stop reason: HOSPADM

## 2024-01-01 RX ORDER — CARVEDILOL 3.12 MG/1
3.12 TABLET ORAL 2 TIMES DAILY WITH MEALS
Status: DISCONTINUED | OUTPATIENT
Start: 2024-01-01 | End: 2024-01-01

## 2024-01-01 RX ORDER — PHENYLEPHRINE HCL IN 0.9% NACL 0.5 MG/5ML
200 SYRINGE (ML) INTRAVENOUS
Status: DISPENSED | OUTPATIENT
Start: 2024-01-01 | End: 2024-01-01

## 2024-01-01 RX ORDER — PHENYLEPHRINE HCL IN 0.9% NACL 0.5 MG/5ML
SYRINGE (ML) INTRAVENOUS
Status: ACTIVE
Start: 2024-01-01 | End: 2024-01-01

## 2024-01-01 RX ORDER — ROCURONIUM BROMIDE 10 MG/ML
100 INJECTION, SOLUTION INTRAVENOUS ONCE
Status: COMPLETED | OUTPATIENT
Start: 2024-01-01 | End: 2024-01-01

## 2024-01-01 RX ORDER — NOREPINEPHRINE BITARTRATE 0.03 MG/ML
0-1 INJECTION, SOLUTION INTRAVENOUS CONTINUOUS
Status: DISCONTINUED | OUTPATIENT
Start: 2024-01-01 | End: 2024-01-01

## 2024-01-01 RX ORDER — NYSTATIN 100000 [USP'U]/G
POWDER TOPICAL 2 TIMES DAILY
Status: COMPLETED | OUTPATIENT
Start: 2024-01-01 | End: 2024-01-01

## 2024-01-01 RX ORDER — ACETYLCYSTEINE 200 MG/ML
3 SOLUTION ORAL; RESPIRATORY (INHALATION) EVERY 4 HOURS
Status: DISPENSED | OUTPATIENT
Start: 2024-01-01 | End: 2024-01-01

## 2024-01-01 RX ORDER — DEXMEDETOMIDINE HYDROCHLORIDE 4 UG/ML
0-1.5 INJECTION, SOLUTION INTRAVENOUS CONTINUOUS
Status: DISCONTINUED | OUTPATIENT
Start: 2024-01-01 | End: 2024-01-01

## 2024-01-01 RX ORDER — DOCUSATE SODIUM 100 MG/1
100 CAPSULE, LIQUID FILLED ORAL 2 TIMES DAILY
Status: SHIPPED | COMMUNITY
End: 2024-01-01

## 2024-01-01 RX ORDER — SODIUM CHLORIDE 450 MG/100ML
INJECTION, SOLUTION INTRAVENOUS CONTINUOUS
Status: DISCONTINUED | OUTPATIENT
Start: 2024-01-01 | End: 2024-01-01

## 2024-01-01 RX ORDER — POTASSIUM CHLORIDE 20 MEQ/1
40 TABLET, EXTENDED RELEASE ORAL ONCE
Status: DISCONTINUED | OUTPATIENT
Start: 2024-01-01 | End: 2024-01-01

## 2024-01-01 RX ORDER — AMLODIPINE BESYLATE 5 MG/1
5 TABLET ORAL
Status: DISCONTINUED | OUTPATIENT
Start: 2024-01-01 | End: 2024-01-01

## 2024-01-01 RX ORDER — ROCURONIUM BROMIDE 10 MG/ML
75 INJECTION, SOLUTION INTRAVENOUS ONCE
Status: COMPLETED | OUTPATIENT
Start: 2024-01-01 | End: 2024-01-01

## 2024-01-01 RX ORDER — PHENYLEPHRINE HCL IN 0.9% NACL 0.5 MG/5ML
SYRINGE (ML) INTRAVENOUS
Status: COMPLETED
Start: 2024-01-01 | End: 2024-01-01

## 2024-01-01 RX ORDER — GLYCOPYRROLATE 1 MG/1
1 TABLET ORAL 3 TIMES DAILY
Status: DISCONTINUED | OUTPATIENT
Start: 2024-01-01 | End: 2024-01-01

## 2024-01-01 RX ORDER — MIDAZOLAM HYDROCHLORIDE 1 MG/ML
2 INJECTION INTRAMUSCULAR; INTRAVENOUS
Status: DISCONTINUED | OUTPATIENT
Start: 2024-01-01 | End: 2024-01-01

## 2024-01-01 RX ORDER — VALPROIC ACID 250 MG/5ML
125 SOLUTION ORAL EVERY 8 HOURS
Status: DISCONTINUED | OUTPATIENT
Start: 2024-01-01 | End: 2024-01-01

## 2024-01-01 RX ORDER — AMANTADINE HYDROCHLORIDE 100 MG/1
100 TABLET ORAL DAILY
Status: DISCONTINUED | OUTPATIENT
Start: 2024-01-01 | End: 2024-01-01

## 2024-01-01 RX ORDER — MIDAZOLAM HYDROCHLORIDE 1 MG/ML
INJECTION INTRAMUSCULAR; INTRAVENOUS
Status: COMPLETED
Start: 2024-01-01 | End: 2024-01-01

## 2024-01-01 RX ORDER — SODIUM CHLORIDE 9 MG/ML
500 INJECTION, SOLUTION INTRAVENOUS ONCE
Status: DISCONTINUED | OUTPATIENT
Start: 2024-01-01 | End: 2024-01-01

## 2024-01-01 RX ORDER — QUETIAPINE FUMARATE 25 MG/1
50 TABLET, FILM COATED ORAL EVERY 8 HOURS
Status: DISCONTINUED | OUTPATIENT
Start: 2024-01-01 | End: 2024-01-01

## 2024-01-01 RX ORDER — HALOPERIDOL 5 MG/ML
5 INJECTION INTRAMUSCULAR EVERY 4 HOURS PRN
Status: DISCONTINUED | OUTPATIENT
Start: 2024-01-01 | End: 2024-01-01

## 2024-01-01 RX ORDER — ADENOSINE 3 MG/ML
12 INJECTION, SOLUTION INTRAVENOUS ONCE
Status: COMPLETED | OUTPATIENT
Start: 2024-01-01 | End: 2024-01-01

## 2024-01-01 RX ORDER — HYDRALAZINE HYDROCHLORIDE 20 MG/ML
10 INJECTION INTRAMUSCULAR; INTRAVENOUS EVERY 6 HOURS PRN
Status: DISCONTINUED | OUTPATIENT
Start: 2024-01-01 | End: 2024-01-01

## 2024-01-01 RX ORDER — ETOMIDATE 2 MG/ML
20 INJECTION INTRAVENOUS ONCE
Status: COMPLETED | OUTPATIENT
Start: 2024-01-01 | End: 2024-01-01

## 2024-01-01 RX ORDER — AMLODIPINE BESYLATE 10 MG/1
10 TABLET ORAL
Status: DISCONTINUED | OUTPATIENT
Start: 2024-01-01 | End: 2024-01-01

## 2024-01-01 RX ORDER — ETOMIDATE 2 MG/ML
10 INJECTION INTRAVENOUS ONCE
Status: COMPLETED | OUTPATIENT
Start: 2024-01-01 | End: 2024-01-01

## 2024-01-01 RX ORDER — METOCLOPRAMIDE HYDROCHLORIDE 5 MG/ML
10 INJECTION INTRAMUSCULAR; INTRAVENOUS ONCE
Status: COMPLETED | OUTPATIENT
Start: 2024-01-01 | End: 2024-01-01

## 2024-01-01 RX ORDER — SCOLOPAMINE TRANSDERMAL SYSTEM 1 MG/1
1 PATCH, EXTENDED RELEASE TRANSDERMAL
Status: DISCONTINUED | OUTPATIENT
Start: 2024-01-01 | End: 2024-01-01 | Stop reason: HOSPADM

## 2024-01-01 RX ORDER — LISINOPRIL 40 MG/1
40 TABLET ORAL DAILY
Qty: 30 TABLET | Status: SHIPPED
Start: 2024-01-01

## 2024-01-01 RX ORDER — NOREPINEPHRINE BITARTRATE 0.03 MG/ML
INJECTION, SOLUTION INTRAVENOUS
Status: ACTIVE
Start: 2024-01-01 | End: 2024-01-01

## 2024-01-01 RX ORDER — AMLODIPINE BESYLATE 5 MG/1
5 TABLET ORAL ONCE
Status: COMPLETED | OUTPATIENT
Start: 2024-01-01 | End: 2024-01-01

## 2024-01-01 RX ORDER — FAMOTIDINE 20 MG/1
20 TABLET, FILM COATED ORAL DAILY
Status: DISCONTINUED | OUTPATIENT
Start: 2024-01-01 | End: 2024-01-01

## 2024-01-01 RX ORDER — DEXTROSE MONOHYDRATE 50 MG/ML
INJECTION, SOLUTION INTRAVENOUS CONTINUOUS
Status: ACTIVE | OUTPATIENT
Start: 2024-01-01 | End: 2024-01-01

## 2024-01-01 RX ORDER — HEPARIN SODIUM 5000 [USP'U]/ML
5000 INJECTION, SOLUTION INTRAVENOUS; SUBCUTANEOUS EVERY 8 HOURS
Status: DISCONTINUED | OUTPATIENT
Start: 2024-01-01 | End: 2024-01-01

## 2024-01-01 RX ORDER — MIDODRINE HYDROCHLORIDE 5 MG/1
10 TABLET ORAL EVERY 8 HOURS
Status: DISCONTINUED | OUTPATIENT
Start: 2024-01-01 | End: 2024-01-01

## 2024-01-01 RX ORDER — AMLODIPINE BESYLATE 10 MG/1
10 TABLET ORAL DAILY
Qty: 30 TABLET | Status: SHIPPED
Start: 2024-01-01

## 2024-01-01 RX ORDER — FUROSEMIDE 10 MG/ML
20 INJECTION INTRAMUSCULAR; INTRAVENOUS ONCE
Status: COMPLETED | OUTPATIENT
Start: 2024-01-01 | End: 2024-01-01

## 2024-01-01 RX ORDER — ONDANSETRON 4 MG/1
4 TABLET, ORALLY DISINTEGRATING ORAL EVERY 4 HOURS PRN
Qty: 10 TABLET | Refills: 0 | Status: SHIPPED | OUTPATIENT
Start: 2024-01-01

## 2024-01-01 RX ORDER — DOCUSATE SODIUM 100 MG/1
100 CAPSULE, LIQUID FILLED ORAL EVERY 12 HOURS
Status: DISCONTINUED | OUTPATIENT
Start: 2024-01-01 | End: 2024-01-01 | Stop reason: HOSPADM

## 2024-01-01 RX ORDER — LABETALOL HYDROCHLORIDE 5 MG/ML
20 INJECTION, SOLUTION INTRAVENOUS ONCE
Status: COMPLETED | OUTPATIENT
Start: 2024-01-01 | End: 2024-01-01

## 2024-01-01 RX ORDER — SODIUM CHLORIDE, SODIUM LACTATE, POTASSIUM CHLORIDE, CALCIUM CHLORIDE 600; 310; 30; 20 MG/100ML; MG/100ML; MG/100ML; MG/100ML
INJECTION, SOLUTION INTRAVENOUS CONTINUOUS
Status: DISCONTINUED | OUTPATIENT
Start: 2024-01-01 | End: 2024-01-01

## 2024-01-01 RX ORDER — PHENYLEPHRINE HCL IN 0.9% NACL 0.5 MG/5ML
300 SYRINGE (ML) INTRAVENOUS ONCE
Status: COMPLETED | OUTPATIENT
Start: 2024-01-01 | End: 2024-01-01

## 2024-01-01 RX ORDER — HALOPERIDOL 5 MG/ML
1 INJECTION INTRAMUSCULAR EVERY 6 HOURS PRN
Status: DISCONTINUED | OUTPATIENT
Start: 2024-01-01 | End: 2024-01-01

## 2024-01-01 RX ORDER — LORAZEPAM 2 MG/ML
2 INJECTION INTRAMUSCULAR ONCE
Status: COMPLETED | OUTPATIENT
Start: 2024-01-01 | End: 2024-01-01

## 2024-01-01 RX ORDER — LORAZEPAM 2 MG/ML
1 CONCENTRATE ORAL
Status: DISCONTINUED | OUTPATIENT
Start: 2024-01-01 | End: 2024-01-01 | Stop reason: HOSPADM

## 2024-01-01 RX ORDER — POTASSIUM CHLORIDE 7.45 MG/ML
10 INJECTION INTRAVENOUS
Status: DISPENSED | OUTPATIENT
Start: 2024-01-01 | End: 2024-01-01

## 2024-01-01 RX ORDER — PHENYLEPHRINE HCL IN 0.9% NACL 0.5 MG/5ML
SYRINGE (ML) INTRAVENOUS
Status: COMPLETED | OUTPATIENT
Start: 2024-01-01 | End: 2024-01-01

## 2024-01-01 RX ORDER — PHENYLEPHRINE HCL IN 0.9% NACL 0.5 MG/5ML
300 SYRINGE (ML) INTRAVENOUS
Status: COMPLETED | OUTPATIENT
Start: 2024-01-01 | End: 2024-01-01

## 2024-01-01 RX ORDER — OXYCODONE HYDROCHLORIDE 5 MG/1
5 TABLET ORAL EVERY 4 HOURS PRN
Status: DISCONTINUED | OUTPATIENT
Start: 2024-01-01 | End: 2024-01-01

## 2024-01-01 RX ORDER — ACETAMINOPHEN 325 MG/1
650 TABLET ORAL EVERY 4 HOURS PRN
Status: SHIPPED | COMMUNITY
End: 2024-01-01

## 2024-01-01 RX ORDER — ATORVASTATIN CALCIUM 40 MG/1
40 TABLET, FILM COATED ORAL EVERY EVENING
Status: DISCONTINUED | OUTPATIENT
Start: 2024-01-01 | End: 2024-01-01 | Stop reason: HOSPADM

## 2024-01-01 RX ORDER — MIDAZOLAM HYDROCHLORIDE 1 MG/ML
2 INJECTION INTRAMUSCULAR; INTRAVENOUS ONCE
Status: COMPLETED | OUTPATIENT
Start: 2024-01-01 | End: 2024-01-01

## 2024-01-01 RX ORDER — CARVEDILOL 6.25 MG/1
6.25 TABLET ORAL 2 TIMES DAILY WITH MEALS
Status: DISCONTINUED | OUTPATIENT
Start: 2024-01-01 | End: 2024-01-01

## 2024-01-01 RX ORDER — CARBOXYMETHYLCELLULOSE SODIUM 5 MG/ML
1 SOLUTION/ DROPS OPHTHALMIC PRN
Status: DISCONTINUED | OUTPATIENT
Start: 2024-01-01 | End: 2024-01-01 | Stop reason: HOSPADM

## 2024-01-01 RX ORDER — OXYCODONE HYDROCHLORIDE 5 MG/1
2.5-5 TABLET ORAL EVERY 6 HOURS PRN
Status: DISCONTINUED | OUTPATIENT
Start: 2024-01-01 | End: 2024-01-01

## 2024-01-01 RX ORDER — TERAZOSIN 2 MG/1
2 CAPSULE ORAL EVERY EVENING
Status: DISCONTINUED | OUTPATIENT
Start: 2024-01-01 | End: 2024-01-01

## 2024-01-01 RX ORDER — MIDAZOLAM HYDROCHLORIDE 1 MG/ML
2 INJECTION INTRAMUSCULAR; INTRAVENOUS ONCE
Status: DISCONTINUED | OUTPATIENT
Start: 2024-01-01 | End: 2024-01-01

## 2024-01-01 RX ORDER — AMOXICILLIN 250 MG
2 CAPSULE ORAL 2 TIMES DAILY
Qty: 30 TABLET | Refills: 0 | Status: SHIPPED | OUTPATIENT
Start: 2024-01-01

## 2024-01-01 RX ORDER — ENOXAPARIN SODIUM 100 MG/ML
1 INJECTION SUBCUTANEOUS EVERY 12 HOURS
Status: DISCONTINUED | OUTPATIENT
Start: 2024-01-01 | End: 2024-01-01

## 2024-01-01 RX ORDER — ATORVASTATIN CALCIUM 40 MG/1
40 TABLET, FILM COATED ORAL EVERY EVENING
Qty: 30 TABLET | Status: SHIPPED
Start: 2024-01-01

## 2024-01-01 RX ORDER — POLYETHYLENE GLYCOL 3350 17 G/17G
1 POWDER, FOR SOLUTION ORAL
Status: DISCONTINUED | OUTPATIENT
Start: 2024-01-01 | End: 2024-01-01 | Stop reason: HOSPADM

## 2024-01-01 RX ADMIN — SODIUM CHLORIDE, POTASSIUM CHLORIDE, SODIUM LACTATE AND CALCIUM CHLORIDE 500 ML: 600; 310; 30; 20 INJECTION, SOLUTION INTRAVENOUS at 22:30

## 2024-01-01 RX ADMIN — CARVEDILOL 3.12 MG: 3.12 TABLET, FILM COATED ORAL at 17:20

## 2024-01-01 RX ADMIN — POTASSIUM BICARBONATE 25 MEQ: 978 TABLET, EFFERVESCENT ORAL at 12:31

## 2024-01-01 RX ADMIN — METOCLOPRAMIDE 5 MG: 5 INJECTION, SOLUTION INTRAMUSCULAR; INTRAVENOUS at 16:59

## 2024-01-01 RX ADMIN — METOCLOPRAMIDE 5 MG: 5 INJECTION, SOLUTION INTRAMUSCULAR; INTRAVENOUS at 00:08

## 2024-01-01 RX ADMIN — INSULIN HUMAN 2 UNITS: 100 INJECTION, SOLUTION PARENTERAL at 17:52

## 2024-01-01 RX ADMIN — IPRATROPIUM BROMIDE AND ALBUTEROL SULFATE 3 ML: 2.5; .5 SOLUTION RESPIRATORY (INHALATION) at 22:32

## 2024-01-01 RX ADMIN — FAMOTIDINE 20 MG: 10 INJECTION, SOLUTION INTRAVENOUS at 17:39

## 2024-01-01 RX ADMIN — LISINOPRIL 40 MG: 20 TABLET ORAL at 05:27

## 2024-01-01 RX ADMIN — HYDRALAZINE HYDROCHLORIDE 10 MG: 20 INJECTION, SOLUTION INTRAMUSCULAR; INTRAVENOUS at 05:05

## 2024-01-01 RX ADMIN — PIPERACILLIN AND TAZOBACTAM 4.5 G: 4; .5 INJECTION, POWDER, FOR SOLUTION INTRAVENOUS at 04:33

## 2024-01-01 RX ADMIN — GLYCOPYRROLATE 1 MG: 1 TABLET ORAL at 10:05

## 2024-01-01 RX ADMIN — MIDODRINE HYDROCHLORIDE 10 MG: 5 TABLET ORAL at 21:25

## 2024-01-01 RX ADMIN — AMPICILLIN SODIUM, SULBACTAM SODIUM 3 G: 2; 1 INJECTION, POWDER, FOR SOLUTION INTRAMUSCULAR; INTRAVENOUS at 12:43

## 2024-01-01 RX ADMIN — HYDROCORTISONE SODIUM SUCCINATE 50 MG: 100 INJECTION, POWDER, FOR SOLUTION INTRAMUSCULAR; INTRAVENOUS at 23:59

## 2024-01-01 RX ADMIN — QUETIAPINE FUMARATE 50 MG: 25 TABLET ORAL at 13:27

## 2024-01-01 RX ADMIN — IPRATROPIUM BROMIDE AND ALBUTEROL SULFATE 3 ML: 2.5; .5 SOLUTION RESPIRATORY (INHALATION) at 22:04

## 2024-01-01 RX ADMIN — SODIUM BICARBONATE 3 ML: 84 INJECTION, SOLUTION INTRAVENOUS at 18:25

## 2024-01-01 RX ADMIN — NYSTATIN: 100000 POWDER TOPICAL at 05:32

## 2024-01-01 RX ADMIN — METOCLOPRAMIDE 5 MG: 5 INJECTION, SOLUTION INTRAMUSCULAR; INTRAVENOUS at 01:25

## 2024-01-01 RX ADMIN — DOCUSATE SODIUM 50 MG AND SENNOSIDES 8.6 MG 2 TABLET: 8.6; 5 TABLET, FILM COATED ORAL at 05:09

## 2024-01-01 RX ADMIN — DOCUSATE SODIUM 100 MG: 100 CAPSULE, LIQUID FILLED ORAL at 05:27

## 2024-01-01 RX ADMIN — FUROSEMIDE 10 MG: 10 INJECTION, SOLUTION INTRAVENOUS at 05:32

## 2024-01-01 RX ADMIN — FENTANYL CITRATE 100 MCG: 50 INJECTION, SOLUTION INTRAMUSCULAR; INTRAVENOUS at 00:04

## 2024-01-01 RX ADMIN — ACETYLCYSTEINE 3 ML: 200 SOLUTION ORAL; RESPIRATORY (INHALATION) at 18:57

## 2024-01-01 RX ADMIN — AMANTADINE HYDROCHLORIDE 100 MG: 100 TABLET ORAL at 06:41

## 2024-01-01 RX ADMIN — METOCLOPRAMIDE 5 MG: 5 INJECTION, SOLUTION INTRAMUSCULAR; INTRAVENOUS at 11:23

## 2024-01-01 RX ADMIN — APIXABAN 5 MG: 5 TABLET, FILM COATED ORAL at 17:13

## 2024-01-01 RX ADMIN — AMPICILLIN AND SULBACTAM 3 G: 1; 2 INJECTION, POWDER, FOR SOLUTION INTRAMUSCULAR; INTRAVENOUS at 17:33

## 2024-01-01 RX ADMIN — POTASSIUM BICARBONATE 25 MEQ: 978 TABLET, EFFERVESCENT ORAL at 09:52

## 2024-01-01 RX ADMIN — DEXMEDETOMIDINE 1.5 MCG/KG/HR: 100 INJECTION, SOLUTION INTRAVENOUS at 13:45

## 2024-01-01 RX ADMIN — DOCUSATE SODIUM 50 MG AND SENNOSIDES 8.6 MG 2 TABLET: 8.6; 5 TABLET, FILM COATED ORAL at 05:44

## 2024-01-01 RX ADMIN — FENTANYL CITRATE 50 MCG: 50 INJECTION, SOLUTION INTRAMUSCULAR; INTRAVENOUS at 08:31

## 2024-01-01 RX ADMIN — DOCUSATE SODIUM 50 MG AND SENNOSIDES 8.6 MG 2 TABLET: 8.6; 5 TABLET, FILM COATED ORAL at 05:34

## 2024-01-01 RX ADMIN — INSULIN HUMAN 2 UNITS: 100 INJECTION, SOLUTION PARENTERAL at 12:15

## 2024-01-01 RX ADMIN — AMPICILLIN SODIUM, SULBACTAM SODIUM 3 G: 2; 1 INJECTION, POWDER, FOR SOLUTION INTRAMUSCULAR; INTRAVENOUS at 11:58

## 2024-01-01 RX ADMIN — INSULIN HUMAN 2 UNITS: 100 INJECTION, SOLUTION PARENTERAL at 23:58

## 2024-01-01 RX ADMIN — APIXABAN 5 MG: 5 TABLET, FILM COATED ORAL at 17:58

## 2024-01-01 RX ADMIN — Medication 300 MCG: at 20:51

## 2024-01-01 RX ADMIN — FENTANYL CITRATE 100 MCG: 50 INJECTION, SOLUTION INTRAMUSCULAR; INTRAVENOUS at 08:52

## 2024-01-01 RX ADMIN — DEXMEDETOMIDINE 0.8 MCG/KG/HR: 100 INJECTION, SOLUTION INTRAVENOUS at 09:51

## 2024-01-01 RX ADMIN — LABETALOL HYDROCHLORIDE 20 MG: 5 INJECTION INTRAVENOUS at 01:17

## 2024-01-01 RX ADMIN — MIDODRINE HYDROCHLORIDE 10 MG: 5 TABLET ORAL at 13:28

## 2024-01-01 RX ADMIN — GLYCOPYRROLATE 1 MG: 1 TABLET ORAL at 12:15

## 2024-01-01 RX ADMIN — SODIUM BICARBONATE 3 ML: 84 INJECTION, SOLUTION INTRAVENOUS at 11:10

## 2024-01-01 RX ADMIN — INSULIN HUMAN 1 UNITS: 100 INJECTION, SOLUTION PARENTERAL at 17:23

## 2024-01-01 RX ADMIN — AMPICILLIN SODIUM, SULBACTAM SODIUM 3 G: 2; 1 INJECTION, POWDER, FOR SOLUTION INTRAMUSCULAR; INTRAVENOUS at 18:19

## 2024-01-01 RX ADMIN — Medication 200 MCG: at 14:56

## 2024-01-01 RX ADMIN — GLYCOPYRROLATE 1 MG: 1 TABLET ORAL at 17:13

## 2024-01-01 RX ADMIN — PIPERACILLIN AND TAZOBACTAM 4.5 G: 4; .5 INJECTION, POWDER, FOR SOLUTION INTRAVENOUS at 13:22

## 2024-01-01 RX ADMIN — VALPROIC ACID 125 MG: 250 SOLUTION ORAL at 05:29

## 2024-01-01 RX ADMIN — FUROSEMIDE 10 MG: 10 INJECTION, SOLUTION INTRAVENOUS at 06:05

## 2024-01-01 RX ADMIN — FENTANYL CITRATE 100 MCG: 50 INJECTION, SOLUTION INTRAMUSCULAR; INTRAVENOUS at 15:47

## 2024-01-01 RX ADMIN — AMPICILLIN SODIUM, SULBACTAM SODIUM 3 G: 2; 1 INJECTION, POWDER, FOR SOLUTION INTRAMUSCULAR; INTRAVENOUS at 22:26

## 2024-01-01 RX ADMIN — VASOPRESSIN 0.03 UNITS/MIN: 20 INJECTION INTRAVENOUS at 00:25

## 2024-01-01 RX ADMIN — AMPICILLIN AND SULBACTAM 3 G: 1; 2 INJECTION, POWDER, FOR SOLUTION INTRAMUSCULAR; INTRAVENOUS at 23:19

## 2024-01-01 RX ADMIN — APIXABAN 5 MG: 5 TABLET, FILM COATED ORAL at 17:33

## 2024-01-01 RX ADMIN — INSULIN HUMAN 6 UNITS: 100 INJECTION, SOLUTION PARENTERAL at 06:01

## 2024-01-01 RX ADMIN — FENTANYL CITRATE 100 MCG: 50 INJECTION, SOLUTION INTRAMUSCULAR; INTRAVENOUS at 13:46

## 2024-01-01 RX ADMIN — FAMOTIDINE 20 MG: 20 TABLET, FILM COATED ORAL at 17:17

## 2024-01-01 RX ADMIN — IPRATROPIUM BROMIDE AND ALBUTEROL SULFATE 3 ML: 2.5; .5 SOLUTION RESPIRATORY (INHALATION) at 14:25

## 2024-01-01 RX ADMIN — INSULIN HUMAN 2 UNITS: 100 INJECTION, SOLUTION PARENTERAL at 17:13

## 2024-01-01 RX ADMIN — LABETALOL HYDROCHLORIDE 20 MG: 5 INJECTION INTRAVENOUS at 02:11

## 2024-01-01 RX ADMIN — SODIUM CHLORIDE: 4.5 INJECTION, SOLUTION INTRAVENOUS at 00:29

## 2024-01-01 RX ADMIN — AMLODIPINE BESYLATE 5 MG: 5 TABLET ORAL at 06:01

## 2024-01-01 RX ADMIN — POTASSIUM BICARBONATE 25 MEQ: 978 TABLET, EFFERVESCENT ORAL at 11:30

## 2024-01-01 RX ADMIN — GLYCOPYRROLATE 1 MG: 1 TABLET ORAL at 18:00

## 2024-01-01 RX ADMIN — DOCUSATE SODIUM 50 MG AND SENNOSIDES 8.6 MG 2 TABLET: 8.6; 5 TABLET, FILM COATED ORAL at 04:51

## 2024-01-01 RX ADMIN — IPRATROPIUM BROMIDE AND ALBUTEROL SULFATE 3 ML: 2.5; .5 SOLUTION RESPIRATORY (INHALATION) at 02:40

## 2024-01-01 RX ADMIN — APIXABAN 5 MG: 5 TABLET, FILM COATED ORAL at 05:08

## 2024-01-01 RX ADMIN — DOCUSATE SODIUM 50 MG AND SENNOSIDES 8.6 MG 2 TABLET: 8.6; 5 TABLET, FILM COATED ORAL at 04:40

## 2024-01-01 RX ADMIN — INSULIN HUMAN 2 UNITS: 100 INJECTION, SOLUTION PARENTERAL at 05:32

## 2024-01-01 RX ADMIN — APIXABAN 5 MG: 5 TABLET, FILM COATED ORAL at 06:00

## 2024-01-01 RX ADMIN — IPRATROPIUM BROMIDE AND ALBUTEROL SULFATE 3 ML: 2.5; .5 SOLUTION RESPIRATORY (INHALATION) at 22:49

## 2024-01-01 RX ADMIN — FENTANYL CITRATE 100 MCG: 50 INJECTION, SOLUTION INTRAMUSCULAR; INTRAVENOUS at 21:38

## 2024-01-01 RX ADMIN — DOCUSATE SODIUM 50 MG AND SENNOSIDES 8.6 MG 2 TABLET: 8.6; 5 TABLET, FILM COATED ORAL at 18:08

## 2024-01-01 RX ADMIN — IPRATROPIUM BROMIDE AND ALBUTEROL SULFATE 3 ML: 2.5; .5 SOLUTION RESPIRATORY (INHALATION) at 01:51

## 2024-01-01 RX ADMIN — METOCLOPRAMIDE 5 MG: 5 INJECTION, SOLUTION INTRAMUSCULAR; INTRAVENOUS at 17:28

## 2024-01-01 RX ADMIN — DEXMEDETOMIDINE 1.5 MCG/KG/HR: 100 INJECTION, SOLUTION INTRAVENOUS at 03:45

## 2024-01-01 RX ADMIN — APIXABAN 5 MG: 5 TABLET, FILM COATED ORAL at 18:09

## 2024-01-01 RX ADMIN — ENOXAPARIN SODIUM 80 MG: 100 INJECTION SUBCUTANEOUS at 12:47

## 2024-01-01 RX ADMIN — DOCUSATE SODIUM 50 MG AND SENNOSIDES 8.6 MG 2 TABLET: 8.6; 5 TABLET, FILM COATED ORAL at 00:01

## 2024-01-01 RX ADMIN — IPRATROPIUM BROMIDE AND ALBUTEROL SULFATE 3 ML: 2.5; .5 SOLUTION RESPIRATORY (INHALATION) at 10:38

## 2024-01-01 RX ADMIN — IPRATROPIUM BROMIDE AND ALBUTEROL SULFATE 3 ML: 2.5; .5 SOLUTION RESPIRATORY (INHALATION) at 22:56

## 2024-01-01 RX ADMIN — AMPICILLIN SODIUM, SULBACTAM SODIUM 3 G: 2; 1 INJECTION, POWDER, FOR SOLUTION INTRAMUSCULAR; INTRAVENOUS at 21:59

## 2024-01-01 RX ADMIN — SODIUM BICARBONATE 3 ML: 84 INJECTION, SOLUTION INTRAVENOUS at 01:51

## 2024-01-01 RX ADMIN — TERAZOSIN HYDROCHLORIDE 2 MG: 2 CAPSULE ORAL at 17:28

## 2024-01-01 RX ADMIN — SODIUM CHLORIDE 1000 ML: 9 INJECTION, SOLUTION INTRAVENOUS at 14:15

## 2024-01-01 RX ADMIN — AMPICILLIN SODIUM, SULBACTAM SODIUM 3 G: 2; 1 INJECTION, POWDER, FOR SOLUTION INTRAMUSCULAR; INTRAVENOUS at 04:57

## 2024-01-01 RX ADMIN — DEXMEDETOMIDINE 1.4 MCG/KG/HR: 100 INJECTION, SOLUTION INTRAVENOUS at 12:11

## 2024-01-01 RX ADMIN — SODIUM BICARBONATE 3 ML: 84 INJECTION, SOLUTION INTRAVENOUS at 19:17

## 2024-01-01 RX ADMIN — HYDRALAZINE HYDROCHLORIDE 10 MG: 20 INJECTION, SOLUTION INTRAMUSCULAR; INTRAVENOUS at 18:06

## 2024-01-01 RX ADMIN — FENTANYL CITRATE 100 MCG: 50 INJECTION, SOLUTION INTRAMUSCULAR; INTRAVENOUS at 08:01

## 2024-01-01 RX ADMIN — PIPERACILLIN AND TAZOBACTAM 4.5 G: 4; .5 INJECTION, POWDER, FOR SOLUTION INTRAVENOUS at 13:07

## 2024-01-01 RX ADMIN — DEXMEDETOMIDINE 0.2 MCG/KG/HR: 100 INJECTION, SOLUTION INTRAVENOUS at 09:57

## 2024-01-01 RX ADMIN — TERAZOSIN HYDROCHLORIDE 2 MG: 2 CAPSULE ORAL at 17:25

## 2024-01-01 RX ADMIN — FENTANYL CITRATE 50 MCG: 50 INJECTION, SOLUTION INTRAMUSCULAR; INTRAVENOUS at 16:30

## 2024-01-01 RX ADMIN — AMLODIPINE BESYLATE 5 MG: 5 TABLET ORAL at 05:18

## 2024-01-01 RX ADMIN — NYSTATIN: 100000 POWDER TOPICAL at 04:50

## 2024-01-01 RX ADMIN — LABETALOL HYDROCHLORIDE 20 MG: 5 INJECTION INTRAVENOUS at 22:05

## 2024-01-01 RX ADMIN — FENTANYL CITRATE 100 MCG: 50 INJECTION, SOLUTION INTRAMUSCULAR; INTRAVENOUS at 23:25

## 2024-01-01 RX ADMIN — ENOXAPARIN SODIUM 80 MG: 100 INJECTION SUBCUTANEOUS at 12:15

## 2024-01-01 RX ADMIN — DOCUSATE SODIUM 50 MG AND SENNOSIDES 8.6 MG 2 TABLET: 8.6; 5 TABLET, FILM COATED ORAL at 17:13

## 2024-01-01 RX ADMIN — INSULIN HUMAN 2 UNITS: 100 INJECTION, SOLUTION PARENTERAL at 23:53

## 2024-01-01 RX ADMIN — IPRATROPIUM BROMIDE AND ALBUTEROL SULFATE 3 ML: 2.5; .5 SOLUTION RESPIRATORY (INHALATION) at 18:35

## 2024-01-01 RX ADMIN — Medication 100 MCG/HR: at 09:33

## 2024-01-01 RX ADMIN — FAMOTIDINE 20 MG: 20 TABLET, FILM COATED ORAL at 05:04

## 2024-01-01 RX ADMIN — SODIUM BICARBONATE 3 ML: 84 INJECTION, SOLUTION INTRAVENOUS at 14:25

## 2024-01-01 RX ADMIN — POTASSIUM CHLORIDE 10 MEQ: 7.46 INJECTION, SOLUTION INTRAVENOUS at 09:54

## 2024-01-01 RX ADMIN — POTASSIUM CHLORIDE 10 MEQ: 7.46 INJECTION, SOLUTION INTRAVENOUS at 10:59

## 2024-01-01 RX ADMIN — ENOXAPARIN SODIUM 80 MG: 100 INJECTION SUBCUTANEOUS at 00:41

## 2024-01-01 RX ADMIN — FENTANYL CITRATE 100 MCG: 50 INJECTION, SOLUTION INTRAMUSCULAR; INTRAVENOUS at 02:45

## 2024-01-01 RX ADMIN — IPRATROPIUM BROMIDE AND ALBUTEROL SULFATE 3 ML: 2.5; .5 SOLUTION RESPIRATORY (INHALATION) at 15:26

## 2024-01-01 RX ADMIN — FUROSEMIDE 10 MG: 10 INJECTION, SOLUTION INTRAVENOUS at 05:38

## 2024-01-01 RX ADMIN — DEXMEDETOMIDINE 1 MCG/KG/HR: 100 INJECTION, SOLUTION INTRAVENOUS at 14:30

## 2024-01-01 RX ADMIN — INSULIN HUMAN 1 UNITS: 100 INJECTION, SOLUTION PARENTERAL at 17:32

## 2024-01-01 RX ADMIN — SODIUM BICARBONATE 3 ML: 84 INJECTION, SOLUTION INTRAVENOUS at 10:38

## 2024-01-01 RX ADMIN — POLYETHYLENE GLYCOL 3350 1 PACKET: 17 POWDER, FOR SOLUTION ORAL at 05:27

## 2024-01-01 RX ADMIN — MIDODRINE HYDROCHLORIDE 10 MG: 5 TABLET ORAL at 23:03

## 2024-01-01 RX ADMIN — DOCUSATE SODIUM 50 MG AND SENNOSIDES 8.6 MG 2 TABLET: 8.6; 5 TABLET, FILM COATED ORAL at 16:58

## 2024-01-01 RX ADMIN — METOCLOPRAMIDE 5 MG: 5 INJECTION, SOLUTION INTRAMUSCULAR; INTRAVENOUS at 05:24

## 2024-01-01 RX ADMIN — APIXABAN 5 MG: 5 TABLET, FILM COATED ORAL at 06:01

## 2024-01-01 RX ADMIN — IPRATROPIUM BROMIDE AND ALBUTEROL SULFATE 3 ML: 2.5; .5 SOLUTION RESPIRATORY (INHALATION) at 22:46

## 2024-01-01 RX ADMIN — QUETIAPINE FUMARATE 50 MG: 25 TABLET ORAL at 05:35

## 2024-01-01 RX ADMIN — IPRATROPIUM BROMIDE AND ALBUTEROL SULFATE 3 ML: 2.5; .5 SOLUTION RESPIRATORY (INHALATION) at 02:22

## 2024-01-01 RX ADMIN — ACETYLCYSTEINE 3 ML: 200 SOLUTION ORAL; RESPIRATORY (INHALATION) at 18:53

## 2024-01-01 RX ADMIN — FAMOTIDINE 20 MG: 20 TABLET, FILM COATED ORAL at 05:30

## 2024-01-01 RX ADMIN — METOCLOPRAMIDE 5 MG: 5 INJECTION, SOLUTION INTRAMUSCULAR; INTRAVENOUS at 06:38

## 2024-01-01 RX ADMIN — LABETALOL HYDROCHLORIDE 20 MG: 5 INJECTION INTRAVENOUS at 03:09

## 2024-01-01 RX ADMIN — AMANTADINE HYDROCHLORIDE 100 MG: 100 TABLET ORAL at 05:18

## 2024-01-01 RX ADMIN — AMPICILLIN SODIUM, SULBACTAM SODIUM 3 G: 2; 1 INJECTION, POWDER, FOR SOLUTION INTRAMUSCULAR; INTRAVENOUS at 04:51

## 2024-01-01 RX ADMIN — FENTANYL CITRATE 100 MCG: 50 INJECTION, SOLUTION INTRAMUSCULAR; INTRAVENOUS at 19:45

## 2024-01-01 RX ADMIN — NYSTATIN: 100000 POWDER TOPICAL at 06:04

## 2024-01-01 RX ADMIN — FENTANYL CITRATE 100 MCG: 50 INJECTION, SOLUTION INTRAMUSCULAR; INTRAVENOUS at 20:53

## 2024-01-01 RX ADMIN — METOCLOPRAMIDE 5 MG: 5 INJECTION, SOLUTION INTRAMUSCULAR; INTRAVENOUS at 23:55

## 2024-01-01 RX ADMIN — INSULIN HUMAN 1 UNITS: 100 INJECTION, SOLUTION PARENTERAL at 05:20

## 2024-01-01 RX ADMIN — AMPICILLIN SODIUM, SULBACTAM SODIUM 3 G: 2; 1 INJECTION, POWDER, FOR SOLUTION INTRAMUSCULAR; INTRAVENOUS at 04:17

## 2024-01-01 RX ADMIN — POLYETHYLENE GLYCOL 3350 1 PACKET: 17 POWDER, FOR SOLUTION ORAL at 05:11

## 2024-01-01 RX ADMIN — SODIUM BICARBONATE 3 ML: 84 INJECTION, SOLUTION INTRAVENOUS at 22:49

## 2024-01-01 RX ADMIN — DOCUSATE SODIUM 50 MG AND SENNOSIDES 8.6 MG 2 TABLET: 8.6; 5 TABLET, FILM COATED ORAL at 05:18

## 2024-01-01 RX ADMIN — HYDROCORTISONE SODIUM SUCCINATE 50 MG: 100 INJECTION, POWDER, FOR SOLUTION INTRAMUSCULAR; INTRAVENOUS at 05:45

## 2024-01-01 RX ADMIN — AMPICILLIN SODIUM, SULBACTAM SODIUM 3 G: 2; 1 INJECTION, POWDER, FOR SOLUTION INTRAMUSCULAR; INTRAVENOUS at 06:08

## 2024-01-01 RX ADMIN — FAMOTIDINE 20 MG: 20 TABLET, FILM COATED ORAL at 05:34

## 2024-01-01 RX ADMIN — ATORVASTATIN CALCIUM 40 MG: 40 TABLET, FILM COATED ORAL at 18:21

## 2024-01-01 RX ADMIN — HYDRALAZINE HYDROCHLORIDE 10 MG: 20 INJECTION, SOLUTION INTRAMUSCULAR; INTRAVENOUS at 20:09

## 2024-01-01 RX ADMIN — DEXMEDETOMIDINE 0.2 MCG/KG/HR: 100 INJECTION, SOLUTION INTRAVENOUS at 02:17

## 2024-01-01 RX ADMIN — AMPICILLIN SODIUM, SULBACTAM SODIUM 3 G: 2; 1 INJECTION, POWDER, FOR SOLUTION INTRAMUSCULAR; INTRAVENOUS at 11:16

## 2024-01-01 RX ADMIN — MIDAZOLAM HYDROCHLORIDE 2 MG: 1 INJECTION INTRAMUSCULAR; INTRAVENOUS at 15:40

## 2024-01-01 RX ADMIN — AMPICILLIN SODIUM, SULBACTAM SODIUM 3 G: 2; 1 INJECTION, POWDER, FOR SOLUTION INTRAMUSCULAR; INTRAVENOUS at 00:14

## 2024-01-01 RX ADMIN — SODIUM CHLORIDE: 4.5 INJECTION, SOLUTION INTRAVENOUS at 15:53

## 2024-01-01 RX ADMIN — DEXMEDETOMIDINE 1.5 MCG/KG/HR: 100 INJECTION, SOLUTION INTRAVENOUS at 05:49

## 2024-01-01 RX ADMIN — DEXMEDETOMIDINE 0.2 MCG/KG/HR: 100 INJECTION, SOLUTION INTRAVENOUS at 16:06

## 2024-01-01 RX ADMIN — SODIUM BICARBONATE 3 ML: 84 INJECTION, SOLUTION INTRAVENOUS at 18:31

## 2024-01-01 RX ADMIN — METOCLOPRAMIDE 5 MG: 5 INJECTION, SOLUTION INTRAMUSCULAR; INTRAVENOUS at 06:45

## 2024-01-01 RX ADMIN — HYDRALAZINE HYDROCHLORIDE 10 MG: 20 INJECTION, SOLUTION INTRAMUSCULAR; INTRAVENOUS at 18:15

## 2024-01-01 RX ADMIN — ACETYLCYSTEINE 3 ML: 200 SOLUTION ORAL; RESPIRATORY (INHALATION) at 13:35

## 2024-01-01 RX ADMIN — INSULIN HUMAN 2 UNITS: 100 INJECTION, SOLUTION PARENTERAL at 05:17

## 2024-01-01 RX ADMIN — SODIUM BICARBONATE 3 ML: 84 INJECTION, SOLUTION INTRAVENOUS at 18:02

## 2024-01-01 RX ADMIN — FENTANYL CITRATE 100 MCG: 50 INJECTION, SOLUTION INTRAMUSCULAR; INTRAVENOUS at 04:47

## 2024-01-01 RX ADMIN — INSULIN HUMAN 2 UNITS: 100 INJECTION, SOLUTION PARENTERAL at 12:06

## 2024-01-01 RX ADMIN — ENOXAPARIN SODIUM 80 MG: 100 INJECTION SUBCUTANEOUS at 00:43

## 2024-01-01 RX ADMIN — DEXTROSE AND SODIUM CHLORIDE: 5; 450 INJECTION, SOLUTION INTRAVENOUS at 19:25

## 2024-01-01 RX ADMIN — APIXABAN 5 MG: 5 TABLET, FILM COATED ORAL at 05:12

## 2024-01-01 RX ADMIN — IPRATROPIUM BROMIDE AND ALBUTEROL SULFATE 3 ML: 2.5; .5 SOLUTION RESPIRATORY (INHALATION) at 11:19

## 2024-01-01 RX ADMIN — DOCUSATE SODIUM 50 MG AND SENNOSIDES 8.6 MG 2 TABLET: 8.6; 5 TABLET, FILM COATED ORAL at 05:13

## 2024-01-01 RX ADMIN — AMANTADINE HYDROCHLORIDE 100 MG: 100 TABLET ORAL at 05:32

## 2024-01-01 RX ADMIN — FENTANYL CITRATE 100 MCG: 50 INJECTION, SOLUTION INTRAMUSCULAR; INTRAVENOUS at 05:20

## 2024-01-01 RX ADMIN — IPRATROPIUM BROMIDE AND ALBUTEROL SULFATE 3 ML: 2.5; .5 SOLUTION RESPIRATORY (INHALATION) at 03:03

## 2024-01-01 RX ADMIN — LINEZOLID 600 MG: 600 INJECTION, SOLUTION INTRAVENOUS at 23:52

## 2024-01-01 RX ADMIN — MIDODRINE HYDROCHLORIDE 10 MG: 5 TABLET ORAL at 22:26

## 2024-01-01 RX ADMIN — HYDRALAZINE HYDROCHLORIDE 10 MG: 20 INJECTION, SOLUTION INTRAMUSCULAR; INTRAVENOUS at 17:15

## 2024-01-01 RX ADMIN — FENTANYL CITRATE 100 MCG: 50 INJECTION, SOLUTION INTRAMUSCULAR; INTRAVENOUS at 13:17

## 2024-01-01 RX ADMIN — INSULIN HUMAN 2 UNITS: 100 INJECTION, SOLUTION PARENTERAL at 00:47

## 2024-01-01 RX ADMIN — INSULIN GLARGINE-YFGN 10 UNITS: 100 INJECTION, SOLUTION SUBCUTANEOUS at 05:55

## 2024-01-01 RX ADMIN — FUROSEMIDE 10 MG: 10 INJECTION, SOLUTION INTRAVENOUS at 05:27

## 2024-01-01 RX ADMIN — INSULIN HUMAN 2 UNITS: 100 INJECTION, SOLUTION PARENTERAL at 17:21

## 2024-01-01 RX ADMIN — IPRATROPIUM BROMIDE AND ALBUTEROL SULFATE 3 ML: 2.5; .5 SOLUTION RESPIRATORY (INHALATION) at 10:33

## 2024-01-01 RX ADMIN — DOCUSATE SODIUM 50 MG AND SENNOSIDES 8.6 MG 2 TABLET: 8.6; 5 TABLET, FILM COATED ORAL at 05:38

## 2024-01-01 RX ADMIN — MIDODRINE HYDROCHLORIDE 10 MG: 5 TABLET ORAL at 14:00

## 2024-01-01 RX ADMIN — POTASSIUM BICARBONATE 25 MEQ: 978 TABLET, EFFERVESCENT ORAL at 17:37

## 2024-01-01 RX ADMIN — DOCUSATE SODIUM 50 MG AND SENNOSIDES 8.6 MG 2 TABLET: 8.6; 5 TABLET, FILM COATED ORAL at 17:21

## 2024-01-01 RX ADMIN — ACETYLCYSTEINE 3 ML: 200 SOLUTION ORAL; RESPIRATORY (INHALATION) at 03:24

## 2024-01-01 RX ADMIN — ACETYLCYSTEINE 3 ML: 200 SOLUTION ORAL; RESPIRATORY (INHALATION) at 07:04

## 2024-01-01 RX ADMIN — FUROSEMIDE 10 MG: 10 INJECTION, SOLUTION INTRAVENOUS at 16:42

## 2024-01-01 RX ADMIN — DEXMEDETOMIDINE 0.2 MCG/KG/HR: 100 INJECTION, SOLUTION INTRAVENOUS at 13:47

## 2024-01-01 RX ADMIN — FENTANYL CITRATE 100 MCG: 50 INJECTION, SOLUTION INTRAMUSCULAR; INTRAVENOUS at 02:41

## 2024-01-01 RX ADMIN — INSULIN HUMAN 2 UNITS: 100 INJECTION, SOLUTION PARENTERAL at 11:18

## 2024-01-01 RX ADMIN — INSULIN HUMAN 1 UNITS: 100 INJECTION, SOLUTION PARENTERAL at 05:39

## 2024-01-01 RX ADMIN — FENTANYL CITRATE 100 MCG: 50 INJECTION, SOLUTION INTRAMUSCULAR; INTRAVENOUS at 00:15

## 2024-01-01 RX ADMIN — NYSTATIN: 100000 POWDER TOPICAL at 05:18

## 2024-01-01 RX ADMIN — APIXABAN 5 MG: 5 TABLET, FILM COATED ORAL at 17:28

## 2024-01-01 RX ADMIN — TERAZOSIN HYDROCHLORIDE 2 MG: 2 CAPSULE ORAL at 17:21

## 2024-01-01 RX ADMIN — APIXABAN 5 MG: 5 TABLET, FILM COATED ORAL at 18:18

## 2024-01-01 RX ADMIN — FUROSEMIDE 10 MG: 10 INJECTION, SOLUTION INTRAVENOUS at 06:01

## 2024-01-01 RX ADMIN — SODIUM BICARBONATE 3 ML: 84 INJECTION, SOLUTION INTRAVENOUS at 11:15

## 2024-01-01 RX ADMIN — AMPICILLIN SODIUM, SULBACTAM SODIUM 3 G: 2; 1 INJECTION, POWDER, FOR SOLUTION INTRAMUSCULAR; INTRAVENOUS at 17:20

## 2024-01-01 RX ADMIN — Medication 100 MCG/HR: at 03:08

## 2024-01-01 RX ADMIN — IOHEXOL 80 ML: 350 INJECTION, SOLUTION INTRAVENOUS at 16:00

## 2024-01-01 RX ADMIN — SODIUM BICARBONATE 3 ML: 84 INJECTION, SOLUTION INTRAVENOUS at 06:36

## 2024-01-01 RX ADMIN — HALOPERIDOL LACTATE 5 MG: 5 INJECTION, SOLUTION INTRAMUSCULAR at 16:39

## 2024-01-01 RX ADMIN — MIDODRINE HYDROCHLORIDE 10 MG: 5 TABLET ORAL at 21:22

## 2024-01-01 RX ADMIN — FENTANYL CITRATE 100 MCG: 50 INJECTION, SOLUTION INTRAMUSCULAR; INTRAVENOUS at 06:31

## 2024-01-01 RX ADMIN — AMPICILLIN SODIUM, SULBACTAM SODIUM 3 G: 2; 1 INJECTION, POWDER, FOR SOLUTION INTRAMUSCULAR; INTRAVENOUS at 05:41

## 2024-01-01 RX ADMIN — PIPERACILLIN AND TAZOBACTAM 4.5 G: 4; .5 INJECTION, POWDER, FOR SOLUTION INTRAVENOUS at 04:45

## 2024-01-01 RX ADMIN — SODIUM BICARBONATE 3 ML: 84 INJECTION, SOLUTION INTRAVENOUS at 23:16

## 2024-01-01 RX ADMIN — ROCURONIUM BROMIDE 100 MG: 50 INJECTION, SOLUTION INTRAVENOUS at 13:54

## 2024-01-01 RX ADMIN — APIXABAN 5 MG: 5 TABLET, FILM COATED ORAL at 18:21

## 2024-01-01 RX ADMIN — APIXABAN 5 MG: 5 TABLET, FILM COATED ORAL at 17:17

## 2024-01-01 RX ADMIN — IPRATROPIUM BROMIDE AND ALBUTEROL SULFATE 3 ML: 2.5; .5 SOLUTION RESPIRATORY (INHALATION) at 02:19

## 2024-01-01 RX ADMIN — GLYCOPYRROLATE 1 MG: 1 TABLET ORAL at 12:40

## 2024-01-01 RX ADMIN — SODIUM BICARBONATE 3 ML: 84 INJECTION, SOLUTION INTRAVENOUS at 15:00

## 2024-01-01 RX ADMIN — GLYCOPYRROLATE 1 MG: 1 TABLET ORAL at 17:27

## 2024-01-01 RX ADMIN — MIDODRINE HYDROCHLORIDE 10 MG: 5 TABLET ORAL at 05:30

## 2024-01-01 RX ADMIN — TERAZOSIN HYDROCHLORIDE 2 MG: 2 CAPSULE ORAL at 17:13

## 2024-01-01 RX ADMIN — APIXABAN 5 MG: 5 TABLET, FILM COATED ORAL at 17:03

## 2024-01-01 RX ADMIN — SODIUM CHLORIDE, POTASSIUM CHLORIDE, SODIUM LACTATE AND CALCIUM CHLORIDE 500 ML: 600; 310; 30; 20 INJECTION, SOLUTION INTRAVENOUS at 23:15

## 2024-01-01 RX ADMIN — INSULIN HUMAN 2 UNITS: 100 INJECTION, SOLUTION PARENTERAL at 12:32

## 2024-01-01 RX ADMIN — POTASSIUM BICARBONATE 25 MEQ: 978 TABLET, EFFERVESCENT ORAL at 09:20

## 2024-01-01 RX ADMIN — IPRATROPIUM BROMIDE AND ALBUTEROL SULFATE 3 ML: 2.5; .5 SOLUTION RESPIRATORY (INHALATION) at 01:44

## 2024-01-01 RX ADMIN — AMPICILLIN SODIUM, SULBACTAM SODIUM 3 G: 2; 1 INJECTION, POWDER, FOR SOLUTION INTRAMUSCULAR; INTRAVENOUS at 16:40

## 2024-01-01 RX ADMIN — DOCUSATE SODIUM 50 MG AND SENNOSIDES 8.6 MG 2 TABLET: 8.6; 5 TABLET, FILM COATED ORAL at 06:01

## 2024-01-01 RX ADMIN — INSULIN HUMAN 1 UNITS: 100 INJECTION, SOLUTION PARENTERAL at 05:16

## 2024-01-01 RX ADMIN — IPRATROPIUM BROMIDE AND ALBUTEROL SULFATE 3 ML: 2.5; .5 SOLUTION RESPIRATORY (INHALATION) at 03:25

## 2024-01-01 RX ADMIN — APIXABAN 5 MG: 5 TABLET, FILM COATED ORAL at 18:23

## 2024-01-01 RX ADMIN — AMANTADINE HYDROCHLORIDE 100 MG: 100 TABLET ORAL at 05:24

## 2024-01-01 RX ADMIN — ENOXAPARIN SODIUM 80 MG: 100 INJECTION SUBCUTANEOUS at 00:04

## 2024-01-01 RX ADMIN — SODIUM BICARBONATE 3 ML: 84 INJECTION, SOLUTION INTRAVENOUS at 02:40

## 2024-01-01 RX ADMIN — AMPICILLIN AND SULBACTAM 3 G: 1; 2 INJECTION, POWDER, FOR SOLUTION INTRAMUSCULAR; INTRAVENOUS at 17:17

## 2024-01-01 RX ADMIN — FAMOTIDINE 20 MG: 20 TABLET, FILM COATED ORAL at 17:13

## 2024-01-01 RX ADMIN — INSULIN HUMAN 2 UNITS: 100 INJECTION, SOLUTION PARENTERAL at 13:14

## 2024-01-01 RX ADMIN — SODIUM BICARBONATE 3 ML: 84 INJECTION, SOLUTION INTRAVENOUS at 10:33

## 2024-01-01 RX ADMIN — DEXTROSE MONOHYDRATE: 50 INJECTION, SOLUTION INTRAVENOUS at 15:57

## 2024-01-01 RX ADMIN — POLYETHYLENE GLYCOL 3350 1 PACKET: 17 POWDER, FOR SOLUTION ORAL at 18:21

## 2024-01-01 RX ADMIN — AMLODIPINE BESYLATE 10 MG: 10 TABLET ORAL at 05:09

## 2024-01-01 RX ADMIN — FENTANYL CITRATE 50 MCG: 50 INJECTION, SOLUTION INTRAMUSCULAR; INTRAVENOUS at 09:42

## 2024-01-01 RX ADMIN — FAMOTIDINE 20 MG: 20 TABLET, FILM COATED ORAL at 05:45

## 2024-01-01 RX ADMIN — METOCLOPRAMIDE 5 MG: 5 INJECTION, SOLUTION INTRAMUSCULAR; INTRAVENOUS at 12:57

## 2024-01-01 RX ADMIN — ETOMIDATE 20 MG: 2 INJECTION, SOLUTION INTRAVENOUS at 14:52

## 2024-01-01 RX ADMIN — BISACODYL 10 MG: 10 SUPPOSITORY RECTAL at 05:08

## 2024-01-01 RX ADMIN — APIXABAN 5 MG: 5 TABLET, FILM COATED ORAL at 05:34

## 2024-01-01 RX ADMIN — ENOXAPARIN SODIUM 80 MG: 100 INJECTION SUBCUTANEOUS at 13:04

## 2024-01-01 RX ADMIN — SODIUM CHLORIDE, POTASSIUM CHLORIDE, SODIUM LACTATE AND CALCIUM CHLORIDE: 600; 310; 30; 20 INJECTION, SOLUTION INTRAVENOUS at 15:15

## 2024-01-01 RX ADMIN — INSULIN HUMAN 5 UNITS: 100 INJECTION, SOLUTION PARENTERAL at 18:30

## 2024-01-01 RX ADMIN — HYDRALAZINE HYDROCHLORIDE 10 MG: 20 INJECTION, SOLUTION INTRAMUSCULAR; INTRAVENOUS at 01:08

## 2024-01-01 RX ADMIN — AMLODIPINE BESYLATE 5 MG: 5 TABLET ORAL at 05:04

## 2024-01-01 RX ADMIN — VALPROIC ACID 125 MG: 250 SOLUTION ORAL at 14:03

## 2024-01-01 RX ADMIN — INSULIN HUMAN 2 UNITS: 100 INJECTION, SOLUTION PARENTERAL at 00:18

## 2024-01-01 RX ADMIN — INSULIN HUMAN 2 UNITS: 100 INJECTION, SOLUTION PARENTERAL at 05:29

## 2024-01-01 RX ADMIN — AMPICILLIN SODIUM, SULBACTAM SODIUM 3 G: 2; 1 INJECTION, POWDER, FOR SOLUTION INTRAMUSCULAR; INTRAVENOUS at 16:25

## 2024-01-01 RX ADMIN — ACETYLCYSTEINE 3 ML: 200 SOLUTION ORAL; RESPIRATORY (INHALATION) at 09:57

## 2024-01-01 RX ADMIN — INSULIN HUMAN 2 UNITS: 100 INJECTION, SOLUTION PARENTERAL at 23:55

## 2024-01-01 RX ADMIN — POLYETHYLENE GLYCOL 3350 1 PACKET: 17 POWDER, FOR SOLUTION ORAL at 15:59

## 2024-01-01 RX ADMIN — DOCUSATE SODIUM 50 MG AND SENNOSIDES 8.6 MG 2 TABLET: 8.6; 5 TABLET, FILM COATED ORAL at 04:48

## 2024-01-01 RX ADMIN — DOCUSATE SODIUM 50 MG AND SENNOSIDES 8.6 MG 2 TABLET: 8.6; 5 TABLET, FILM COATED ORAL at 05:35

## 2024-01-01 RX ADMIN — IPRATROPIUM BROMIDE AND ALBUTEROL SULFATE 3 ML: 2.5; .5 SOLUTION RESPIRATORY (INHALATION) at 18:02

## 2024-01-01 RX ADMIN — HALOPERIDOL LACTATE 5 MG: 5 INJECTION, SOLUTION INTRAMUSCULAR at 08:21

## 2024-01-01 RX ADMIN — FENTANYL CITRATE 100 MCG: 50 INJECTION, SOLUTION INTRAMUSCULAR; INTRAVENOUS at 04:00

## 2024-01-01 RX ADMIN — IPRATROPIUM BROMIDE AND ALBUTEROL SULFATE 3 ML: 2.5; .5 SOLUTION RESPIRATORY (INHALATION) at 18:53

## 2024-01-01 RX ADMIN — CARVEDILOL 3.12 MG: 3.12 TABLET, FILM COATED ORAL at 08:31

## 2024-01-01 RX ADMIN — APIXABAN 5 MG: 5 TABLET, FILM COATED ORAL at 17:32

## 2024-01-01 RX ADMIN — AMPICILLIN AND SULBACTAM 3 G: 1; 2 INJECTION, POWDER, FOR SOLUTION INTRAMUSCULAR; INTRAVENOUS at 14:57

## 2024-01-01 RX ADMIN — IPRATROPIUM BROMIDE AND ALBUTEROL SULFATE 3 ML: 2.5; .5 SOLUTION RESPIRATORY (INHALATION) at 07:47

## 2024-01-01 RX ADMIN — ENOXAPARIN SODIUM 80 MG: 100 INJECTION SUBCUTANEOUS at 12:57

## 2024-01-01 RX ADMIN — GLYCOPYRROLATE 1 MG: 1 TABLET ORAL at 05:05

## 2024-01-01 RX ADMIN — ETOMIDATE 14 MG: 2 INJECTION, SOLUTION INTRAVENOUS at 22:00

## 2024-01-01 RX ADMIN — HYDRALAZINE HYDROCHLORIDE 10 MG: 20 INJECTION, SOLUTION INTRAMUSCULAR; INTRAVENOUS at 10:06

## 2024-01-01 RX ADMIN — FENTANYL CITRATE 100 MCG: 50 INJECTION, SOLUTION INTRAMUSCULAR; INTRAVENOUS at 20:26

## 2024-01-01 RX ADMIN — GLYCOPYRROLATE 1 MG: 1 TABLET ORAL at 13:14

## 2024-01-01 RX ADMIN — INSULIN HUMAN 2 UNITS: 100 INJECTION, SOLUTION PARENTERAL at 06:09

## 2024-01-01 RX ADMIN — EPINEPHRINE 1 MG: 0.1 INJECTION, SOLUTION INTRAVENOUS at 14:58

## 2024-01-01 RX ADMIN — FENTANYL CITRATE 100 MCG: 50 INJECTION, SOLUTION INTRAMUSCULAR; INTRAVENOUS at 11:28

## 2024-01-01 RX ADMIN — HALOPERIDOL LACTATE 5 MG: 5 INJECTION, SOLUTION INTRAMUSCULAR at 02:13

## 2024-01-01 RX ADMIN — SODIUM CHLORIDE: 4.5 INJECTION, SOLUTION INTRAVENOUS at 14:41

## 2024-01-01 RX ADMIN — FAMOTIDINE 20 MG: 20 TABLET, FILM COATED ORAL at 05:03

## 2024-01-01 RX ADMIN — DEXTROSE MONOHYDRATE: 50 INJECTION, SOLUTION INTRAVENOUS at 09:33

## 2024-01-01 RX ADMIN — SODIUM BICARBONATE 3 ML: 84 INJECTION, SOLUTION INTRAVENOUS at 03:24

## 2024-01-01 RX ADMIN — DEXMEDETOMIDINE 0.6 MCG/KG/HR: 100 INJECTION, SOLUTION INTRAVENOUS at 17:04

## 2024-01-01 RX ADMIN — GLYCOPYRROLATE 1 MG: 1 TABLET ORAL at 05:03

## 2024-01-01 RX ADMIN — IPRATROPIUM BROMIDE AND ALBUTEROL SULFATE 3 ML: 2.5; .5 SOLUTION RESPIRATORY (INHALATION) at 15:00

## 2024-01-01 RX ADMIN — FUROSEMIDE 10 MG: 10 INJECTION, SOLUTION INTRAVENOUS at 05:17

## 2024-01-01 RX ADMIN — DOCUSATE SODIUM 50 MG AND SENNOSIDES 8.6 MG 2 TABLET: 8.6; 5 TABLET, FILM COATED ORAL at 18:02

## 2024-01-01 RX ADMIN — FENTANYL CITRATE 100 MCG: 50 INJECTION, SOLUTION INTRAMUSCULAR; INTRAVENOUS at 06:55

## 2024-01-01 RX ADMIN — SODIUM BICARBONATE 50 MEQ: 84 INJECTION, SOLUTION INTRAVENOUS at 15:00

## 2024-01-01 RX ADMIN — AMPICILLIN SODIUM, SULBACTAM SODIUM 3 G: 2; 1 INJECTION, POWDER, FOR SOLUTION INTRAMUSCULAR; INTRAVENOUS at 16:48

## 2024-01-01 RX ADMIN — DOCUSATE SODIUM 50 MG AND SENNOSIDES 8.6 MG 2 TABLET: 8.6; 5 TABLET, FILM COATED ORAL at 17:49

## 2024-01-01 RX ADMIN — DOCUSATE SODIUM 50 MG AND SENNOSIDES 8.6 MG 2 TABLET: 8.6; 5 TABLET, FILM COATED ORAL at 17:48

## 2024-01-01 RX ADMIN — IPRATROPIUM BROMIDE AND ALBUTEROL SULFATE 3 ML: 2.5; .5 SOLUTION RESPIRATORY (INHALATION) at 23:15

## 2024-01-01 RX ADMIN — DEXMEDETOMIDINE 1 MCG/KG/HR: 100 INJECTION, SOLUTION INTRAVENOUS at 07:09

## 2024-01-01 RX ADMIN — IPRATROPIUM BROMIDE AND ALBUTEROL SULFATE 3 ML: 2.5; .5 SOLUTION RESPIRATORY (INHALATION) at 18:25

## 2024-01-01 RX ADMIN — INSULIN HUMAN 1 UNITS: 100 INJECTION, SOLUTION PARENTERAL at 07:13

## 2024-01-01 RX ADMIN — DEXMEDETOMIDINE 0.2 MCG/KG/HR: 100 INJECTION, SOLUTION INTRAVENOUS at 16:05

## 2024-01-01 RX ADMIN — METOCLOPRAMIDE 5 MG: 5 INJECTION, SOLUTION INTRAMUSCULAR; INTRAVENOUS at 18:38

## 2024-01-01 RX ADMIN — MAGNESIUM HYDROXIDE 30 ML: 1200 LIQUID ORAL at 17:49

## 2024-01-01 RX ADMIN — PIPERACILLIN AND TAZOBACTAM 3.38 G: 3; .375 INJECTION, POWDER, FOR SOLUTION INTRAVENOUS at 20:43

## 2024-01-01 RX ADMIN — APIXABAN 5 MG: 5 TABLET, FILM COATED ORAL at 17:49

## 2024-01-01 RX ADMIN — FENTANYL CITRATE 100 MCG: 50 INJECTION, SOLUTION INTRAMUSCULAR; INTRAVENOUS at 01:32

## 2024-01-01 RX ADMIN — NYSTATIN: 100000 POWDER TOPICAL at 18:18

## 2024-01-01 RX ADMIN — MIDODRINE HYDROCHLORIDE 10 MG: 5 TABLET ORAL at 09:26

## 2024-01-01 RX ADMIN — ACETYLCYSTEINE 3 ML: 200 SOLUTION ORAL; RESPIRATORY (INHALATION) at 06:41

## 2024-01-01 RX ADMIN — ACETYLCYSTEINE 3 ML: 200 SOLUTION ORAL; RESPIRATORY (INHALATION) at 18:59

## 2024-01-01 RX ADMIN — POTASSIUM BICARBONATE 25 MEQ: 978 TABLET, EFFERVESCENT ORAL at 13:14

## 2024-01-01 RX ADMIN — FAMOTIDINE 20 MG: 20 TABLET ORAL at 05:24

## 2024-01-01 RX ADMIN — DEXMEDETOMIDINE 1.2 MCG/KG/HR: 100 INJECTION, SOLUTION INTRAVENOUS at 13:32

## 2024-01-01 RX ADMIN — APIXABAN 5 MG: 5 TABLET, FILM COATED ORAL at 05:31

## 2024-01-01 RX ADMIN — DEXMEDETOMIDINE 1.2 MCG/KG/HR: 100 INJECTION, SOLUTION INTRAVENOUS at 19:03

## 2024-01-01 RX ADMIN — DEXMEDETOMIDINE 1.2 MCG/KG/HR: 100 INJECTION, SOLUTION INTRAVENOUS at 00:32

## 2024-01-01 RX ADMIN — LABETALOL HYDROCHLORIDE 20 MG: 5 INJECTION INTRAVENOUS at 14:18

## 2024-01-01 RX ADMIN — AMPICILLIN AND SULBACTAM 3 G: 1; 2 INJECTION, POWDER, FOR SOLUTION INTRAMUSCULAR; INTRAVENOUS at 11:26

## 2024-01-01 RX ADMIN — ALTEPLASE 2 MG: 2.2 INJECTION, POWDER, LYOPHILIZED, FOR SOLUTION INTRAVENOUS at 11:28

## 2024-01-01 RX ADMIN — FENTANYL CITRATE 100 MCG: 50 INJECTION, SOLUTION INTRAMUSCULAR; INTRAVENOUS at 11:53

## 2024-01-01 RX ADMIN — AMPICILLIN SODIUM, SULBACTAM SODIUM 3 G: 2; 1 INJECTION, POWDER, FOR SOLUTION INTRAMUSCULAR; INTRAVENOUS at 10:17

## 2024-01-01 RX ADMIN — POTASSIUM BICARBONATE 25 MEQ: 978 TABLET, EFFERVESCENT ORAL at 07:58

## 2024-01-01 RX ADMIN — Medication 400 MCG: at 15:13

## 2024-01-01 RX ADMIN — NOREPINEPHRINE BITARTRATE 0.1 MCG/KG/MIN: 1 INJECTION, SOLUTION, CONCENTRATE INTRAVENOUS at 15:45

## 2024-01-01 RX ADMIN — FAMOTIDINE 20 MG: 20 TABLET ORAL at 05:07

## 2024-01-01 RX ADMIN — FENTANYL CITRATE 100 MCG: 50 INJECTION, SOLUTION INTRAMUSCULAR; INTRAVENOUS at 04:38

## 2024-01-01 RX ADMIN — ACETYLCYSTEINE 3 ML: 200 SOLUTION ORAL; RESPIRATORY (INHALATION) at 13:48

## 2024-01-01 RX ADMIN — AMLODIPINE BESYLATE 5 MG: 5 TABLET ORAL at 05:34

## 2024-01-01 RX ADMIN — POTASSIUM BICARBONATE 50 MEQ: 978 TABLET, EFFERVESCENT ORAL at 09:48

## 2024-01-01 RX ADMIN — SODIUM CHLORIDE, POTASSIUM CHLORIDE, SODIUM LACTATE AND CALCIUM CHLORIDE 1000 ML: 600; 310; 30; 20 INJECTION, SOLUTION INTRAVENOUS at 21:20

## 2024-01-01 RX ADMIN — INSULIN HUMAN 2 UNITS: 100 INJECTION, SOLUTION PARENTERAL at 17:07

## 2024-01-01 RX ADMIN — FENTANYL CITRATE 100 MCG: 50 INJECTION, SOLUTION INTRAMUSCULAR; INTRAVENOUS at 22:52

## 2024-01-01 RX ADMIN — POLYETHYLENE GLYCOL 3350 1 PACKET: 17 POWDER, FOR SOLUTION ORAL at 05:13

## 2024-01-01 RX ADMIN — INSULIN HUMAN 2 UNITS: 100 INJECTION, SOLUTION PARENTERAL at 12:09

## 2024-01-01 RX ADMIN — FENTANYL CITRATE 100 MCG: 50 INJECTION, SOLUTION INTRAMUSCULAR; INTRAVENOUS at 13:05

## 2024-01-01 RX ADMIN — APIXABAN 5 MG: 5 TABLET, FILM COATED ORAL at 05:41

## 2024-01-01 RX ADMIN — FAMOTIDINE 20 MG: 20 TABLET, FILM COATED ORAL at 05:18

## 2024-01-01 RX ADMIN — SODIUM BICARBONATE 3 ML: 84 INJECTION, SOLUTION INTRAVENOUS at 12:04

## 2024-01-01 RX ADMIN — FENTANYL CITRATE 100 MCG: 50 INJECTION, SOLUTION INTRAMUSCULAR; INTRAVENOUS at 00:06

## 2024-01-01 RX ADMIN — CHLOROTHIAZIDE SODIUM 500 MG: 500 INJECTION, POWDER, LYOPHILIZED, FOR SOLUTION INTRAVENOUS at 08:56

## 2024-01-01 RX ADMIN — AMANTADINE HYDROCHLORIDE 100 MG: 100 TABLET ORAL at 05:28

## 2024-01-01 RX ADMIN — DEXMEDETOMIDINE 0.4 MCG/KG/HR: 100 INJECTION, SOLUTION INTRAVENOUS at 05:38

## 2024-01-01 RX ADMIN — QUETIAPINE FUMARATE 50 MG: 25 TABLET ORAL at 21:12

## 2024-01-01 RX ADMIN — SCOPOLAMINE 1 PATCH: 1.5 PATCH, EXTENDED RELEASE TRANSDERMAL at 18:42

## 2024-01-01 RX ADMIN — APIXABAN 5 MG: 5 TABLET, FILM COATED ORAL at 05:09

## 2024-01-01 RX ADMIN — PIPERACILLIN AND TAZOBACTAM 3.38 G: 3; .375 INJECTION, POWDER, FOR SOLUTION INTRAVENOUS at 20:50

## 2024-01-01 RX ADMIN — FENTANYL CITRATE 100 MCG: 50 INJECTION, SOLUTION INTRAMUSCULAR; INTRAVENOUS at 18:11

## 2024-01-01 RX ADMIN — FENTANYL CITRATE 100 MCG: 50 INJECTION, SOLUTION INTRAMUSCULAR; INTRAVENOUS at 20:52

## 2024-01-01 RX ADMIN — GLYCOPYRROLATE 1 MG: 1 TABLET ORAL at 17:28

## 2024-01-01 RX ADMIN — POTASSIUM PHOSPHATE, MONOBASIC AND POTASSIUM PHOSPHATE, DIBASIC 15 MMOL: 224; 236 INJECTION, SOLUTION, CONCENTRATE INTRAVENOUS at 07:47

## 2024-01-01 RX ADMIN — HYDRALAZINE HYDROCHLORIDE 10 MG: 20 INJECTION, SOLUTION INTRAMUSCULAR; INTRAVENOUS at 01:14

## 2024-01-01 RX ADMIN — ACETYLCYSTEINE 3 ML: 200 SOLUTION ORAL; RESPIRATORY (INHALATION) at 22:57

## 2024-01-01 RX ADMIN — VALPROIC ACID 125 MG: 250 SOLUTION ORAL at 10:13

## 2024-01-01 RX ADMIN — FENTANYL CITRATE 100 MCG: 50 INJECTION, SOLUTION INTRAMUSCULAR; INTRAVENOUS at 16:32

## 2024-01-01 RX ADMIN — LABETALOL HYDROCHLORIDE 20 MG: 5 INJECTION INTRAVENOUS at 12:14

## 2024-01-01 RX ADMIN — MORPHINE SULFATE 5 MG: 10 INJECTION INTRAVENOUS at 22:37

## 2024-01-01 RX ADMIN — FENTANYL CITRATE 100 MCG: 50 INJECTION, SOLUTION INTRAMUSCULAR; INTRAVENOUS at 23:03

## 2024-01-01 RX ADMIN — AMLODIPINE BESYLATE 10 MG: 10 TABLET ORAL at 05:10

## 2024-01-01 RX ADMIN — HYDRALAZINE HYDROCHLORIDE 10 MG: 20 INJECTION, SOLUTION INTRAMUSCULAR; INTRAVENOUS at 12:33

## 2024-01-01 RX ADMIN — DEXMEDETOMIDINE 1 MCG/KG/HR: 100 INJECTION, SOLUTION INTRAVENOUS at 02:41

## 2024-01-01 RX ADMIN — APIXABAN 5 MG: 5 TABLET, FILM COATED ORAL at 17:27

## 2024-01-01 RX ADMIN — HYDRALAZINE HYDROCHLORIDE 10 MG: 20 INJECTION, SOLUTION INTRAMUSCULAR; INTRAVENOUS at 01:16

## 2024-01-01 RX ADMIN — ROCURONIUM BROMIDE 75 MG: 50 INJECTION, SOLUTION INTRAVENOUS at 22:00

## 2024-01-01 RX ADMIN — INSULIN HUMAN 2 UNITS: 100 INJECTION, SOLUTION PARENTERAL at 00:45

## 2024-01-01 RX ADMIN — APIXABAN 5 MG: 5 TABLET, FILM COATED ORAL at 05:10

## 2024-01-01 RX ADMIN — MIDAZOLAM HYDROCHLORIDE 1 MG: 1 INJECTION, SOLUTION INTRAMUSCULAR; INTRAVENOUS at 11:40

## 2024-01-01 RX ADMIN — DOCUSATE SODIUM 50 MG AND SENNOSIDES 8.6 MG 2 TABLET: 8.6; 5 TABLET, FILM COATED ORAL at 18:18

## 2024-01-01 RX ADMIN — IPRATROPIUM BROMIDE AND ALBUTEROL SULFATE 3 ML: 2.5; .5 SOLUTION RESPIRATORY (INHALATION) at 15:04

## 2024-01-01 RX ADMIN — HYDRALAZINE HYDROCHLORIDE 10 MG: 20 INJECTION, SOLUTION INTRAMUSCULAR; INTRAVENOUS at 03:10

## 2024-01-01 RX ADMIN — DOCUSATE SODIUM 50 MG AND SENNOSIDES 8.6 MG 2 TABLET: 8.6; 5 TABLET, FILM COATED ORAL at 17:37

## 2024-01-01 RX ADMIN — DOCUSATE SODIUM 50 MG AND SENNOSIDES 8.6 MG 2 TABLET: 8.6; 5 TABLET, FILM COATED ORAL at 17:32

## 2024-01-01 RX ADMIN — GLYCOPYRROLATE 1 MG: 1 TABLET ORAL at 17:48

## 2024-01-01 RX ADMIN — NYSTATIN: 100000 POWDER TOPICAL at 05:28

## 2024-01-01 RX ADMIN — INSULIN HUMAN 2 UNITS: 100 INJECTION, SOLUTION PARENTERAL at 18:09

## 2024-01-01 RX ADMIN — AMPICILLIN SODIUM, SULBACTAM SODIUM 3 G: 2; 1 INJECTION, POWDER, FOR SOLUTION INTRAMUSCULAR; INTRAVENOUS at 22:06

## 2024-01-01 RX ADMIN — INSULIN HUMAN 1 UNITS: 100 INJECTION, SOLUTION PARENTERAL at 11:31

## 2024-01-01 RX ADMIN — Medication 100 MCG: at 11:34

## 2024-01-01 RX ADMIN — MORPHINE SULFATE 10 MG: 10 INJECTION INTRAVENOUS at 02:58

## 2024-01-01 RX ADMIN — MIDODRINE HYDROCHLORIDE 10 MG: 5 TABLET ORAL at 05:36

## 2024-01-01 RX ADMIN — FENTANYL CITRATE 100 MCG: 50 INJECTION, SOLUTION INTRAMUSCULAR; INTRAVENOUS at 03:55

## 2024-01-01 RX ADMIN — HYDRALAZINE HYDROCHLORIDE 10 MG: 20 INJECTION, SOLUTION INTRAMUSCULAR; INTRAVENOUS at 20:00

## 2024-01-01 RX ADMIN — AMPICILLIN AND SULBACTAM 3 G: 1; 2 INJECTION, POWDER, FOR SOLUTION INTRAMUSCULAR; INTRAVENOUS at 05:17

## 2024-01-01 RX ADMIN — IPRATROPIUM BROMIDE AND ALBUTEROL SULFATE 3 ML: 2.5; .5 SOLUTION RESPIRATORY (INHALATION) at 02:27

## 2024-01-01 RX ADMIN — AMPICILLIN AND SULBACTAM 3 G: 1; 2 INJECTION, POWDER, FOR SOLUTION INTRAMUSCULAR; INTRAVENOUS at 23:08

## 2024-01-01 RX ADMIN — AMPICILLIN SODIUM, SULBACTAM SODIUM 3 G: 2; 1 INJECTION, POWDER, FOR SOLUTION INTRAMUSCULAR; INTRAVENOUS at 23:01

## 2024-01-01 RX ADMIN — HYDRALAZINE HYDROCHLORIDE 10 MG: 20 INJECTION, SOLUTION INTRAMUSCULAR; INTRAVENOUS at 03:21

## 2024-01-01 RX ADMIN — POTASSIUM BICARBONATE 50 MEQ: 978 TABLET, EFFERVESCENT ORAL at 08:50

## 2024-01-01 RX ADMIN — LABETALOL HYDROCHLORIDE 20 MG: 5 INJECTION INTRAVENOUS at 10:05

## 2024-01-01 RX ADMIN — AMPICILLIN AND SULBACTAM 3 G: 1; 2 INJECTION, POWDER, FOR SOLUTION INTRAMUSCULAR; INTRAVENOUS at 17:31

## 2024-01-01 RX ADMIN — DOCUSATE SODIUM 50 MG AND SENNOSIDES 8.6 MG 2 TABLET: 8.6; 5 TABLET, FILM COATED ORAL at 16:00

## 2024-01-01 RX ADMIN — FENTANYL CITRATE 100 MCG: 50 INJECTION, SOLUTION INTRAMUSCULAR; INTRAVENOUS at 03:13

## 2024-01-01 RX ADMIN — AMPICILLIN AND SULBACTAM 3 G: 1; 2 INJECTION, POWDER, FOR SOLUTION INTRAMUSCULAR; INTRAVENOUS at 05:21

## 2024-01-01 RX ADMIN — POTASSIUM CHLORIDE 10 MEQ: 7.46 INJECTION, SOLUTION INTRAVENOUS at 06:02

## 2024-01-01 RX ADMIN — SODIUM CHLORIDE, POTASSIUM CHLORIDE, SODIUM LACTATE AND CALCIUM CHLORIDE 1000 ML: 600; 310; 30; 20 INJECTION, SOLUTION INTRAVENOUS at 15:45

## 2024-01-01 RX ADMIN — DEXMEDETOMIDINE 1 MCG/KG/HR: 100 INJECTION, SOLUTION INTRAVENOUS at 03:03

## 2024-01-01 RX ADMIN — FENTANYL CITRATE 100 MCG: 50 INJECTION, SOLUTION INTRAMUSCULAR; INTRAVENOUS at 11:02

## 2024-01-01 RX ADMIN — GLYCOPYRROLATE 1 MG: 1 TABLET ORAL at 05:18

## 2024-01-01 RX ADMIN — IPRATROPIUM BROMIDE AND ALBUTEROL SULFATE 3 ML: 2.5; .5 SOLUTION RESPIRATORY (INHALATION) at 10:26

## 2024-01-01 RX ADMIN — INSULIN HUMAN 2 UNITS: 100 INJECTION, SOLUTION PARENTERAL at 00:40

## 2024-01-01 RX ADMIN — ENOXAPARIN SODIUM 80 MG: 100 INJECTION SUBCUTANEOUS at 00:21

## 2024-01-01 RX ADMIN — Medication 300 MCG: at 23:26

## 2024-01-01 RX ADMIN — INSULIN HUMAN 2 UNITS: 100 INJECTION, SOLUTION PARENTERAL at 07:05

## 2024-01-01 RX ADMIN — FAMOTIDINE 20 MG: 20 TABLET, FILM COATED ORAL at 05:26

## 2024-01-01 RX ADMIN — INSULIN HUMAN 2 UNITS: 100 INJECTION, SOLUTION PARENTERAL at 00:06

## 2024-01-01 RX ADMIN — ACETAMINOPHEN 650 MG: 325 TABLET, FILM COATED ORAL at 23:50

## 2024-01-01 RX ADMIN — HALOPERIDOL LACTATE 5 MG: 5 INJECTION, SOLUTION INTRAMUSCULAR at 14:07

## 2024-01-01 RX ADMIN — CARVEDILOL 3.12 MG: 3.12 TABLET, FILM COATED ORAL at 07:37

## 2024-01-01 RX ADMIN — IPRATROPIUM BROMIDE AND ALBUTEROL SULFATE 3 ML: 2.5; .5 SOLUTION RESPIRATORY (INHALATION) at 11:15

## 2024-01-01 RX ADMIN — AMPICILLIN SODIUM, SULBACTAM SODIUM 3 G: 2; 1 INJECTION, POWDER, FOR SOLUTION INTRAMUSCULAR; INTRAVENOUS at 04:52

## 2024-01-01 RX ADMIN — HYDRALAZINE HYDROCHLORIDE 10 MG: 20 INJECTION, SOLUTION INTRAMUSCULAR; INTRAVENOUS at 00:11

## 2024-01-01 RX ADMIN — DOCUSATE SODIUM 50 MG AND SENNOSIDES 8.6 MG 2 TABLET: 8.6; 5 TABLET, FILM COATED ORAL at 05:06

## 2024-01-01 RX ADMIN — FUROSEMIDE 10 MG: 10 INJECTION, SOLUTION INTRAVENOUS at 18:01

## 2024-01-01 RX ADMIN — FENTANYL CITRATE 100 MCG: 50 INJECTION, SOLUTION INTRAMUSCULAR; INTRAVENOUS at 06:40

## 2024-01-01 RX ADMIN — NOREPINEPHRINE BITARTRATE 0.05 MCG/KG/MIN: 1 INJECTION INTRAVENOUS at 20:40

## 2024-01-01 RX ADMIN — PIPERACILLIN AND TAZOBACTAM 4.5 G: 4; .5 INJECTION, POWDER, FOR SOLUTION INTRAVENOUS at 21:26

## 2024-01-01 RX ADMIN — INSULIN HUMAN 1 UNITS: 100 INJECTION, SOLUTION PARENTERAL at 18:24

## 2024-01-01 RX ADMIN — AMLODIPINE BESYLATE 5 MG: 5 TABLET ORAL at 05:25

## 2024-01-01 RX ADMIN — APIXABAN 5 MG: 5 TABLET, FILM COATED ORAL at 05:18

## 2024-01-01 RX ADMIN — AMPICILLIN SODIUM, SULBACTAM SODIUM 3 G: 2; 1 INJECTION, POWDER, FOR SOLUTION INTRAMUSCULAR; INTRAVENOUS at 17:13

## 2024-01-01 RX ADMIN — FAMOTIDINE 20 MG: 20 TABLET, FILM COATED ORAL at 05:55

## 2024-01-01 RX ADMIN — INSULIN HUMAN 1 UNITS: 100 INJECTION, SOLUTION PARENTERAL at 17:36

## 2024-01-01 RX ADMIN — SODIUM CHLORIDE 1000 ML: 9 INJECTION, SOLUTION INTRAVENOUS at 15:45

## 2024-01-01 RX ADMIN — IPRATROPIUM BROMIDE AND ALBUTEROL SULFATE 3 ML: 2.5; .5 SOLUTION RESPIRATORY (INHALATION) at 11:10

## 2024-01-01 RX ADMIN — INSULIN HUMAN 2 UNITS: 100 INJECTION, SOLUTION PARENTERAL at 03:04

## 2024-01-01 RX ADMIN — IPRATROPIUM BROMIDE AND ALBUTEROL SULFATE 3 ML: 2.5; .5 SOLUTION RESPIRATORY (INHALATION) at 19:17

## 2024-01-01 RX ADMIN — FENTANYL CITRATE 100 MCG: 50 INJECTION, SOLUTION INTRAMUSCULAR; INTRAVENOUS at 05:39

## 2024-01-01 RX ADMIN — DEXMEDETOMIDINE 1.5 MCG/KG/HR: 100 INJECTION, SOLUTION INTRAVENOUS at 01:50

## 2024-01-01 RX ADMIN — Medication 100 MCG: at 15:05

## 2024-01-01 RX ADMIN — HALOPERIDOL LACTATE 5 MG: 5 INJECTION, SOLUTION INTRAMUSCULAR at 10:05

## 2024-01-01 RX ADMIN — POTASSIUM CHLORIDE 10 MEQ: 7.46 INJECTION, SOLUTION INTRAVENOUS at 13:03

## 2024-01-01 RX ADMIN — SODIUM BICARBONATE 3 ML: 84 INJECTION, SOLUTION INTRAVENOUS at 03:14

## 2024-01-01 RX ADMIN — FAMOTIDINE 20 MG: 20 TABLET, FILM COATED ORAL at 12:33

## 2024-01-01 RX ADMIN — FAMOTIDINE 20 MG: 20 TABLET, FILM COATED ORAL at 05:31

## 2024-01-01 RX ADMIN — INSULIN HUMAN 2 UNITS: 100 INJECTION, SOLUTION PARENTERAL at 11:43

## 2024-01-01 RX ADMIN — IPRATROPIUM BROMIDE AND ALBUTEROL SULFATE 3 ML: 2.5; .5 SOLUTION RESPIRATORY (INHALATION) at 13:56

## 2024-01-01 RX ADMIN — PIPERACILLIN AND TAZOBACTAM 4.5 G: 4; .5 INJECTION, POWDER, FOR SOLUTION INTRAVENOUS at 02:43

## 2024-01-01 RX ADMIN — APIXABAN 5 MG: 5 TABLET, FILM COATED ORAL at 05:19

## 2024-01-01 RX ADMIN — GLYCOPYRROLATE 1 MG: 1 TABLET ORAL at 05:19

## 2024-01-01 RX ADMIN — IPRATROPIUM BROMIDE AND ALBUTEROL SULFATE 3 ML: 2.5; .5 SOLUTION RESPIRATORY (INHALATION) at 19:49

## 2024-01-01 RX ADMIN — FENTANYL CITRATE 100 MCG: 50 INJECTION, SOLUTION INTRAMUSCULAR; INTRAVENOUS at 20:58

## 2024-01-01 RX ADMIN — APIXABAN 5 MG: 5 TABLET, FILM COATED ORAL at 18:52

## 2024-01-01 RX ADMIN — AMPICILLIN SODIUM, SULBACTAM SODIUM 3 G: 2; 1 INJECTION, POWDER, FOR SOLUTION INTRAMUSCULAR; INTRAVENOUS at 09:34

## 2024-01-01 RX ADMIN — HYDRALAZINE HYDROCHLORIDE 10 MG: 20 INJECTION, SOLUTION INTRAMUSCULAR; INTRAVENOUS at 19:42

## 2024-01-01 RX ADMIN — DOCUSATE SODIUM 50 MG AND SENNOSIDES 8.6 MG 2 TABLET: 8.6; 5 TABLET, FILM COATED ORAL at 05:24

## 2024-01-01 RX ADMIN — PIPERACILLIN AND TAZOBACTAM 4.5 G: 4; .5 INJECTION, POWDER, FOR SOLUTION INTRAVENOUS at 23:43

## 2024-01-01 RX ADMIN — ROCURONIUM BROMIDE 100 MG: 50 INJECTION, SOLUTION INTRAVENOUS at 14:52

## 2024-01-01 RX ADMIN — SODIUM BICARBONATE 3 ML: 84 INJECTION, SOLUTION INTRAVENOUS at 19:49

## 2024-01-01 RX ADMIN — ACETYLCYSTEINE 3 ML: 200 SOLUTION ORAL; RESPIRATORY (INHALATION) at 06:35

## 2024-01-01 RX ADMIN — SODIUM CHLORIDE: 4.5 INJECTION, SOLUTION INTRAVENOUS at 00:05

## 2024-01-01 RX ADMIN — DEXMEDETOMIDINE 0.6 MCG/KG/HR: 100 INJECTION, SOLUTION INTRAVENOUS at 23:10

## 2024-01-01 RX ADMIN — POLYETHYLENE GLYCOL 3350 1 PACKET: 17 POWDER, FOR SOLUTION ORAL at 17:15

## 2024-01-01 RX ADMIN — INSULIN HUMAN 1 UNITS: 100 INJECTION, SOLUTION PARENTERAL at 12:21

## 2024-01-01 RX ADMIN — INSULIN HUMAN 2 UNITS: 100 INJECTION, SOLUTION PARENTERAL at 12:31

## 2024-01-01 RX ADMIN — DOCUSATE SODIUM 50 MG AND SENNOSIDES 8.6 MG 2 TABLET: 8.6; 5 TABLET, FILM COATED ORAL at 17:15

## 2024-01-01 RX ADMIN — LABETALOL HYDROCHLORIDE 20 MG: 5 INJECTION INTRAVENOUS at 18:37

## 2024-01-01 RX ADMIN — QUETIAPINE FUMARATE 50 MG: 25 TABLET ORAL at 09:25

## 2024-01-01 RX ADMIN — PIPERACILLIN AND TAZOBACTAM 4.5 G: 4; .5 INJECTION, POWDER, FOR SOLUTION INTRAVENOUS at 05:22

## 2024-01-01 RX ADMIN — AMPICILLIN AND SULBACTAM 3 G: 1; 2 INJECTION, POWDER, FOR SOLUTION INTRAMUSCULAR; INTRAVENOUS at 05:10

## 2024-01-01 RX ADMIN — FENTANYL CITRATE 100 MCG: 50 INJECTION, SOLUTION INTRAMUSCULAR; INTRAVENOUS at 14:11

## 2024-01-01 RX ADMIN — DEXMEDETOMIDINE 1 MCG/KG/HR: 100 INJECTION, SOLUTION INTRAVENOUS at 21:45

## 2024-01-01 RX ADMIN — FAMOTIDINE 20 MG: 20 TABLET, FILM COATED ORAL at 00:01

## 2024-01-01 RX ADMIN — IPRATROPIUM BROMIDE AND ALBUTEROL SULFATE 3 ML: 2.5; .5 SOLUTION RESPIRATORY (INHALATION) at 15:58

## 2024-01-01 RX ADMIN — GLYCOPYRROLATE 1 MG: 1 TABLET ORAL at 12:16

## 2024-01-01 RX ADMIN — APIXABAN 5 MG: 5 TABLET, FILM COATED ORAL at 17:16

## 2024-01-01 RX ADMIN — NYSTATIN: 100000 POWDER TOPICAL at 17:37

## 2024-01-01 RX ADMIN — Medication 100 MCG/HR: at 05:49

## 2024-01-01 RX ADMIN — DEXMEDETOMIDINE 1 MCG/KG/HR: 100 INJECTION, SOLUTION INTRAVENOUS at 22:13

## 2024-01-01 RX ADMIN — DOCUSATE SODIUM 50 MG AND SENNOSIDES 8.6 MG 2 TABLET: 8.6; 5 TABLET, FILM COATED ORAL at 05:03

## 2024-01-01 RX ADMIN — DEXMEDETOMIDINE 0.8 MCG/KG/HR: 100 INJECTION, SOLUTION INTRAVENOUS at 21:48

## 2024-01-01 RX ADMIN — DOCUSATE SODIUM 50 MG AND SENNOSIDES 8.6 MG 2 TABLET: 8.6; 5 TABLET, FILM COATED ORAL at 05:08

## 2024-01-01 RX ADMIN — DOCUSATE SODIUM 50 MG AND SENNOSIDES 8.6 MG 2 TABLET: 8.6; 5 TABLET, FILM COATED ORAL at 18:17

## 2024-01-01 RX ADMIN — SODIUM BICARBONATE 3 ML: 84 INJECTION, SOLUTION INTRAVENOUS at 10:26

## 2024-01-01 RX ADMIN — FENTANYL CITRATE 200 MCG: 50 INJECTION, SOLUTION INTRAMUSCULAR; INTRAVENOUS at 05:16

## 2024-01-01 RX ADMIN — INSULIN HUMAN 2 UNITS: 100 INJECTION, SOLUTION PARENTERAL at 00:12

## 2024-01-01 RX ADMIN — IPRATROPIUM BROMIDE AND ALBUTEROL SULFATE 3 ML: 2.5; .5 SOLUTION RESPIRATORY (INHALATION) at 07:29

## 2024-01-01 RX ADMIN — NYSTATIN: 100000 POWDER TOPICAL at 18:20

## 2024-01-01 RX ADMIN — MIDODRINE HYDROCHLORIDE 10 MG: 5 TABLET ORAL at 05:11

## 2024-01-01 RX ADMIN — CARVEDILOL 3.12 MG: 3.12 TABLET, FILM COATED ORAL at 17:28

## 2024-01-01 RX ADMIN — INSULIN HUMAN 6 UNITS: 100 INJECTION, SOLUTION PARENTERAL at 13:09

## 2024-01-01 RX ADMIN — IPRATROPIUM BROMIDE AND ALBUTEROL SULFATE 3 ML: 2.5; .5 SOLUTION RESPIRATORY (INHALATION) at 19:03

## 2024-01-01 RX ADMIN — MIDODRINE HYDROCHLORIDE 10 MG: 5 TABLET ORAL at 13:23

## 2024-01-01 RX ADMIN — ACETYLCYSTEINE 3 ML: 200 SOLUTION ORAL; RESPIRATORY (INHALATION) at 22:46

## 2024-01-01 RX ADMIN — POTASSIUM BICARBONATE 50 MEQ: 978 TABLET, EFFERVESCENT ORAL at 08:20

## 2024-01-01 RX ADMIN — AMPICILLIN SODIUM, SULBACTAM SODIUM 3 G: 2; 1 INJECTION, POWDER, FOR SOLUTION INTRAMUSCULAR; INTRAVENOUS at 23:12

## 2024-01-01 RX ADMIN — ACETYLCYSTEINE 3 ML: 200 SOLUTION ORAL; RESPIRATORY (INHALATION) at 22:49

## 2024-01-01 RX ADMIN — METOPROLOL TARTRATE 5 MG: 5 INJECTION INTRAVENOUS at 13:06

## 2024-01-01 RX ADMIN — AMPICILLIN SODIUM, SULBACTAM SODIUM 3 G: 2; 1 INJECTION, POWDER, FOR SOLUTION INTRAMUSCULAR; INTRAVENOUS at 11:12

## 2024-01-01 RX ADMIN — DOCUSATE SODIUM 50 MG AND SENNOSIDES 8.6 MG 2 TABLET: 8.6; 5 TABLET, FILM COATED ORAL at 17:03

## 2024-01-01 RX ADMIN — ENOXAPARIN SODIUM 80 MG: 100 INJECTION SUBCUTANEOUS at 01:28

## 2024-01-01 RX ADMIN — POTASSIUM BICARBONATE 50 MEQ: 978 TABLET, EFFERVESCENT ORAL at 10:31

## 2024-01-01 RX ADMIN — FAMOTIDINE 20 MG: 20 TABLET, FILM COATED ORAL at 17:27

## 2024-01-01 RX ADMIN — AMPICILLIN AND SULBACTAM 3 G: 1; 2 INJECTION, POWDER, FOR SOLUTION INTRAMUSCULAR; INTRAVENOUS at 12:15

## 2024-01-01 RX ADMIN — ENOXAPARIN SODIUM 80 MG: 100 INJECTION SUBCUTANEOUS at 12:12

## 2024-01-01 RX ADMIN — CHLOROTHIAZIDE SODIUM 500 MG: 500 INJECTION, POWDER, LYOPHILIZED, FOR SOLUTION INTRAVENOUS at 08:49

## 2024-01-01 RX ADMIN — FUROSEMIDE 10 MG: 10 INJECTION, SOLUTION INTRAVENOUS at 14:58

## 2024-01-01 RX ADMIN — AMLODIPINE BESYLATE 5 MG: 5 TABLET ORAL at 05:30

## 2024-01-01 RX ADMIN — DEXMEDETOMIDINE 1.2 MCG/KG/HR: 100 INJECTION, SOLUTION INTRAVENOUS at 02:43

## 2024-01-01 RX ADMIN — DEXMEDETOMIDINE 0.9 MCG/KG/HR: 100 INJECTION, SOLUTION INTRAVENOUS at 16:32

## 2024-01-01 RX ADMIN — METOCLOPRAMIDE 10 MG: 5 INJECTION, SOLUTION INTRAMUSCULAR; INTRAVENOUS at 21:55

## 2024-01-01 RX ADMIN — LABETALOL HYDROCHLORIDE 20 MG: 5 INJECTION, SOLUTION INTRAVENOUS at 12:21

## 2024-01-01 RX ADMIN — GLYCOPYRROLATE 1 MG: 1 TABLET ORAL at 11:23

## 2024-01-01 RX ADMIN — SODIUM CHLORIDE: 4.5 INJECTION, SOLUTION INTRAVENOUS at 12:18

## 2024-01-01 RX ADMIN — Medication 100 MCG/HR: at 03:56

## 2024-01-01 RX ADMIN — DEXTROSE MONOHYDRATE 75 ML: 50 INJECTION, SOLUTION INTRAVENOUS at 21:59

## 2024-01-01 RX ADMIN — FAMOTIDINE 20 MG: 10 INJECTION, SOLUTION INTRAVENOUS at 16:00

## 2024-01-01 RX ADMIN — INSULIN HUMAN 2 UNITS: 100 INJECTION, SOLUTION PARENTERAL at 18:23

## 2024-01-01 RX ADMIN — FENTANYL CITRATE 100 MCG: 50 INJECTION, SOLUTION INTRAMUSCULAR; INTRAVENOUS at 16:21

## 2024-01-01 RX ADMIN — APIXABAN 5 MG: 5 TABLET, FILM COATED ORAL at 17:48

## 2024-01-01 RX ADMIN — IPRATROPIUM BROMIDE AND ALBUTEROL SULFATE 3 ML: 2.5; .5 SOLUTION RESPIRATORY (INHALATION) at 06:35

## 2024-01-01 RX ADMIN — APIXABAN 5 MG: 5 TABLET, FILM COATED ORAL at 18:00

## 2024-01-01 RX ADMIN — ACETYLCYSTEINE 3 ML: 200 SOLUTION ORAL; RESPIRATORY (INHALATION) at 03:03

## 2024-01-01 RX ADMIN — LABETALOL HYDROCHLORIDE 20 MG: 5 INJECTION INTRAVENOUS at 20:21

## 2024-01-01 RX ADMIN — IPRATROPIUM BROMIDE AND ALBUTEROL SULFATE 3 ML: 2.5; .5 SOLUTION RESPIRATORY (INHALATION) at 07:05

## 2024-01-01 RX ADMIN — AMLODIPINE BESYLATE 10 MG: 10 TABLET ORAL at 05:13

## 2024-01-01 RX ADMIN — APIXABAN 5 MG: 5 TABLET, FILM COATED ORAL at 05:03

## 2024-01-01 RX ADMIN — AMPICILLIN AND SULBACTAM 3 G: 1; 2 INJECTION, POWDER, FOR SOLUTION INTRAMUSCULAR; INTRAVENOUS at 12:26

## 2024-01-01 RX ADMIN — SODIUM CHLORIDE, POTASSIUM CHLORIDE, SODIUM LACTATE AND CALCIUM CHLORIDE 1000 ML: 600; 310; 30; 20 INJECTION, SOLUTION INTRAVENOUS at 13:59

## 2024-01-01 RX ADMIN — FENTANYL CITRATE 100 MCG: 50 INJECTION, SOLUTION INTRAMUSCULAR; INTRAVENOUS at 22:00

## 2024-01-01 RX ADMIN — DOCUSATE SODIUM 50 MG AND SENNOSIDES 8.6 MG 2 TABLET: 8.6; 5 TABLET, FILM COATED ORAL at 17:24

## 2024-01-01 RX ADMIN — INSULIN HUMAN 2 UNITS: 100 INJECTION, SOLUTION PARENTERAL at 17:31

## 2024-01-01 RX ADMIN — INSULIN HUMAN 2 UNITS: 100 INJECTION, SOLUTION PARENTERAL at 05:21

## 2024-01-01 RX ADMIN — APIXABAN 5 MG: 5 TABLET, FILM COATED ORAL at 17:15

## 2024-01-01 RX ADMIN — DEXMEDETOMIDINE 0.7 MCG/KG/HR: 100 INJECTION, SOLUTION INTRAVENOUS at 05:36

## 2024-01-01 RX ADMIN — DEXTROSE AND SODIUM CHLORIDE: 5; 450 INJECTION, SOLUTION INTRAVENOUS at 05:21

## 2024-01-01 RX ADMIN — IPRATROPIUM BROMIDE AND ALBUTEROL SULFATE 3 ML: 2.5; .5 SOLUTION RESPIRATORY (INHALATION) at 18:31

## 2024-01-01 RX ADMIN — AMLODIPINE BESYLATE 10 MG: 10 TABLET ORAL at 05:29

## 2024-01-01 RX ADMIN — DOCUSATE SODIUM 100 MG: 100 CAPSULE, LIQUID FILLED ORAL at 18:21

## 2024-01-01 RX ADMIN — FAMOTIDINE 20 MG: 20 TABLET, FILM COATED ORAL at 05:17

## 2024-01-01 RX ADMIN — MIDAZOLAM HYDROCHLORIDE 2 MG: 1 INJECTION, SOLUTION INTRAMUSCULAR; INTRAVENOUS at 21:20

## 2024-01-01 RX ADMIN — INSULIN HUMAN 1 UNITS: 100 INJECTION, SOLUTION PARENTERAL at 17:45

## 2024-01-01 RX ADMIN — INSULIN HUMAN 2 UNITS: 100 INJECTION, SOLUTION PARENTERAL at 11:56

## 2024-01-01 RX ADMIN — NOREPINEPHRINE BITARTRATE 0.12 MCG/KG/MIN: 1 INJECTION INTRAVENOUS at 06:39

## 2024-01-01 RX ADMIN — DOCUSATE SODIUM 50 MG AND SENNOSIDES 8.6 MG 2 TABLET: 8.6; 5 TABLET, FILM COATED ORAL at 18:52

## 2024-01-01 RX ADMIN — HYDRALAZINE HYDROCHLORIDE 10 MG: 20 INJECTION, SOLUTION INTRAMUSCULAR; INTRAVENOUS at 21:35

## 2024-01-01 RX ADMIN — INSULIN HUMAN 5 UNITS: 100 INJECTION, SOLUTION PARENTERAL at 02:47

## 2024-01-01 RX ADMIN — ACETYLCYSTEINE 3 ML: 200 SOLUTION ORAL; RESPIRATORY (INHALATION) at 13:57

## 2024-01-01 RX ADMIN — FENTANYL CITRATE 100 MCG: 50 INJECTION, SOLUTION INTRAMUSCULAR; INTRAVENOUS at 02:19

## 2024-01-01 RX ADMIN — APIXABAN 5 MG: 5 TABLET, FILM COATED ORAL at 18:17

## 2024-01-01 RX ADMIN — DOCUSATE SODIUM 50 MG AND SENNOSIDES 8.6 MG 2 TABLET: 8.6; 5 TABLET, FILM COATED ORAL at 05:20

## 2024-01-01 RX ADMIN — SODIUM BICARBONATE 3 ML: 84 INJECTION, SOLUTION INTRAVENOUS at 22:47

## 2024-01-01 RX ADMIN — SODIUM CHLORIDE, POTASSIUM CHLORIDE, SODIUM LACTATE AND CALCIUM CHLORIDE: 600; 310; 30; 20 INJECTION, SOLUTION INTRAVENOUS at 17:15

## 2024-01-01 RX ADMIN — AMANTADINE HYDROCHLORIDE 100 MG: 100 TABLET ORAL at 04:51

## 2024-01-01 RX ADMIN — QUETIAPINE FUMARATE 50 MG: 25 TABLET ORAL at 21:25

## 2024-01-01 RX ADMIN — DOCUSATE SODIUM 50 MG AND SENNOSIDES 8.6 MG 2 TABLET: 8.6; 5 TABLET, FILM COATED ORAL at 17:06

## 2024-01-01 RX ADMIN — ENOXAPARIN SODIUM 80 MG: 100 INJECTION SUBCUTANEOUS at 00:52

## 2024-01-01 RX ADMIN — DEXMEDETOMIDINE 0.4 MCG/KG/HR: 100 INJECTION, SOLUTION INTRAVENOUS at 20:26

## 2024-01-01 RX ADMIN — MAGNESIUM HYDROXIDE 30 ML: 1200 LIQUID ORAL at 05:11

## 2024-01-01 RX ADMIN — DOCUSATE SODIUM 50 MG AND SENNOSIDES 8.6 MG 2 TABLET: 8.6; 5 TABLET, FILM COATED ORAL at 05:10

## 2024-01-01 RX ADMIN — HYDRALAZINE HYDROCHLORIDE 10 MG: 20 INJECTION, SOLUTION INTRAMUSCULAR; INTRAVENOUS at 13:06

## 2024-01-01 RX ADMIN — FENTANYL CITRATE 100 MCG: 50 INJECTION, SOLUTION INTRAMUSCULAR; INTRAVENOUS at 17:58

## 2024-01-01 RX ADMIN — SODIUM CHLORIDE: 4.5 INJECTION, SOLUTION INTRAVENOUS at 20:39

## 2024-01-01 RX ADMIN — GLYCOPYRROLATE 1 MG: 1 TABLET ORAL at 05:34

## 2024-01-01 RX ADMIN — POTASSIUM BICARBONATE 25 MEQ: 978 TABLET, EFFERVESCENT ORAL at 10:09

## 2024-01-01 RX ADMIN — IPRATROPIUM BROMIDE AND ALBUTEROL SULFATE 3 ML: 2.5; .5 SOLUTION RESPIRATORY (INHALATION) at 10:10

## 2024-01-01 RX ADMIN — DEXMEDETOMIDINE 1.5 MCG/KG/HR: 100 INJECTION, SOLUTION INTRAVENOUS at 21:34

## 2024-01-01 RX ADMIN — METOCLOPRAMIDE 5 MG: 5 INJECTION, SOLUTION INTRAMUSCULAR; INTRAVENOUS at 00:22

## 2024-01-01 RX ADMIN — DEXMEDETOMIDINE 1.4 MCG/KG/HR: 100 INJECTION, SOLUTION INTRAVENOUS at 09:25

## 2024-01-01 RX ADMIN — AMANTADINE HYDROCHLORIDE 100 MG: 100 TABLET ORAL at 08:43

## 2024-01-01 RX ADMIN — LABETALOL HYDROCHLORIDE 20 MG: 5 INJECTION INTRAVENOUS at 15:33

## 2024-01-01 RX ADMIN — DOCUSATE SODIUM 50 MG AND SENNOSIDES 8.6 MG 2 TABLET: 8.6; 5 TABLET, FILM COATED ORAL at 05:29

## 2024-01-01 RX ADMIN — DEXMEDETOMIDINE 1.2 MCG/KG/HR: 100 INJECTION, SOLUTION INTRAVENOUS at 19:59

## 2024-01-01 RX ADMIN — INSULIN HUMAN 1 UNITS: 100 INJECTION, SOLUTION PARENTERAL at 23:59

## 2024-01-01 RX ADMIN — INSULIN HUMAN 2 UNITS: 100 INJECTION, SOLUTION PARENTERAL at 12:58

## 2024-01-01 RX ADMIN — DEXTROSE MONOHYDRATE: 50 INJECTION, SOLUTION INTRAVENOUS at 10:56

## 2024-01-01 RX ADMIN — POTASSIUM CHLORIDE 10 MEQ: 7.46 INJECTION, SOLUTION INTRAVENOUS at 05:03

## 2024-01-01 RX ADMIN — HYDRALAZINE HYDROCHLORIDE 10 MG: 20 INJECTION, SOLUTION INTRAMUSCULAR; INTRAVENOUS at 04:10

## 2024-01-01 RX ADMIN — Medication 200 MCG/HR: at 13:00

## 2024-01-01 RX ADMIN — FENTANYL CITRATE 100 MCG: 50 INJECTION, SOLUTION INTRAMUSCULAR; INTRAVENOUS at 02:33

## 2024-01-01 RX ADMIN — POLYETHYLENE GLYCOL 3350 1 PACKET: 17 POWDER, FOR SOLUTION ORAL at 17:24

## 2024-01-01 RX ADMIN — INSULIN HUMAN 2 UNITS: 100 INJECTION, SOLUTION PARENTERAL at 23:20

## 2024-01-01 RX ADMIN — HYDRALAZINE HYDROCHLORIDE 10 MG: 20 INJECTION, SOLUTION INTRAMUSCULAR; INTRAVENOUS at 23:06

## 2024-01-01 RX ADMIN — SODIUM BICARBONATE 3 ML: 84 INJECTION, SOLUTION INTRAVENOUS at 14:07

## 2024-01-01 RX ADMIN — APIXABAN 5 MG: 5 TABLET, FILM COATED ORAL at 05:29

## 2024-01-01 RX ADMIN — INSULIN HUMAN 2 UNITS: 100 INJECTION, SOLUTION PARENTERAL at 06:18

## 2024-01-01 RX ADMIN — FUROSEMIDE 10 MG: 10 INJECTION, SOLUTION INTRAVENOUS at 18:25

## 2024-01-01 RX ADMIN — MIDODRINE HYDROCHLORIDE 10 MG: 5 TABLET ORAL at 22:21

## 2024-01-01 RX ADMIN — INSULIN HUMAN 3 UNITS: 100 INJECTION, SOLUTION PARENTERAL at 23:56

## 2024-01-01 RX ADMIN — FUROSEMIDE 10 MG: 10 INJECTION, SOLUTION INTRAVENOUS at 16:06

## 2024-01-01 RX ADMIN — AMLODIPINE BESYLATE 10 MG: 10 TABLET ORAL at 05:27

## 2024-01-01 RX ADMIN — HYDRALAZINE HYDROCHLORIDE 10 MG: 20 INJECTION, SOLUTION INTRAMUSCULAR; INTRAVENOUS at 08:19

## 2024-01-01 RX ADMIN — INSULIN HUMAN 1 UNITS: 100 INJECTION, SOLUTION PARENTERAL at 18:16

## 2024-01-01 RX ADMIN — FENTANYL CITRATE 100 MCG: 50 INJECTION, SOLUTION INTRAMUSCULAR; INTRAVENOUS at 23:33

## 2024-01-01 RX ADMIN — APIXABAN 5 MG: 5 TABLET, FILM COATED ORAL at 05:55

## 2024-01-01 RX ADMIN — FAMOTIDINE 20 MG: 20 TABLET, FILM COATED ORAL at 05:35

## 2024-01-01 RX ADMIN — AMLODIPINE BESYLATE 10 MG: 10 TABLET ORAL at 05:08

## 2024-01-01 RX ADMIN — ACETYLCYSTEINE 3 ML: 200 SOLUTION ORAL; RESPIRATORY (INHALATION) at 02:27

## 2024-01-01 RX ADMIN — APIXABAN 5 MG: 5 TABLET, FILM COATED ORAL at 05:30

## 2024-01-01 RX ADMIN — SODIUM CHLORIDE, POTASSIUM CHLORIDE, SODIUM LACTATE AND CALCIUM CHLORIDE: 600; 310; 30; 20 INJECTION, SOLUTION INTRAVENOUS at 04:25

## 2024-01-01 RX ADMIN — INSULIN HUMAN 2 UNITS: 100 INJECTION, SOLUTION PARENTERAL at 05:55

## 2024-01-01 RX ADMIN — POTASSIUM BICARBONATE 25 MEQ: 978 TABLET, EFFERVESCENT ORAL at 09:39

## 2024-01-01 RX ADMIN — SODIUM BICARBONATE 3 ML: 84 INJECTION, SOLUTION INTRAVENOUS at 15:06

## 2024-01-01 RX ADMIN — DEXMEDETOMIDINE 0.6 MCG/KG/HR: 100 INJECTION, SOLUTION INTRAVENOUS at 02:33

## 2024-01-01 RX ADMIN — LINEZOLID 600 MG: 600 INJECTION, SOLUTION INTRAVENOUS at 05:42

## 2024-01-01 RX ADMIN — POTASSIUM BICARBONATE 50 MEQ: 978 TABLET, EFFERVESCENT ORAL at 12:12

## 2024-01-01 RX ADMIN — SODIUM BICARBONATE 3 ML: 84 INJECTION, SOLUTION INTRAVENOUS at 02:38

## 2024-01-01 RX ADMIN — AMPICILLIN SODIUM, SULBACTAM SODIUM 3 G: 2; 1 INJECTION, POWDER, FOR SOLUTION INTRAMUSCULAR; INTRAVENOUS at 09:11

## 2024-01-01 RX ADMIN — NYSTATIN: 100000 POWDER TOPICAL at 17:21

## 2024-01-01 RX ADMIN — ACETYLCYSTEINE 3 ML: 200 SOLUTION ORAL; RESPIRATORY (INHALATION) at 10:26

## 2024-01-01 RX ADMIN — INSULIN HUMAN 2 UNITS: 100 INJECTION, SOLUTION PARENTERAL at 12:07

## 2024-01-01 RX ADMIN — AMLODIPINE BESYLATE 5 MG: 5 TABLET ORAL at 05:41

## 2024-01-01 RX ADMIN — HYDRALAZINE HYDROCHLORIDE 10 MG: 20 INJECTION, SOLUTION INTRAMUSCULAR; INTRAVENOUS at 11:30

## 2024-01-01 RX ADMIN — MIDAZOLAM HYDROCHLORIDE 2 MG: 1 INJECTION, SOLUTION INTRAMUSCULAR; INTRAVENOUS at 22:21

## 2024-01-01 RX ADMIN — PIPERACILLIN AND TAZOBACTAM 3.38 G: 3; .375 INJECTION, POWDER, FOR SOLUTION INTRAVENOUS at 12:30

## 2024-01-01 RX ADMIN — DEXMEDETOMIDINE 0.2 MCG/KG/HR: 100 INJECTION, SOLUTION INTRAVENOUS at 12:50

## 2024-01-01 RX ADMIN — APIXABAN 5 MG: 5 TABLET, FILM COATED ORAL at 05:17

## 2024-01-01 RX ADMIN — FAMOTIDINE 20 MG: 20 TABLET, FILM COATED ORAL at 05:40

## 2024-01-01 RX ADMIN — AMANTADINE HYDROCHLORIDE 100 MG: 100 TABLET ORAL at 06:45

## 2024-01-01 RX ADMIN — AMPICILLIN SODIUM, SULBACTAM SODIUM 3 G: 2; 1 INJECTION, POWDER, FOR SOLUTION INTRAMUSCULAR; INTRAVENOUS at 16:41

## 2024-01-01 RX ADMIN — INSULIN HUMAN 2 UNITS: 100 INJECTION, SOLUTION PARENTERAL at 05:19

## 2024-01-01 RX ADMIN — AMLODIPINE BESYLATE 5 MG: 5 TABLET ORAL at 10:09

## 2024-01-01 RX ADMIN — LINEZOLID 600 MG: 600 INJECTION, SOLUTION INTRAVENOUS at 17:26

## 2024-01-01 RX ADMIN — POLYETHYLENE GLYCOL 3350 1 PACKET: 17 POWDER, FOR SOLUTION ORAL at 04:40

## 2024-01-01 RX ADMIN — MIDODRINE HYDROCHLORIDE 10 MG: 5 TABLET ORAL at 14:17

## 2024-01-01 RX ADMIN — INSULIN HUMAN 2 UNITS: 100 INJECTION, SOLUTION PARENTERAL at 18:03

## 2024-01-01 RX ADMIN — VALPROIC ACID 125 MG: 250 SOLUTION ORAL at 21:57

## 2024-01-01 RX ADMIN — INSULIN HUMAN 2 UNITS: 100 INJECTION, SOLUTION PARENTERAL at 05:59

## 2024-01-01 RX ADMIN — DOCUSATE SODIUM 100 MG: 100 CAPSULE, LIQUID FILLED ORAL at 16:58

## 2024-01-01 RX ADMIN — POTASSIUM CHLORIDE 10 MEQ: 7.46 INJECTION, SOLUTION INTRAVENOUS at 11:59

## 2024-01-01 RX ADMIN — HYDRALAZINE HYDROCHLORIDE 10 MG: 20 INJECTION, SOLUTION INTRAMUSCULAR; INTRAVENOUS at 01:06

## 2024-01-01 RX ADMIN — DEXTROSE MONOHYDRATE: 50 INJECTION, SOLUTION INTRAVENOUS at 09:51

## 2024-01-01 RX ADMIN — NYSTATIN: 100000 POWDER TOPICAL at 17:33

## 2024-01-01 RX ADMIN — FENTANYL CITRATE 100 MCG: 50 INJECTION, SOLUTION INTRAMUSCULAR; INTRAVENOUS at 08:21

## 2024-01-01 RX ADMIN — POTASSIUM BICARBONATE 25 MEQ: 978 TABLET, EFFERVESCENT ORAL at 08:43

## 2024-01-01 RX ADMIN — INSULIN HUMAN 2 UNITS: 100 INJECTION, SOLUTION PARENTERAL at 00:31

## 2024-01-01 RX ADMIN — POLYETHYLENE GLYCOL 3350 1 PACKET: 17 POWDER, FOR SOLUTION ORAL at 17:28

## 2024-01-01 RX ADMIN — DEXMEDETOMIDINE 1.1 MCG/KG/HR: 100 INJECTION, SOLUTION INTRAVENOUS at 09:53

## 2024-01-01 RX ADMIN — DOCUSATE SODIUM 50 MG AND SENNOSIDES 8.6 MG 2 TABLET: 8.6; 5 TABLET, FILM COATED ORAL at 17:27

## 2024-01-01 RX ADMIN — NYSTATIN: 100000 POWDER TOPICAL at 17:25

## 2024-01-01 RX ADMIN — GLYCOPYRROLATE 1 MG: 1 TABLET ORAL at 05:29

## 2024-01-01 RX ADMIN — DEXTROSE MONOHYDRATE: 50 INJECTION, SOLUTION INTRAVENOUS at 09:48

## 2024-01-01 RX ADMIN — INSULIN HUMAN 2 UNITS: 100 INJECTION, SOLUTION PARENTERAL at 18:21

## 2024-01-01 RX ADMIN — AMPICILLIN SODIUM, SULBACTAM SODIUM 3 G: 2; 1 INJECTION, POWDER, FOR SOLUTION INTRAMUSCULAR; INTRAVENOUS at 09:57

## 2024-01-01 RX ADMIN — PIPERACILLIN AND TAZOBACTAM 4.5 G: 4; .5 INJECTION, POWDER, FOR SOLUTION INTRAVENOUS at 20:45

## 2024-01-01 RX ADMIN — DEXMEDETOMIDINE 1.5 MCG/KG/HR: 100 INJECTION, SOLUTION INTRAVENOUS at 17:55

## 2024-01-01 RX ADMIN — FAMOTIDINE 20 MG: 20 TABLET, FILM COATED ORAL at 06:45

## 2024-01-01 RX ADMIN — FENTANYL CITRATE 100 MCG: 50 INJECTION, SOLUTION INTRAMUSCULAR; INTRAVENOUS at 18:24

## 2024-01-01 RX ADMIN — DEXMEDETOMIDINE 0.7 MCG/KG/HR: 100 INJECTION, SOLUTION INTRAVENOUS at 18:21

## 2024-01-01 RX ADMIN — FAMOTIDINE 20 MG: 20 TABLET, FILM COATED ORAL at 05:11

## 2024-01-01 RX ADMIN — DEXMEDETOMIDINE 1.4 MCG/KG/HR: 100 INJECTION, SOLUTION INTRAVENOUS at 15:47

## 2024-01-01 RX ADMIN — FENTANYL CITRATE 100 MCG: 50 INJECTION, SOLUTION INTRAMUSCULAR; INTRAVENOUS at 05:36

## 2024-01-01 RX ADMIN — GLYCOPYRROLATE 1 MG: 1 TABLET ORAL at 17:32

## 2024-01-01 RX ADMIN — GLYCOPYRROLATE 1 MG: 1 TABLET ORAL at 17:17

## 2024-01-01 RX ADMIN — HALOPERIDOL LACTATE 1 MG: 5 INJECTION, SOLUTION INTRAMUSCULAR at 21:34

## 2024-01-01 RX ADMIN — DEXMEDETOMIDINE 0.3 MCG/KG/HR: 100 INJECTION, SOLUTION INTRAVENOUS at 06:01

## 2024-01-01 RX ADMIN — POTASSIUM BICARBONATE 25 MEQ: 978 TABLET, EFFERVESCENT ORAL at 08:45

## 2024-01-01 RX ADMIN — ENOXAPARIN SODIUM 80 MG: 100 INJECTION SUBCUTANEOUS at 12:38

## 2024-01-01 RX ADMIN — HYDRALAZINE HYDROCHLORIDE 10 MG: 20 INJECTION, SOLUTION INTRAMUSCULAR; INTRAVENOUS at 06:10

## 2024-01-01 RX ADMIN — ATORVASTATIN CALCIUM 40 MG: 40 TABLET, FILM COATED ORAL at 16:59

## 2024-01-01 RX ADMIN — AMPICILLIN AND SULBACTAM 3 G: 1; 2 INJECTION, POWDER, FOR SOLUTION INTRAMUSCULAR; INTRAVENOUS at 12:25

## 2024-01-01 RX ADMIN — FENTANYL CITRATE 200 MCG: 50 INJECTION, SOLUTION INTRAMUSCULAR; INTRAVENOUS at 00:10

## 2024-01-01 RX ADMIN — SODIUM BICARBONATE 3 ML: 84 INJECTION, SOLUTION INTRAVENOUS at 07:10

## 2024-01-01 RX ADMIN — APIXABAN 5 MG: 5 TABLET, FILM COATED ORAL at 17:24

## 2024-01-01 RX ADMIN — FENTANYL CITRATE 100 MCG: 50 INJECTION, SOLUTION INTRAMUSCULAR; INTRAVENOUS at 05:37

## 2024-01-01 RX ADMIN — MIDAZOLAM HYDROCHLORIDE 2 MG: 1 INJECTION, SOLUTION INTRAMUSCULAR; INTRAVENOUS at 15:40

## 2024-01-01 RX ADMIN — INSULIN HUMAN 1 UNITS: 100 INJECTION, SOLUTION PARENTERAL at 02:25

## 2024-01-01 RX ADMIN — ACETAMINOPHEN 650 MG: 325 TABLET, FILM COATED ORAL at 16:59

## 2024-01-01 RX ADMIN — DEXTROSE MONOHYDRATE: 50 INJECTION, SOLUTION INTRAVENOUS at 12:57

## 2024-01-01 RX ADMIN — DEXMEDETOMIDINE 0.7 MCG/KG/HR: 100 INJECTION, SOLUTION INTRAVENOUS at 02:38

## 2024-01-01 RX ADMIN — METOCLOPRAMIDE 5 MG: 5 INJECTION, SOLUTION INTRAMUSCULAR; INTRAVENOUS at 06:01

## 2024-01-01 RX ADMIN — FUROSEMIDE 10 MG: 10 INJECTION, SOLUTION INTRAVENOUS at 07:47

## 2024-01-01 RX ADMIN — FAMOTIDINE 20 MG: 20 TABLET, FILM COATED ORAL at 17:48

## 2024-01-01 RX ADMIN — ENOXAPARIN SODIUM 80 MG: 100 INJECTION SUBCUTANEOUS at 00:31

## 2024-01-01 RX ADMIN — IPRATROPIUM BROMIDE AND ALBUTEROL SULFATE 3 ML: 2.5; .5 SOLUTION RESPIRATORY (INHALATION) at 14:07

## 2024-01-01 RX ADMIN — DEXMEDETOMIDINE 1.2 MCG/KG/HR: 100 INJECTION, SOLUTION INTRAVENOUS at 09:11

## 2024-01-01 RX ADMIN — ENOXAPARIN SODIUM 80 MG: 100 INJECTION SUBCUTANEOUS at 12:31

## 2024-01-01 RX ADMIN — Medication 200 MCG/HR: at 03:32

## 2024-01-01 RX ADMIN — DOCUSATE SODIUM 50 MG AND SENNOSIDES 8.6 MG 2 TABLET: 8.6; 5 TABLET, FILM COATED ORAL at 05:25

## 2024-01-01 RX ADMIN — INSULIN HUMAN 1 UNITS: 100 INJECTION, SOLUTION PARENTERAL at 12:17

## 2024-01-01 RX ADMIN — SODIUM CHLORIDE: 4.5 INJECTION, SOLUTION INTRAVENOUS at 01:32

## 2024-01-01 RX ADMIN — DEXMEDETOMIDINE 0.6 MCG/KG/HR: 100 INJECTION, SOLUTION INTRAVENOUS at 13:30

## 2024-01-01 RX ADMIN — SODIUM BICARBONATE 3 ML: 84 INJECTION, SOLUTION INTRAVENOUS at 07:12

## 2024-01-01 RX ADMIN — DOCUSATE SODIUM 50 MG AND SENNOSIDES 8.6 MG 2 TABLET: 8.6; 5 TABLET, FILM COATED ORAL at 05:55

## 2024-01-01 RX ADMIN — FENTANYL CITRATE 50 MCG: 50 INJECTION, SOLUTION INTRAMUSCULAR; INTRAVENOUS at 15:08

## 2024-01-01 RX ADMIN — DEXMEDETOMIDINE 1.2 MCG/KG/HR: 100 INJECTION, SOLUTION INTRAVENOUS at 04:34

## 2024-01-01 RX ADMIN — AMANTADINE HYDROCHLORIDE 100 MG: 100 TABLET ORAL at 06:02

## 2024-01-01 RX ADMIN — INSULIN HUMAN 2 UNITS: 100 INJECTION, SOLUTION PARENTERAL at 18:06

## 2024-01-01 RX ADMIN — METOCLOPRAMIDE 5 MG: 5 INJECTION, SOLUTION INTRAMUSCULAR; INTRAVENOUS at 17:12

## 2024-01-01 RX ADMIN — INSULIN HUMAN 2 UNITS: 100 INJECTION, SOLUTION PARENTERAL at 17:42

## 2024-01-01 RX ADMIN — APIXABAN 5 MG: 5 TABLET, FILM COATED ORAL at 05:26

## 2024-01-01 RX ADMIN — Medication 200 MCG: at 15:10

## 2024-01-01 RX ADMIN — FENTANYL CITRATE 50 MCG: 50 INJECTION, SOLUTION INTRAMUSCULAR; INTRAVENOUS at 13:42

## 2024-01-01 RX ADMIN — IPRATROPIUM BROMIDE AND ALBUTEROL SULFATE 3 ML: 2.5; .5 SOLUTION RESPIRATORY (INHALATION) at 07:12

## 2024-01-01 RX ADMIN — INSULIN HUMAN 2 UNITS: 100 INJECTION, SOLUTION PARENTERAL at 12:40

## 2024-01-01 RX ADMIN — FENTANYL CITRATE 200 MCG: 50 INJECTION, SOLUTION INTRAMUSCULAR; INTRAVENOUS at 00:35

## 2024-01-01 RX ADMIN — INSULIN HUMAN 2 UNITS: 100 INJECTION, SOLUTION PARENTERAL at 17:53

## 2024-01-01 RX ADMIN — SODIUM CHLORIDE: 4.5 INJECTION, SOLUTION INTRAVENOUS at 17:55

## 2024-01-01 RX ADMIN — FUROSEMIDE 10 MG: 10 INJECTION, SOLUTION INTRAVENOUS at 15:48

## 2024-01-01 RX ADMIN — METOCLOPRAMIDE 5 MG: 5 INJECTION, SOLUTION INTRAMUSCULAR; INTRAVENOUS at 00:04

## 2024-01-01 RX ADMIN — HYDRALAZINE HYDROCHLORIDE 10 MG: 20 INJECTION, SOLUTION INTRAMUSCULAR; INTRAVENOUS at 20:12

## 2024-01-01 RX ADMIN — Medication 100 MCG: at 12:00

## 2024-01-01 RX ADMIN — INSULIN HUMAN 2 UNITS: 100 INJECTION, SOLUTION PARENTERAL at 00:32

## 2024-01-01 RX ADMIN — AMPICILLIN SODIUM, SULBACTAM SODIUM 3 G: 2; 1 INJECTION, POWDER, FOR SOLUTION INTRAMUSCULAR; INTRAVENOUS at 06:02

## 2024-01-01 RX ADMIN — APIXABAN 5 MG: 5 TABLET, FILM COATED ORAL at 05:36

## 2024-01-01 RX ADMIN — INSULIN HUMAN 2 UNITS: 100 INJECTION, SOLUTION PARENTERAL at 06:12

## 2024-01-01 RX ADMIN — AMPICILLIN SODIUM, SULBACTAM SODIUM 3 G: 2; 1 INJECTION, POWDER, FOR SOLUTION INTRAMUSCULAR; INTRAVENOUS at 18:08

## 2024-01-01 RX ADMIN — DEXTROSE AND SODIUM CHLORIDE: 5; 450 INJECTION, SOLUTION INTRAVENOUS at 09:21

## 2024-01-01 RX ADMIN — AMPICILLIN SODIUM, SULBACTAM SODIUM 3 G: 2; 1 INJECTION, POWDER, FOR SOLUTION INTRAMUSCULAR; INTRAVENOUS at 00:15

## 2024-01-01 RX ADMIN — QUETIAPINE FUMARATE 50 MG: 25 TABLET ORAL at 05:11

## 2024-01-01 RX ADMIN — POTASSIUM BICARBONATE 25 MEQ: 978 TABLET, EFFERVESCENT ORAL at 08:18

## 2024-01-01 RX ADMIN — SODIUM BICARBONATE 3 ML: 84 INJECTION, SOLUTION INTRAVENOUS at 14:15

## 2024-01-01 RX ADMIN — ETOMIDATE 10 MG: 2 INJECTION, SOLUTION INTRAVENOUS at 13:53

## 2024-01-01 RX ADMIN — DEXMEDETOMIDINE 0.2 MCG/KG/HR: 100 INJECTION, SOLUTION INTRAVENOUS at 11:41

## 2024-01-01 RX ADMIN — MAGNESIUM HYDROXIDE 30 ML: 1200 LIQUID ORAL at 06:01

## 2024-01-01 RX ADMIN — APIXABAN 5 MG: 5 TABLET, FILM COATED ORAL at 18:02

## 2024-01-01 RX ADMIN — APIXABAN 5 MG: 5 TABLET, FILM COATED ORAL at 17:06

## 2024-01-01 RX ADMIN — POLYETHYLENE GLYCOL 3350 1 PACKET: 17 POWDER, FOR SOLUTION ORAL at 17:06

## 2024-01-01 RX ADMIN — POTASSIUM BICARBONATE 25 MEQ: 978 TABLET, EFFERVESCENT ORAL at 14:57

## 2024-01-01 RX ADMIN — INSULIN HUMAN 2 UNITS: 100 INJECTION, SOLUTION PARENTERAL at 12:50

## 2024-01-01 RX ADMIN — AMPICILLIN SODIUM, SULBACTAM SODIUM 3 G: 2; 1 INJECTION, POWDER, FOR SOLUTION INTRAMUSCULAR; INTRAVENOUS at 05:01

## 2024-01-01 RX ADMIN — AMLODIPINE BESYLATE 5 MG: 5 TABLET ORAL at 05:03

## 2024-01-01 RX ADMIN — POTASSIUM CHLORIDE 20 MEQ: 14.9 INJECTION, SOLUTION INTRAVENOUS at 05:42

## 2024-01-01 RX ADMIN — AMPICILLIN SODIUM, SULBACTAM SODIUM 3 G: 2; 1 INJECTION, POWDER, FOR SOLUTION INTRAMUSCULAR; INTRAVENOUS at 00:38

## 2024-01-01 RX ADMIN — POTASSIUM BICARBONATE 25 MEQ: 978 TABLET, EFFERVESCENT ORAL at 11:16

## 2024-01-01 RX ADMIN — FUROSEMIDE 20 MG: 10 INJECTION, SOLUTION INTRAVENOUS at 17:32

## 2024-01-01 RX ADMIN — PIPERACILLIN AND TAZOBACTAM 3.38 G: 3; .375 INJECTION, POWDER, FOR SOLUTION INTRAVENOUS at 05:07

## 2024-01-01 RX ADMIN — DOCUSATE SODIUM 50 MG AND SENNOSIDES 8.6 MG 2 TABLET: 8.6; 5 TABLET, FILM COATED ORAL at 17:17

## 2024-01-01 RX ADMIN — AMPICILLIN AND SULBACTAM 3 G: 1; 2 INJECTION, POWDER, FOR SOLUTION INTRAMUSCULAR; INTRAVENOUS at 02:20

## 2024-01-01 RX ADMIN — NOREPINEPHRINE BITARTRATE 0.12 MCG/KG/MIN: 1 INJECTION, SOLUTION, CONCENTRATE INTRAVENOUS at 22:12

## 2024-01-01 RX ADMIN — DEXMEDETOMIDINE 0.9 MCG/KG/HR: 100 INJECTION, SOLUTION INTRAVENOUS at 20:51

## 2024-01-01 RX ADMIN — PIPERACILLIN AND TAZOBACTAM 3.38 G: 3; .375 INJECTION, POWDER, FOR SOLUTION INTRAVENOUS at 17:48

## 2024-01-01 RX ADMIN — ACETYLCYSTEINE 3 ML: 200 SOLUTION ORAL; RESPIRATORY (INHALATION) at 18:35

## 2024-01-01 RX ADMIN — HALOPERIDOL LACTATE 2.5 MG: 5 INJECTION, SOLUTION INTRAMUSCULAR at 22:23

## 2024-01-01 RX ADMIN — INSULIN HUMAN 2 UNITS: 100 INJECTION, SOLUTION PARENTERAL at 00:10

## 2024-01-01 RX ADMIN — POTASSIUM BICARBONATE 50 MEQ: 978 TABLET, EFFERVESCENT ORAL at 07:52

## 2024-01-01 RX ADMIN — AMLODIPINE BESYLATE 5 MG: 5 TABLET ORAL at 05:55

## 2024-01-01 RX ADMIN — HALOPERIDOL LACTATE 2.5 MG: 5 INJECTION, SOLUTION INTRAMUSCULAR at 22:10

## 2024-01-01 RX ADMIN — AMPICILLIN SODIUM, SULBACTAM SODIUM 3 G: 2; 1 INJECTION, POWDER, FOR SOLUTION INTRAMUSCULAR; INTRAVENOUS at 00:42

## 2024-01-01 RX ADMIN — AMLODIPINE BESYLATE 5 MG: 5 TABLET ORAL at 13:06

## 2024-01-01 RX ADMIN — APIXABAN 5 MG: 5 TABLET, FILM COATED ORAL at 05:05

## 2024-01-01 RX ADMIN — FENTANYL CITRATE 100 MCG: 50 INJECTION, SOLUTION INTRAMUSCULAR; INTRAVENOUS at 21:11

## 2024-01-01 RX ADMIN — Medication 100 MCG/HR: at 07:39

## 2024-01-01 RX ADMIN — GLYCOPYRROLATE 1 MG: 1 TABLET ORAL at 12:25

## 2024-01-01 RX ADMIN — ENOXAPARIN SODIUM 80 MG: 100 INJECTION SUBCUTANEOUS at 00:08

## 2024-01-01 RX ADMIN — CARVEDILOL 6.25 MG: 6.25 TABLET, FILM COATED ORAL at 16:57

## 2024-01-01 RX ADMIN — FENTANYL CITRATE 100 MCG: 50 INJECTION, SOLUTION INTRAMUSCULAR; INTRAVENOUS at 21:48

## 2024-01-01 RX ADMIN — SODIUM CHLORIDE, POTASSIUM CHLORIDE, SODIUM LACTATE AND CALCIUM CHLORIDE 1000 ML: 600; 310; 30; 20 INJECTION, SOLUTION INTRAVENOUS at 14:20

## 2024-01-01 RX ADMIN — NYSTATIN: 100000 POWDER TOPICAL at 06:01

## 2024-01-01 RX ADMIN — POTASSIUM BICARBONATE 50 MEQ: 978 TABLET, EFFERVESCENT ORAL at 17:05

## 2024-01-01 RX ADMIN — NYSTATIN: 100000 POWDER TOPICAL at 11:10

## 2024-01-01 RX ADMIN — ENOXAPARIN SODIUM 80 MG: 100 INJECTION SUBCUTANEOUS at 12:48

## 2024-01-01 RX ADMIN — METOCLOPRAMIDE 5 MG: 5 INJECTION, SOLUTION INTRAMUSCULAR; INTRAVENOUS at 17:20

## 2024-01-01 RX ADMIN — SODIUM CHLORIDE: 4.5 INJECTION, SOLUTION INTRAVENOUS at 10:37

## 2024-01-01 RX ADMIN — PHENYLEPHRINE HYDROCHLORIDE 0.25 MCG/KG/MIN: 100 INJECTION INTRAVENOUS at 23:06

## 2024-01-01 RX ADMIN — DEXMEDETOMIDINE 0.2 MCG/KG/HR: 100 INJECTION, SOLUTION INTRAVENOUS at 22:14

## 2024-01-01 RX ADMIN — IPRATROPIUM BROMIDE AND ALBUTEROL SULFATE 3 ML: 2.5; .5 SOLUTION RESPIRATORY (INHALATION) at 18:59

## 2024-01-01 RX ADMIN — LORAZEPAM 2 MG: 2 INJECTION INTRAMUSCULAR; INTRAVENOUS at 04:44

## 2024-01-01 RX ADMIN — HALOPERIDOL LACTATE 1 MG: 5 INJECTION, SOLUTION INTRAMUSCULAR at 13:34

## 2024-01-01 RX ADMIN — PIPERACILLIN AND TAZOBACTAM 4.5 G: 4; .5 INJECTION, POWDER, FOR SOLUTION INTRAVENOUS at 13:29

## 2024-01-01 RX ADMIN — MIDAZOLAM HYDROCHLORIDE 1 MG: 1 INJECTION, SOLUTION INTRAMUSCULAR; INTRAVENOUS at 11:29

## 2024-01-01 RX ADMIN — POLYETHYLENE GLYCOL 3350 1 PACKET: 17 POWDER, FOR SOLUTION ORAL at 17:13

## 2024-01-01 RX ADMIN — SODIUM BICARBONATE 3 ML: 84 INJECTION, SOLUTION INTRAVENOUS at 15:27

## 2024-01-01 RX ADMIN — INSULIN HUMAN 2 UNITS: 100 INJECTION, SOLUTION PARENTERAL at 17:32

## 2024-01-01 RX ADMIN — NYSTATIN: 100000 POWDER TOPICAL at 17:06

## 2024-01-01 RX ADMIN — SODIUM BICARBONATE 3 ML: 84 INJECTION, SOLUTION INTRAVENOUS at 22:32

## 2024-01-01 RX ADMIN — Medication 100 MCG/HR: at 02:20

## 2024-01-01 RX ADMIN — GLYCOPYRROLATE 1 MG: 1 TABLET ORAL at 18:02

## 2024-01-01 RX ADMIN — DEXMEDETOMIDINE 1.4 MCG/KG/HR: 100 INJECTION, SOLUTION INTRAVENOUS at 23:51

## 2024-01-01 RX ADMIN — LABETALOL HYDROCHLORIDE 20 MG: 5 INJECTION INTRAVENOUS at 15:46

## 2024-01-01 RX ADMIN — SODIUM CHLORIDE, POTASSIUM CHLORIDE, SODIUM LACTATE AND CALCIUM CHLORIDE: 600; 310; 30; 20 INJECTION, SOLUTION INTRAVENOUS at 14:54

## 2024-01-01 RX ADMIN — INSULIN GLARGINE-YFGN 10 UNITS: 100 INJECTION, SOLUTION SUBCUTANEOUS at 10:33

## 2024-01-01 RX ADMIN — FENTANYL CITRATE 100 MCG: 50 INJECTION, SOLUTION INTRAMUSCULAR; INTRAVENOUS at 16:33

## 2024-01-01 RX ADMIN — DOCUSATE SODIUM 50 MG AND SENNOSIDES 8.6 MG 2 TABLET: 8.6; 5 TABLET, FILM COATED ORAL at 16:36

## 2024-01-01 RX ADMIN — ACETYLCYSTEINE 3 ML: 200 SOLUTION ORAL; RESPIRATORY (INHALATION) at 01:44

## 2024-01-01 RX ADMIN — MAGNESIUM HYDROXIDE 30 ML: 1200 LIQUID ORAL at 05:20

## 2024-01-01 RX ADMIN — INSULIN HUMAN 1 UNITS: 100 INJECTION, SOLUTION PARENTERAL at 00:39

## 2024-01-01 RX ADMIN — IPRATROPIUM BROMIDE AND ALBUTEROL SULFATE 3 ML: 2.5; .5 SOLUTION RESPIRATORY (INHALATION) at 18:57

## 2024-01-01 RX ADMIN — HYDRALAZINE HYDROCHLORIDE 10 MG: 20 INJECTION, SOLUTION INTRAMUSCULAR; INTRAVENOUS at 20:06

## 2024-01-01 RX ADMIN — AMPICILLIN SODIUM, SULBACTAM SODIUM 3 G: 2; 1 INJECTION, POWDER, FOR SOLUTION INTRAMUSCULAR; INTRAVENOUS at 05:31

## 2024-01-01 RX ADMIN — FENTANYL CITRATE 100 MCG: 50 INJECTION, SOLUTION INTRAMUSCULAR; INTRAVENOUS at 04:35

## 2024-01-01 RX ADMIN — ACETYLCYSTEINE 3 ML: 200 SOLUTION ORAL; RESPIRATORY (INHALATION) at 13:37

## 2024-01-01 RX ADMIN — POTASSIUM BICARBONATE 50 MEQ: 978 TABLET, EFFERVESCENT ORAL at 05:39

## 2024-01-01 RX ADMIN — FENTANYL CITRATE 100 MCG: 50 INJECTION, SOLUTION INTRAMUSCULAR; INTRAVENOUS at 23:19

## 2024-01-01 RX ADMIN — SODIUM BICARBONATE 3 ML: 84 INJECTION, SOLUTION INTRAVENOUS at 07:35

## 2024-01-01 RX ADMIN — AMPICILLIN AND SULBACTAM 3 G: 1; 2 INJECTION, POWDER, FOR SOLUTION INTRAMUSCULAR; INTRAVENOUS at 15:55

## 2024-01-01 RX ADMIN — IOHEXOL 40 ML: 350 INJECTION, SOLUTION INTRAVENOUS at 15:45

## 2024-01-01 RX ADMIN — MORPHINE SULFATE 5 MG: 10 INJECTION INTRAVENOUS at 23:55

## 2024-01-01 RX ADMIN — IPRATROPIUM BROMIDE AND ALBUTEROL SULFATE 3 ML: 2.5; .5 SOLUTION RESPIRATORY (INHALATION) at 07:35

## 2024-01-01 RX ADMIN — INSULIN HUMAN 1 UNITS: 100 INJECTION, SOLUTION PARENTERAL at 06:13

## 2024-01-01 RX ADMIN — FAMOTIDINE 20 MG: 20 TABLET, FILM COATED ORAL at 17:58

## 2024-01-01 RX ADMIN — MAGNESIUM HYDROXIDE 30 ML: 1200 LIQUID ORAL at 05:13

## 2024-01-01 RX ADMIN — GLYCOPYRROLATE 1 MG: 1 TABLET ORAL at 11:31

## 2024-01-01 RX ADMIN — INSULIN HUMAN 2 UNITS: 100 INJECTION, SOLUTION PARENTERAL at 06:53

## 2024-01-01 RX ADMIN — HYDROCORTISONE SODIUM SUCCINATE 100 MG: 100 INJECTION, POWDER, FOR SOLUTION INTRAMUSCULAR; INTRAVENOUS at 14:28

## 2024-01-01 RX ADMIN — SODIUM BICARBONATE 3 ML: 84 INJECTION, SOLUTION INTRAVENOUS at 07:47

## 2024-01-01 RX ADMIN — DOCUSATE SODIUM 100 MG: 100 CAPSULE, LIQUID FILLED ORAL at 05:13

## 2024-01-01 RX ADMIN — IPRATROPIUM BROMIDE AND ALBUTEROL SULFATE 3 ML: 2.5; .5 SOLUTION RESPIRATORY (INHALATION) at 03:14

## 2024-01-01 RX ADMIN — GLYCOPYRROLATE 1 MG: 1 TABLET ORAL at 05:40

## 2024-01-01 RX ADMIN — FENTANYL CITRATE 100 MCG: 50 INJECTION, SOLUTION INTRAMUSCULAR; INTRAVENOUS at 23:40

## 2024-01-01 RX ADMIN — ACETYLCYSTEINE 3 ML: 200 SOLUTION ORAL; RESPIRATORY (INHALATION) at 09:34

## 2024-01-01 RX ADMIN — GLYCOPYRROLATE 1 MG: 1 TABLET ORAL at 05:25

## 2024-01-01 RX ADMIN — DIBASIC SODIUM PHOSPHATE, MONOBASIC POTASSIUM PHOSPHATE AND MONOBASIC SODIUM PHOSPHATE 500 MG: 852; 155; 130 TABLET ORAL at 09:26

## 2024-01-01 RX ADMIN — DOCUSATE SODIUM 50 MG AND SENNOSIDES 8.6 MG 2 TABLET: 8.6; 5 TABLET, FILM COATED ORAL at 18:23

## 2024-01-01 RX ADMIN — SODIUM CHLORIDE: 4.5 INJECTION, SOLUTION INTRAVENOUS at 12:44

## 2024-01-01 RX ADMIN — PIPERACILLIN AND TAZOBACTAM 4.5 G: 4; .5 INJECTION, POWDER, FOR SOLUTION INTRAVENOUS at 20:22

## 2024-01-01 RX ADMIN — IPRATROPIUM BROMIDE AND ALBUTEROL SULFATE 3 ML: 2.5; .5 SOLUTION RESPIRATORY (INHALATION) at 22:47

## 2024-01-01 RX ADMIN — APIXABAN 5 MG: 5 TABLET, FILM COATED ORAL at 05:06

## 2024-01-01 RX ADMIN — FAMOTIDINE 20 MG: 20 TABLET, FILM COATED ORAL at 17:15

## 2024-01-01 RX ADMIN — IPRATROPIUM BROMIDE AND ALBUTEROL SULFATE 3 ML: 2.5; .5 SOLUTION RESPIRATORY (INHALATION) at 02:38

## 2024-01-01 RX ADMIN — INSULIN HUMAN 2 UNITS: 100 INJECTION, SOLUTION PARENTERAL at 05:51

## 2024-01-01 RX ADMIN — METOCLOPRAMIDE 5 MG: 5 INJECTION, SOLUTION INTRAMUSCULAR; INTRAVENOUS at 12:11

## 2024-01-01 RX ADMIN — APIXABAN 5 MG: 5 TABLET, FILM COATED ORAL at 05:38

## 2024-01-01 RX ADMIN — TERAZOSIN HYDROCHLORIDE 2 MG: 2 CAPSULE ORAL at 16:59

## 2024-01-01 RX ADMIN — IPRATROPIUM BROMIDE AND ALBUTEROL SULFATE 3 ML: 2.5; .5 SOLUTION RESPIRATORY (INHALATION) at 19:48

## 2024-01-01 RX ADMIN — INSULIN HUMAN 2 UNITS: 100 INJECTION, SOLUTION PARENTERAL at 17:24

## 2024-01-01 RX ADMIN — FAMOTIDINE 20 MG: 20 TABLET, FILM COATED ORAL at 05:06

## 2024-01-01 RX ADMIN — LABETALOL HYDROCHLORIDE 20 MG: 5 INJECTION INTRAVENOUS at 13:18

## 2024-01-01 RX ADMIN — PIPERACILLIN AND TAZOBACTAM 3.38 G: 3; .375 INJECTION, POWDER, FOR SOLUTION INTRAVENOUS at 05:19

## 2024-01-01 RX ADMIN — HALOPERIDOL LACTATE 5 MG: 5 INJECTION, SOLUTION INTRAMUSCULAR at 07:08

## 2024-01-01 RX ADMIN — POTASSIUM CHLORIDE 10 MEQ: 7.46 INJECTION, SOLUTION INTRAVENOUS at 07:07

## 2024-01-01 ASSESSMENT — PAIN DESCRIPTION - PAIN TYPE
TYPE: ACUTE PAIN
TYPE: ACUTE PAIN;SURGICAL PAIN
TYPE: ACUTE PAIN

## 2024-01-01 ASSESSMENT — COGNITIVE AND FUNCTIONAL STATUS - GENERAL
SUGGESTED CMS G CODE MODIFIER MOBILITY: CK
DAILY ACTIVITIY SCORE: 12
CLIMB 3 TO 5 STEPS WITH RAILING: TOTAL
HELP NEEDED FOR BATHING: TOTAL
MOVING TO AND FROM BED TO CHAIR: UNABLE
PERSONAL GROOMING: A LITTLE
STANDING UP FROM CHAIR USING ARMS: TOTAL
TOILETING: TOTAL
MOVING TO AND FROM BED TO CHAIR: UNABLE
STANDING UP FROM CHAIR USING ARMS: TOTAL
DRESSING REGULAR LOWER BODY CLOTHING: A LITTLE
EATING MEALS: TOTAL
TOILETING: A LOT
SUGGESTED CMS G CODE MODIFIER MOBILITY: CK
SUGGESTED CMS G CODE MODIFIER MOBILITY: CN
EATING MEALS: A LOT
MOVING FROM LYING ON BACK TO SITTING ON SIDE OF FLAT BED: UNABLE
CLIMB 3 TO 5 STEPS WITH RAILING: A LITTLE
STANDING UP FROM CHAIR USING ARMS: A LOT
MOVING TO AND FROM BED TO CHAIR: A LITTLE
SUGGESTED CMS G CODE MODIFIER DAILY ACTIVITY: CN
SUGGESTED CMS G CODE MODIFIER MOBILITY: CN
TOILETING: A LOT
DAILY ACTIVITIY SCORE: 6
HELP NEEDED FOR BATHING: A LOT
MOBILITY SCORE: 6
MOVING FROM LYING ON BACK TO SITTING ON SIDE OF FLAT BED: UNABLE
PERSONAL GROOMING: TOTAL
MOBILITY SCORE: 19
TURNING FROM BACK TO SIDE WHILE IN FLAT BAD: UNABLE
PERSONAL GROOMING: A LOT
DRESSING REGULAR LOWER BODY CLOTHING: TOTAL
CLIMB 3 TO 5 STEPS WITH RAILING: TOTAL
WALKING IN HOSPITAL ROOM: TOTAL
EATING MEALS: A LITTLE
EATING MEALS: TOTAL
PERSONAL GROOMING: A LOT
HELP NEEDED FOR BATHING: A LOT
HELP NEEDED FOR BATHING: A LOT
DAILY ACTIVITIY SCORE: 16
SUGGESTED CMS G CODE MODIFIER MOBILITY: CN
STANDING UP FROM CHAIR USING ARMS: TOTAL
DAILY ACTIVITIY SCORE: 6
CLIMB 3 TO 5 STEPS WITH RAILING: A LOT
PERSONAL GROOMING: A LITTLE
WALKING IN HOSPITAL ROOM: TOTAL
HELP NEEDED FOR BATHING: A LOT
DRESSING REGULAR UPPER BODY CLOTHING: TOTAL
WALKING IN HOSPITAL ROOM: A LITTLE
MOVING TO AND FROM BED TO CHAIR: UNABLE
SUGGESTED CMS G CODE MODIFIER DAILY ACTIVITY: CN
CLIMB 3 TO 5 STEPS WITH RAILING: TOTAL
MOVING TO AND FROM BED TO CHAIR: A LITTLE
WALKING IN HOSPITAL ROOM: TOTAL
CLIMB 3 TO 5 STEPS WITH RAILING: TOTAL
STANDING UP FROM CHAIR USING ARMS: A LITTLE
TURNING FROM BACK TO SIDE WHILE IN FLAT BAD: UNABLE
TOILETING: TOTAL
MOBILITY SCORE: 7
SUGGESTED CMS G CODE MODIFIER MOBILITY: CN
DRESSING REGULAR LOWER BODY CLOTHING: A LOT
TURNING FROM BACK TO SIDE WHILE IN FLAT BAD: UNABLE
STANDING UP FROM CHAIR USING ARMS: TOTAL
MOBILITY SCORE: 6
HELP NEEDED FOR BATHING: TOTAL
DRESSING REGULAR LOWER BODY CLOTHING: A LOT
SUGGESTED CMS G CODE MODIFIER MOBILITY: CN
MOVING FROM LYING ON BACK TO SITTING ON SIDE OF FLAT BED: A LITTLE
MOVING FROM LYING ON BACK TO SITTING ON SIDE OF FLAT BED: A LITTLE
STANDING UP FROM CHAIR USING ARMS: TOTAL
DRESSING REGULAR UPPER BODY CLOTHING: TOTAL
MOVING TO AND FROM BED TO CHAIR: UNABLE
TURNING FROM BACK TO SIDE WHILE IN FLAT BAD: UNABLE
TOILETING: A LOT
DAILY ACTIVITIY SCORE: 6
MOVING FROM LYING ON BACK TO SITTING ON SIDE OF FLAT BED: UNABLE
DRESSING REGULAR UPPER BODY CLOTHING: A LOT
DRESSING REGULAR LOWER BODY CLOTHING: A LITTLE
HELP NEEDED FOR BATHING: TOTAL
TOILETING: A LOT
EATING MEALS: A LOT
MOVING FROM LYING ON BACK TO SITTING ON SIDE OF FLAT BED: UNABLE
MOVING TO AND FROM BED TO CHAIR: UNABLE
MOVING FROM LYING ON BACK TO SITTING ON SIDE OF FLAT BED: UNABLE
DRESSING REGULAR LOWER BODY CLOTHING: TOTAL
SUGGESTED CMS G CODE MODIFIER DAILY ACTIVITY: CK
EATING MEALS: TOTAL
MOBILITY SCORE: 6
SUGGESTED CMS G CODE MODIFIER DAILY ACTIVITY: CL
TOILETING: TOTAL
DRESSING REGULAR UPPER BODY CLOTHING: A LITTLE
MOVING TO AND FROM BED TO CHAIR: UNABLE
WALKING IN HOSPITAL ROOM: TOTAL
EATING MEALS: TOTAL
MOVING FROM LYING ON BACK TO SITTING ON SIDE OF FLAT BED: UNABLE
DAILY ACTIVITIY SCORE: 6
SUGGESTED CMS G CODE MODIFIER DAILY ACTIVITY: CN
DRESSING REGULAR UPPER BODY CLOTHING: TOTAL
EATING MEALS: A LITTLE
MOBILITY SCORE: 6
HELP NEEDED FOR BATHING: TOTAL
SUGGESTED CMS G CODE MODIFIER DAILY ACTIVITY: CK
SUGGESTED CMS G CODE MODIFIER DAILY ACTIVITY: CN
WALKING IN HOSPITAL ROOM: TOTAL
MOBILITY SCORE: 17
EATING MEALS: A LOT
DRESSING REGULAR LOWER BODY CLOTHING: TOTAL
SUGGESTED CMS G CODE MODIFIER DAILY ACTIVITY: CL
WALKING IN HOSPITAL ROOM: A LITTLE
DRESSING REGULAR LOWER BODY CLOTHING: A LOT
HELP NEEDED FOR BATHING: A LOT
TOILETING: A LOT
DRESSING REGULAR UPPER BODY CLOTHING: A LOT
PERSONAL GROOMING: TOTAL
CLIMB 3 TO 5 STEPS WITH RAILING: TOTAL
PERSONAL GROOMING: TOTAL
DRESSING REGULAR UPPER BODY CLOTHING: A LOT
DAILY ACTIVITIY SCORE: 12
CLIMB 3 TO 5 STEPS WITH RAILING: TOTAL
TURNING FROM BACK TO SIDE WHILE IN FLAT BAD: UNABLE
TURNING FROM BACK TO SIDE WHILE IN FLAT BAD: A LITTLE
DRESSING REGULAR UPPER BODY CLOTHING: A LITTLE
PERSONAL GROOMING: TOTAL
TURNING FROM BACK TO SIDE WHILE IN FLAT BAD: UNABLE
SUGGESTED CMS G CODE MODIFIER MOBILITY: CM
MOVING FROM LYING ON BACK TO SITTING ON SIDE OF FLAT BED: UNABLE
MOBILITY SCORE: 6
CLIMB 3 TO 5 STEPS WITH RAILING: TOTAL
SUGGESTED CMS G CODE MODIFIER MOBILITY: CN
MOVING TO AND FROM BED TO CHAIR: UNABLE
DAILY ACTIVITIY SCORE: 12
STANDING UP FROM CHAIR USING ARMS: A LITTLE
WALKING IN HOSPITAL ROOM: TOTAL
TURNING FROM BACK TO SIDE WHILE IN FLAT BAD: UNABLE
WALKING IN HOSPITAL ROOM: TOTAL
SUGGESTED CMS G CODE MODIFIER DAILY ACTIVITY: CL
DAILY ACTIVITIY SCORE: 16
DRESSING REGULAR UPPER BODY CLOTHING: TOTAL
PERSONAL GROOMING: A LOT
DRESSING REGULAR LOWER BODY CLOTHING: TOTAL
STANDING UP FROM CHAIR USING ARMS: TOTAL
TOILETING: TOTAL
MOBILITY SCORE: 6

## 2024-01-01 ASSESSMENT — ENCOUNTER SYMPTOMS
SHORTNESS OF BREATH: 0
SPEECH CHANGE: 1
NAUSEA: 0
SHORTNESS OF BREATH: 0
PSYCHIATRIC NEGATIVE: 1
COUGH: 1
NAUSEA: 0
SHORTNESS OF BREATH: 0
SPUTUM PRODUCTION: 1
FEVER: 0
SPUTUM PRODUCTION: 1
VOMITING: 0
SORE THROAT: 0
HEADACHES: 0
SHORTNESS OF BREATH: 0
SPUTUM PRODUCTION: 1
SPUTUM PRODUCTION: 1
ABDOMINAL PAIN: 0
HEADACHES: 0
PSYCHIATRIC NEGATIVE: 1
SPUTUM PRODUCTION: 0
COUGH: 1
DIZZINESS: 0
HEADACHES: 0
MYALGIAS: 0
VOMITING: 0
NAUSEA: 0
VOMITING: 0
DEPRESSION: 0
NERVOUS/ANXIOUS: 0
NEUROLOGICAL NEGATIVE: 1
NAUSEA: 0
SORE THROAT: 0
PSYCHIATRIC NEGATIVE: 1
SPUTUM PRODUCTION: 1
NAUSEA: 0
NEUROLOGICAL NEGATIVE: 1
ABDOMINAL PAIN: 0
COUGH: 1
SHORTNESS OF BREATH: 0
NEUROLOGICAL NEGATIVE: 1
BACK PAIN: 0
VOMITING: 0
SORE THROAT: 0
NAUSEA: 0
SPUTUM PRODUCTION: 1
VOMITING: 0
VOMITING: 0
ABDOMINAL PAIN: 0
HEADACHES: 0
FEVER: 0
VOMITING: 0
NECK PAIN: 0
VOMITING: 0
SPUTUM PRODUCTION: 1
SHORTNESS OF BREATH: 0
NAUSEA: 0
HEADACHES: 0
VOMITING: 0
SHORTNESS OF BREATH: 0
HEADACHES: 0
FEVER: 0

## 2024-01-01 ASSESSMENT — FIBROSIS 4 INDEX
FIB4 SCORE: 0.96
FIB4 SCORE: 1.1
FIB4 SCORE: 0.71
FIB4 SCORE: .6551724137931034483
FIB4 SCORE: 0.57
FIB4 SCORE: 0.56
FIB4 SCORE: 0.58
FIB4 SCORE: 0.51
FIB4 SCORE: 1.15
FIB4 SCORE: 1.24
FIB4 SCORE: 0.53
FIB4 SCORE: 0.86
FIB4 SCORE: 1.4
FIB4 SCORE: 0.51
FIB4 SCORE: 0.48
FIB4 SCORE: 0.51
FIB4 SCORE: 1.4
FIB4 SCORE: 0.78
FIB4 SCORE: 1.73
FIB4 SCORE: .6551724137931034483
FIB4 SCORE: 1.29
FIB4 SCORE: 1.73
FIB4 SCORE: 1.85
FIB4 SCORE: 0.58

## 2024-01-01 ASSESSMENT — GAIT ASSESSMENTS
DISTANCE (FEET): 80
GAIT LEVEL OF ASSIST: UNABLE TO PARTICIPATE
DEVIATION: DECREASED BASE OF SUPPORT;BRADYKINETIC
ASSISTIVE DEVICE: FRONT WHEEL WALKER
GAIT LEVEL OF ASSIST: UNABLE TO PARTICIPATE
GAIT LEVEL OF ASSIST: MINIMAL ASSIST
ASSISTIVE DEVICE: FRONT WHEEL WALKER
GAIT LEVEL OF ASSIST: UNABLE TO PARTICIPATE
GAIT LEVEL OF ASSIST: UNABLE TO PARTICIPATE
GAIT LEVEL OF ASSIST: MINIMAL ASSIST
DISTANCE (FEET): 100
GAIT LEVEL OF ASSIST: UNABLE TO PARTICIPATE
DEVIATION: STEP TO;DECREASED BASE OF SUPPORT;BRADYKINETIC
GAIT LEVEL OF ASSIST: UNABLE TO PARTICIPATE
GAIT LEVEL OF ASSIST: UNABLE TO PARTICIPATE

## 2024-01-01 ASSESSMENT — PATIENT HEALTH QUESTIONNAIRE - PHQ9
SUM OF ALL RESPONSES TO PHQ9 QUESTIONS 1 AND 2: 0
2. FEELING DOWN, DEPRESSED, IRRITABLE, OR HOPELESS: NOT AT ALL
1. LITTLE INTEREST OR PLEASURE IN DOING THINGS: NOT AT ALL

## 2024-01-01 ASSESSMENT — PULMONARY FUNCTION TESTS
FVC: 1000
FVC: 1500
FVC: 1333

## 2024-01-01 ASSESSMENT — LIFESTYLE VARIABLES
TOTAL SCORE: 0
AVERAGE NUMBER OF DAYS PER WEEK YOU HAVE A DRINK CONTAINING ALCOHOL: 0
EVER FELT BAD OR GUILTY ABOUT YOUR DRINKING: NO
HAVE YOU EVER FELT YOU SHOULD CUT DOWN ON YOUR DRINKING: NO
HOW MANY TIMES IN THE PAST YEAR HAVE YOU HAD 5 OR MORE DRINKS IN A DAY: 0
HAVE PEOPLE ANNOYED YOU BY CRITICIZING YOUR DRINKING: NO
ON A TYPICAL DAY WHEN YOU DRINK ALCOHOL HOW MANY DRINKS DO YOU HAVE: 0
ALCOHOL_USE: NO
TOTAL SCORE: 0
CONSUMPTION TOTAL: NEGATIVE
TOTAL SCORE: 0
EVER HAD A DRINK FIRST THING IN THE MORNING TO STEADY YOUR NERVES TO GET RID OF A HANGOVER: NO

## 2024-01-01 ASSESSMENT — HEART SCORE
ECG: NON-SPECIFIC REPOLARIZATION DISTURBANCE
HISTORY: SLIGHTLY SUSPICIOUS
AGE: 65+
RISK FACTORS: 1-2 RISK FACTORS
HEART SCORE: 5
TROPONIN: 1-3 TIMES NORMAL LIMIT

## 2024-01-01 ASSESSMENT — PAIN SCALES - PAIN ASSESSMENT IN ADVANCED DEMENTIA (PAINAD)
BREATHING: NORMAL
FACIALEXPRESSION: SMILING OR INEXPRESSIVE
TOTALSCORE: 3
NEGVOCALIZATION: OCCASIONAL MOAN/GROAN, LOW SPEECH, NEGATIVE/DISAPPROVING QUALITY
TOTALSCORE: 2
CONSOLABILITY: DISTRACTED OR REASSURED BY VOICE/TOUCH
NEGVOCALIZATION: OCCASIONAL MOAN/GROAN, LOW SPEECH, NEGATIVE/DISAPPROVING QUALITY
BODYLANGUAGE: TENSE, DISTRESSED PACING, FIDGETING
BODYLANGUAGE: TENSE, DISTRESSED PACING, FIDGETING
FACIALEXPRESSION: SMILING OR INEXPRESSIVE
CONSOLABILITY: NO NEED TO CONSOLE
TOTALSCORE: 0
BODYLANGUAGE: RELAXED
FACIALEXPRESSION: SMILING OR INEXPRESSIVE
CONSOLABILITY: NO NEED TO CONSOLE

## 2024-01-01 ASSESSMENT — CHA2DS2 SCORE
DIABETES: NO
PRIOR STROKE OR TIA OR THROMBOEMBOLISM: YES
AGE 65 TO 74: NO
CHF OR LEFT VENTRICULAR DYSFUNCTION: NO
AGE 75 OR GREATER: YES
HYPERTENSION: NO
SEX: MALE
VASCULAR DISEASE: NO
CHA2DS2 VASC SCORE: 4

## 2024-01-01 ASSESSMENT — COPD QUESTIONNAIRES
DO YOU EVER COUGH UP ANY MUCUS OR PHLEGM?: NO/ONLY WITH OCCASIONAL COLDS OR INFECTIONS
COPD SCREENING SCORE: 2
DURING THE PAST 4 WEEKS HOW MUCH DID YOU FEEL SHORT OF BREATH: NONE/LITTLE OF THE TIME
HAVE YOU SMOKED AT LEAST 100 CIGARETTES IN YOUR ENTIRE LIFE: NO/DON'T KNOW

## 2024-01-01 NOTE — PROGRESS NOTES
Neurosurgery Progress Note    Subjective:  Pt awake  Denies ha    Exam:  Oriented x 3 - some mumbled speech/expr aphasia  FC x 4, Perrl 3mm bilat, rt eye ecchymosis   Inc c/d with staples  No drift     BP  Min: 112/73  Max: 153/92  Pulse  Av.8  Min: 67  Max: 82  Resp  Av.3  Min: 15  Max: 18  Temp  Av.5 °C (97.7 °F)  Min: 36.2 °C (97.2 °F)  Max: 36.8 °C (98.2 °F)  SpO2  Av %  Min: 93 %  Max: 95 %    No data recorded    Recent Labs     23  0423 23  0549 24  0247   WBC 10.8 9.5 9.2   RBC 4.57* 4.99 5.31   HEMOGLOBIN 13.9* 15.4 16.3   HEMATOCRIT 41.8* 45.0 47.8   MCV 91.5 90.2 90.0   MCH 30.4 30.9 30.7   MCHC 33.3 34.2 34.1   RDW 45.3 42.6 42.0   PLATELETCT 253 274 285   MPV 10.2 10.1 9.9       Recent Labs     23  0423 23  0549 24  0247   SODIUM 140 138 136   POTASSIUM 3.8 3.7 3.7   CHLORIDE 107 103 102   CO2 24 27 20   GLUCOSE 121* 123* 123*   BUN 23* 24* 23*   CREATININE 0.94 1.04 0.91   CALCIUM 9.4 9.1 9.6                     Intake/Output                         23 - 24 0659 24 - 24 0659      Total 190059 Total                 Intake    Total Intake -- -- -- -- -- --       Output    Urine  800  650 1450  300  -- 300    Urine Void (mL)  300 -- 300    Total Output  300 -- 300       Net I/O     -800 -650 -1450 -300 -- -300              Intake/Output Summary (Last 24 hours) at 2024 1119  Last data filed at 2024 0810  Gross per 24 hour   Intake --   Output 1150 ml   Net -1150 ml               polyethylene glycol/lytes  1 Packet BID    docusate sodium  100 mg BID    lisinopril  40 mg Q DAY    enalaprilat  1.25 mg Q6HRS PRN    labetalol  10 mg Q4HRS PRN    prochlorperazine  10 mg Q6HRS PRN    hydrALAZINE  10-20 mg Q6HRS PRN    amLODIPine  10 mg Q DAY    Respiratory Therapy Consult   Continuous RT    acetaminophen  650 mg Q4HRS PRN    Or    acetaminophen  650 mg Q4HRS PRN     ondansetron  4 mg Q4HRS PRN    Or    ondansetron  4 mg Q4HRS PRN    insulin regular  1-6 Units Q6HRS    And    dextrose bolus  25 g Q15 MIN PRN       Assessment and Plan:  Hospital day # 6 ICH  POD# 5 Rt SOC and evac ICH   Chemical prophylactic DVT therapy: No  Start date/time: TBD- mobilize    Q4 hour neuro checks  Keep SBP <160  Pt/ot/mobilize as justo  CT head 12/28 stable  Dispo per IM, clear from Nsx perspective

## 2024-01-01 NOTE — DISCHARGE PLANNING
Agency/Facility Name: [facility]  Spoke To: Juany  Outcome: Admissions not available today due to Holiday.    Agency/Facility Name: [facility]  Outcome: LVM for Tevin in admissions regarding pt referral, requested a call back.

## 2024-01-01 NOTE — PROGRESS NOTES
Neurology Progress Note  Neurohospitalist Service, [facility]    Referring Physician: Dani Bautista D.O.    Chief Complaint   Patient presents with    Trauma Green       HPI: Refer to initial documented Neurology H&P, as detailed in the patient's chart.    Interval History 1/1: No new events overnight.    Review of systems: In addition to what is detailed in the HPI and/or updated in the interval history, all other systems reviewed and are negative.    Past Medical History:    has a past medical history of Patient denies medical problems.    FHx:  family history is not on file.    SHx:   reports current alcohol use. He reports that he does not currently use drugs.    Medications:    Current Facility-Administered Medications:     polyethylene glycol/lytes (Miralax) Packet 1 Packet, 1 Packet, Oral, BID, Orion Bennett M.D., 1 Packet at 01/01/24 0527    docusate sodium (Colace) capsule 100 mg, 100 mg, Oral, BID, Orion Bennett M.D., 100 mg at 01/01/24 0527    lisinopril (Prinivil) tablet 40 mg, 40 mg, Oral, Q DAY, Orion Bennett M.D., 40 mg at 01/01/24 0527    enalaprilat (Vasotec) injection 1.25 mg 1 mL, 1.25 mg, Intravenous, Q6HRS PRN, Dez Kimball D.O., 1.25 mg at 12/29/23 1616    labetalol (Normodyne/Trandate) injection 10 mg, 10 mg, Intravenous, Q4HRS PRN, Dez Kimball D.O.    prochlorperazine (Compazine) injection 10 mg, 10 mg, Intravenous, Q6HRS PRN, Kailee Granda, A.P.R.N.    hydrALAZINE (Apresoline) injection 10-20 mg, 10-20 mg, Intravenous, Q6HRS PRN, Kailee Granda, A.P.R.N., 10 mg at 12/31/23 0234    amLODIPine (Norvasc) tablet 10 mg, 10 mg, Oral, Q DAY, Dani Bautista D.O., 10 mg at 01/01/24 0527    Respiratory Therapy Consult, , Nebulization, Continuous RT, Donnie Babin M.D.    acetaminophen (Tylenol) tablet 650 mg, 650 mg, Oral, Q4HRS PRN, 650 mg at 12/28/23 0353 **OR** acetaminophen (Tylenol) suppository 650 mg, 650 mg, Rectal, Q4HRS PRN, Donnie Babin M.D.    ondansetron  (Zofran ODT) dispertab 4 mg, 4 mg, Oral, Q4HRS PRN **OR** ondansetron (Zofran) syringe/vial injection 4 mg, 4 mg, Intravenous, Q4HRS PRN, Donnie Babin M.D.    insulin regular (HumuLIN R,NovoLIN R) injection, 1-6 Units, Subcutaneous, Q6HRS, 1 Units at 12/28/23 1814 **AND** POC blood glucose manual result, , , Q6H **AND** NOTIFY MD and PharmD, , , Once **AND** Administer 20 grams of glucose (approximately 8 ounces of fruit juice) every 15 minutes PRN FSBG less than 70 mg/dL, , , PRN **AND** dextrose 10 % BOLUS 25 g, 25 g, Intravenous, Q15 MIN PRN, Donnie Babin M.D.    Physical Examination:     Vitals:    12/31/23 0800 12/31/23 1530 12/31/23 2000 01/01/24 0400   BP: (!) 143/79 136/82 (!) 141/74 (!) 153/92   Pulse: 85 82 67 76   Resp: 15 15 16 18   Temp: 36.2 °C (97.1 °F) 36.2 °C (97.2 °F) 36.6 °C (97.9 °F) 36.3 °C (97.3 °F)   TempSrc: Temporal Temporal Temporal Temporal   SpO2: 93% 93% 93% 95%   Weight:       Height:               NEUROLOGICAL EXAM:     Patient is awake and alert oriented to self place and time.  Speech is intact but slurred but not aphasic.  Pupils equal reactive.  Face symmetrical.  Hearing intact.  Sensation intact.  Visual field intact.  Moves all fours antigravity.  Sensation intact.  Slight ataxia on the right finger-nose testing.    No segment changes compared to yesterday 12/31    Objective Data:    Labs:  Lab Results   Component Value Date/Time    PROTHROMBTM 14.6 12/27/2023 12:21 AM    INR 1.13 12/27/2023 12:21 AM      Lab Results   Component Value Date/Time    WBC 9.2 01/01/2024 02:47 AM    RBC 5.31 01/01/2024 02:47 AM    HEMOGLOBIN 16.3 01/01/2024 02:47 AM    HEMATOCRIT 47.8 01/01/2024 02:47 AM    MCV 90.0 01/01/2024 02:47 AM    MCH 30.7 01/01/2024 02:47 AM    MCHC 34.1 01/01/2024 02:47 AM    MPV 9.9 01/01/2024 02:47 AM    NEUTSPOLYS 68.80 01/01/2024 02:47 AM    LYMPHOCYTES 20.00 (L) 01/01/2024 02:47 AM    MONOCYTES 10.10 01/01/2024 02:47 AM    EOSINOPHILS 0.20 01/01/2024 02:47 AM     BASOPHILS 0.40 01/01/2024 02:47 AM      Lab Results   Component Value Date/Time    SODIUM 136 01/01/2024 02:47 AM    POTASSIUM 3.7 01/01/2024 02:47 AM    CHLORIDE 102 01/01/2024 02:47 AM    CO2 20 01/01/2024 02:47 AM    GLUCOSE 123 (H) 01/01/2024 02:47 AM    BUN 23 (H) 01/01/2024 02:47 AM    CREATININE 0.91 01/01/2024 02:47 AM      Lab Results   Component Value Date/Time    CHOLSTRLTOT 284 (H) 12/27/2023 04:06 AM     (H) 12/27/2023 04:06 AM    HDL 46 12/27/2023 04:06 AM    TRIGLYCERIDE 85 12/27/2023 04:06 AM       Lab Results   Component Value Date/Time    ALKPHOSPHAT 104 (H) 01/01/2024 02:47 AM    ASTSGOT 27 01/01/2024 02:47 AM    ALTSGPT 37 01/01/2024 02:47 AM    TBILIRUBIN 0.8 01/01/2024 02:47 AM        Imaging/Testing:  EC-ECHOCARDIOGRAM COMPLETE W/O CONT   Final Result      DX-CHEST-PORTABLE (1 VIEW)   Final Result      New right subclavian line tip overlies the cavoatrial junction and no pneumothorax is identified      Similar hypoventilatory appearance of the chest with basilar atelectasis, some areas of scarring probable.      CT-HEAD W/O   Final Result         1. Postop right cerebellar hemorrhage evacuation. Only a minimal amount of blood product and edema remains.   2. Resolved shift of the cerebellum.         DX-CHEST-LIMITED (1 VIEW)   Final Result      1.  Hypoinflation and mild pulmonary vascular congestion.   2.  No pneumonia or pneumothorax.      CT-CTA HEAD WITH & W/O-POST PROCESS   Final Result      1.  Large RIGHT cerebellar intraparenchymal hemorrhage with associated mass effect and 5 mm posterior fossa midline shift.   2.  No intracranial aneurysm, focal high-grade stenosis, or abrupt large vessel cut off.   3.  RIGHT forehead scalp hematoma.      These findings were electronically conveyed to and received by EMELY FRIAS on 12/27/2023 12:27 AM.         CT-MAXILLOFACIAL W/O PLUS RECONS   Final Result      1.  Large RIGHT forehead scalp hematoma.   2.  No facial fracture.       CT-CSPINE WITHOUT PLUS RECONS   Final Result      1.  Moderate degenerative change of cervical spine.   2.  No fracture or subluxation.        ICH score 2.    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  Blood pressure was over 200 systolic on arrival here.  Status post evacuation.  Postop day #2.  Patient is done remarkably well.  Post CT head looks great.  Primary team is already stopped hypertonic saline.  Current sodium is 147.    Update 12/30  Patient appears to be stable.  Blood pressures are well-controlled.  Sodium did drop from 140  to 147 today.  Recommend watching this make sure is not dropping further.  Watch for salt wasting.  Try to maintain current levels.    Post bleed 8/4.  Postop day 3.    Update 12/21  Post bleed day #5.  Postop day #4.    Recommend keeping the patient to postop day #6.  If no new changes then most likely can go to rehab or SNF.      Update 1/1  Post bleeding #6.  Postop day 5.  No clinical changes.  From a neurological corrective okay to proceed with SNF for rehab starting tomorrow.  Patient can follow-up in neurology stroke [facility].  Neurology will sign off.      Plan:  1. every 4 hours neurochecks.  2.  Keep blood pressure low below 160 systolic.  Long-term goal should be normotensive.  3.  No antiplatelets or anticoagulation.  4.  SCDs for DVT prophylaxis.  Can use chemical prophylaxis once cleared by neurosurgery.  5.  Monitor sodium levels.   6.  Follow-up in stroke clinic.      This chart was partially generated using voice recognition technology and sound alike word replacement may be present, best efforts were made to make the chart accurate.    Luke Solis MD  Board Certified Neurology, ABPN  (t) [phone]

## 2024-01-01 NOTE — PROGRESS NOTES
Hospital Medicine Daily Progress Note    Date of Service  12/31/2023    Chief Complaint  Marty Mohr is a 78 y.o. male admitted 12/26/2023 with found down cerebellar hematoma.    Hospital Course  78 y.o. male who presented 12/26/2023 after a room mate found him down on the floor with vomit around him.  Apparently he had fallen forward and hit his head.  He was found to be hypertensive to 200's and CT scan of the head found a cerebellar ICH 4.6x2.9x.6cm with surrounding edema and effacement of 4th ventricle, CTA with no vascular malformation, CT maxillofacial with right frontal hematoma without fracture and CT spine with no fracture.  Neurosurgeon consulted and on 12/27 took the patient for right suboccipital craniotomy for evacuation and decompression.     Interval Problem Update  12/30/23  Patient is alert and oriented x 3.  On 2 LPM oxygen via nasal cannula.  Blood pressures increasing to 169/94.  Increasing lisinopril to 40 mg daily continues on amlodipine 10 mg daily.  For goal less than 160 systolic.  Denies any headache.  Discussed with case management.  Referral for LTAC placed.    12/31/23  Patient remains alert and orient x 3.  Blood pressures improving on increased dose of lisinopril.  On 2 LPM oxygen by nasal cannula weaning down to room air.  Discussed with Mary FERNANDEZ of neurosurgery, patient is cleared for discharge to postacute placement.      I have discussed this patient's plan of care and discharge plan at IDT rounds today with Case Management, Nursing, Nursing leadership, and other members of the IDT team.    Consultants/Specialty  critical care and neurosurgery    Code Status  Full Code    Disposition  The patient is not medically cleared for discharge to home or a post-acute facility.  Anticipate discharge to: skilled nursing facility    I have placed the appropriate orders for post-discharge needs.    Review of Systems  Review of Systems   Constitutional:  Positive for malaise/fatigue.  Negative for chills and fever.   Respiratory:  Negative for cough and shortness of breath.    Cardiovascular:  Negative for chest pain and palpitations.   Gastrointestinal:  Negative for abdominal pain, nausea and vomiting.   Musculoskeletal:  Negative for joint pain and myalgias.   Neurological:  Positive for weakness and headaches. Negative for dizziness.        Physical Exam  Temp:  [36.2 °C (97.1 °F)-36.6 °C (97.9 °F)] 36.2 °C (97.2 °F)  Pulse:  [60-85] 82  Resp:  [15-16] 15  BP: (136-160)/(78-82) 136/82  SpO2:  [93 %-94 %] 93 %    Physical Exam  Vitals and nursing note reviewed.   Constitutional:       General: He is not in acute distress.     Appearance: Normal appearance. He is not ill-appearing.   HENT:      Head: Normocephalic.      Comments: Right periorbital ecchymosis      Nose: Nose normal.      Mouth/Throat:      Mouth: Mucous membranes are moist.      Pharynx: Oropharynx is clear. No oropharyngeal exudate.   Eyes:      General: No scleral icterus.        Right eye: No discharge.         Left eye: No discharge.      Conjunctiva/sclera: Conjunctivae normal.   Cardiovascular:      Rate and Rhythm: Normal rate and regular rhythm.      Pulses: Normal pulses.      Heart sounds: Normal heart sounds. No murmur heard.  Pulmonary:      Effort: Pulmonary effort is normal. No respiratory distress.      Breath sounds: Normal breath sounds.   Abdominal:      General: Abdomen is flat. Bowel sounds are normal. There is no distension.      Palpations: Abdomen is soft.   Musculoskeletal:         General: No swelling.      Cervical back: Neck supple. No tenderness.      Right lower leg: No edema.      Left lower leg: No edema.   Skin:     General: Skin is warm and dry.      Coloration: Skin is not pale.   Neurological:      Mental Status: He is alert and oriented to person, place, and time. Mental status is at baseline.      Motor: Weakness (Diffuse) present.      Comments: Follows commands in all 4 extremities, no  drift, right dysmetria   Psychiatric:         Thought Content: Thought content normal.         Judgment: Judgment normal.         Fluids    Intake/Output Summary (Last 24 hours) at 12/31/2023 2025  Last data filed at 12/31/2023 1918  Gross per 24 hour   Intake 60 ml   Output 1350 ml   Net -1290 ml       Laboratory  Recent Labs     12/29/23  0422 12/30/23  0423 12/31/23  0549   WBC 12.2* 10.8 9.5   RBC 4.42* 4.57* 4.99   HEMOGLOBIN 13.2* 13.9* 15.4   HEMATOCRIT 41.3* 41.8* 45.0   MCV 93.4 91.5 90.2   MCH 29.9 30.4 30.9   MCHC 32.0* 33.3 34.2   RDW 46.7 45.3 42.6   PLATELETCT 252 253 274   MPV 10.2 10.2 10.1     Recent Labs     12/29/23  0422 12/29/23  0559 12/30/23 0423 12/31/23  0549   SODIUM 148* 147* 140 138   POTASSIUM 3.6  --  3.8 3.7   CHLORIDE 114*  --  107 103   CO2 27  --  24 27   GLUCOSE 124*  --  121* 123*   BUN 15  --  23* 24*   CREATININE 0.95  --  0.94 1.04   CALCIUM 8.8  --  9.4 9.1                   Imaging  EC-ECHOCARDIOGRAM COMPLETE W/O CONT   Final Result      DX-CHEST-PORTABLE (1 VIEW)   Final Result      New right subclavian line tip overlies the cavoatrial junction and no pneumothorax is identified      Similar hypoventilatory appearance of the chest with basilar atelectasis, some areas of scarring probable.      CT-HEAD W/O   Final Result         1. Postop right cerebellar hemorrhage evacuation. Only a minimal amount of blood product and edema remains.   2. Resolved shift of the cerebellum.         DX-CHEST-LIMITED (1 VIEW)   Final Result      1.  Hypoinflation and mild pulmonary vascular congestion.   2.  No pneumonia or pneumothorax.      CT-CTA HEAD WITH & W/O-POST PROCESS   Final Result      1.  Large RIGHT cerebellar intraparenchymal hemorrhage with associated mass effect and 5 mm posterior fossa midline shift.   2.  No intracranial aneurysm, focal high-grade stenosis, or abrupt large vessel cut off.   3.  RIGHT forehead scalp hematoma.      These findings were electronically conveyed to  and received by EMELY FRIAS on 12/27/2023 12:27 AM.         CT-MAXILLOFACIAL W/O PLUS RECONS   Final Result      1.  Large RIGHT forehead scalp hematoma.   2.  No facial fracture.      CT-CSPINE WITHOUT PLUS RECONS   Final Result      1.  Moderate degenerative change of cervical spine.   2.  No fracture or subluxation.           Assessment/Plan  * Intracranial hemorrhage (HCC)- (present on admission)  Assessment & Plan  S/p craniotomy  BP control  PT/OT/ST  Physiatry consulting  Neurochecks    12/30/23  PT, OT, SLP recommending postacute placement.  Discussed with case management about discharge to SNF versus inpatient rehabitation.      Patient is medically cleared for discharge to postacute placement.  Referrals to skilled nursing facility, inpatient rehabitation hospital, and LTAC are in place.    Acute respiratory failure with hypoxia (HCC)  Assessment & Plan  12/31/23  Requiring 2 LPM.  Continue to wean down oxygen to room air.    Dyslipidemia  Assessment & Plan  LDL:221  Diet and lifestyle changes need to occur  Recommend statin therapy    Hypernatremia  Assessment & Plan  12/29 Na:148  Allowing for some hypernatremia to help prevent cerebral edema.  Will loosen his fluid restriction slightly.    Cerebellar hemorrhage (HCC)- (present on admission)  Assessment & Plan  Status post suboccipital craniectomy and evacuation of hematoma    Cephalohematoma- (present on admission)  Assessment & Plan  Continue amlodipine 10 mg daily as per presentation.    Primary hypertension  Assessment & Plan  12/29 added lisinopril 10mg BID  Continue amlodipine 10mg  PRN BP to keep SBP <160  Monitor vitals.    12/30/23  Blood pressures above 160.  Increasing lisinopril to 40 mg daily    12/31/23  Pressures improving.  Continue lisinopril for milligrams daily with amlodipine 10 mg daily         VTE prophylaxis:   SCDs/TEDs   pharmacologic prophylaxis contraindicated due to Recent intracranial surgery with intracranial  hemorrhage      I have performed a physical exam and reviewed and updated ROS and Plan today (12/31/2023). In review of yesterday's note (12/30/2023), there are no changes except as documented above.

## 2024-01-01 NOTE — CARE PLAN
Problem: Skin Integrity  Goal: Skin integrity is maintained or improved  Outcome: Progressing     Problem: Neuro Status  Goal: Neuro status will remain stable or improve  Outcome: Progressing   The patient is Stable - Low risk of patient condition declining or worsening    Shift Goals  Clinical Goals: Mobility, Q4 neuro and blood sugar checks, safety  Patient Goals: comfort  Family Goals: N/A    Progress made toward(s) clinical / shift goals:  Was able to get patient up to chair for about an hour. Will continue to encourage mobility.     Patient is not progressing towards the following goals:

## 2024-01-01 NOTE — CARE PLAN
The patient is Stable - Low risk of patient condition declining or worsening    Shift Goals  Clinical Goals: Q4H neuro check, mobility, safety, skin integrity  Patient Goals: comfort, rest  Family Goals: n/a    Progress made toward(s) clinical / shift goals:    Problem: Knowledge Deficit - Standard  Goal: Patient and family/care givers will demonstrate understanding of plan of care, disease process/condition, diagnostic tests and medications  Outcome: Progressing     Problem: Skin Integrity  Goal: Skin integrity is maintained or improved  Outcome: Progressing     Problem: Neuro Status  Goal: Neuro status will remain stable or improve  Outcome: Progressing     Problem: Craniotomy Surgery  Goal: Post-Operative Craniotomy Surgery: Patient will achieve optimal post-surgical outcomes  Outcome: Progressing

## 2024-01-02 PROBLEM — J96.01 ACUTE RESPIRATORY FAILURE WITH HYPOXIA (HCC): Status: RESOLVED | Noted: 2023-01-01 | Resolved: 2024-01-01

## 2024-01-02 PROBLEM — I62.9 INTRACRANIAL HEMORRHAGE (HCC): Status: RESOLVED | Noted: 2023-01-01 | Resolved: 2024-01-01

## 2024-01-02 PROBLEM — E87.0 HYPERNATREMIA: Status: RESOLVED | Noted: 2023-01-01 | Resolved: 2024-01-01

## 2024-01-02 NOTE — DISCHARGE SUMMARY
Discharge Summary    CHIEF COMPLAINT ON ADMISSION  Chief Complaint   Patient presents with    Trauma Green       Reason for Admission  TBI    Admission Date  12/26/2023     CODE STATUS  Full Code    HPI & HOSPITAL COURSE    Patient is a 78 y.o. male who presented to [facility] on 12/26/2023 after a room mate found him down on the floor with vomit around him.  Apparently he had fallen forward and hit his head.  He was found to be hypertensive to 200's and CT scan of the head found a cerebellar ICH 4.6x2.9x.6cm with surrounding edema and effacement of 4th ventricle, CTA with no vascular malformation, CT maxillofacial with right frontal hematoma without fracture and CT spine with no fracture.  Neurosurgery was consulted and on 12/27/23 took the patient for right suboccipital craniotomy for evacuation and decompression.  He tolerated this well. He was followed closely by both neurology and neurosurgery. They have recommended that his systolic blood pressure goal be below 160 with intermittent monitoring of sodium levels. Please note that his sodium is currently stable at 137 and his blood pressure is currently in a good range. He has ongoing dysarthria, but this is improving.   He also had acute respiratory failure with hypoxia that resolved.    He will transfer to skilled nursing and will follow up with neurosurgery and in the neurology stroke bridge clinic. He should return to the ER if needed.    Therefore, he is discharged in fair and stable condition to skilled nursing facility.    The patient met 2-midnight criteria for an inpatient stay at the time of discharge.      FOLLOW UP ITEMS POST DISCHARGE  Skilled nursing  [facility] stroke bridge clinic  Neurosurgery    DISCHARGE DIAGNOSES  Principal Problem:    Intracranial hemorrhage (HCC) (POA: Yes)  Active Problems:    Primary hypertension (POA: Yes)    Cerebellar hemorrhage (HCC) (POA: Yes)    Hypernatremia (POA: No)    Dyslipidemia (POA: Yes)    Acute respiratory failure  with hypoxia (HCC) (POA: No)  Resolved Problems:    * No resolved hospital problems. *      FOLLOW UP  No future appointments.  No follow-up provider specified.    MEDICATIONS ON DISCHARGE     Medication List      You have not been prescribed any medications.         Allergies  No Known Allergies    DIET  Orders Placed This Encounter   Procedures    Diet Order Diet: Consistent CHO (Diabetic); Fluid modifications: (optional): 1200 ml Fluid Restriction     Standing Status:   Standing     Number of Occurrences:   1     Order Specific Question:   Diet:     Answer:   Consistent CHO (Diabetic) [4]     Order Specific Question:   Fluid modifications: (optional)     Answer:   1200 ml Fluid Restriction [8]       ACTIVITY  As tolerated.  Weight bearing as tolerated    LINES, DRAINS, AND WOUNDS  This is an automated list. Peripheral IVs will be removed prior to discharge.  Peripheral IV 12/29/23 20 G Left;Posterior Hand (Active)   Site Assessment Clean;Dry;Intact 01/02/24 0837   Dressing Type Transparent;Tape 01/02/24 0837   Line Status Flushed;Scrubbed the hub prior to access;Saline locked 01/02/24 0837   Dressing Status Dry;Intact;Old drainage 01/02/24 0837   Dressing Intervention N/A 01/02/24 0837   Dressing Change Due 01/05/24 01/01/24 1600   Infiltration Grading ([facility], [facility]) 0 01/02/24 0837   Phlebitis Scale ([facility] Only) 0 01/02/24 0837       Wound 12/27/23 Incision Head Right xeroform, 4x4, medipore tape (Active)   Wound Image   12/27/23 0526   Site Assessment Clean;Dry;Intact 01/02/24 0837   Periwound Assessment Dry;Clean;Intact 01/02/24 0837   Margins Attached edges 01/02/24 0837   Closure Staples 01/02/24 0837   Drainage Amount None 01/02/24 0837   Wound Cleansing Normal Saline Irrigation 01/01/24 0806   Dressing Status Open to Air 01/02/24 0837   Dressing Changed Observed 01/02/24 0837   Dressing Cleansing/Solutions Not Applicable 12/30/23 2000   Dressing Options Open to Air 01/02/24 0837       Wound 12/27/23  Pressure Injury Knee Lateral Right red, non blanching (Active)   Wound Image   12/27/23 0526   Site Assessment Clean;Dry;Intact;Red 01/01/24 2100   Periwound Assessment Clean;Dry;Intact 01/01/24 2100   Closure Open to air 01/01/24 2100   Drainage Amount None 01/01/24 2100   Dressing Status Open to Air 01/01/24 2100       Peripheral IV 12/29/23 20 G Left;Posterior Hand (Active)   Site Assessment Clean;Dry;Intact 01/02/24 0837   Dressing Type Transparent;Tape 01/02/24 0837   Line Status Flushed;Scrubbed the hub prior to access;Saline locked 01/02/24 0837   Dressing Status Dry;Intact;Old drainage 01/02/24 0837   Dressing Intervention N/A 01/02/24 0837   Dressing Change Due 01/05/24 01/01/24 1600   Infiltration Grading ([facility], [facility]) 0 01/02/24 0837   Phlebitis Scale ([facility] Only) 0 01/02/24 0837               MENTAL STATUS ON TRANSFER   Axox3 with some dysarthria     CONSULTATIONS  Critical care  [facility]  [facility]    PROCEDURES  12/27/23 OPERATION:    1.  Right suboccipital craniectomy and evacuation of a right sizable   cerebellar intracerebral hematoma, decompression of posterior fossa contents.  2.  A 4 cm titanium mesh cranioplasty.     SURGEON:  Alex Patten MD    12/28/23 Dr. Bautista - central line insertion    LABORATORY  Lab Results   Component Value Date    SODIUM 137 01/02/2024    POTASSIUM 3.9 01/02/2024    CHLORIDE 104 01/02/2024    CO2 22 01/02/2024    GLUCOSE 151 (H) 01/02/2024    BUN 29 (H) 01/02/2024    CREATININE 1.31 01/02/2024        Lab Results   Component Value Date    WBC 10.2 01/02/2024    HEMOGLOBIN 16.1 01/02/2024    HEMATOCRIT 46.4 01/02/2024    PLATELETCT 348 01/02/2024        Total time of the discharge process exceeds 40 minutes.

## 2024-01-02 NOTE — PROGRESS NOTES
Neurosurgery Progress Note    Subjective:  Pt awake  Denies ha    Exam:  Oriented x 3 - some mumbled speech/expr aphasia  FC x 4, Perrl 3mm bilat, rt eye ecchymosis   Inc c/d with staples  No drift     BP  Min: 107/74  Max: 115/68  Pulse  Av.8  Min: 79  Max: 103  Resp  Av  Min: 16  Max: 16  Temp  Av.4 °C (97.6 °F)  Min: 36.2 °C (97.2 °F)  Max: 36.6 °C (97.9 °F)  SpO2  Av %  Min: 95 %  Max: 95 %    No data recorded    Recent Labs     23  0549 24  0247 24  0448   WBC 9.5 9.2 10.2   RBC 4.99 5.31 5.27   HEMOGLOBIN 15.4 16.3 16.1   HEMATOCRIT 45.0 47.8 46.4   MCV 90.2 90.0 88.0   MCH 30.9 30.7 30.6   MCHC 34.2 34.1 34.7   RDW 42.6 42.0 41.7   PLATELETCT 274 285 348   MPV 10.1 9.9 10.2       Recent Labs     23  0549 24  0247 24  0448   SODIUM 138 136 137   POTASSIUM 3.7 3.7 3.9   CHLORIDE 103 102 104   CO2 27 20 22   GLUCOSE 123* 123* 151*   BUN 24* 23* 29*   CREATININE 1.04 0.91 1.31   CALCIUM 9.1 9.6 9.8                     Intake/Output                         24 07 - 24 0659 24 - 24 0659     1900-0659 Total 7735-5617 7373-8690 Total                 Intake    P.O.  1170  370 1540  --  -- --    P.O. 4966 701 6264 -- -- --    Total Intake 9549 439 7521 -- -- --       Output    Urine  1070  710 1780  --  -- --    Urine Void (mL) 2056 742 3528 -- -- --    Total Output 6433 327 9434 -- -- --       Net I/O     100 -340 -240 -- -- --              Intake/Output Summary (Last 24 hours) at 2024 1235  Last data filed at 2024 0525  Gross per 24 hour   Intake 1060 ml   Output 1160 ml   Net -100 ml               senna-docusate  2 Tablet BID    And    polyethylene glycol/lytes  1 Packet QDAY PRN    And    magnesium hydroxide  30 mL QDAY PRN    And    bisacodyl  10 mg QDAY PRN    atorvastatin  40 mg Q EVENING    polyethylene glycol/lytes  1 Packet BID    docusate sodium  100 mg BID    lisinopril  40 mg Q DAY    enalaprilat  1.25  mg Q6HRS PRN    labetalol  10 mg Q4HRS PRN    prochlorperazine  10 mg Q6HRS PRN    hydrALAZINE  10-20 mg Q6HRS PRN    amLODIPine  10 mg Q DAY    Respiratory Therapy Consult   Continuous RT    acetaminophen  650 mg Q4HRS PRN    Or    acetaminophen  650 mg Q4HRS PRN    ondansetron  4 mg Q4HRS PRN    Or    ondansetron  4 mg Q4HRS PRN    insulin regular  1-6 Units Q6HRS    And    dextrose bolus  25 g Q15 MIN PRN       Assessment and Plan:  Hospital day # 7 ICH  POD# 6 Rt SOC and evac ICH   Chemical prophylactic DVT therapy: No  Start date/time: TBD- mobilize    Q4 hour neuro checks  Keep SBP <160  Pt/ot/mobilize as justo  CT head 12/28 stable  Dispo per IM, clear from Nsx perspective

## 2024-01-02 NOTE — DISCHARGE PLANNING
Received voice message from [facility] @ [facility] that insurance auth obtained. Called [facility] back for bed availability, left voice message.    Spoke to [facility] bed available today, requesting 1700 transport.     @1445  Advised [facility] of transport time.     @1550  Received call from [facility] at [facility] requesting discharge meds be added to discharge summary.

## 2024-01-02 NOTE — THERAPY
"Physical Therapy   Daily Treatment     Patient Name: Marty Mohr  Age:  78 y.o., Sex:  male  Medical Record #: [id number]  Today's Date: 1/1/2024     Precautions  Precautions: Fall Risk  Comments: s/p craniectomy    Assessment    Pt seen for PT tx session. Pt with significantly improved mobility this session vs previous therapy documentation. Required decreased assist for all mobility and was able to initiate gait training 80 ft with FWW and min A. Presented with limited reactive balance strategies when up requiring min A to correct multiple minor LOB. Remains limited by cognitive deficits with limited carryover, impaired balance, and decreased endurance. Will follow.    Plan    Treatment Plan Status: Continue Current Treatment Plan  Type of Treatment: Bed Mobility, Gait Training, Neuro Re-Education / Balance, Self Care / Home Evaluation, Stair Training, Therapeutic Activities, Therapeutic Exercise  Treatment Frequency: 4 Times per Week  Treatment Duration: Until Therapy Goals Met    DC Equipment Recommendations: Unable to determine at this time  Discharge Recommendations: Recommend post-acute placement for additional physical therapy services prior to discharge home      Subjective    Pt received asleep in bed, easily woken with verbal cues and agreeable to participate. Received with large dark area of bruising around R eye. \"Why do I feel murky?\"     Objective       01/01/24 1440   Precautions   Precautions Fall Risk   Comments s/p craniectomy   Vitals   O2 Delivery Device None - Room Air   Pain 0 - 10 Group   Therapist Pain Assessment Post Activity Pain Same as Prior to Activity;Nurse Notified  (no c/o pain during session however pt repeatedly rubbing his head (not by surgical site))   Cognition    Cognition / Consciousness X   Speech/ Communication Delayed Responses   Level of Consciousness Alert   Ability To Follow Commands 1 Step   Safety Awareness Impaired   New Learning Impaired   Attention Impaired   Comments " pleasant and cooperative, presented with qualities of expressive aphasia ie word finding difficulties   Balance   Sitting Balance (Static) Fair   Sitting Balance (Dynamic) Fair   Standing Balance (Static) Fair -   Standing Balance (Dynamic) Poor +   Weight Shift Sitting Fair   Weight Shift Standing Fair   Skilled Intervention Facilitation;Tactile Cuing;Verbal Cuing   Comments FWW   Bed Mobility    Supine to Sit Standby Assist   Scooting Standby Assist   Skilled Intervention Verbal Cuing   Comments HOBE slightly, up in chair post   Gait Analysis   Gait Level Of Assist Minimal Assist   Assistive Device Front Wheel Walker   Distance (Feet) 80   # of Times Distance was Traveled 1   Deviation Decreased Base Of Support;Bradykinetic   # of Stairs Climbed 0   Skilled Intervention Facilitation;Postural Facilitation;Tactile Cuing;Verbal Cuing   Comments impaired balance with minimal reactive balance strategies requiring assist to correct multiple minor LOB   Functional Mobility   Sit to Stand Contact Guard Assist   Bed, Chair, Wheelchair Transfer Minimal Assist   Transfer Method Stand Step   Mobility bed>up in hallway with FWW>chair   Skilled Intervention Facilitation;Postural Facilitation;Sequencing;Tactile Cuing;Verbal Cuing   How much difficulty does the patient currently have...   Turning over in bed (including adjusting bedclothes, sheets and blankets)? 3   Sitting down on and standing up from a chair with arms (e.g., wheelchair, bedside commode, etc.) 3   Moving from lying on back to sitting on the side of the bed? 3   How much help from another person does the patient currently need...   Moving to and from a bed to a chair (including a wheelchair)? 3   Need to walk in a hospital room? 3   Climbing 3-5 steps with a railing? 2   6 clicks Mobility Score 17   Short Term Goals    Short Term Goal # 1 pt will move supine<>eob with spv in 6 tx for bed mobility   Goal Outcome # 1 Progressing as expected   Short Term Goal # 2 pt  will complete spt with fww and spv in 6 tx for functional mobility.   Goal Outcome # 2 Progressing as expected   Short Term Goal # 3 pt will ambulate 150 ft with fww and spv in 6 tx for household distances.   Goal Outcome # 3 Progressing as expected   Short Term Goal # 4 pt will negotiate a FOS with spv in 6 tx for home access.   Goal Outcome # 4 Goal not met   Physical Therapy Treatment Plan   Physical Therapy Treatment Plan Continue Current Treatment Plan   Anticipated Discharge Equipment and Recommendations   DC Equipment Recommendations Unable to determine at this time   Discharge Recommendations Recommend post-acute placement for additional physical therapy services prior to discharge home   Interdisciplinary Plan of Care Collaboration   IDT Collaboration with  Nursing;Occupational Therapist   Patient Position at End of Therapy Seated;Bed Alarm On;Call Light within Reach;Tray Table within Reach;Phone within Reach   Collaboration Comments RN and OT updated   Session Information   Date / Session Number  1/1- 3 (3/4, 1/2)

## 2024-01-02 NOTE — THERAPY
"Occupational Therapy  Daily Treatment     Patient Name: Marty Mohr  Age:  78 y.o., Sex:  male  Medical Record #: [id number]  Today's Date: 1/1/2024    Precautions: Fall Risk, Swallow Precautions  Comments: SBP <160    Assessment    Pt seen for OT tx. Pt demo progress towards OT goals, but continues to be limited by balance deficits, cognition, decreased activity tolerance, and decreased functional mobility. Pt demo ability to complete most ADLs, functional mobility, and txfs with CGA/min A using FWW. Pt demo improvements in cognition based on previous sessions, but continues to have slurred and sometimes incomprehensible speech. Required occassional verbal cues to reattend to task as he would become distracted with extraneous stimuli. Continue to recommend post-acute placement prior to DC home. Will continue to benefit from ongoing acute OT services.     Plan    Treatment Plan Status: Continue Current Treatment Plan  Type of Treatment: Self Care / Activities of Daily Living, Neuro Re-Education / Balance, Therapeutic Exercises, Therapeutic Activity, Adaptive Equipment  Treatment Frequency: 4 Times per Week  Treatment Duration: Until Therapy Goals Met    DC Equipment Recommendations: Unable to determine at this time  Discharge Recommendations: Recommend post-acute placement for additional occupational therapy services prior to discharge home    Subjective    \"My head feels foggy.\"      Objective      Vitals   O2 Delivery Device None - Room Air   Pain 0 - 10 Group   Therapist Pain Assessment Post Activity Pain Same as Prior to Activity;Nurse Notified  (Not rated, agreeable to activity)   Cognition    Cognition / Consciousness X   Speech/ Communication Slurred   Level of Consciousness Alert   New Learning Impaired   Attention Impaired   Balance   Sitting Balance (Static) Fair   Sitting Balance (Dynamic) Fair -   Standing Balance (Static) Fair -   Standing Balance (Dynamic) Poor +   Weight Shift Sitting Fair   Weight Shift " Standing Fair   Skilled Intervention Verbal Cuing;Tactile Cuing;Compensatory Strategies;Facilitation   Comments w/FWW   Bed Mobility    Supine to Sit Contact Guard Assist   Sit to Supine   (Up to chair post)   Scooting Contact Guard Assist   Skilled Intervention Verbal Cuing   Comments HOB slightly elevated   Activities of Daily Living   Grooming Minimal Assist;Seated   Upper Body Dressing Minimal Assist   Lower Body Dressing Contact Guard Assist  (Demo ability to tailor sit to don socks. Intially tried to attempt bending towards his toes.)   Toileting   (NT; declined need during session)   Skilled Intervention Verbal Cuing;Tactile Cuing;Compensatory Strategies   6 Clicks Daily Activity Score 16   Functional Mobility   Sit to Stand Contact Guard Assist   Bed, Chair, Wheelchair Transfer Contact Guard Assist   Toilet Transfers   (NT; declined need during session)   Mobility Functional mobility in room and hallway (working on way finding) w/FWW   Skilled Intervention Verbal Cuing;Tactile Cuing;Compensatory Strategies   ICU Target Mobility Level   ICU Mobility - Targeted Level Level 3B   Activity Tolerance   Sitting in Chair Up to chair post   Sitting Edge of Bed 5 min   Standing 5 min   Patient / Family Goals   Patient / Family Goal #1 To go home   Goal #1 Outcome Progressing slower than expected   Short Term Goals   Short Term Goal # 1 Pt will demo LB dressing w/ min A   Goal Outcome # 1 Progressing as expected   Short Term Goal # 2 Pt will demo seated grooming CGA   Goal Outcome # 2 Progressing as expected   Short Term Goal # 3 Pt will demo ADL txf w/ min A   Goal Outcome # 3 Goal met, new goal added   Short Term Goal # 3 B Pt will complete ADL txfs with supv   Education Group   Education Provided Role of Occupational Therapist;Activities of Daily Living   Role of Occupational Therapist Patient Response Patient;Acceptance;Explanation;Reinforcement Needed   ADL Patient Response  Patient;Acceptance;Explanation;Reinforcement Needed

## 2024-01-02 NOTE — CARE PLAN
Problem: Fall Risk  Goal: Patient will remain free from falls  Outcome: Progressing    Fall Prevention in place: Wearing teaded socks, bed locked and in lowest position, call light within reach, DME in bathroom, frame alarm on (GIAN bed).       The patient is Stable - Low risk of patient condition declining or worsening    Shift Goals  Clinical Goals: safety, comfort  Patient Goals: rest  Family Goals: na    Progress made toward(s) clinical / shift goals:  progressing     Patient is not progressing towards the following goals:

## 2024-01-02 NOTE — CARE PLAN
The patient is Stable - Low risk of patient condition declining or worsening    Shift Goals  Clinical Goals: safety, mobility  Patient Goals: sleep  Family Goals: not present    Progress made toward(s) clinical / shift goals:    Problem: Fall Risk  Goal: Patient will remain free from falls  Outcome: Progressing  Note: Safety precautions are in place including bed locked and in lowest position, upper bed rails up, bed alarm on, call light within reach, treaded socks on, tray table and personal belongings within reach.       Patient is not progressing towards the following goals:

## 2024-01-02 NOTE — THERAPY
"Occupational Therapy  Daily Treatment     Patient Name: Marty Mohr  Age:  78 y.o., Sex:  male  Medical Record #: [id number]  Today's Date: 1/2/2024     Precautions  Precautions: Fall Risk  Comments: s/p craniectomy    Assessment    Pt was seen for OT treatment. Pt required increased encouragement to participate.  Pt required SBA/Min A for bed mobility for supine to sit at EOB. Demo only fair dynamic sitting balance with leaning laterally and posteriorly with fatigue.  SBA/Min A for washing face declining to do oral hygiene . Min A for UB dressing seated base with extended time to process MP to change gown. Min A for LB dressing using tailor tech seated base to don socks and pants with cues for problem solving,  sequencing and processing MP. Pt attempted to but then declined to stand for clothing management up over hips and laid back down in bed stating ,\"I can't do this \", then attempted to bridge in bed to complete task  needing Min A  to complete. Pt refused to attempt all ADL transfers. Pt required extended time to complete all tasks. Pt demo low functional cognition needed for simple self care tasks. Pt is limited by decreased functional mobility, activity tolerance, strength , balance and coordination which is affecting pt's ability to complete ADL's, I ADL's and ADL transfers at baseline which is impacting functional Indep. Pt refused to sit up in chair lying back down in bed. RN updated on OT treatment findings and recommendations . Will continue to follow.          Plan    Treatment Plan Status: (P) Continue Current Treatment Plan  Type of Treatment: Self Care / Activities of Daily Living, Neuro Re-Education / Balance, Therapeutic Exercises, Therapeutic Activity, Adaptive Equipment  Treatment Frequency: 4 Times per Week  Treatment Duration: Until Therapy Goals Met    DC Equipment Recommendations: (P) Unable to determine at this time  Discharge Recommendations: (P) Recommend post-acute placement for additional " "occupational therapy services prior to discharge home    Subjective    \"I can't do that now\". \"OK, come back in one hour\", then stated 2 mins later \"Come back in 5 mins \".      Objective       01/02/24 1143   Cognition    Cognition / Consciousness X   Speech/ Communication Delayed Responses   Level of Consciousness Alert  (times of lethargy with activity)   Ability To Follow Commands 1 Step   Safety Awareness Impaired   New Learning Impaired   Attention Impaired   Sequencing Impaired   Comments continues with word finding difficulties; needed constant cues at EOB to open his eyes and remain alert to complete activities.   Passive ROM Upper Body   Comments WFL   Active ROM Upper Body   Dominant Hand Right   Comments WFL   Strength Upper Body   Comments WFL for simple self care tasks.   Other Treatments   Other Treatments Provided Worked on simple cognition activties with only fair responses.Poor carry over.   Balance   Sitting Balance (Static) Fair   Sitting Balance (Dynamic) Fair -   Standing Balance (Static) Fair -   Standing Balance (Dynamic) Poor +   Weight Shift Sitting Fair   Weight Shift Standing Fair   Comments with HHA for STS   Bed Mobility    Supine to Sit Standby Assist   Sit to Supine Minimal Assist   Scooting Standby Assist   Skilled Intervention Verbal Cuing;Tactile Cuing;Sequencing;Compensatory Strategies   Comments HOB slightly elevated and use of bedrail   Activities of Daily Living   Grooming Minimal Assist;Seated  (with extended time, gave up easily and declined to complete.)   Bathing   (declined)   Upper Body Dressing Minimal Assist   Lower Body Dressing Contact Guard Assist  (Tailor sit to don socks and pant legs with cues for problem solving and sequencing. Refused to stand to do clothing management lying back down in bed and bridged to bring pant up over hips with Min A.)   Toileting   (NT declined the need)   Skilled Intervention Verbal Cuing;Tactile Cuing;Sequencing;Postural " "Facilitation;Compensatory Strategies   Comments Pt demo poor functional cognition and followed only 20 % of commands . Easily frustrated and continued to say, \"Come back in an hour, then come back in 5 mins \". Needed increased encouragement to continue with simple self care tasks.   Functional Mobility   Sit to Stand Contact Guard Assist   Bed, Chair, Wheelchair Transfer Refused   Toilet Transfers Refused   Transfer Method Stand Step   Comments cues to sequencing STS safely ; pt fatigued with EOB activity.   Activity Tolerance   Comments low activity tolerance   Patient / Family Goals   Patient / Family Goal #1 To go home   Goal #1 Outcome Progressing slower than expected   Short Term Goals   Short Term Goal # 1 Pt will demo LB dressing w/ min A   Goal Outcome # 1 Progressing as expected   Short Term Goal # 2 Pt will demo seated grooming CGA   Goal Outcome # 2 Progressing as expected   Short Term Goal # 3 Pt will demo ADL txf w/ min A   Goal Outcome # 3 Goal met, new goal added   Short Term Goal # 3 B Pt will complete ADL txfs with supv   Goal Outcome # 3 B Progressing slower than expected   Occupational Therapy Treatment Plan    O.T. Treatment Plan Continue Current Treatment Plan   Anticipated Discharge Equipment and Recommendations   DC Equipment Recommendations Unable to determine at this time   Discharge Recommendations Recommend post-acute placement for additional occupational therapy services prior to discharge home   Interdisciplinary Plan of Care Collaboration   IDT Collaboration with  Nursing   Collaboration Comments RN updated           "

## 2024-01-02 NOTE — DISCHARGE PLANNING
Agency/Facility Name: [facility]  Outcome: LVM for admissions regarding status of patient referral, requested a call back.     Agency/Facility Name: [facility]  Outcome: LVM for admissions regarding status of patient referral, requested a call back.

## 2024-01-02 NOTE — DISCHARGE PLANNING
DC Transport Scheduled    Received request at: 1/2/2024 at 1416    Transport Company Scheduled:  [employer]  Spoke with Scott at [facility] to schedule transport.    Scheduled Date: 1/2/2024  Scheduled Time: 1715    Destination: [facility] at [address] [facility]     Notified care team of scheduled transport via [employer].     If there are any changes needed to the DC transportation scheduled, please contact [facility] at ext. [phone] between the hours of 2117-6765 Mon-Fri. If outside those hours, contact the ED Case Manager at ext. [phone].

## 2024-01-02 NOTE — PROGRESS NOTES
[facility] Medicine Daily Progress Note    Date of Service  1/1/2024    Chief Complaint  Marty Mohr is a 78 y.o. male admitted 12/26/2023 with found down cerebellar hematoma.    Hospital Course  78 y.o. male who presented 12/26/2023 after a room mate found him down on the floor with vomit around him.  Apparently he had fallen forward and hit his head.  He was found to be hypertensive to 200's and CT scan of the head found a cerebellar ICH 4.6x2.9x.6cm with surrounding edema and effacement of 4th ventricle, CTA with no vascular malformation, CT maxillofacial with right frontal hematoma without fracture and CT spine with no fracture.  Neurosurgeon consulted and on 12/27 took the patient for right suboccipital craniotomy for evacuation and decompression.     Interval Problem Update    DONTRELL ON  He reports improved speech when speaking slowly.  Was able to get up with PT/OT to the chair.  He denies pain, headache, N/V, dysequilibrium.  Awaiting SNF referrals, HH placed in case he is declined.  Cbc reviewed, normal.  CMP reviewed, normal.  Magnesium normal.  A1c 6.1.    I have discussed this patient's plan of care and discharge plan at IDT rounds today with Case Management, Nursing, Nursing leadership, and other members of the IDT team.    Consultants/Specialty  critical care, neurology, and neurosurgery    Code Status  Full Code    Disposition  The patient is medically cleared for discharge to home or a post-acute facility.  Anticipate discharge to: skilled nursing facility    I have placed the appropriate orders for post-discharge needs.    Review of Systems  Review of Systems   Respiratory:  Negative for shortness of breath.    Cardiovascular:  Negative for chest pain.   Gastrointestinal:  Negative for nausea and vomiting.   Musculoskeletal:  Negative for back pain, joint pain, myalgias and neck pain.   Neurological:  Positive for speech change. Negative for dizziness and headaches.   Psychiatric/Behavioral:  Negative  for depression. The patient is not nervous/anxious.         Physical Exam  Temp:  [36.3 °C (97.3 °F)-36.8 °C (98.2 °F)] 36.4 °C (97.6 °F)  Pulse:  [67-82] 79  Resp:  [16-18] 16  BP: (112-153)/(68-92) 115/68  SpO2:  [93 %-95 %] 95 %    Physical Exam  Vitals and nursing note reviewed.   Constitutional:       General: He is not in acute distress.     Appearance: Normal appearance. He is not ill-appearing.   HENT:      Head: Normocephalic.      Comments: Right periorbital ecchymosis. Right craniotomy incision approximated with staples, nonerythematous, nontender.     Nose: Nose normal.      Mouth/Throat:      Mouth: Mucous membranes are moist.   Eyes:      General: No scleral icterus.     Conjunctiva/sclera: Conjunctivae normal.   Cardiovascular:      Rate and Rhythm: Normal rate and regular rhythm.      Pulses: Normal pulses.      Heart sounds: Normal heart sounds. No murmur heard.     No friction rub. No gallop.   Pulmonary:      Effort: Pulmonary effort is normal. No respiratory distress.      Breath sounds: Normal breath sounds. No wheezing, rhonchi or rales.   Abdominal:      General: Abdomen is flat. Bowel sounds are normal. There is no distension.      Palpations: Abdomen is soft.      Tenderness: There is no abdominal tenderness. There is no guarding or rebound.   Genitourinary:     Comments: No fernando  Musculoskeletal:      Cervical back: Neck supple. No tenderness.      Right lower leg: No edema.      Left lower leg: No edema.   Skin:     General: Skin is warm and dry.   Neurological:      Mental Status: He is alert.      Cranial Nerves: Dysarthria (Mild) present.      Comments: Appropriately conversant   Psychiatric:         Mood and Affect: Mood normal.         Behavior: Behavior normal.         Thought Content: Thought content normal.         Judgment: Judgment normal.         Fluids    Intake/Output Summary (Last 24 hours) at 1/1/2024 7871  Last data filed at 1/1/2024 1600  Gross per 24 hour   Intake --    Output 1920 ml   Net -1920 ml         Laboratory  Recent Labs     12/30/23  0423 12/31/23  0549 01/01/24  0247   WBC 10.8 9.5 9.2   RBC 4.57* 4.99 5.31   HEMOGLOBIN 13.9* 15.4 16.3   HEMATOCRIT 41.8* 45.0 47.8   MCV 91.5 90.2 90.0   MCH 30.4 30.9 30.7   MCHC 33.3 34.2 34.1   RDW 45.3 42.6 42.0   PLATELETCT 253 274 285   MPV 10.2 10.1 9.9       Recent Labs     12/30/23  0423 12/31/23  0549 01/01/24  0247   SODIUM 140 138 136   POTASSIUM 3.8 3.7 3.7   CHLORIDE 107 103 102   CO2 24 27 20   GLUCOSE 121* 123* 123*   BUN 23* 24* 23*   CREATININE 0.94 1.04 0.91   CALCIUM 9.4 9.1 9.6                     Imaging  EC-ECHOCARDIOGRAM COMPLETE W/O CONT   Final Result      DX-CHEST-PORTABLE (1 VIEW)   Final Result      New right subclavian line tip overlies the cavoatrial junction and no pneumothorax is identified      Similar hypoventilatory appearance of the chest with basilar atelectasis, some areas of scarring probable.      CT-HEAD W/O   Final Result         1. Postop right cerebellar hemorrhage evacuation. Only a minimal amount of blood product and edema remains.   2. Resolved shift of the cerebellum.         DX-CHEST-LIMITED (1 VIEW)   Final Result      1.  Hypoinflation and mild pulmonary vascular congestion.   2.  No pneumonia or pneumothorax.      CT-CTA HEAD WITH & W/O-POST PROCESS   Final Result      1.  Large RIGHT cerebellar intraparenchymal hemorrhage with associated mass effect and 5 mm posterior fossa midline shift.   2.  No intracranial aneurysm, focal high-grade stenosis, or abrupt large vessel cut off.   3.  RIGHT forehead scalp hematoma.      These findings were electronically conveyed to and received by EMELY FRIAS on 12/27/2023 12:27 AM.         CT-MAXILLOFACIAL W/O PLUS RECONS   Final Result      1.  Large RIGHT forehead scalp hematoma.   2.  No facial fracture.      CT-CSPINE WITHOUT PLUS RECONS   Final Result      1.  Moderate degenerative change of cervical spine.   2.  No fracture or subluxation.            Assessment/Plan  * Intracranial hemorrhage (HCC)- (present on admission)  Assessment & Plan  Right cerebellar hemorrhagic infarct  Neurosurgery consulted, s/p craniotomy and evacuation of hematoma 12/27 by Dr. Patten  Neurology consulted  PT/OT/ST recommend post-acute placement  IPR consulted, not a candidate due to lack of discharge support  SNF referrals in progress  LTAC referral placed due to ICU stay s/p craniotomy but minimal acute needs    Acute respiratory failure with hypoxia (HCC)  Assessment & Plan  Resolved    Dyslipidemia- (present on admission)  Assessment & Plan  LDL:221  Initiated high-intensity atorvastatin 40 mg, up-titrate if tolerated    Hypernatremia  Assessment & Plan  Resolved    Cerebellar hemorrhage (HCC)- (present on admission)  Assessment & Plan  Status post suboccipital craniectomy and evacuation of hematoma    Primary hypertension- (present on admission)  Assessment & Plan  Goal SBP <160 per Neurology due to hemorrhagic cerebellar infarct  Likely undiagnosed, initiated on antihypertensives this admission  Continue lisinopril and amlodipine       VTE prophylaxis:   SCDs/TEDs   pharmacologic prophylaxis contraindicated due to intracranial hemorrhage    I have performed a physical exam and reviewed and updated ROS and Plan today (1/1/2024). In review of yesterday's note (12/31/2023), there are no changes except as documented above.

## 2024-01-02 NOTE — DISCHARGE PLANNING
"CHARLIE FONTAINE called [facility] to follow up. Patient accepted and insurance authorization initiated. Choice form provided and signed by patient. He is agreeable to [facility]. RN CM to assist with transportation once insurance authorization is received.   1454: Cleared for discharge to [facility]. Patient and daughter, Jaymie, agreeable to transport. IMM signed verbally by patient because he reports he was \"tired of signing\", but patient expressed he is ok to go.   Discharge packet complete with face sheet x2, Discharge summary and 72 hr of chart notes. COBRA signed by provider and verbally by patient and daughter, Jaymie. Patient requiring no narcotic prescriptions.   Transport via [employer] [email or web address] scheduled for 1715. IDT notified. No further needs from case management.     Case Management Discharge Planning    Admission Date: 12/26/2023  GMLOS: 6.6  ALOS: 6    6-Clicks ADL Score: 16  6-Clicks Mobility Score: 17  PT and/or OT Eval ordered: Yes  Post-acute Referrals Ordered: Yes  Post-acute Choice Obtained: Yes  Has referral(s) been sent to post-acute provider:  Yes      Anticipated Discharge Dispo: Discharge Disposition: D/T to SNF with Medicare cert in anticipation of skilled care (03)    DME Needed: No    Action(s) Taken: Patient Conference, Choice obtained, Referral(s) sent, and Acceptance Received    Escalations Completed: Insurance     Medically Clear: Yes    Next Steps: Pending insurance authorization with [facility].     Barriers to Discharge: Pending Placement and Pending Insurance Authorization    Is the patient up for discharge tomorrow: No        "

## 2024-01-02 NOTE — ANESTHESIA POSTPROCEDURE EVALUATION
Patient: Marty Mohr    Procedure Summary       Date: 12/27/23 Room / Location: [facility] 05 / SURGERY [facility]    Anesthesia Start: 0208 Anesthesia Stop: 0352    Procedure: CRANIOTOMY (Right: Head) Diagnosis: (Right Cerabellar Hematoma)    Surgeons: Alex Patten M.D. Responsible Provider: Clifford Gibbons M.D.    Anesthesia Type: general ASA Status: 4 - Emergent            Final Anesthesia Type: general  Last vitals  BP   Blood Pressure : 107/74    Temp   36.2 °C (97.2 °F)    Pulse   91   Resp   16    SpO2   95 %      Anesthesia Post Evaluation    Patient location during evaluation: PACU  Patient participation: complete - patient participated  Level of consciousness: sleepy but conscious    Airway patency: patent  Anesthetic complications: no  Cardiovascular status: hemodynamically stable  Respiratory status: acceptable  Hydration status: euvolemic    PONV: none          There were no known notable events for this encounter.     Nurse Pain Score: 0 (NPRS)

## 2024-01-03 NOTE — ED NOTES
Pt discharged back to [facility] with [facility] transport. Pt was given follow up instructions. Pt verbalized understanding of all instructions for discharge and is out of ED with [facility] transport. AOx4

## 2024-01-03 NOTE — ED PROVIDER NOTES
"ED Provider Note    Primary care provider: Pcp Pt States None    CHIEF COMPLAINT  Chief Complaint   Patient presents with    Slurred Speech     Additional history obtained from: EMS, they report they picked him up from the rehab facility for new slurred speech, they do not have any additional information.    HPI  Lewis Mohr is a 78 y.o. male who presents to the Emergency Department for dysarthria.  The patient does not know exactly why he is here.  He states he feels fine, he does not note any new changes in his speech since being discharged from here yesterday.      External Record Review: Patient presented 12/26/2023 after a room mate found him down on the floor with vomit around him.  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  Neurosurgery was consulted and on 12/27/23 took the patient for right suboccipital craniotomy for evacuation and decompression.   Patient did well postoperatively, goals of blood pressure with systolics below 160.  He was discharged yesterday to rehab facility, the most recent progress notes on the inpatient service stated that he had some dysarthria and expressive aphasia.    REVIEW OF SYSTEMS  See HPI.     PAST MEDICAL HISTORY       SURGICAL HISTORY  patient denies any surgical history    SOCIAL HISTORY  Social History     Tobacco Use    Smoking status: Never    Smokeless tobacco: Never   Vaping Use    Vaping Use: Never used   Substance Use Topics    Alcohol use: Yes     Comment: occ    Drug use: Never      Social History     Substance and Sexual Activity   Drug Use Never       FAMILY HISTORY  No family history on file.    CURRENT MEDICATIONS  Reviewed.  See Encounter Summary.     ALLERGIES  No Known Allergies    PHYSICAL EXAM  VITAL SIGNS: /74   Pulse 88   Temp 36.7 °C (98.1 °F) (Temporal)   Resp 17   Ht 1.778 m (5' 10\")   Wt 93 " kg (205 lb)   SpO2 94%   BMI 29.41 kg/m²   Constitutional: Awake, alert in no apparent distress.  HENT: Normocephalic, Bilateral external ears normal. Nose normal.  Old ecchymosis to the right face.  Eyes: Conjunctiva normal, non-icteric, EOMI.    Thorax & Lungs: Easy unlabored respirations, Clear to ascultation bilaterally.  Cardiovascular: Regular rate, Regular rhythm, No murmurs, rubs or gallops. Bilateral pulses symmetrical.   Abdomen:  Soft, nontender, nondistended, normal active bowel sounds.   :    Skin: Visualized skin is  Dry, No erythema, No rash.   Musculoskeletal:   No cyanosis, clubbing or edema. No leg asymmetry.   Neurologic: Alert, Grossly non-focal.  Some expressive aphasia but patient can communicate without difficulty, mild dysarthria.  Psychiatric: Normal affect, Normal mood  Lymphatic:        RADIOLOGY  I have independently interpreted the diagnostic imaging associated with this visit and am waiting the final reading from the radiologist.   My preliminary interpretation is as follows: No obvious hemorrhage    Radiologist interpretation:   CT-HEAD W/O   Final Result      1.  No evidence of acute territorial infarct, intracranial hemorrhage or mass lesion.   2.  Status post right suboccipital cranioplasty with underlying right cerebellar hypoattenuation likely on the basis of evolving encephalomalacia.   3.  Mild diffuse cerebral substance loss.   4.  Mild microangiopathic ischemic change versus demyelination or gliosis.             COURSE & MEDICAL DECISION MAKING  Pertinent Labs & Imaging studies reviewed. (See chart for details)    COURSE & MEDICAL DECISION MAKING  Pertinent Labs & Imaging studies reviewed. (See chart for details)    Differential diagnoses include but are not limited to: Baseline versus acute on chronic hemorrhage    11:47 AM - Nursing notes reviewed, patient seen and examined at bedside.    Escalation of care considered, and ultimately not performed: blood analysis and  acute inpatient care management, however at this time, the patient is most appropriate for outpatient management.    Barriers to care at this time, including but not limited to: Patient does not have established PCP.   Decision Making:  This is a pleasant 78 y.o. year old male who presents with dysarthria and some expressive aphasia.  This was noted on his inpatient stay, does not appear to be new.  The patient tells me this is not new.  I am not entirely sure why he was sent here for this, though it is quite possible that his rehab does not know his baseline as he is only been there for fewer than 24 hours.    Because there was some uncertainty I did repeat a head CT today that does not show any acute process.  He can go back to rehab at this time.      ADDITIONAL PROBLEM LIST  Intracranial hemorrhage history, stable     The patient was discharged home (see d/c instructions) was told to return immediately for any signs or symptoms listed, or any worsening at all.  The patient verbally agreed to the discharge precautions and follow-up plan which is documented in EPIC.    Discharge Medications:  New Prescriptions    No medications on file       FINAL IMPRESSION  1. Encounter for medical screening examination    2. History of intracranial hemorrhage

## 2024-01-03 NOTE — ED NOTES
Spoke to social work about patient transfer back to [facility]. Report called to [facility] rn.

## 2024-01-03 NOTE — ED NOTES
Per SLP and Dr. Pena upon discharge in December, patient noted to have slurred speech and dysarthria. Patient states he feels like he is at baseline speech.

## 2024-01-03 NOTE — THERAPY
"Physical Therapy   Daily Treatment     Patient Name: Marty Mohr  Age:  78 y.o., Sex:  male  Medical Record #: [id number]  Today's Date: 1/2/2024     Precautions  Precautions: Fall Risk  Comments: s/p craniectomy, SBP<160    Assessment    Pt seen for PT tx session. Able to progress gait distance to 100 ft with FWW and min A for balance. Benefited from cueing for step through gait to improve fluidity with good carryover within session, however carryover between sessions is unlikely. Remains primarily limited by cognitive deficits, impaired balance, and variable HA pain. Will follow.    Plan    Treatment Plan Status: Continue Current Treatment Plan  Type of Treatment: Bed Mobility, Gait Training, Neuro Re-Education / Balance, Self Care / Home Evaluation, Stair Training, Therapeutic Activities, Therapeutic Exercise  Treatment Frequency: 4 Times per Week  Treatment Duration: Until Therapy Goals Met    DC Equipment Recommendations: Unable to determine at this time  Discharge Recommendations: Recommend post-acute placement for additional physical therapy services prior to discharge home      Subjective    Pt received resting in bed, agreeable to participate. \"Tell your people they'll be missing out on the big bucks.\"     Objective       01/02/24 1526   Precautions   Precautions Fall Risk   Comments s/p craniectomy, SBP<160   Vitals   O2 Delivery Device None - Room Air   Pain 0 - 10 Group   Therapist Pain Assessment Prior to Activity;Nurse Notified  (c/o HA pain on first attempt, RN notified. No further c/o of HA pain upon second attempt)   Cognition    Cognition / Consciousness X   Speech/ Communication Delayed Responses;Slurred   Level of Consciousness Alert   Ability To Follow Commands 1 Step   Safety Awareness Impaired   New Learning Impaired   Attention Impaired   Sequencing Impaired   Comments pleasant and cooperative, gently rubbing his face/head throughout session, delayed processing, confused   Balance   Sitting " Balance (Static) Fair   Sitting Balance (Dynamic) Fair   Standing Balance (Static) Fair -   Standing Balance (Dynamic) Poor +   Weight Shift Sitting Fair   Weight Shift Standing Fair   Skilled Intervention Tactile Cuing;Verbal Cuing   Comments FWW   Bed Mobility    Supine to Sit Supervised   Sit to Supine Supervised   Scooting Supervised   Rolling Supervised   Skilled Intervention Verbal Cuing   Comments HOBE slightly   Gait Analysis   Gait Level Of Assist Minimal Assist   Assistive Device Front Wheel Walker   Distance (Feet) 100   # of Times Distance was Traveled 1   Deviation Step To;Decreased Base Of Support;Bradykinetic   # of Stairs Climbed 0   Skilled Intervention Sequencing;Tactile Cuing;Verbal Cuing   Comments benefited from cueing for step through gait to improve fluidity, with improved balance when up today vs yesterday   Functional Mobility   Sit to Stand Contact Guard Assist   Bed, Chair, Wheelchair Transfer Minimal Assist   Transfer Method Stand Step   Mobility bed>up in hallway with FWW>bed   Skilled Intervention Facilitation;Sequencing;Tactile Cuing;Verbal Cuing   How much difficulty does the patient currently have...   Turning over in bed (including adjusting bedclothes, sheets and blankets)? 4   Sitting down on and standing up from a chair with arms (e.g., wheelchair, bedside commode, etc.) 3   Moving from lying on back to sitting on the side of the bed? 3   How much help from another person does the patient currently need...   Moving to and from a bed to a chair (including a wheelchair)? 3   Need to walk in a hospital room? 3   Climbing 3-5 steps with a railing? 3   6 clicks Mobility Score 19   Short Term Goals    Short Term Goal # 1 pt will move supine<>eob with spv in 6 tx for bed mobility   Goal Outcome # 1 Goal met   Short Term Goal # 2 pt will complete spt with fww and spv in 6 tx for functional mobility.   Goal Outcome # 2 Progressing as expected   Short Term Goal # 3 pt will ambulate 150 ft  with fww and spv in 6 tx for household distances.   Goal Outcome # 3 Progressing as expected   Short Term Goal # 4 pt will negotiate a FOS with spv in 6 tx for home access.   Goal Outcome # 4 Goal not met   Physical Therapy Treatment Plan   Physical Therapy Treatment Plan Continue Current Treatment Plan   Anticipated Discharge Equipment and Recommendations   DC Equipment Recommendations Unable to determine at this time   Discharge Recommendations Recommend post-acute placement for additional physical therapy services prior to discharge home   Interdisciplinary Plan of Care Collaboration   IDT Collaboration with  Nursing   Patient Position at End of Therapy In Bed;Bed Alarm On;Call Light within Reach;Tray Table within Reach;Phone within Reach   Collaboration Comments RN updated   Session Information   Date / Session Number  1/2- 4 (4/4, 1/2)

## 2024-01-03 NOTE — PROGRESS NOTES
"- Patient discharged to [place] via [facility] with Jim.   - AVS reviewed, signed and dated by patient.   - No paper prescriptions required (Rx electronically sent to pharmacy of choice).  - Patient's PIV removed.   - Envelope handed to escort with COBRA carbon copy, copy of AVS, Discharge Summary and Face Sheet.  - Patient understands need for follow-up care and understands instructions to make appointment with surgical team.  - Patient is A+O x 4 and verbalized understanding of instructions. Vital signs stable and patient is content with care and plan for discharge. All belongings collected by patient and discharged with patient.    /72   Pulse 89   Temp 36.8 °C (98.2 °F) (Temporal)   Resp 17   Ht 1.778 m (5' 10\")   Wt 92.4 kg (203 lb 11.3 oz)   SpO2 96%   "

## 2024-01-03 NOTE — ED TRIAGE NOTES
Chief Complaint   Patient presents with    Slurred Speech     BIB EMS from [facility] for Slurred speech, LNK [id number]. -Stroke assessment. Patient AOX4, GCS of 15. Patient on 2lpm baseline 02. Patient had a craniotomy on 12/27 for a TBI. Wound site appears to be healing well. Staples in tact with no redness or discharge.   Patient denies any complaint other than the slurred speech.     Vitals:    01/03/24 1116   BP: (P) 127/74   Pulse: (P) 88   Resp: (P) 17   Temp: (P) 36.7 °C (98.1 °F)   SpO2: (P) 94%

## 2024-01-03 NOTE — DISCHARGE PLANNING
Agency/Facility Name: [facility]  Spoke To: Negrita  Outcome: After visit summary with discharge medication list sent via manual fax.

## 2024-01-03 NOTE — DISCHARGE PLANNING
RN has notified SW that Pt is medically clear and can return to [place]. SW spoke to Birgit GUERRA who is in agreement to take Pt back.   LEE ANN contacted Pt daughter who is in agreement for Pt to return as long as her dad is in agreement.     PCS completed and faxed to [facility]  COBRA completed  Transfer Packet placed with RN    Transportation time arranged for 1430  RN updated on transportation time.

## 2024-01-03 NOTE — DISCHARGE INSTRUCTIONS
I was told that the patient was sent here to be evaluated for some dysarthria and expressive aphasia.  This has been a known complication for his intracranial hemorrhage, he was like this when he was here for the past week.  I did repeat a head CT today that does not show any acute infarct or any acute hemorrhage.    The patient tells me that this is his baseline as well.  It would be important to recognize what his baseline is so we can avoid further ER visits if there is no acute change in status.

## 2024-01-04 PROBLEM — J96.90 RESPIRATORY FAILURE (HCC): Status: ACTIVE | Noted: 2024-01-01

## 2024-01-04 PROBLEM — J69.0 ASPIRATION PNEUMONIA (HCC): Status: ACTIVE | Noted: 2024-01-01

## 2024-01-04 PROBLEM — E87.1 HYPONATREMIA: Status: RESOLVED | Noted: 2024-01-01 | Resolved: 2024-01-01

## 2024-01-04 PROBLEM — Z86.79 HISTORY OF CEREBELLAR HEMORRHAGE: Status: ACTIVE | Noted: 2024-01-01

## 2024-01-04 PROBLEM — E87.1 HYPONATREMIA: Status: ACTIVE | Noted: 2024-01-01

## 2024-01-04 PROBLEM — E87.0 HYPERNATREMIA: Status: ACTIVE | Noted: 2024-01-01

## 2024-01-04 NOTE — ED NOTES
Pt remains GCS=14, Ox2.  Redirectable.  Bed alarm in place.      Pt continues to require 2 L NC to maintain O2 saturation > 90%.    Offered pt urinal but he states he doesn't need to urinate.

## 2024-01-04 NOTE — ED TRIAGE NOTES
"Chief Complaint   Patient presents with    ALOC     From [facility], staff stated pt was 'unresponsive' in his WC, pt was placed in his bed and then returned to baseline mentation of A+Ox2, recent cerebellar ICH on 12/26 with R suboccipital craniotomy on 12/27, pt has no complaints on arrival,      Pt BIB [facility] for above complaints, VSS on RA, GCS 14 baseline, NAD, .    Pt was seen in this ER yesterday for 'slurred speech', head CT showed no acute changes and was dc'd back to rehab.    Pt has bruising to face that is reported to be from initial TBI incident. Craniotomy site appears well healed with no signs of infection.     /78   Pulse 90   Temp 36.1 °C (97 °F) (Temporal)   Resp 16   Ht 1.778 m (5' 10\")   Wt 93 kg (205 lb)   SpO2 94%   BMI 29.41 kg/m²     "

## 2024-01-04 NOTE — ED NOTES
Pt requesting water.  Explained NPO status and concern for aspiration.  Pt able to cough up some yellow mucous.  Swabs provided to pt.

## 2024-01-04 NOTE — ED NOTES
Med rec completed per med list from [facility]   Allergies reviewed     Unknown last doses of medications taken   No MAR was sent, just a med list   Waiting for MAR to be faxed over

## 2024-01-04 NOTE — ED PROVIDER NOTES
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  He is currently in rehab at the [place] rehab facility and was found unresponsive in his wheelchair.  He sustained the intracranial hemorrhage after a mechanical fall and was found by his roommate.    HPI/ROS  LIMITATION TO HISTORY   Select: Altered mental status / Confusion  OUTSIDE HISTORIAN(S):  none    Lewis Mohr is a 78 y.o. male who presents from [facility] after he was found unresponsive in his wheelchair.  He was placed in bed and then returned to baseline mentation 12/26/2023 with a intracranial hemorrhage and a right suboccipital craniotomy on 12/27.  Patient currently has no complaints.  He does not remember what happened.  He denies any headache unilateral weakness or numbness chest pain shortness of breath or abdominal pain.    PAST MEDICAL HISTORY       SURGICAL HISTORY  patient denies any surgical history    FAMILY HISTORY  No family history on file.    SOCIAL HISTORY  Social History     Tobacco Use    Smoking status: Never    Smokeless tobacco: Never   Vaping Use    Vaping Use: Never used   Substance and Sexual Activity    Alcohol use: Yes     Comment: occ    Drug use: Never    Sexual activity: Not on file       CURRENT MEDICATIONS  Home Medications       Reviewed by Aria Snyder (Pharmacy Tech) on 01/04/24 at 1521  Med List Status: Complete  "    Medication Last Dose Status   acetaminophen (TYLENOL) 325 MG Tab UNK Active   amLODIPine (NORVASC) 10 MG Tab UNK Active   bisacodyl (DULCOLAX) 10 MG Suppos UNK Active   docusate sodium (COLACE) 100 MG Cap UNK Active   lisinopril (PRINIVIL) 40 MG tablet UNK Active   senna-docusate (PERICOLACE OR SENOKOT S) 8.6-50 MG Tab UNK Active                    ALLERGIES  No Known Allergies    PHYSICAL EXAM  VITAL SIGNS: /78   Pulse 90   Temp 36.1 °C (97 °F) (Temporal)   Resp 16   Ht 1.778 m (5' 10\")   Wt 93 kg (205 lb)   SpO2 is 85% on room air which is hypoxic he is not normally on nasal cannula oxygen at rehab  BMI 29.41 kg/m²      Constitutional: Well developed, chronically ill-appearing, No acute distress, Non-toxic appearance.   HEENT: Normocephalic, positive for a healing right periorbital contusion and facial contusion external ears normal, pharynx pink,  Mucous  Membranes moist, No rhinorrhea or mucosal edema  Eyes: PERRL, EOMI, Conjunctiva normal, No discharge.   Neck: Normal range of motion, No tenderness, Supple, No stridor.   Lymphatic: No lymphadenopathy    Cardiovascular: Regular Rate and Rhythm, No murmurs,  rubs, or gallops.   Thorax & Lungs: Lungs clear to auscultation bilaterally, No respiratory distress, No wheezes, rhales or rhonchi, No chest wall tenderness.   Abdomen: Bowel sounds normal, Soft, non tender, non distended,  No pulsatile masses., no rebound guarding or peritoneal signs.   Skin: Warm, Dry, No erythema, No rash,   Back:  No CVA tenderness,  No spinal tenderness, bony crepitance step offs or instability.   Extremities: Equal, intact distal pulses, No cyanosis, clubbing or edema,  No tenderness.   Musculoskeletal: Good range of motion in all major joints. No tenderness to palpation or major deformities noted.   Neurologic: Alert & oriented No focal deficits noted.  NIH is 1 positive aphasia  Psychiatric: Affect normal, Judgment normal, Mood normal.  Positive " aphasia      DIAGNOSTIC STUDIES / PROCEDURES  EKG  I have independently interpreted this EKG  See below    LABS  Results for orders placed or performed during the hospital encounter of 01/04/24   CoV-2, FLU A/B, and RSV by PCR (2-4 Hours Cryptonator) : Collect NP swab in VTM    Specimen: Respirate   Result Value Ref Range    SARS-CoV-2 Source NP Swab    CBC WITH DIFFERENTIAL   Result Value Ref Range    WBC 20.9 (H) 4.8 - 10.8 K/uL    RBC 5.75 4.70 - 6.10 M/uL    Hemoglobin 17.5 14.0 - 18.0 g/dL    Hematocrit 51.9 42.0 - 52.0 %    MCV 90.3 81.4 - 97.8 fL    MCH 30.4 27.0 - 33.0 pg    MCHC 33.7 32.3 - 36.5 g/dL    RDW 43.8 35.9 - 50.0 fL    Platelet Count 416 164 - 446 K/uL    MPV 10.8 9.0 - 12.9 fL    Neutrophils-Polys 84.30 (H) 44.00 - 72.00 %    Lymphocytes 4.40 (L) 22.00 - 41.00 %    Monocytes 10.30 0.00 - 13.40 %    Eosinophils 0.00 0.00 - 6.90 %    Basophils 0.20 0.00 - 1.80 %    Immature Granulocytes 0.80 0.00 - 0.90 %    Nucleated RBC 0.00 0.00 - 0.20 /100 WBC    Neutrophils (Absolute) 17.62 (H) 1.82 - 7.42 K/uL    Lymphs (Absolute) 0.93 (L) 1.00 - 4.80 K/uL    Monos (Absolute) 2.15 (H) 0.00 - 0.85 K/uL    Eos (Absolute) 0.00 0.00 - 0.51 K/uL    Baso (Absolute) 0.05 0.00 - 0.12 K/uL    Immature Granulocytes (abs) 0.17 (H) 0.00 - 0.11 K/uL    NRBC (Absolute) 0.00 K/uL   COMP METABOLIC PANEL   Result Value Ref Range    Sodium 146 (H) 135 - 145 mmol/L    Potassium 4.1 3.6 - 5.5 mmol/L    Chloride 106 96 - 112 mmol/L    Co2 22 20 - 33 mmol/L    Anion Gap 18.0 (H) 7.0 - 16.0    Glucose 164 (H) 65 - 99 mg/dL    Bun 38 (H) 8 - 22 mg/dL    Creatinine 1.60 (H) 0.50 - 1.40 mg/dL    Calcium 10.9 (H) 8.5 - 10.5 mg/dL    Correct Calcium 10.6 (H) 8.5 - 10.5 mg/dL    AST(SGOT) 24 12 - 45 U/L    ALT(SGPT) 33 2 - 50 U/L    Alkaline Phosphatase 125 (H) 30 - 99 U/L    Total Bilirubin 0.9 0.1 - 1.5 mg/dL    Albumin 4.4 3.2 - 4.9 g/dL    Total Protein 8.2 6.0 - 8.2 g/dL    Globulin 3.8 (H) 1.9 - 3.5 g/dL    A-G Ratio 1.2 g/dL    TROPONIN   Result Value Ref Range    Troponin T 30 (H) 6 - 19 ng/L   PROCALCITONIN   Result Value Ref Range    Procalcitonin 0.10 <0.25 ng/mL   ESTIMATED GFR   Result Value Ref Range    GFR (CKD-EPI) 44 (A) >60 mL/min/1.73 m 2   EKG (NOW)   Result Value Ref Range    Report       [facility] Emergency Dept.    Test Date:  2024  Pt Name:    NADIR SIN                  Department: ER  MRN:        [id number]                      Room:       [facility]  Gender:     Male                         Technician: [id number]  :        1945                   Requested By:VIOLETA ARNOLD  Order #:    [id number]                    Reading MD: VIOLETA ARNOLD MD    Measurements  Intervals                                Axis  Rate:       85                           P:          8  OH:         178                          QRS:        -18  QRSD:       102                          T:          -22  QT:         396  QTc:        471    Interpretive Statements  Sinus rhythm  Probable left atrial enlargement  Abnormal R-wave progression, late transition  Left ventricular hypertrophy  Nonspecific T abnormalities, inferior leads  Compared to ECG 2023 08:14:43  Left ventricular hypertrophy now present  T-wave abnormality now present  Electronically Signed On 20 15:31:12 PST by VIOLETA ARNOLD MD           RADIOLOGY  I have independently interpreted the diagnostic imaging associated with this visit and am waiting the final reading from the radiologist.   My preliminary interpretation is as follows: cxr positive for hypoinflation and increased vascular congestion  Radiologist interpretation:   DX-CHEST-PORTABLE (1 VIEW)   Final Result      1.  Hypoinflation and pulmonary vascular congestion with mild bibasilar atelectasis.   2.  No lobar pneumonia or overt pulmonary edema.      CT-HEAD W/O   Final Result      1.  Diffuse atrophy and white matter changes.   2.  No acute intracranial hemorrhage or territorial infarct.    3.  RIGHT occipital craniotomy with underlying cerebellar encephalomalacia.              COURSE & MEDICAL DECISION MAKING    ED Observation Status? No; Patient does not meet criteria for ED Observation.     INITIAL ASSESSMENT, COURSE AND PLAN  Care Narrative: This is a 78-year-old male who is coming in from [place] re[facility] after being found unresponsive in his wheelchair.  His condition improved when he was laid back in bed.  He was recently admitted to the hospital 12/26/2023 for an intracranial hemorrhage and ended up undergoing craniotomy 12/27/2023.  He has been in [place] [facility] ever since his discharge from the hospital.  He has not been drinking or smoking.  The patient has no recollection of what happened and has no complaints at this time.  On physical exam he has a healing right facial contusion and periorbital contusion.  His mucous membranes appear dry he has no focal neurologic deficits his NIH is 0.     Differential diagnosis: COVID, pneumonia, sepsis, seizure, recurrent intracranial hemorrhage, electrolyte disturbance, cardiac ischemia, heart failure arrhythmia  ADDITIONAL PROBLEM LIST  Pneumonia  Kidney stones  COVID in 2020  Left adrenal mass  Hypoxia  DISPOSITION AND DISCUSSIONS    I ordered a repeat CT of the head to ensure he has not had any recurrent bleeding.  Labs were also ordered along with an EKG.  Per the nurse he is newly hypoxic at 86% on room air he does not use require oxygen at the rehab facility chest x-ray was ordered along with COVID swab and lab work to further evaluate this.    Patient's chest x-ray shows cardiomegaly hypoinflation and vascular congestion.  His CT head shows diffuse atrophy and occipital craniotomy on the right with underlying cerebellar encephalomalacia.    Patient's white blood cell count is elevated at 20.9 with 84% neutrophils and 0.17 immature granulocytes.  Since the patient is so hypoxic and with a high leukocytosis I am concerned for possible pneumonia  and sepsis.  I have ordered IV antibiotics and a lactate panel.  I have also ordered a BNP.  His EKG showed normal sinus rhythm with nonspecific ST-T changes in the inferior leads.  Troponin is elevated at 30.  His procalcitonin is normal at 0.1.  His comprehensive metabolic panel is significant for an elevated sodium of 146 CO2 is normal at 22 anion gap is elevated 18 glucose is 164 BUN is elevated at 38 creatinine 1.6 indicating acute kidney insufficiency.  Calcium is elevated at 10.6 alk phos is elevated 125 liver function tests are normal.  Patient's heart score is elevated at 6.  I ordered IV fluids for rehydration for his acute renal insufficiency.  I spoken with pharmacy and we started antibiotic therapy because of his hypoxia and leukocytosis he could have a pneumonia that will appear on x-ray later.  Patient will be admitted to the hospitalist service in guarded condition.    I have ordered IV fluid and blood cultures.  I spoken with Dr. Puga who will admit him for oxygen support rehydration tearing of his kidneys and blood cultures and further care    I have discussed management of the patient with the following physicians and WILTON's:  hospitalist Dr Puga    Discussion of management with other [facility] or appropriate source(s): Pharmacy regarding antibiotic choice      Escalation of care considered, and ultimately not performed: none    Barriers to care at this time, including but not limited to: Patient does not have established PCP.     Decision tools and prescription drugs considered including, but not limited to: NIH Stroke Scale 0 and HEART Score 6 .    Patient will be admitted in guarded condition  CRITICAL CARE  The very real possibilty of a deterioration of this patient's condition required the highest level of my preparedness for sudden, emergent intervention.  I provided critical care services, which included medication orders, frequent reevaluations of the patient's condition and response to  treatment, ordering and reviewing test results, and discussing the case with various consultants.  The critical care time associated with the care of the patient was 45 minutes. Review chart for interventions. This time is exclusive of any other billable procedures.     FINAL DIAGNOSIS  1. Syncope, unspecified syncope type    2. Hypoxia    3. ERWIN (acute kidney injury) (HCC)    4. Leukocytosis, unspecified type    5. Elevated troponin    6. Dehydration           Electronically signed by: Jennifer Agrawal M.D., 1/4/2024 1:22 PM

## 2024-01-05 PROBLEM — E87.29 HIGH ANION GAP METABOLIC ACIDOSIS: Status: ACTIVE | Noted: 2024-01-01

## 2024-01-05 PROBLEM — R33.9 URINARY RETENTION: Status: ACTIVE | Noted: 2024-01-01

## 2024-01-05 NOTE — DOCUMENTATION QUERY
[facility]                                                                       Query Response Note      PATIENT:               NADIR SIN  ACCT #:                  [id number]  MRN:                     [id number]  :                      1945  ADMIT DATE:       2024 12:40 PM  DISCH DATE:          RESPONDING  PROVIDER #:        [id number]           QUERY TEXT:    Reviewed of the medical record reveals an elevated anion gap and a lactic acid value of 2.3 from 23.    Can a diagnosis be further specified to support the lab findings and treatment?     The patient's clinical indicators include:   Labs: Anion gap 18.0, lactic acid 2.3 --> 2.2, SCr 1.60 (with reported baseline ~ 0.9)  H&P: Presented with confusion     Risk Factors: Acute kidney failure, dehydration   Treatment: IV hydration, lab monitoring     Thank you for time and attention,  LAINE Evans RN  Available via Voalte  Options provided:   -- Acute metabolic acidosis clinically significant and ruled in   -- Insignificant lab findings   -- Other explanation, (please specify the other explanation)      Query created by: Jennifer Wallace on 2024 11:45 AM    RESPONSE TEXT:    Acute metabolic acidosis clinically significant and ruled in       QUERY TEXT:    Review of the medical record indicates the patient has been diagnosed with respiratory failure.     Please specify the type and acuity of the condition (includes suspected or probable)    The patient's Clinical Indicators include:  ED: Per the nurse he is newly hypoxic at 86% on room air he does not use require oxygen at the rehab facility    H&P:   - Respiratory failure. Saturations have been in the 80% range on room air where to has not required oxygen in the past  - PE:  Supplemental oxygen, increased work of breathing with tachypnea, coarse breath sounds throughout    Clinical Findings: Lowest SpO2 84% RA, 92% 2L NC (PF  ratio 228)  Risk Factors: Presumptive aspiration pneumonia  Treatment: O2 supplement, RT consult     Thank you for time and attention,  Jennifer Wallace MSN RN  Available via Voalte  Options provided:   -- Acute respiratory failure with hypoxia   -- Acute on chronic respiratory failure with hypoxia   -- Chronic respiratory failure with hypoxia   -- Hypoxia, respiratory failure ruled out   -- Other explanation, (Please specify other explanation)      Query created by: Jennifer Wallace on 1/5/2024 11:47 AM    RESPONSE TEXT:    Acute respiratory failure with hypoxia          Electronically signed by:  GLEN ZELAYA MD 1/5/2024 2:23 PM

## 2024-01-05 NOTE — ED NOTES
Pt's IV found to have come unconnected at the hub and his IV fluids were on the floor.  Pt cleaned up and assisted to SOB to attempt to urinate with urinal.  Pt attempted, but unable to urinate.  RN to alert hospitalist and request bladderscan order.      Pt sent to [facility] on 2 L NC.

## 2024-01-05 NOTE — H&P
Hospital Medicine History & Physical Note    Date of Service  1/4/2024    Primary Care Physician  Pcp Pt States None    Consultants  none    Code Status  Full Code    Chief Complaint  Chief Complaint   Patient presents with    ALOC     From [place] re[facility], staff stated pt was 'unresponsive' in his WC, pt was placed in his bed and then returned to baseline mentation of A+Ox2, recent cerebellar ICH on 12/26 with R suboccipital craniotomy on 12/27, pt has no complaints on arrival,        History of Presenting Illness  Lewis Mohr is a 78 y.o. male who presented 1/4/2024 with confusion and low oxygen levels.  Mr. Mohr is resubmitted to this hospital due to a fall with cerebellar hemorrhage underwent craniotomy by Dr. Patten neurosurgery on 12/27/2023.  He was subsequently discharged to a local skilled nursing facility, at [place] on 1/2/2024.  Yesterday he came back because of slurred speech and a CT of the head was done which was unremarkable for acute processes and he was discharged back to [place].  Paramedics were called again today because he was found to be unresponsive with hypoxia.  In the emergency room he has oxygen saturations down to 80% on room air and he is hypotensive.  A CT scan of the head again is unchanged for acute processes and chest x-ray reveals possible pulmonary vascular congestion and hypoinflation.  His white blood cell count is 20,000 and he has acute kidney injury with a creatinine of 1.6.  Viral panel was negative.  Nurse notes significant coughing with sputum and he has unable to swallow safely without excessive coughing.  He will be admitted for IV fluids and empiric IV antibiotics for presumptive aspiration pneumonitis kept n.p.o. until speech pathology sees him.    I called his daughter Jaymie Banda [phone] and left a message as there was no answer.    I discussed the plan of care with bedside RN.    Review of Systems  Review of Systems   Unable to perform ROS: Mental acuity        Past Medical History   has no past medical history on file.    Surgical History   has no past surgical history on file.     Family History  family history is not on file.   Family history reviewed with patient. There is no family history that is pertinent to the chief complaint.     Social History   reports that he has never smoked. He has never used smokeless tobacco. He reports current alcohol use. He reports that he does not use drugs.    Allergies  No Known Allergies    Medications  None       Physical Exam  Temp:  [36.1 °C (97 °F)] 36.1 °C (97 °F)  Pulse:  [88-95] 88  Resp:  [16-20] 20  BP: (106-118)/(69-78) 106/69  SpO2:  [84 %-95 %] 95 %  Blood Pressure : 106/69   Temperature: 36.1 °C (97 °F)   Pulse: 88   Respiration: 20   Pulse Oximetry: 95 %       Physical Exam  Vitals and nursing note reviewed.   HENT:      Head:      Comments: Staples on the right posterior scalp  Eyes:      Comments: Extensive ecchymosis around the right eye and the right face   Cardiovascular:      Rate and Rhythm: Normal rate and regular rhythm.   Pulmonary:      Comments: Supplemental oxygen, increased work of breathing with tachypnea, coarse breath sounds throughout  Abdominal:      General: There is no distension.      Tenderness: There is no abdominal tenderness.   Neurological:      Comments: He generally moves his extremities equally.  His speech is reasonable.  He has a moderate though not excellent historian         Laboratory:  Recent Labs     01/04/24  1407   WBC 20.9*   RBC 5.75   HEMOGLOBIN 17.5   HEMATOCRIT 51.9   MCV 90.3   MCH 30.4   MCHC 33.7   RDW 43.8   PLATELETCT 416   MPV 10.8     Recent Labs     01/04/24  1407   SODIUM 146*   POTASSIUM 4.1   CHLORIDE 106   CO2 22   GLUCOSE 164*   BUN 38*   CREATININE 1.60*   CALCIUM 10.9*     Recent Labs     01/04/24  1407   ALTSGPT 33   ASTSGOT 24   ALKPHOSPHAT 125*   TBILIRUBIN 0.9   GLUCOSE 164*         Recent Labs     01/04/24  1556   NTPROBNP 399*         Recent Labs      01/04/24  1407   TROPONINT 30*       Imaging:  DX-CHEST-PORTABLE (1 VIEW)   Final Result      1.  Hypoinflation and pulmonary vascular congestion with mild bibasilar atelectasis.   2.  No lobar pneumonia or overt pulmonary edema.      CT-HEAD W/O   Final Result      1.  Diffuse atrophy and white matter changes.   2.  No acute intracranial hemorrhage or territorial infarct.   3.  RIGHT occipital craniotomy with underlying cerebellar encephalomalacia.                 Assessment/Plan:  Justification for Admission Status  I anticipate this patient will require at least two midnights for appropriate medical management, necessitating inpatient admission because IV fluids and IV antibiotics    Patient will need a Med/Surg bed on MEDICAL service .  The need is secondary to as above.    * Respiratory failure (HCC)- (present on admission)  Assessment & Plan  Saturations have been in the 80% range on room air where to has not required oxygen in the past  Viral panel is negative  This is consistent with aspiration pneumonitis given the sputum production in the emergency room as well as dysphagia and coarse breath sounds.  N.p.o. until he can have a formal swallow evaluation by speech pathology  Last admission he had a ejection fraction 62% with grade 2 diastolic dysfunction    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 Patten  CT head in the ER does not reveal any acute processes    Aspiration pneumonia (HCC)- (present on admission)  Assessment & Plan  Is a clinical diagnosis in setting of white blood cell count of 20.9 thousand and a recent normal white blood cell count a few days ago with clear aspiration here in the emergency department.  It remains unclear if there is a clear role of IV antibiotics given the negative procalcitonin for now we will cover with IV Unasyn and consider a short course.      Acute kidney failure (HCC)-  (present on admission)  Assessment & Plan  His baseline creatinine a few days ago was 0.9 now is 1.6 likely secondary to dehydration.  IV fluids will be given we will follow his urine output and check a basic metabolic panel in the morning    Hypernatremia- (present on admission)  Assessment & Plan  Secondary to dehydration  Sodium is 146  Hypotonic IV fluids and monitor his sodium level in the morning        VTE prophylaxis: Hold on pharmacologic DVT prophylaxis until cleared by neurosurgery.  I reviewed the records from 2 days ago and they did not recommend starting anticoagulation at that point in time.

## 2024-01-05 NOTE — ASSESSMENT & PLAN NOTE
Stopped IV Lasix  Increase free water via NG tube to 250 ml q6 hours.   Cont on D5  50 ml/ hr   Slowly improving

## 2024-01-05 NOTE — ASSESSMENT & PLAN NOTE
Saturations have been in the 80% range on room air where to has not required oxygen in the past  Viral panel is negative  This is consistent with aspiration pneumonitis given the sputum production in the emergency room as well as dysphagia and coarse breath sounds.  N.p.o. until he can have a formal swallow evaluation by speech pathology  Last admission he had a ejection fraction 62% with grade 2 diastolic dysfunction

## 2024-01-05 NOTE — CARE PLAN
The patient is Stable - Low risk of patient condition declining or worsening    Shift Goals  Clinical Goals: Patient will remain free from falls throughout this shift.  Patient Goals: Patient will rest comfortably throughout this shift.    Progress made toward(s) clinical / shift goals:      Patient is not progressing towards the following goals:

## 2024-01-05 NOTE — THERAPY
Speech Language Pathology   Clinical Swallow Evaluation     Patient Name: Lewis Mohr  AGE:  78 y.o., SEX:  male  Medical Record #: [id number]  Date of Service: 1/5/2024      History of Present Illness  Pt is a 78 y.o. M who presented 1/4/23 with ALOC and low oxygen levels. Admitting dx of respiratory failure. Pt w/ recent cerebellar hemorrhage who underwent craniotomy by Dr. Patten neurosurgery on 12/27/23 2/2 fall. Pt subsequently discharged to SNF. Pt w/ return to hospital 1/3/24 because of slurred speech and CT head was done which was unremarkable for acute processes, again pt discharged to SNF.     CMHx: Aspiration PNA, respiratory failure, ERWIN, Hx of cerebellar hemorrhage   PMHx: Respiratory failure with hypoxia, Leucocytosis, Adrenal mass L    No hx of ST per EMR.     CXR 1/4:   1.  Hypoinflation and pulmonary vascular congestion with mild bibasilar atelectasis.  2.  No lobar pneumonia or overt pulmonary edema.    CT Head 1/4:   1.  Diffuse atrophy and white matter changes.  2.  No acute intracranial hemorrhage or territorial infarct.  3.  RIGHT occipital craniotomy with underlying cerebellar encephalomalacia.    General Information:  Vitals  O2 (LPM): 2  O2 Delivery Device: Oxymask  Level of Consciousness: Alert, Drowsy  Patient Behaviors: Confused, Restless  Orientation: Oriented x 4  Follows Directives: Yes    Prior Living Situation & Level of Function:  Prior Services: Continuous (24 Hour) Care Giving Per Service  Housing / Facility: Skilled Nursing Facility  Lives with - Patient's Self Care Capacity: Attendant / Paid Care Giver  Comments: Per EMR, pt currently resides at [facility].  Communication: Hx of cerebellar hemorrhage, pt denied working with ST  Swallowing: Hx of cerebellar hemorrhage, pt denied working with ST. Pt endorsing consuming a regular diet prior to admit.     Oral Mechanism Evaluation:  Dentition: Fair, Natural dentition   Facial Symmetry: Equal  Facial Sensation: Equal     Labial  "Observations: WFL   Lingual Observations: Midline, Xerostomia     Laryngeal Function:  Secretion Management: Expectoration of secretions (excess)  Voice Quality: Hoarse, Wet  Cough: Perceptually WNL     Subjective  Pt cleared by RN for clinical bedside swallow evaluation. Pt was received awake, notably drowsy, but was cooperative with SLP tasks. While patient was AAOx4, pt demonstrated difficulty provided information on PLOF/confused throughout the evaluation. Stating ice chips were \"hot\" and needing re-direction to the task- Therefore, pt may not be a reliable historian.      Assessment  Current Method of Nutrition: NPO until cleared by speech pathology  Positioning: Espino's (60-90 degrees)  Bolus Administration: SLP  O2 (LPM): 2 O2 Delivery Device: Oxymask  Factor(s) Affecting Performance: Impaired endurance, Impaired mental status    Swallowing Trials:  Swallowing Trials  Ice: WFL  Thin Liquid (TN0): Impaired  Liquidised (LQ3): Impaired    Comments:   D/t b/l UE tremors, SLP fed trials of ice chips, TN0, and LQ3 completed. Pt demonstrated adequate oral acceptance/containment. Stripping was WFL. Pt w/ notable increased WOB, cough, and throat clearing on trials of TN0/LQ3 concerning for airway invasion. Notably, cough productive with excess expectoration of thick, yellow, string secretions. Pt endorsed transient globus on LQ3 which improved with a liquid wash. Pt denied odynophagia.     SLP provided education on s/s concerning for aspiration and recommendation for NPO status pending FEES. FEES procedure described in detail. Pt willing to participate.     Due to excess expectoration of secretions and aspiration risk , pt would benefit from an oral suction. RN updated to same. RN to set-up in patient's room.     Clinical Impressions  Pt presenting with clinical indicators of dysphagia and increased risk of aspiration 2/2 respiratory failure, PNA, AMS w/ a hx of cerebellar hemorrhage. Therefore, SLP cannot recommend " "a safe oral diet at this time. SLP recommending NPO/NN with ice chips only to maintain moisture of the oral cavity, prevent atrophy of the muscles necessary for deglutition, and aid in secretion management.. Instrumental swallow study via FEES recommended to determine oral feeding safety, potential for diet consistency change, assess swallow efficiency and airway protection, and determine the efficacy of swallowing exercises and/or compensatory strategies to improve dysphagia outcomes. RN  aware of recommendations. Risk or aspiration or related sequelae can be mitigated with the use of frequent, thorough, oral care.        Recommendations  Diet Consistency: NPO/NN- ice chips only with 1:1 supv following oral care  Instrumentation: FEES  Medication: Non Oral  Supervision: 1:1 feeding with constant supervision (ice chips only)   Oral Care: Q4h    SLP Treatment Plan  Treatment Plan: Dysphagia Treatment, Patient/Family/Caregiver Training  SLP Frequency: 4x Per Week  Estimated Duration: Until Therapy Goals Met    Anticipated Discharge Needs  Discharge Recommendations: Recommend post-acute placement for additional speech therapy services prior to discharge home   Therapy Recommendations Upon DC: Dysphagia Training, Patient / Family / Caregiver Education      Patient / Family Goals  Patient / Family Goal #1: \"more\" referring to water  Short Term Goals  Short Term Goal # 1: Pt will participate in an instrumental swallow study via FEES to determine airway protection/swallow efficiency and guide dysphagia management.    Jeri Morfin, SLP   "

## 2024-01-05 NOTE — ED NOTES
New orders received from hospitalist for po antibiotics.  This RN contacted hospitalist and asked if he wanted to suspend the NPO orders placed by ERP.  Alerted hospitalist about yellow sputum production and oxygen needs.

## 2024-01-05 NOTE — ED NOTES
Swallow screen completed.  Pt unable to take full swallow, and demonstrated coughing and sputum production after small sip with straw.  Alerted Hospitalist of results.

## 2024-01-05 NOTE — ASSESSMENT & PLAN NOTE
His baseline creatinine a few days ago was 0.9 now is 1.6 likely secondary to dehydration.   IVF  Continue monitoring  On fernando   Continue monitoring BMP in a.m.

## 2024-01-05 NOTE — ASSESSMENT & PLAN NOTE
Intubated date: 1/9/2024-1/11, 1/13-1/20, 1/25-2/1  Recurrent aspiration pneumonitis    Empiric aspiration PNA coverage was given  Remains at risk for aspiration and reintubation - ongoing goals of care, daughter wants him to remain full code    His overall prognosis is quite poor given his recurrent aspirations that of which are unlikely to change with a PEG tube.  This was discussed with his daughter on 2/8/2024 and she will discuss with her sister.    He had been on IV Lasix which has been stopped  If he can follow commands with incentive spirometry this will help with atelectasis    He is still high risk aspiration and to develop respiratory failure     2/13  1 LPM oxygen mask  Stable  No Lasix.  Tolerating TF  Continuous pulse oximetry monitoring.

## 2024-01-05 NOTE — ASSESSMENT & PLAN NOTE
He was recently admitted and underwent surgery on 12/27 by Dr. Patten  Repeat imaging including MRI show changes consistent with known bleed but no new findings  Still needs a f/u MRI in 3-4 wks to confirm no underlying lesion as etiology of initial bleed

## 2024-01-05 NOTE — PROGRESS NOTES
4 Eyes Skin Assessment Completed by CHARLIE Pompa and CHARLIE Whitmore.    Head Bruising, Periorbital R facial bruising  Staples to the R side of head behind ear   Ears WDL  Nose WDL  Mouth WDL  Neck WDL  Breast/Chest WDL  Shoulder Blades WDL  Spine WDL  (R) Arm/Elbow/Hand Bruising and Scab  (L) Arm/Elbow/Hand Bruising and Scab  Abdomen WDL  Groin WDL  Scrotum/Coccyx/Buttocks WDL  (R) Leg Scab and Bruising  (L) Leg Scab and Bruising  (R) Heel/Foot/Toe WDL  (L) Heel/Foot/Toe WDL      Interventions In Place Pillows    Possible Skin Injury No    Pictures Uploaded Into Epic N/A  Wound Consult Placed N/A  RN Wound Prevention Protocol Ordered No

## 2024-01-05 NOTE — PROGRESS NOTES
Received report and assumed care of patient at change of shift. Patient is A&Ox 2 oriented to self and place, disoriented to situation and time, on 2 liters nasal cannula, and reports no pain at this time. Patient assessment completed, bed in lowest position, and call light and personal belongings are within reach. Patient expressed no further needs at this time.

## 2024-01-05 NOTE — PROGRESS NOTES
Alerted that pt has not urinated in ED despite attempt to urinate and had urge to urinate. Bladder scan performed with results of 442. MD xavier contacted.     Per MD, straight cath x1. Straight caths ordered to be delivered to floor. NOC RN notified.

## 2024-01-05 NOTE — PROGRESS NOTES
Assumed care of pt  at 1730. Report received from Laura GUERRA.  Patient stated that their pain is 0/10. Pt's pain comfort goal is 0/10. Pt educated on how to use the call light, pt verbalized understanding. Bed in lowest locked position, call light within reach, hourly rounding in place. Labs reviewed, orders reviewed, communication board updated. Pt declines any further needs at this time.

## 2024-01-05 NOTE — PROGRESS NOTES
Hospital Medicine Daily Progress Note    Date of Service  1/5/2024    Chief Complaint  Lewis Mohr is a 78 y.o. male admitted 1/4/2024 with confusion and hypoglycemia     Hospital Course  78 y.o. male who presented 1/4/2024 with confusion and low oxygen levels.  Mr. Mohr is resubmitted to this hospital due to a fall with cerebellar hemorrhage underwent craniotomy by Dr. Patten neurosurgery on 12/27/2023.  He was subsequently discharged to a local skilled nursing facility, at [place] on 1/2/2024. 1/3 he came back because of slurred speech and a CT of the head was done which was unremarkable for acute processes and he was discharged back to [place].  Paramedics were called again 1/4 because he was found to be unresponsive with hypoxia.      In the emergency room he has oxygen saturations down to 80% on room air and he is hypotensive.  A CT scan of the head again is unchanged for acute processes and chest x-ray reveals possible pulmonary vascular congestion and hypoinflation.  His white blood cell count is 20,000 and he has acute kidney injury with a creatinine of 1.6.  Viral panel was negative.  Nurse notes significant coughing with sputum and he has unable to swallow safely without excessive coughing.      Interval Problem Update  I have reviewed CT head independently  currently on 2 L oxygen. Not on home oxygen  NPO, speech evaluation, FEES  Continue IV Unasyn  Continue IV fluids  I spoke to speech therapy, patient noted to have silent aspiration with thin liquid, thick liquid and apple sauce. Rec tube feeding.   I called and spoke to daughter Jaymie, updated patient's current conditions and treatment plans. Jaymie is in agreement for tube feeding.     I have discussed this patient's plan of care and discharge plan at IDT rounds today with Case Management, Nursing, Nursing leadership, and other members of the IDT team.    Consultants/Specialty  na    Code Status  Full Code    Disposition  The patient is not medically  cleared for discharge to home or a post-acute facility.      I have placed the appropriate orders for post-discharge needs.    Review of Systems  Review of Systems   Unable to perform ROS: Mental acuity        Physical Exam  Temp:  [35.3 °C (95.5 °F)-36.2 °C (97.1 °F)] 36.2 °C (97.1 °F)  Pulse:  [71-95] 71  Resp:  [17-20] 18  BP: ()/(59-90) 141/88  SpO2:  [92 %-98 %] 94 %    Physical Exam  Vitals and nursing note reviewed.   Constitutional:       Appearance: Normal appearance.   HENT:      Head: Normocephalic and atraumatic.      Comments: Staples on the right posterior scalp         Mouth/Throat:      Pharynx: Oropharynx is clear.   Eyes:      Pupils: Pupils are equal, round, and reactive to light.      Comments: Extensive ecchymosis around the right eye and the right face    Neck:      Vascular: No carotid bruit.   Cardiovascular:      Rate and Rhythm: Normal rate and regular rhythm.   Pulmonary:      Effort: Pulmonary effort is normal.      Breath sounds: Rales present.      Comments: Coarse breath sounds b/l  Decreased breath sounds   Abdominal:      General: Abdomen is flat. Bowel sounds are normal.      Palpations: Abdomen is soft. There is no mass.   Musculoskeletal:         General: Normal range of motion.      Cervical back: Neck supple.   Skin:     General: Skin is warm and dry.   Neurological:      General: No focal deficit present.      Mental Status: He is alert. Mental status is at baseline.      Comments: move extremities spontaneous   Psychiatric:      Comments: Unable to assess         Fluids    Intake/Output Summary (Last 24 hours) at 1/5/2024 1424  Last data filed at 1/5/2024 1059  Gross per 24 hour   Intake 700 ml   Output 1720 ml   Net -1020 ml       Laboratory  Recent Labs     01/04/24  1407 01/05/24  0319   WBC 20.9* 20.5*   RBC 5.75 5.37   HEMOGLOBIN 17.5 16.4   HEMATOCRIT 51.9 48.7   MCV 90.3 90.7   MCH 30.4 30.5   MCHC 33.7 33.7   RDW 43.8 44.6   PLATELETCT 416 369   MPV 10.8 10.6      Recent Labs     01/04/24  1407 01/05/24  0319   SODIUM 146* 146*   POTASSIUM 4.1 4.1   CHLORIDE 106 109   CO2 22 23   GLUCOSE 164* 138*   BUN 38* 42*   CREATININE 1.60* 1.59*   CALCIUM 10.9* 10.3                   Imaging  DX-CHEST-PORTABLE (1 VIEW)   Final Result      1.  Hypoinflation and pulmonary vascular congestion with mild bibasilar atelectasis.   2.  No lobar pneumonia or overt pulmonary edema.      CT-HEAD W/O   Final Result      1.  Diffuse atrophy and white matter changes.   2.  No acute intracranial hemorrhage or territorial infarct.   3.  RIGHT occipital craniotomy with underlying cerebellar encephalomalacia.              Assessment/Plan  * Aspiration pneumonia (HCC)- (present on admission)  Assessment & Plan  Is a clinical diagnosis in setting of leukocytosis with clear aspiration here in the emergency department.   Speech evaluation, FEES: silent aspiration with thin liquid, thick liquid and apple sauce. Rec tube feeding.   Continue IV Unasyn  I called and spoke to daughter Jaymie, updated patient's current conditions and treatment plans. Jaymie is in agreement for tube feeding.     Acute respiratory failure with hypoxia (HCC)- (present on admission)  Assessment & Plan  Not required oxygen in the past  Viral panel is negative  This is consistent with aspiration pneumonitis given the sputum production in the emergency room as well as dysphagia and coarse breath sounds.  N.p.o. until he can have a formal swallow evaluation by speech pathology  FEES  Last admission he had a ejection fraction 62% with grade 2 diastolic dysfunction    Urinary retention  Assessment & Plan  Continue bladder scan  Straight cath if bladder scan over 350cc    High anion gap metabolic acidosis  Assessment & Plan  Continue IVF  Cont monitoring    Hypernatremia- (present on admission)  Assessment & Plan  Secondary to dehydration  Sodium is 146  Hypotonic IV fluids and monitor his sodium level in the morning    History of  cerebellar hemorrhage- (present on admission)  Assessment & Plan  He was recently admitted and underwent surgery on 12/27 by Dr. Patten  CT head in the ER does not reveal any acute processes    Acute kidney failure (HCC)- (present on admission)  Assessment & Plan  His baseline creatinine a few days ago was 0.9 now is 1.6 likely secondary to dehydration.  IV fluids will be given we will follow his urine output and check a basic metabolic panel in the morning         VTE prophylaxis: SCD (recent brain bleeds)    I have performed a physical exam and reviewed and updated ROS and Plan today (1/5/2024). In review of yesterday's note (1/4/2024), there are no changes except as documented above.    My total time spent caring for the patient on the day of the encounter was 51 minutes.   This does not include time spent on separately billable procedures/tests.

## 2024-01-06 NOTE — PROGRESS NOTES
Notified Dr. Holman of pt's leaking fernando catheter. MD okay to replace fernando. Will continue to monitor.

## 2024-01-06 NOTE — PROGRESS NOTES
"Pt became increasingly agitated. I attempted to hang an antibiotic and he began to wrap his legs around my waist and began to kick me. I called for staff assistance, and after help and attempting to calm the patient down he was still agitated and trying to get out of bed. The doctor was reached out to and ordered a third soft ankle restraint for the right leg due to impulsivity and trying to pull out his fernando catheter and IV. NG tube was attempted to be placed multiple times by a rapid response team member \"Johny Cross\" but was unsuccessful. Will pass information onto day shift doctor and suggest cortrack placement instead.  "

## 2024-01-06 NOTE — CARE PLAN
The patient is Stable - Low risk of patient condition declining or worsening    Shift Goals  Clinical Goals: free from falls, fernando placement  Patient Goals: fluids  Family Goals: DULCE MARIA    Progress made toward(s) clinical / shift goals:        Problem: Fluid Volume  Goal: Fluid volume balance will be maintained  Outcome: Progressing     Problem: Knowledge Deficit - Standard  Goal: Patient and family/care givers will demonstrate understanding of plan of care, disease process/condition, diagnostic tests and medications  Outcome: Progressing     Problem: Skin Integrity  Goal: Skin integrity is maintained or improved  Outcome: Progressing     Problem: Fall Risk  Goal: Patient will remain free from falls  Outcome: Progressing     Problem: Safety - Medical Restraint  Goal: Remains free of injury from restraints (Restraint for Interference with Medical Device)  Outcome: Progressing       Patient is not progressing towards the following goals:

## 2024-01-06 NOTE — PROGRESS NOTES
Report received. Assumed care. Pt in bed sleeping, calm, no s/s of distress. Denies pain, SOB. Assessment complete. Bilateral soft wrist and right ankle soft wrist restraints in plac. Verified Order and placement. Discussed POC, , pt verbalizes understanding. Explained importance of calling before getting OOB. Call light and belongings within reach. Bed alarm on. Bed in the lowest position. Treaded socks in place. Hourly rounding in progress. Will continue to monitor .

## 2024-01-06 NOTE — PROGRESS NOTES
Assumed pt care with RN. Pt is A&OX3 (disoriented to situation) and states he is in 0/10 pain but declines interventions at this time. Plan of care discussed, no further questions. Personal belongings and call light within reach, bed alarm on.

## 2024-01-06 NOTE — PROGRESS NOTES
IRIS Placement    Tube Team verified patient name and medical record number prior to tube placement.  IRIS tube (55 inches, 10 Khmer) placed at 75 cm in left nare.  Per iris picture, tube appears to be in the stomach.    Nursing Instructions: Awaiting abdomen x-ray to confirm placement before use for medications or feeding. Once placement confirmed, flush tube with 30 ml of water and then remove and save stylet in patient medication drawer. Please contact me with any questions or concerns.

## 2024-01-06 NOTE — CARE PLAN
The patient is Watcher - Medium risk of patient condition declining or worsening    Shift Goals  Clinical Goals: Iris tube feeding placement, Stable VS, rest comfortably  Patient Goals: Rest comfortably, remain pain free  Family Goals: DULCE MARIA    Progress made toward(s) clinical / shift goals:    Problem: Fall Risk  Goal: Patient will remain free from falls  Outcome: Progressing  Note: Pt remained free from falls, bed alarm on, bilateral soft wrist and right ankle soft wrist restraints in place per MD order, hourly rounding in progress     Problem: Safety - Medical Restraint  Goal: Remains free of injury from restraints (Restraint for Interference with Medical Device)  Outcome: Progressing  Note: Q2 hr restraints checked/ verified orders, VSS, no s/s of distress, resting in bed comfortably, hourly rounding in progress       Patient is not progressing towards the following goals:

## 2024-01-06 NOTE — PROGRESS NOTES
Hospital Medicine Daily Progress Note    Date of Service  1/6/2024    Chief Complaint  Lewis Mohr is a 78 y.o. male admitted 1/4/2024 with confusion and hypoglycemia     Hospital Course  78 y.o. male who presented 1/4/2024 with confusion and low oxygen levels.  Mr. Mohr is resubmitted to this hospital due to a fall with cerebellar hemorrhage underwent craniotomy by Dr. Patten neurosurgery on 12/27/2023.  He was subsequently discharged to a local skilled nursing facility, at [place] on 1/2/2024. 1/3 he came back because of slurred speech and a CT of the head was done which was unremarkable for acute processes and he was discharged back to [place].  Paramedics were called again 1/4 because he was found to be unresponsive with hypoxia.      In the emergency room he has oxygen saturations down to 80% on room air and he is hypotensive.  A CT scan of the head again is unchanged for acute processes and chest x-ray reveals possible pulmonary vascular congestion and hypoinflation.  His white blood cell count is 20,000 and he has acute kidney injury with a creatinine of 1.6.  Viral panel was negative.  Nurse notes significant coughing with sputum and he has unable to swallow safely without excessive coughing.      S/p FEES, noted silent aspiration. S/p NG placement 1/6    Interval Problem Update  Failed multiple attempts of NG placement 1/5  Ordered Cortrak vs. IRIS  I have reviewed xray noted good position of feeding tube  Continue iv Unasyn  , changed to D5W  Monitoring Na level  Start tube feeding, dietician consulted     I have discussed this patient's plan of care and discharge plan at IDT rounds today with Case Management, Nursing, Nursing leadership, and other members of the IDT team.    Consultants/Specialty  na    Code Status  Full Code    Disposition  Medically Cleared  I have placed the appropriate orders for post-discharge needs.    Review of Systems  Review of Systems   Unable to perform ROS: Mental  acuity        Physical Exam  Temp:  [35.9 °C (96.6 °F)-36.2 °C (97.2 °F)] 36.1 °C (97 °F)  Pulse:  [] 91  Resp:  [17-20] 20  BP: (125-174)/(62-88) 132/65  SpO2:  [87 %-93 %] 89 %    Physical Exam  Vitals and nursing note reviewed.   Constitutional:       Appearance: Normal appearance.   HENT:      Head: Normocephalic and atraumatic.      Comments: Staples on the right posterior scalp         Mouth/Throat:      Pharynx: Oropharynx is clear.   Eyes:      Pupils: Pupils are equal, round, and reactive to light.      Comments: Extensive ecchymosis around the right eye and the right face    Neck:      Vascular: No carotid bruit.   Cardiovascular:      Rate and Rhythm: Normal rate and regular rhythm.   Pulmonary:      Effort: Pulmonary effort is normal.      Breath sounds: Rales present.      Comments: Coarse breath sounds b/l  Decreased breath sounds   Abdominal:      General: Abdomen is flat. Bowel sounds are normal.      Palpations: Abdomen is soft. There is no mass.   Musculoskeletal:         General: Normal range of motion.      Cervical back: Neck supple.   Skin:     General: Skin is warm and dry.   Neurological:      General: No focal deficit present.      Mental Status: He is alert. Mental status is at baseline.      Comments: move extremities spontaneous   Psychiatric:      Comments: Unable to assess         Fluids  No intake or output data in the 24 hours ending 01/06/24 1245      Laboratory  Recent Labs     01/04/24 1407 01/05/24 0319 01/06/24 0227   WBC 20.9* 20.5* 14.7*   RBC 5.75 5.37 4.85   HEMOGLOBIN 17.5 16.4 14.7   HEMATOCRIT 51.9 48.7 45.0   MCV 90.3 90.7 92.8   MCH 30.4 30.5 30.3   MCHC 33.7 33.7 32.7   RDW 43.8 44.6 45.7   PLATELETCT 416 369 367   MPV 10.8 10.6 10.6       Recent Labs     01/04/24  1407 01/05/24 0319 01/06/24 0227   SODIUM 146* 146* 149*   POTASSIUM 4.1 4.1 3.9   CHLORIDE 106 109 113*   CO2 22 23 21   GLUCOSE 164* 138* 159*   BUN 38* 42* 39*   CREATININE 1.60* 1.59* 1.36    CALCIUM 10.9* 10.3 9.6                     Imaging  DX-ABDOMEN FOR TUBE PLACEMENT   Final Result      Removal of nasogastric tube.      Feeding tube tip projects in the distal stomach.      No visualized thorax.      DX-ABDOMEN FOR TUBE PLACEMENT   Final Result         Gastric drainage tube with tip projecting over the expected area of the right lower lobe airway. Recommend removal.            CRITICAL RESULT READ BACK: Preliminary findings discussed with and critical read back performed by Dr. GLEN ZELAYA via telephone on 1/5/2024 6:22 PM      DX-ABDOMEN FOR TUBE PLACEMENT   Final Result      1.  Malpositioned enteric sump catheter which is folded back upon itself over the left paramedian distal one third mediastinum probably beyond the left mainstem bronchus and within the esophagus.   2.  A Voalte message was sent to GLEN ZELAYA at 1641 hours. Immediate response received.   3.  Per discussion, the patient has already pulled out the tube.      DX-CHEST-PORTABLE (1 VIEW)   Final Result      1.  Hypoinflation and pulmonary vascular congestion with mild bibasilar atelectasis.   2.  No lobar pneumonia or overt pulmonary edema.      CT-HEAD W/O   Final Result      1.  Diffuse atrophy and white matter changes.   2.  No acute intracranial hemorrhage or territorial infarct.   3.  RIGHT occipital craniotomy with underlying cerebellar encephalomalacia.              Assessment/Plan  * Aspiration pneumonia (HCC)- (present on admission)  Assessment & Plan  Is a clinical diagnosis in setting of leukocytosis with clear aspiration here in the emergency department.   Speech evaluation, FEES: silent aspiration with thin liquid, thick liquid and apple sauce. Rec tube feeding.   Continue IV Unasyn  I called and spoke to daughter Jaymie, updated patient's current conditions and treatment plans. Jaymie is in agreement for tube feeding.   Status post NG tube 1/6/2024    Acute respiratory failure with hypoxia (HCC)- (present on  admission)  Assessment & Plan  Not required oxygen in the past  Viral panel is negative  This is consistent with aspiration pneumonitis given the sputum production in the emergency room as well as dysphagia and coarse breath sounds.  FEES noted silent aspiration  Last admission he had a ejection fraction 62% with grade 2 diastolic dysfunction    Continue IV antibiotics  Wean oxygen as tolerated    Urinary retention  Assessment & Plan  Continue bladder scan  Straight cath if bladder scan over 350cc    High anion gap metabolic acidosis  Assessment & Plan  Continue IVF  Cont monitoring    Hypernatremia- (present on admission)  Assessment & Plan  Worsening hyponatremia sodium 149 1/6  IV fluids switched to D5  Continue monitor sodium level  Can switch to Free water flushes tomorrow    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 Patten  CT head in the ER does not reveal any acute processes    Acute kidney failure (HCC)- (present on admission)  Assessment & Plan  His baseline creatinine a few days ago was 0.9 now is 1.6 likely secondary to dehydration.   IVF  Continue monitoring  On fernando          VTE prophylaxis: SCD (recent brain bleeds)    I have performed a physical exam and reviewed and updated ROS and Plan today (1/6/2024). In review of yesterday's note (1/5/2024), there are no changes except as documented above.    My total time spent caring for the patient on the day of the encounter was 53 minutes.   This does not include time spent on separately billable procedures/tests.

## 2024-01-06 NOTE — DIETARY
"Nutrition Support Assessment:  Day 2 of admit.  Lewis Mohr is a 78 y.o. male with admitting DX of respiratory failure      Current problem list:  High anion gap metabolic acidosis  Urinary retention   Aspiration pneumonia   Hx of cerebellar hemorrhage  Hypernatremia   Acute respiratory failure with hypoxia   Acute kidney failure      Assessment:  Estimated Nutritional Needs based on:   Height: 177.8 cm (5' 10\")  Weight: 86.1 kg (189 lb 13.1 oz) via bed scale   Weight to Use in Calculations: 86.1 kg (189 lb 13.1 oz)  Body mass index is 27.24 kg/m²., BMI classification: overweight     Calculation/Equation:   REE per XFN=8637 kcals/day (x1.0=7601 kcals/day)  Total Calories / day: 1292 - 1722  (Calories / kg: 15 - 20)  Total Grams Protein / day: 86 - 1722  (Grams Protein / k.0 - 1.2)     Evaluation:   RD consulted for tube feeding assessment. Pt assessed by SLP  found to have severe oropharyngeal dysphagia, SLP recommending NPO with alternative nutrition. Nutrition support indicated.   Per RN note IRIS placed -pending abdominal x ray to confirm placement before tube able to be used.   Current clinical picture and MD progress notes reviewed. Pt admitted with confusion and hypoglycemia. Recent admit d/t fall with cerebellar hemorrhage s/p crani   Labs () Na 149 (H), Glu 159 (H),   Meds: D5 @ 75 mL/hr, senna (refused),   Skin: no pressure injuries or edema noted   +BM pta      Malnutrition Risk: Criteria not assessed at this time     Recommendations/Plan:  Initiate promote with fiber @ 25 mL/hr and advance per protocol to goal rate of 65 mL/hr providing 1560 kcals, 98 grams protein and 1296 mL free water.   Fluids per MD   Monitor weights     RD following             "

## 2024-01-06 NOTE — THERAPY
Speech Language Therapy Contact Note    Patient Name: Lewis Mohr  Age:  78 y.o., Sex:  male  Medical Record #: [id number]  Today's Date: 1/6/2024    Discussed missed therapy with CHARLIE Alonso.        01/06/24 0884   Interdisciplinary Plan of Care Collaboration   IDT Collaboration with  Nursing   Collaboration Comments Discussed with RN - Patient had NGT placed and then pulled it. ICU RN to place Iris today. Patient with severe dysphagia with silent aspiration across consistencies on 1/5. SLP following.

## 2024-01-06 NOTE — THERAPY
Speech Language Pathology   Flexible Endoscopic Evaluation of Swallowing (FEES)        Patient Name: Lewis Mohr  AGE:  78 y.o., SEX:  male  Medical Record #: [id number]  Date of Service: 1/5/2024      History of Present Illness  Pt is a 78 y.o. M who presented 1/4/23 with ALOC and low oxygen levels. Admitting dx of respiratory failure. Pt w/ recent cerebellar hemorrhage who underwent craniotomy by Dr. Patetn neurosurgery on 12/27/23 2/2 fall. Pt subsequently discharged to SNF. Pt w/ return to hospital 1/3/24 because of slurred speech and CT head was done which was unremarkable for acute processes, again pt discharged to SNF.      CMHx: Aspiration PNA, respiratory failure, ERWIN, Hx of cerebellar hemorrhage   PMHx: Respiratory failure with hypoxia, Leucocytosis, Adrenal mass L      Pertinent Information  Current Method of Nutrition: NPO until cleared by speech pathology  Patient Behaviors: Confused, Restless   Dentition: Fair, Natural dentition   Feeding Tube: None   Tracheostomy: No    Factor(s) Affecting Performance: Impaired endurance, Impaired command following     Discussed the risks, benefits, and alternatives of the FEES procedure. Patient/family acknowledged and agreed to proceed.      Assessment  Flexible Endoscopic Evaluation of Swallowing (FEES) completed at bedside today. The endoscope was passed transnasally via Left nare to evaluate the anatomy and physiology of swallowing. Pt tolerated the procedure with no apparent distress.    Anatomic Findings: diffuse, moderate pharyngeal edema involving arytenoids, ventricular folds, base of tongue, and bilateral pharyngeal walls. Cued cough with significant amount of phlegm exiting trachea.  Vocal Fold Motion: Bilateral movement  Secretion Management: Adequate  PO Trials: Ice Chips, Thin Liquid, Mildly Thick Liquid, Liquidised      Consistency PAS Score Timing Residue Comments   Thin Liquid 8 During swallow Vallecular Residue: Trace (1%-5%)  Pyriform Sinus Residue:  Trace (1%-5%) Ice: PAS 3  Tsp: PAS 8   Mildly Thick 8 During Swallow Vallecular Residue: Mild (5%-25%)  Pyriform Sinus Residue: Trace (1%-5%) Tsp: PAS 8  Cup: PAS 8   Liquidised 8 During Swallow Vallecular Residue: Mild (5%-25%)  Pyriform Sinus Residue: Trace (1%-5%)      Penetration-Aspiration Scale (PAS)  1     No contrast enters airway  2     Contrast enters the airway, remains above the vocal folds, and is ejected from the airway (not seen in the airway at the end of the swallow).  3     Contrast enters the airway, remains above the vocal folds, and is not ejected from the airway (is seen in the airway after the swallow).  4     Contrast enters the airway, contacts the vocal folds, and is ejected from the airway.  5     Contrast enters the airway, contacts the vocal folds, and is not ejected from the airway  6     Contrast enters the airway, crosses the plane of the vocal folds, and is ejected from the airway.  7     Contrast enters the airway, crosses the plane of the vocal folds, and is not ejected from the airway despite effort.  8     Contrast enters the airway, crosses the plane of the vocal folds, is not ejected from the airway and there is no response to aspiration.      Oral phase:  Patient with intermittent poor labial seal around cup with midline anterior bolus loss. Timely AP transfer of PO. Chewable solids held r/t severity of pharyngeal dysphagia.     Pharyngeal phase:  - Incomplete laryngeal vestibule closure resulted in silent aspiration (inferred) of thin liquid, mildly thick liquid, and liquidized solids during the swallow.    - Reduced base of tongue retraction resulted in mild vallecular residue with mildly thick liquids and liquidized solids.     Compensatory Strategies:  -  Cued cough/throat clear did not eject any aspiration across consistencies.   - Reflexive cleansing swallow reduced pharyngeal residue.   Patient did not follow commands to trial more advanced strategies; required  "significant prompting for above strategies.     Severity Rating:  HORACE: Severe      Clinical Impressions  Patient presents with a severe oropharyngeal dysphagia, likely subacute related to recent cerebellar stroke (potential acute component r/t current mentation). Swallow safety and efficiency are both impaired. Primary deficits include incomplete laryngeal vestibule closure and reduced base of tongue retraction. Deficits are resistant to diet modification; current mentation impedes efficacy of compensatory strategy usage. Patient is a fair candidate for behavioral and exercise-based swallow rehabilitation. A repeat swallowing diagnostic is likely indicated prior to diet advancement related to silent nature of aspiration events. Consider training the following evidence-based swallowing exercises in therapy based on patient-specific pathophysiology: effortful swallow and tongue pull back. Recommend NPO with consideration of short term alternative nutrition.    Recommendations  Diet Consistency: NPO consider short term alternative nutrition; Clear for minimal ice chips/hour after oral care to reduce xerostomia and mitigate disuse atrophy of swallow musculature   Medication: Non Oral (SILENT ASPIRATION WTIH APPLESAUCE)  Oral Care: Q6h            SLP Treatment Plan  Treatment Plan: Dysphagia Treatment  SLP Frequency: 4x Per Week  Estimated Duration: Until Therapy Goals Met      Anticipated Discharge Needs  Discharge Recommendations: Recommend post-acute placement for additional speech therapy services prior to discharge home   Therapy Recommendations Upon DC: Dysphagia Training       Patient / Family Goals  Patient / Family Goal #1: \"more\" referring to water  Goal #1 Outcome: Progressing slower than expected  Short Term Goal # 1: Pt will participate in an instrumental swallow study via FEES to determine airway protection/swallow efficiency and guide dysphagia management.  Goal Outcome # 1: Goal met, new goal " added  Short Term Goal # 1 B : Patient will complete swallowing exercises targeting LVC and BOT retraction x40.      Keira Vanegas, SLP

## 2024-01-07 PROBLEM — Z71.89 ADVANCE CARE PLANNING: Status: ACTIVE | Noted: 2024-01-01

## 2024-01-07 NOTE — PROGRESS NOTES
Bedside report received, Assumed care of patient 0700         Patient is alert unable to answer orientation questions.  Patient pain level- PAINAD score 3  Patient on 2L Oxy mask  Mobility bed bound  Assistance level assistance of two or more  Voiding +  Last BM - unable to assess, prior to arrival    Patient in soft wrist restraints    Hourly rounding in place, call light within reach.Patient restless. Repositioned IV pole and tubing, new stat lock dressing placed, repositioned patient. Educated patient regarding discontinuation of restraints, no learning evident.

## 2024-01-07 NOTE — CARE PLAN
The patient is Stable - Low risk of patient condition declining or worsening    Shift Goals  Clinical Goals: increase tube feed to 50 mL/hr, pt will remain free from falls  Patient Goals: comfort  Family Goals: DULCE MARIA    Progress made toward(s) clinical / shift goals:       Problem: Fluid Volume  Goal: Fluid volume balance will be maintained  Outcome: Progressing     Problem: Knowledge Deficit - Standard  Goal: Patient and family/care givers will demonstrate understanding of plan of care, disease process/condition, diagnostic tests and medications  Outcome: Progressing     Problem: Skin Integrity  Goal: Skin integrity is maintained or improved  Outcome: Progressing     Problem: Fall Risk  Goal: Patient will remain free from falls  Outcome: Progressing       Patient is not progressing towards the following goals:

## 2024-01-07 NOTE — ASSESSMENT & PLAN NOTE
Following ICH he has recurrent aspiration and will remain high risk for reintubation; several goals of care conversations with the daughter. She does not want him trached but wants him to keep him full code and intubated if he declines     2/2 palliative medicine signed off

## 2024-01-07 NOTE — PROGRESS NOTES
Rapid response team successfully placed an Iris cortrack and placed an order for X-ray verification of placement. MD made aware.

## 2024-01-07 NOTE — THERAPY
"Speech Language Pathology   Daily Treatment     Patient Name: Lewis Mohr  AGE:  78 y.o., SEX:  male  Medical Record #: [id number]  Date of Service: 1/7/2024      Precautions: Swallow precautions, 3-point restraints       Subjective  Pt seen per MD request after pulling NGT again. Per RN - \"He chewed the Cortrak last night completely in two pieces.\"    Pt inconsistently agreeable, chewing on air. Did consistently nod \"Yes\" when asked if he wants to eat/drink.      Assessment  Pt seen for dysphagia management. In 3-point restraints, on 4L Oxymask satting in the mid-90s. Required multi-sensory cues to rouse. Speech nonsensical. Trials of ice chips and thin water provided. Inconsistent bolus stripping including chewing-type movements in the absence of a bolus. Fair mastication of ice chips. Inconsistently demonstrated appreciable swallow. Exteremly wet/congested vocal and breath quality after limited trials and did ultimately have unproductive cough although absence of a cough response is NOT A RELIABLE BEDSIDE INDICATOR FOR THIS PATIENT as he demonstrated consistent silent aspiration of liquids and solids on FEES completed on 1/5.     Cough was persistent, and pt ultimately coughed and expectorated copious amount of glob-like, medium brown secretions. RN aware, to update MD.     Pt did not follow commands during session, unable to trial dysphagia exercises this date.     Clinical Impressions  Per FEES completed on 1/5, pt demonstrates a severe oropharyngeal dysphagia with visualized silent aspiration of liquids and applesauce. Bedside presentation this date is consistent with FEES results. A safe diet recommendation cannot be made at this time; pt will need repeat diagnostic prior to upgrading diet but does not demonstrate readiness this date. He appears to be a guarded candidate for ongoing dysphagia rehabilitation given current mentation. Pt may have ice chips after oral care with assistance from trained staff to " "mitigate the impacts of xerostomia and disuse atrophy as well as to provide comfort and aid with secretion management.        Given severity of dysphagia as demonstrated on FEES, multiple unsuccessful attempts at non-oral nutrition, and overall poor participation in dysphagia therapy this date/guarded prognosis given current mentation, would consider discussing overall GOC including eating despite risk.       Recommendations  NPO with consideration for non-oral nutrition source vs. dicussions of GOC and eating despite risk    Appropriate for limited ice chips after oral care given strict 1:1 spv  Instrumentation: Instrumental swallow study pending clinical progress  Medication: Non Oral  Supervision: 1:1 feeding with constant supervision (ice chips only)  Oral Care: Q4h        SLP Treatment Plan  Treatment Plan: Dysphagia Treatment, Patient/Family/Caregiver Training  SLP Frequency: 4x Per Week  Estimated Duration: Until Therapy Goals Met      Anticipated Discharge Needs  Discharge Recommendations: Recommend post-acute placement for additional speech therapy services prior to discharge home  Therapy Recommendations Upon DC: Dysphagia Training, Patient / Family / Caregiver Education, Community Re-Integration      Patient / Family Goals  Patient / Family Goal #1: \"more\" referring to water  Goal #1 Outcome: Goal not met  Short Term Goals  Short Term Goal # 1: Pt will participate in an instrumental swallow study via FEES to determine airway protection/swallow efficiency and guide dysphagia management.  Goal Outcome # 1: Goal met, new goal added  Short Term Goal # 1 B : Patient will complete swallowing exercises targeting LVC and BOT retraction x40.  Goal Outcome  # 1 B: Goal not met      Veda Babin, BRINA  "

## 2024-01-07 NOTE — PROGRESS NOTES
IRIS 10 Armenian 55 inch inserted per orders. Measurement at 85 CM. Flat plate abd xray ordered for tube placement. Pt tolerated procedure without complications. Will wait for xray results for any further adjustments needed. IRIS secured with bridle.  Sienna Gunter R.N.

## 2024-01-07 NOTE — PROGRESS NOTES
Notified Kaleigh GUERRA of pt's recent tube placement xray. Per Kaleigh giordano to use. Flushed 30 ml tube feeding with no resistance. Restarted tube feeding. Will continue to monitor

## 2024-01-07 NOTE — PROGRESS NOTES
Hospital Medicine Daily Progress Note    Date of Service  1/7/2024    Chief Complaint  Lewis Mohr is a 78 y.o. male admitted 1/4/2024 with confusion and hypoglycemia     Hospital Course  78 y.o. male who presented 1/4/2024 with confusion and low oxygen levels.  Mr. Mohr is resubmitted to this hospital due to a fall with cerebellar hemorrhage underwent craniotomy by Dr. Patten neurosurgery on 12/27/2023.  He was subsequently discharged to a local skilled nursing facility, at [place] on 1/2/2024. 1/3 he came back because of slurred speech and a CT of the head was done which was unremarkable for acute processes and he was discharged back to [place].  Paramedics were called again 1/4 because he was found to be unresponsive with hypoxia.      In the emergency room he has oxygen saturations down to 80% on room air and he is hypotensive.  A CT scan of the head again is unchanged for acute processes and chest x-ray reveals possible pulmonary vascular congestion and hypoinflation.  His white blood cell count is 20,000 and he has acute kidney injury with a creatinine of 1.6.  Viral panel was negative.  Nurse notes significant coughing with sputum and he has unable to swallow safely without excessive coughing.      S/p FEES, noted silent aspiration. S/p NG placement 1/6. NG pulled out by patient on 1/7    Interval Problem Update  Failed multiple attempts of NG placement 1/5  Ordered Cortrak vs. IRIS. Finally placed on 1/6, however patient pulled out on 1/7  Speech evaluation, high risk of aspiration, rec feeding tube.   Will plan to replace feeding tube  Monitoring Na level  Continue iv Unasyn  I have consulted with palliative care for further discussion goal of care  Still restless, restraints ordered     I have discussed this patient's plan of care and discharge plan at IDT rounds today with Case Management, Nursing, Nursing leadership, and other members of the IDT team.    Consultants/Specialty  na    Code Status  Full  Code    Disposition  The patient is not medically cleared for discharge to home or a post-acute facility.      I have placed the appropriate orders for post-discharge needs.    Review of Systems  Review of Systems   Unable to perform ROS: Mental acuity        Physical Exam  Temp:  [36 °C (96.8 °F)-36.4 °C (97.5 °F)] 36.1 °C (97 °F)  Pulse:  [78-95] 84  Resp:  [16-20] 16  BP: (118-143)/(63-79) 129/79  SpO2:  [91 %-96 %] 94 %    Physical Exam  Vitals and nursing note reviewed.   Constitutional:       Appearance: Normal appearance.   HENT:      Head: Normocephalic and atraumatic.      Comments: Staples on the right posterior scalp         Mouth/Throat:      Pharynx: Oropharynx is clear.   Eyes:      Pupils: Pupils are equal, round, and reactive to light.      Comments: Extensive ecchymosis around the right eye and the right face    Neck:      Vascular: No carotid bruit.   Cardiovascular:      Rate and Rhythm: Normal rate and regular rhythm.   Pulmonary:      Effort: Pulmonary effort is normal.      Breath sounds: Rales present.      Comments: Coarse breath sounds b/l  Decreased breath sounds   Abdominal:      General: Abdomen is flat. Bowel sounds are normal.      Palpations: Abdomen is soft. There is no mass.   Musculoskeletal:         General: Normal range of motion.      Cervical back: Neck supple.   Skin:     General: Skin is warm and dry.   Neurological:      General: No focal deficit present.      Mental Status: He is alert. Mental status is at baseline.      Comments: move extremities spontaneous   Psychiatric:      Comments: Unable to assess         Fluids    Intake/Output Summary (Last 24 hours) at 1/7/2024 1430  Last data filed at 1/7/2024 1056  Gross per 24 hour   Intake 30 ml   Output 200 ml   Net -170 ml         Laboratory  Recent Labs     01/05/24  0319 01/06/24  0227 01/07/24  0215   WBC 20.5* 14.7* 15.2*   RBC 5.37 4.85 4.82   HEMOGLOBIN 16.4 14.7 14.8   HEMATOCRIT 48.7 45.0 45.9   MCV 90.7 92.8 95.2    MCH 30.5 30.3 30.7   MCHC 33.7 32.7 32.2*   RDW 44.6 45.7 48.4   PLATELETCT 369 367 384   MPV 10.6 10.6 10.5       Recent Labs     01/05/24  0319 01/06/24  0227 01/06/24  1153 01/06/24  1602 01/07/24  0215   SODIUM 146* 149* 147* 147* 146*   POTASSIUM 4.1 3.9  --   --  3.9   CHLORIDE 109 113*  --   --  111   CO2 23 21  --   --  23   GLUCOSE 138* 159*  --   --  179*   BUN 42* 39*  --   --  40*   CREATININE 1.59* 1.36  --   --  1.18   CALCIUM 10.3 9.6  --   --  10.0                     Imaging  DX-ABDOMEN FOR TUBE PLACEMENT   Final Result      DHT tip overlies the second portion of the duodenum      DX-ABDOMEN FOR TUBE PLACEMENT   Final Result      Feeding tube tip projects in the distal stomach or first portion of the duodenum.      DX-ABDOMEN FOR TUBE PLACEMENT   Final Result      Removal of nasogastric tube.      Feeding tube tip projects in the distal stomach.      No visualized thorax.      DX-ABDOMEN FOR TUBE PLACEMENT   Final Result         Gastric drainage tube with tip projecting over the expected area of the right lower lobe airway. Recommend removal.            CRITICAL RESULT READ BACK: Preliminary findings discussed with and critical read back performed by Dr. GLEN ZELAYA via telephone on 1/5/2024 6:22 PM      DX-ABDOMEN FOR TUBE PLACEMENT   Final Result      1.  Malpositioned enteric sump catheter which is folded back upon itself over the left paramedian distal one third mediastinum probably beyond the left mainstem bronchus and within the esophagus.   2.  A Voalte message was sent to GLEN ZELAYA at 1641 hours. Immediate response received.   3.  Per discussion, the patient has already pulled out the tube.      DX-CHEST-PORTABLE (1 VIEW)   Final Result      1.  Hypoinflation and pulmonary vascular congestion with mild bibasilar atelectasis.   2.  No lobar pneumonia or overt pulmonary edema.      CT-HEAD W/O   Final Result      1.  Diffuse atrophy and white matter changes.   2.  No acute intracranial  hemorrhage or territorial infarct.   3.  RIGHT occipital craniotomy with underlying cerebellar encephalomalacia.              Assessment/Plan  * Aspiration pneumonia (HCC)- (present on admission)  Assessment & Plan  Is a clinical diagnosis in setting of leukocytosis with clear aspiration here in the emergency department.   Speech evaluation, FEES: silent aspiration with thin liquid, thick liquid and apple sauce. Rec tube feeding.   Continue IV Unasyn  I called and spoke to daughter Jaymie, updated patient's current conditions and treatment plans. Jaymie is in agreement for tube feeding.   Status post NG tube 1/6/2024, pulled out by patient on 1/7  Reevaluated by speech, still high risk of aspiration, rec feeding tube  Will plan to replace the feeding tube    Acute respiratory failure with hypoxia (HCC)- (present on admission)  Assessment & Plan  Not required oxygen in the past  Viral panel is negative  This is consistent with aspiration pneumonitis given the sputum production in the emergency room as well as dysphagia and coarse breath sounds.  FEES noted silent aspiration  Last admission he had a ejection fraction 62% with grade 2 diastolic dysfunction    Continue IV antibiotics  Wean oxygen as tolerated    Advance care planning  Assessment & Plan  Patient is confused, agitated, able to make medical decision.  I have discussed the goal of care with patient's daughter Jaymie.  She said that she is unable to make a decision now and she will check with other family members.  Currently full code  Given his new dysphagia requiring possible long-term oral access, I have consulted palliative care for further goal of care discussion  Time spent: 16 mins    Urinary retention  Assessment & Plan  Continue bladder scan  Straight cath if bladder scan over 350cc    High anion gap metabolic acidosis  Assessment & Plan  Continue IVF  Cont monitoring    Hypernatremia- (present on admission)  Assessment & Plan  Worsening hyponatremia  sodium 149 1/6  IV fluids switched to D5  Continue monitor sodium level  1/7: improving. 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 Patten  CT head in the ER does not reveal any acute processes    Acute kidney failure (HCC)- (present on admission)  Assessment & Plan  His baseline creatinine a few days ago was 0.9 now is 1.6 likely secondary to dehydration.   IVF  Continue monitoring  On fernando          VTE prophylaxis: SCD (recent brain bleeds)    I have performed a physical exam and reviewed and updated ROS and Plan today (1/7/2024). In review of yesterday's note (1/6/2024), there are no changes except as documented above.    My total time spent caring for the patient on the day of the encounter was 68 minutes.   This does not include time spent on separately billable procedures/tests.

## 2024-01-07 NOTE — PROGRESS NOTES
I went into patient's room to check on him and the patient was pulling at his restraints and restless. Upon further assessment the Cortrak was visibly chewed through and no longer intact. Half of the NG tube was still in the patient and the other half was out. Tube feeding was stopped and the rest of the tube was removed from the patient. Mario was notified and she requested rapid response team attempt to place another Iris Cortrack.

## 2024-01-08 PROBLEM — G93.40 ENCEPHALOPATHY: Status: ACTIVE | Noted: 2024-01-01

## 2024-01-08 NOTE — PROGRESS NOTES
NG placed in the R nare, 12 Serbian. Patient tolerated procedure well. Placement verification xray ordered, currently awaiting xray and interpretation.     ABD xray showed NG was in R lung, NG removed and re-inserted ABD xray re-ordered

## 2024-01-08 NOTE — PROGRESS NOTES
Hospital Medicine Daily Progress Note    Date of Service  1/8/2024    Chief Complaint  Lewis Mohr is a 78 y.o. male admitted 1/4/2024 with confusion and hypoglycemia     Hospital Course  78 y.o. male who presented 1/4/2024 with confusion and low oxygen levels.  Mr. Mohr is resubmitted to this hospital due to a fall with cerebellar hemorrhage underwent craniotomy by Dr. Patten neurosurgery on 12/27/2023.  He was subsequently discharged to a local skilled nursing facility, at [place] on 1/2/2024. 1/3 he came back because of slurred speech and a CT of the head was done which was unremarkable for acute processes and he was discharged back to [place].  Paramedics were called again 1/4 because he was found to be unresponsive with hypoxia.      In the emergency room he has oxygen saturations down to 80% on room air and he is hypotensive.  A CT scan of the head again is unchanged for acute processes and chest x-ray reveals possible pulmonary vascular congestion and hypoinflation.  His white blood cell count is 20,000 and he has acute kidney injury with a creatinine of 1.6.  Viral panel was negative.  Nurse notes significant coughing with sputum and he has unable to swallow safely without excessive coughing.      S/p FEES, noted silent aspiration. S/p NG placement 1/6. NG pulled out by patient on 1/7    Interval Problem Update  Failed multiple attempts of NG placement 1/5. NG replaced on 1/6, patient pulled out 1/7. Rep-place NG on 1/7, then patient pulled it out on 1/8, then replaced again  I have reviewed CXR  Palliative care consulted   Carin 151, ordered iv D5W and free water flush  Monitoring Na level  Continue iv Unasyn  Still on restraints.     Addendum: noted worsening O2 demand. Currently on 7L O2 with gurgle noted. , RR 24. I have ordered BNP, procalcitonin, ABG, Lactic acid. Check CTA chest. Transfer to tele. Discussed with RN.   Carin 152, continue D5W and free water flushes,    I have discussed this patient's  plan of care and discharge plan at IDT rounds today with Case Management, Nursing, Nursing leadership, and other members of the IDT team.    Consultants/Specialty  na    Code Status  Full Code    Disposition  The patient is not medically cleared for discharge to home or a post-acute facility.      I have placed the appropriate orders for post-discharge needs.    Review of Systems  Review of Systems   Unable to perform ROS: Mental acuity        Physical Exam  Temp:  [35.9 °C (96.7 °F)-36.8 °C (98.2 °F)] 36.8 °C (98.2 °F)  Pulse:  [] 92  Resp:  [18] 18  BP: ()/() 156/82  SpO2:  [94 %-97 %] 97 %    Physical Exam  Vitals and nursing note reviewed.   Constitutional:       Appearance: Normal appearance.   HENT:      Head: Normocephalic and atraumatic.      Comments: Staples on the right posterior scalp         Mouth/Throat:      Pharynx: Oropharynx is clear.   Eyes:      Pupils: Pupils are equal, round, and reactive to light.      Comments: Extensive ecchymosis around the right eye and the right face    Neck:      Vascular: No carotid bruit.   Cardiovascular:      Rate and Rhythm: Normal rate and regular rhythm.   Pulmonary:      Effort: Pulmonary effort is normal.      Breath sounds: Rales present.      Comments: Coarse breath sounds b/l  Decreased breath sounds   Abdominal:      General: Abdomen is flat. Bowel sounds are normal.      Palpations: Abdomen is soft. There is no mass.   Musculoskeletal:         General: Normal range of motion.      Cervical back: Neck supple.   Skin:     General: Skin is warm and dry.   Neurological:      General: No focal deficit present.      Mental Status: He is alert. Mental status is at baseline.      Comments: move extremities spontaneous   Psychiatric:      Comments: Unable to assess         Fluids    Intake/Output Summary (Last 24 hours) at 1/8/2024 1334  Last data filed at 1/8/2024 0600  Gross per 24 hour   Intake 120 ml   Output 1200 ml   Net -1080 ml          Laboratory  Recent Labs     01/06/24  0227 01/07/24  0215 01/08/24  0335   WBC 14.7* 15.2* 16.3*   RBC 4.85 4.82 4.91   HEMOGLOBIN 14.7 14.8 14.9   HEMATOCRIT 45.0 45.9 45.8   MCV 92.8 95.2 93.3   MCH 30.3 30.7 30.3   MCHC 32.7 32.2* 32.5   RDW 45.7 48.4 46.4   PLATELETCT 367 384 374   MPV 10.6 10.5 10.6       Recent Labs     01/06/24  0227 01/06/24  1153 01/06/24  1602 01/07/24  0215 01/08/24  0335   SODIUM 149*   < > 147* 146* 151*   POTASSIUM 3.9  --   --  3.9 4.3   CHLORIDE 113*  --   --  111 115*   CO2 21  --   --  23 25   GLUCOSE 159*  --   --  179* 148*   BUN 39*  --   --  40* 34*   CREATININE 1.36  --   --  1.18 1.34   CALCIUM 9.6  --   --  10.0 9.8    < > = values in this interval not displayed.                     Imaging  DX-ABDOMEN FOR TUBE PLACEMENT   Final Result         1.  Nonspecific bowel gas pattern in the upper abdomen.   2.  Cardiomegaly   3.  Atherosclerosis   4.  Bibasilar atelectasis   5.  Nasogastric tube tip terminates overlying the expected location of the pylorus or first duodenal segment.      DX-ABDOMEN FOR TUBE PLACEMENT   Final Result         1.  Nonspecific bowel gas pattern in the upper abdomen.   2.  Nasogastric tube tip terminates overlying the expected location of the gastric antrum.   3.  Linear densities the bilateral lung bases favor atelectasis, component of infiltrate not excluded.      DX-ABDOMEN FOR TUBE PLACEMENT   Final Result      NG tube extends below the diaphragm with tip at the level of the gastric fundus. Side port is at the level of the distal esophagus.      DX-ABDOMEN FOR TUBE PLACEMENT   Final Result      NG tube is noted with tip projecting over the right lower lung.      These findings were transmitted with GLEN ZELAYA on 01/07/2024. Tube was subsequently adjusted and is now present below the diaphragm.      DX-ABDOMEN FOR TUBE PLACEMENT   Final Result      DHT tip overlies the second portion of the duodenum      DX-ABDOMEN FOR TUBE PLACEMENT   Final  Result      Feeding tube tip projects in the distal stomach or first portion of the duodenum.      DX-ABDOMEN FOR TUBE PLACEMENT   Final Result      Removal of nasogastric tube.      Feeding tube tip projects in the distal stomach.      No visualized thorax.      DX-ABDOMEN FOR TUBE PLACEMENT   Final Result         Gastric drainage tube with tip projecting over the expected area of the right lower lobe airway. Recommend removal.            CRITICAL RESULT READ BACK: Preliminary findings discussed with and critical read back performed by Dr. GLEN ZELAYA via telephone on 1/5/2024 6:22 PM      DX-ABDOMEN FOR TUBE PLACEMENT   Final Result      1.  Malpositioned enteric sump catheter which is folded back upon itself over the left paramedian distal one third mediastinum probably beyond the left mainstem bronchus and within the esophagus.   2.  A Voalte message was sent to GLEN ZELAYA at 1641 hours. Immediate response received.   3.  Per discussion, the patient has already pulled out the tube.      DX-CHEST-PORTABLE (1 VIEW)   Final Result      1.  Hypoinflation and pulmonary vascular congestion with mild bibasilar atelectasis.   2.  No lobar pneumonia or overt pulmonary edema.      CT-HEAD W/O   Final Result      1.  Diffuse atrophy and white matter changes.   2.  No acute intracranial hemorrhage or territorial infarct.   3.  RIGHT occipital craniotomy with underlying cerebellar encephalomalacia.              Assessment/Plan  * Aspiration pneumonia (HCC)- (present on admission)  Assessment & Plan  Is a clinical diagnosis in setting of leukocytosis with clear aspiration here in the emergency department.   Speech evaluation, FEES: silent aspiration with thin liquid, thick liquid and apple sauce. Rec tube feeding.   Continue IV Unasyn  I called and spoke to daughter Jaymie, updated patient's current conditions and treatment plans. Jaymie is in agreement for tube feeding.   Failed multiple attempts of NG placement 1/5. NG  replaced on 1/6, patient pulled out 1/7. Re-place NG on 1/7, then patient pulled it out on 1/8, then replaced again  Speech following  Palliative consulted    Acute respiratory failure with hypoxia (HCC)- (present on admission)  Assessment & Plan  Not required oxygen in the past  Viral panel is negative  This is consistent with aspiration pneumonitis given the sputum production in the emergency room as well as dysphagia and coarse breath sounds.  FEES noted silent aspiration  Last admission he had a ejection fraction 62% with grade 2 diastolic dysfunction    Continue IV antibiotics  Wean oxygen as tolerated    Encephalopathy  Assessment & Plan  Recent hx of brain bleeding s/p surgery 12/27  Noted worsening encephalopathy. Likely metabolic encephalopathy due to aspiration pneumonia, UTI.  CT head unremarkable  Check TSH, ammonia. Follow blood and urine culture  Cont iv Unasyn      Advance care planning  Assessment & Plan  Patient is confused, agitated, able to make medical decision.  I have discussed the goal of care with patient's daughter Jaymie.  She said that she is unable to make a decision now and she will check with other family members.  Currently full code  Given his new dysphagia requiring possible long-term oral access, I have consulted palliative care for further goal of care discussion  Time spent: 16 mins    Urinary retention  Assessment & Plan  Continue bladder scan  Straight cath if bladder scan over 350cc    High anion gap metabolic acidosis  Assessment & Plan  Continue IVF  Cont monitoring    Hypernatremia- (present on admission)  Assessment & Plan  Worsening hyponatremia sodium 149 1/6  IV fluids switched to D5  Continue monitor sodium level  1/8: Na 151, continue D5W iv and free water flushes once he has a NG tube    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 Patten  CT head in the ER does not reveal any  acute processes    Acute kidney failure (HCC)- (present on admission)  Assessment & Plan  His baseline creatinine a few days ago was 0.9 now is 1.6 likely secondary to dehydration.   IVF  Continue monitoring  On fernando          VTE prophylaxis: SCD (recent brain bleeds)    I have performed a physical exam and reviewed and updated ROS and Plan today (1/8/2024). In review of yesterday's note (1/7/2024), there are no changes except as documented above.    My total time spent caring for the patient on the day of the encounter was 55 minutes.   This does not include time spent on separately billable procedures/tests.

## 2024-01-08 NOTE — PROGRESS NOTES
"Received drowsy but easily to arouse. Kept NPO, awaiting xray verification of tube placement prior to tube feeding as ordered. Check vitals sign and recorded accordingly and due med given per MAR. Monitor sign and symptoms of respiratory distress and treatment given accordingly per MAR.Medicated per MAR and reassessed every 2 hours per protocol. Call light within reach. Bed alarm in placed. Needs attended. Continue to monitor./69   Pulse (!) 105   Temp 36 °C (96.8 °F) (Temporal)   Resp 18   Ht 1.778 m (5' 10\")   Wt 86.1 kg (189 lb 13.1 oz)   SpO2 94%   BMI 27.24 kg/m² . Fs 179, turned every 2 hours per skin risk . Restraints in placed soft wrist bilateral and right ankle soft restraints.   "

## 2024-01-08 NOTE — CARE PLAN
The patient is Stable - Low risk of patient condition declining or worsening    Shift Goals  Clinical Goals: To start tube feeding per protocol, skin integrity, ventilation and safety on restraint and  Patient Goals: DULCE MARIA  Family Goals: DULCE MARIA    Progress made toward(s) clinical / shift goals:  Started tube feeding after xray verified proper placement and followed as ordered. No aspiration and pulling out of vital lines including NGT and 02 tubing for better ventilation and nutrition. Continued Restraint in placed for the continuity of treatment and care/safety from fall.

## 2024-01-08 NOTE — PROGRESS NOTES
Notified MD that RN was unable to obtain a certified RN to replace Iris cortrak. Spoke to TICU Charge who recommended RN talk to MD regarding patient getting a NG vs Iris and that the iris insertion had certain criteria that patient needed to meet. RN spoke to Dr. Holman who stated iris was indicated due to several NG placement attempts/fails. Per Dr. Holman okay for RN to re-attempt NG placement if unable to obtain RN who can do iris cortrak. Charge RN updated.

## 2024-01-08 NOTE — ASSESSMENT & PLAN NOTE
Toxic metabolic + ICU hypoactive delirium  This has persisted  MRI brain 1/29/24 was unremarkable for acute processes.    2/13  On going

## 2024-01-09 PROBLEM — J96.91 HYPOXIC RESPIRATORY FAILURE (HCC): Status: ACTIVE | Noted: 2024-01-01

## 2024-01-09 PROBLEM — I48.91 A-FIB (HCC): Status: ACTIVE | Noted: 2024-01-01

## 2024-01-09 PROBLEM — R57.9 SHOCK (HCC): Status: ACTIVE | Noted: 2024-01-01

## 2024-01-09 NOTE — PROGRESS NOTES
"Bedside report received from RN on S5 and then patient transferred to [id number]. Reported that patient had increased oxygen need and went from 3L NC to 8L oxymask. Tube feeding running. RN reports patient sounds \"gurgally\".   When on the floor of the receiving unit the patient lines and rates checked. Abd xray for NG tube placement also read and shows tip terminates in the duodenal segment.   Patient lung sounds were course crackles and RT was called to help assess patient needs and eval for possible suctioning. Upon suctioning tube feed seemed to be produced. Feeding stopped and On-call provider notified.   Informed to hold tube feed until the AM.   CTA chest to be performed this night.   "

## 2024-01-09 NOTE — PROGRESS NOTES
Received call from monitors. Patients heart rate 140-150s. BP 90/56. MD Apontes notified. Requested MD to come to bedside. Pts O2 demands increased. Reached out to Rapid RN for nurse concern. Patient non responsive to painful stimuli. Rapid response called, code stroke called by rapid team.

## 2024-01-09 NOTE — CARE PLAN
The patient is Unstable - High likelihood or risk of patient condition declining or worsening    Shift Goals  Clinical Goals: Monitor O2 need  Patient Goals: DULCE MARIA  Family Goals: DULCE MARIA    Progress made toward(s) clinical / shift goals:  Patient oxygen need has increased and patient appears tachypneic. Patient in confused and able to acknowledge name being called but unable to respond in any meaning  Problem: Hemodynamics  Goal: Patient's hemodynamics, fluid balance and neurologic status will be stable or improve  Description: Target End Date:  Prior to discharge or change in level of care    Document on Assessment and I/O flowsheet templates    1.  Monitor vital signs, pulse oximetry and cardiac monitor per provider order and/or policy  2.  Maintain blood pressure per provider order  3.  Hemodynamic monitoring per provider order  4.  Manage IV fluids and IV infusions  5.  Monitor intake and output  6.  Daily weights per unit policy or provider order  7.  Assess peripheral pulses and capillary refill  8.  Assess color and body temperature  9.  Position patient for maximum circulation/cardiac output  10. Monitor for signs/symptoms of excessive bleeding  11. Assess mental status, restlessness and changes in level of consciousness  12. Monitor temperature and report fever or hypothermia to provider immediately. Consideration of targeted temperature management.  Outcome: Not Progressing     Problem: Respiratory  Goal: Patient will achieve/maintain optimum respiratory ventilation and gas exchange  Description: Target End Date:  Prior to discharge or change in level of care    Document on Assessment flowsheet    1.  Assess and monitor rate, rhythm, depth and effort of respiration  2.  Breath sounds assessed qshift and/or as needed  3.  Assess O2 saturation, administer/titrate oxygen as ordered  4.  Position patient for maximum ventilatory efficiency  5.  Turn, cough, and deep breath with splinting to improve effectiveness  6.   Collaborate with RT to administer medication/treatments per order  7.  Encourage use of incentive spirometer and encourage patient to cough after use and utilize splinting techniques if applicable  8.  Airway suctioning  9.  Monitor sputum production for changes in color, consistency and frequency  10. Perform frequent oral hygiene  11. Alternate physical activity with rest periods  Outcome: Not Progressing  Flowsheets (Taken 1/9/2024 6050)  O2 Delivery Device: Oxymask  Incentive Spirometer: Patient Unable to Perform  Deep Breathe and Cough: (weak effort) --  Suction Frequency: Suctioned Less Than 2 Times in 12 Hours   ful way.      Patient is not progressing towards the following goals:      Problem: Hemodynamics  Goal: Patient's hemodynamics, fluid balance and neurologic status will be stable or improve  Description: Target End Date:  Prior to discharge or change in level of care    Document on Assessment and I/O flowsheet templates    1.  Monitor vital signs, pulse oximetry and cardiac monitor per provider order and/or policy  2.  Maintain blood pressure per provider order  3.  Hemodynamic monitoring per provider order  4.  Manage IV fluids and IV infusions  5.  Monitor intake and output  6.  Daily weights per unit policy or provider order  7.  Assess peripheral pulses and capillary refill  8.  Assess color and body temperature  9.  Position patient for maximum circulation/cardiac output  10. Monitor for signs/symptoms of excessive bleeding  11. Assess mental status, restlessness and changes in level of consciousness  12. Monitor temperature and report fever or hypothermia to provider immediately. Consideration of targeted temperature management.  Outcome: Not Progressing     Problem: Respiratory  Goal: Patient will achieve/maintain optimum respiratory ventilation and gas exchange  Description: Target End Date:  Prior to discharge or change in level of care    Document on Assessment flowsheet    1.  Assess and  monitor rate, rhythm, depth and effort of respiration  2.  Breath sounds assessed qshift and/or as needed  3.  Assess O2 saturation, administer/titrate oxygen as ordered  4.  Position patient for maximum ventilatory efficiency  5.  Turn, cough, and deep breath with splinting to improve effectiveness  6.  Collaborate with RT to administer medication/treatments per order  7.  Encourage use of incentive spirometer and encourage patient to cough after use and utilize splinting techniques if applicable  8.  Airway suctioning  9.  Monitor sputum production for changes in color, consistency and frequency  10. Perform frequent oral hygiene  11. Alternate physical activity with rest periods  Outcome: Not Progressing  Flowsheets (Taken 1/9/2024 0239)  O2 Delivery Device: Oxymask  Incentive Spirometer: Patient Unable to Perform  Deep Breathe and Cough: (weak effort) --  Suction Frequency: Suctioned Less Than 2 Times in 12 Hours

## 2024-01-09 NOTE — THERAPY
Speech Language Therapy Contact Note    Patient Name: Lewis Mohr  Age:  78 y.o., Sex:  male  Medical Record #: [id number]  Today's Date: 1/9/2024 01/09/24 1551   Treatment Variance   Reason For Missed Therapy Medical - Patient not Able To Participate;Medical - Patient Is Not Medically Stable;Medical - Patient on Hold from Therapy   Vitals   O2 Delivery Device Ventilator   Interdisciplinary Plan of Care Collaboration   IDT Collaboration with  Other (See Comments)  (EMR)   Collaboration Comments Code stroke called this PM by rapid response team. PEA arrest with CPR and emergent intubation. SLP to monitor for extubation and will follow for appropriate service at such time.     - Veda Babin CCC-SLP

## 2024-01-09 NOTE — PROCEDURES
Intubation    Date/Time: 1/9/2024 3:35 PM    Performed by: Donnie Babin M.D.  Authorized by: Donnie Babin M.D.    Consent:     Consent obtained:  Emergent situation    Risks discussed:  Aspiration, hypoxia and brain injury  Pre-procedure details:     Patient status:  Unresponsive    Mallampati score:  I    Pretreatment meds: Etomidate.    Paralytics:  Rocuronium  Procedure details:     Preoxygenation:  Bag valve mask    CPR in progress: no      Intubation method:  Oral    Oral intubation technique:  Video-assisted    Laryngoscope type:  GlideScope    Laryngoscope blade:  Mac 4    Cormack-Lehane Classification:  Grade 1    Tube size (mm):  7.5    Tube type:  Cuffed    Number of attempts:  1    Ventilation between attempts: no      Cricoid pressure: no      Tube visualized through cords: yes    Placement assessment:     ETT to teeth:  24    Tube secured with:  ETT lopez    Breath sounds:  Equal    Placement verification: chest rise, condensation, CXR verification, direct visualization and ETCO2 detector      CXR findings:  ETT in proper place  Comments:      Emergent intubation while in CT scan for poor airway reflexes hypoxia and aspiration

## 2024-01-09 NOTE — DISCHARGE PLANNING
Case Management Discharge Planning    Admission Date: 1/4/2024  GMLOS: 5  ALOS: 5    6-Clicks ADL Score:    6-Clicks Mobility Score:        Anticipated Discharge Dispo: Discharge Disposition: D/T to hospice medical facility (51)      Action(s) Taken:   RN CM went to bedside to complete assessment.    Bedside RN, RT, rapid response team in room.  Pt deteriorating.  Dr. Mayer telephoned patient's daughter, Jaymie.  She is contacting her sister and will follow up with physician in 10 minutes.        Medically Clear: No    Next Steps:   Follow up with Dr. Mayer.  Follow up with palliative.    Barriers to Discharge: Medical clearance, family decision making

## 2024-01-09 NOTE — RESPIRATORY CARE
Endotracheal Intubation Procedure Note    Indication for endotracheal intubation: Unresponsive  Equipment: GlideScope #4, 7.5 ETT  Cricoid Pressure: No  Number of attempts: 1  ETT location confirmed by: Bilateral breath sounds, negative gastric sounds, positive color change via EZ cap, Xray.

## 2024-01-09 NOTE — WOUND TEAM
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 Chart and images reviewed. This discussed with bedside RN. This clinician in to assess patient. Patient pleasant and agreeable. Patient sacrum and buttocks with birth meli noted and small scratch of excoriation noted to left side. Bilateral IT's intact.     No pressure injuries or advanced wound care needs identified. Wound consult completed. No further follow up unless indicated and consulted.          PREVENTATIVE INTERVENTIONS:    Q shift Navarro - performed per nursing policy  Q shift pressure point assessments - performed per nursing policy    Surface/Positioning  Standard/trauma mattress - Currently in Place  Reposition q 2 hours - Currently in Place  Waffle overlay  - Currently in Place    Offloading/Redistribution  Sacral offloading dressing (Silicone dressing) - Currently in Place  Heel offloading dressing (Silicone dressing) - Currently in Place      Respiratory  Silicone O2 tubing - Currently in Place  Gray Foam Ear protectors - Currently in Place    Containment/Moisture Prevention    Schwartz Catheter - Currently in Place

## 2024-01-09 NOTE — PROGRESS NOTES
Bedside report received from night shift RN. Assumed care of patient at 0700. Pt is A&O 2, observed expressive aphagia. Assessed orientation status by asking yes or no questions and patient nodded head with response. Patient is in bed. Bilateral wrist restrains and mitts in place with active order. Tele sitter at bedside. Patient was updated on plan of care. Patient has call light, personal belongings and bedside table with in reach. Bed is in low locked position, bed alarm is on.

## 2024-01-09 NOTE — DIETARY
"Nutrition Services Brief Update:    Problem: Nutritional:  Goal: Nutrition support tolerated and meeting greater than 85% of estimated needs  Outcome: Progressing slower than expected    NGT pulled by pt and replaced multiple times on 1/6, 1/7, 1/8. TF was held overnight due to pt w/ O2 needs increased from 3L NC to 8L oxymask; pt lung sounds w/ course crackles, suctioned and \"tube feed seemed to be produced.\" Per imaging report a.m. of 1/9/24, \"Enteric feeding tube terminates with the tip projecting over the expected location of the distal stomach.\" TF restarted at 25 mL/hr. Per flowsheets, appears in the past that TF has reached up to 50mL/hr infusion rate before interruptions.  Of note, pt has not had a BM since 1/4/24 per ADLs, +pericolace and miralax given per MAR. Recommend not advancing TF beyond 25 ml/hr until constipation has resolved. Updated Dr. Mayer with recommendations via Voalte message.  Update 1457:  Rapid response team at bedside and \"pt deteriorating\" per chart notes. RD following for updates in POC/GOC.    RD continues to follow.  "

## 2024-01-09 NOTE — CONSULTS
Critical Care Consultation    Date of consult: 1/9/2024    Referring Physician  Tj Salas M.D.    Reason for Consultation  Afib w/ RVR, hypoxic respiratory failure, AMS    History of Presenting Illness  78 y.o. 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 He presented back for AMS and evaluated in ER and was discharged. He presented the next day 1/4/2024 for hypoxia and was admitted to medicine. He was found to have silent aspiration, lauren, urinary retention and hypernatremia and dehydration. He had a an echo 12/29/2023 LVH EF 60% grade II daistolic dysfunction mild MR/TR. He has not had an MRI on the prior admission. He today 1/9/2024 had afib w/ RVR and worsening hypoxia as well as worsening mental status and code stroke was called. His BS was 190. I met the patient in the CT scanner gurgling with poor airway reflexes. He was intubated and shortly after had PEA arrest. CT head and CTA/P was negative for acute stroke.  He is full code and confirmed prior to this event by daughters.     Code Status  Full Code    Review of Systems  Review of Systems   Unable to perform ROS: Intubated       Past Medical History   has no past medical history on file.    Surgical History   has no past surgical history on file.    Family History  family history is not on file.    Social History   reports that he has never smoked. He has never used smokeless tobacco. He reports current alcohol use. He reports that he does not use drugs.    Medications  Home Medications       Reviewed by Aria Snyder (Pharmacy Tech) on 01/04/24 at 1521  Med List Status: Complete     Medication Last Dose Status   acetaminophen (TYLENOL) 325 MG Tab UNK Active   amLODIPine (NORVASC) 10 MG Tab UNK Active   bisacodyl (DULCOLAX) 10 MG Suppos UNK Active   docusate sodium (COLACE) 100 MG Cap UNK Active   lisinopril (PRINIVIL) 40 MG tablet UNK Active   senna-docusate  (PERICOLACE OR SENOKOT S) 8.6-50 MG Tab UNK Active                  Current Facility-Administered Medications   Medication Dose Route Frequency Provider Last Rate Last Admin    piperacillin-tazobactam (Zosyn) 3.375 g in  mL IVPB  3.375 g Intravenous Once Tj Mayer M.D.        And    piperacillin-tazobactam (Zosyn) 3.375 g in  mL IVPB  3.375 g Intravenous Q8HRS Tj Mayer M.D.        Respiratory Therapy Consult   Nebulization Continuous RT Donnie Babin M.D.        famotidine (Pepcid) tablet 20 mg  20 mg Enteral Tube DAILY Donnie Babin M.D.        Or    famotidine (Pepcid) injection 20 mg  20 mg Intravenous DAILY Donnie Babin M.D.        senna-docusate (Pericolace Or Senokot S) 8.6-50 MG per tablet 2 Tablet  2 Tablet Enteral Tube BID Donnie Babin M.D.        And    polyethylene glycol/lytes (Miralax) Packet 1 Packet  1 Packet Enteral Tube QDAY PRN Donnie Babin M.D.        And    magnesium hydroxide (Milk Of Magnesia) suspension 30 mL  30 mL Enteral Tube QDAY PRN Donnie Babin M.D.        And    bisacodyl (Dulcolax) suppository 10 mg  10 mg Rectal QDAY PRN Donnie Babin M.D. MD Alert...ICU Electrolyte Replacement per Pharmacy   Other PHARMACY TO DOSE Donnie Babin M.D.        lidocaine (Xylocaine) 1 % injection 2 mL  2 mL Tracheal Tube Q30 MIN PRN Donnie Babin M.D.        dexmedetomidine (PRECEDEX) 400 mcg/100mL NS premix infusion  0-1.5 mcg/kg/hr (Ideal) Intravenous Continuous Donnie Babin M.D. 3.7 mL/hr at 01/09/24 1606 0.2 mcg/kg/hr at 01/09/24 1606    fentaNYL (Sublimaze) injection 25 mcg  25 mcg Intravenous Q HOUR PRN Donnie Babin M.D.        Or    fentaNYL (Sublimaze) injection 50 mcg  50 mcg Intravenous Q HOUR PRN Donnie Babin M.D.        Or    fentaNYL (Sublimaze) injection 100 mcg  100 mcg Intravenous Q HOUR PRN Donnie Babin M.D.        norepinephrine (Levophed) 8 mg in 250 mL NS infusion (premix)  0-1  mcg/kg/min (Ideal) Intravenous Continuous Donnie Babin M.D. 27.4 mL/hr at 01/09/24 1719 0.2 mcg/kg/min at 01/09/24 1719    lactated ringers infusion   Intravenous Continuous Donnie Babin M.D. 100 mL/hr at 01/09/24 1715 New Bag at 01/09/24 1715    Pharmacy Consult: Enteral tube insertion - review meds/change route/product selection  1 Each Other PHARMACY TO DOSE Rebecca Holman M.D.        haloperidol lactate (Haldol) injection 5 mg  5 mg Intravenous Q4HRS PRN Agapito Bonilla A.P.R.N.   5 mg at 01/07/24 1639       Allergies  No Known Allergies    Vital Signs last 24 hours  Temp:  [35.6 °C (96 °F)-37.2 °C (99 °F)] 35.6 °C (96 °F)  Pulse:  [] 127  Resp:  [8-27] 18  BP: ()/() 158/104  SpO2:  [84 %-100 %] 99 %    Physical Exam  Physical Exam  Vitals and nursing note reviewed.   Constitutional:       General: He is in acute distress.      Appearance: He is ill-appearing.      Comments: Ill appearing male laying flat in CT scanner gurgling when I meet him   HENT:      Mouth/Throat:      Mouth: Mucous membranes are moist.   Eyes:      General:         Right eye: No discharge.      Comments: Bruise to right orbit   Cardiovascular:      Rate and Rhythm: Tachycardia present.      Heart sounds: No murmur heard.  Pulmonary:      Effort: Respiratory distress present.      Breath sounds: No stridor. Rhonchi present. No wheezing.      Comments: Gurgling breath sounds bilateral  Abdominal:      General: There is no distension.      Palpations: There is no mass.      Tenderness: There is no abdominal tenderness.      Hernia: No hernia is present.   Musculoskeletal:         General: No swelling or tenderness.   Skin:     Coloration: Skin is not jaundiced or pale.   Neurological:      Comments: Patient was sedated and given rocuronium after I met him and I didn't perform a neuro exam   Psychiatric:      Comments: Unable to determine         Fluids    Intake/Output Summary (Last 24 hours) at 1/9/2024 1739  Last  data filed at 1/9/2024 1700  Gross per 24 hour   Intake 400 ml   Output 1750 ml   Net -1350 ml       Laboratory  Recent Results (from the past 48 hour(s))   POCT glucose device results    Collection Time: 01/07/24  6:31 PM   Result Value Ref Range    POC Glucose, Blood 130 (H) 65 - 99 mg/dL   POCT glucose device results    Collection Time: 01/07/24 10:59 PM   Result Value Ref Range    POC Glucose, Blood 179 (H) 65 - 99 mg/dL   CBC WITHOUT DIFFERENTIAL    Collection Time: 01/08/24  3:35 AM   Result Value Ref Range    WBC 16.3 (H) 4.8 - 10.8 K/uL    RBC 4.91 4.70 - 6.10 M/uL    Hemoglobin 14.9 14.0 - 18.0 g/dL    Hematocrit 45.8 42.0 - 52.0 %    MCV 93.3 81.4 - 97.8 fL    MCH 30.3 27.0 - 33.0 pg    MCHC 32.5 32.3 - 36.5 g/dL    RDW 46.4 35.9 - 50.0 fL    Platelet Count 374 164 - 446 K/uL    MPV 10.6 9.0 - 12.9 fL   Comp Metabolic Panel    Collection Time: 01/08/24  3:35 AM   Result Value Ref Range    Sodium 151 (H) 135 - 145 mmol/L    Potassium 4.3 3.6 - 5.5 mmol/L    Chloride 115 (H) 96 - 112 mmol/L    Co2 25 20 - 33 mmol/L    Anion Gap 11.0 7.0 - 16.0    Glucose 148 (H) 65 - 99 mg/dL    Bun 34 (H) 8 - 22 mg/dL    Creatinine 1.34 0.50 - 1.40 mg/dL    Calcium 9.8 8.5 - 10.5 mg/dL    Correct Calcium 10.1 8.5 - 10.5 mg/dL    AST(SGOT) 55 (H) 12 - 45 U/L    ALT(SGPT) 134 (H) 2 - 50 U/L    Alkaline Phosphatase 117 (H) 30 - 99 U/L    Total Bilirubin 0.7 0.1 - 1.5 mg/dL    Albumin 3.6 3.2 - 4.9 g/dL    Total Protein 6.8 6.0 - 8.2 g/dL    Globulin 3.2 1.9 - 3.5 g/dL    A-G Ratio 1.1 g/dL   MAGNESIUM    Collection Time: 01/08/24  3:35 AM   Result Value Ref Range    Magnesium 2.4 1.5 - 2.5 mg/dL   PHOSPHORUS    Collection Time: 01/08/24  3:35 AM   Result Value Ref Range    Phosphorus 3.4 2.5 - 4.5 mg/dL   ESTIMATED GFR    Collection Time: 01/08/24  3:35 AM   Result Value Ref Range    GFR (CKD-EPI) 55 (A) >60 mL/min/1.73 m 2   Prealbumin    Collection Time: 01/08/24  2:47 PM   Result Value Ref Range    Pre-Albumin 15.8 (L) 18.0  - 38.0 mg/dL   SODIUM SERUM (NA)    Collection Time: 01/08/24  2:47 PM   Result Value Ref Range    Sodium 152 (H) 135 - 145 mmol/L   CRP QUANTITIVE (NON-CARDIAC)    Collection Time: 01/08/24  2:47 PM   Result Value Ref Range    Stat C-Reactive Protein 12.54 (H) 0.00 - 0.75 mg/dL   Basic Metabolic Panel    Collection Time: 01/08/24  6:55 PM   Result Value Ref Range    Sodium 148 (H) 135 - 145 mmol/L    Potassium 4.3 3.6 - 5.5 mmol/L    Chloride 113 (H) 96 - 112 mmol/L    Co2 24 20 - 33 mmol/L    Glucose 156 (H) 65 - 99 mg/dL    Bun 31 (H) 8 - 22 mg/dL    Creatinine 1.13 0.50 - 1.40 mg/dL    Calcium 9.8 8.5 - 10.5 mg/dL    Anion Gap 11.0 7.0 - 16.0   proBrain Natriuretic Peptide, NT    Collection Time: 01/08/24  6:55 PM   Result Value Ref Range    NT-proBNP 698 (H) 0 - 125 pg/mL   PROCALCITONIN    Collection Time: 01/08/24  6:55 PM   Result Value Ref Range    Procalcitonin 0.13 <0.25 ng/mL   ABG - LAB    Collection Time: 01/08/24  6:55 PM   Result Value Ref Range    Ph 7.41 7.40 - 7.50    Pco2 41.7 (H) 26.0 - 37.0 mmHg    Po2 59.0 (L) 64.0 - 87.0 mmHg    O2 Saturation 90.8 (L) 93.0 - 99.0 %    Hco3 26 (H) 17 - 25 mmol/L    Base Excess 1 -4 - 3 mmol/L    Body Temp 38.2 Centigrade    O2 Therapy 6     Ph -TC 7.39 (L) 7.40 - 7.50    Pco2 -TC 43.9 (H) 26.0 - 37.0 mmHg    Po2 -TC 64.1 64.0 - 87.0 mmHg   CBC WITH DIFFERENTIAL    Collection Time: 01/08/24  6:55 PM   Result Value Ref Range    WBC 23.5 (H) 4.8 - 10.8 K/uL    RBC 4.53 (L) 4.70 - 6.10 M/uL    Hemoglobin 14.1 14.0 - 18.0 g/dL    Hematocrit 43.8 42.0 - 52.0 %    MCV 96.7 81.4 - 97.8 fL    MCH 31.1 27.0 - 33.0 pg    MCHC 32.2 (L) 32.3 - 36.5 g/dL    RDW 48.4 35.9 - 50.0 fL    Platelet Count 333 164 - 446 K/uL    MPV 10.9 9.0 - 12.9 fL    Neutrophils-Polys 87.10 (H) 44.00 - 72.00 %    Lymphocytes 3.10 (L) 22.00 - 41.00 %    Monocytes 8.70 0.00 - 13.40 %    Eosinophils 0.00 0.00 - 6.90 %    Basophils 0.20 0.00 - 1.80 %    Immature Granulocytes 0.90 0.00 - 0.90 %     Nucleated RBC 0.00 0.00 - 0.20 /100 WBC    Neutrophils (Absolute) 20.44 (H) 1.82 - 7.42 K/uL    Lymphs (Absolute) 0.74 (L) 1.00 - 4.80 K/uL    Monos (Absolute) 2.05 (H) 0.00 - 0.85 K/uL    Eos (Absolute) 0.00 0.00 - 0.51 K/uL    Baso (Absolute) 0.05 0.00 - 0.12 K/uL    Immature Granulocytes (abs) 0.22 (H) 0.00 - 0.11 K/uL    NRBC (Absolute) 0.00 K/uL   LACTIC ACID    Collection Time: 01/08/24  6:55 PM   Result Value Ref Range    Lactic Acid 1.4 0.5 - 2.0 mmol/L   ESTIMATED GFR    Collection Time: 01/08/24  6:55 PM   Result Value Ref Range    GFR (CKD-EPI) 67 >60 mL/min/1.73 m 2   Basic Metabolic Panel    Collection Time: 01/09/24 12:45 AM   Result Value Ref Range    Sodium 148 (H) 135 - 145 mmol/L    Potassium 4.3 3.6 - 5.5 mmol/L    Chloride 112 96 - 112 mmol/L    Co2 26 20 - 33 mmol/L    Glucose 191 (H) 65 - 99 mg/dL    Bun 32 (H) 8 - 22 mg/dL    Creatinine 1.34 0.50 - 1.40 mg/dL    Calcium 9.6 8.5 - 10.5 mg/dL    Anion Gap 10.0 7.0 - 16.0   MAGNESIUM    Collection Time: 01/09/24 12:45 AM   Result Value Ref Range    Magnesium 2.3 1.5 - 2.5 mg/dL   PHOSPHORUS    Collection Time: 01/09/24 12:45 AM   Result Value Ref Range    Phosphorus 3.3 2.5 - 4.5 mg/dL   AMMONIA    Collection Time: 01/09/24 12:45 AM   Result Value Ref Range    Ammonia 48 (H) 11 - 45 umol/L   TSH WITH REFLEX TO FT4    Collection Time: 01/09/24 12:45 AM   Result Value Ref Range    TSH 0.280 (L) 0.380 - 5.330 uIU/mL   ESTIMATED GFR    Collection Time: 01/09/24 12:45 AM   Result Value Ref Range    GFR (CKD-EPI) 55 (A) >60 mL/min/1.73 m 2   FREE THYROXINE    Collection Time: 01/09/24 12:45 AM   Result Value Ref Range    Free T-4 1.51 0.93 - 1.70 ng/dL   CBC WITHOUT DIFFERENTIAL    Collection Time: 01/09/24  2:05 AM   Result Value Ref Range    WBC 23.3 (H) 4.8 - 10.8 K/uL    RBC 4.46 (L) 4.70 - 6.10 M/uL    Hemoglobin 13.7 (L) 14.0 - 18.0 g/dL    Hematocrit 43.0 42.0 - 52.0 %    MCV 96.4 81.4 - 97.8 fL    MCH 30.7 27.0 - 33.0 pg    MCHC 31.9 (L) 32.3 -  36.5 g/dL    RDW 48.9 35.9 - 50.0 fL    Platelet Count 329 164 - 446 K/uL    MPV 11.0 9.0 - 12.9 fL   HEPATIC FUNCTION PANEL    Collection Time: 24  2:05 AM   Result Value Ref Range    Alkaline Phosphatase 142 (H) 30 - 99 U/L    AST(SGOT) 113 (H) 12 - 45 U/L    ALT(SGPT) 228 (H) 2 - 50 U/L    Total Bilirubin 0.8 0.1 - 1.5 mg/dL    Direct Bilirubin 0.3 0.1 - 0.5 mg/dL    Indirect Bilirubin 0.5 0.0 - 1.0 mg/dL    Albumin 3.2 3.2 - 4.9 g/dL    Total Protein 6.0 6.0 - 8.2 g/dL   EKG    Collection Time: 24  1:59 PM   Result Value Ref Range    Report       [facility]    Test Date:  2024  Pt Name:    NADIR SIN                  Department: [facility]  MRN:        [id number]                      Room:       [facility]  Gender:     Male                         Technician: EVELYN  :        1945                   Requested By:PAULA PALOMINO  Order #:    [id number]                    Reading MD: Erik Ramsey MD    Measurements  Intervals                                Axis  Rate:       137                          P:          0  RI:         0                            QRS:        -19  QRSD:       105                          T:          -13  QT:         306  QTc:        462    Interpretive Statements  Atrial fibrillation  Abnormal R-wave progression, late transition  Probable left ventricular hypertrophy  Borderline T abnormalities, inferior leads  Compared to ECG 2024 14:32:53  Sinus rhythm no longer present    Electronically Signed On 2024 16:24:09 PST by Erik Ramsey MD     POCT glucose device results    Collection Time: 24  2:27 PM   Result Value Ref Range    POC Glucose, Blood 185 (H) 65 - 99 mg/dL   ABG - LAB    Collection Time: 24  2:29 PM   Result Value Ref Range    Ph 7.23 (LL) 7.40 - 7.50    Pco2 72.2 (HH) 26.0 - 37.0 mmHg    Po2 63.6 (L) 64.0 - 87.0 mmHg    O2 Saturation 87.4 (L) 93.0 - 99.0 %    Hco3 29 (H) 17 - 25 mmol/L    Base Excess 0 -4 - 3 mmol/L    Body Temp  36.2 Centigrade    O2 Therapy 10L     O2 Therapy 10.0 2.0 - 10.0 L/min    Ph -TC 7.24 (LL) 7.40 - 7.50    Pco2 -TC 69.7 (HH) 26.0 - 37.0 mmHg    Po2 -TC 60.2 (L) 64.0 - 87.0 mmHg   LACTIC ACID    Collection Time: 24  2:29 PM   Result Value Ref Range    Lactic Acid 1.3 0.5 - 2.0 mmol/L   EKG    Collection Time: 24  3:07 PM   Result Value Ref Range    Report       [facility]    Test Date:  2024  Pt Name:    NADIR SIN                  Department: [facility]  MRN:        [id number]                      Room:       06  Gender:     Male                         Technician: EVELYN  :        1945                   Requested By:VARINDER BATEMAN  Order #:    [id number]                    Reading MD: Erik Ramsey MD    Measurements  Intervals                                Axis  Rate:       121                          P:          0  OR:         0                            QRS:        43  QRSD:       96                           T:          38  QT:         338  QTc:        480    Interpretive Statements  Atrial fibrillation  Low voltage, extremity leads  Nonspecific intraventricular conduction delay  Borderline prolonged QT interval  Compared to ECG 2024 13:59:50  No significant changes  Electronically Signed On 2024 16:36:10 PST by Erik Ramsey MD     CBC WITH DIFFERENTIAL    Collection Time: 24  4:20 PM   Result Value Ref Range    WBC 24.7 (H) 4.8 - 10.8 K/uL    RBC 4.17 (L) 4.70 - 6.10 M/uL    Hemoglobin 12.8 (L) 14.0 - 18.0 g/dL    Hematocrit 41.1 (L) 42.0 - 52.0 %    MCV 98.6 (H) 81.4 - 97.8 fL    MCH 30.7 27.0 - 33.0 pg    MCHC 31.1 (L) 32.3 - 36.5 g/dL    RDW 50.2 (H) 35.9 - 50.0 fL    Platelet Count 305 164 - 446 K/uL    MPV 10.8 9.0 - 12.9 fL    Neutrophils-Polys 87.20 (H) 44.00 - 72.00 %    Lymphocytes 2.60 (L) 22.00 - 41.00 %    Monocytes 8.30 0.00 - 13.40 %    Eosinophils 0.00 0.00 - 6.90 %    Basophils 0.20 0.00 - 1.80 %    Immature Granulocytes 1.70 (H) 0.00 - 0.90 %     Nucleated RBC 0.00 0.00 - 0.20 /100 WBC    Neutrophils (Absolute) 21.51 (H) 1.82 - 7.42 K/uL    Lymphs (Absolute) 0.65 (L) 1.00 - 4.80 K/uL    Monos (Absolute) 2.04 (H) 0.00 - 0.85 K/uL    Eos (Absolute) 0.01 0.00 - 0.51 K/uL    Baso (Absolute) 0.06 0.00 - 0.12 K/uL    Immature Granulocytes (abs) 0.43 (H) 0.00 - 0.11 K/uL    NRBC (Absolute) 0.00 K/uL   Comp Metabolic Panel    Collection Time: 01/09/24  4:20 PM   Result Value Ref Range    Sodium 148 (H) 135 - 145 mmol/L    Potassium 4.0 3.6 - 5.5 mmol/L    Chloride 113 (H) 96 - 112 mmol/L    Co2 24 20 - 33 mmol/L    Anion Gap 11.0 7.0 - 16.0    Glucose 195 (H) 65 - 99 mg/dL    Bun 30 (H) 8 - 22 mg/dL    Creatinine 1.56 (H) 0.50 - 1.40 mg/dL    Calcium 8.9 8.5 - 10.5 mg/dL    Correct Calcium 10.0 8.5 - 10.5 mg/dL    AST(SGOT) 51 (H) 12 - 45 U/L    ALT(SGPT) 152 (H) 2 - 50 U/L    Alkaline Phosphatase 130 (H) 30 - 99 U/L    Total Bilirubin 0.9 0.1 - 1.5 mg/dL    Albumin 2.6 (L) 3.2 - 4.9 g/dL    Total Protein 5.6 (L) 6.0 - 8.2 g/dL    Globulin 3.0 1.9 - 3.5 g/dL    A-G Ratio 0.9 g/dL   LACTIC ACID    Collection Time: 01/09/24  4:20 PM   Result Value Ref Range    Lactic Acid 1.6 0.5 - 2.0 mmol/L   CORTISOL    Collection Time: 01/09/24  4:20 PM   Result Value Ref Range    Cortisol 24.6 (H) 0.0 - 23.0 ug/dL   ESTIMATED GFR    Collection Time: 01/09/24  4:20 PM   Result Value Ref Range    GFR (CKD-EPI) 46 (A) >60 mL/min/1.73 m 2   POCT arterial blood gas device results    Collection Time: 01/09/24  4:33 PM   Result Value Ref Range    Ph 7.286 (LL) 7.400 - 7.500    Pco2 58.9 (HH) 26.0 - 37.0 mmHg    Po2 100 (H) 64 - 87 mmHg    Tco2 30 20 - 33 mmol/L    S02 97 93 - 99 %    Hco3 28.1 (H) 17.0 - 25.0 mmol/L    BE 0 -4 - 3 mmol/L    Body Temp 95.8 F degrees    O2 Therapy 100 %    iPF Ratio 100     Ph Temp [place] 7.308 (L) 7.400 - 7.500    Pco2 Temp Co 55.0 (HH) 26.0 - 37.0 mmHg    Po2 Temp Cor 91 (H) 64 - 87 mmHg    Specimen Arterial     DelSys Vent     End Tidal Carbon Dioxide  35 mmhg    Tidal Volume 440 mL    Peep End Expiratory Pressure 14 cmh20    Set Rate 18     Mode APV-CMV    POCT lactate device results    Collection Time: 01/09/24  4:33 PM   Result Value Ref Range    iStat Lactate 1.3 0.5 - 2.0 mmol/L       Imaging  DX-CHEST-PORTABLE (1 VIEW)   Final Result      CT-CTA NECK WITH & W/O-POST PROCESSING   Final Result      1.  No acute arterial abnormality detected. No significant arterial stenosis.      CT-CTA HEAD WITH & W/O-POST PROCESS   Final Result      CT angiogram of the [place] within normal limits.      CT-CEREBRAL PERFUSION ANALYSIS   Final Result      1.  Cerebral blood flow less than 30% likely representing completed infarct = 0 mL.      2.  T Max more than 6 seconds likely representing combination of completed infarct and ischemia = 0 mL.      3.  Mismatched volume likely representing ischemic brain/penumbra = None      4.  Please note that the cerebral perfusion was performed on the limited brain tissue around the basal ganglia region. Infarct/ischemia outside the CT perfusion sections can be missed in this study.      CT-HEAD W/O   Final Result      1.  No evidence of acute territorial infarct, intracranial hemorrhage or mass lesion.   2.  Mild diffuse cerebral substance loss.   3.  Mild microangiopathic ischemic change versus demyelination or gliosis.   4.  Status post right suboccipital cranioplasty with underlying cerebellar hypoattenuation likely on the basis of evolving encephalomalacia.         DX-CHEST-PORTABLE (1 VIEW)   Final Result      1.  Intubation.   2.  Bilateral pulmonary infiltrates.      DX-CHEST-LIMITED (1 VIEW)   Final Result      DX-ABDOMEN FOR TUBE PLACEMENT   Final Result      Enteric feeding tube terminates with the tip projecting over the expected location of the distal stomach.      CT-CTA CHEST PULMONARY ARTERY W/ RECONS   Final Result         1.  No pulmonary embolus appreciated.   2.  Consolidations in bilateral lung bases an  scattered hazy right pulmonary opacities, compatible with atelectasis with component of infiltrate.   3.  Indeterminate left adrenal nodule, follow-up adrenal protocol CT for further characterization as clinically appropriate.   4.  Atherosclerosis and atherosclerotic coronary artery disease.      DX-ABDOMEN FOR TUBE PLACEMENT   Final Result         1.  Nonspecific bowel gas pattern in the upper abdomen.   2.  Cardiomegaly   3.  Atherosclerosis   4.  Bibasilar atelectasis   5.  Nasogastric tube tip terminates overlying the expected location of the pylorus or first duodenal segment.      DX-ABDOMEN FOR TUBE PLACEMENT   Final Result         1.  Nonspecific bowel gas pattern in the upper abdomen.   2.  Nasogastric tube tip terminates overlying the expected location of the gastric antrum.   3.  Linear densities the bilateral lung bases favor atelectasis, component of infiltrate not excluded.      DX-ABDOMEN FOR TUBE PLACEMENT   Final Result      NG tube extends below the diaphragm with tip at the level of the gastric fundus. Side port is at the level of the distal esophagus.      DX-ABDOMEN FOR TUBE PLACEMENT   Final Result      NG tube is noted with tip projecting over the right lower lung.      These findings were transmitted with GLEN ZELAYA on 01/07/2024. Tube was subsequently adjusted and is now present below the diaphragm.      DX-ABDOMEN FOR TUBE PLACEMENT   Final Result      DHT tip overlies the second portion of the duodenum      DX-ABDOMEN FOR TUBE PLACEMENT   Final Result      Feeding tube tip projects in the distal stomach or first portion of the duodenum.      DX-ABDOMEN FOR TUBE PLACEMENT   Final Result      Removal of nasogastric tube.      Feeding tube tip projects in the distal stomach.      No visualized thorax.      DX-ABDOMEN FOR TUBE PLACEMENT   Final Result         Gastric drainage tube with tip projecting over the expected area of the right lower lobe airway. Recommend removal.             CRITICAL RESULT READ BACK: Preliminary findings discussed with and critical read back performed by Dr. GLEN ZELAYA via telephone on 1/5/2024 6:22 PM      DX-ABDOMEN FOR TUBE PLACEMENT   Final Result      1.  Malpositioned enteric sump catheter which is folded back upon itself over the left paramedian distal one third mediastinum probably beyond the left mainstem bronchus and within the esophagus.   2.  A Voalte message was sent to GLEN ZELAYA at 1641 hours. Immediate response received.   3.  Per discussion, the patient has already pulled out the tube.      DX-CHEST-PORTABLE (1 VIEW)   Final Result      1.  Hypoinflation and pulmonary vascular congestion with mild bibasilar atelectasis.   2.  No lobar pneumonia or overt pulmonary edema.      CT-HEAD W/O   Final Result      1.  Diffuse atrophy and white matter changes.   2.  No acute intracranial hemorrhage or territorial infarct.   3.  RIGHT occipital craniotomy with underlying cerebellar encephalomalacia.         MR-BRAIN-W/O    (Results Pending)   US-EXTREMITY VENOUS LOWER BILAT    (Results Pending)       Assessment/Plan  * Aspiration pneumonia (HCC)- (present on admission)  Assessment & Plan  Ongoing significant aspiration  S/p Bronchoscopy on 1/9  Check MRI to rule out brainstem insult  Head of bed > 30  Ongoing speech eval  Zosyn check MRSA nares     Shock (HCC)  Assessment & Plan  Post arrest vs sepsis  Check cortisol  Hyperdynamic on echo  IVF multiple bolus in code and post code  Norepinephrine for map > 65      A-fib (HCC)  Assessment & Plan  Just prior to code likely precipitated by aspiration  Tele monitor check TSH, optimize k and mag  LVH on 12/27 echo with diastolic dysfunction  Will need to discuss risk with anticoagulation with neurosurgery/neurology or need for watchman pending response  Serial monitor    Encephalopathy  Assessment & Plan  Very likely due to hypercarbic hypoxic respiratory failure  Serial neuro exams  Check cortisol, MRI brain,  cEEG   Concerns for brainstem insult follow up on MRI      Advance care planning  Assessment & Plan  Per discussion prior to code full code  Ongoing discussion pending findings    Urinary retention  Assessment & Plan  Schwartz catheter    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   Lung protective ventilation strategy w/ A-F bundle  CXR: monitor lung volumes and tube/line placement  VAP bundle prevention, oral care, post pyloric feeding  Head of bed > 30 degree  GI prophylaxis  Daily awakening and SBT trials unless contraindicated  Monitor for liberation  Respiratory treatments: prn      Acute kidney failure (HCC)- (present on admission)  Assessment & Plan  Maintain euvolemia and monitor fluid responsiveness (avoid NaCL and renal congestion)  MAP > 65 uses pressors or inotropic trial  Monitor urine output and I&O's  Avoid and review nephrotoxin medication  Rule out post obstruction  Consider renal U/S if no renal images  U/a and CPK    S/p multiple contrast loads and retention on admission  Avoid nephrotoxins        Discussed patient condition and risk of morbidity and/or mortality with Hospitalist, RT, Pharmacy, Code status disscussed, Charge nurse / hot rounds, and neurology.      The patient remains critically ill from post arrest code, levophed gtt, ventilator titration.  Critical care time = 140 minutes in directly providing and coordinating critical care and extensive data review.  No time overlap and excludes procedures.

## 2024-01-09 NOTE — PROCEDURES
CPR NOTE:    Patient was rapidly started on ACLS protocol, with CPR and limited interruption while maintaining good depth, recoil, and limit respiration to 12/minute to not prevent CPP and when available EtCO2 was closely monitored for ROSC to limit pulse checks and to continue to monitor for quality of CPR. Pads were placed and constantly monitor rhythm and when available bedside ultrasound is always utilized to evaluate cardiac activity, rule out alternate causes such as hypovolemia, tamponade, ptx, pulmonary emboli/right heart strain, true vs pseudo PEA and cardiac contractility.     Please review nursing code note for specific times of drugs and therapies administered.     Quit description: patient was brought to CT scan with AMS hypoxia and on inspection had poor airway reflexes and aspiration. He was emergently intubated and then on pulse check he had PEA arrest he was bradycardic to 50-80's he was given high quality CPR epinephrine and achieved ROSC on almost 2nd pulse check he had afib RVR and was unstable so was cardioverted with out success his repeat SBP was 220 and was given metoprolol. His bedside pocus showed good BiV function hyperdynamic and lungs sliding bilateral. He achieved ROSC and needed to pushes of neosynephrine 2ml and 4ml and 1L of fluids was given as well as bicarb in code.     Donnie Babin MD  Critical Care Medicine

## 2024-01-09 NOTE — CONSULTS
Inpatient Palliative Medicine Evaluation     Lewis Mohr  78 y.o. male  [id number]    Location: [facility]  Pcp Pt States None    Referral Source:   Tj Hoyt M.d.    Reason for palliative medicine consultation and/or visit: ACP         Assessment and Plan:     GOAL(S) OF CARE = LONGEVITY    SYMPTOMS ETIOLOGY/CAUSES = multifactorial encephalopathy secondary to: recent cerebellar hemorrhage, aspiration PNA, UTI, ??dehydration/poor intake/volume depletion    PROGNOSIS = HIGHLY GUARDED, long term recovery and survival not yet assured    CODE STATUS = FULL, unchanged  1/9/2024 later upgraded to ICU and intubated, consistent with wishes expressed by patient's daughter Jaymie      ADVANCE CARE PLANNING =   Relevant history reviewed  Medical updates    PALLIATIVE CARE TEAM INTERVENTIONS =   ACP  Reconfirmed daughter and family's overarching GOC - unchanged, and consistent with FULL CODE    Daughter expressed strong wish to address potentially reversible medical issues (volume/intake/hydration, infection, sodium level).    Daughter's impression of patient's prior status was better last week during initial discharge.    Will follow intermittently, but ongoing aggressive care and resuscitation appears well aligned with daughter and family's current wishes and goals.          Summary:   Lewis Mohr is a 78 y.o. 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 Patient was reportedly discharged to [place] for rehab 1/2/2024. However he has since declined, resulting in an ER visit 1/3/2024 and the current readmission starting 1/4/204.    Since readmission patient has been treated for multiple issues: aspiration pneumonia, urinary retention, hypernatremia, ERWIN, etc. Patient has remained encephalopathic however, and dislodged NGT multiple times and remains NPO due to FEES+ silent  "aspiration.  -----------------------------------------------------------------------------------------------------------------------------------------  Placed call to patient's daughter Jaymie and introduced my role and scope of practice. She was amenable to this visit. We reviewed patient's relevant history. According to Jaymie, patient has had a good overall health for decades not on any chronic medication until his last admission for his cerebellar hemorrhage.      Jaymie's initial impression as of patient's official discharge was good. \"Dad was still talking to me over the phone as of Sunday 12/31/23. Patient did gradually deteriorate after initial charge, with more slurred speech and unsuccessful rehab efforts at [facility].     The dysphagia and aphasia issues are relatively to Jaymie, about for last week. She and her sister wonders if patient's ongoing pneumonia, hyperkalemia, and low hydration/intake negatively affect his reocvery.    Clinical picture still evolving. I advised Jaymie that, if patient's dysphagia and aphasia remain unchanged, then pneumonia and pneumonitis can become recurrent due to continued aspiration... of course, that will also be dependent on if reversible secondary issues may improve patient's current level of function.    Lots of uncertainties... low intake volume, possible dehydration due to net negative I/O since admission, hyperkalemia uncertain if secondary in part to continued low intake and multiple NGT dislodges and feed disruption, etc.    Informed Jaymie that, as multiple secondary and potentially reversible medical issues are also ongoing, I am unable to definitively prognosticate patient's prognosis or chance for definitive recovery at this point. Jaymie was accepting and stressed family's wishes for medical team to address all potentially reversible issues, to try to possibly recover patient's overall function to his status upon his initial discharge, if possible.    For now, Jaymie and her " sister and other family members all want to continue aggressive care, consistent with full code status.  Jaymie does not recall any issue weaning patient off ventilator after his craniotomy 12/27/2023 either.      Note patient later developed afib RVR in the evening and was upgraded to ICU and also appropriately intubated for worsening respiratory distress.       Advance care planning:  The patient and/or legal decision maker has provided voluntary consent to discuss advance care planning. We discussed code status.   FULL    Total time spent in ACP discussion 30 minutes, which is separate from the time spent completing the evaluation and management visit.     Thank you for allowing me the opportunity to participate in the care of Lewis Mhor     I spent a total of 60 minutes reviewing medical records, direct face-to-face time with the patient and/or family, documentation and coordination of care. This is separate from the time spent on advance care planning, which is documented above.         JOSH DENT DO (TIM)  [facility]   [id number] [place]  [id number]  P: [phone]  F: [phone]  C: [phone]

## 2024-01-09 NOTE — PROGRESS NOTES
4 Eyes Skin Assessment Completed by CHARLIE Busby and A, RN.    Head Bruising R eye and R cheek, R crani staples   Ears Redness, slow to alicia bilaterally  Nose NGT in place, WDL  Mouth Redness and Discoloration  Neck WDL  Breast/Chest Redness and Abrasion  Shoulder Blades WDL  Spine WDL  (R) Arm/Elbow/Hand Redness, Blanching, Abrasion, Scab, and Discoloration  (L) Arm/Elbow/Hand Redness, Blanching, Bruising, and Discoloration  Abdomen rounded, distended  Groin WDL, fernando in place, CDI  Scrotum/Coccyx/Buttocks- redness/non-blanching R cheek, L scratch   (R) Leg mottled at knees  (L) Leg mottled at knees  (R) Heel/Foot/Toe boggy, pink, blanching, r ankle redness/non-blanching  (L) Heel/Foot/Toe Redness 2nd toe- non-blanching, boggy heel, pink, blanching          Devices In Places ECG, Blood Pressure Cuff, Pulse Ox, Fernando, SCD's, and OG/NG, PIV x2      Interventions In Place Heel Mepilex, Sacral Mepilex, Heel Float Boots, TAP System, Pillows, Elbow Mepilex, Q2 Turns, Low Air Loss Mattress, and Heels Loaded W/Pillows    Possible Skin Injury No    Pictures Uploaded Into Epic Yes  Wound Consult Placed Yes  RN Wound Prevention Protocol Ordered Yes

## 2024-01-09 NOTE — CARE PLAN
The patient is Watcher - Medium risk of patient condition declining or worsening    Shift Goals  Clinical Goals: oxygen will be titrated when goal O2 sat of 90-98% is achieved this shift if appropriate  Patient Goals: food  Family Goals: not present    Progress made toward(s) clinical / shift goals:  patient has a weak cough unable to bring up secretions via cough. Oxygen saturation is 90-91% on 7L oxymask,  RR 24 MD notified, new orders received. Patient requires freqent re-orientation. Pt is in soft wrists and mitts for removing medical equipment.       Problem: Skin Integrity  Goal: Skin integrity is maintained or improved  Outcome: Progressing       Patient is not progressing towards the following goals:      Problem: Respiratory  Goal: Patient will achieve/maintain optimum respiratory ventilation and gas exchange  Outcome: Not Progressing     Problem: Knowledge Deficit - Standard  Goal: Patient and family/care givers will demonstrate understanding of plan of care, disease process/condition, diagnostic tests and medications  Outcome: Not Progressing

## 2024-01-10 NOTE — CARE PLAN
The patient is Watcher - Medium risk of patient condition declining or worsening    Shift Goals  Clinical Goals: stable hemodynamics, MRI  Patient Goals: unable to assess  Family Goals: unable to assess    Progress made toward(s) clinical / shift goals:    Problem: Hemodynamics  Goal: Patient's hemodynamics, fluid balance and neurologic status will be stable or improve  Outcome: Progressing     Problem: Skin Integrity  Goal: Skin integrity is maintained or improved  Outcome: Progressing     Problem: Safety - Medical Restraint  Goal: Remains free of injury from restraints (Restraint for Interference with Medical Device)  Outcome: Progressing     Problem: Pain - Standard  Goal: Alleviation of pain or a reduction in pain to the patient’s comfort goal  Outcome: Progressing       Patient is not progressing towards the following goals: N/A    Levophed being titrated to maintain MAP > 65. Patient treated with nonpharmacological pain management methods, as well as Precedex. Q2h turns being performed to maintain skin integrity.

## 2024-01-10 NOTE — EEG PROGRESS NOTE
BRIEF VIDEO EEG UPDATE NOTE    The patient is a 78-year-old man who is being evaluated for altered mental status/seizures.    The first 90 minutes of the study were reviewed.    There was diffuse slowing, with at times some discontinuity, as well as abundant triphasic waveforms, at times occurring in periodic fashion at 1 per 1-2 seconds. This may be suggestive of toxic-metabolic encephalopathy, among other considerations.     There were no clear sharp discharges noted.    There were no electrographic seizures captured.    Full report to follow in AM.    Nolan Jauregui MD  Neurology Attending, Epilepsy Program  [facility]

## 2024-01-10 NOTE — PROGRESS NOTES
Neurology Progress Note  Vascular Neurology Service, [facility]    Referring Physician: Manuel Antoine M.D.    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    Interval History 01/10/2024: No acute neurologic events overnight. Remains on norepinephrine with labile blood pressures. MRI brain completed overnight demonstrates small punctate infarcts in the bilateral parietal lobes, left cerebellum, and right frontal lobe. These small infarcts do not clinically correlate to the patient's precipitous decline in mental status and are incidental findings. cEEG in place, no electrographic evidence of seizure activity appreciated, there is diffuse background slowing consistent with profound encephalopathy.    Review of systems: In addition to what is detailed in the HPI and/or updated in the interval history, all other systems reviewed and are negative.    Past Medical History:    has no past medical history on file.    FHx:  family history is not on file.    SHx:   reports that he has never smoked. He has never used smokeless tobacco. He reports current alcohol use. He reports that he does not use drugs.    Medications:    Current Facility-Administered Medications:     Respiratory Therapy Consult, , Nebulization, Continuous RT, Donnie Babin M.D.    famotidine (Pepcid) tablet 20 mg, 20 mg, Enteral Tube, DAILY, 20 mg at 01/10/24 6224 **OR** famotidine (Pepcid) injection 20 mg, 20 mg, Intravenous, DAILY, Donnie Babin M.D., 20 mg at 01/09/24 0363    senna-docusate (Pericolace Or Senokot S) 8.6-50 MG per tablet 2 Tablet, 2 Tablet, Enteral Tube, BID, 2  Tablet at 01/10/24 0520 **AND** polyethylene glycol/lytes (Miralax) Packet 1 Packet, 1 Packet, Enteral Tube, QDAY PRN **AND** magnesium hydroxide (Milk Of Magnesia) suspension 30 mL, 30 mL, Enteral Tube, QDAY PRN, 30 mL at 01/10/24 0520 **AND** bisacodyl (Dulcolax) suppository 10 mg, 10 mg, Rectal, QDAY PRN, Donnie Babin M.D.    MD Alert...ICU Electrolyte Replacement per Pharmacy, , Other, PHARMACY TO DOSE, Donnie Babin M.D.    lidocaine (Xylocaine) 1 % injection 2 mL, 2 mL, Tracheal Tube, Q30 MIN PRN, Donnie Babin M.D.    dexmedetomidine (PRECEDEX) 400 mcg/100mL NS premix infusion, 0-1.5 mcg/kg/hr (Ideal), Intravenous, Continuous, Donnie Babin M.D., Stopped at 01/10/24 0604    fentaNYL (Sublimaze) injection 25 mcg, 25 mcg, Intravenous, Q HOUR PRN **OR** fentaNYL (Sublimaze) injection 50 mcg, 50 mcg, Intravenous, Q HOUR PRN **OR** fentaNYL (Sublimaze) injection 100 mcg, 100 mcg, Intravenous, Q HOUR PRN, Donnie Babin M.D.    norepinephrine (Levophed) 8 mg in 250 mL NS infusion (premix), 0-1 mcg/kg/min (Ideal), Intravenous, Continuous, Donnie Babin M.D., Last Rate: 2.7 mL/hr at 01/10/24 0622, 0.02 mcg/kg/min at 01/10/24 0622    lactated ringers infusion, , Intravenous, Continuous, Donnie Babin M.D., Last Rate: 100 mL/hr at 01/10/24 0625, Rate Verify at 01/10/24 0625    [COMPLETED] piperacillin-tazobactam (Zosyn) 3.375 g in  mL IVPB, 3.375 g, Intravenous, Once, Stopped at 01/09/24 1818 **AND** piperacillin-tazobactam (Zosyn) 3.375 g in  mL IVPB, 3.375 g, Intravenous, Q8HRS, Donnie Babin M.D., Last Rate: 25 mL/hr at 01/10/24 0519, 3.375 g at 01/10/24 0519    Pharmacy Consult: Enteral tube insertion - review meds/change route/product selection, 1 Each, Other, PHARMACY TO DOSE, Rebecca Holman M.D.    haloperidol lactate (Haldol) injection 5 mg, 5 mg, Intravenous, Q4HRS PRN, Agapito Bonilla, JENNY.P.R.N., 5 mg at 01/07/24 1639    Physical Examination:     Vitals:    01/10/24  "0600 01/10/24 0614 01/10/24 0615 01/10/24 0616   BP: 122/60  134/72    Pulse: 69 69 69    Resp: (!) 22 (!) 22 (!) 22    Temp: 35.9 °C (96.7 °F)      TempSrc: Temporal      SpO2: 99% 99% 100%    Weight:       Height:    1.778 m (5' 10\")       GEN - Intubated, sedated. NAD.  Neck: There is normal range of motion  CV: Regular rate   Extremities:  Warm, dry, and intact, without peripheral lower extremity edema    Neurologic:  Mental Status - Intubated, sedated. Unresponsive.  Cranial Nerves - Pupils equal, round, and reactive to light. Sluggish corneal, oculoceophalic, cough, and gag.  Motor - No purposeful movements. Tone flaccid.  Sensory - No response to noxious in any extremity.  DTRs - Globally reduced 1/4. Toes equivocal.  Gait and Coordination - Unable to assess due to patient condition.      Objective Data:    Labs:  Lab Results   Component Value Date/Time    PROTHROMBTM 17.0 (H) 12/09/2020 05:24 PM    INR 1.34 (H) 12/09/2020 05:24 PM      Lab Results   Component Value Date/Time    WBC 21.7 (H) 01/10/2024 04:26 AM    RBC 4.08 (L) 01/10/2024 04:26 AM    HEMOGLOBIN 12.3 (L) 01/10/2024 04:26 AM    HEMATOCRIT 38.7 (L) 01/10/2024 04:26 AM    MCV 94.9 01/10/2024 04:26 AM    MCH 30.1 01/10/2024 04:26 AM    MCHC 31.8 (L) 01/10/2024 04:26 AM    MPV 11.3 01/10/2024 04:26 AM    NEUTSPOLYS 83.40 (H) 01/10/2024 04:26 AM    LYMPHOCYTES 4.90 (L) 01/10/2024 04:26 AM    MONOCYTES 8.80 01/10/2024 04:26 AM    EOSINOPHILS 0.30 01/10/2024 04:26 AM    BASOPHILS 0.20 01/10/2024 04:26 AM      Lab Results   Component Value Date/Time    SODIUM 152 (H) 01/10/2024 04:26 AM    POTASSIUM 4.1 01/10/2024 04:26 AM    CHLORIDE 114 (H) 01/10/2024 04:26 AM    CO2 27 01/10/2024 04:26 AM    GLUCOSE 193 (H) 01/10/2024 04:26 AM    BUN 37 (H) 01/10/2024 04:26 AM    CREATININE 1.75 (H) 01/10/2024 04:26 AM      No results found for: \"CHOLSTRLTOT\", \"LDL\", \"HDL\", \"TRIGLYCERIDE\"    Lab Results   Component Value Date/Time    ALKPHOSPHAT 130 (H) 01/09/2024 " 04:20 PM    ASTSGOT 51 (H) 01/09/2024 04:20 PM    ALTSGPT 152 (H) 01/09/2024 04:20 PM    TBILIRUBIN 0.9 01/09/2024 04:20 PM        Imaging/Testing:    I interpreted and/or reviewed the patient's neuroimaging    DX-CHEST-PORTABLE (1 VIEW)   Final Result         1.  Scattered hazy bilateral pulmonary infiltrates, slightly increased since prior study.   2.  Atherosclerosis      MR-BRAIN-W/O   Final Result      1.  A few punctate areas of acute infarcts in the right frontal and bilateral parietal lobes and left cerebellum.   2.  There is no brainstem infarct or diffuse anoxic injury.   3.  Mixed signal abnormality in the right cerebellum with the previous surgical intervention in keeping with the clinical diagnosis of right cerebellar hemorrhage evacuation. Follow-up study with contrast in 6 weeks is recommended to rule out any    underlying pathology.   4.  Mild cerebral volume loss.      DX-CHEST-PORTABLE (1 VIEW)   Final Result      CT-CTA NECK WITH & W/O-POST PROCESSING   Final Result      1.  No acute arterial abnormality detected. No significant arterial stenosis.      CT-CTA HEAD WITH & W/O-POST PROCESS   Final Result      CT angiogram of the [place] within normal limits.      CT-CEREBRAL PERFUSION ANALYSIS   Final Result      1.  Cerebral blood flow less than 30% likely representing completed infarct = 0 mL.      2.  T Max more than 6 seconds likely representing combination of completed infarct and ischemia = 0 mL.      3.  Mismatched volume likely representing ischemic brain/penumbra = None      4.  Please note that the cerebral perfusion was performed on the limited brain tissue around the basal ganglia region. Infarct/ischemia outside the CT perfusion sections can be missed in this study.      CT-HEAD W/O   Final Result      1.  No evidence of acute territorial infarct, intracranial hemorrhage or mass lesion.   2.  Mild diffuse cerebral substance loss.   3.  Mild microangiopathic ischemic change  versus demyelination or gliosis.   4.  Status post right suboccipital cranioplasty with underlying cerebellar hypoattenuation likely on the basis of evolving encephalomalacia.         DX-CHEST-PORTABLE (1 VIEW)   Final Result      1.  Intubation.   2.  Bilateral pulmonary infiltrates.      DX-CHEST-LIMITED (1 VIEW)   Final Result      DX-ABDOMEN FOR TUBE PLACEMENT   Final Result      Enteric feeding tube terminates with the tip projecting over the expected location of the distal stomach.      CT-CTA CHEST PULMONARY ARTERY W/ RECONS   Final Result         1.  No pulmonary embolus appreciated.   2.  Consolidations in bilateral lung bases an scattered hazy right pulmonary opacities, compatible with atelectasis with component of infiltrate.   3.  Indeterminate left adrenal nodule, follow-up adrenal protocol CT for further characterization as clinically appropriate.   4.  Atherosclerosis and atherosclerotic coronary artery disease.      DX-ABDOMEN FOR TUBE PLACEMENT   Final Result         1.  Nonspecific bowel gas pattern in the upper abdomen.   2.  Cardiomegaly   3.  Atherosclerosis   4.  Bibasilar atelectasis   5.  Nasogastric tube tip terminates overlying the expected location of the pylorus or first duodenal segment.      DX-ABDOMEN FOR TUBE PLACEMENT   Final Result         1.  Nonspecific bowel gas pattern in the upper abdomen.   2.  Nasogastric tube tip terminates overlying the expected location of the gastric antrum.   3.  Linear densities the bilateral lung bases favor atelectasis, component of infiltrate not excluded.      DX-ABDOMEN FOR TUBE PLACEMENT   Final Result      NG tube extends below the diaphragm with tip at the level of the gastric fundus. Side port is at the level of the distal esophagus.      DX-ABDOMEN FOR TUBE PLACEMENT   Final Result      NG tube is noted with tip projecting over the right lower lung.      These findings were transmitted with GLEN ZELAYA on 01/07/2024. Tube was subsequently  adjusted and is now present below the diaphragm.      DX-ABDOMEN FOR TUBE PLACEMENT   Final Result      DHT tip overlies the second portion of the duodenum      DX-ABDOMEN FOR TUBE PLACEMENT   Final Result      Feeding tube tip projects in the distal stomach or first portion of the duodenum.      DX-ABDOMEN FOR TUBE PLACEMENT   Final Result      Removal of nasogastric tube.      Feeding tube tip projects in the distal stomach.      No visualized thorax.      DX-ABDOMEN FOR TUBE PLACEMENT   Final Result         Gastric drainage tube with tip projecting over the expected area of the right lower lobe airway. Recommend removal.            CRITICAL RESULT READ BACK: Preliminary findings discussed with and critical read back performed by Dr. GLEN ZELAYA via telephone on 1/5/2024 6:22 PM      DX-ABDOMEN FOR TUBE PLACEMENT   Final Result      1.  Malpositioned enteric sump catheter which is folded back upon itself over the left paramedian distal one third mediastinum probably beyond the left mainstem bronchus and within the esophagus.   2.  A Voalte message was sent to GLEN ZELAYA at 1641 hours. Immediate response received.   3.  Per discussion, the patient has already pulled out the tube.      DX-CHEST-PORTABLE (1 VIEW)   Final Result      1.  Hypoinflation and pulmonary vascular congestion with mild bibasilar atelectasis.   2.  No lobar pneumonia or overt pulmonary edema.      CT-HEAD W/O   Final Result      1.  Diffuse atrophy and white matter changes.   2.  No acute intracranial hemorrhage or territorial infarct.   3.  RIGHT occipital craniotomy with underlying cerebellar encephalomalacia.         US-EXTREMITY VENOUS LOWER BILAT    (Results Pending)       Assessment and Plan:    78 y.o. male with recent right cerebellar ICH s/p suboccipital crani and evacuation on 12/27 who presented 01/04 with altered mental status and slurred speech who subsequently became obtunded this afternoon and unresponsive to noxious stimuli.  Last known well is ~1200. Patient was found to be unresponsive with fixed pupils that were anisocoric. He was in atrial fibrillation with RVR, rate in the 150s, and with apparent aspiration related hypoxia. While in the CT scanner the patient went into hypoxic respiratory failure requiring emergent intubation by the ICU team. Shortly following intubation the patient went into PEA arrest, ROSC successfully attained shortly after. Stroke protocol CT head was negative for acute intracranial abnormalities. CTA head/neck negative for LVO, dissection, and flow limiting stenoses. CTA perfusion negative for CBF defect. Patient's decline in mental status likely secondary to hypoxic respiratory failure. MRI brain without contrast was completed which demonstrates several small punctate infarcts in the bilateral parietal lobes, left cerebellum, and right frontal lobe. This infarcts are incidental findings and do not clinically correlate to the patient's decline in mentation yesterday and could be resultant from hypoperfusion during the Code event.      Problem list:  Scattered bilateral punctate infarcts  Hypoxic respiratory failure  PEA cardiac arrest  ICH s/p crani (12/27/2023)    Plan:  - Q2h neuro exams, vitals per nursing/unit protocols  - Wean sedation as tolerated, extubate when clinically stable per ICU team  - Check stroke labs: Lipid Panel, HgbA1c  - Start atorvastatin 40mg nightly, titrate to long term LDL goal < 70  - No need for TTE from neuro perspective as patient has known atrial fibrillation and these results will not alter plan  - Recommend NOAC with Eliquis 5mg BID  - Blood glucose management per primary team. FSBS goal , A1c goal < 7  - PT/OT/SLP eval and treat  - DVT: SCDs     Case reviewed and plan created with Dr. Scarlett Duffy, Vascular Neurology. Please call with any questions.    The evaluation of the patient, and recommended management, was discussed with the resident staff. I have performed a  physical exam and reviewed and updated ROS and Plan today (1/10/2024). In review of yesterday's note (1/9/2024), there are no changes except as documented above.      VIRGINIA Berg.  Neurology, Acute Care Services

## 2024-01-10 NOTE — ASSESSMENT & PLAN NOTE
Required multiple episodes of mechanical ventilation, intubation,  Unable to protect airway effectively  Ongoing high flow nasal cannula due to ongoing respiratory failure in the setting of recurrent aspiration pneumonitis for which she has received antibiotics on numerous occasions and required multiple intubations

## 2024-01-10 NOTE — ASSESSMENT & PLAN NOTE
LVH on 12/27 ECHO with diastolic dysfunction  Eliquis prophylaxis for CVA  Optimize electrolytes   Continue cardiac monitoring.

## 2024-01-10 NOTE — FLOWSHEET NOTE
01/10/24 1527   Spontaneous Breathing Trial (SBT)   Safety screen spontaneous breathing trial (SBT) Proceed with SBT - no exclusion criteria met   Spontaneous breathing trial (SBT) outcome Pt weaned for 1 hour and returned to rest settings per protocol - SBT Pass   Length of Weaning Trial (Hours) 1.5   Pt passed SBT but not ready to extubate Not ready - continue daily assessments for extubation   Weaning Parameters   RR (bpm) 20   $ FVC / Vital Capacity (liters)    (does not follow)   NIF (cm H2O)    (does not follow)   Rapid Shallow Breathing Index (RR/VT) 57   Spontaneous VE 8.1   Spontaneous

## 2024-01-10 NOTE — CONSULTS
Neurology STROKE CODE H&P  Vascular Neurology Service, [facility]    Referring Physician: Donnie Babin M.D.    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  For time of CT, refer to the radiology report. See A&P below for TPA Decision and door to needle time if and when applicable.    HPI: Lewis Mohr is a 78 y.o. male who presented on 1/04/2024 with altered mental status and slurred speech.  Patient was recently hospitalized on 12/27/2023 for a right cerebellar ICH with subsequent evacuation by neurosurgery and a suboccipital crani.  On the most recent admission the patient was found to be in hypoxic respiratory failure, with urinary retention, hypernatremia, and ERWIN.  Today a stroke alert was activated by the primary team or the patient was found to be unresponsive to noxious stimuli with anisocoric and fixed pupils.  On exam the patient did not respond to sternal rub, left pupil was larger than right, both are unreactive to light.  Patient was also found to be in atrial fibrillation with RVR and worsening hypoxia.  Patient was taken to the CT scanner for stroke imaging where he subsequently declined into hypoxic respiratory failure requiring emergent intubation in the scanner.  Immediately following intubation the patient went into PEA arrest and ACLS protocols were followed and ROSC was ultimately achieved.  At that time we were able to proceed with noncontrast CT head which was negative for acute intracranial abnormalities.  CTA head/neck were negative for LVO, dissection, or critical  flow-limiting stenosis.  CT perfusion negative for CBF defect.  Neurology has been consulted for further evaluation of the above.    Review of systems: In addition to what is detailed in the HPI above, all other systems reviewed and are negative.    Past Medical History:    has no past medical history on file.    FHx:  family history is not on file.    SHx:   reports that he has never smoked. He has never used smokeless tobacco. He reports current alcohol use. He reports that he does not use drugs.    Allergies:  No Known Allergies    Medications:    Current Facility-Administered Medications:     piperacillin-tazobactam (Zosyn) 3.375 g in  mL IVPB, 3.375 g, Intravenous, Once **AND** piperacillin-tazobactam (Zosyn) 3.375 g in  mL IVPB, 3.375 g, Intravenous, Q8HRS, Tj Mayer M.D.    Respiratory Therapy Consult, , Nebulization, Continuous RT, Donnie Babin M.D.    famotidine (Pepcid) tablet 20 mg, 20 mg, Enteral Tube, DAILY **OR** famotidine (Pepcid) injection 20 mg, 20 mg, Intravenous, DAILY, Donnie Babin M.D.    senna-docusate (Pericolace Or Senokot S) 8.6-50 MG per tablet 2 Tablet, 2 Tablet, Enteral Tube, BID **AND** polyethylene glycol/lytes (Miralax) Packet 1 Packet, 1 Packet, Enteral Tube, QDAY PRN **AND** magnesium hydroxide (Milk Of Magnesia) suspension 30 mL, 30 mL, Enteral Tube, QDAY PRN **AND** bisacodyl (Dulcolax) suppository 10 mg, 10 mg, Rectal, QDAY PRN, Donnie Babin M.D.    MD Alert...ICU Electrolyte Replacement per Pharmacy, , Other, PHARMACY TO DOSE, Donnie Babin M.D.    lidocaine (Xylocaine) 1 % injection 2 mL, 2 mL, Tracheal Tube, Q30 MIN PRN, Donnie Babin M.D.    dexmedetomidine (PRECEDEX) 400 mcg/100mL NS premix infusion, 0-1.5 mcg/kg/hr (Ideal), Intravenous, Continuous, Donnie Babin M.D., Last Rate: 3.7 mL/hr at 01/09/24 1606, 0.2 mcg/kg/hr at 01/09/24 1606    fentaNYL (Sublimaze) injection 25 mcg, 25 mcg, Intravenous, Q HOUR PRN **OR**  fentaNYL (Sublimaze) injection 50 mcg, 50 mcg, Intravenous, Q HOUR PRN **OR** fentaNYL (Sublimaze) injection 100 mcg, 100 mcg, Intravenous, Q HOUR PRN, Donnie Babin M.D.    norepinephrine (Levophed) 8 mg in 250 mL NS infusion (premix), 0-1 mcg/kg/min (Ideal), Intravenous, Continuous, Donnie Babin M.D., Last Rate: 13.7 mL/hr at 01/09/24 1647, 0.1 mcg/kg/min at 01/09/24 1647    dextrose 5% infusion, , Intravenous, Continuous, Rebecca Holman M.D., Last Rate: 75 mL/hr at 01/09/24 0933, New Bag at 01/09/24 0933    Pharmacy Consult: Enteral tube insertion - review meds/change route/product selection, 1 Each, Other, PHARMACY TO DOSE, Rebecca Holman M.D.    haloperidol lactate (Haldol) injection 5 mg, 5 mg, Intravenous, Q4HRS PRN, GOYO QuintanillaPAllieRAllieNAllie, 5 mg at 01/07/24 1639    Physical Examination:    Vitals:    01/09/24 1610 01/09/24 1611 01/09/24 1612 01/09/24 1614   BP: 124/89 (!) 151/100 (!) 151/100 (!) 158/104   Pulse: (!) 127 (!) 126 (!) 124 (!) 127   Resp: 19 18 18 18   Temp:       TempSrc:       SpO2: 97% 96% 98% 99%   Weight:       Height:           GEN - Intubated, sedated. NAD.  Neck: There is normal range of motion  CV: Regular rate   Extremities:  Warm, dry, and intact, without peripheral lower extremity edema    Neurologic (Following Code Event)  Mental Status - Intubated, sedated. Unresponsive.  Cranial Nerves - Pupils equal, round, and reactive to light. Sluggish corneal, oculoceophalic, cough, and gag.  Motor - No purposeful movements. Tone flaccid.  Sensory - No response to noxious in any extremity.  DTRs - Globally reduced 1/4. Toes equivocal.  Gait and Coordination - Unable to assess due to patient condition.      NEUROLOGICAL EXAM: (Prior to Code Event)    Mental status: Obtunded, unresponsive to noxious stimuli  Speech and language: Obtunded, nonverbal  Cranial nerve exam: Anisocoric pupils, L>R, nonreactive to light. Blink to threat absent bilaterally.   Motor exam: Flaccid in all four  extremities. No purposeful movement on exam  Sensory exam:  Unable to assess  Deep tendon reflexes:  2+ throughout. Toes down-going bilaterally.  Coordination: Unable to assess  Gait: Deferred due to patient acuity    NIHSS: National Institutes of Health Stroke Scale    [2] 1a:Level of Consciousness    0-alert 1-drowsy   2-stupor   3-coma  [2] 1b:LOC Questions                  0-both  1-one      2-neither  [2] 1c:LOC Commands                   0-both  1-one      2-neither  [0] 2: Best Gaze                     0-nl    1-partial  2-forced  [0] 3: Visual Fields                   0-nl    1-partial  2-complete 3-bilat  [0] 4: Facial Paresis                0-nl    1-minor    2-partial  3-full  MOTOR                       0-nl  [3] 5: Right Arm           1-drift  [3] 6: Left Arm             2-some effort vs gravity  [3] 7: Right Leg           3-no effort vs gravity  [3] 8: Left Leg             4-no movement                             x-untestable  [0] 9: Limb Ataxia                    0-abs   1-1_limb   2-2+_limbs       x-untestable  [0] 10:Sensory                        0-nl    1-partial  2-dense  [3] 11:Best Language/Aphasia         0-nl    1-mild/mod 2-severe   3-mute  [x] 12:Dysarthria                     0-nl    1-mild/mod 2-severe       x-untestable  [0] 13:Neglect/Inattention            0-none  1-partial  2-complete  [21] TOTAL    NIHSS Date/Time: 01/09/2024 @ 1509    Baseline Modified [place] Scale (MRS): 5 = Severe disability; bedridden, incontinent, and requires constant nursing care and attention    Objective Data:    Labs:  Lab Results   Component Value Date/Time    PROTHROMBTM 17.0 (H) 12/09/2020 05:24 PM    INR 1.34 (H) 12/09/2020 05:24 PM      Lab Results   Component Value Date/Time    WBC 24.7 (H) 01/09/2024 04:20 PM    RBC 4.17 (L) 01/09/2024 04:20 PM    HEMOGLOBIN 12.8 (L) 01/09/2024 04:20 PM    HEMATOCRIT 41.1 (L) 01/09/2024 04:20 PM    MCV 98.6 (H) 01/09/2024 04:20 PM    MCH 30.7 01/09/2024 04:20 PM     "MCHC 31.1 (L) 01/09/2024 04:20 PM    MPV 10.8 01/09/2024 04:20 PM    NEUTSPOLYS 87.20 (H) 01/09/2024 04:20 PM    LYMPHOCYTES 2.60 (L) 01/09/2024 04:20 PM    MONOCYTES 8.30 01/09/2024 04:20 PM    EOSINOPHILS 0.00 01/09/2024 04:20 PM    BASOPHILS 0.20 01/09/2024 04:20 PM      Lab Results   Component Value Date/Time    SODIUM 148 (H) 01/09/2024 12:45 AM    POTASSIUM 4.3 01/09/2024 12:45 AM    CHLORIDE 112 01/09/2024 12:45 AM    CO2 26 01/09/2024 12:45 AM    GLUCOSE 191 (H) 01/09/2024 12:45 AM    BUN 32 (H) 01/09/2024 12:45 AM    CREATININE 1.34 01/09/2024 12:45 AM      No results found for: \"CHOLSTRLTOT\", \"LDL\", \"HDL\", \"TRIGLYCERIDE\"    Lab Results   Component Value Date/Time    ALKPHOSPHAT 142 (H) 01/09/2024 02:05 AM    ASTSGOT 113 (H) 01/09/2024 02:05 AM    ALTSGPT 228 (H) 01/09/2024 02:05 AM    TBILIRUBIN 0.8 01/09/2024 02:05 AM        Imaging/Testing:    I interpreted and/or reviewed the patient's neuroimaging    DX-CHEST-PORTABLE (1 VIEW)   Final Result      CT-CTA NECK WITH & W/O-POST PROCESSING   Final Result      1.  No acute arterial abnormality detected. No significant arterial stenosis.      CT-CTA HEAD WITH & W/O-POST PROCESS   Final Result      CT angiogram of the [place] within normal limits.      CT-CEREBRAL PERFUSION ANALYSIS   Final Result      1.  Cerebral blood flow less than 30% likely representing completed infarct = 0 mL.      2.  T Max more than 6 seconds likely representing combination of completed infarct and ischemia = 0 mL.      3.  Mismatched volume likely representing ischemic brain/penumbra = None      4.  Please note that the cerebral perfusion was performed on the limited brain tissue around the basal ganglia region. Infarct/ischemia outside the CT perfusion sections can be missed in this study.      CT-HEAD W/O   Final Result      1.  No evidence of acute territorial infarct, intracranial hemorrhage or mass lesion.   2.  Mild diffuse cerebral substance loss.   3.  Mild " microangiopathic ischemic change versus demyelination or gliosis.   4.  Status post right suboccipital cranioplasty with underlying cerebellar hypoattenuation likely on the basis of evolving encephalomalacia.         DX-CHEST-PORTABLE (1 VIEW)   Final Result      1.  Intubation.   2.  Bilateral pulmonary infiltrates.      DX-CHEST-LIMITED (1 VIEW)   Final Result      DX-ABDOMEN FOR TUBE PLACEMENT   Final Result      Enteric feeding tube terminates with the tip projecting over the expected location of the distal stomach.      CT-CTA CHEST PULMONARY ARTERY W/ RECONS   Final Result         1.  No pulmonary embolus appreciated.   2.  Consolidations in bilateral lung bases an scattered hazy right pulmonary opacities, compatible with atelectasis with component of infiltrate.   3.  Indeterminate left adrenal nodule, follow-up adrenal protocol CT for further characterization as clinically appropriate.   4.  Atherosclerosis and atherosclerotic coronary artery disease.      DX-ABDOMEN FOR TUBE PLACEMENT   Final Result         1.  Nonspecific bowel gas pattern in the upper abdomen.   2.  Cardiomegaly   3.  Atherosclerosis   4.  Bibasilar atelectasis   5.  Nasogastric tube tip terminates overlying the expected location of the pylorus or first duodenal segment.      DX-ABDOMEN FOR TUBE PLACEMENT   Final Result         1.  Nonspecific bowel gas pattern in the upper abdomen.   2.  Nasogastric tube tip terminates overlying the expected location of the gastric antrum.   3.  Linear densities the bilateral lung bases favor atelectasis, component of infiltrate not excluded.      DX-ABDOMEN FOR TUBE PLACEMENT   Final Result      NG tube extends below the diaphragm with tip at the level of the gastric fundus. Side port is at the level of the distal esophagus.      DX-ABDOMEN FOR TUBE PLACEMENT   Final Result      NG tube is noted with tip projecting over the right lower lung.      These findings were transmitted with GLEN ZELAYA on  01/07/2024. Tube was subsequently adjusted and is now present below the diaphragm.      DX-ABDOMEN FOR TUBE PLACEMENT   Final Result      DHT tip overlies the second portion of the duodenum      DX-ABDOMEN FOR TUBE PLACEMENT   Final Result      Feeding tube tip projects in the distal stomach or first portion of the duodenum.      DX-ABDOMEN FOR TUBE PLACEMENT   Final Result      Removal of nasogastric tube.      Feeding tube tip projects in the distal stomach.      No visualized thorax.      DX-ABDOMEN FOR TUBE PLACEMENT   Final Result         Gastric drainage tube with tip projecting over the expected area of the right lower lobe airway. Recommend removal.            CRITICAL RESULT READ BACK: Preliminary findings discussed with and critical read back performed by Dr. GLEN ZELAYA via telephone on 1/5/2024 6:22 PM      DX-ABDOMEN FOR TUBE PLACEMENT   Final Result      1.  Malpositioned enteric sump catheter which is folded back upon itself over the left paramedian distal one third mediastinum probably beyond the left mainstem bronchus and within the esophagus.   2.  A Voalte message was sent to GLEN ZELAYA at 1641 hours. Immediate response received.   3.  Per discussion, the patient has already pulled out the tube.      DX-CHEST-PORTABLE (1 VIEW)   Final Result      1.  Hypoinflation and pulmonary vascular congestion with mild bibasilar atelectasis.   2.  No lobar pneumonia or overt pulmonary edema.      CT-HEAD W/O   Final Result      1.  Diffuse atrophy and white matter changes.   2.  No acute intracranial hemorrhage or territorial infarct.   3.  RIGHT occipital craniotomy with underlying cerebellar encephalomalacia.         MR-BRAIN-W/O    (Results Pending)       Assessment and Plan:    78 y.o. male with recent right cerebellar ICH s/p suboccipital crani and evacuation on 12/27 who presented 01/04 with altered mental status and slurred speech who subsequently became obtunded this afternoon and unresponsive to  noxious stimuli. Last known well is ~1200. Patient was found to be unresponsive with fixed pupils that were anisocoric. He was in atrial fibrillation with RVR, rate in the 150s, and with apparent aspiration related hypoxia. While in the CT scanner the patient went into hypoxic respiratory failure requiring emergent intubation by the ICU team. Shortly following intubation the patient went into PEA arrest, ROSC successfully attained shortly after. Stroke protocol CT head was negative for acute intracranial abnormalities. CTA head/neck negative for LVO, dissection, and flow limiting stenoses. CTA perfusion negative for CBF defect.     At this time there is low suspicion for an acute ischemic process. Patient's decline in mental status likely secondary to hypoxic respiratory failure. There are no acute neurological interventions recommended at this time. Consider obtaining MRI brain without contrast for further evaluation once clinically stable. Blood pressure goal of normotension as there is no ischemic penumbra on perfusion study. Please reconsult neurology with any acute findings.    Problem list:  Altered mental status  Hypoxic respiratory failure  PEA cardiac arrest  ICH s/p crani (12/27/2023)    Case reviewed and plan created with Dr. Scarlett Duffy, Vascular Neurology. Please call with any questions.      Suraj MENDOZA  Vascular Neurology, [facility]

## 2024-01-10 NOTE — PROGRESS NOTES
Critical Care Progress Note    Date of admission  1/4/2024    Chief Complaint  78 y.o. male admitted 1/4/2024 with cardiac arrest    Hospital Course  78 y.o. 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 He presented back for AMS and evaluated in ER and was discharged. He presented the next day 1/4/2024 for hypoxia and was admitted to medicine. He was found to have silent aspiration, lauren, urinary retention and hypernatremia and dehydration. He had a an echo 12/29/2023 LVH EF 60% grade II daistolic dysfunction mild MR/TR. He has not had an MRI on the prior admission. He today 1/9/2024 had afib w/ RVR and worsening hypoxia as well as worsening mental status and code stroke was called. His BS was 190. In the CT scanner gurgling with poor airway reflexes. He was intubated and shortly after had PEA arrest. CT head and CTA/P was negative for acute stroke.  He is full code and confirmed prior to this event by daughters.        Interval Problem Update  Reviewed last 24 hour events:              - Tm: Afebrile              - HR: 60-70s              - SBP: 130-140s              - Neuro: RASS -3              - GI: NPO              - UOP: 2.4 L              - Schwartz: Yes              - Lines: PIV, CVC              - PPx: H2,Contraindicated              - CXR (personally reviewed): Tubes and lines in good position.  Hazy opacities in the right upper and left lower lobes.  No effusions no pneumothoraces              - Antibiotic Day #2, Zosyn   - SAT/SBT contraindicated due to hemodynamic instability   - Mobility: level1    Infusions:  Norepinephrine  Phenylephrine  Dexmedetomidine    Review of Systems  Review of Systems   Unable to perform ROS: Intubated        Vital Signs for last 24 hours   Temp:  [35.6 °C (96 °F)-36.4 °C (97.5 °F)] 36.1 °C (96.9 °F)  Pulse:  [] 78  Resp:  [8-28] 18  BP: ()/() 122/67  SpO2:  [84 %-100 %] 91  %    Hemodynamic parameters for last 24 hours       Respiratory Information for the last 24 hours  Vent Mode: APVCMV  Rate (breaths/min): 22  Vt Target (mL): 440  PEEP/CPAP: 10  MAP: 12  Length of Weaning Trial (Hours): 1.5  Control VTE (exp VT): 428    Physical Exam   Physical Exam  Constitutional:       Appearance: He is ill-appearing.   HENT:      Head: Normocephalic.      Comments: Ecchymosis noted over the lateral right orbit     Mouth/Throat:      Mouth: Mucous membranes are moist.   Eyes:      Comments: Pupils are 3 to 4 mm nonreactive, mid position   Cardiovascular:      Rate and Rhythm: Normal rate. Rhythm irregular.      Heart sounds: No murmur heard.     No friction rub. No gallop.   Pulmonary:      Breath sounds: No wheezing or rhonchi.      Comments: Diminished in the bases  Abdominal:      General: There is no distension.      Palpations: Abdomen is soft. There is no mass.      Tenderness: There is no abdominal tenderness.   Musculoskeletal:      Cervical back: Neck supple.      Right lower leg: Edema present.      Left lower leg: Edema present.   Skin:     General: Skin is warm and dry.   Neurological:      Comments: Eyes were open spontaneously without stimulation he does not track movement in the room, moves purposefully or follow commands.  Cough and gag reflex are weak.         Medications  Current Facility-Administered Medications   Medication Dose Route Frequency Provider Last Rate Last Admin    insulin regular (HumuLIN R,NovoLIN R) injection  1-6 Units Subcutaneous Q6HRS GOYO GeorgesP.R.N.   1 Units at 01/10/24 1217    And    dextrose 10 % BOLUS 25 g  25 g Intravenous Q15 MIN PRN GOYO GeorgesPAllieR.N.        D5 1/2 NS infusion   Intravenous Continuous Manuel Antoine M.D. 100 mL/hr at 01/10/24 0921 New Bag at 01/10/24 0921    Respiratory Therapy Consult   Nebulization Continuous RT Donnie Babin M.D.        famotidine (Pepcid) tablet 20 mg  20 mg Enteral Tube DAILY Donnie AGUILAR  JANNETH Babin   20 mg at 01/10/24 0524    Or    famotidine (Pepcid) injection 20 mg  20 mg Intravenous DAILY Donnie Babin M.D.   20 mg at 01/09/24 1739    senna-docusate (Pericolace Or Senokot S) 8.6-50 MG per tablet 2 Tablet  2 Tablet Enteral Tube BID Donnie Babin M.D.   2 Tablet at 01/10/24 0520    And    polyethylene glycol/lytes (Miralax) Packet 1 Packet  1 Packet Enteral Tube QDAY PRN Donnie Babin M.D.        And    magnesium hydroxide (Milk Of Magnesia) suspension 30 mL  30 mL Enteral Tube QDAY PRN Donnie Babin M.D.   30 mL at 01/10/24 0520    And    bisacodyl (Dulcolax) suppository 10 mg  10 mg Rectal QDAY PRN Donnie Babin M.D. MD Alert...ICU Electrolyte Replacement per Pharmacy   Other PHARMACY TO DOSE Donnie Babin M.D.        lidocaine (Xylocaine) 1 % injection 2 mL  2 mL Tracheal Tube Q30 MIN PRN Donnie Babin M.D.        dexmedetomidine (PRECEDEX) 400 mcg/100mL NS premix infusion  0-1.5 mcg/kg/hr (Ideal) Intravenous Continuous Donnie Babin M.D.   Stopped at 01/10/24 0604    fentaNYL (Sublimaze) injection 25 mcg  25 mcg Intravenous Q HOUR PRN Donnie Babin M.D.        Or    fentaNYL (Sublimaze) injection 50 mcg  50 mcg Intravenous Q HOUR PRN Donnie Babin M.D.        Or    fentaNYL (Sublimaze) injection 100 mcg  100 mcg Intravenous Q HOUR PRN Donnie Babin M.D.        norepinephrine (Levophed) 8 mg in 250 mL NS infusion (premix)  0-1 mcg/kg/min (Ideal) Intravenous Continuous Donnie Babin M.D.   Paused at 01/10/24 0945    piperacillin-tazobactam (Zosyn) 3.375 g in  mL IVPB  3.375 g Intravenous Q8HRS Donnie Babin M.D. 25 mL/hr at 01/10/24 1230 3.375 g at 01/10/24 1230    Pharmacy Consult: Enteral tube insertion - review meds/change route/product selection  1 Each Other PHARMACY TO DOSE Rebecca Holman M.D.        haloperidol lactate (Haldol) injection 5 mg  5 mg Intravenous Q4HRS PRN JNENY Quintanilla.P.RAllieN.   5 mg at 01/07/24 1785        Fluids    Intake/Output Summary (Last 24 hours) at 1/10/2024 1534  Last data filed at 1/10/2024 1400  Gross per 24 hour   Intake 2472.69 ml   Output 1585 ml   Net 887.69 ml       Laboratory  Recent Labs     01/08/24  1855 01/09/24  1429 01/09/24  1633 01/09/24  1924 01/10/24  0235   KZUKG22H 7.41 7.23*  --   --   --    BOLJCT763T 41.7* 72.2*  --   --   --    XKSTX930X 59.0* 63.6*  --   --   --    QRDK5XQV 90.8* 87.4*  --   --   --    ARTHCO3 26* 29*  --   --   --    W2ZPGGYGQ 6 10L  10.0  --   --   --    ARTBE 1 0  --   --   --    ISTATAPH  --   --  7.286* 7.379* 7.427   ISTATAPCO2  --   --  58.9* 44.2* 39.2*   ISTATAPO2  --   --  100* 196* 107*   ISTATATCO2  --   --  30 27 27   APADEPF0YJP  --   --  97 100* 98   ISTATARTHCO3  --   --  28.1* 26.1* 25.8*   ISTATARTBE  --   --  0 1 1   ISTATTEMP  --   --  95.8 F 98.8 F 98.8 F   ISTATFIO2  --   --  100 100 40   ISTATSPEC  --   --  Arterial Arterial Arterial   ISTATAPHTC  --   --  7.308* 7.377* 7.425   IABEJSTL5VE  --   --  91* 196* 107*         Recent Labs     01/08/24  0335 01/08/24  1447 01/09/24  0045 01/09/24  1620 01/10/24  0426   SODIUM 151*   < > 148* 148* 152*   POTASSIUM 4.3   < > 4.3 4.0 4.1   CHLORIDE 115*   < > 112 113* 114*   CO2 25   < > 26 24 27   BUN 34*   < > 32* 30* 37*   CREATININE 1.34   < > 1.34 1.56* 1.75*   MAGNESIUM 2.4  --  2.3  --  2.3   PHOSPHORUS 3.4  --  3.3  --  3.3   CALCIUM 9.8   < > 9.6 8.9 9.4    < > = values in this interval not displayed.     Recent Labs     01/08/24  0335 01/08/24  1447 01/08/24  1855 01/09/24  0045 01/09/24  0205 01/09/24  1620 01/10/24  0426   ALTSGPT 134*  --   --   --  228* 152*  --    ASTSGOT 55*  --   --   --  113* 51*  --    ALKPHOSPHAT 117*  --   --   --  142* 130*  --    TBILIRUBIN 0.7  --   --   --  0.8 0.9  --    DBILIRUBIN  --   --   --   --  0.3  --   --    PREALBUMIN  --  15.8*  --   --   --   --   --    GLUCOSE 148*  --    < > 191*  --  195* 193*    < > = values in this interval not displayed.      Recent Labs     01/08/24  0335 01/08/24  1855 01/09/24  0205 01/09/24  1620 01/10/24  0426   WBC 16.3* 23.5* 23.3* 24.7* 21.7*   NEUTSPOLYS  --  87.10*  --  87.20* 83.40*   LYMPHOCYTES  --  3.10*  --  2.60* 4.90*   MONOCYTES  --  8.70  --  8.30 8.80   EOSINOPHILS  --  0.00  --  0.00 0.30   BASOPHILS  --  0.20  --  0.20 0.20   ASTSGOT 55*  --  113* 51*  --    ALTSGPT 134*  --  228* 152*  --    ALKPHOSPHAT 117*  --  142* 130*  --    TBILIRUBIN 0.7  --  0.8 0.9  --      Recent Labs     01/09/24  0205 01/09/24  1620 01/10/24  0426   RBC 4.46* 4.17* 4.08*   HEMOGLOBIN 13.7* 12.8* 12.3*   HEMATOCRIT 43.0 41.1* 38.7*   PLATELETCT 329 305 295       Imaging  MRI:   Reviewed    Assessment/Plan  * Aspiration pneumonia (HCC)- (present on admission)  Assessment & Plan  Ongoing significant aspiration  S/p Bronchoscopy on 1/9  Head of bed > 30  Ongoing speech eval  Zosyn    Shock (HCC)  Assessment & Plan  Shock state resolving  Maintain intravascular euvolemia  Titrate norepinephrine for map > 65      A-fib (HCC)  Assessment & Plan  Rate controlled  LVH on 12/27 echo with diastolic dysfunction  Will discuss risk with anticoagulation with neurosurgery/neurology or need for watchman pending response  Continuous telemetry    Encephalopathy  Assessment & Plan  Toxic metabolic colopathy.  Serial neurologic exams.  Hypercapnia resolved.  MRI does not reveal signs of anoxic brain injury or brainstem infarct that would explain a locked-in type syndrome.        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  Daily ABC trials      Acute kidney failure (HCC)- (present on admission)  Assessment & Plan  Maintain euvolemia and monitor fluid responsiveness (avoid NaCL and renal congestion)  MAP > 65 uses pressors or inotropic trial  Monitor volume status, electrolytes, urine output.  Avoid nephrotoxic agents.           VTE:  Contraindicated  Ulcer: H2 Antagonist  Lines: Central Line  Ongoing indication addressed    I have performed a physical exam and reviewed and updated ROS and Plan today (1/10/2024). In review of yesterday's note (1/9/2024), there are no changes except as documented above.     Discussed patient condition and risk of morbidity and/or mortality with RN, RT, and Pharmacy      The patient remains critically ill.  I have assessed and reassessed the respiratory status and made ventilator adjustments based upon arterial blood gas analysis, ventilator waveforms and airway mechanics.  I have assessed and reassessed the blood pressure, hemodynamics, cardiovascular status. This patient remains at high risk for worsening cardiopulmonary dysfunction and death without the above critical care interventions.    Critical care time = 40 minutes in directly providing and coordinating critical care and extensive data review.  No time overlap and excludes procedures.

## 2024-01-10 NOTE — DIETARY
"Nutrition Services Brief Update:    Problem: Nutritional:  Goal: Nutrition support tolerated and meeting greater than 85% of estimated needs  Outcome: Not Progressing    Pt transferred to ICU after Rapid response for deteriorating, PEA arrest and CPR, emergent intubation. TF is on hold >24 hours. GOC discussions addressed with palliative care, notes full code. IRIS feeding tube has been replaced multple times, observed in this am. RN reports bloody output. Levo has been paused.    Assessment:   Weight: 87.4 kg via bed scale.   Height: 5'10\" (177.8 cm), BMI: 27.65 (overweight)    Calculation/Equation: REE per MSJ x 1.2 = 1934 kcals  RMR per PSU (VE: 8.8 L/min and Tmax: 36.4 C) = 1684  Calories/day: 1650 - 1950 (19 - 22 kcals/kg)  Grams Protein/day: 70 - 87 (0.8 - 1.0 gm/kg)    Skin: DTI to mid buttocks, L buttocks abrasion. No pressure wounds or advanced wound care needs per wound team on 1/8. No edema noted.   Labs: Na 152, glucose 193, BUN 37, Creat 1.75, GFR 40, Ammonia 48  Meds: pepcid, SSI, senna, anti-emetics prn, bowel protocol, D5 1/2 NS IV  Last BM: 1/4    Restart nutrition support when medically stable and appropriate. Pt with elevated blood glucose in 190s and renal function worsening, will adjust to renal concentrated formula with CHO-control. When appropriate to restart TF. Start Nepro at 25 ml/hr and advance per protocol to goal rate of 40 ml/hr to provide 1728 kcals, 78 gm protein, and 698 ml free water per day. Fluids per MD.     RD following.     "

## 2024-01-10 NOTE — ASSESSMENT & PLAN NOTE
Toxic metabolic + ICU hypoactive delirium    2/3 improved mentation but somnolence - amantadine 100 mg qd

## 2024-01-10 NOTE — PROCEDURES
Date: 1/9/2024    Procedure: Bronchoscopy with Therapeutic suctioning and BAL    Indication: Infiltrate/atlectasis    Physician:  Donnie Babin M.D.    Consent:  Emergent    Procedure:  A time out occurred with the patient name, medical record number, allergies, and consent with right procedure and indication with bedside nurse and if able the patient. The patient was connected to a monitor and had continuous pulse oximeter. The patient was sedated with versed and etomidate. The bronchoscope was insert down the left and right bronchi to the terminal bronchioles. Therapeutic suction of secretion was provided right lower right upper and right middle lobes with milky brown fluid and purulent secretion. He also had significant secretions suction from left lower lobe and lingula with brown milkly purulent secretions. A BAL was preformed in the right lower lobe. The patient tolerated the procedure without any immediate complications with minimal <5ml of blood loss.     Findings bilateral significant aspiration pneumonia in all dependent lobes suction clear, some friability noted.     Donnie Babin MD  Critical Care Medicine

## 2024-01-10 NOTE — ASSESSMENT & PLAN NOTE
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

## 2024-01-10 NOTE — ASSESSMENT & PLAN NOTE
Intubated date: 1/9/2024-1/11, 1/13-1/20, 1/25-2/1  Recurrent aspiration    HFNC for SpO2 > 90%  Pulmonary hygiene   Mucolytics  Empiric aspiration PNA coverage started 2/2    Remains at risk for aspiration and reintubation - ongoing goals of care, daughter wants him to remain full code

## 2024-01-10 NOTE — PROCEDURES
[facility]    Continuous Video Electroencephalogram Report          Patient Name: Lewis Mohr  MRN: [id number]  #: [id number]  Date of Service: 18:03 on 1/9/2024 to 08:52 on 01/10/24  Total Recording Time: 14 hours and 45 minutes. There was a pause in recording lasting 1 hour and 15 minutes.  Referring Provider: Manuel Antoine M.D.    INDICATION:  Lewis Mohr 78 y.o. male presenting with seizure(s) and altered mental status    CURRENT ANTI-SEIZURE AND OTHER PERTINENT MEDICATIONS:     Current Facility-Administered Medications:     Respiratory Therapy Consult    famotidine **OR** famotidine    senna-docusate **AND** polyethylene glycol/lytes **AND** magnesium hydroxide **AND** bisacodyl    MD Alert...Adult ICU Electrolyte Replacement per Pharmacy    lidocaine    dexmedetomidine (Precedex) infusion    fentaNYL **OR** fentaNYL **OR** fentaNYL    NORepinephrine    LR    [COMPLETED] piperacillin-tazobactam **AND** piperacillin-tazobactam    Pharmacy    haloperidol lactate      TECHNIQUE: CVEEG was set up by a Neurodiagnostic technologist who performed education to the patient and staff. A minimum of 23 electrodes and 23 channel recording was setup and performed by Neurodiagnostic technologist, in accordance with the international 10-20 system. Impedence, electrode integrity, and technical impressions were documented a minimum of every 2-24 hour period by a Neurodiagnostic Technologist and reviewed by Interpreting Physician. The study was reviewed in bipolar and referential montages. The recording examined the patient in the drowsy/sleep and/or comatose state(s).     DESCRIPTION OF THE RECORD:  EEG background: The background was continuous/at times with discontinuity, symmetrical, and was absent of a posterior dominant rhythm, with a mixture of delta and theta frequencies, with abundant triphasic waveforms . Reactivity was minimally present. Spontaneous variability was  was minimally present. State changes were were not  seen.    N2 sleep architecture was absent.    ACTIVATION PROCEDURES:   Intermittent Photic stimulation was performed in a stepwise fashion from 1 to 30 Hz and did not elicited any additional abnormalities on EEG.     ICTAL AND INTERICTAL FINDINGS:   There were frequent, bilateral, frontally dominant, triphasic waveforms, with AP lag.  These became much more prominent with stimulation, occurring at 1 per 1 to 2 seconds to 1.5/second, and with stimulation frequently assumed somewhat sharply contoured morphology.    No persistent regional slowing was seen during this study.      No electroclinical or electrographic seizures were reported or recorded during the study.     EKG: Sampling of the EKG recording did not demonstrate any abnormalities    EVENTS:  No clinical events recorded or reported    INTERPRETATION:  This was an abnormal video EEG recording in the drowsy/sleep and/or comatose state(s):  Diffuse slowing of the background, suggestive of diffuse and/or multifocal cerebral dysfunction.  This finding may be seen in a myriad of encephalopathies and in itself is a nonspecific finding.  The presence of frequent, bilateral, frontally dominant, periodic, triphasic waveforms, with AP lag, though in itself is a nonspecific finding, is frequently seen in context of toxic-metabolic etiology of encephalopathies.  The fact that these are more much more prominent with stimulation further supports this assessment.  The fact that these triphasic waveforms at times assumed somewhat sharply contoured morphology is of unclear significance at this time, but might point toward increased propensity toward seizures.  There were no electrographic seizures captured.    Nolan Jauregui MD  Neurology Attending, Epilepsy Program  [facility]

## 2024-01-10 NOTE — ASSESSMENT & PLAN NOTE
Ischemic ATN   Resolved    Strict I/Os  Renally dosed medications  Avoid nephrotoxic agents as able  Trend

## 2024-01-10 NOTE — PROGRESS NOTES
Hospital Medicine Daily Progress Note    Date of Service  1/9/2024    Chief Complaint  Lewis Mohr is a 78 y.o. male admitted 1/4/2024 with confusion and hypoglycemia     Hospital Course  78 y.o. male who presented 1/4/2024 with confusion and low oxygen levels.  Mr. Mohr is resubmitted to this hospital due to a fall with cerebellar hemorrhage underwent craniotomy by Dr. Patten neurosurgery on 12/27/2023.  He was subsequently discharged to a local skilled nursing facility, at [facility] on 1/2/2024. 1/3 he came back because of slurred speech and a CT of the head was done which was unremarkable for acute processes and he was discharged back to [facility].  Paramedics were called again 1/4 because he was found to be unresponsive with hypoxia.      In the emergency room he has oxygen saturations down to 80% on room air and he is hypotensive.  A CT scan of the head again is unchanged for acute processes and chest x-ray reveals possible pulmonary vascular congestion and hypoinflation.  His white blood cell count is 20,000 and he has acute kidney injury with a creatinine of 1.6.  Viral panel was negative.  Nurse notes significant coughing with sputum and he has unable to swallow safely without excessive coughing.      S/p FEES, noted silent aspiration. S/p NG placement 1/6. NG pulled out by patient on 1/7    Interval Problem Update  Failed multiple attempts of NG placement 1/5. NG replaced on 1/6, patient pulled out 1/7. Rep-place NG on 1/7, then patient pulled it out on 1/8, then replaced again  I have reviewed CXR  Palliative care consulted   Carin 151, ordered iv D5W and free water flush  Monitoring Na level  Continue iv Unasyn  Still on restraints.     Addendum: noted worsening O2 demand. Currently on 7L O2 with gurgle noted. , RR 24. I have ordered BNP, procalcitonin, ABG, Lactic acid. Check CTA chest. Transfer to tele. Discussed with RN.   Carin 152, continue D5W and free water flushes,        1/9 patient is new to me  today, I have evaluated patient earlier this morning, patient was able to track with his eyes, patient was tolerating oxygen, patient was on Unasyn but due to his elevated white blood cell count I have switched him to IV Zosyn, palliative care was consulted and plan was to discuss case with patient's family today.  Later today I was called to bedside since patient had weak on more lethargic and her blood pressure was low  90/56, I have ordered IV bolus, came to bedside, I have discussed with bedside nurse, RRT staff, I have ordered stat CT head, CTA head and neck CT perfusion, ABG, discussed with critical care, discussed with neurology, patient went down to CT, patient was evaluated by critical care before CT and patient was intubated after reviewing ABGs, patient require CPR shortly after intubation patient was able to recover p.o., patient had CT and I have reviewed along with neurology no acute findings, will require MRI of the brain, I have been discussing with patient's daughter over the phone multiple times, patient was transferred to ICU, patient has been started on pressors, at this time care is being transferred to critical care team.                  I have discussed this patient's plan of care and discharge plan at IDT rounds today with Case Management, Nursing, Nursing leadership, and other members of the IDT team.    Consultants/Specialty  na    Code Status  Full Code    Disposition  The patient is not medically cleared for discharge to home or a post-acute facility.      I have placed the appropriate orders for post-discharge needs.    Review of Systems  Review of Systems   Unable to perform ROS: Mental acuity        Physical Exam  Temp:  [35.6 °C (96 °F)-37.4 °C (99.4 °F)] 35.6 °C (96 °F)  Pulse:  [] 127  Resp:  [8-27] 18  BP: ()/() 158/104  SpO2:  [84 %-100 %] 99 %    Physical Exam  Vitals and nursing note reviewed.   Constitutional:       General: He is in acute distress.       Appearance: He is toxic-appearing.   HENT:      Head: Normocephalic and atraumatic.      Comments: Staples on the right posterior scalp         Mouth/Throat:      Pharynx: Oropharynx is clear.   Eyes:      General: No scleral icterus.     Comments: Pupils are fixed and minimally responded to light   Neck:      Vascular: No carotid bruit.   Cardiovascular:      Rate and Rhythm: Normal rate and regular rhythm.   Pulmonary:      Effort: Pulmonary effort is normal.      Breath sounds: Rhonchi and rales present.      Comments:   Decreased breath sounds   Abdominal:      General: Abdomen is flat. Bowel sounds are normal. There is no distension.      Palpations: Abdomen is soft. There is no mass.   Musculoskeletal:         General: Normal range of motion.      Cervical back: Normal range of motion and neck supple.   Skin:     General: Skin is warm and dry.   Neurological:      Comments: Very lethargic   Psychiatric:      Comments: Unable to assess         Fluids    Intake/Output Summary (Last 24 hours) at 1/9/2024 1726  Last data filed at 1/9/2024 1700  Gross per 24 hour   Intake 400 ml   Output 1750 ml   Net -1350 ml         Laboratory  Recent Labs     01/08/24  1855 01/09/24  0205 01/09/24  1620   WBC 23.5* 23.3* 24.7*   RBC 4.53* 4.46* 4.17*   HEMOGLOBIN 14.1 13.7* 12.8*   HEMATOCRIT 43.8 43.0 41.1*   MCV 96.7 96.4 98.6*   MCH 31.1 30.7 30.7   MCHC 32.2* 31.9* 31.1*   RDW 48.4 48.9 50.2*   PLATELETCT 333 329 305   MPV 10.9 11.0 10.8       Recent Labs     01/08/24  1855 01/09/24  0045 01/09/24  1620   SODIUM 148* 148* 148*   POTASSIUM 4.3 4.3 4.0   CHLORIDE 113* 112 113*   CO2 24 26 24   GLUCOSE 156* 191* 195*   BUN 31* 32* 30*   CREATININE 1.13 1.34 1.56*   CALCIUM 9.8 9.6 8.9                     Imaging  DX-CHEST-PORTABLE (1 VIEW)   Final Result      CT-CTA NECK WITH & W/O-POST PROCESSING   Final Result      1.  No acute arterial abnormality detected. No significant arterial stenosis.      CT-CTA HEAD WITH & W/O-POST  PROCESS   Final Result      CT angiogram of the [place] within normal limits.      CT-CEREBRAL PERFUSION ANALYSIS   Final Result      1.  Cerebral blood flow less than 30% likely representing completed infarct = 0 mL.      2.  T Max more than 6 seconds likely representing combination of completed infarct and ischemia = 0 mL.      3.  Mismatched volume likely representing ischemic brain/penumbra = None      4.  Please note that the cerebral perfusion was performed on the limited brain tissue around the basal ganglia region. Infarct/ischemia outside the CT perfusion sections can be missed in this study.      CT-HEAD W/O   Final Result      1.  No evidence of acute territorial infarct, intracranial hemorrhage or mass lesion.   2.  Mild diffuse cerebral substance loss.   3.  Mild microangiopathic ischemic change versus demyelination or gliosis.   4.  Status post right suboccipital cranioplasty with underlying cerebellar hypoattenuation likely on the basis of evolving encephalomalacia.         DX-CHEST-PORTABLE (1 VIEW)   Final Result      1.  Intubation.   2.  Bilateral pulmonary infiltrates.      DX-CHEST-LIMITED (1 VIEW)   Final Result      DX-ABDOMEN FOR TUBE PLACEMENT   Final Result      Enteric feeding tube terminates with the tip projecting over the expected location of the distal stomach.      CT-CTA CHEST PULMONARY ARTERY W/ RECONS   Final Result         1.  No pulmonary embolus appreciated.   2.  Consolidations in bilateral lung bases an scattered hazy right pulmonary opacities, compatible with atelectasis with component of infiltrate.   3.  Indeterminate left adrenal nodule, follow-up adrenal protocol CT for further characterization as clinically appropriate.   4.  Atherosclerosis and atherosclerotic coronary artery disease.      DX-ABDOMEN FOR TUBE PLACEMENT   Final Result         1.  Nonspecific bowel gas pattern in the upper abdomen.   2.  Cardiomegaly   3.  Atherosclerosis   4.  Bibasilar  atelectasis   5.  Nasogastric tube tip terminates overlying the expected location of the pylorus or first duodenal segment.      DX-ABDOMEN FOR TUBE PLACEMENT   Final Result         1.  Nonspecific bowel gas pattern in the upper abdomen.   2.  Nasogastric tube tip terminates overlying the expected location of the gastric antrum.   3.  Linear densities the bilateral lung bases favor atelectasis, component of infiltrate not excluded.      DX-ABDOMEN FOR TUBE PLACEMENT   Final Result      NG tube extends below the diaphragm with tip at the level of the gastric fundus. Side port is at the level of the distal esophagus.      DX-ABDOMEN FOR TUBE PLACEMENT   Final Result      NG tube is noted with tip projecting over the right lower lung.      These findings were transmitted with GLEN ZELAYA on 01/07/2024. Tube was subsequently adjusted and is now present below the diaphragm.      DX-ABDOMEN FOR TUBE PLACEMENT   Final Result      DHT tip overlies the second portion of the duodenum      DX-ABDOMEN FOR TUBE PLACEMENT   Final Result      Feeding tube tip projects in the distal stomach or first portion of the duodenum.      DX-ABDOMEN FOR TUBE PLACEMENT   Final Result      Removal of nasogastric tube.      Feeding tube tip projects in the distal stomach.      No visualized thorax.      DX-ABDOMEN FOR TUBE PLACEMENT   Final Result         Gastric drainage tube with tip projecting over the expected area of the right lower lobe airway. Recommend removal.            CRITICAL RESULT READ BACK: Preliminary findings discussed with and critical read back performed by Dr. GLEN ZELAYA via telephone on 1/5/2024 6:22 PM      DX-ABDOMEN FOR TUBE PLACEMENT   Final Result      1.  Malpositioned enteric sump catheter which is folded back upon itself over the left paramedian distal one third mediastinum probably beyond the left mainstem bronchus and within the esophagus.   2.  A Voalte message was sent to GLEN ZELAYA at 1641 hours.  Immediate response received.   3.  Per discussion, the patient has already pulled out the tube.      DX-CHEST-PORTABLE (1 VIEW)   Final Result      1.  Hypoinflation and pulmonary vascular congestion with mild bibasilar atelectasis.   2.  No lobar pneumonia or overt pulmonary edema.      CT-HEAD W/O   Final Result      1.  Diffuse atrophy and white matter changes.   2.  No acute intracranial hemorrhage or territorial infarct.   3.  RIGHT occipital craniotomy with underlying cerebellar encephalomalacia.         MR-BRAIN-W/O    (Results Pending)        Assessment/Plan  * Aspiration pneumonia (HCC)- (present on admission)  Assessment & Plan  Is a clinical diagnosis in setting of leukocytosis with clear aspiration here in the emergency department.   Speech evaluation, FEES: silent aspiration with thin liquid, thick liquid and apple sauce. Rec tube feeding.   Continue IV Unasyn  I called and spoke to daughter Jaymie, updated patient's current conditions and treatment plans. Jaymie is in agreement for tube feeding.   Failed multiple attempts of NG placement 1/5. NG replaced on 1/6, patient pulled out 1/7. Re-place NG on 1/7, then patient pulled it out on 1/8, then replaced again  Speech following  Palliative consulted    I have switched Unasyn to Zosyn.  Patient require mechanical ventilation s/p intubation    Hypoxic respiratory failure (HCC)- (present on admission)  Assessment & Plan  Requiring intubation with mechanical ventilation  Critical care consulted  Transfer to ICU    Encephalopathy  Assessment & Plan  Recent hx of brain bleeding s/p surgery 12/27  Noted worsening encephalopathy. Likely metabolic encephalopathy due to aspiration pneumonia, UTI.  CT head unremarkable  Check TSH, ammonia. Follow blood and urine culture  Cont iv Unasyn      Worsening mental status and patient with decreased responsiveness.  I came to bedside patient with fixed pupils, decreased gag reflex, discussed with RRT staff charge nurse bedside  nurse I have ordered a stat CT head CTA head and neck CT perfusion, neurology consulted discussed with critical care.    Advance care planning  Assessment & Plan  I have discussed goals of care with patient's daughter Jennifer, I discussed with her regarding medical condition, worsening mental status worsening respiratory failure, discussed that patient will require mechanical ventilation with intubation, that he might require CPR, and transferred to ICU, at this time patient's daughter asked for more time to discuss with rest of the family, later she called back stating that patient needs to continue to be full code and they were okay with intubation with mechanical ventilation, and the will evaluate in the next few days depending on patient's improvement or lack of improvement.  Spent 18 minutes discussing with patient's daughter regarding ACP.    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 Patten  CT head in the ER does not reveal any acute processes    Repeat CT no acute findings no acute bleeding    Acute respiratory failure with hypoxia (HCC)- (present on admission)  Assessment & Plan  Not required oxygen in the past  Viral panel is negative  This is consistent with aspiration pneumonitis given the sputum production in the emergency room as well as dysphagia and coarse breath sounds.  FEES noted silent aspiration  Last admission he had a ejection fraction 62% with grade 2 diastolic  dysfunction      Patient with worsening respiratory failure today ABG ordered stat showed acute respiratory failure, I discussed with critical care patient require intubation.    Acute kidney failure (HCC)- (present on admission)  Assessment & Plan  His baseline creatinine a few days ago was 0.9 now is 1.6 likely secondary to dehydration.   IVF  Continue monitoring  On fernando   Continue monitoring BMP in a.m.         VTE prophylaxis: SCD (recent brain bleeds)    I have performed a physical exam and reviewed and updated ROS and Plan today (1/9/2024). In review of yesterday's note (1/8/2024), there are no changes except as documented above.    Total time of 75 minutes spent prepping to see patient (e.g. reviewing  tests/imaging results, notes from consultants, bedside nurse, night shift ) obtaining and/or reviewing separately obtained history. Performing a medically appropriate examination and evaluation.  Counseling and educating the patient.  Ordering medications, tests, or procedures.  Referring and communicating with other health care professionals.  Documenting clinical information in EPIC.  Independently interpreting results and communicating results to patient.  Care coordination.    ACP time is not included above, please see A/P.

## 2024-01-10 NOTE — PROCEDURES
Central Line Insertion    Date/Time: 1/9/2024 4:20 PM    Performed by: Donnie Babin M.D.  Authorized by: Donnie Babin M.D.    Consent:     Consent obtained:  Emergent situation    Risks discussed:  Arterial puncture, bleeding, infection and pneumothorax    Alternatives discussed:  No treatment  Pre-procedure details:     Skin preparation:  2% chlorhexidine  Sedation:     Sedation type:  None  Anesthesia:     Anesthesia method:  None  Procedure details:     Location:  R internal jugular    Patient position:  Flat    Procedural supplies:  Triple lumen    Catheter size:  7.5 Fr    Landmarks identified: yes      Ultrasound guidance: yes      Sterile ultrasound techniques: Sterile gel and sterile probe covers were used      Number of attempts:  1    Successful placement: yes    Post-procedure details:     Post-procedure:  Dressing applied    Guidewire: guidewire removal confirmed      Assessment:  Blood return through all ports    Patient tolerance of procedure:  Tolerated well, no immediate complications  Comments:      US guided right IJ for shock on norepinephrine gtt

## 2024-01-11 PROBLEM — R57.9 SHOCK (HCC): Status: RESOLVED | Noted: 2024-01-01 | Resolved: 2024-01-01

## 2024-01-11 NOTE — PROGRESS NOTES
Neurosurgery Progress Note    Subjective:  Sedated/intubated- meds on hold per nursing    Exam:  EO to voice with stim  Shakes head no when asked to follow commands- questionable move BLE to command  FLETCHER to pain/  CDI    BP  Min: 101/59  Max: 164/89  Pulse  Av  Min: 65  Max: 81  Resp  Av.5  Min: 11  Max: 23  Temp  Av.1 °C (96.9 °F)  Min: 35.9 °C (96.6 °F)  Max: 36.4 °C (97.5 °F)  SpO2  Av.5 %  Min: 91 %  Max: 100 %    No data recorded    Recent Labs     24  1620 01/10/24  04224  0513   WBC 24.7* 21.7* 17.3*   RBC 4.17* 4.08* 3.75*   HEMOGLOBIN 12.8* 12.3* 11.6*   HEMATOCRIT 41.1* 38.7* 35.5*   MCV 98.6* 94.9 94.7   MCH 30.7 30.1 30.9   MCHC 31.1* 31.8* 32.7   RDW 50.2* 48.2 47.8   PLATELETCT 305 295 243   MPV 10.8 11.3 11.1     Recent Labs     01/09/24  1620 01/10/24  04224  0513   SODIUM 148* 152* 149*   POTASSIUM 4.0 4.1 3.5*   CHLORIDE 113* 114* 116*   CO2 24 27 25   GLUCOSE 195* 193* 232*   BUN 30* 37* 34*   CREATININE 1.56* 1.75* 1.56*   CALCIUM 8.9 9.4 9.1               Intake/Output                         01/10/24 0700 - 24 0659 24 - 24 0659      Total  Total                 Intake    I.V.  1163  1232.9 2395.9  --  -- --    Precedex Volume 0 39.6 39.6 -- -- --    Norepinephrine Volume 7.4 -- 7.4 -- -- --    Volume (mL) (lactated ringers infusion) 293.7 -- 293.7 -- -- --    Volume (mL) (D5 1/2 NS infusion) 862 1193.3 2055.3 -- -- --    Other  150  120 270  --  -- --    Medications (PO/Enteral Liquids) 150 120 270 -- -- --    NG/GT  30  200 230  --  -- --    Intake (mL) (Enteral Tube 24 Nasogastric 12 Fr. Right nare) 30 200 230 -- -- --    IV Piggyback  172.4  120.9 293.3  --  -- --    Volume (mL) (piperacillin-tazobactam (Zosyn) 3.375 g in  mL IVPB) 172.4 120.9 293.3 -- -- --    Enteral  --  90 90  --  -- --    Free Water / Tube Flush -- 90 90 -- -- --    Total Intake 1515.5 1763.8 3279.3 -- --  --       Output    Urine  910  700 1610  --  -- --    Output (mL) (Urethral Catheter Non-latex 16 Fr.)  -- -- --    Stool  --  -- --  --  -- --    Number of Times Stooled 1 x 1 x 2 x -- -- --    Total Output  -- -- --       Net I/O     605.5 1063.8 1669.3 -- -- --              Intake/Output Summary (Last 24 hours) at 1/11/2024 0911  Last data filed at 1/11/2024 0600  Gross per 24 hour   Intake 3086.69 ml   Output 1510 ml   Net 1576.69 ml             potassium phosphate 15 mmol in  mL ivpb  15 mmol Once    glycopyrrolate  1 mg TID    insulin regular  1-6 Units Q6HRS    And    dextrose bolus  25 g Q15 MIN PRN    apixaban  5 mg BID    Respiratory Therapy Consult   Continuous RT    senna-docusate  2 Tablet BID    And    polyethylene glycol/lytes  1 Packet QDAY PRN    And    magnesium hydroxide  30 mL QDAY PRN    And    bisacodyl  10 mg QDAY PRN    MD Alert...Adult ICU Electrolyte Replacement per Pharmacy   PHARMACY TO DOSE    dexmedetomidine (Precedex) infusion  0-1.5 mcg/kg/hr (Ideal) Continuous    fentaNYL  25 mcg Q HOUR PRN    Or    fentaNYL  50 mcg Q HOUR PRN    Or    fentaNYL  100 mcg Q HOUR PRN    NORepinephrine  0-1 mcg/kg/min (Ideal) Continuous    piperacillin-tazobactam  3.375 g Q8HRS    Pharmacy  1 Each PHARMACY TO DOSE    haloperidol lactate  5 mg Q4HRS PRN       Assessment and Plan:  Hospital day #3  Crani on 12/27  Prophylactic anticoagulation: yes         Start date/time: 1/10    CT- stable  MRI- punctate infarct  Discussed anticoagulation with Dr Milton Falk as per neurology rec- ok

## 2024-01-11 NOTE — CARE PLAN
The patient is Watcher - Medium risk of patient condition declining or worsening    Shift Goals  Clinical Goals: Hemodynamic stability, oxygen stability  Patient Goals: DULCE MARIA  Family Goals: DULCE MARIA    Progress made toward(s) clinical / shift goals:  Extubated, PRN's for hemodynamic status initiated and utilized    Problem: Respiratory  Goal: Patient will achieve/maintain optimum respiratory ventilation and gas exchange  Outcome: Progressing  Note: SAT/SBT  PATIENT EXTUBATED     Problem: Mechanical Ventilation  Goal: Successful weaning off mechanical ventilator, spontaneously maintains adequate gas exchange  Outcome: Met     Problem: Mechanical Ventilation  Goal: Patient will be able to express needs and understand communication  Outcome: Progressing     Problem: Fall Risk  Goal: Patient will remain free from falls  Outcome: Progressing  Note: Fall risk assessed; appropriate precautions implemented.      Problem: Safety - Medical Restraint  Goal: Remains free of injury from restraints (Restraint for Interference with Medical Device)  Outcome: Progressing  Flowsheets (Taken 1/11/2024 1400)  Addressed this shift: Remains free of injury from restraints (restraint for interference with medical device):   Determine that other, less restrictive measures have been tried or would not be effective before applying the restraint   Evaluate the patient's condition at the time of restraint application   Inform patient/family regarding the reason for restraint   Every 2 hours: Monitor safety, psychosocial status, comfort, nutrition and hydration     Problem: Pain - Standard  Goal: Alleviation of pain or a reduction in pain to the patient’s comfort goal  Outcome: Progressing       Patient is not progressing towards the following goals:      Problem: Safety - Medical Restraint  Goal: Free from restraint(s) (Restraint for Interference with Medical Device)  Outcome: Not Progressing  Flowsheets (Taken 1/11/2024 1400)  Addressed this shift:  Free from restraint(s) (restraint for interference with medical device):   ONCE/SHIFT or MINIMUM Every 12 hours: Assess and document the continuing need for restraints   Every 24 hours: Continued use of restraint requires Licensed Independent Practitioner to perform face to face examination and written order   Identify and implement measures to help patient regain control

## 2024-01-11 NOTE — PROGRESS NOTES
Critical Care Progress Note    Date of admission  1/4/2024    Chief Complaint  78 y.o. male admitted 1/4/2024 with cardiac arrest    Hospital Course  78 y.o. 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 He presented back for AMS and evaluated in ER and was discharged. He presented the next day 1/4/2024 for hypoxia and was admitted to medicine. He was found to have silent aspiration, lauren, urinary retention and hypernatremia and dehydration. He had a an echo 12/29/2023 LVH EF 60% grade II daistolic dysfunction mild MR/TR. He has not had an MRI on the prior admission. He today 1/9/2024 had afib w/ RVR and worsening hypoxia as well as worsening mental status and code stroke was called. His BS was 190. In the CT scanner gurgling with poor airway reflexes. He was intubated and shortly after had PEA arrest. CT head and CTA/P was negative for acute stroke.  He is full code and confirmed prior to this event by daughters.        Interval Problem Update  Reviewed last 24 hour events:              - Tm: Afebrile              - HR: 70s              - SBP: 150-160s              - Neuro: RASS -1 to +2, MAEW.              - GI: NPO              - UOP: 2.4 L              - Schwartz: Yes              - Lines: PIV, CVC              - PPx: H2,Eliquis              - CXR (personally reviewed): Tubes and lines in good position.  Hazy opacities in the right upper and left lower lobes without interval change.  No effusions no pneumothoraces              - Antibiotic Day #3, Zosyn   - SAT/SBT VC 1.3 Nif 25, RSBI 45 able to lift head off pillow, +cuff leak.   - Mobility: level1      Review of Systems  Review of Systems   Unable to perform ROS: Intubated        Vital Signs for last 24 hours   Temp:  [35.9 °C (96.6 °F)-36.2 °C (97.1 °F)] 35.9 °C (96.7 °F)  Pulse:  [65-91] 80  Resp:  [11-39] 27  BP: (106-186)/(59-97) 178/90  SpO2:  [89 %-100 %] 95 %    Hemodynamic  parameters for last 24 hours       Respiratory Information for the last 24 hours  Vent Mode: APVCMV  Rate (breaths/min): 22  Vt Target (mL): 440  PEEP/CPAP: 10  P Support: 8  MAP: 13  Length of Weaning Trial (Hours): 1.5  Control VTE (exp VT): 447      Physical Exam  Constitutional:       Appearance: He is ill-appearing.   HENT:      Head: Normocephalic.      Comments: Ecchymosis noted over the lateral right orbit     Mouth/Throat:      Mouth: Mucous membranes are moist.   Eyes:      Comments: Pupils are 3 to 4 mm nonreactive, mid position   Cardiovascular:      Rate and Rhythm: Normal rate. Rhythm irregular.      Heart sounds: No murmur heard.     No friction rub. No gallop.   Pulmonary:      Breath sounds: No wheezing or rhonchi.      Comments: Diminished in the bases  Abdominal:      General: There is no distension.      Palpations: Abdomen is soft. There is no mass.      Tenderness: There is no abdominal tenderness.   Musculoskeletal:      Cervical back: Neck supple.      Right lower leg: Edema present.      Left lower leg: Edema present.   Skin:     General: Skin is warm and dry.   Neurological:      Comments: Eyes open spontaneously.  Questions appropriately.  Moves all extremities equally.  He is able to hold his head up off the pillow   Psychiatric:         Mood and Affect: Mood normal.         Behavior: Behavior normal.         Medications  Current Facility-Administered Medications   Medication Dose Route Frequency Provider Last Rate Last Admin    glycopyrrolate (Robinul) tablet 1 mg  1 mg Enteral Tube TID Manuel Antoine M.D.   1 mg at 01/11/24 1005    labetalol (Normodyne/Trandate) injection 20 mg  20 mg Intravenous Q4HRS PRN Manuel Antoine M.D.   20 mg at 01/11/24 1005    ampicillin/sulbactam (Unasyn) 3 g in  mL IVPB  3 g Intravenous Q6HRS Manuel Antoine M.D. 200 mL/hr at 01/11/24 1226 3 g at 01/11/24 1226    insulin regular (HumuLIN R,NovoLIN R) injection  1-6 Units Subcutaneous Q6HRS Kailee SYKES  GOYO GrandaP.R.N.   2 Units at 01/11/24 0517    And    dextrose 10 % BOLUS 25 g  25 g Intravenous Q15 MIN PRN WIN GeorgesRAllieNAllie        apixaban (Eliquis) tablet 5 mg  5 mg Enteral Tube BID Manuel Antoine M.D.   5 mg at 01/11/24 0506    Respiratory Therapy Consult   Nebulization Continuous RT Donnie Babin M.D.        senna-docusate (Pericolace Or Senokot S) 8.6-50 MG per tablet 2 Tablet  2 Tablet Enteral Tube BID Donnie Babin M.D.   2 Tablet at 01/11/24 0506    And    polyethylene glycol/lytes (Miralax) Packet 1 Packet  1 Packet Enteral Tube QDAY PRN Donnie Babin M.D.        And    magnesium hydroxide (Milk Of Magnesia) suspension 30 mL  30 mL Enteral Tube QDAY PRN Donnie Babin M.D.   30 mL at 01/10/24 0520    And    bisacodyl (Dulcolax) suppository 10 mg  10 mg Rectal QDAY PRN Donnie Babin M.D. MD Alert...ICU Electrolyte Replacement per Pharmacy   Other PHARMACY TO DOSE Donnie Babin M.D.        dexmedetomidine (PRECEDEX) 400 mcg/100mL NS premix infusion  0-1.5 mcg/kg/hr (Ideal) Intravenous Continuous Donnie Babin M.D.   Stopped at 01/11/24 0511    fentaNYL (Sublimaze) injection 25 mcg  25 mcg Intravenous Q HOUR PRN Donnie Babin M.D.        Or    fentaNYL (Sublimaze) injection 50 mcg  50 mcg Intravenous Q HOUR PRN Donnie Babin M.D.        Or    fentaNYL (Sublimaze) injection 100 mcg  100 mcg Intravenous Q HOUR PRN Donnie Babin M.D.        norepinephrine (Levophed) 8 mg in 250 mL NS infusion (premix)  0-1 mcg/kg/min (Ideal) Intravenous Continuous Donnie Babin M.D.   Paused at 01/10/24 0945    Pharmacy Consult: Enteral tube insertion - review meds/change route/product selection  1 Each Other PHARMACY TO DOSE Rebecca Holman M.D.        haloperidol lactate (Haldol) injection 5 mg  5 mg Intravenous Q4HRS PRN Agapito Bonilla A.P.R.N.   5 mg at 01/07/24 1639       Fluids    Intake/Output Summary (Last 24 hours) at 1/11/2024 1253  Last data filed at 1/11/2024  1056  Gross per 24 hour   Intake 2713.82 ml   Output 1280 ml   Net 1433.82 ml       Laboratory  Recent Labs     01/08/24  1855 01/09/24  1429 01/09/24  1633 01/09/24  1924 01/10/24  0235 01/11/24  0353 01/11/24  1147   NUPXK71L 7.41 7.23*  --   --   --   --   --    BURQWR976V 41.7* 72.2*  --   --   --   --   --    GYQHU069D 59.0* 63.6*  --   --   --   --   --    UKUX5HXM 90.8* 87.4*  --   --   --   --   --    ARTHCO3 26* 29*  --   --   --   --   --    G2JCAUIHD 6 10L  10.0  --   --   --   --   --    ARTBE 1 0  --   --   --   --   --    ISTATAPH  --   --    < > 7.379* 7.427 7.418 7.389*   ISTATAPCO2  --   --    < > 44.2* 39.2* 40.4* 43.9*   ISTATAPO2  --   --    < > 196* 107* 86 69   ISTATATCO2  --   --    < > 27 27 27 28   TQBROYM2DIB  --   --    < > 100* 98 97 93   ISTATARTHCO3  --   --    < > 26.1* 25.8* 26.1* 26.5*   ISTATARTBE  --   --    < > 1 1 1 1   ISTATTEMP  --   --    < > 98.8 F 98.8 F 96.4 F 96.1 F   ISTATFIO2  --   --    < > 100 40 40  --    ISTATSPEC  --   --    < > Arterial Arterial Arterial Arterial   ISTATAPHTC  --   --    < > 7.377* 7.425 7.436 7.409   USFHAECT3JC  --   --    < > 196* 107* 79 62*    < > = values in this interval not displayed.         Recent Labs     01/09/24  0045 01/09/24  1620 01/10/24  0426 01/11/24  0513   SODIUM 148* 148* 152* 149*   POTASSIUM 4.3 4.0 4.1 3.5*   CHLORIDE 112 113* 114* 116*   CO2 26 24 27 25   BUN 32* 30* 37* 34*   CREATININE 1.34 1.56* 1.75* 1.56*   MAGNESIUM 2.3  --  2.3 2.4   PHOSPHORUS 3.3  --  3.3 2.2*   CALCIUM 9.6 8.9 9.4 9.1     Recent Labs     01/08/24  1447 01/08/24  1855 01/09/24  0205 01/09/24  1620 01/10/24  0426 01/11/24  0513   ALTSGPT  --   --  228* 152*  --   --    ASTSGOT  --   --  113* 51*  --   --    ALKPHOSPHAT  --   --  142* 130*  --   --    TBILIRUBIN  --   --  0.8 0.9  --   --    DBILIRUBIN  --   --  0.3  --   --   --    PREALBUMIN 15.8*  --   --   --   --   --    GLUCOSE  --    < >  --  195* 193* 232*    < > = values in this interval  not displayed.     Recent Labs     01/09/24  0205 01/09/24  1620 01/10/24  0426 01/11/24  0513   WBC 23.3* 24.7* 21.7* 17.3*   NEUTSPOLYS  --  87.20* 83.40* 84.60*   LYMPHOCYTES  --  2.60* 4.90* 5.80*   MONOCYTES  --  8.30 8.80 7.90   EOSINOPHILS  --  0.00 0.30 0.60   BASOPHILS  --  0.20 0.20 0.30   ASTSGOT 113* 51*  --   --    ALTSGPT 228* 152*  --   --    ALKPHOSPHAT 142* 130*  --   --    TBILIRUBIN 0.8 0.9  --   --      Recent Labs     01/09/24  1620 01/10/24  0426 01/11/24  0513   RBC 4.17* 4.08* 3.75*   HEMOGLOBIN 12.8* 12.3* 11.6*   HEMATOCRIT 41.1* 38.7* 35.5*   PLATELETCT 305 295 243       Imaging  MRI:   Reviewed    Assessment/Plan  * Aspiration pneumonia (HCC)- (present on admission)  Assessment & Plan  Ongoing significant aspiration  S/p Bronchoscopy on 1/9  Head of bed > 30  Zosyn    A-fib (HCC)  Assessment & Plan  Rate controlled  LVH on 12/27 echo with diastolic dysfunction  Eliquis   Continuous telemetry    Encephalopathy  Assessment & Plan  Toxic metabolic encephalopathy resolving  Serial neurologic exams.  Hypercapnia resolved.  MRI does not reveal signs of anoxic brain injury or brainstem infarct that would explain a locked-in type syndrome.        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  Daily ABC trials, encouraging.  Proceed with trial of extubation      Acute kidney failure (HCC)- (present on  admission)  Assessment & Plan  Maintain euvolemia and monitor fluid responsiveness (avoid NaCL and renal congestion)  MAP > 65 uses pressors or inotropic trial  Monitor volume status, electrolytes, urine output.  Avoid nephrotoxic agents.           VTE:  NOAC  Ulcer: H2 Antagonist  Lines: Central Line  Ongoing indication addressed    I have performed a physical exam and reviewed and updated ROS and Plan today (1/11/2024). In review of yesterday's note (1/10/2024), there are no changes except as documented above.     Discussed patient condition and risk of morbidity and/or mortality with RN, RT, and Pharmacy      The patient remains critically ill.  I have assessed and reassessed the respiratory status and made ventilator adjustments based upon arterial blood gas analysis, ventilator waveforms and airway mechanics.  I have assessed and reassessed the blood pressure, hemodynamics, cardiovascular status. This patient remains at high risk for worsening cardiopulmonary dysfunction and death without the above critical care interventions.    Critical care time = 55 minutes in directly providing and coordinating critical care and extensive data review.  No time overlap and excludes procedures.

## 2024-01-11 NOTE — THERAPY
Speech Language Therapy Contact Note    Patient Name: Lewis Mohr  Age:  78 y.o., Sex:  male  Medical Record #: [id number]  Today's Date: 1/11/2024    SLP in contact w/ RN about new orders.        01/11/24 0900   Treatment Variance   Reason For Missed Therapy Medical - Other (Please Comment)   Interdisciplinary Plan of Care Collaboration   IDT Collaboration with  Nursing;Other (See Comments)  (EMR)   Collaboration Comments Per RN/EMR, pt extubated this morning at 0840. SLP in contact w/ RN about new orders for CSE. RN to place as appropriate. Thank you.

## 2024-01-11 NOTE — CARE PLAN
The patient is Watcher - Medium risk of patient condition declining or worsening    Shift Goals  Clinical Goals: SBP >90  Patient Goals: DULCE MARIA  Family Goals: DULCE MARIA    Progress made toward(s) clinical / shift goals:  Off Levophed  Problem: Hemodynamics  Goal: Patient's hemodynamics, fluid balance and neurologic status will be stable or improve  Outcome: Progressing     Problem: Safety - Medical Restraint  Goal: Remains free of injury from restraints (Restraint for Interference with Medical Device)  Outcome: Progressing       Patient is not progressing towards the following goals: Remains obtunded      Problem: Knowledge Deficit - Standard  Goal: Patient and family/care givers will demonstrate understanding of plan of care, disease process/condition, diagnostic tests and medications  Outcome: Not Progressing

## 2024-01-11 NOTE — CARE PLAN
Problem: Ventilation  Goal: Ability to achieve and maintain unassisted ventilation or tolerate decreased levels of ventilator support  Description: Target End Date:  4 days     Document on Vent flowsheet    1.  Support and monitor invasive and noninvasive mechanical ventilation  2.  Monitor ventilator weaning response  3.  Perform ventilator associated pneumonia prevention interventions  4.  Manage ventilation therapy by monitoring diagnostic test results  Outcome: Progressing   Vent Day 2  APVCMV: 22,440,10,40%  Spont 1 hr, does not follow commands

## 2024-01-11 NOTE — PROGRESS NOTES
0845 Neurosurg at bedside.   0914 Patient discussed in IDT rounds  [id number] Continued hypertension (see flowsheets, MAR, relative orders). MD notified and aware throughout. Pain assessed throughout. Restarting sedation for RASS goal now per MD at bedside.

## 2024-01-11 NOTE — CARE PLAN
The patient is Watcher - Medium risk of patient condition declining or worsening    Shift Goals  Clinical Goals: SBP >90  Patient Goals: DULCE MARIA  Family Goals: DULCE MARIA    Progress made toward(s) clinical / shift goals:    Problem: Hemodynamics  Goal: Patient's hemodynamics, fluid balance and neurologic status will be stable or improve  Outcome: Progressing     Problem: Respiratory  Goal: Patient will achieve/maintain optimum respiratory ventilation and gas exchange  Outcome: Progressing     Problem: Physical Regulation  Goal: Diagnostic test results will improve  Outcome: Progressing     Problem: Knowledge Deficit - Standard  Goal: Patient and family/care givers will demonstrate understanding of plan of care, disease process/condition, diagnostic tests and medications  Outcome: Progressing     Problem: Skin Integrity  Goal: Skin integrity is maintained or improved  Outcome: Progressing     Problem: Fall Risk  Goal: Patient will remain free from falls  Outcome: Progressing     Problem: Safety - Medical Restraint  Goal: Remains free of injury from restraints (Restraint for Interference with Medical Device)  Outcome: Progressing     Problem: Pain - Standard  Goal: Alleviation of pain or a reduction in pain to the patient’s comfort goal  Outcome: Progressing       Patient is not progressing towards the following goals: N/A    Q2h turns performed to maintain skin integrity. Patient did not have any falls during this shift.

## 2024-01-11 NOTE — WOUND TEAM
[facility]  Inpatient Services  Wound and Skin Care Brief Evaluation    Admission Date: 1/4/2024     Last order of IP CONSULT TO WOUND CARE was found on 1/9/2024 from Hospital Encounter on 1/4/2024     HPI, PMH, SH: Reviewed    Chief Complaint   Patient presents with    ALOC     [facility] stated pt was 'unresponsive' in his WC, pt was placed in his bed and then returned to baseline mentation of A+Ox2, recent cerebellar ICH on 12/26 with R suboccipital craniotomy on 12/27, pt has no complaints on arrival,      Diagnosis: Respiratory failure (HCC) [J96.90]  Hypoxic respiratory failure (HCC) [J96.91]    Unit where seen by Wound Team: R106/00     Wound consult placed regarding Right buttock. Chart and images reviewed. This discussed with bedside RN, Laura. This clinician in to assess patient. Patient obtunded, unable to acknowledge. Patient turned to his right side. Non-selectively debrided with Moist warm washcloth.     No pressure injuries or advanced wound care needs identified. Right buttock discoloration from birthmark.  All areas on sacrum and buttock still blanching at this assessment. Bilateral heels assess, and all blanching throughout. Wound consult completed. No further follow up unless indicated and consulted.                            PREVENTATIVE INTERVENTIONS:    Q shift Navarro - performed per nursing policy  Q shift pressure point assessments - performed per nursing policy    Surface/Positioning  ICU Low Airloss - Currently in Place  Reposition q 2 hours - Currently in Place  TAPs Turning system - Currently in Place    Offloading/Redistribution  Sacral offloading dressing (Silicone dressing) - Currently in Place  Float Heels off Bed with Pillows - Currently in Place           Respiratory  High flow offloading Clip - Currently in Place    Containment/Moisture Prevention    Dri-jocelynn pad - Currently in Place  Purwick/Condom Cath - Currently in Place    Mobilization       Unable to assess

## 2024-01-12 NOTE — CARE PLAN
The patient is Stable - Low risk of patient condition declining or worsening    Shift Goals  Clinical Goals: Hemodynamic Stability, Stable O2 Levels, Stable Neuro  Patient Goals: DULCE MARIA  Family Goals: Family Updates    Progress made toward(s) clinical / shift goals:    Problem: Hemodynamics  Goal: Patient's hemodynamics, fluid balance and neurologic status will be stable or improve  Outcome: Progressing     Problem: Fluid Volume  Goal: Fluid volume balance will be maintained  Outcome: Progressing     Problem: Urinary - Renal Perfusion  Goal: Ability to achieve and maintain adequate renal perfusion and functioning will improve  Outcome: Progressing     Problem: Respiratory  Goal: Patient will achieve/maintain optimum respiratory ventilation and gas exchange  Outcome: Progressing     Problem: Physical Regulation  Goal: Diagnostic test results will improve  Outcome: Progressing  Goal: Signs and symptoms of infection will decrease  Outcome: Progressing     Problem: Knowledge Deficit - Standard  Goal: Patient and family/care givers will demonstrate understanding of plan of care, disease process/condition, diagnostic tests and medications  Outcome: Progressing     Problem: Skin Integrity  Goal: Skin integrity is maintained or improved  Outcome: Progressing     Problem: Fall Risk  Goal: Patient will remain free from falls  Outcome: Progressing     Problem: Safety - Medical Restraint  Goal: Remains free of injury from restraints (Restraint for Interference with Medical Device)  Outcome: Progressing  Goal: Free from restraint(s) (Restraint for Interference with Medical Device)  Outcome: Progressing     Problem: Pain - Standard  Goal: Alleviation of pain or a reduction in pain to the patient’s comfort goal  Outcome: Progressing

## 2024-01-12 NOTE — THERAPY
Speech Language Therapy Contact Note    Patient Name: Lewis Mohr  Age:  78 y.o., Sex:  male  Medical Record #: [id number]  Today's Date: 1/12/2024    Discussed missed therapy with RN.      01/12/24 1055   Treatment Variance   Reason For Missed Therapy Medical - Patient Is Not Medically Stable   Interdisciplinary Plan of Care Collaboration   IDT Collaboration with  Nursing;Other (See Comments)  (EMR)   Collaboration Comments SLP in contact w/ RN about new orders for CSE s/p extubation on 1/11. Per RN, pt w/ unstable respiratory status, concern for re-intubation. Request SLP hold at this time. RN to place new orders for CSE as appropriate. Thank you.

## 2024-01-12 NOTE — CARE PLAN
Problem: Humidified High Flow Nasal Cannula  Goal: Maintain adequate oxygenation dependent on patient condition  Description: Target End Date:  resolve prior to discharge or when underlying condition is resolved/stabilized    1.  Implement humidified high flow oxygen therapy  2.  Titrate high flow oxygen to maintain appropriate SpO2  Outcome: Progressing     Pt tolerating HHFNC well, currently 45L/70%, sat 93-94%. WOB mild.

## 2024-01-12 NOTE — DIETARY
Nutrition Services: Update   Day 8 of admit.  Lewis Mohr is a 78 y.o. male with admitting DX of Hypoxic respiratory failure     Problem: Nutritional:  Goal: Nutrition support tolerated and meeting greater than 85% of estimated needs  Outcome: Met    Pt is receiving TF of Nepro @ goal rate of 40mL/hr    Recommendations/Plan:  Continue TF of Nepro @ 40mL/hr  Fluids per MD  Monitor weight.  Diet advancements as appropriate per SLP    RD following

## 2024-01-12 NOTE — PROGRESS NOTES
Critical Care Progress Note    Date of admission  1/4/2024    Chief Complaint  78 y.o. male admitted 1/4/2024 with cardiac arrest    Hospital Course  78 y.o. 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 He presented back for AMS and evaluated in ER and was discharged. He presented the next day 1/4/2024 for hypoxia and was admitted to medicine. He was found to have silent aspiration, lauren, urinary retention and hypernatremia and dehydration. He had a an echo 12/29/2023 LVH EF 60% grade II daistolic dysfunction mild MR/TR. He has not had an MRI on the prior admission. He today 1/9/2024 had afib w/ RVR and worsening hypoxia as well as worsening mental status and code stroke was called. His BS was 190. In the CT scanner gurgling with poor airway reflexes. He was intubated and shortly after had PEA arrest. CT head and CTA/P was negative for acute stroke.  He is full code and confirmed prior to this event by daughters.      01/11-Trial of extubation. GOC discussion with his daughter Jaymie. We will re-intubate if extubation is not successful.    Interval Problem Update  Reviewed last 24 hour events:   - Requiring HFNC 45L/70% FiO2              - Tm: Afebrile              - HR: 50s              - SBP: 140-150 s              - Neuro: RASS -1 MAEW.              - GI: NPO              - UOP: 2.6 L              - Schwartz: Yes              - Lines: PIV, CVC              - PPx: H2,Eliquis              - CXR (personally reviewed): Tubes and lines in good position.  Increase in bilateral hazy opacification worse on the right than left.  Small left pleural effusion is noted.              - Antibiotic Day #4, Zosyn   - Mobility: level1    Infusions:  Dexmedetomidine      Review of Systems  Review of Systems   Respiratory:  Positive for sputum production. Negative for shortness of breath.    Cardiovascular:  Negative for chest pain.   Gastrointestinal:   Negative for abdominal pain, nausea and vomiting.   Neurological: Negative.    Psychiatric/Behavioral: Negative.     All other systems reviewed and are negative.       Vital Signs for last 24 hours   Temp:  [36.5 °C (97.7 °F)-37 °C (98.6 °F)] 37 °C (98.6 °F)  Pulse:  [56-79] 69  Resp:  [8-54] 54  BP: (134-189)/(68-96) 134/68  SpO2:  [90 %-97 %] 90 %    Hemodynamic parameters for last 24 hours       Respiratory Information for the last 24 hours         Physical Exam  Constitutional:       Appearance: He is ill-appearing.   HENT:      Head: Normocephalic.      Comments: Ecchymosis noted over the lateral right orbit     Mouth/Throat:      Mouth: Mucous membranes are moist.   Eyes:      Comments: Pupils are 3 to 4 mm nonreactive, mid position   Cardiovascular:      Rate and Rhythm: Normal rate. Rhythm irregular.      Heart sounds: No murmur heard.     No friction rub. No gallop.   Pulmonary:      Breath sounds: Rhonchi present. No wheezing.      Comments: Diminished in the bases, no accessory muscle use.  Abdominal:      General: There is no distension.      Palpations: Abdomen is soft. There is no mass.      Tenderness: There is no abdominal tenderness.   Musculoskeletal:      Cervical back: Neck supple.      Right lower leg: Edema present.      Left lower leg: Edema present.   Skin:     General: Skin is warm and dry.   Neurological:      Comments: Somnolent, eyes open spontaneously.  Questions appropriately.  Moves all extremities equally.     Psychiatric:         Mood and Affect: Mood normal.         Behavior: Behavior normal.         Medications  Current Facility-Administered Medications   Medication Dose Route Frequency Provider Last Rate Last Admin    amLODIPine (Norvasc) tablet 5 mg  5 mg Enteral Tube Q DAY Manuel Antoine M.D.   5 mg at 01/12/24 1306    hydrALAZINE (Apresoline) injection 10 mg  10 mg Intravenous Q6HRS PRN Manuel Antoine M.D.   10 mg at 01/12/24 1306    glycopyrrolate (Robinul) tablet 1 mg  1 mg  Enteral Tube TID Manuel Antoine M.D.   1 mg at 01/12/24 1216    labetalol (Normodyne/Trandate) injection 20 mg  20 mg Intravenous Q4HRS PRN Manuel Antoine M.D.   20 mg at 01/12/24 1214    ampicillin/sulbactam (Unasyn) 3 g in  mL IVPB  3 g Intravenous Q6HRS Manuel Antoine M.D.   Stopped at 01/12/24 1245    insulin regular (HumuLIN R,NovoLIN R) injection  1-6 Units Subcutaneous Q6HRS Kailee Granda, A.P.R.N.   1 Units at 01/12/24 1221    And    dextrose 10 % BOLUS 25 g  25 g Intravenous Q15 MIN PRN Kailee Granda, A.P.R.N.        apixaban (Eliquis) tablet 5 mg  5 mg Enteral Tube BID Manuel Antoine M.D.   5 mg at 01/12/24 0519    Respiratory Therapy Consult   Nebulization Continuous RT Donnie Babin M.D.        senna-docusate (Pericolace Or Senokot S) 8.6-50 MG per tablet 2 Tablet  2 Tablet Enteral Tube BID Donnie Babin M.D.   2 Tablet at 01/11/24 0506    And    polyethylene glycol/lytes (Miralax) Packet 1 Packet  1 Packet Enteral Tube QDAY PRN Donnie Babin M.D.        And    magnesium hydroxide (Milk Of Magnesia) suspension 30 mL  30 mL Enteral Tube QDAY PRN Donnie Babin M.D.   30 mL at 01/10/24 0520    And    bisacodyl (Dulcolax) suppository 10 mg  10 mg Rectal QDAY PRN Donnie Babin M.D. MD Alert...ICU Electrolyte Replacement per Pharmacy   Other PHARMACY TO DOSE Donnie Babin M.D.        dexmedetomidine (PRECEDEX) 400 mcg/100mL NS premix infusion  0-1.5 mcg/kg/hr (Ideal) Intravenous Continuous Donnie Babin M.D. 3.7 mL/hr at 01/12/24 1400 0.2 mcg/kg/hr at 01/12/24 1400    Pharmacy Consult: Enteral tube insertion - review meds/change route/product selection  1 Each Other PHARMACY TO DOSE Rebecca Holman M.D.        haloperidol lactate (Haldol) injection 5 mg  5 mg Intravenous Q4HRS PRN GOYO QuintanillaP.RAllieN.   5 mg at 01/07/24 1639       Fluids    Intake/Output Summary (Last 24 hours) at 1/12/2024 1448  Last data filed at 1/12/2024 1400  Gross per 24 hour   Intake  2634.15 ml   Output 2930 ml   Net -295.85 ml       Laboratory  Recent Labs     01/09/24  1924 01/10/24  0235 01/11/24  0353 01/11/24  1147   ISTATAPH 7.379* 7.427 7.418 7.389*   ISTATAPCO2 44.2* 39.2* 40.4* 43.9*   ISTATAPO2 196* 107* 86 69   ISTATATCO2 27 27 27 28   ADOXEUD3BWQ 100* 98 97 93   ISTATARTHCO3 26.1* 25.8* 26.1* 26.5*   ISTATARTBE 1 1 1 1   ISTATTEMP 98.8 F 98.8 F 96.4 F 96.1 F   ISTATFIO2 100 40 40  --    ISTATSPEC Arterial Arterial Arterial Arterial   ISTATAPHTC 7.377* 7.425 7.436 7.409   KZEYQNMR1WY 196* 107* 79 62*         Recent Labs     01/10/24  0426 01/11/24  0513 01/12/24  0440   SODIUM 152* 149* 153*   POTASSIUM 4.1 3.5* 3.6   CHLORIDE 114* 116* 117*   CO2 27 25 27   BUN 37* 34* 30*   CREATININE 1.75* 1.56* 1.39   MAGNESIUM 2.3 2.4 2.5   PHOSPHORUS 3.3 2.2* 3.2   CALCIUM 9.4 9.1 9.3     Recent Labs     01/09/24  1620 01/10/24  0426 01/11/24  0513 01/12/24  0440   ALTSGPT 152*  --   --   --    ASTSGOT 51*  --   --   --    ALKPHOSPHAT 130*  --   --   --    TBILIRUBIN 0.9  --   --   --    GLUCOSE 195* 193* 232* 187*     Recent Labs     01/09/24  1620 01/10/24  0426 01/11/24  0513 01/12/24  0440   WBC 24.7* 21.7* 17.3* 17.2*   NEUTSPOLYS 87.20* 83.40* 84.60* 84.60*   LYMPHOCYTES 2.60* 4.90* 5.80* 5.10*   MONOCYTES 8.30 8.80 7.90 7.50   EOSINOPHILS 0.00 0.30 0.60 1.40   BASOPHILS 0.20 0.20 0.30 0.30   ASTSGOT 51*  --   --   --    ALTSGPT 152*  --   --   --    ALKPHOSPHAT 130*  --   --   --    TBILIRUBIN 0.9  --   --   --      Recent Labs     01/10/24  0426 01/11/24  0513 01/12/24  0440   RBC 4.08* 3.75* 3.71*   HEMOGLOBIN 12.3* 11.6* 11.6*   HEMATOCRIT 38.7* 35.5* 34.9*   PLATELETCT 295 243 245       Imaging  X-Ray:  I have personally reviewed the images and compared with prior images.    Assessment/Plan  * Aspiration pneumonia (HCC)- (present on admission)  Assessment & Plan  Worsening chest x-ray suggests ongoing significant aspiration  S/p Bronchoscopy on 1/9  Head of bed > 30  Zosyn    A-fib  (HCC)  Assessment & Plan  Rate controlled  LVH on 12/27 echo with diastolic dysfunction  Eliquis   Continuous telemetry    Encephalopathy  Assessment & Plan  Toxic metabolic encephalopathy resolving  Serial neurologic exams.  Hypercapnia resolved.  MRI does not reveal signs of anoxic brain injury or brainstem infarct that would explain a locked-in type syndrome.        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      Acute kidney failure (HCC)- (present on admission)  Assessment & Plan  Maintain euvolemia and monitor fluid responsiveness (avoid NaCL and renal congestion)  MAP > 65 uses pressors or inotropic trial  Monitor volume status, electrolytes, urine output.  Avoid nephrotoxic agents.           VTE:  NOAC  Ulcer: H2 Antagonist  Lines: Central Line  Ongoing indication addressed    I have performed a physical exam and reviewed and updated ROS and Plan today (1/12/2024). In review of yesterday's note (1/11/2024), there are no changes except as documented above.     Discussed patient condition and risk of morbidity and/or mortality with RN, RT, and Pharmacy      The patient remains critically ill,  I have assessed and reassessed the blood pressure,  cardiovascular status, labs and response to interventions. This patient remains at high risk for worsening shock and death without the above critical care interventions.      Critical care time = 60 minutes in directly  providing and coordinating critical care and extensive data review.  No time overlap and excludes procedures.

## 2024-01-12 NOTE — DISCHARGE PLANNING
"Case Management Discharge Planning    Admission Date: 1/4/2024  GMLOS: 5.2  ALOS: 8    6-Clicks ADL Score:    6-Clicks Mobility Score:        Anticipated Discharge Disposition: D/T to SNF with Medicare cert in anticipation of skilled care (03)    DME Needed: No    Action(s) Taken: Chart reviewed and case discussed in ICU rounds this morning:  Patient re-admitted to [facility] on 1/4/2024 for aspiration pneumonia, respiratory failure, ERWIN, and hypernatremia.    On 1/9/2024 patient went into A-Fib with RVR, had for worsening hypoxia, and worsening mental status.  He was intubated and subsequently had PEA arrest.    MRI brain showed scattered bilateral punctate infarcts.   Extubated on 1/11/2024, currently on HHFNC 40L/70% FiO2.     Following commands, incomprehensible words.   +Precedex drip/NGT (tolerating feeds)/IV ABX/Q4H neurochecks.     NSGY and Neurology following.       Patient was discharged from [facility] to [facility] on 1/2/2024.       CM called patient's daughter, Jaymie #[phone], who confirmed info from CM assessment completed during last admission:   Prior to recent hospitalization patient lived with a roommate, Varun #[phone], in a 2nd floor apartment w/o elevator access (address:  [address] [place] [address] [address] [place] [id number]) and was independent with ADLs, driving, and ambulating w/o assistive devices.  No DME used.  No Hx of ETOH or drug abuse.   Patient is retired, collecting [employer] benefits (amount not known to Jaymie).   Local support consist of daughter/Jaymie and friends. Patient has another daughter, Kendra Neri #[phone], who lives in [place].     No PCP.     Jaymie agreeable to SNF placement when medically cleared for DC.    Referral already submitted to [facility] & declined with following reason:  \"pt very confused, will need a new referral once pt is medically cleared\".     Escalations Completed: None    Medically Clear: No    Next Steps: CM will f/u on clinical progression & re-submit " SNF referrals when appropriate    Barriers to Discharge: Medical clearance and Pending Placement    Is the patient up for discharge tomorrow: No      Care Transition Team Assessment    Information Source  Orientation Level: Unable to assess  Information Given By: Other (Comments) (Chart review & daughter, Jaymie)  Who is responsible for making decisions for patient? : Adult child  Name(s) of Primary Decision Maker: Jaymie Banda #[phone]    Readmission Evaluation  Is this a readmission?: Yes - unplanned readmission  Did you have enough support after your last discharge?: Yes    Elopement Risk  Legal Hold: No  Ambulatory or Self Mobile in Wheelchair: No-Not an Elopement Risk  Disoriented: Person-At Risk for Elopement, Place-At Risk for Elopement, Time-At Risk for Elopement, Situation-At Risk for Elopement  Psychiatric Symptoms: None  History of Wandering: No  Elopement this Admit: No  Vocalizing Wanting to Leave: No  Displays Behaviors, Body Language Wanting to Leave: No-Not at Risk for Elopement  Elopement Risk: Not at Risk for Elopement  Wanderguard On: No (See Comments)  Personal Belongings: Hospital Clothing Only  Environmental Precautions: Sharp or Dangerous Items Removed  Picture of Patient on Inside Chart Front Cover: No (See Comments)  Purple Armband on Patient: No (See Comments)    Interdisciplinary Discharge Planning  Lives with - Patient's Self Care Capacity: Attendant / Paid Care Giver  Housing / Facility: Skilled Nursing Facility  Prior Services: Continuous (24 Hour) Care Giving Per Service    Discharge Preparedness  What is your plan after discharge?: Skilled nursing facility  What are your discharge supports?: Child, Other (comment) (daughter & friends)  Prior Functional Level: Needs Assist with Activities of Daily Living    Functional Assesment  Prior Functional Level: Needs Assist with Activities of Daily Living    Finances  Financial Barriers to Discharge: No  Prescription Coverage: Yes      Advance Directive  Advance Directive?: None    Domestic Abuse  Have you ever been the victim of abuse or violence?: No    Psychological Assessment  History of Substance Abuse: None  History of Psychiatric Problems: No  Non-compliant with Treatment: No  Newly Diagnosed Illness: Yes    Discharge Risks or Barriers  Discharge risks or barriers?: Complex medical needs  Patient risk factors: Complex medical needs, Readmission    Anticipated Discharge Information  Discharge Disposition: D/T to SNF with Medicare cert in anticipation of skilled care (03)

## 2024-01-13 NOTE — PROGRESS NOTES
Inpatient Palliative Medicine - Follow Up     Lewis Mohr  78 y.o. male  [id number]     Location: [facility]  Pcp Pt States None     Referral Source:   Tj Hoyt M.d.     Reason for palliative medicine consultation and/or visit: ACP           Assessment and Plan:     GOAL(S) OF CARE = LONGEVITY     SYMPTOMS ETIOLOGY/CAUSES = multifactorial encephalopathy secondary to: recent cerebellar hemorrhage, aspiration PNA, UTI, ??dehydration/poor intake/volume depletion     PROGNOSIS = HIGHLY GUARDED, long term recovery and survival not yet assured     CODE STATUS = FULL, unchanged  1/9/2024 later upgraded to ICU and intubated, consistent with wishes expressed by patient's daughter Jaymie  1/11/2024 Tentative plan is to re-intubate again if failing first extubation attempt.   1/12/2024 Jaymie verified current GOC: unchanged. Re-intubate if needed. Patient on HFNC 45-50LPM.         PALLIATIVE CARE TEAM INTERVENTIONS =   ACP  Reconfirmed daughter and family's overarching GOC - unchanged, and consistent with FULL CODE  Reconfirmed with Jaymie about plan to re-intubate if failing initial extubation: unchanged.  Emotional support provided.  Will continue following. Prognosis remains high guarded.           Summary:   Lewis Mohr is a 78 y.o. 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 Patient was reportedly discharged to [facility] for rehab 1/2/2024. However he has since declined, resulting in an ER visit 1/3/2024 and the current readmission starting 1/4/204.     Since readmission patient has been treated for multiple issues: aspiration pneumonia, urinary retention, hypernatremia, ERWIN, etc. Patient has remained encephalopathic however, and dislodged NGT multiple times and remains NPO due to FEES+ silent  "aspiration.  -----------------------------------------------------------------------------------------------------------------------------------------  1/12/2023  Provided brief update to patient's daughter over phone - off vent still on HFNC with slightly increased O2 demand 45==>50LPM. Patient remained encephalopathic and nonverbal, only able to unreliably follow simple commands at times. This remains far below his functional level at the time of his first discharge 1/2/2024 post craniotomy.    Jaymie verified that indeed the current plan remains to re-intubate again should patient fail his first extubation trial. Informed Jaymie that while I am not able to predict ultimate outcome, the creased O2 demand and poor mentation and NPO are all warning signs for potential reintubation. Informed Jaymie that repeated extubation failures would speak for a negative future outcome. Jaymie understands.    Provided emotional support and therapeutic presence. This latest event has been distressing to Jaymie and her family. She still has to work and patient's critical status had caused her a few \"panic attacks\" at times.    Jaymie and her  typically visit after 1630 on weekdays, after work. She plans to visit in person SAT afternoon 12/13/2024, hopefully after roads have been better plowed.        1/9/2023  Placed call to patient's daughter Jaymie and introduced my role and scope of practice. She was amenable to this visit. We reviewed patient's relevant history. According to Jaymie, patient has had a good overall health for decades not on any chronic medication until his last admission for his cerebellar hemorrhage.       Jaymie's initial impression as of patient's official discharge was good. \"Dad was still talking to me over the phone as of Sunday 12/31/23. Patient did gradually deteriorate after initial charge, with more slurred speech and unsuccessful rehab efforts at [facility].      The dysphagia and aphasia issues are relatively to " Jaymie, about for last week. She and her sister wonders if patient's ongoing pneumonia, hyperkalemia, and low hydration/intake negatively affect his reocvery.     Clinical picture still evolving. I advised Jaymie that, if patient's dysphagia and aphasia remain unchanged, then pneumonia and pneumonitis can become recurrent due to continued aspiration... of course, that will also be dependent on if reversible secondary issues may improve patient's current level of function.     Lots of uncertainties... low intake volume, possible dehydration due to net negative I/O since admission, hyperkalemia uncertain if secondary in part to continued low intake and multiple NGT dislodges and feed disruption, etc.     Informed Jaymie that, as multiple secondary and potentially reversible medical issues are also ongoing, I am unable to definitively prognosticate patient's prognosis or chance for definitive recovery at this point. Jaymie was accepting and stressed family's wishes for medical team to address all potentially reversible issues, to try to possibly recover patient's overall function to his status upon his initial discharge, if possible.     For now, Jaymie and her sister and other family members all want to continue aggressive care, consistent with full code status.  Jaymie does not recall any issue weaning patient off ventilator after his craniotomy 12/27/2023 either.        Note patient later developed afib RVR in the evening and was upgraded to ICU and also appropriately intubated for worsening respiratory distress.        Advance care planning:  The patient and/or legal decision maker has provided voluntary consent to discuss advance care planning. We discussed code status.   FULL     Total time spent in ACP discussion 30 minutes, which is separate from the time spent completing the evaluation and management visit.      Thank you for allowing me the opportunity to participate in the care of Lewis Mohr     I spent a total of 60  minutes reviewing medical records, direct face-to-face time with the patient and/or family, documentation and coordination of care. This is separate from the time spent on advance care planning, which is documented above.           JOSH HORNE) DO FILIPE  [facility]   [address] [place]  [address]  P: [phone]  F: [phone]  C: [phone]

## 2024-01-13 NOTE — PROCEDURES
Intubation    Date/Time: 1/13/2024 2:19 PM    Performed by: Manuel Antoine M.D.  Authorized by: Manuel Antoine M.D.    Consent:     Consent obtained:  Verbal and emergent situation    Alternatives discussed:  Delayed treatment  Pre-procedure details:     Patient status:  Awake    Mallampati score:  III    Pretreatment meds: Etomidate.    Paralytics:  Rocuronium  Procedure details:     Preoxygenation: HFNC.    CPR in progress: no      Intubation method:  Oral    Oral intubation technique:  Video-assisted    Laryngoscope type:  GlideScope    Laryngoscope blade:  Mac 3    Tube size (mm):  8.0    Tube type:  Cuffed    Number of attempts:  1    Ventilation between attempts: no      Cricoid pressure: no      Tube visualized through cords: yes    Placement assessment:     ETT to lip:  24    Tube secured with:  ETT lopez    Placement verification: ETCO2 detector and fiberoptic scope      CXR findings:  ETT in proper place  Comments:      Indications: Hypoxia due to inability to manage secretions with resultant aspiration.  Patient was preoxygenated unable to oxygenate greater than 91% SpO2.  Patient desaturated to 86% during the intubation procedure and immediately recovered to 99% once the endotracheal tube was placed and the cuff was inflated.

## 2024-01-13 NOTE — PROGRESS NOTES
Critical Care Progress Note    Date of admission  1/4/2024    Chief Complaint  78 y.o. male admitted 1/4/2024 with cardiac arrest    Hospital Course  78 y.o. 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 He presented back for AMS and evaluated in ER and was discharged. He presented the next day 1/4/2024 for hypoxia and was admitted to medicine. He was found to have silent aspiration, lauren, urinary retention and hypernatremia and dehydration. He had a an echo 12/29/2023 LVH EF 60% grade II daistolic dysfunction mild MR/TR. He has not had an MRI on the prior admission. He today 1/9/2024 had afib w/ RVR and worsening hypoxia as well as worsening mental status and code stroke was called. His BS was 190. In the CT scanner gurgling with poor airway reflexes. He was intubated and shortly after had PEA arrest. CT head and CTA/P was negative for acute stroke.  He is full code and confirmed prior to this event by daughters.      01/11-Trial of extubation. GOC discussion with his daughter Jaymie. We will re-intubate if extubation is not successful.  01/12- Requiring HFNC 45L/70% FiO2    Interval Problem Update  Reviewed last 24 hour events:   - Increasing O2 requirements, on HFNC 60l/100%              - Tm: Afebrile              - HR: 60s              - SBP: 110's (Goal <160mmHg)              - Neuro: RASS -1 MAEW. Somnolent but arousable, Apahsic. Follows commands              - GI: NPO              - UOP: 2.4 L              - Schwartz: Yes              - Lines: PIV, CVC              - PPx: H2,Eliquis              - CXR (personally reviewed): Tubes and lines in good position.  Increase in bilateral hazy opacification worse on the right than left.  Small left pleural effusion is noted.              - Antibiotic Day #9/10, Unasyn   - Mobility: level 2 edge     Infusions:  Dexmedetomidine    Update: At approximately 1420 Mr. Georges became hypoxic and unable  to manage secretions in his posterior pharynx, which required urgent reintubation.  I performed bronchoscopy with bronchioloalveolar lavage.  His airways were obstructed by thick tenacious secretions which have now been removed.      Review of Systems  Review of Systems   Respiratory:  Positive for sputum production. Negative for shortness of breath.    Cardiovascular:  Negative for chest pain.   Gastrointestinal:  Negative for abdominal pain, nausea and vomiting.   Neurological: Negative.    Psychiatric/Behavioral: Negative.     All other systems reviewed and are negative.       Vital Signs for last 24 hours   Temp:  [35.3 °C (95.6 °F)-36.1 °C (97 °F)] 35.8 °C (96.5 °F)  Pulse:  [63-90] 65  Resp:  [13-31] 22  BP: ()/() 150/74  SpO2:  [87 %-99 %] 96 %    Hemodynamic parameters for last 24 hours       Respiratory Information for the last 24 hours  Vent Mode: APVCMV  Rate (breaths/min): 22  Vt Target (mL): 4440  PEEP/CPAP: 12  MAP: 17  Control VTE (exp VT): 446      Physical Exam  Constitutional:       Appearance: He is ill-appearing.   HENT:      Head: Normocephalic.      Comments: Ecchymosis noted over the lateral right orbit     Mouth/Throat:      Mouth: Mucous membranes are moist.   Eyes:      Comments: Pupils are 3 to 4 mm nonreactive, mid position   Cardiovascular:      Rate and Rhythm: Normal rate. Rhythm irregular.      Heart sounds: No murmur heard.     No friction rub. No gallop.   Pulmonary:      Effort: Respiratory distress present.      Breath sounds: Rhonchi present. No wheezing.      Comments: Diminished in the bases, no accessory muscle use.  Abdominal:      General: There is no distension.      Palpations: Abdomen is soft. There is no mass.      Tenderness: There is no abdominal tenderness.   Musculoskeletal:      Cervical back: Neck supple.      Right lower leg: Edema present.      Left lower leg: Edema present.   Skin:     General: Skin is warm and dry.   Neurological:      Comments:  Somnolent, eyes open spontaneously.  Questions appropriately.  Moves all extremities equally.     Psychiatric:         Mood and Affect: Mood normal.         Behavior: Behavior normal.         Medications  Current Facility-Administered Medications   Medication Dose Route Frequency Provider Last Rate Last Admin    Respiratory Therapy Consult   Nebulization Continuous RT Manuel Antoine M.D.        famotidine (Pepcid) tablet 20 mg  20 mg Enteral Tube Q12HRS Manuel Antoine M.D.        senna-docusate (Pericolace Or Senokot S) 8.6-50 MG per tablet 2 Tablet  2 Tablet Enteral Tube BID Manuel Antoine M.D.        And    polyethylene glycol/lytes (Miralax) Packet 1 Packet  1 Packet Enteral Tube QDAY PRN Manuel Antoine M.D.        And    magnesium hydroxide (Milk Of Magnesia) suspension 30 mL  30 mL Enteral Tube QDAY PRN Manuel Antoine M.D.        And    bisacodyl (Dulcolax) suppository 10 mg  10 mg Rectal QDAY PRN Manuel Antoine M.D. MD Alert...ICU Electrolyte Replacement per Pharmacy   Other PHARMACY TO DOSE Manuel Antoine M.D.        lidocaine (Xylocaine) 1 % injection 2 mL  2 mL Tracheal Tube Q30 MIN PRN Manuel Antoine M.D.        fentaNYL (Sublimaze) injection 100 mcg  100 mcg Intravenous Q15 MIN PRN Manuel Antoine M.D.        And    fentaNYL (Sublimaze) injection 200 mcg  200 mcg Intravenous Q15 MIN PRN Manuel Antoine M.D.        And    fentaNYL (SUBLIMAZE) 50 mcg/mL in 50mL (Continuous Infusion)   Intravenous Continuous Manuel Antoine M.D.        And    dexmedetomidine (PRECEDEX) 400 mcg/100mL NS premix infusion  0-1.5 mcg/kg/hr (Ideal) Intravenous Continuous Manuel Antoine M.D.        amLODIPine (Norvasc) tablet 5 mg  5 mg Enteral Tube Q DAY Manuel Antoine M.D.   5 mg at 01/13/24 0518    hydrALAZINE (Apresoline) injection 10 mg  10 mg Intravenous Q6HRS PRN Manuel Antoine M.D.   10 mg at 01/12/24 2306    glycopyrrolate (Robinul) tablet 1 mg  1 mg Enteral Tube TID Manule Antoine M.D.   1 mg at  01/13/24 1225    labetalol (Normodyne/Trandate) injection 20 mg  20 mg Intravenous Q4HRS PRN Manuel Antoine M.D.   20 mg at 01/13/24 1418    ampicillin/sulbactam (Unasyn) 3 g in  mL IVPB  3 g Intravenous Q6HRS Manuel Antoine M.D.   Stopped at 01/13/24 1255    insulin regular (HumuLIN R,NovoLIN R) injection  1-6 Units Subcutaneous Q6HRS Kailee Granda, A.P.R.N.   1 Units at 01/13/24 0713    And    dextrose 10 % BOLUS 25 g  25 g Intravenous Q15 MIN PRN Kailee Granda, A.P.R.N.        apixaban (Eliquis) tablet 5 mg  5 mg Enteral Tube BID Manule Antoine M.D.   5 mg at 01/13/24 0518    Pharmacy Consult: Enteral tube insertion - review meds/change route/product selection  1 Each Other PHARMACY TO DOSE Rebecca Holman M.D.        haloperidol lactate (Haldol) injection 5 mg  5 mg Intravenous Q4HRS PRN Agapito Bonilla A.P.R.NAllie   5 mg at 01/07/24 1639       Fluids    Intake/Output Summary (Last 24 hours) at 1/13/2024 1441  Last data filed at 1/13/2024 1424  Gross per 24 hour   Intake 3852.02 ml   Output 2380 ml   Net 1472.02 ml       Laboratory  Recent Labs     01/11/24  0353 01/11/24  1147 01/13/24  0439   ISTATAPH 7.418 7.389* 7.417   ISTATAPCO2 40.4* 43.9* 42.0*   ISTATAPO2 86 69 60*   ISTATATCO2 27 28 28   [id number] 97 93 91*   ISTATARTHCO3 26.1* 26.5* 27.1*   ISTATARTBE 1 1 2   ISTATTEMP 96.4 F 96.1 F 96.6 F   ISTATFIO2 40  --  100   ISTATSPEC Arterial Arterial Arterial   ISTATAPHTC 7.436 7.409 7.434   [id number] 79 62* 55*         Recent Labs     01/11/24  0513 01/12/24 0440 01/13/24 0315   SODIUM 149* 153* 152*   POTASSIUM 3.5* 3.6 3.7   CHLORIDE 116* 117* 116*   CO2 25 27 28   BUN 34* 30* 29*   CREATININE 1.56* 1.39 1.21   MAGNESIUM 2.4 2.5  --    PHOSPHORUS 2.2* 3.2  --    CALCIUM 9.1 9.3 9.3     Recent Labs     01/11/24  0513 01/12/24 0440 01/13/24 0315   GLUCOSE 232* 187* 178*     Recent Labs     01/11/24  0513 01/12/24 0440 01/13/24 0315   WBC 17.3* 17.2* 19.5*   NEUTSPOLYS 84.60* 84.60* 83.90*    LYMPHOCYTES 5.80* 5.10* 5.80*   MONOCYTES 7.90 7.50 7.60   EOSINOPHILS 0.60 1.40 0.70   BASOPHILS 0.30 0.30 0.30     Recent Labs     01/11/24  0513 01/12/24  0440 01/13/24  0315   RBC 3.75* 3.71* 3.89*   HEMOGLOBIN 11.6* 11.6* 11.8*   HEMATOCRIT 35.5* 34.9* 37.1*   PLATELETCT 243 245 253       Imaging  X-Ray:  I have personally reviewed the images and compared with prior images.    Assessment/Plan  * Acute respiratory failure with hypoxia (HCC)- (present on admission)  Assessment & Plan  Intubated date: 1/9/2024  Extubated 01/11/2024  Reintubated on 01/13/2024.  Bronchoscopy revealed thick tenacious secretions occluding multiple airways predominantly on the right.  Lung protective ventilation strategies  Titrate ventilator prescription to optimize oxygenation, ventilation, and acid base balance.        Encephalopathy  Assessment & Plan  Toxic metabolic encephalopathy resolving  Serial neurologic exams.  Hypercapnia resolved.  MRI does not reveal signs of anoxic brain injury or brainstem infarct that would explain a locked-in type syndrome.        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)  Assessment & Plan  S/p evacuation on 12/27 by Dr Patten  SBP < 160  CT head stable no acute edema  Normonatremia goal    A-fib (HCC)  Assessment & Plan  Rate controlled  LVH on 12/27 echo with diastolic dysfunction  Eliquis   Continuous telemetry    Advance care planning  Assessment & Plan  Nora is his daughter and surrogate decision maker  Plan for reintubation if necessary    Urinary retention  Assessment & Plan  Schwartz catheter    Aspiration pneumonia (HCC)- (present on admission)  Assessment & Plan  Worsening chest x-ray suggests ongoing significant aspiration  Explanation for inability to protect airway since cerebellar ICH is unclear  MRI/MRA brain to evaluate for interval changes since last MRI on 1/9/2024  Head of bed > 30  Zosyn    Acute kidney failure (HCC)- (present on admission)  Assessment &  Plan  Maintain euvolemia and monitor fluid responsiveness (avoid NaCL and renal congestion)  MAP > 65 uses pressors or inotropic trial  Monitor volume status, electrolytes, urine output.  Avoid nephrotoxic agents.           VTE:  NOAC  Ulcer: H2 Antagonist  Lines: Central Line  Ongoing indication addressed    I have performed a physical exam and reviewed and updated ROS and Plan today (1/13/2024). In review of yesterday's note (1/12/2024), there are no changes except as documented above.     Discussed patient condition and risk of morbidity and/or mortality with RN, RT, and Pharmacy      The patient remains critically ill,  I have assessed and reassessed the blood pressure,  cardiovascular status, labs and response to interventions. This patient remains at high risk for worsening shock and death without the above critical care interventions.      Critical care time = 80 minutes in directly providing and coordinating critical care and extensive data review.  No time overlap and excludes procedures.

## 2024-01-13 NOTE — CARE PLAN
Problem: Humidified High Flow Nasal Cannula  Goal: Maintain adequate oxygenation dependent on patient condition  Description: Target End Date:  resolve prior to discharge or when underlying condition is resolved/stabilized    1.  Implement humidified high flow oxygen therapy  2.  Titrate high flow oxygen to maintain appropriate SpO2  Outcome: Not Progressing

## 2024-01-13 NOTE — CARE PLAN
The patient is Watcher - Medium risk of patient condition declining or worsening    Shift Goals  Clinical Goals: SBP< 160, improved neuro  Patient Goals: DULCE MARIA  Family Goals: Updates    Progress made toward(s) clinical / shift goals:    Problem: Hemodynamics  Goal: Patient's hemodynamics, fluid balance and neurologic status will be stable or improve  Outcome: Progressing     Problem: Fluid Volume  Goal: Fluid volume balance will be maintained  Outcome: Progressing     Problem: Skin Integrity  Goal: Skin integrity is maintained or improved  Outcome: Progressing     Problem: Fall Risk  Goal: Patient will remain free from falls  Outcome: Progressing     Problem: Safety - Medical Restraint  Goal: Remains free of injury from restraints (Restraint for Interference with Medical Device)  Outcome: Progressing     Problem: Pain - Standard  Goal: Alleviation of pain or a reduction in pain to the patient’s comfort goal  Outcome: Progressing       Patient is not progressing towards the following goals:      Problem: Safety - Medical Restraint  Goal: Free from restraint(s) (Restraint for Interference with Medical Device)  Outcome: Not Progressing

## 2024-01-13 NOTE — PROCEDURES
Procedure Note    Date: 1/13/2024  Time: 1415    Procedure: Bronchoscopy with bronchoalveolar lavage and therapeutic aspiration of secretions    Indication: Acute respiratory failure with ongoing aspiration  Consent: Informed consent obtained from patient or designated decision maker after explaining the benefits/risks of the procedure including but not limited to bleeding, infection, airway trauma or loss therof, pneumothorax/hemothorax, arrythmia, or death. Patient or surrogate expressed understanding and agreement and signed consent which can be found in the patient's chart.    Procedure: After obtaining consent, a time-out was performed. Respiratory therapy and nursing at bedside throughout procedure. Patient provided sedation and analgesia throughout the procedure. Placed on full ventilator support with an FiO2 of 100% throughout the procedure. Using a fiberoptic bronchoscope, trachea entered via 8.0 ETT.   Airways visualized directly and the following intervention was performed: diagnostic and therapeutic lavage with all areas of lungs suctioned to clear. Findings as below. Patient tolerated procedure well without any difficulties and left in care of bedside nurse/RT.     Medications: Etomidate and Rocuronium  Findings: Upper airway - Not visualized as bronchoscope passed through ETT.        Trachea to soco - erythematous appearing mucosa without lesions or mass, ETT tip measured 3 cm from the soco.        R proximal and distal airways - erythematous appearing mucosa without mass/lesion/anatomic variance, secretions: Thick tan tenacious secretions blanketing over both sides of the main soco and extending into the middle and lower lobes.        L proximal and distal airways -erythematous appearing mucosa without mass/lesion/anatomic variance, secretions: Scant thick adherent secretions in the left main bronchus.        Samples -obtained from the right middle lobe.  Lavaged and sent for  analysis.    Complications: None

## 2024-01-13 NOTE — CARE PLAN
The patient is Watcher - Medium risk of patient condition declining or worsening    Shift Goals  Clinical Goals: SBP <160, Oxygen stability  Patient Goals: DULCE MARIA  Family Goals: DULCE MARIA    Progress made toward(s) clinical / shift goals:    Problem: Hemodynamics  Goal: Patient's hemodynamics, fluid balance and neurologic status will be stable or improve  Outcome: Progressing     Problem: Fluid Volume  Goal: Fluid volume balance will be maintained  Outcome: Progressing     Problem: Urinary - Renal Perfusion  Goal: Ability to achieve and maintain adequate renal perfusion and functioning will improve  Outcome: Progressing     Problem: Respiratory  Goal: Patient will achieve/maintain optimum respiratory ventilation and gas exchange  Outcome: Progressing     Problem: Skin Integrity  Goal: Skin integrity is maintained or improved  Outcome: Progressing     Problem: Fall Risk  Goal: Patient will remain free from falls  Outcome: Progressing     Problem: Safety - Medical Restraint  Goal: Remains free of injury from restraints (Restraint for Interference with Medical Device)  Outcome: Progressing     Problem: Pain - Standard  Goal: Alleviation of pain or a reduction in pain to the patient’s comfort goal  Outcome: Progressing       Patient is not progressing towards the following goals:      Problem: Safety - Medical Restraint  Goal: Free from restraint(s) (Restraint for Interference with Medical Device)  Outcome: Not Progressing

## 2024-01-13 NOTE — CARE PLAN
Problem: Humidified High Flow Nasal Cannula  Goal: Maintain adequate oxygenation dependent on patient condition  Description: Target End Date:  resolve prior to discharge or when underlying condition is resolved/stabilized    1.  Implement humidified high flow oxygen therapy  2.  Titrate high flow oxygen to maintain appropriate SpO2  Outcome: Not Progressing     HHFNC 50L/90%  Currently NT suctioning PRN + Cough on request to help clear secretions, pt demonstrates a strong cough when coached and requested

## 2024-01-14 NOTE — CARE PLAN
The patient is Watcher - Medium risk of patient condition declining or worsening    Shift Goals  Clinical Goals: SpO2 stability, decrease O2 requirements  Patient Goals: DULCE MARIA  Family Goals: updates    Progress made toward(s) clinical / shift goals:    Problem: Safety - Medical Restraint  Goal: Remains free of injury from restraints (Restraint for Interference with Medical Device)  Outcome: Progressing     Problem: Pain - Standard  Goal: Alleviation of pain or a reduction in pain to the patient’s comfort goal  Outcome: Progressing

## 2024-01-14 NOTE — PROGRESS NOTES
Critical Care Progress Note    Date of admission  1/4/2024    Chief Complaint  78 y.o. male admitted 1/4/2024 with cardiac arrest    Hospital Course  78 y.o. 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 He presented back for AMS and evaluated in ER and was discharged. He presented the next day 1/4/2024 for hypoxia and was admitted to medicine. He was found to have silent aspiration, lauren, urinary retention and hypernatremia and dehydration. He had a an echo 12/29/2023 LVH EF 60% grade II daistolic dysfunction mild MR/TR. He has not had an MRI on the prior admission. He today 1/9/2024 had afib w/ RVR and worsening hypoxia as well as worsening mental status and code stroke was called. His BS was 190. In the CT scanner gurgling with poor airway reflexes. He was intubated and shortly after had PEA arrest. CT head and CTA/P was negative for acute stroke.  He is full code and confirmed prior to this event by daughters.      01/11-Trial of extubation. GOC discussion with his daughter Jaymie. We will re-intubate if extubation is not successful.  01/12- Requiring HFNC 45L/70% FiO  01/13- Reintubated, MRI/MRA ordered to look for explanation for ongoing aspiration.    Interval Problem Update  Reviewed last 24 hour events:   - Tm: Afebrile              - HR: 70-80s s              - SBP: 120-140s (Goal <160mmHg)              - Neuro: RASS -2-+3, MAEW.               - GI: EN at goal.              - UOP: 2.4 L              - Schwartz: Yes              - Lines: PIV, CVC              - PPx: H2,Eliquis              - CXR (personally reviewed): Endotracheal tube is in good position.  There are bilateral hazy opacities more prominent on the right.  No effusions no pneumothorax.              - Antibiotic Day #10/10, Unasyn   - VD#2.   - Mobility: level 2 edge     Infusions:  Dexmedetomidine          Review of Systems  Review of Systems   Respiratory:  Negative for  sputum production and shortness of breath.    Cardiovascular:  Negative for chest pain.   Gastrointestinal:  Negative for abdominal pain, nausea and vomiting.   Neurological: Negative.    Psychiatric/Behavioral: Negative.     All other systems reviewed and are negative.       Vital Signs for last 24 hours   Temp:  [35.8 °C (96.5 °F)-36.4 °C (97.6 °F)] (P) 36.4 °C (97.6 °F)  Pulse:  [55-94] (P) 78  Resp:  [15-36] (P) 22  BP: ()/() (P) 118/61  SpO2:  [87 %-100 %] (P) 95 %    Hemodynamic parameters for last 24 hours       Respiratory Information for the last 24 hours  Vent Mode: APVCMV  Rate (breaths/min): 22  Vt Target (mL): 450  PEEP/CPAP: 12  MAP: 17  Control VTE (exp VT): 435      Physical Exam  Constitutional:       Appearance: He is ill-appearing.   HENT:      Head: Normocephalic.      Comments: Ecchymosis noted over the lateral right orbit     Mouth/Throat:      Mouth: Mucous membranes are moist.   Eyes:      Pupils: Pupils are equal, round, and reactive to light.      Comments: Pupils are 3 to 4 mm nonreactive, mid position   Cardiovascular:      Rate and Rhythm: Normal rate. Rhythm irregular.      Heart sounds: No murmur heard.     No friction rub. No gallop.   Pulmonary:      Effort: No respiratory distress.      Breath sounds: No wheezing or rhonchi.      Comments: Diminished in the bases, no accessory muscle use.  Abdominal:      General: There is no distension.      Palpations: Abdomen is soft. There is no mass.      Tenderness: There is no abdominal tenderness.   Musculoskeletal:      Cervical back: Neck supple.      Right lower leg: Edema present.      Left lower leg: Edema present.   Skin:     General: Skin is warm and dry.   Neurological:      Comments: RASS -2, MAEW spont. Not following commands.   Psychiatric:      Comments: DULCE MARIA         Medications  Current Facility-Administered Medications   Medication Dose Route Frequency Provider Last Rate Last Admin    Respiratory Therapy Consult    Nebulization Continuous RT Manuel Antoine M.D.        famotidine (Pepcid) tablet 20 mg  20 mg Enteral Tube Q12HRS Manuel Antoine M.D.   20 mg at 01/14/24 0503    senna-docusate (Pericolace Or Senokot S) 8.6-50 MG per tablet 2 Tablet  2 Tablet Enteral Tube BID Manuel Antoine M.D.   2 Tablet at 01/14/24 0503    And    polyethylene glycol/lytes (Miralax) Packet 1 Packet  1 Packet Enteral Tube QDAY PRN Manuel Antoine M.D.   1 Packet at 01/13/24 1728    And    magnesium hydroxide (Milk Of Magnesia) suspension 30 mL  30 mL Enteral Tube QDAY PRN Manuel Antoine M.D.        And    bisacodyl (Dulcolax) suppository 10 mg  10 mg Rectal QDAY PRN Manuel Antoine M.D. MD Alert...ICU Electrolyte Replacement per Pharmacy   Other PHARMACY TO DOSE Manuel Antoine M.D.        lidocaine (Xylocaine) 1 % injection 2 mL  2 mL Tracheal Tube Q30 MIN PRN Manuel Antoine M.D.        fentaNYL (Sublimaze) injection 100 mcg  100 mcg Intravenous Q15 MIN PRN Manuel Antoine M.D.   100 mcg at 01/14/24 0631    And    fentaNYL (Sublimaze) injection 200 mcg  200 mcg Intravenous Q15 MIN PRN Manuel Antoine M.D.   200 mcg at 01/14/24 0035    And    fentaNYL (SUBLIMAZE) 50 mcg/mL in 50mL (Continuous Infusion)   Intravenous Continuous Manuel Antoine M.D. 4 mL/hr at 01/14/24 1300 200 mcg/hr at 01/14/24 1300    And    dexmedetomidine (PRECEDEX) 400 mcg/100mL NS premix infusion  0-1.5 mcg/kg/hr (Ideal) Intravenous Continuous Manuel Antoine M.D. 3.7 mL/hr at 01/14/24 1141 0.2 mcg/kg/hr at 01/14/24 1141    amLODIPine (Norvasc) tablet 5 mg  5 mg Enteral Tube Q DAY Manuel Antoine M.D.   5 mg at 01/14/24 0503    hydrALAZINE (Apresoline) injection 10 mg  10 mg Intravenous Q6HRS PRN Manuel Antoine M.D.   10 mg at 01/13/24 2006    glycopyrrolate (Robinul) tablet 1 mg  1 mg Enteral Tube TID Manuel Antoine M.D.   1 mg at 01/14/24 1123    labetalol (Normodyne/Trandate) injection 20 mg  20 mg Intravenous Q4HRS PRN Manuel Antoine M.D.   20 mg at 01/13/24  1418    insulin regular (HumuLIN R,NovoLIN R) injection  1-6 Units Subcutaneous Q6HRS Kailee Granda A.P.R.N.   1 Units at 01/13/24 1723    And    dextrose 10 % BOLUS 25 g  25 g Intravenous Q15 MIN PRN Kailee Granda A.P.R.N.        apixaban (Eliquis) tablet 5 mg  5 mg Enteral Tube BID Manuel Antoine M.D.   5 mg at 01/14/24 0503    Pharmacy Consult: Enteral tube insertion - review meds/change route/product selection  1 Each Other PHARMACY TO DOSE Rebecca Holman M.D.        haloperidol lactate (Haldol) injection 5 mg  5 mg Intravenous Q4HRS PRN GOYO QuintanillaPAllieRMACY   5 mg at 01/14/24 0708       Fluids    Intake/Output Summary (Last 24 hours) at 1/14/2024 1315  Last data filed at 1/14/2024 1200  Gross per 24 hour   Intake 3714.06 ml   Output 2265 ml   Net 1449.06 ml       Laboratory  Recent Labs     01/13/24  0439 01/13/24  1604 01/14/24  0318   ISTATAPH 7.417 7.378* 7.414   ISTATAPCO2 42.0* 46.9* 41.7*   ISTATAPO2 60* 112* 77   ISTATATCO2 28 29 28   BAHCLSD6BXI 91* 98 95   ISTATARTHCO3 27.1* 27.6* 26.7*   ISTATARTBE 2 2 2   ISTATTEMP 96.6 F 97.0 F 97.0 F   ISTATFIO2 100 80 70   ISTATSPEC Arterial Arterial Arterial   ISTATAPHTC 7.434 7.391* 7.427   QOTKBQBZ2IA 55* 106* 72         Recent Labs     01/12/24  0440 01/13/24  0315 01/14/24  0315   SODIUM 153* 152* 145   POTASSIUM 3.6 3.7 3.4*   CHLORIDE 117* 116* 110   CO2 27 28 26   BUN 30* 29* 27*   CREATININE 1.39 1.21 1.14   MAGNESIUM 2.5  --  2.5   PHOSPHORUS 3.2  --  3.4   CALCIUM 9.3 9.3 9.3     Recent Labs     01/12/24 0440 01/13/24 0315 01/14/24 0315   GLUCOSE 187* 178* 160*     Recent Labs     01/12/24 0440 01/13/24 0315 01/14/24 0315   WBC 17.2* 19.5* 17.5*   NEUTSPOLYS 84.60* 83.90* 82.20*   LYMPHOCYTES 5.10* 5.80* 6.80*   MONOCYTES 7.50 7.60 7.20   EOSINOPHILS 1.40 0.70 0.60   BASOPHILS 0.30 0.30 0.50     Recent Labs     01/12/24 0440 01/13/24 0315 01/14/24 0315   RBC 3.71* 3.89* 3.80*   HEMOGLOBIN 11.6* 11.8* 11.5*   HEMATOCRIT 34.9* 37.1* 35.9*    PLATELETCT 245 444 090       Imaging  MRA OF THE [place] [facility] [place] WITHIN NORMAL LIMITS.     Assessment/Plan  * Acute respiratory failure with hypoxia (HCC)- (present on admission)  Assessment & Plan  Intubated date: 1/9/2024  Extubated 01/11/2024  Reintubated on 01/13/2024.  Bronchoscopy revealed thick tenacious secretions occluding multiple airways predominantly on the right.  Lung protective ventilation strategies  Titrate ventilator prescription to optimize oxygenation, ventilation, and acid base balance.  Daily ABC trials  Will need to clarify goals of care before next trial of extubation.  Daughter Nora is very involved with these discussions.      Encephalopathy  Assessment & Plan  Toxic metabolic encephalopathy resolving  Serial neurologic exams.  Hypercapnia resolved.  MRI does not reveal signs of anoxic brain injury or brainstem infarct that would explain a locked-in type syndrome.        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)  Assessment & Plan  S/p evacuation on 12/27 by Dr Patten  SBP < 160  CT head stable no acute edema  Normonatremia goal    A-fib (HCC)  Assessment & Plan  Rate controlled  LVH on 12/27 echo with diastolic dysfunction  Eliquis   Continuous telemetry    Advance care planning  Assessment & Plan  Nora is his daughter and surrogate decision maker  Plan for reintubation if necessary    Urinary retention  Assessment & Plan  Schwartz catheter    Aspiration pneumonia (HCC)- (present on admission)  Assessment & Plan  Worsening chest x-ray suggests ongoing significant aspiration  Explanation for inability to protect airway since cerebellar ICH is unclear  Head of bed > 30  Zosyn/Unasyn 10 day regimen completed 01/14    Acute kidney failure (HCC)- (present on admission)  Assessment & Plan  Maintain euvolemia and monitor fluid responsiveness (avoid NaCL and renal congestion)  MAP > 65 uses pressors or inotropic trial  Monitor volume status, electrolytes, urine output.  Avoid  nephrotoxic agents.           VTE:  NOAC  Ulcer: H2 Antagonist  Lines: Central Line  Ongoing indication addressed    I have performed a physical exam and reviewed and updated ROS and Plan today (1/14/2024). In review of yesterday's note (1/13/2024), there are no changes except as documented above.     Discussed patient condition and risk of morbidity and/or mortality with   RN, RT, and Pharmacy        The patient remains critically ill.  I have assessed and reassessed the respiratory status and made ventilator adjustments based upon arterial blood gas analysis, ventilator waveforms and airway mechanics.  I have assessed and reassessed the blood pressure, hemodynamics, cardiovascular status. This patient remains at high risk for worsening cardiopulmonary dysfunction and death without the above critical care interventions.        Critical care time = 50 minutes in directly providing and coordinating critical care and extensive data review.  No time overlap and excludes procedures.

## 2024-01-14 NOTE — DISCHARGE PLANNING
Case Management Discharge Planning    Chart reviewed:    Intubated on 1/9/2024, extubated on 1/11/2024, and re-intubated on 1/13/2024.  +Vent (day 2)/Drips (fentanyl, Precedex)/Q4H neurochecks/NGT.    MRI Brain & MRA Head pending.    Referrals (SNF vs LTACH) to be submitted when appropriate (pending clinical progression).

## 2024-01-14 NOTE — CARE PLAN
The patient is Watcher - Medium risk of patient condition declining or worsening    Shift Goals  Clinical Goals: Oxygen stability, SBP <160  Patient Goals: DULCE MARIA  Family Goals: DULCE MARIA    Progress made toward(s) clinical / shift goals:    Problem: Hemodynamics  Goal: Patient's hemodynamics, fluid balance and neurologic status will be stable or improve  Outcome: Progressing     Problem: Fluid Volume  Goal: Fluid volume balance will be maintained  Outcome: Progressing     Problem: Urinary - Renal Perfusion  Goal: Ability to achieve and maintain adequate renal perfusion and functioning will improve  Outcome: Progressing     Problem: Mechanical Ventilation  Goal: Patient will be able to express needs and understand communication  Outcome: Progressing     Problem: Skin Integrity  Goal: Skin integrity is maintained or improved  Outcome: Progressing     Problem: Fall Risk  Goal: Patient will remain free from falls  Outcome: Progressing     Problem: Safety - Medical Restraint  Goal: Remains free of injury from restraints (Restraint for Interference with Medical Device)  Outcome: Progressing     Problem: Pain - Standard  Goal: Alleviation of pain or a reduction in pain to the patient’s comfort goal  Outcome: Progressing       Patient is not progressing towards the following goals:      Problem: Respiratory  Goal: Patient will achieve/maintain optimum respiratory ventilation and gas exchange  Outcome: Not Progressing     Problem: Safety - Medical Restraint  Goal: Free from restraint(s) (Restraint for Interference with Medical Device)  Outcome: Not Progressing

## 2024-01-15 NOTE — CARE PLAN
Problem: Ventilation  Goal: Ability to achieve and maintain unassisted ventilation or tolerate decreased levels of ventilator support  Description: Target End Date:  4 days     Document on Vent flowsheet    1.  Support and monitor invasive and noninvasive mechanical ventilation  2.  Monitor ventilator weaning response  3.  Perform ventilator associated pneumonia prevention interventions  4.  Manage ventilation therapy by monitoring diagnostic test results  Outcome: Progressing     VD 3  8.0@27 at the lip  ApvCmv 24/450/+10/60%

## 2024-01-15 NOTE — THERAPY
Speech Language Therapy Contact Note    Patient Name: Lewis Mohr  Age:  78 y.o., Sex:  male  Medical Record #: [id number]  Today's Date: 1/15/2024    Discussed missed therapy with RN.        01/15/24 0829   Treatment Variance   Reason For Missed Therapy Medical - Patient Is Not Medically Stable;Medical - Patient not Able To Participate   Interdisciplinary Plan of Care Collaboration   IDT Collaboration with  Other (See Comments)  (EMR)   Collaboration Comments Per EMR, pt re-intubated on 1/13/24. SLP to monitor for extubation. Please place new orders for CSE as appropriate. Thank you.

## 2024-01-15 NOTE — CARE PLAN
The patient is Watcher - Medium risk of patient condition declining or worsening    Shift Goals  Clinical Goals: SBP <160, oxygen stabilitiy  Patient Goals: DULCE MARIA  Family Goals: DULCE MARIA    Progress made toward(s) clinical / shift goals:      Problem: Safety - Medical Restraint  Goal: Remains free of injury from restraints (Restraint for Interference with Medical Device)  Outcome: Progressing     Problem: Pain - Standard  Goal: Alleviation of pain or a reduction in pain to the patient’s comfort goal  Outcome: Progressing

## 2024-01-15 NOTE — CARE PLAN
Problem: Ventilation  Goal: Ability to achieve and maintain unassisted ventilation or tolerate decreased levels of ventilator support  Description: Target End Date:  4 days     Document on Vent flowsheet    1.  Support and monitor invasive and noninvasive mechanical ventilation  2.  Monitor ventilator weaning response  3.  Perform ventilator associated pneumonia prevention interventions  4.  Manage ventilation therapy by monitoring diagnostic test results  Outcome: Progressing  Ventilator Daily Summary    Vent Day # 3    Airway: 8@27    Ventilator settings: 24 450 8 50%    Weaning trials: 4hr spont in am    Plan: Continue current ventilator settings and wean mechanical ventilation as tolerated per physician orders.

## 2024-01-15 NOTE — CARE PLAN
The patient is Watcher - Medium risk of patient condition declining or worsening    Shift Goals  Clinical Goals: SBP <160, Oxygen stability  Patient Goals: DULCE MARIA  Family Goals: DULCE MARIA    Progress made toward(s) clinical / shift goals:    Problem: Hemodynamics  Goal: Patient's hemodynamics, fluid balance and neurologic status will be stable or improve  Outcome: Progressing     Problem: Fluid Volume  Goal: Fluid volume balance will be maintained  Outcome: Progressing     Problem: Urinary - Renal Perfusion  Goal: Ability to achieve and maintain adequate renal perfusion and functioning will improve  Outcome: Progressing     Problem: Respiratory  Goal: Patient will achieve/maintain optimum respiratory ventilation and gas exchange  Outcome: Progressing     Problem: Physical Regulation  Goal: Diagnostic test results will improve  Outcome: Progressing  Goal: Signs and symptoms of infection will decrease  Outcome: Progressing     Problem: Knowledge Deficit - Standard  Goal: Patient and family/care givers will demonstrate understanding of plan of care, disease process/condition, diagnostic tests and medications  Outcome: Progressing     Problem: Skin Integrity  Goal: Skin integrity is maintained or improved  Outcome: Progressing     Problem: Fall Risk  Goal: Patient will remain free from falls  Outcome: Progressing     Problem: Safety - Medical Restraint  Goal: Remains free of injury from restraints (Restraint for Interference with Medical Device)  Outcome: Progressing     Problem: Pain - Standard  Goal: Alleviation of pain or a reduction in pain to the patient’s comfort goal  Outcome: Progressing       Patient is not progressing towards the following goals:      Problem: Mechanical Ventilation  Goal: Patient will be able to express needs and understand communication  Outcome: Not Progressing     Problem: Safety - Medical Restraint  Goal: Free from restraint(s) (Restraint for Interference with Medical Device)  Outcome: Not  Progressing

## 2024-01-15 NOTE — PROGRESS NOTES
Critical Care Progress Note    Date of admission  2024    Chief Complaint  78 y.o. male admitted 2024 with cardiac arrest    Hospital Course  78 y.o. 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 He presented back for AMS and evaluated in ER and was discharged. He presented the next day 2024 for hypoxia and was admitted to medicine. He was found to have silent aspiration, lauren, urinary retention and hypernatremia and dehydration. He had a an echo 2023 LVH EF 60% grade II daistolic dysfunction mild MR/TR. He has not had an MRI on the prior admission. He today 2024 had afib w/ RVR and worsening hypoxia as well as worsening mental status and code stroke was called. His BS was 190. In the CT scanner gurgling with poor airway reflexes. He was intubated and shortly after had PEA arrest. CT head and CTA/P was negative for acute stroke.  He is full code and confirmed prior to this event by daughters.      -Trial of extubation. GOC discussion with his daughter Jaymie. We will re-intubate if extubation is not successful.  - Requiring HFNC 45L/70% FiO  - Reintubated, MRI/MRA ordered to look for explanation for ongoing aspiration.    1/15 -Vent day 3 of second course on ventilator, MRI without obvious abnormality to account for aspiration    Interval Problem Update  Reviewed last 24 hour events:    Sedate on ventilator  Follows some, moves all 4  Dexmedetomidine 0.2  Fentanyl 100  SR 60s  -120s  UO good  I/O+ 1.24L  Weaned off norepinephrine  APV CMV 24/450/8/50%  AB.37/51/105  Secretions small  CXR clearing  Tmax 97.7  WBC 13.7 improved  Completed 10 days ABX   FOB cultures neg - URF   FOB cultures  negative so far  MRSA nares neg/9  Hemoglobin 10.8,   Electrolytes and AG normal  B/C30/1.26  Transaminases elevated, albumin 2, alk phos 139, bili 0.2  Amlodipine  SSI glucose levels  reviewed  Famotidine & apixaban PPx  TF goal      Reduce free H2O to 100cc Q4H  SAT/SBT as tolerated  PT/OT    Case reviewed with daughter Nora at length at the bedside  Diagnosis, treatment and prognosis plan reviewed  We are continuing a full code but no tracheostomy and likely no feeding tube but she wants further conversations with her family and with us and ideally speak to her dad  Not ready for extubation, continuing to monitor      YESTERDAY   - Tm: Afebrile              - HR: 70-80s s              - SBP: 120-140s (Goal <160mmHg)              - Neuro: RASS -2-+3, MAEW.               - GI: EN at goal.              - UOP: 2.4 L              - Schwartz: Yes              - Lines: PIV, CVC              - PPx: H2,Eliquis              - CXR (personally reviewed): Endotracheal tube is in good position.  There are bilateral hazy opacities more prominent on the right.  No effusions no pneumothorax.              - Antibiotic Day #10/10, Unasyn   - VD#2.   - Mobility: level 2 edge     Infusions:  Dexmedetomidine          Review of Systems  Review of Systems   Unable to perform ROS: Intubated        Vital Signs for last 24 hours   Temp:  [35.6 °C (96 °F)-36.5 °C (97.7 °F)] (P) 36.5 °C (97.7 °F)  Pulse:  [67-78] (P) 77  Resp:  [3-24] (P) 16  BP: ()/(53-86) (P) 148/70  SpO2:  [92 %-100 %] (P) 92 %    Hemodynamic parameters for last 24 hours       Respiratory Information for the last 24 hours  Vent Mode: APVCMV  Rate (breaths/min): 24  Vt Target (mL): 450  PEEP/CPAP: 8  P Support: 5  MAP: 11  Length of Weaning Trial (Hours): 2  Control VTE (exp VT): 450      Physical Exam  Vitals reviewed.   Constitutional:       Appearance: He is ill-appearing.      Interventions: He is sedated, intubated and restrained.   HENT:      Head: Normocephalic.      Comments: Ecchymosis noted over the lateral right orbit     Mouth/Throat:      Mouth: Mucous membranes are moist.   Eyes:      General: No scleral icterus.     Pupils: Pupils are  equal, round, and reactive to light.      Comments: Pupils are 3 to 4 mm nonreactive, mid position   Neck:      Vascular: No JVD.      Comments: IJ CVC  Cardiovascular:      Rate and Rhythm: Normal rate. Rhythm irregular.      Heart sounds: No murmur heard.     No friction rub. No gallop.      Comments: AF  Pulmonary:      Effort: No respiratory distress. He is intubated.      Breath sounds: No wheezing or rhonchi.      Comments: Diminished in the bases, no accessory muscle use.  Abdominal:      General: There is no distension.      Palpations: Abdomen is soft. There is no mass.      Tenderness: There is no abdominal tenderness. There is no right CVA tenderness or guarding.   Genitourinary:     Comments: Schwartz  Musculoskeletal:      Cervical back: Neck supple. No rigidity.      Right lower leg: Edema present.      Left lower leg: Edema present.   Skin:     General: Skin is warm and dry.      Capillary Refill: Capillary refill takes less than 2 seconds.      Coloration: Skin is not cyanotic or mottled.      Nails: There is no clubbing.   Neurological:      Mental Status: He is lethargic.      Comments: RASS -2, MAEW spont. Not following commands.  Sedated   Psychiatric:         Behavior: Behavior is cooperative.      Comments: DULCE MARIA         Medications  Current Facility-Administered Medications   Medication Dose Route Frequency Provider Last Rate Last Admin    Respiratory Therapy Consult   Nebulization Continuous RT Manuel Antoine M.D.        famotidine (Pepcid) tablet 20 mg  20 mg Enteral Tube Q12HRS Manuel Antoine M.D.   20 mg at 01/15/24 0504    senna-docusate (Pericolace Or Senokot S) 8.6-50 MG per tablet 2 Tablet  2 Tablet Enteral Tube BID Manuel Antoine M.D.   2 Tablet at 01/14/24 1748    And    polyethylene glycol/lytes (Miralax) Packet 1 Packet  1 Packet Enteral Tube QDAY PRN Manuel Antoine M.D.   1 Packet at 01/13/24 1728    And    magnesium hydroxide (Milk Of Magnesia) suspension 30 mL  30 mL Enteral Tube  QDAY PRN Manuel Antoine M.D.   30 mL at 01/14/24 1749    And    bisacodyl (Dulcolax) suppository 10 mg  10 mg Rectal QDAY PRN Manuel Antoine M.D. MD Alert...ICU Electrolyte Replacement per Pharmacy   Other PHARMACY TO DOSE Manuel Antoine M.D.        lidocaine (Xylocaine) 1 % injection 2 mL  2 mL Tracheal Tube Q30 MIN PRN Manuel Antoine M.D.        fentaNYL (Sublimaze) injection 100 mcg  100 mcg Intravenous Q15 MIN PRN Manuel Antoine M.D.   100 mcg at 01/15/24 1633    And    fentaNYL (Sublimaze) injection 200 mcg  200 mcg Intravenous Q15 MIN PRN Manuel Antoine M.D.   200 mcg at 01/14/24 0035    And    fentaNYL (SUBLIMAZE) 50 mcg/mL in 50mL (Continuous Infusion)   Intravenous Continuous Manuel Antoine M.D. 2 mL/hr at 01/15/24 1631 100 mcg/hr at 01/15/24 1631    And    dexmedetomidine (PRECEDEX) 400 mcg/100mL NS premix infusion  0-1.5 mcg/kg/hr (Ideal) Intravenous Continuous Manuel Antoine M.D. 5.5 mL/hr at 01/15/24 1647 0.3 mcg/kg/hr at 01/15/24 1647    amLODIPine (Norvasc) tablet 5 mg  5 mg Enteral Tube Q DAY Manuel Antoine M.D.   5 mg at 01/15/24 0504    hydrALAZINE (Apresoline) injection 10 mg  10 mg Intravenous Q6HRS PRN Manuel Antoine M.D.   10 mg at 01/13/24 2006    glycopyrrolate (Robinul) tablet 1 mg  1 mg Enteral Tube TID Manuel Antoine M.D.   1 mg at 01/15/24 1215    labetalol (Normodyne/Trandate) injection 20 mg  20 mg Intravenous Q4HRS PRN Manuel Antoine M.D.   20 mg at 01/13/24 1418    insulin regular (HumuLIN R,NovoLIN R) injection  1-6 Units Subcutaneous Q6HRS Kailee Granda A.P.R.N.   1 Units at 01/15/24 0039    And    dextrose 10 % BOLUS 25 g  25 g Intravenous Q15 MIN PRN JENNY Georges.P.R.N.        apixaban (Eliquis) tablet 5 mg  5 mg Enteral Tube BID Manuel Antoine M.D.   5 mg at 01/15/24 050    Pharmacy Consult: Enteral tube insertion - review meds/change route/product selection  1 Each Other PHARMACY TO DOSE Rebecca Holman M.D.        haloperidol lactate (Haldol) injection  5 mg  5 mg Intravenous Q4HRS PRN Agapito Bonilla A.P.R.NAllie   5 mg at 01/14/24 0708       Fluids    Intake/Output Summary (Last 24 hours) at 1/15/2024 1703  Last data filed at 1/15/2024 1657  Gross per 24 hour   Intake 2423.26 ml   Output 2210 ml   Net 213.26 ml       Laboratory  Recent Labs     01/13/24  1604 01/14/24  0318 01/15/24  0148   ISTATAPH 7.378* 7.414 7.357*   ISTATAPCO2 46.9* 41.7* 52.9*   ISTATAPO2 112* 77 110*   ISTATATCO2 29 28 31   BWLNDXW1FHR 98 95 98   ISTATARTHCO3 27.6* 26.7* 29.7*   ISTATARTBE 2 2 3   ISTATTEMP 97.0 F 97.0 F 97.0 F   ISTATFIO2 80 70 60   ISTATSPEC Arterial Arterial Arterial   ISTATAPHTC 7.391* 7.427 7.370*   DLYVEGTA2KW 106* 72 105*     Recent Labs     01/14/24  1550   CPKTOTAL 27     Recent Labs     01/13/24  0315 01/14/24  0315 01/15/24  0430   SODIUM 152* 145 143   POTASSIUM 3.7 3.4* 4.0   CHLORIDE 116* 110 107   CO2 28 26 27   BUN 29* 27* 30*   CREATININE 1.21 1.14 1.26   MAGNESIUM  --  2.5 2.9*   PHOSPHORUS  --  3.4 3.3   CALCIUM 9.3 9.3 9.1     Recent Labs     01/13/24  0315 01/14/24  0315 01/15/24  0430 01/15/24  1500   ALTSGPT  --   --  138*  --    ASTSGOT  --   --  81*  --    ALKPHOSPHAT  --   --  139*  --    TBILIRUBIN  --   --  0.2  --    PREALBUMIN  --   --   --  6.4*   GLUCOSE 178* 160* 151*  --      Recent Labs     01/13/24  0315 01/14/24  0315 01/15/24  0430   WBC 19.5* 17.5* 13.7*   NEUTSPOLYS 83.90* 82.20* 75.00*   LYMPHOCYTES 5.80* 6.80* 9.60*   MONOCYTES 7.60 7.20 8.10   EOSINOPHILS 0.70 0.60 2.60   BASOPHILS 0.30 0.50 0.40   ASTSGOT  --   --  81*   ALTSGPT  --   --  138*   ALKPHOSPHAT  --   --  139*   TBILIRUBIN  --   --  0.2     Recent Labs     01/13/24  0315 01/14/24  0315 01/15/24  0430   RBC 3.89* 3.80* 3.61*   HEMOGLOBIN 11.8* 11.5* 10.8*   HEMATOCRIT 37.1* 35.9* 34.6*   PLATELETCT 253 285 284       Imaging  MRA OF THE [place] WITHIN NORMAL LIMITS.   Reviewed CT series as well as MRI brain  Reviewed CXRs    Assessment/Plan  * Acute respiratory  failure with hypoxia (HCC)- (present on admission)  Assessment & Plan  Intubated date: 1/9/2024  Extubated 01/11/2024  Reintubated on 01/13/2024.  Bronchoscopy revealed thick tenacious secretions occluding multiple airways predominantly on the right.  Lung protective ventilation strategies  Titrate ventilator prescription to optimize oxygenation, ventilation, and acid base balance.  Daily ABC trials-RT protocol/ventilator bundle  Will need to clarify goals of care before next trial of extubation.  Daughter Nora is very involved with these discussions.    Daughter Nora very clear about no tracheostomy further conversations about CODE STATUS, feeding tube etc. to follow      Aspiration pneumonia (HCC)- (present on admission)  Assessment & Plan  Trace stabilize after intubation and bronchoscopy  Explanation for inability to protect airway since cerebellar ICH is unclear  Head of bed > 30  Zosyn/Unasyn 10 day regimen completed 01/14   May need to extend/resume antibiotic regimen monitoring    A-fib (HCC)  Assessment & Plan  Rate controlled  LVH on 12/27 echo with diastolic dysfunction  Eliquis   Demise electrolytes  Continuous telemetry    Encephalopathy  Assessment & Plan  Toxic metabolic encephalopathy resolving  Serial neurologic exams going.  Hypercapnia resolved.  MRI does not reveal signs of anoxic brain injury or brainstem infarct that would explain a locked-in type syndrome.  Reassessing daily with family  Monitor for the need to have neurology evaluate patient    Acute kidney failure (HCC)- (present on admission)  Assessment & Plan  Maintain euvolemia and monitor fluid responsiveness (avoid NaCL and renal congestion)  MAP > 65 uses pressors or inotropic trial  Monitor volume status, electrolytes,   Monitor urine output, Schwartz catheter  Avoid nephrotoxic agents.      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 In review of yesterday's note (1/14/2024), there are no changes except as documented above.     Discussed patient condition and risk of morbidity and/or mortality with   Family, RN, RT, Pharmacy, Code status disscussed, and Charge nurse / hot rounds      The patient remains critically ill.  I have assessed and reassessed the respiratory status and made ventilator adjustments based upon arterial blood gas analysis, ventilator waveforms and airway mechanics.  I have assessed and reassessed the blood pressure, hemodynamics, cardiovascular status. This patient remains at high risk for worsening cardiopulmonary dysfunction and death without the above critical care interventions.    Critical care time = 50 minutes in directly providing and coordinating critical care and extensive data review.  No time overlap and excludes procedures.

## 2024-01-16 NOTE — CARE PLAN
The patient is Watcher - Medium risk of patient condition declining or worsening    Shift Goals  Clinical Goals: SBP <160  Patient Goals: DULCE MARIA  Family Goals: DULCE MARIA    Problem: Safety - Medical Restraint  Goal: Remains free of injury from restraints (Restraint for Interference with Medical Device)  Description: INTERVENTIONS:  1. Determine that other, less restrictive measures have been tried or would not be effective before applying the restraint  2. Evaluate the patient's condition at the time of restraint application  3. Educate patient/family regarding the reason for restraint  4. Q2H: Monitor safety, psychosocial status, comfort, circulation, respiratory status, LOC, nutrition and hydration  Outcome: Progressing  Goal: Free from restraint(s) (Restraint for Interference with Medical Device)  Description: INTERVENTIONS:  1.  ONCE/SHIFT or MINIMUM Q12H: Assess and document the continuing need for restraints  2.  Q24H: Continued use of restraint requires LIP to perform face to face examination and written order  3.  Identify and implement measures to help patient regain control  4.  Educate patient/family on discontinuation criteria   5.  Assess patient's understanding and retention of education provided  6.  Assess readiness for release & initiate progressive release per protocol  7.  Identify and document criteria for restraints  Outcome: Progressing     Problem: Pain - Standard  Goal: Alleviation of pain or a reduction in pain to the patient’s comfort goal  Description: Target End Date:  Prior to discharge or change in level of care    Document on Vitals flowsheet    1.  Document pain using the appropriate pain scale per order or unit policy  2.  Educate and implement non-pharmacologic comfort measures (i.e. relaxation, distraction, massage, cold/heat therapy, etc.)  3.  Pain management medications as ordered  4.  Reassess pain after pain med administration per policy  5.  If opiods administered assess patient's  response to pain medication is appropriate per POSS sedation scale  6.  Follow pain management plan developed in collaboration with patient and interdisciplinary team (including palliative care or pain specialists if applicable)  Outcome: Progressing

## 2024-01-16 NOTE — CARE PLAN
Problem: Ventilation  Goal: Ability to achieve and maintain unassisted ventilation or tolerate decreased levels of ventilator support  Description: Target End Date:  4 days     Document on Vent flowsheet    1.  Support and monitor invasive and noninvasive mechanical ventilation  2.  Monitor ventilator weaning response  3.  Perform ventilator associated pneumonia prevention interventions  4.  Manage ventilation therapy by monitoring diagnostic test results  Outcome: Not Met  Note:   Ventilator Daily Summary    Vent Day # 4    Airway: 8.0 25 at the teeth     Ventilator settings: APV/CMV 24/440/+8/50    Weaning trials: Failed SAT         Plan: Continue current ventilator settings and wean mechanical ventilation as tolerated per physician orders.

## 2024-01-16 NOTE — CARE PLAN
The patient is Watcher - Medium risk of patient condition declining or worsening    Shift Goals  Clinical Goals: wean sedation, mobilize  Patient Goals: unable to assess  Family Goals: unable to assess    Progress made toward(s) clinical / shift goals:        Problem: Hemodynamics  Goal: Patient's hemodynamics, fluid balance and neurologic status will be stable or improve  Outcome: Progressing     Problem: Mechanical Ventilation  Goal: Patient will be able to express needs and understand communication  Outcome: Progressing     Problem: Knowledge Deficit - Standard  Goal: Patient and family/care givers will demonstrate understanding of plan of care, disease process/condition, diagnostic tests and medications  Outcome: Progressing     Problem: Skin Integrity  Goal: Skin integrity is maintained or improved  Outcome: Progressing     Problem: Safety - Medical Restraint  Goal: Remains free of injury from restraints (Restraint for Interference with Medical Device)  Outcome: Progressing     Problem: Pain - Standard  Goal: Alleviation of pain or a reduction in pain to the patient’s comfort goal  Outcome: Progressing       Patient is not progressing towards the following goals:      Problem: Safety - Medical Restraint  Goal: Free from restraint(s) (Restraint for Interference with Medical Device)  Outcome: Not Progressing

## 2024-01-16 NOTE — THERAPY
Occupational Therapy   Initial Evaluation     Patient Name: Lewis Mohr  Age:  78 y.o., Sex:  male  Medical Record #: [id number]  Today's Date: 1/16/2024     Precautions  Precautions: Fall Risk, Nasogastric Tube  Comments: ETT    Assessment  Patient is 78 y.o. male with a diagnosis of ALOC, resp failure, R Crani 12/27/23.  Pt currently limited by decreased functional mobility, activity tolerance, cognition, sensation, strength, AROM, coordination, balance,  which are affecting pt's ability to complete ADLs/IADLs at baseline. Pt would benefit from OT services in the acute care setting to maximize functional recovery.    Plan    Occupational Therapy Initial Treatment Plan   Treatment Interventions: (P) Self Care / Activities of Daily Living, Therapeutic Activity, Neuro Re-Education / Balance  Treatment Frequency: (P) 3 Times per Week  Duration: (P) Until Therapy Goals Met       Discharge Recommendations: (P) Recommend post-acute placement for additional occupational therapy services prior to discharge home        01/16/24 0921   Prior Living Situation   Prior Services Other (Comments)  (admited from [place])   Housing / Facility 1 Story Apartment / Condo   Steps Into Home 13   Equipment Owned None   Lives with - Patient's Self Care Capacity Alone and Able to Care For Self   Comments per chart from previous admit.  Pt unable to give PLOf   Prior Level of ADL Function   Self Feeding Unable To Determine At This Time   Dressing Unable To Determine At This Time   ADL Assessment   Grooming Total Assist   Upper Body Dressing Total Assist   Lower Body Dressing Total Assist   Toileting Total Assist   Functional Mobility   Sit to Stand Maximal Assist  (x2)   Bed, Chair, Wheelchair Transfer Unable to Participate   Short Term Goals   Short Term Goal # 1 pt will wash face with a cloth mod A   Short Term Goal # 2 pt will maintain sitting in midine with SBA for ADLs   Short Term Goal # 3 mod A with UB dressing   Short Term Goal # 4  mod A with ADL txfs   Occupational Therapy Initial Treatment Plan    Treatment Interventions Self Care / Activities of Daily Living;Therapeutic Activity;Neuro Re-Education / Balance   Treatment Frequency 3 Times per Week   Duration Until Therapy Goals Met   Anticipated Discharge Equipment and Recommendations   Discharge Recommendations Recommend post-acute placement for additional occupational therapy services prior to discharge home

## 2024-01-16 NOTE — CARE PLAN
Problem: Ventilation  Goal: Ability to achieve and maintain unassisted ventilation or tolerate decreased levels of ventilator support  Description: Target End Date:  4 days     Document on Vent flowsheet    1.  Support and monitor invasive and noninvasive mechanical ventilation  2.  Monitor ventilator weaning response  3.  Perform ventilator associated pneumonia prevention interventions  4.  Manage ventilation therapy by monitoring diagnostic test results  Outcome: Not Met       Vent Day # 4     Airway: 8@27     Ventilator settings: 24 450 8 50%

## 2024-01-16 NOTE — PROGRESS NOTES
Critical Care Progress Note    Date of admission  1/4/2024    Chief Complaint  78 y.o. male admitted 1/4/2024 with cardiac arrest    Hospital Course  78 y.o. 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 He presented back for AMS and evaluated in ER and was discharged. He presented the next day 1/4/2024 for hypoxia and was admitted to medicine. He was found to have silent aspiration, lauren, urinary retention and hypernatremia and dehydration. He had a an echo 12/29/2023 LVH EF 60% grade II daistolic dysfunction mild MR/TR. He has not had an MRI on the prior admission. He today 1/9/2024 had afib w/ RVR and worsening hypoxia as well as worsening mental status and code stroke was called. His BS was 190. In the CT scanner gurgling with poor airway reflexes. He was intubated and shortly after had PEA arrest. CT head and CTA/P was negative for acute stroke.  He is full code and confirmed prior to this event by daughters.      01/11-Trial of extubation. GOC discussion with his daughter Jaymie. We will re-intubate if extubation is not successful.  01/12- Requiring HFNC 45L/70% FiO  01/13- Reintubated, MRI/MRA ordered to look for explanation for ongoing aspiration.    1/15 -Vent day 3 of second course on ventilator, MRI without obvious abnormality to account for aspiration    Interval Problem Update  Reviewed last 24 hour events:    Sedate on ventilator, agitated  Not following or purposeful  Dexmedetomidine 0.3  Fentanyl 100  Failed SAT severe agitation  SR 60-70s  -140s  I/O's neg -906 cc/24 H  UO excellent  Vent: APV CMV 24/450/8/50% Day 4  O2 sats 90-96%  Secretions minimal  CXR no film  Glycopyrrolate 3 times daily  Tmax 98.2  WBC 11.8  ABX complete  Hemoglobin low 10.7,   , K 3.7, HCO3 28, AG 10, Mg 2.7  BUN 30, creatinine 1.38  SSI use noted  Glucose: 165, 140, 179, 132, 134, 167  Famotidine and apixaban PPx  Amlodipine  EF  goal      Limit sedation as agitation allows  SAT/SBT  Increase Free water serial BMP       YESTERDAY  Sedate on ventilator  Follows some, moves all 4  Dexmedetomidine 0.2  Fentanyl 100  SR 60s  -120s  UO good  I/O+ 1.24L  Weaned off norepinephrine  APV CMV 24/450/8/50%  AB.37/51/105  Secretions small  CXR clearing  Tmax 97.7  WBC 13.7 improved  Completed 10 days ABX   FOB cultures neg - URF   FOB cultures  negative so far  MRSA nares neg/  Hemoglobin 10.8,   Electrolytes and AG normal  B/C30/1.26  Transaminases elevated, albumin 2, alk phos 139, bili 0.2  Amlodipine  SSI glucose levels reviewed  Famotidine & apixaban PPx  TF goal      Reduce free H2O to 100cc Q4H  SAT/SBT as tolerated  PT/OT    Case reviewed with daughter Nora at length at the bedside  Diagnosis, treatment and prognosis plan reviewed  We are continuing a full code but no tracheostomy and likely no feeding tube but she wants further conversations with her family and with us and ideally speak to her dad  Not ready for extubation, continuing to monitor    Review of Systems  Review of Systems   Unable to perform ROS: Intubated        Vital Signs for last 24 hours   Temp:  [36.3 °C (97.4 °F)-36.9 °C (98.5 °F)] 36.9 °C (98.4 °F)  Pulse:  [68-80] 70  Resp:  [14-27] 24  BP: (112-156)/(58-86) 140/69  SpO2:  [85 %-97 %] 94 %    Hemodynamic parameters for last 24 hours       Respiratory Information for the last 24 hours  Vent Mode: APVCMV  Rate (breaths/min): 24  Vt Target (mL): 440  PEEP/CPAP: 8  MAP: 13  Length of Weaning Trial (Hours): 2  Control VTE (exp VT): 440      Physical Exam  Vitals reviewed.   Constitutional:       Appearance: He is ill-appearing.      Interventions: He is sedated, intubated and restrained.   HENT:      Head: Normocephalic.      Comments: Ecchymosis noted over the lateral right orbit     Mouth/Throat:      Mouth: Mucous membranes are moist.   Eyes:      General: No scleral icterus.     Pupils: Pupils  are equal, round, and reactive to light.      Comments: Pupils are 3 to 4 mm nonreactive, mid position   Neck:      Vascular: No JVD.      Comments: IJ CVC  Cardiovascular:      Rate and Rhythm: Normal rate. Rhythm irregular.      Heart sounds: No murmur heard.     No friction rub. No gallop.      Comments: AF  Pulmonary:      Effort: No respiratory distress. He is intubated.      Breath sounds: Rales present. No wheezing or rhonchi.      Comments: Diminished in the bases, no accessory muscle use.  Abdominal:      General: There is no distension.      Palpations: Abdomen is soft. There is no mass.      Tenderness: There is no abdominal tenderness. There is no right CVA tenderness, left CVA tenderness or guarding.   Genitourinary:     Comments: Schwartz  Musculoskeletal:      Cervical back: Neck supple. No rigidity.      Right lower leg: Edema present.      Left lower leg: Edema present.   Skin:     General: Skin is warm and dry.      Capillary Refill: Capillary refill takes less than 2 seconds.      Coloration: Skin is not cyanotic or mottled.      Nails: There is no clubbing.   Neurological:      Mental Status: He is lethargic.      Comments: RASS -2, MAEW spont. CN intact, Not following commands.  Sedated   Psychiatric:         Behavior: Behavior is cooperative.      Comments: DULCE MARIA         Medications  Current Facility-Administered Medications   Medication Dose Route Frequency Provider Last Rate Last Admin    Respiratory Therapy Consult   Nebulization Continuous RT Manuel Antoine M.D.        famotidine (Pepcid) tablet 20 mg  20 mg Enteral Tube Q12HRS Manuel Antoine M.D.   20 mg at 01/16/24 0530    senna-docusate (Pericolace Or Senokot S) 8.6-50 MG per tablet 2 Tablet  2 Tablet Enteral Tube BID Manuel Antoine M.D.   2 Tablet at 01/15/24 1727    And    polyethylene glycol/lytes (Miralax) Packet 1 Packet  1 Packet Enteral Tube QDAY PRN Manuel Antoine M.D.   1 Packet at 01/13/24 1728    And    magnesium hydroxide  (Milk Of Magnesia) suspension 30 mL  30 mL Enteral Tube QDAY PRN Manuel Antoine M.D.   30 mL at 01/14/24 1749    And    bisacodyl (Dulcolax) suppository 10 mg  10 mg Rectal QDAY PRN Manuel Antoine M.D. MD Alert...ICU Electrolyte Replacement per Pharmacy   Other PHARMACY TO DOSE Manuel Antoine M.D.        lidocaine (Xylocaine) 1 % injection 2 mL  2 mL Tracheal Tube Q30 MIN PRN Manuel Antoine M.D.        fentaNYL (Sublimaze) injection 100 mcg  100 mcg Intravenous Q15 MIN PRN Manuel Antoine M.D.   100 mcg at 01/16/24 1411    And    fentaNYL (Sublimaze) injection 200 mcg  200 mcg Intravenous Q15 MIN PRN Manuel Antoine M.D.   200 mcg at 01/16/24 0516    And    fentaNYL (SUBLIMAZE) 50 mcg/mL in 50mL (Continuous Infusion)   Intravenous Continuous Manuel Antoine M.D. 2 mL/hr at 01/16/24 0549 100 mcg/hr at 01/16/24 0549    And    dexmedetomidine (PRECEDEX) 400 mcg/100mL NS premix infusion  0-1.5 mcg/kg/hr (Ideal) Intravenous Continuous Manuel Antoine M.D. 12.8 mL/hr at 01/16/24 1410 0.7 mcg/kg/hr at 01/16/24 1410    amLODIPine (Norvasc) tablet 5 mg  5 mg Enteral Tube Q DAY Manuel Antoine M.D.   5 mg at 01/16/24 0530    hydrALAZINE (Apresoline) injection 10 mg  10 mg Intravenous Q6HRS PRN Manuel Antoine M.D.   10 mg at 01/13/24 2006    glycopyrrolate (Robinul) tablet 1 mg  1 mg Enteral Tube TID Manuel Antoine M.D.   1 mg at 01/16/24 1131    labetalol (Normodyne/Trandate) injection 20 mg  20 mg Intravenous Q4HRS PRN Manuel Antoine M.D.   20 mg at 01/13/24 1418    insulin regular (HumuLIN R,NovoLIN R) injection  1-6 Units Subcutaneous Q6HRS JENNY Georges.P.R.N.   1 Units at 01/16/24 1131    And    dextrose 10 % BOLUS 25 g  25 g Intravenous Q15 MIN PRN Kailee Granda A.P.R.N.        apixaban (Eliquis) tablet 5 mg  5 mg Enteral Tube BID Manuel Antoine M.D.   5 mg at 01/16/24 0530    Pharmacy Consult: Enteral tube insertion - review meds/change route/product selection  1 Each Other PHARMACY TO DOSE Rebecca  JANNETH Holman        haloperidol lactate (Haldol) injection 5 mg  5 mg Intravenous Q4HRS PRN Agapito Bonilla A.P.R.NAllie   5 mg at 01/16/24 1407       Fluids    Intake/Output Summary (Last 24 hours) at 1/16/2024 1453  Last data filed at 1/16/2024 1400  Gross per 24 hour   Intake 1513.51 ml   Output 2310 ml   Net -796.49 ml       Laboratory  Recent Labs     01/13/24  1604 01/14/24  0318 01/15/24  0148   ISTATAPH 7.378* 7.414 7.357*   ISTATAPCO2 46.9* 41.7* 52.9*   ISTATAPO2 112* 77 110*   ISTATATCO2 29 28 31   IIUUZLA1MJQ 98 95 98   ISTATARTHCO3 27.6* 26.7* 29.7*   ISTATARTBE 2 2 3   ISTATTEMP 97.0 F 97.0 F 97.0 F   ISTATFIO2 80 70 60   ISTATSPEC Arterial Arterial Arterial   ISTATAPHTC 7.391* 7.427 7.370*   [id number] 106* 72 105*     Recent Labs     01/14/24  1550   CPKTOTAL 27     Recent Labs     01/14/24  0315 01/15/24  0430 01/16/24  0413   SODIUM 145 143 146*   POTASSIUM 3.4* 4.0 3.7   CHLORIDE 110 107 108   CO2 26 27 28   BUN 27* 30* 30*   CREATININE 1.14 1.26 1.38   MAGNESIUM 2.5 2.9* 2.7*   PHOSPHORUS 3.4 3.3 3.0   CALCIUM 9.3 9.1 8.8     Recent Labs     01/14/24  0315 01/15/24  0430 01/15/24  1500 01/16/24  0413   ALTSGPT  --  138*  --   --    ASTSGOT  --  81*  --   --    ALKPHOSPHAT  --  139*  --   --    TBILIRUBIN  --  0.2  --   --    PREALBUMIN  --   --  6.4*  --    GLUCOSE 160* 151*  --  178*     Recent Labs     01/14/24  0315 01/15/24  0430 01/16/24  0413   WBC 17.5* 13.7* 11.8*   NEUTSPOLYS 82.20* 75.00* 76.40*   LYMPHOCYTES 6.80* 9.60* 10.60*   MONOCYTES 7.20 8.10 8.20   EOSINOPHILS 0.60 2.60 0.30   BASOPHILS 0.50 0.40 0.30   ASTSGOT  --  81*  --    ALTSGPT  --  138*  --    ALKPHOSPHAT  --  139*  --    TBILIRUBIN  --  0.2  --      Recent Labs     01/14/24  0315 01/15/24  0430 01/16/24  0413   RBC 3.80* 3.61* 3.44*   HEMOGLOBIN 11.5* 10.8* 10.7*   HEMATOCRIT 35.9* 34.6* 32.5*   PLATELETCT 285 284 303       Imaging  MRA OF THE [place] WITHIN NORMAL LIMITS.   Reviewed CT series as well as MRI  brain  Reviewed CXRs    Assessment/Plan  * Acute respiratory failure with hypoxia (HCC)- (present on admission)  Assessment & Plan  Intubated date: 1/9/2024  Extubated 01/11/2024  Reintubated on 01/13/2024.  Bronchoscopy revealed thick tenacious secretions occluding multiple airways predominantly on the right.  Lung protective ventilation strategies  Titrate ventilator prescription to optimize oxygenation, ventilation, and acid base balance.  Daily ABC trials-RT protocol/ventilator bundle  Failing SAT's  Tolerating SBT's but on 50% and becomes more tachypneic and end-tidal CO2 goes up significantly  Will need to clarify goals of care before next trial of extubation.  Wanda Melendez is very involved with these discussions.    Daughter Nora very clear about no tracheostomy further conversations about CODE STATUS, feeding tube etc. to follow  Further discussions to follow      Aspiration pneumonia (HCC)- (present on admission)  Assessment & Plan  Trace stabilize after intubation and bronchoscopy  Explanation for inability to protect airway since cerebellar ICH is unclear  Head of bed > 30  Zosyn/Unasyn 10 day regimen completed 01/14   May need to extend/resume antibiotic regimen monitoring closely    A-fib (HCC)  Assessment & Plan  Rate used to be controlled  LVH on 12/27 echo with diastolic dysfunction  Eliquis prophylaxis for CVA  Optimize electrolytes  Continuous telemetry    Encephalopathy  Assessment & Plan  Toxic metabolic encephalopathy resolving  Serial neurologic exams going.  Hypercapnia resolved while on ventilator.  MRI does not reveal signs of anoxic brain injury or brainstem infarct that would explain a locked-in type syndrome.  Reassessing daily with family  Monitor for the need to have neurology evaluate patient    Acute kidney failure (HCC)- (present on admission)  Assessment & Plan  Maintain euvolemia and monitor fluid responsiveness (avoid NaCL and renal congestion)  MAP > 65 uses pressors or  inotropic trial  Monitor volume status, serial BMP  Monitor urine output, Schwartz catheter  Avoid nephrotoxic agents.  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 In review of yesterday's note (1/15/2024), there are no changes except as documented above.     Discussed patient condition and risk of morbidity and/or mortality with   RN, RT, Pharmacy, Code status disscussed, and Charge nurse / hot rounds      The patient remains critically ill.  I have assessed and reassessed the respiratory status and made ventilator adjustments based upon arterial blood gas analysis, ventilator waveforms and airway mechanics.  I have assessed and reassessed the blood pressure, hemodynamics, cardiovascular status. This patient remains at high risk for worsening cardiopulmonary dysfunction and death without the above critical care interventions.    Critical care time = 45 minutes in directly providing and coordinating critical care and extensive data review.  No time overlap and excludes procedures.

## 2024-01-16 NOTE — THERAPY
Physical Therapy   Initial Evaluation     Patient Name: Lewis Mohr  Age:  78 y.o., Sex:  male  Medical Record #: [id number]  Today's Date: 1/16/2024     Precautions  Precautions: Fall Risk;Nasogastric Tube  Comments: ETT    Assessment  Patient is a 78 y.o. male who was admitted from [facility] with hypoxia on 1/4. On 1/9, pt went into Afib with RVR and code stroke called. Pt intubated emergently and then had PEA with CPR. Pt briefly extubated 1/11-1/13 and now re-intubated. PMH significant for recent ICH on 12/27/23 s/p suboccipital crani for evacuation, HTN.    Pt received in bed and appropriate for PT evaluation per discussion with RN and chart review. Prior level and home setup obtained per chart review. Pt opens eyes to verbal stimuli and remained alert throughout evaluation, but did not follow any commands. Pt required total A for mobilizing to EOB, was able to maintain seated balance briefly and primarily attempts to actively lean forward, and required 2P max A for standing attempts at EOB. Pt was ambulatory on discharge to [facility] last admission. Will continue to follow for acute PT.    Plan    Physical Therapy Initial Treatment Plan   Treatment Plan : Bed Mobility, Equipment, Gait Training, Neuro Re-Education / Balance, Self Care / Home Evaluation, Therapeutic Activities, Therapeutic Exercise  Treatment Frequency: 4 Times per Week  Duration: Until Therapy Goals Met    DC Equipment Recommendations: Unable to determine at this time  Discharge Recommendations: Recommend post-acute placement for additional physical therapy services prior to discharge home    Subjective    Pt intubated.     Objective       01/16/24 0847   Precautions   Precautions Fall Risk;Nasogastric Tube   Comments ETT   Vitals   O2 Delivery Device Ventilator   Vitals Comments HR 60's at rest, 70's with mobility   Prior Living Situation   Prior Services Other (Comments)  (Skilled nursing at [facility] prior to admission)   Housing / Facility 1 Story  "Apartment / Condo   Steps Into Home   (FOS)   Equipment Owned None   Lives with - Patient's Self Care Capacity Alone and Able to Care For Self   Comments Pt with a recent admission under the name \"Marty Mohr\" and at that time reported living in a 2nd floor apartment with a roommate.   Prior Level of Functional Mobility   Comments Prior to admission in Dec 2023, was reportedly independent with no DME. Pt discharged to [facility] after that.   History of Falls   History of Falls No   Cognition    Cognition / Consciousness X   Speech/ Communication Intubated / Trached   Level of Consciousness Responds to voice   Ability To Follow Commands Unable to Follow 1 Step Commands   Safety Awareness Impaired;Impulsive   Attention Impaired   Comments Opens eyes and roves around the room, does not focus on staff. Pt not following commands. Orally intubated. Pt with forward lean during sitting, requiring physical restraint to maintain neutral position   Strength Upper Body   Comments RUE strong with spontaneous movement, no movement appreciated in LUE.   Passive ROM Lower Body   Passive ROM Lower Body WDL   Active ROM Lower Body    Comments Intermittent spontaneous movement, not through full range   Strength Lower Body   Comments Difficult to assess due to cognition. Fernanda in standing and does not fully support self. Unclear if strength and/or cognition related   Lower Body Muscle Tone   Lower Body Muscle Tone  WDL   Balance Assessment   Sitting Balance (Static) Poor -   Sitting Balance (Dynamic) Trace +   Standing Balance (Static) Trace   Standing Balance (Dynamic) Trace   Weight Shift Sitting Fair   Weight Shift Standing Poor   Comments Pt pushing anteriorly in sitting, requires phsyical assist to maintain neutral balance most of the time. Occasionally maintains neutral and attempts to shift weight. Assist required for standing attempts.   Bed Mobility    Supine to Sit Total Assist   Sit to Supine Total Assist   Scooting " Total Assist   Rolling Total Assist to Lt.;Total Assist to Rt.   Comments Impaired due to cognition, has functional strength to likely assist more   Gait Analysis   Gait Level Of Assist Unable to Participate   Functional Mobility   Sit to Stand Maximal Assist   Bed, Chair, Wheelchair Transfer Unable to Participate   Mobility EOB, STS x3 with 2P assist   Comments Pt did not come to full standing with 2P assist, allows knees to buckly   How much difficulty does the patient currently have...   Turning over in bed (including adjusting bedclothes, sheets and blankets)? 1   Sitting down on and standing up from a chair with arms (e.g., wheelchair, bedside commode, etc.) 1   Moving from lying on back to sitting on the side of the bed? 1   How much help from another person does the patient currently need...   Moving to and from a bed to a chair (including a wheelchair)? 1   Need to walk in a hospital room? 1   Climbing 3-5 steps with a railing? 1   6 clicks Mobility Score 6   Short Term Goals    Short Term Goal # 1 Pt will perform bed mobility with min A to progress function in 6 visits.   Short Term Goal # 2 Pt will perform sit<>stand with min A to progress towards functional transfers in 6 visits.   Short Term Goal # 3 Pt will maintain seated balance at EOB with standby assist for 2 min to progress function in 6 visits.   Education Group   Education Provided Role of Physical Therapist   Role of Physical Therapist Patient Response Patient;Nonacceptance;Explanation;No Learning Evidence   Physical Therapy Initial Treatment Plan    Treatment Plan  Bed Mobility;Equipment;Gait Training;Neuro Re-Education / Balance;Self Care / Home Evaluation;Therapeutic Activities;Therapeutic Exercise   Treatment Frequency 4 Times per Week   Duration Until Therapy Goals Met   Problem List    Problems Impaired Bed Mobility;Impaired Transfers;Impaired Ambulation;Safety Awareness Deficits / Cognition;Motor Planning / Sequencing   Anticipated  Discharge Equipment and Recommendations   DC Equipment Recommendations Unable to determine at this time   Discharge Recommendations Recommend post-acute placement for additional physical therapy services prior to discharge home   Interdisciplinary Plan of Care Collaboration   IDT Collaboration with  Nursing   Patient Position at End of Therapy In Bed;Wrist Restraints Applied   Collaboration Comments RN updated

## 2024-01-16 NOTE — PROGRESS NOTES
"Neurosurgery Progress Note    Subjective:  Sedated/intubated- aggitated and nonpurposeful per nursing    Exam:  EO to voice with stim  Shakes head \"no\"- swings arms and legs combatively.  FLETCHER strong  CDI staples out  Perrl 4mm bilat    BP  Min: 87/54  Max: 156/83  Pulse  Av.9  Min: 68  Max: 80  Resp  Av.9  Min: 14  Max: 26  Temp  Av.5 °C (97.7 °F)  Min: 36 °C (96.8 °F)  Max: 36.9 °C (98.4 °F)  SpO2  Av.5 %  Min: 85 %  Max: 97 %    No data recorded    Recent Labs     01/14/24  0315 01/15/24  0430 01/16/24  0413   WBC 17.5* 13.7* 11.8*   RBC 3.80* 3.61* 3.44*   HEMOGLOBIN 11.5* 10.8* 10.7*   HEMATOCRIT 35.9* 34.6* 32.5*   MCV 94.5 95.8 94.5   MCH 30.3 29.9 31.1   MCHC 32.0* 31.2* 32.9   RDW 47.7 49.0 46.8   PLATELETCT 285 284 303   MPV 11.7 11.6 11.4       Recent Labs     01/14/24  0315 01/15/24  0430 01/16/24  0413   SODIUM 145 143 146*   POTASSIUM 3.4* 4.0 3.7   CHLORIDE 110 107 108   CO2 26 27 28   GLUCOSE 160* 151* 178*   BUN 27* 30* 30*   CREATININE 1.14 1.26 1.38   CALCIUM 9.3 9.1 8.8                 Intake/Output                         01/15/24 0700 - 2459 24 - 24 0659      Total [id number] Total                 Intake    I.V.  65.3  24.1 89.4  4  -- 4    Fentanyl Volume 20.9 24.1 45 4 -- 4    Precedex Volume 44.3 -- 44.3 -- -- --    Other  90  --   --  -- --    Medications (PO/Enteral Liquids) 90 --  -- -- --    NG/GT  480  480 960  80  -- 80    Intake (mL) (Enteral Tube 24 Nasogastric 12 Fr. Right nare) 480 480 960 80 -- 80    Enteral  400  300 700  --  -- --    Free Water / Tube Flush 400 300 700 -- -- --    Total Intake 1035.3 804.1 1839.4 84 -- 84       Output    Urine  1370  1375 2745  --  -- --    Output (mL) (Urethral Catheter Non-latex 16 Fr.) 1370 1375 2745 -- -- --    Stool  --  -- --  --  -- --    Number of Times Stooled 0 x -- 0 x -- -- --    Total Output 1370 1375 2745 -- -- --       Net I/O     -334.7 -570.9 " -905.6 84 -- 84              Intake/Output Summary (Last 24 hours) at 1/16/2024 1004  Last data filed at 1/16/2024 0800  Gross per 24 hour   Intake 1540.76 ml   Output 2310 ml   Net -769.24 ml               potassium bicarbonate  25 mEq Once    Respiratory Therapy Consult   Continuous RT    famotidine  20 mg Q12HRS    senna-docusate  2 Tablet BID    And    polyethylene glycol/lytes  1 Packet QDAY PRN    And    magnesium hydroxide  30 mL QDAY PRN    And    bisacodyl  10 mg QDAY PRN    MD Alert...Adult ICU Electrolyte Replacement per Pharmacy   PHARMACY TO DOSE    lidocaine  2 mL Q30 MIN PRN    fentaNYL  100 mcg Q15 MIN PRN    And    fentaNYL  200 mcg Q15 MIN PRN    And    fentaNYL   Continuous    And    dexmedetomidine (Precedex) infusion  0-1.5 mcg/kg/hr (Ideal) Continuous    amLODIPine  5 mg Q DAY    hydrALAZINE  10 mg Q6HRS PRN    glycopyrrolate  1 mg TID    labetalol  20 mg Q4HRS PRN    insulin regular  1-6 Units Q6HRS    And    dextrose bolus  25 g Q15 MIN PRN    apixaban  5 mg BID    Pharmacy  1 Each PHARMACY TO DOSE    haloperidol lactate  5 mg Q4HRS PRN       Assessment and Plan:  Hospital day #8  Crani on 12/27  Prophylactic anticoagulation: yes         Start date/time: 1/10    CT- stable  MRI- punctate infarct  Discussed anticoagulation with Dr Milton Falk as per neurology rec- ok

## 2024-01-17 NOTE — DIETARY
Nutrition support weekly update:  Day 13 of admit.  Lewis Mohr is a 78 y.o. male with admitting DX of Respiratory failure.      Tube feeding initiated on 1/6. Current TF via NG tube is Nepro at 40 ml/hr to provide 1728 kcals, 78 gm protein, 698 ml free water per day. Current free water order is 200 ml Q 4 hours.     Assessment:  Weight 1/17: 95 kg via bed scale. Weight increase overnight from 80.6 kg on 1/14 to 93.5 kg on 1/15. Weights variable from 80.6 kg - 95 kg during admit, will continue to use admit weight of 87.4 kg. Pt is currently +1.3L fluids per I/O.     Weight used: 87.4 kg on admit  Calculation/Equation: REE per MSJ x 1.2 = 1934 kcals  RMR per PSU (VE: 11.1 L/min and Tmax: 36.8 C) = 1951 kcals  Calories/day: 1900 - 2100 (22 - 24 kcals/kg)  Grams Protein/day: 87 - 105  (1.0 - 1.2 gm/kg)    Evaluation:   Pt is receiving TF at goal rate and tolerating per RN.   Pt is intubated on ventilator support. Pt failed extubation trial and was re-intubated 1/13.  Encephalopathy noted to be resolving.   Skin: DTI mid right to buttocks, L buttocks abrasion.   Labs: 2+ moderate edema noted to all extremities.   Meds: pepcid, SSI, Klyte, senna, fentanyl IV, precedex IV  Last BM: 1/15  Renal concentrated formula still appropriate.     Malnutrition risk: Pt appears adequately nourished.     Recommendations/Plan:  To better meet estimated needs, increase TF Nepro to goal rate of 45 ml/hr to provide 1944 kcals, 87 gm protein, and 785 ml free water per day.   Fluids per MD.   Monitor renal lab trend.      RD following.

## 2024-01-17 NOTE — CARE PLAN
The patient is Watcher - Medium risk of patient condition declining or worsening    Shift Goals  Clinical Goals: stable vital signs  Patient Goals: n/a  Family Goals: improve neuro status    Progress made toward(s) clinical / shift goals:    Problem: Hemodynamics  Goal: Patient's hemodynamics, fluid balance and neurologic status will be stable or improve  Outcome: Progressing     Problem: Respiratory  Goal: Patient will achieve/maintain optimum respiratory ventilation and gas exchange  Outcome: Not Progressing     Problem: Safety - Medical Restraint  Goal: Remains free of injury from restraints (Restraint for Interference with Medical Device)  Outcome: Not Progressing     Problem: Pain - Standard  Goal: Alleviation of pain or a reduction in pain to the patient’s comfort goal  Outcome: Not Progressing       Patient is not progressing towards the following goals:extubation      Problem: Respiratory  Goal: Patient will achieve/maintain optimum respiratory ventilation and gas exchange  Outcome: Not Progressing     Problem: Safety - Medical Restraint  Goal: Remains free of injury from restraints (Restraint for Interference with Medical Device)  Outcome: Not Progressing     Problem: Pain - Standard  Goal: Alleviation of pain or a reduction in pain to the patient’s comfort goal  Outcome: Not Progressing

## 2024-01-17 NOTE — CARE PLAN
Problem: Ventilation  Goal: Ability to achieve and maintain unassisted ventilation or tolerate decreased levels of ventilator support  Description: Target End Date:  4 days     Document on Vent flowsheet    1.  Support and monitor invasive and noninvasive mechanical ventilation  2.  Monitor ventilator weaning response  3.  Perform ventilator associated pneumonia prevention interventions  4.  Manage ventilation therapy by monitoring diagnostic test results  Outcome: Not Met     Vent Day # 5     Airway: 8.0 25 at the teeth      Ventilator settings: APV/CMV 24/440/+8/55%

## 2024-01-17 NOTE — CARE PLAN
The patient is Stable - Low risk of patient condition declining or worsening    Shift Goals  Clinical Goals: stable vital signs  Patient Goals: n/a  Family Goals: improve neuro status    Progress made toward(s) clinical / shift goals:    Problem: Mechanical Ventilation  Goal: Patient will be able to express needs and understand communication  Outcome: Not Progressing     Problem: Knowledge Deficit - Standard  Goal: Patient and family/care givers will demonstrate understanding of plan of care, disease process/condition, diagnostic tests and medications  Outcome: Not Progressing       Patient is not progressing towards the following goals:      Problem: Mechanical Ventilation  Goal: Patient will be able to express needs and understand communication  Outcome: Not Progressing     Problem: Knowledge Deficit - Standard  Goal: Patient and family/care givers will demonstrate understanding of plan of care, disease process/condition, diagnostic tests and medications  Outcome: Not Progressing

## 2024-01-17 NOTE — PROGRESS NOTES
Critical Care Progress Note    Date of admission  1/4/2024    Chief Complaint  78 y.o. male admitted 1/4/2024 with cardiac arrest    Hospital Course  78 y.o. 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 He presented back for AMS and evaluated in ER and was discharged. He presented the next day 1/4/2024 for hypoxia and was admitted to medicine. He was found to have silent aspiration, lauren, urinary retention and hypernatremia and dehydration. He had a an echo 12/29/2023 LVH EF 60% grade II daistolic dysfunction mild MR/TR. He has not had an MRI on the prior admission. He today 1/9/2024 had afib w/ RVR and worsening hypoxia as well as worsening mental status and code stroke was called. His BS was 190. In the CT scanner gurgling with poor airway reflexes. He was intubated and shortly after had PEA arrest. CT head and CTA/P was negative for acute stroke.  He is full code and confirmed prior to this event by daughters.      01/11-Trial of extubation. GOC discussion with his daughter Jaymie. We will re-intubate if extubation is not successful.  01/12- Requiring HFNC 45L/70% FiO  01/13- Reintubated, MRI/MRA ordered to look for explanation for ongoing aspiration.    1/15 -Vent day 3 of second course on ventilator, MRI without obvious abnormality to account for aspiration  1/16 -Vent day 4, failed SAT, tachypneic with increased end-tidal CO2 with SBT's, not following    Interval Problem Update  Reviewed last 24 hour events:    Sedate on ventilator, agitated  Dexmedetomidine 0.7  Fentanyl 100  RASS -2-+3  SAT not done yet  No SBT yet  SB/SR 50-60s  SBP   UO good-nearly 2 L  I/Os +271 cc/24hr  Vent: APV CMV 24/440/8 - day 5  FiO2 increased from 50 to 60%  Secretions min  CXR increased atx/opacities L base, worse R base as well?  Tmax 98.5, WBC 10.9  Hemoglobin 10.4,   , K3.7, HCO3 27, AG 9, calcium 9.3  BUN 34, creatinine  1.53  Amlodipine  Apixaban and famotidine PPx  Glycopyrrolate  SSI  TF goal    Follow-up chest x-ray, a bit more hypoxic  Further goals of care discussion with daughter  SAT/SBT as tolerated  Mobilize as much as possible  Up to Medium SSI  Up free H2O to 200c    Apnea with SBTs  Try 50% SAT - too agitated for safety otherwise    Attempt at SAT/SBT  Initially apneic again even at 50% dose of fentanyl/dexmedetomidine  Subsequently RASS +3-4 and too agitated for safety and resedated, not purposeful       YESTERDAY  Sedate on ventilator, agitated  Not following or purposeful  Dexmedetomidine 0.3  Fentanyl 100  Failed SAT severe agitation  SR 60-70s  -140s  I/O's neg -906 cc/24 H  UO excellent  Vent: APV CMV 24/450/8/50% Day 4  O2 sats 90-96%  Secretions minimal  CXR no film  Glycopyrrolate 3 times daily  Tmax 98.2  WBC 11.8  ABX complete  Hemoglobin low 10.7,   , K 3.7, HCO3 28, AG 10, Mg 2.7  BUN 30, creatinine 1.38  SSI use noted  Glucose: 165, 140, 179, 132, 134, 167  Famotidine and apixaban PPx  Amlodipine  EF goal    Limit sedation as agitation allows  SAT/SBT  Increase Free water serial BMP    Review of Systems  Review of Systems   Unable to perform ROS: Intubated        Vital Signs for last 24 hours   Temp:  [35.6 °C (96 °F)-36.8 °C (98.2 °F)] 36.4 °C (97.5 °F)  Pulse:  [58-85] 72  Resp:  [12-48] 23  BP: ()/(53-81) 100/58  SpO2:  [87 %-99 %] 94 %    Hemodynamic parameters for last 24 hours       Respiratory Information for the last 24 hours  Vent Mode: APVCMV  Rate (breaths/min): 24  Vt Target (mL): 440  PEEP/CPAP: 8  MAP: 14  Control VTE (exp VT): 450      Physical Exam  Vitals reviewed.   Constitutional:       Appearance: He is ill-appearing.      Interventions: He is sedated, intubated and restrained.   HENT:      Head: Normocephalic.      Comments: Ecchymosis noted over the lateral right orbit     Mouth/Throat:      Mouth: Mucous membranes are moist.   Eyes:      General: No scleral  icterus.     Pupils: Pupils are equal, round, and reactive to light.      Comments: Pupils are 3 to 4 mm nonreactive, mid position   Neck:      Vascular: No JVD.      Comments: IJ CVC  Cardiovascular:      Rate and Rhythm: Normal rate. Rhythm irregular.      Pulses: Normal pulses.      Heart sounds: No murmur heard.     No friction rub. No gallop.      Comments: AF  Pulmonary:      Effort: No respiratory distress. He is intubated.      Breath sounds: Rales (Bibasilar) present. No wheezing or rhonchi.      Comments: Diminished in the bases, no accessory muscle use.  Abdominal:      General: There is no distension.      Palpations: Abdomen is soft. There is no mass.      Tenderness: There is no abdominal tenderness. There is no right CVA tenderness, left CVA tenderness or guarding.   Genitourinary:     Comments: Schwartz  Musculoskeletal:      Cervical back: Neck supple. No rigidity.      Right lower leg: Edema present.      Left lower leg: Edema present.   Skin:     General: Skin is warm and dry.      Capillary Refill: Capillary refill takes less than 2 seconds.      Coloration: Skin is not cyanotic or mottled.      Nails: There is no clubbing.   Neurological:      Mental Status: He is lethargic.      Comments: RASS neg 2-pos 3-4 range, MAEW spont. CN intact, Not following commands.  Not purposeful.  No change   Psychiatric:         Behavior: Behavior is cooperative.      Comments: DULCE MARIA         Medications  Current Facility-Administered Medications   Medication Dose Route Frequency Provider Last Rate Last Admin    [START ON 1/18/2024] famotidine (Pepcid) tablet 20 mg  20 mg Enteral Tube DAILY Ag Dixon M.D.        insulin regular (HumuLIN R,NovoLIN R) injection  2-9 Units Subcutaneous Q6HRS Ag Dixon M.D.   2 Units at 01/17/24 1314    And    dextrose 10 % BOLUS 25 g  25 g Intravenous Q15 MIN PRN Ag Dixon M.D.        Respiratory Therapy Consult   Nebulization Continuous RT Manuel Antoine M.D.         senna-docusate (Pericolace Or Senokot S) 8.6-50 MG per tablet 2 Tablet  2 Tablet Enteral Tube BID Manuel Antoine M.D.   2 Tablet at 01/17/24 0525    And    polyethylene glycol/lytes (Miralax) Packet 1 Packet  1 Packet Enteral Tube QDAY PRN Manuel Antoine M.D.   1 Packet at 01/13/24 1728    And    magnesium hydroxide (Milk Of Magnesia) suspension 30 mL  30 mL Enteral Tube QDAY PRN Manuel Antoine M.D.   30 mL at 01/14/24 1749    And    bisacodyl (Dulcolax) suppository 10 mg  10 mg Rectal QDAY PRN Manuel Antoine M.D. MD Alert...ICU Electrolyte Replacement per Pharmacy   Other PHARMACY TO DOSE Manuel Antoine M.D.        lidocaine (Xylocaine) 1 % injection 2 mL  2 mL Tracheal Tube Q30 MIN PRN Manuel Antoine M.D.        fentaNYL (Sublimaze) injection 100 mcg  100 mcg Intravenous Q15 MIN PRN Manuel Antoine M.D.   100 mcg at 01/17/24 0536    And    fentaNYL (Sublimaze) injection 200 mcg  200 mcg Intravenous Q15 MIN PRN Manuel Antoine M.D.   200 mcg at 01/16/24 0516    And    fentaNYL (SUBLIMAZE) 50 mcg/mL in 50mL (Continuous Infusion)   Intravenous Continuous Manuel Antoine M.D. 2 mL/hr at 01/17/24 0739 100 mcg/hr at 01/17/24 0739    And    dexmedetomidine (PRECEDEX) 400 mcg/100mL NS premix infusion  0-1.5 mcg/kg/hr (Ideal) Intravenous Continuous Manuel Antoine M.D. 11 mL/hr at 01/17/24 1400 0.6 mcg/kg/hr at 01/17/24 1400    amLODIPine (Norvasc) tablet 5 mg  5 mg Enteral Tube Q DAY Manuel Antoine M.D.   5 mg at 01/17/24 0525    hydrALAZINE (Apresoline) injection 10 mg  10 mg Intravenous Q6HRS PRN Manuel Antoine M.D.   10 mg at 01/13/24 2006    glycopyrrolate (Robinul) tablet 1 mg  1 mg Enteral Tube TID Manuel Antoine M.D.   1 mg at 01/17/24 1314    labetalol (Normodyne/Trandate) injection 20 mg  20 mg Intravenous Q4HRS PRN Manuel Antoine M.D.   20 mg at 01/13/24 1418    apixaban (Eliquis) tablet 5 mg  5 mg Enteral Tube BID Manuel Antoine M.D.   5 mg at 01/17/24 0309    Pharmacy Consult: Enteral  tube insertion - review meds/change route/product selection  1 Each Other PHARMACY TO DOSE Rebecca Holman M.D.        haloperidol lactate (Haldol) injection 5 mg  5 mg Intravenous Q4HRS PRN GOYO QuintanillaPCHRISTINANAllie   5 mg at 01/16/24 1407       Fluids    Intake/Output Summary (Last 24 hours) at 1/17/2024 1519  Last data filed at 1/17/2024 1400  Gross per 24 hour   Intake 2279.08 ml   Output 2100 ml   Net 179.08 ml       Laboratory  Recent Labs     01/15/24  0148   ISTATAPH 7.357*   ISTATAPCO2 52.9*   ISTATAPO2 110*   ISTATATCO2 31   [id number] 98   ISTATARTHCO3 29.7*   ISTATARTBE 3   ISTATTEMP 97.0 F   ISTATFIO2 60   ISTATSPEC Arterial   ISTATAPHTC 7.370*   [id number] 105*     Recent Labs     01/14/24  1550   CPKTOTAL 27     Recent Labs     01/15/24  0430 01/16/24  0413 01/17/24  0453   SODIUM 143 146* 147*   POTASSIUM 4.0 3.7 3.7   CHLORIDE 107 108 111   CO2 27 28 27   BUN 30* 30* 34*   CREATININE 1.26 1.38 1.53*   MAGNESIUM 2.9* 2.7*  --    PHOSPHORUS 3.3 3.0  --    CALCIUM 9.1 8.8 9.3     Recent Labs     01/15/24  0430 01/15/24  1500 01/16/24 0413 01/17/24 0453   ALTSGPT 138*  --   --   --    ASTSGOT 81*  --   --   --    ALKPHOSPHAT 139*  --   --   --    TBILIRUBIN 0.2  --   --   --    PREALBUMIN  --  6.4*  --   --    GLUCOSE 151*  --  178* 199*     Recent Labs     01/15/24  0430 01/16/24  0413 01/17/24  0453   WBC 13.7* 11.8* 10.9*   NEUTSPOLYS 75.00* 76.40* 78.70*   LYMPHOCYTES 9.60* 10.60* 9.90*   MONOCYTES 8.10 8.20 7.80   EOSINOPHILS 2.60 0.30 0.10   BASOPHILS 0.40 0.30 0.30   ASTSGOT 81*  --   --    ALTSGPT 138*  --   --    ALKPHOSPHAT 139*  --   --    TBILIRUBIN 0.2  --   --      Recent Labs     01/15/24  0430 01/16/24  0413 01/17/24  0453   RBC 3.61* 3.44* 3.41*   HEMOGLOBIN 10.8* 10.7* 10.4*   HEMATOCRIT 34.6* 32.5* 32.0*   PLATELETCT 284 303 311       Imaging  MRA OF THE [place] [facility] [place] WITHIN NORMAL LIMITS.   Reviewed CT series as well as MRI brain  Reviewed CXRs    Assessment/Plan  * Acute  respiratory failure with hypoxia (HCC)- (present on admission)  Assessment & Plan  Intubated date: 1/9/2024  Extubated 01/11/2024  Reintubated on 01/13/2024.  Bronchoscopy revealed thick tenacious secretions occluding multiple airways predominantly on the right.  Lung protective ventilation strategies  Titrate ventilator prescription to optimize oxygenation, ventilation, and acid base balance.  Daily ABC trials-RT protocol/ventilator bundle  Continues to fail SAT's/SBT's either over sedate and apneic or too agitated and unsafe, trying to find a sweet spot  Consider alternative sedation regimen such as ketamine  Will need to clarify goals of care before next trial of extubation.  Wanda Melendez is very involved with these discussions.    Daughter Nora very clear about no tracheostomy further conversations about CODE STATUS, feeding tube etc. to follow  Further discussions to follow      Aspiration pneumonia (HCC)- (present on admission)  Assessment & Plan  Trace stabilize after intubation and bronchoscopy  Explanation for inability to protect airway since cerebellar ICH is unclear  Head of bed > 30  Zosyn/Unasyn 10 day regimen completed 01/14   WBC trended down to normal and remains afebrile  Monitoring for recurrent healthcare associated pneumonia/aspiration pneumonia    A-fib (HCC)  Assessment & Plan  Rate remains controlled  LVH on 12/27 echo with diastolic dysfunction  Eliquis prophylaxis for CVA  Continue to optimize electrolytes  Continuous telemetry    Encephalopathy  Assessment & Plan  Toxic metabolic encephalopathy resolving?  Plateaued at this point?  Serial neurologic exams going unchanged.  Hypercapnia resolved while on ventilator.  MRI does not reveal signs of anoxic brain injury or brainstem infarct that would explain a locked-in type syndrome.  Reassessing daily with family  Monitor for the need to have neurology evaluate patient    Acute kidney failure (HCC)- (present on admission)  Assessment &  Plan  Maintain euvolemia and monitor fluid responsiveness (avoid NaCL and renal congestion)  MAP > 65 uses pressors or inotropic trial  Monitor volume status, serial BMP  Monitor urine output, Schwartz catheter  Avoid nephrotoxic agents.  Seen trending up along with sodium, increasing free water      Hypernatremia- (present on admission)  Assessment & Plan  Sodium trended up as well as creatinine  No fever and no clear insensible loss?  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 In review of yesterday's note (1/16/2024), there are no changes except as documented above.     Discussed patient condition and risk of morbidity and/or mortality with   RN, RT, Pharmacy, Code status disscussed, and Charge nurse / hot rounds    The patient remains critically ill.  I have assessed and reassessed the respiratory status and made ventilator adjustments based upon arterial blood gas analysis, ventilator waveforms and airway mechanics.  I have assessed and reassessed the blood pressure, hemodynamics, cardiovascular status. This patient remains at high risk for worsening cardiopulmonary dysfunction and death without the above critical care interventions.    Critical care time = 45  minutes in directly providing and coordinating critical  care and extensive data review.  No time overlap and excludes procedures.

## 2024-01-18 NOTE — PROGRESS NOTES
Rate:56-87  Rhythm: Sinus with a first degree  Ectopy: rare PVCs  Measurements: 0.229/0.088/0.427

## 2024-01-18 NOTE — CARE PLAN
Problem: Ventilation  Goal: Ability to achieve and maintain unassisted ventilation or tolerate decreased levels of ventilator support  Description: Target End Date:  4 days     Document on Vent flowsheet    1.  Support and monitor invasive and noninvasive mechanical ventilation  2.  Monitor ventilator weaning response  3.  Perform ventilator associated pneumonia prevention interventions  4.  Manage ventilation therapy by monitoring diagnostic test results  Outcome: Not Met       Vent Day #6     Airway: 8@26     Ventilator settings: 24/440/+8/50%

## 2024-01-18 NOTE — CARE PLAN
The patient is Watcher - Medium risk of patient condition declining or worsening    Shift Goals  Clinical Goals: Maintain RASS -1/+1  Patient Goals: DULCE MARIA  Family Goals: DULCE MARIA    Progress made toward(s) clinical / shift goals:  I believe patient was purposefully reaching for ET tube early in shift, I also believe patient followed commands on 2 occasions overnight (gave thumbs up and wiggled toes during mobility and bedbath) but did not follow commands on any other occasions. CPOTs improve with decreased stimulation + fent push earlier in shift, plan for SAT/SBT    Patient is not progressing towards the following goals:        Problem: Hemodynamics  Goal: Patient's hemodynamics, fluid balance and neurologic status will be stable or improve  Outcome: Progressing     Problem: Safety - Medical Restraint  Goal: Remains free of injury from restraints (Restraint for Interference with Medical Device)  Outcome: Progressing     Problem: Pain - Standard  Goal: Alleviation of pain or a reduction in pain to the patient’s comfort goal  Outcome: Progressing

## 2024-01-18 NOTE — CARE PLAN
Problem: Ventilation  Goal: Ability to achieve and maintain unassisted ventilation or tolerate decreased levels of ventilator support  Description: Target End Date:  4 days     Document on Vent flowsheet    1.  Support and monitor invasive and noninvasive mechanical ventilation  2.  Monitor ventilator weaning response  3.  Perform ventilator associated pneumonia prevention interventions  4.  Manage ventilation therapy by monitoring diagnostic test results  Outcome: Not Met     Ventilator Daily Summary    Vent Day #5    Airway: 8@26    Ventilator settings: 24/440/+8/50%    Weaning trials: Attempted SBT x 2, pt's RR 4-8.    Respiratory Procedures: No    Plan: Continue current ventilator settings and wean mechanical ventilation as tolerated per physician orders.

## 2024-01-18 NOTE — CARE PLAN
Problem: Ventilation  Goal: Ability to achieve and maintain unassisted ventilation or tolerate decreased levels of ventilator support  Description: Target End Date:  4 days     Document on Vent flowsheet    1.  Support and monitor invasive and noninvasive mechanical ventilation  2.  Monitor ventilator weaning response  3.  Perform ventilator associated pneumonia prevention interventions  4.  Manage ventilation therapy by monitoring diagnostic test results  Outcome: Not Met     Ventilator Daily Summary    Vent Day #6    Airway: 8@26    Ventilator settings: 24/440/+8/40%    Weaning trials: SBT x 1    Respiratory Procedures: No    Plan: Continue current ventilator settings and wean mechanical ventilation as tolerated per physician orders.

## 2024-01-18 NOTE — PROGRESS NOTES
Critical Care Progress Note    Date of admission  1/4/2024    Chief Complaint  78 y.o. male admitted 1/4/2024 with cardiac arrest    Hospital Course  78 y.o. 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 He presented back for AMS and evaluated in ER and was discharged. He presented the next day 1/4/2024 for hypoxia and was admitted to medicine. He was found to have silent aspiration, lauren, urinary retention and hypernatremia and dehydration. He had a an echo 12/29/2023 LVH EF 60% grade II daistolic dysfunction mild MR/TR. He has not had an MRI on the prior admission. He today 1/9/2024 had afib w/ RVR and worsening hypoxia as well as worsening mental status and code stroke was called. His BS was 190. In the CT scanner gurgling with poor airway reflexes. He was intubated and shortly after had PEA arrest. CT head and CTA/P was negative for acute stroke.  He is full code and confirmed prior to this event by daughters.      01/11-Trial of extubation. GOC discussion with his daughter Jaymie. We will re-intubate if extubation is not successful.  01/12- Requiring HFNC 45L/70% FiO  01/13- Reintubated, MRI/MRA ordered to look for explanation for ongoing aspiration.    1/15 -Vent day 3 of second course on ventilator, MRI without obvious abnormality to account for aspiration  1/16 -Vent day 4, failed SAT, tachypneic with increased end-tidal CO2 with SBT's, not following  1/17 -day 5, failed SAT again, not following    Interval Problem Update  Reviewed last 24 hour events:    DEX 0.8  FENT 100  Finally following some commands and moving all 4 but still bit lethargic  Agitation but not as severe?  SR 60-70s  SBP 90-150s  UO excellent  I/Os -98 cc / 24 hours  VENT#6  CXR no film  Secretions mod  SBT CPAP 8, PS 10  Weak cough  Tm 98.3  WBC 11.0-no change X 3 days  Hemoglobin 10.2,   , K 3.3, HCO3 27, BUN 30, creatinine 1.62 Mg 2.6  Glucose: 141,  161, 182, 180, 186  Apixiban/H2 blocker PPx      Medical update and goals of care conversation with daughter Nora Link for CVC  Increase Free H20  Follow-up chest x-ray  SBT in the afternoon on CPAP 5 pressure port 5 and reassess with weaning parameters and blood gas       YESTERDAY  Sedate on ventilator, agitated  Dexmedetomidine 0.7  Fentanyl 100  RASS -2-+3  SAT not done yet  No SBT yet  SB/SR 50-60s  SBP   UO good-nearly 2 L  I/Os +271 cc/24hr  Vent: APV CMV 24/440/8 - day 5  FiO2 increased from 50 to 60%  Secretions min  CXR increased atx/opacities L base, worse R base as well?  Tmax 98.5, WBC 10.9  Hemoglobin 10.4,   , K3.7, HCO3 27, AG 9, calcium 9.3  BUN 34, creatinine 1.53  Amlodipine  Apixaban and famotidine PPx  Glycopyrrolate  SSI  TF goal    Follow-up chest x-ray, a bit more hypoxic  Further goals of care discussion with daughter  SAT/SBT as tolerated  Mobilize as much as possible  Up to Medium SSI  Up free H2O to 200c    Apnea with SBTs  Try 50% SAT - too agitated for safety otherwise    Attempt at SAT/SBT  Initially apneic again even at 50% dose of fentanyl/dexmedetomidine  Subsequently RASS +3-4 and too agitated for safety and resedated, not purposeful    Review of Systems  Review of Systems   Unable to perform ROS: Intubated        Vital Signs for last 24 hours   Temp:  [36.2 °C (97.2 °F)-36.8 °C (98.3 °F)] 36.6 °C (97.9 °F)  Pulse:  [57-86] 65  Resp:  [8-35] 24  BP: ()/(43-87) 132/73  SpO2:  [91 %-99 %] 93 %    Hemodynamic parameters for last 24 hours       Respiratory Information for the last 24 hours  Vent Mode: APVCMV  Rate (breaths/min): 24  Vt Target (mL): 440  PEEP/CPAP: 8  P Support: 10  MAP: 13  Length of Weaning Trial (Hours): 1  Control VTE (exp VT): 441      Physical Exam  Vitals reviewed.   Constitutional:       Appearance: He is ill-appearing.      Interventions: He is sedated, intubated and restrained.   HENT:      Head: Normocephalic.      Comments:  Ecchymosis noted over the lateral right orbit     Mouth/Throat:      Mouth: Mucous membranes are moist.   Eyes:      General: No scleral icterus.     Pupils: Pupils are equal, round, and reactive to light.      Comments: Pupils are 3 to 4 mm nonreactive, mid position   Neck:      Vascular: No JVD.      Comments: IJ CVC  Cardiovascular:      Rate and Rhythm: Normal rate. Rhythm irregular.      Pulses: Normal pulses.      Heart sounds: No murmur heard.     No friction rub. No gallop.      Comments: AF  Pulmonary:      Effort: No respiratory distress. He is intubated.      Breath sounds: Rales (Bibasilar) present. No wheezing or rhonchi.      Comments: Diminished in the bases, no accessory muscle use.  Abdominal:      General: There is no distension.      Palpations: Abdomen is soft. There is no mass.      Tenderness: There is no abdominal tenderness. There is no right CVA tenderness, left CVA tenderness or guarding.   Genitourinary:     Comments: Schwartz  Musculoskeletal:      Cervical back: Neck supple. No rigidity.      Right lower leg: Edema present.      Left lower leg: Edema present.   Skin:     General: Skin is warm and dry.      Capillary Refill: Capillary refill takes less than 2 seconds.      Coloration: Skin is not cyanotic or mottled.      Nails: There is no clubbing.   Neurological:      Mental Status: He is lethargic.      Comments: With sedation vacation patient opening eyes to voice, tracking some, squeezed hands briefly for me, followed more commands for nursing staff later after more prolonged SAT, did become agitated and medications had to be resumed improved   Psychiatric:         Behavior: Behavior is cooperative.      Comments: DULCE MARIA         Medications  Current Facility-Administered Medications   Medication Dose Route Frequency Provider Last Rate Last Admin    famotidine (Pepcid) tablet 20 mg  20 mg Enteral Tube DAILY Ag Dixon M.D.   20 mg at 01/18/24 0565    insulin regular (HumuLIN  R,NovoLIN R) injection  2-9 Units Subcutaneous Q6HRS Ag Dixon M.D.   2 Units at 01/18/24 0705    And    dextrose 10 % BOLUS 25 g  25 g Intravenous Q15 MIN PRN Ag Dixon M.D.        Respiratory Therapy Consult   Nebulization Continuous RT Manuel Antoine M.D.        senna-docusate (Pericolace Or Senokot S) 8.6-50 MG per tablet 2 Tablet  2 Tablet Enteral Tube BID Manuel Antoine M.D.   2 Tablet at 01/18/24 0534    And    polyethylene glycol/lytes (Miralax) Packet 1 Packet  1 Packet Enteral Tube QDAY PRN Manuel Antoine M.D.   1 Packet at 01/13/24 1728    And    magnesium hydroxide (Milk Of Magnesia) suspension 30 mL  30 mL Enteral Tube QDAY PRN Manuel Antoine M.D.   30 mL at 01/14/24 1749    And    bisacodyl (Dulcolax) suppository 10 mg  10 mg Rectal QDAY PRN Manuel Antoine M.D. MD Alert...ICU Electrolyte Replacement per Pharmacy   Other PHARMACY TO DOSE Manuel Antoine M.D.        lidocaine (Xylocaine) 1 % injection 2 mL  2 mL Tracheal Tube Q30 MIN PRN Manuel Antoine M.D.        fentaNYL (Sublimaze) injection 100 mcg  100 mcg Intravenous Q15 MIN PRN Manuel Antoine M.D.   100 mcg at 01/18/24 0655    And    fentaNYL (Sublimaze) injection 200 mcg  200 mcg Intravenous Q15 MIN PRN Manuel Antoine M.D.   200 mcg at 01/16/24 0516    And    fentaNYL (SUBLIMAZE) 50 mcg/mL in 50mL (Continuous Infusion)   Intravenous Continuous Manuel Antoine M.D. 2 mL/hr at 01/18/24 0933 100 mcg/hr at 01/18/24 0933    And    dexmedetomidine (PRECEDEX) 400 mcg/100mL NS premix infusion  0-1.5 mcg/kg/hr (Ideal) Intravenous Continuous Manuel Antoine M.D. 12.8 mL/hr at 01/18/24 0736 0.7 mcg/kg/hr at 01/18/24 0736    amLODIPine (Norvasc) tablet 5 mg  5 mg Enteral Tube Q DAY Manuel Antoine M.D.   5 mg at 01/18/24 0534    hydrALAZINE (Apresoline) injection 10 mg  10 mg Intravenous Q6HRS PRN Manuel Antione M.D.   10 mg at 01/13/24 2006    glycopyrrolate (Robinul) tablet 1 mg  1 mg Enteral Tube TID Manuel Antoine M.D.    1 mg at 01/18/24 1240    labetalol (Normodyne/Trandate) injection 20 mg  20 mg Intravenous Q4HRS PRN Manuel Antoine M.D.   20 mg at 01/13/24 1418    apixaban (Eliquis) tablet 5 mg  5 mg Enteral Tube BID Manuel Antoine M.D.   5 mg at 01/18/24 0534    Pharmacy Consult: Enteral tube insertion - review meds/change route/product selection  1 Each Other PHARMACY TO DOSE Rebecca Holman M.D.        haloperidol lactate (Haldol) injection 5 mg  5 mg Intravenous Q4HRS PRN GOYO [email or web address].[email or web address]   5 mg at 01/16/24 1407       Fluids    Intake/Output Summary (Last 24 hours) at 1/18/2024 1517  Last data filed at 1/18/2024 1200  Gross per 24 hour   Intake 2401.05 ml   Output 2175 ml   Net 226.05 ml       Laboratory              Recent Labs     01/16/24 0413 01/17/24 0453 01/18/24  0305   SODIUM 146* 147* 147*   POTASSIUM 3.7 3.7 3.3*   CHLORIDE 108 111 110   CO2 28 27 27   BUN 30* 34* 30*   CREATININE 1.38 1.53* 1.62*   MAGNESIUM 2.7*  --  2.6*   PHOSPHORUS 3.0  --   --    CALCIUM 8.8 9.3 9.3     Recent Labs     01/16/24 0413 01/17/24  0453 01/18/24  0305   GLUCOSE 178* 199* 189*     Recent Labs     01/16/24 0413 01/17/24  0453 01/18/24  0305   WBC 11.8* 10.9* 11.0*   NEUTSPOLYS 76.40* 78.70* 77.30*   LYMPHOCYTES 10.60* 9.90* 12.20*   MONOCYTES 8.20 7.80 7.70   EOSINOPHILS 0.30 0.10 0.00   BASOPHILS 0.30 0.30 0.30     Recent Labs     01/16/24 0413 01/17/24 0453 01/18/24  0305   RBC 3.44* 3.41* 3.43*   HEMOGLOBIN 10.7* 10.4* 10.2*   HEMATOCRIT 32.5* 32.0* 32.4*   PLATELETCT 303 311 374       Imaging  MRA OF THE [place] OF [place] WITHIN NORMAL LIMITS.   Reviewed CT series as well as MRI brain  Reviewed CXRs    Assessment/Plan  * Acute respiratory failure with hypoxia (HCC)- (present on admission)  Assessment & Plan  Intubated date: 1/9/2024  Extubated 01/11/2024  Reintubated on 01/13/2024.  Bronchoscopy revealed thick tenacious secretions occluding multiple airways predominantly on the right.  Lung protective ventilation  strategies  Titrate ventilator prescription to optimize oxygenation, ventilation, and acid base balance.  Daily ABC trials-RT protocol/ventilator bundle  Finally tolerated SAT for the first time 1/18  Marginally tolerating SBT with increased secretions on 50% O2, not a good liberation candidate yet especially with his history  Consider alternative sedation regimen such as ketamine  Will need to clarify goals of care before next trial of extubation.  Wanda Melendez is very involved with these discussions.    Wanda Melendez very clear about no tracheostomy further conversations about CODE STATUS, feeding tube etc. to follow  Further discussions to follow      Aspiration pneumonia (HCC)- (present on admission)  Assessment & Plan  Trace stabilize after intubation and bronchoscopy  Explanation for inability to protect airway since cerebellar ICH is unclear  Head of bed > 30  Zosyn/Unasyn 10 day regimen completed 01/14   WBC trended down to normal and remains afebrile  Monitoring for recurrent healthcare associated pneumonia/aspiration pneumonia    A-fib (HCC)  Assessment & Plan  Rate remains controlled in the 70s  LVH on 12/27 echo with diastolic dysfunction  Eliquis prophylaxis for CVA, no bleeding  Continue to optimize electrolytes, needs further potassium repletion  Continuous telemetry    Encephalopathy  Assessment & Plan  Toxic metabolic encephalopathy resolving?  Plateaued at this point?  Serial neurologic exams going unchanged.  Hypercapnia resolved while on ventilator.  MRI does not reveal signs of anoxic brain injury or brainstem infarct that would explain a locked-in type syndrome.  Reassessing daily with family  Monitor for the need to have neurology evaluate patient    Acute kidney failure (HCC)- (present on admission)  Assessment & Plan  Maintain euvolemia and monitor fluid responsiveness (avoid NaCL and renal congestion)  MAP > 65 uses pressors or inotropic trial  Monitor volume status, serial BMP  Monitor  urine output, Schwartz catheter  Avoid nephrotoxic agents.  Sodium and B/C trending up, increasing free water feeding tube, may need some IV D5W?      Hypernatremia- (present on admission)  Assessment & Plan  Sodium trended up as well as creatinine  No fever and no clear insensible loss?  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 In review of yesterday's note (1/17/2024), there are no changes except as documented above.     Discussed patient condition and risk of morbidity and/or mortality with   RN, RT, Pharmacy, Code status disscussed, and Charge nurse / hot rounds    The patient remains critically ill.  I have assessed and reassessed the respiratory status and made ventilator adjustments based upon arterial blood gas analysis, ventilator waveforms and airway mechanics.  I have assessed and reassessed the blood pressure, hemodynamics, cardiovascular status. This patient remains at high risk for worsening cardiopulmonary dysfunction and death without the above critical care interventions.    Critical care time = 45  minutes in directly providing and coordinating critical care and extensive data review.  No time overlap and excludes procedures.

## 2024-01-19 NOTE — DISCHARGE PLANNING
Case Management Discharge Planning    Chart reviewed and case discussed in ICU rounds this morning:    Intubated on 1/9/2024, extubated on 1/11/2024, and re-intubated on 1/13/2024.  +Vent (day 6)/Drips (fentanyl, Precedex)/Q4H neurochecks/NG with feeds at goal.    Failed attempts at SAT/SBT.     Referrals (SNF vs LTACH) to be submitted when appropriate (pending clinical progression).

## 2024-01-19 NOTE — CARE PLAN
The patient is Watcher - Medium risk of patient condition declining or worsening    Shift Goals  Clinical Goals: Safety, SAT/SBT as tolerated  Patient Goals: DULCE MARIA  Family Goals: DULCE MARIA    Progress made toward(s) clinical / shift goals:  Appropriate safety precautions assessed and in place, SAT/SBT performed as tolerated      Problem: Hemodynamics  Goal: Patient's hemodynamics, fluid balance and neurologic status will be stable or improve  Outcome: Progressing     Problem: Fall Risk  Goal: Patient will remain free from falls  Outcome: Progressing     Problem: Safety - Medical Restraint  Goal: Remains free of injury from restraints (Restraint for Interference with Medical Device)  Outcome: Met     Problem: Pain - Standard  Goal: Alleviation of pain or a reduction in pain to the patient’s comfort goal  Outcome: Progressing         Patient is not progressing towards the following goals:    Problem: Safety - Medical Restraint  Goal: Free from restraint(s) (Restraint for Interference with Medical Device)  Outcome: Not Progressing

## 2024-01-19 NOTE — PROGRESS NOTES
Rate: 48-85 (mostly 50s-60s)  Rhythm: Sinus with a first degree  Ectopy: N/A  Measurements:0.213/0.079/0.392

## 2024-01-19 NOTE — CARE PLAN
Problem: Ventilation  Goal: Ability to achieve and maintain unassisted ventilation or tolerate decreased levels of ventilator support  Description: Target End Date:  4 days     Document on Vent flowsheet    1.  Support and monitor invasive and noninvasive mechanical ventilation  2.  Monitor ventilator weaning response  3.  Perform ventilator associated pneumonia prevention interventions  4.  Manage ventilation therapy by monitoring diagnostic test results  1/19/2024 1514 by Frances Tran, RRT  Outcome: Progressing    Pt on APVCMV 24/440/8/50%    Problem: Bronchoconstriction  Goal: Improve in air movement and diminished wheezing  Description: Target End Date:  2 to 3 days    1.  Implement inhaled treatments  2.  Evaluate and manage medication effects  Outcome: Progressing    Pt receiving Duoeb Q4 hours    Problem: Bronchopulmonary Hygiene  Goal: Increase mobilization of retained secretions  Description: Target End Date:  2 to 3 days    1.  Perform bronchopulmonary therapy as indicated by assessment  2.  Perform airway suctioning  3.  Perform actions to maintain patient airway  Outcome: Progressing    Pt receiving 3 ml of sodium bicarb

## 2024-01-19 NOTE — PROGRESS NOTES
Critical Care Progress Note    Date of admission  1/4/2024    Chief Complaint  78 y.o. male admitted 1/4/2024 with cardiac arrest    Hospital Course  78 y.o. 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 He presented back for AMS and evaluated in ER and was discharged. He presented the next day 1/4/2024 for hypoxia and was admitted to medicine. He was found to have silent aspiration, lauren, urinary retention and hypernatremia and dehydration. He had a an echo 12/29/2023 LVH EF 60% grade II daistolic dysfunction mild MR/TR. He has not had an MRI on the prior admission. He today 1/9/2024 had afib w/ RVR and worsening hypoxia as well as worsening mental status and code stroke was called. His BS was 190. In the CT scanner gurgling with poor airway reflexes. He was intubated and shortly after had PEA arrest. CT head and CTA/P was negative for acute stroke.  He is full code and confirmed prior to this event by daughters.      01/11-Trial of extubation. GOC discussion with his daughter Jaymie. We will re-intubate if extubation is not successful.  01/12- Requiring HFNC 45L/70% FiO  01/13- Reintubated, MRI/MRA ordered to look for explanation for ongoing aspiration.    1/15 -Vent day 3 of second course on ventilator, MRI without obvious abnormality to account for aspiration  1/16 -Vent day 4, failed SAT, tachypneic with increased end-tidal CO2 with SBT's, not following  1/17 -Vent day 5, failed SAT again, not following  1/18 -Vent day 6 vent, better with SAT/SBT on 50% not ready for liberation starting to follow for the first time    Interval Problem Update  Reviewed last 24 hour events:    Reviewed with daughter at length last night, she is very pleased that her father started to wake up a bit.  She reiterated she absolutely does not want him extubated unless she is in the ICU    Sedate on ventilator  DEX 0.8  Fentanyl 100  SAT tolerated back on 1/2  dose  RSBI 60s - 50%  EOB  SR 80s  SBP 90-140s  PRN Bp meds x one  UO  excellent  Fluid balance +1.43 L  Vent day 7: VC MV 24/440/8-50%  O2 saturations 88-97%  CXR worse ATX  Secretions thick mod  Weak cough  Tmax 97.9  WBC 9.8  Hemoglobin 9.7,   BUN 28, creatinine 1.44-both improved  Glucose 145, 190, 161, 141, 161, 182  K 3.3  Apixaban/famotidine PPx      TX FOB  DuoNeb Q4, IPV  Cont SAT/SBT  K basement    Tx FOB performed, copious secretions occluding distal ET tube and both bases, probably worse on the left especially airway inflammation and BAL sent from left lower lobe along with a bilateral bronc wash for routine cultures and microbiological studies nearly a full Luken's trap was filled with secretions, will add mucolytic's VN elation and nebulizer treatments and try to do some IPV as well if we can find a machine.     YESTERDAY  DEX 0.8  FENT 100  Finally following some commands and moving all 4 but still bit lethargic  Agitation but not as severe?  SR 60-70s  SBP 90-150s  UO excellent  I/Os -98 cc / 24 hours  VENT#6  CXR no film  Secretions mod  SBT CPAP 8, PS 10  Weak cough  Tm 98.3  WBC 11.0-no change X 3 days  Hemoglobin 10.2,   , K 3.3, HCO3 27, BUN 30, creatinine 1.62 Mg 2.6  Glucose: 141, 161, 182, 180, 186  Apixiban/H2 blocker PPx      Medical update and goals of care conversation with daughter Nora Link for CVC  Increase Free H20  Follow-up chest x-ray  SBT in the afternoon on CPAP 5 pressure port 5 and reassess with weaning parameters and blood gas    Review of Systems  Review of Systems   Unable to perform ROS: Intubated        Vital Signs for last 24 hours   Temp:  [35.7 °C (96.3 °F)-36.8 °C (98.2 °F)] 36.3 °C (97.4 °F)  Pulse:  [] 81  Resp:  [14-41] 24  BP: ()/(44-93) 111/55  SpO2:  [87 %-100 %] 95 %    Hemodynamic parameters for last 24 hours       Respiratory Information for the last 24 hours  Vent Mode: APVCMV  Rate (breaths/min): 24  Vt Target (mL):  440  PEEP/CPAP: 8  P Support: 5  MAP: 10  Length of Weaning Trial (Hours): 1  Control VTE (exp VT): 495      Physical Exam  Vitals reviewed.   Constitutional:       Appearance: He is ill-appearing.      Interventions: He is sedated, intubated and restrained.   HENT:      Head: Normocephalic.      Comments: Ecchymosis noted over the lateral right orbit     Mouth/Throat:      Mouth: Mucous membranes are moist.   Eyes:      General: No scleral icterus.     Pupils: Pupils are equal, round, and reactive to light.      Comments: Pupils are 3 to 4 mm nonreactive, mid position   Neck:      Vascular: No JVD.      Comments: IJ CVC  Cardiovascular:      Rate and Rhythm: Normal rate. Rhythm irregular.      Pulses: Normal pulses.      Heart sounds: No murmur heard.     No friction rub. No gallop.      Comments: AF  Pulmonary:      Effort: No respiratory distress. He is intubated.      Breath sounds: Examination of the right-lower field reveals decreased breath sounds. Examination of the left-lower field reveals decreased breath sounds. Decreased breath sounds, rhonchi and rales (Bibasilar) present. No wheezing.      Comments: Diminished in the bases, no accessory muscle use.  Abdominal:      General: There is no distension.      Palpations: Abdomen is soft. There is no mass.      Tenderness: There is no abdominal tenderness. There is no right CVA tenderness, left CVA tenderness or guarding.   Genitourinary:     Comments: Lana  Musculoskeletal:      Cervical back: Neck supple. No rigidity.      Right lower leg: Edema present.      Left lower leg: Edema present.   Skin:     General: Skin is warm and dry.      Capillary Refill: Capillary refill takes less than 2 seconds.      Coloration: Skin is not cyanotic or mottled.      Nails: There is no clubbing.   Neurological:      Mental Status: He is lethargic.      Comments: Following some intermittently, tolerating SAT, tolerated SBT for the first time for a full hour but could not  perform any weaning parameters and secretions are significantly increased, remains on 50% O2 with RSBI 60s+   Psychiatric:         Behavior: Behavior is cooperative.      Comments: DULCE MARIA         Medications  Current Facility-Administered Medications   Medication Dose Route Frequency Provider Last Rate Last Admin    ipratropium-albuterol (DUONEB) nebulizer solution  3 mL Nebulization Q4HRS (RT) Ag Dixon M.D.   3 mL at 01/19/24 1119    sodium bicarbonate 8.4 % for inhalation 3 mL  3 mL Inhalation Q4HRS (RT) Ag Dixon M.D.   3 mL at 01/19/24 1204    famotidine (Pepcid) tablet 20 mg  20 mg Enteral Tube DAILY Ag Dixon M.D.   20 mg at 01/19/24 0540    insulin regular (HumuLIN R,NovoLIN R) injection  2-9 Units Subcutaneous Q6HRS Ag Dixon M.D.   2 Units at 01/19/24 1231    And    dextrose 10 % BOLUS 25 g  25 g Intravenous Q15 MIN PRN Ag Dixon M.D.        Respiratory Therapy Consult   Nebulization Continuous RT Manuel Antoine M.D.        senna-docusate (Pericolace Or Senokot S) 8.6-50 MG per tablet 2 Tablet  2 Tablet Enteral Tube BID Manuel Antoine M.D.   2 Tablet at 01/18/24 0534    And    polyethylene glycol/lytes (Miralax) Packet 1 Packet  1 Packet Enteral Tube QDAY PRN Manuel Antoine M.D.   1 Packet at 01/13/24 1728    And    magnesium hydroxide (Milk Of Magnesia) suspension 30 mL  30 mL Enteral Tube QDAY PRN Manuel Antoine M.D.   30 mL at 01/14/24 1749    And    bisacodyl (Dulcolax) suppository 10 mg  10 mg Rectal QDAY PRN Manuel Antoine M.D. MD Alert...ICU Electrolyte Replacement per Pharmacy   Other PHARMACY TO DOSE Manuel Antoine M.D.        lidocaine (Xylocaine) 1 % injection 2 mL  2 mL Tracheal Tube Q30 MIN PRN Manuel Antoine M.D.        fentaNYL (Sublimaze) injection 100 mcg  100 mcg Intravenous Q15 MIN PRN Manuel Antoine M.D.   100 mcg at 01/19/24 0640    And    fentaNYL (Sublimaze) injection 200 mcg  200 mcg Intravenous Q15 MIN PRN Manuel Antoine,  M.D.   200 mcg at 01/16/24 0516    And    fentaNYL (SUBLIMAZE) 50 mcg/mL in 50mL (Continuous Infusion)   Intravenous Continuous Manuel Antoine M.D. 2 mL/hr at 01/19/24 0743 100 mcg/hr at 01/19/24 0743    And    dexmedetomidine (PRECEDEX) 400 mcg/100mL NS premix infusion  0-1.5 mcg/kg/hr (Ideal) Intravenous Continuous Manuel Antoine M.D. 3.7 mL/hr at 01/19/24 1133 0.2 mcg/kg/hr at 01/19/24 1133    amLODIPine (Norvasc) tablet 5 mg  5 mg Enteral Tube Q DAY Manuel Antoine M.D.   5 mg at 01/19/24 0541    hydrALAZINE (Apresoline) injection 10 mg  10 mg Intravenous Q6HRS PRN Manuel Antoine M.D.   10 mg at 01/19/24 0610    labetalol (Normodyne/Trandate) injection 20 mg  20 mg Intravenous Q4HRS PRN Manuel Antoine M.D.   20 mg at 01/13/24 1418    apixaban (Eliquis) tablet 5 mg  5 mg Enteral Tube BID Manuel Antoine M.D.   5 mg at 01/19/24 0541    Pharmacy Consult: Enteral tube insertion - review meds/change route/product selection  1 Each Other PHARMACY TO DOSE Rebecca Holman M.D.        haloperidol lactate (Haldol) injection 5 mg  5 mg Intravenous Q4HRS PRN Agapito Bonilla, A.P.R.N.   5 mg at 01/16/24 1407       Fluids    Intake/Output Summary (Last 24 hours) at 1/19/2024 1405  Last data filed at 1/19/2024 1200  Gross per 24 hour   Intake 3491.1 ml   Output 1810 ml   Net 1681.1 ml       Laboratory  Recent Labs     01/19/24  0413   ISTATAPH 7.435   ISTATAPCO2 37.1*   ISTATAPO2 60*   ISTATATCO2 26   NOMHOTI2WSQ 91*   ISTATARTHCO3 24.9   ISTATARTBE 1   ISTATTEMP 96.3 F   ISTATFIO2 40   ISTATSPEC Arterial   ISTATAPHTC 7.454   FBVBMRBC3GJ 55*             Recent Labs     01/17/24  0453 01/18/24  0305 01/19/24  0245   SODIUM 147* 147* 142   POTASSIUM 3.7 3.3* 3.3*   CHLORIDE 111 110 108   CO2 27 27 25   BUN 34* 30* 28*   CREATININE 1.53* 1.62* 1.44*   MAGNESIUM  --  2.6*  --    CALCIUM 9.3 9.3 9.0     Recent Labs     01/17/24  0453 01/18/24  0305 01/19/24  0245   GLUCOSE 199* 189* 216*     Recent Labs     01/17/24  0453  01/18/24  0305 01/19/24  0245   WBC 10.9* 11.0* 9.8   NEUTSPOLYS 78.70* 77.30* 77.20*   LYMPHOCYTES 9.90* 12.20* 13.00*   MONOCYTES 7.80 7.70 8.00   EOSINOPHILS 0.10 0.00 0.00   BASOPHILS 0.30 0.30 0.20     Recent Labs     01/17/24  0453 01/18/24  0305 01/19/24  0245   RBC 3.41* 3.43* 3.20*   HEMOGLOBIN 10.4* 10.2* 9.7*   HEMATOCRIT 32.0* 32.4* 30.3*   PLATELETCT 311 374 361       Imaging  MRA OF THE [place] WITHIN NORMAL LIMITS.   Reviewed CT series as well as MRI brain  Reviewed CXRs    Assessment/Plan  * Acute respiratory failure with hypoxia (HCC)- (present on admission)  Assessment & Plan  Intubated date: 1/9/2024  Extubated 01/11/2024  Reintubated on 01/13/2024.  Bronchoscopy revealed thick tenacious secretions occluding multiple airways predominantly on the right.  Lung protective ventilation strategies  Titrate ventilator prescription to optimize oxygenation, ventilation, and acid base balance.  Daily ABC trials-RT protocol/ventilator bundle  Finally tolerated SAT for the first time 1/18  Finally tolerated his first SBT 1/19 but could not perform weaning parameters  Tx FOB 1/19 and copious amount of thick mildly purulent secretions removed, left lower lobe BAL and bronchial wash sent    Will need to clarify goals of care before next trial of extubation.  Wanda Melendez is very involved with these discussions are ongoing.    Wanda Melendez very clear about no tracheostomy further conversations about CODE STATUS, feeding tube etc. to follow  Further discussions to follow      Aspiration pneumonia (HCC)- (present on admission)  Assessment & Plan  Trace stabilize after intubation and bronchoscopy  Explanation for inability to protect airway since cerebellar ICH is unclear  Head of bed > 30  Zosyn/Unasyn 10 day regimen completed 01/14   WBC trended down to normal and remains afebrile several days  Continue monitoring for recurrent healthcare associated pneumonia/aspiration pneumonia  No clinically  apparent aspiration in recent days  Tx FOB 1/19 with copious secretions  BAL and bronc wash cultures pending    A-fib (HCC)  Assessment & Plan  Rate remains controlled in the 70s  LVH on 12/27 echo with diastolic dysfunction  Eliquis prophylaxis for CVA, no signs of bleeding  Continue to optimize electrolytes, needs further potassium repletion  Continuous telemetry ongoing    Encephalopathy  Assessment & Plan  Toxic metabolic encephalopathy resolving?  Plateaued at this point?  Serial neurologic exams going unchanged.  Hypercapnia resolved while on ventilator.  MRI does not reveal signs of anoxic brain injury or brainstem infarct that would explain a locked-in type syndrome.  Reassessing daily with family  Starting to follow 1/18, finally tolerating weaning sedation and SATs  Monitor for the need to have neurology evaluate patient    Acute kidney failure (HCC)- (present on admission)  Assessment & Plan  Maintain euvolemia and monitor fluid responsiveness (avoid NaCL and renal congestion)  MAP > 65 uses pressors or inotropic trial  Monitor volume status, serial BMP  Monitor urine output, Schwartz catheter  Avoid nephrotoxic agents.  Improved serum sodium and renal function with 1 L D5W overnight      Hypernatremia- (present on admission)  Assessment & Plan  Sodium trended up as well as creatinine  No fever and no clear insensible loss?  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 In review of yesterday's note (1/18/2024), there are no changes except as documented above.     Discussed patient condition and risk of morbidity and/or mortality with   RN, RT, Pharmacy, Code status disscussed, and Charge nurse / hot rounds    The patient remains critically ill.  I have assessed and reassessed the respiratory status and made ventilator adjustments based upon arterial blood gas analysis, ventilator waveforms and airway mechanics.  I have assessed and reassessed the blood pressure, hemodynamics, cardiovascular status. This patient remains at high risk for worsening cardiopulmonary dysfunction and death without the above critical care interventions.    Critical care time = 45   minutes in directly providing and coordinating critical care and extensive data review.  No time overlap and excludes procedures.

## 2024-01-19 NOTE — PROCEDURES
[facility]    Date of service:  1/19/2024    PROCEDURE:  Therapeutic bronchoscopy with diagnostic bronchoalveolar lavage/bronchial lavage.     INDICATIONS:  Therapeutically clear inspisated secretions/plugs, obtain bronchial wash and bronchial alveolar lavage specimens for diagnostic studies, chest x-ray showing worsening atelectasis/opacities and increased secretions from ET tube.    POST-OP DIAGNOSIS:  Acute hypoxic and hypercarbic respiratory failure, healthcare associated pneumonia? atlectasis secondary to mucous plugging.    Timeout observed     DESCRIPTION OF PROCEDURE:  The patient is intubated on full ventilator support.  Patient was maintained on dexmedetomidine and fentanyl infusions.  He was sedated with additional IV Versed and Fentanyl IV for the procedure.  He transiently had a soft blood pressure in the high 70s low 80s after initial sedation and was treated with 100 mcg IV Shyam-Synephrine and blood pressure remained acceptable throughout of the case. The patient was placed on 100% O2 for the procedure.  O2 saturations were % throughout the procedure.  The flexible fiberoptic bronchoscope was inserted through the adaptor inline with the endotracheal tube without difficulty.  ET tube noted to be 2 cm above the main soco.    All airways were examined at the subsegmental level.  No endobronchial masses or anatomic variants were identified.  Airway mucosa was mildly moderately inflamed diffusely worse at the bases and perhaps worse on the left.  There was a copious amount of thick the purulent yellow-white secretions which were therapeutically lavaged from lobes but especially the right and left lower lobe sequentially with small aliquots of saline and collected in a Lukens Trap.  Entire trap was filled with secretions.     The bronchoscope was wedged the left lower lobe and 30 mL x 2 of sterile saline was lavaged into the basilar segments and an excellent BAL specimen was obtained yielding mildly cloudy  plug laden fluid which will be sent for appropriate quantitative cultures.  There is no immediate complication.    Procedural time  Start 1127  Stop 1151  Total time  24 minutes    Sedation  Continuous infusion dexmedetomidine between 0.2 and 0.8  Continuous infusion of fentanyl at 100  IV fentanyl 50 mcg X2  IV midazolam 1 mg IV X2    Specimens  Bilateral bronchial washings and Luken's trap  Left lower lobe BAL in sterile cup

## 2024-01-19 NOTE — CARE PLAN
The patient is Watcher - Medium risk of patient condition declining or worsening    Shift Goals  Clinical Goals: Safety, SAT/SBT as tolerated  Patient Goals: DULCE MARIA  Family Goals: DULCE MARIA    Progress made toward(s) clinical / shift goals:  Despite patients best attempts earlier in shift he did not extubate himself, tolerated increase in fent gtt, which allowed RASS for downwards titration of significant amount of DEX. K to be replaced, tolerated bathing, good UOP, improving creatine. Plan remains to SAT/SBT    Patient is not progressing towards the following goals: FiO2 increased following ABG, may need higher sliding scale now that on D5 gtt, still only intermittently following commands occasionally.       Problem: Safety - Medical Restraint  Goal: Free from restraint(s) (Restraint for Interference with Medical Device)  Outcome: Progressing     Problem: Pain - Standard  Goal: Alleviation of pain or a reduction in pain to the patient’s comfort goal  Outcome: Progressing

## 2024-01-19 NOTE — THERAPY
Physical Therapy Contact Note    Patient Name: Lewis Mohr  Age:  78 y.o., Sex:  male  Medical Record #: [id number]  Today's Date: 1/19/2024    Bronch upon attempt; will return when able; following 4x/wk as agitation allows    Lida PORTILLO, PT,  [phone]

## 2024-01-19 NOTE — CARE PLAN
The patient is Watcher - Medium risk of patient condition declining or worsening    Shift Goals  Clinical Goals: SAT/SBT, safety, supportive care  Patient Goals: polo  Family Goals: POLO    Progress made toward(s) clinical / shift goals:  Patient mobilized and completed SAT/SBT. Bronched by MD.       Problem: Pain - Standard  Goal: Alleviation of pain or a reduction in pain to the patient’s comfort goal  Outcome: Progressing     Problem: Safety - Medical Restraint  Goal: Free from restraint(s) (Restraint for Interference with Medical Device)  Outcome: Progressing

## 2024-01-19 NOTE — THERAPY
Speech Language Therapy Contact Note    Patient Name: Lewis Mohr  Age:  78 y.o., Sex:  male  Medical Record #: [id number]  Today's Date: 1/19/2024 01/19/24 0805   Treatment Variance   Reason For Missed Therapy Medical - Patient not Able To Participate;Medical - Patient Is Not Medically Stable   Interdisciplinary Plan of Care Collaboration   IDT Collaboration with  Other (See Comments)  (EMR)   Collaboration Comments Per EMR, pt currently remains intubated on this date. ST to follow to monitor for extubation. Please place new orders for CSE as appropriate. Thank you.

## 2024-01-20 NOTE — CARE PLAN
Problem: Ventilation  Goal: Ability to achieve and maintain unassisted ventilation or tolerate decreased levels of ventilator support  Description: Target End Date:  4 days     Document on Vent flowsheet    1.  Support and monitor invasive and noninvasive mechanical ventilation  2.  Monitor ventilator weaning response  3.  Perform ventilator associated pneumonia prevention interventions  4.  Manage ventilation therapy by monitoring diagnostic test results  Outcome: Not Met   24/440/8/50

## 2024-01-20 NOTE — PROGRESS NOTES
Critical Care Progress Note    Date of admission  1/4/2024    Chief Complaint  78 y.o. male admitted 1/4/2024 with cardiac arrest    Hospital Course  78 y.o. 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 He presented back for AMS and evaluated in ER and was discharged. He presented the next day 1/4/2024 for hypoxia and was admitted to medicine. He was found to have silent aspiration, lauren, urinary retention and hypernatremia and dehydration. He had a an echo 12/29/2023 LVH EF 60% grade II daistolic dysfunction mild MR/TR. He has not had an MRI on the prior admission. He today 1/9/2024 had afib w/ RVR and worsening hypoxia as well as worsening mental status and code stroke was called. His BS was 190. In the CT scanner gurgling with poor airway reflexes. He was intubated and shortly after had PEA arrest. CT head and CTA/P was negative for acute stroke.  He is full code and confirmed prior to this event by daughters.      01/11-Trial of extubation. GOC discussion with his daughter Jaymie. We will re-intubate if extubation is not successful.  01/12- Requiring HFNC 45L/70% FiO  01/13- Reintubated, MRI/MRA ordered to look for explanation for ongoing aspiration.    1/15 -Vent day 3 of second course on ventilator, MRI without obvious abnormality to account for aspiration  1/16 -Vent day 4, failed SAT, tachypneic with increased end-tidal CO2 with SBT's, not following  1/17 -Vent day 5, failed SAT again, not following  1/18 -Vent day 6 vent, better with SAT/SBT on 50% not ready for liberation starting to follow for the first time  1/19 - Tx FOB on vent - copious thick secretions, LLL BAL sent    Interval Problem Update  Reviewed last 24 hour events:    Date on ventilator  Dex 0.4-> off  Fentanyl 100  Follows better  SR 70-80s  -120s  UO good, 2 L / 24 hours  Fluid balance +733 cc / 24 hours  Vent day 8: APV CMV 24/440/8-50->40%  SBT better rsbi  50, nif neg 30, no vc  Secretions improved after bronchoscopy yesterday  No cough  CXR pending  WOB low  TMax 98.2, WBC 11.6  Hemoglobin 8.9,   Electrolytes normal, BUN 25, creatinine 1.64  Glucose 160, 210, 145, 175, 145  Apixaban/famotidine PPx        SAT/SBT  Of care conversation with daughter re: reintubation if fails after extubation    Late CXR reviewed, atelectasis/bibasilar opacities significantly improved post bronchoscopy yesterday    Reviewed case at length at the bedside with Nora his daughter.  He is more responsive for her today and she is quite happy about that.  Would like to leave him full code but if he does need to get reintubated no tracheostomy and likely one-way wean at some point.  Follow-up breathing trial ongoing now and if tolerated well check weans and if those are good check a cuff leak and a blood gas and hopefully liberation trial this afternoon.         YESTERDAY  Reviewed with daughter at length last night, she is very pleased that her father started to wake up a bit.  She reiterated she absolutely does not want him extubated unless she is in the ICU    Sedate on ventilator  DEX 0.8  Fentanyl 100  SAT tolerated back on 1/2 dose  RSBI 60s - 50%  EOB  SR 80s  SBP 90-140s  PRN Bp meds x one  UO  excellent  Fluid balance +1.43 L  Vent day 7: APV CMV 24/440/8-50%  O2 saturations 88-97%  CXR worse ATX  Secretions thick mod  Weak cough  Tmax 97.9  WBC 9.8  Hemoglobin 9.7,   BUN 28, creatinine 1.44-both improved  Glucose 145, 190, 161, 141, 161, 182  K 3.3  Apixaban/famotidine PPx      TX FOB  DuoNeb Q4, IPV  Cont SAT/SBT  K basement    Tx FOB performed, copious secretions occluding distal ET tube and both bases, probably worse on the left especially airway inflammation and BAL sent from left lower lobe along with a bilateral bronc wash for routine cultures and microbiological studies nearly a full Luken's trap was filled with secretions, will add mucolytic's VN elation and  nebulizer treatments and try to do some IPV as well if we can find a machine.    Review of Systems  Review of Systems   Unable to perform ROS: Intubated        Vital Signs for last 24 hours   Temp:  [36.2 °C (97.1 °F)-36.8 °C (98.2 °F)] 36.2 °C (97.1 °F)  Pulse:  [73-96] 94  Resp:  [16-28] 24  BP: (104-159)/(53-81) 153/81  SpO2:  [92 %-99 %] 94 %    Hemodynamic parameters for last 24 hours       Respiratory Information for the last 24 hours  Vent Mode: Spont  Rate (breaths/min): 24  Vt Target (mL): 440  PEEP/CPAP: 8  P Support: 5  MAP: 12  Length of Weaning Trial (Hours): 1  Control VTE (exp VT): 247      Physical Exam  Vitals reviewed.   Constitutional:       General: He is not in acute distress.     Appearance: He is ill-appearing (Looks better). He is not diaphoretic.      Interventions: He is sedated, intubated and restrained.   HENT:      Head: Normocephalic.      Comments: Ecchymosis noted over the lateral right orbit     Mouth/Throat:      Mouth: Mucous membranes are moist.   Eyes:      General: No scleral icterus.     Pupils: Pupils are equal, round, and reactive to light.      Comments: Pupils are 3 to 4 mm nonreactive, mid position   Neck:      Vascular: No JVD.      Comments: IJ CVC  Cardiovascular:      Rate and Rhythm: Normal rate. Rhythm irregular.      Pulses: Normal pulses.      Heart sounds: No murmur heard.     No friction rub. No gallop.      Comments: AF  Pulmonary:      Effort: No respiratory distress. He is intubated.      Breath sounds: Examination of the right-lower field reveals decreased breath sounds. Examination of the left-lower field reveals decreased breath sounds. Decreased breath sounds and rales (Improved) present. No wheezing or rhonchi.      Comments: Diminished in the bases, no accessory muscle use.  Abdominal:      General: There is no distension.      Palpations: Abdomen is soft. There is no mass.      Tenderness: There is no abdominal tenderness. There is no right CVA  tenderness, left CVA tenderness or guarding.   Genitourinary:     Comments: Schwartz  Musculoskeletal:      Cervical back: Neck supple. No rigidity.      Right lower leg: Edema present.      Left lower leg: Edema present.   Skin:     General: Skin is warm and dry.      Capillary Refill: Capillary refill takes less than 2 seconds.      Coloration: Skin is not cyanotic or mottled.      Nails: There is no clubbing.   Neurological:      Mental Status: He is lethargic.      Comments: Doing better with SAT, nodding appropriately including nodding he is ready to have the tube removed, following simple commands and moving all extremities, cranial nerves intact   Psychiatric:         Behavior: Behavior is cooperative.      Comments: DULCE MARIA         Medications  Current Facility-Administered Medications   Medication Dose Route Frequency Provider Last Rate Last Admin    ipratropium-albuterol (DUONEB) nebulizer solution  3 mL Nebulization Q4HRS (RT) Ag Dixon M.D.   3 mL at 01/20/24 1115    sodium bicarbonate 8.4 % for inhalation 3 mL  3 mL Inhalation Q4HRS (RT) Ag Dixon M.D.   3 mL at 01/20/24 1115    famotidine (Pepcid) tablet 20 mg  20 mg Enteral Tube DAILY Ag Dixon M.D.   20 mg at 01/20/24 0534    insulin regular (HumuLIN R,NovoLIN R) injection  2-9 Units Subcutaneous Q6HRS Ag Dixon M.D.   2 Units at 01/20/24 1207    And    dextrose 10 % BOLUS 25 g  25 g Intravenous Q15 MIN PRN Ag Dixon M.D.        Respiratory Therapy Consult   Nebulization Continuous RT Manuel Antoine M.D.        senna-docusate (Pericolace Or Senokot S) 8.6-50 MG per tablet 2 Tablet  2 Tablet Enteral Tube BID Manuel Antoine M.D.   2 Tablet at 01/20/24 0534    And    polyethylene glycol/lytes (Miralax) Packet 1 Packet  1 Packet Enteral Tube QDAY PRN Manuel Antoine M.D.   1 Packet at 01/13/24 1728    And    magnesium hydroxide (Milk Of Magnesia) suspension 30 mL  30 mL Enteral Tube QDAY PRN Manuel Antoine M.D.    30 mL at 01/14/24 1749    And    bisacodyl (Dulcolax) suppository 10 mg  10 mg Rectal QDAY PRN Manuel Antoine M.D. MD Alert...ICU Electrolyte Replacement per Pharmacy   Other PHARMACY TO DOSE Manuel Antoine M.D.        lidocaine (Xylocaine) 1 % injection 2 mL  2 mL Tracheal Tube Q30 MIN PRN Manuel Antoine M.D.        fentaNYL (Sublimaze) injection 100 mcg  100 mcg Intravenous Q15 MIN PRN Manuel Antoine M.D.   100 mcg at 01/20/24 1305    And    fentaNYL (Sublimaze) injection 200 mcg  200 mcg Intravenous Q15 MIN PRN Manuel Antoine M.D.   200 mcg at 01/16/24 0516    And    fentaNYL (SUBLIMAZE) 50 mcg/mL in 50mL (Continuous Infusion)   Intravenous Continuous Manuel Antoine M.D.   Stopped at 01/20/24 0629    And    dexmedetomidine (PRECEDEX) 400 mcg/100mL NS premix infusion  0-1.5 mcg/kg/hr (Ideal) Intravenous Continuous Manuel Antoine M.D.   Stopped at 01/20/24 0628    amLODIPine (Norvasc) tablet 5 mg  5 mg Enteral Tube Q DAY Manuel Antoine M.D.   5 mg at 01/20/24 0534    hydrALAZINE (Apresoline) injection 10 mg  10 mg Intravenous Q6HRS PRN Manuel Anotine M.D.   10 mg at 01/19/24 0610    labetalol (Normodyne/Trandate) injection 20 mg  20 mg Intravenous Q4HRS PRN Manuel Antoine M.D.   20 mg at 01/13/24 1418    apixaban (Eliquis) tablet 5 mg  5 mg Enteral Tube BID Manuel Antoine M.D.   5 mg at 01/20/24 0534    Pharmacy Consult: Enteral tube insertion - review meds/change route/product selection  1 Each Other PHARMACY TO DOSE Rebecca Holman M.D.        haloperidol lactate (Haldol) injection 5 mg  5 mg Intravenous Q4HRS PRN GOYO [email or web address].   5 mg at 01/16/24 1407       Fluids    Intake/Output Summary (Last 24 hours) at 1/20/2024 1431  Last data filed at 1/20/2024 1400  Gross per 24 hour   Intake 2174.42 ml   Output 1810 ml   Net 364.42 ml       Laboratory  Recent Labs     01/19/24  0413   ISTATAPH 7.435   ISTATAPCO2 37.1*   ISTATAPO2 60*   ISTATATCO2 26   GFPNLAS6FOT 91*   ISTATARTHCO3 24.9    ISTATARTBE 1   ISTATTEMP 96.3 F   ISTATFIO2 40   ISTATSPEC Arterial   ISTATAPHTC 7.454   IDDLULEE2DQ 55*             Recent Labs     01/18/24 0305 01/19/24 0245 01/20/24  0300   SODIUM 147* 142 141   POTASSIUM 3.3* 3.3* 3.6   CHLORIDE 110 108 106   CO2 27 25 24   BUN 30* 28* 25*   CREATININE 1.62* 1.44* 1.64*   MAGNESIUM 2.6*  --   --    CALCIUM 9.3 9.0 8.5     Recent Labs     01/18/24 0305 01/19/24 0245 01/20/24  0300   GLUCOSE 189* 216* 206*     Recent Labs     01/18/24 0305 01/19/24 0245 01/20/24  0300   WBC 11.0* 9.8 11.6*   NEUTSPOLYS 77.30* 77.20* 81.50*   LYMPHOCYTES 12.20* 13.00* 10.10*   MONOCYTES 7.70 8.00 7.10   EOSINOPHILS 0.00 0.00 0.00   BASOPHILS 0.30 0.20 0.20     Recent Labs     01/18/24 0305 01/19/24 0245 01/20/24  0300   RBC 3.43* 3.20* 2.98*   HEMOGLOBIN 10.2* 9.7* 8.9*   HEMATOCRIT 32.4* 30.3* 27.7*   PLATELETCT 374 361 390       Imaging  MRA OF THE [place] WITHIN NORMAL LIMITS.   Reviewed CT series as well as MRI brain  Reviewed CXRs    Assessment/Plan  * Acute respiratory failure with hypoxia (HCC)- (present on admission)  Assessment & Plan  Intubated date: 1/9/2024  Extubated 01/11/2024  Reintubated on 01/13/2024.  Bronchoscopy revealed thick tenacious secretions occluding multiple airways predominantly on the right.  Lung protective ventilation strategies  Titrate ventilator prescription to optimize oxygenation, ventilation, and acid base balance.  Daily ABC trials-RT protocol/ventilator bundle  Finally tolerated SAT for the first time 1/18  Finally tolerated his first SBT 1/19 but could not perform weaning parameters  Tx FOB 1/19 and copious amount of thick mildly purulent secretions removed, left lower lobe BAL and bronchial wash sent    Will need to clarify goals of care before next trial of extubation.  Daughter Nora is very involved with these discussions are ongoing.    Daughter Nora very clear about no tracheostomy further conversations about CODE STATUS, feeding  tube etc. to follow  Further discussions to follow      Aspiration pneumonia (HCC)- (present on admission)  Assessment & Plan  Trace stabilize after intubation and bronchoscopy  Explanation for inability to protect airway since cerebellar ICH is unclear  Head of bed > 30  Zosyn/Unasyn 10 day regimen completed 01/14   WBC trended down to normal and remains afebrile several days  Continue monitoring for recurrent healthcare associated pneumonia/aspiration pneumonia  No clinically apparent aspiration in recent days  Tx FOB 1/19 with copious secretions  BAL and bronc wash cultures pending    A-fib (HCC)  Assessment & Plan  Cardiac rate remains controlled  LVH on 12/27 echo with diastolic dysfunction  Eliquis prophylaxis for CVA, no signs of bleeding  Continue to optimize electrolytes, needs further potassium repletion  Continuous telemetry ongoing    Encephalopathy  Assessment & Plan  Toxic metabolic encephalopathy resolving?  Plateaued at this point?  Serial neurologic exams going unchanged.  Hypercapnia resolved while on ventilator.  MRI does not reveal signs of anoxic brain injury or brainstem infarct that would explain a locked-in type syndrome.  Reassessing daily with family  Starting to follow 1/18, finally tolerating weaning sedation and SATs  Monitor for the need to have neurology evaluate patient    Acute kidney failure (HCC)- (present on admission)  Assessment & Plan  Maintain euvolemia and monitor fluid responsiveness (avoid NaCL and renal congestion)  MAP > 65 uses pressors or inotropic trial  Monitor volume status, continue serial BMP  Monitor urine output, Schwartz catheter  Significant insensible losses on review daily with RNs  Avoid nephrotoxic agents-not on any medications on review with clinical pharmacist that might cause ERWIN  Atony and waxing and waning between 1.4 and 1.6, appears euvolemic        Hypernatremia- (present on admission)  Assessment & Plan  Sodium trended up as well as creatinine  No  fever and no clear insensible loss?  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 In review of yesterday's note (1/19/2024), there are no changes except as documented above.     Discussed patient condition and risk of morbidity and/or mortality with   Family, RN, RT, Pharmacy, Code status disscussed, Charge nurse / hot rounds, and Patient    The patient remains critically ill.  I have assessed and reassessed the respiratory status and made ventilator adjustments based upon arterial blood gas analysis, ventilator waveforms and airway mechanics.  I have assessed and reassessed the blood pressure, hemodynamics, cardiovascular status. This patient remains at high risk for worsening cardiopulmonary dysfunction and death without the above critical care interventions.    Critical care time = 50 minutes in directly providing and coordinating critical care and extensive data review.  No time overlap and excludes procedures.

## 2024-01-20 NOTE — CARE PLAN
The patient is Stable - Low risk of patient condition declining or worsening    Shift Goals  Clinical Goals: SAT/SBT, pulmonary hygiene  Patient Goals: DULCE MARIA  Family Goals: DULCE MARIA    Progress made toward(s) clinical / shift goals:    Problem: Hemodynamics  Goal: Patient's hemodynamics, fluid balance and neurologic status will be stable or improve  Outcome: Progressing     Problem: Fluid Volume  Goal: Fluid volume balance will be maintained  Outcome: Progressing     Problem: Urinary - Renal Perfusion  Goal: Ability to achieve and maintain adequate renal perfusion and functioning will improve  Outcome: Progressing     Problem: Skin Integrity  Goal: Skin integrity is maintained or improved  Outcome: Progressing     Problem: Fall Risk  Goal: Patient will remain free from falls  Outcome: Progressing     Problem: Pain - Standard  Goal: Alleviation of pain or a reduction in pain to the patient’s comfort goal  Outcome: Progressing       Patient is not progressing towards the following goals:

## 2024-01-20 NOTE — CARE PLAN
Problem: Hemodynamics  Goal: Patient's hemodynamics, fluid balance and neurologic status will be stable or improve  Outcome: Progressing     Problem: Urinary - Renal Perfusion  Goal: Ability to achieve and maintain adequate renal perfusion and functioning will improve  Outcome: Progressing     Problem: Respiratory  Goal: Patient will achieve/maintain optimum respiratory ventilation and gas exchange  Outcome: Progressing   The patient is Watcher - Medium risk of patient condition declining or worsening    Shift Goals  Clinical Goals: SAT/SBT, pulmonary hygiene  Patient Goals: DULCE MARIA  Family Goals: DULCE MARIA    Progress made toward(s) clinical / shift goals:  Patient has maintained adequate urine output, neuro exam has had improvements across the shift so far, Patient sponting well currently.    Patient is not progressing towards the following goals:

## 2024-01-21 NOTE — CARE PLAN
The patient is Stable - Low risk of patient condition declining or worsening    Shift Goals  Clinical Goals: Pulmonary hygiene, oxygen stability, SBP<160, mobility as tolerated  Patient Goals: DULCE MARIA  Family Goals: DULCE MARIA    Progress made toward(s) clinical / shift goals:    Problem: Hemodynamics  Goal: Patient's hemodynamics, fluid balance and neurologic status will be stable or improve  Outcome: Progressing     Problem: Fluid Volume  Goal: Fluid volume balance will be maintained  Outcome: Progressing     Problem: Urinary - Renal Perfusion  Goal: Ability to achieve and maintain adequate renal perfusion and functioning will improve  Outcome: Progressing     Problem: Respiratory  Goal: Patient will achieve/maintain optimum respiratory ventilation and gas exchange  Outcome: Progressing     Problem: Physical Regulation  Goal: Diagnostic test results will improve  Outcome: Progressing  Goal: Signs and symptoms of infection will decrease  Outcome: Progressing     Problem: Knowledge Deficit - Standard  Goal: Patient and family/care givers will demonstrate understanding of plan of care, disease process/condition, diagnostic tests and medications  Outcome: Progressing       Patient is not progressing towards the following goals:

## 2024-01-21 NOTE — PROGRESS NOTES
Critical Care Progress Note    Date of admission  1/4/2024    Chief Complaint  78 y.o. male admitted 1/4/2024 with cardiac arrest    Hospital Course  78 y.o. 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 He presented back for AMS and evaluated in ER and was discharged. He presented the next day 1/4/2024 for hypoxia and was admitted to medicine. He was found to have silent aspiration, lauren, urinary retention and hypernatremia and dehydration. He had a an echo 12/29/2023 LVH EF 60% grade II daistolic dysfunction mild MR/TR. He has not had an MRI on the prior admission. He today 1/9/2024 had afib w/ RVR and worsening hypoxia as well as worsening mental status and code stroke was called. His BS was 190. In the CT scanner gurgling with poor airway reflexes. He was intubated and shortly after had PEA arrest. CT head and CTA/P was negative for acute stroke.  He is full code and confirmed prior to this event by daughters.      01/11-Trial of extubation. GOC discussion with his daughter Jaymie. We will re-intubate if extubation is not successful.  01/12- Requiring HFNC 45L/70% FiO  01/13- Reintubated, MRI/MRA ordered to look for explanation for ongoing aspiration.    1/15 -Vent day 3 of second course on ventilator, MRI without obvious abnormality to account for aspiration  1/16 -Vent day 4, failed SAT, tachypneic with increased end-tidal CO2 with SBT's, not following  1/17 -Vent day 5, failed SAT again, not following  1/18 -Vent day 6 vent, better with SAT/SBT on 50% not ready for liberation starting to follow for the first time  1/19 - Tx FOB on vent - copious thick secretions, LLL BAL sent  1/20 - Extubated to HFNC 40/70, following better    Interval Problem Update  Reviewed last 24 hour events:    Following  Voice coarse but answering questions  Oriented X 1  Speech mumbled and difficult to understand, denies pain/SOB  SR 70-80s, rare PVCs  SBP  140-160s  2 PRN Bp meds   Labetalol/Hydralazine  UO:  excellent  I/O's neg-1.52 L  HFNC 40-70, no change since extubated  O2 sats 93-98%  Moist cough but reasonably strong  IPV tolerated  DuoNeb Q4H  Min suctioned required  Tm 98.2  WBC 13.8  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  K repletion  Tylenol/Oxy %  D/c Fent    Keep in ICU 1 more day, pulmonary toilet still an issue and remains on fairly high O2 requirements, remains full code per daughter       YESTERDAY  Sedate on ventilator  Dex 0.4-> off  Fentanyl 100  Follows better  SR 70-80s  -120s  UO good, 2 L / 24 hours  Fluid balance +733 cc / 24 hours  Vent day 8: APV CMV 24/440/8-50->40%  SBT better rsbi 50, nif neg 30, no vc  Secretions improved after bronchoscopy yesterday  No cough  CXR pending  WOB low  TMax 98.2, WBC 11.6  Hemoglobin 8.9,   Electrolytes normal, BUN 25, creatinine 1.64  Glucose 160, 210, 145, 175, 145  Apixaban/famotidine PPx    SAT/SBT  Of care conversation with daughter re: reintubation if fails after extubation    Late CXR reviewed, atelectasis/bibasilar opacities significantly improved post bronchoscopy yesterday    Reviewed case at length at the bedside with Nora his daughter.  He is more responsive for her today and she is quite happy about that.  Would like to leave him full code but if he does need to get reintubated no tracheostomy and likely one-way wean at some point.  Follow-up breathing trial ongoing now and if tolerated well check weans and if those are good check a cuff leak and a blood gas and hopefully liberation trial this afternoon.    Review of Systems  Review of Systems   Constitutional:  Positive for malaise/fatigue. Negative for fever.   HENT:  Negative for sore throat.    Respiratory:  Positive for cough and sputum production. Negative for shortness of breath.    Cardiovascular:  Negative for chest  pain.   Gastrointestinal:  Negative for nausea and vomiting.   Genitourinary:  Negative for hematuria.   Neurological:  Negative for headaches.   Limited somewhat by his speech    Vital Signs for last 24 hours   Temp:  [36 °C (96.8 °F)-36.8 °C (98.2 °F)] (P) 36.1 °C (97 °F)  Pulse:  [] (P) 84  Resp:  [20-46] (P) 35  BP: (137-200)/(65-99) (P) 164/84  SpO2:  [89 %-99 %] (P) 94 %    Hemodynamic parameters for last 24 hours       Respiratory Information for the last 24 hours  Vent Mode: Spont  PEEP/CPAP: 8  P Support: 5  Control VTE (exp VT): 247      Physical Exam  Vitals reviewed.   Constitutional:       General: He is not in acute distress.     Appearance: He is ill-appearing (Looks better). He is not diaphoretic.      Interventions: He is restrained.      Comments: HFNC   HENT:      Head: Normocephalic.      Comments: Ecchymosis noted over the lateral right orbit improved     Mouth/Throat:      Mouth: Mucous membranes are moist.   Eyes:      General: No scleral icterus.     Pupils: Pupils are equal, round, and reactive to light.      Comments: Pupils are 3 to 4 mm nonreactive, mid position   Neck:      Vascular: No JVD.      Comments: IJ CVC  Cardiovascular:      Rate and Rhythm: Normal rate. Rhythm irregular.      Pulses: Normal pulses.      Heart sounds: No murmur heard.     No friction rub. No gallop.      Comments: AF  Pulmonary:      Effort: No respiratory distress.      Breath sounds: Examination of the right-lower field reveals decreased breath sounds. Examination of the left-lower field reveals decreased breath sounds. Decreased breath sounds and rales (Improved) present. No wheezing or rhonchi.      Comments: Diminished in the bases, no accessory muscle use.  Abdominal:      General: There is no distension.      Palpations: Abdomen is soft. There is no mass.      Tenderness: There is no abdominal tenderness. There is no right CVA tenderness, left CVA tenderness or guarding.   Genitourinary:      Comments: Schwartz  Musculoskeletal:      Cervical back: Neck supple. No rigidity.      Right lower leg: Edema present.      Left lower leg: Edema present.   Skin:     General: Skin is warm and dry.      Capillary Refill: Capillary refill takes less than 2 seconds.      Coloration: Skin is not cyanotic or mottled.      Nails: There is no clubbing.   Neurological:      Comments: More alert, nodding appropriately, cranial nerves intact, speech difficult to understand some of the time, oriented x 1-2?,  Moves all 4 to command with symmetric strength that is mildly reduced diffusely, sensation intact   Psychiatric:         Mood and Affect: Mood is not anxious.         Behavior: Behavior is not agitated. Behavior is cooperative.         Medications  Current Facility-Administered Medications   Medication Dose Route Frequency Provider Last Rate Last Admin    acetaminophen (Tylenol) tablet 650 mg  650 mg Enteral Tube Q6HRS PRN Ag Dixon M.D.        oxyCODONE immediate-release (Roxicodone) tablet 5 mg  5 mg Enteral Tube Q4HRS PRN Ag Dixon M.D.        sodium bicarbonate 8.4 % for inhalation 3 mL  3 mL Inhalation Q4H PRN (RT) Ag Dixon M.D.        ipratropium-albuterol (DUONEB) nebulizer solution  3 mL Nebulization Q4HRS (RT) Ag Dixon M.D.   3 mL at 01/21/24 1010    insulin regular (HumuLIN R,NovoLIN R) injection  2-9 Units Subcutaneous Q6HRS Ag Dixon M.D.   2 Units at 01/21/24 1209    And    dextrose 10 % BOLUS 25 g  25 g Intravenous Q15 MIN PRN Ag Dixon M.D.        Respiratory Therapy Consult   Nebulization Continuous RT Manuel Antione M.D.        senna-docusate (Pericolace Or Senokot S) 8.6-50 MG per tablet 2 Tablet  2 Tablet Enteral Tube BID Manuel Antoine M.D.   2 Tablet at 01/21/24 0555    And    polyethylene glycol/lytes (Miralax) Packet 1 Packet  1 Packet Enteral Tube QDAY PRN Manuel Antoine M.D.   1 Packet at 01/13/24 1728    And    magnesium hydroxide (Milk Of  Magnesia) suspension 30 mL  30 mL Enteral Tube QDAY PRN Manuel Antoine M.D.   30 mL at 01/14/24 1749    And    bisacodyl (Dulcolax) suppository 10 mg  10 mg Rectal QDAY PRN Manuel Antoine M.D. MD Alert...ICU Electrolyte Replacement per Pharmacy   Other PHARMACY TO DOSE Manuel Antoine M.D.        amLODIPine (Norvasc) tablet 5 mg  5 mg Enteral Tube Q DAY Manuel Antoine M.D.   5 mg at 01/21/24 0555    hydrALAZINE (Apresoline) injection 10 mg  10 mg Intravenous Q6HRS PRN Manuel Antoine M.D.   10 mg at 01/21/24 0106    labetalol (Normodyne/Trandate) injection 20 mg  20 mg Intravenous Q4HRS PRN Manuel Antoine M.D.   20 mg at 01/21/24 1318    apixaban (Eliquis) tablet 5 mg  5 mg Enteral Tube BID Manuel Antoine M.D.   5 mg at 01/21/24 0555    Pharmacy Consult: Enteral tube insertion - review meds/change route/product selection  1 Each Other PHARMACY TO DOSE Rebecca Holman M.D.        haloperidol lactate (Haldol) injection 5 mg  5 mg Intravenous Q4HRS PRN Agapito Bonilla A.P.RAllieN.   5 mg at 01/16/24 1407       Fluids    Intake/Output Summary (Last 24 hours) at 1/21/2024 1328  Last data filed at 1/21/2024 1000  Gross per 24 hour   Intake 1528.3 ml   Output 3350 ml   Net -1821.7 ml       Laboratory  Recent Labs     01/19/24  0413 01/20/24  1515   ISTATAPH 7.435 7.407   ISTATAPCO2 37.1* 44.1*   ISTATAPO2 60* 81   ISTATATCO2 26 29   NDVQOCF0SFD 91* 96   ISTATARTHCO3 24.9 27.8*   ISTATARTBE 1 3   ISTATTEMP 96.3 F 97.7 F   ISTATFIO2 40 40   ISTATSPEC Arterial Arterial   ISTATAPHTC 7.454 7.415   [id number] 55* 78             Recent Labs     01/19/24  0245 01/20/24  0300 01/21/24  0345   SODIUM 142 141 141   POTASSIUM 3.3* 3.6 3.6   CHLORIDE 108 106 107   CO2 25 24 28   BUN 28* 25* 20   CREATININE 1.44* 1.64* 1.27   CALCIUM 9.0 8.5 8.9     Recent Labs     01/19/24  0245 01/20/24  0300 01/21/24  0345   GLUCOSE 216* 206* 185*     Recent Labs     01/19/24  0245 01/20/24  0300 01/21/24  0345   WBC 9.8 11.6* 13.8*    NEUTSPOLYS 77.20* 81.50* 85.20*   LYMPHOCYTES 13.00* 10.10* 6.90*   MONOCYTES 8.00 7.10 6.70   EOSINOPHILS 0.00 0.00 0.10   BASOPHILS 0.20 0.20 0.30     Recent Labs     01/19/24  0245 01/20/24  0300 01/21/24  0345   RBC 3.20* 2.98* 3.26*   HEMOGLOBIN 9.7* 8.9* 9.8*   HEMATOCRIT 30.3* 27.7* 30.5*   PLATELETCT 361 390 455*       Imaging  MRA OF THE [place] OF [place] WITHIN NORMAL LIMITS.   Reviewed CT series as well as MRI brain  Reviewed CXRs    Assessment/Plan  * Acute respiratory failure with hypoxia (HCC)- (present on admission)  Assessment & Plan  Intubated date: 1/9/2024  Extubated 01/11/2024  Reintubated on 01/13/2024.  Bronchoscopy revealed thick tenacious secretions occluding multiple airways predominantly on the right.  Lung protective ventilation strategies  Titrate ventilator prescription to optimize oxygenation, ventilation, and acid base balance.  Daily ABC trials-RT protocol/ventilator bundle  Finally tolerated SAT for the first time 1/18  Finally tolerated his first SBT 1/19 but could not perform weaning parameters  Tx FOB 1/19 and copious amount of thick mildly purulent secretions removed, left lower lobe BAL and bronchial wash sent  Well with SAT/SBT 1/20, possible liberation trial  Extubated 1/20, tolerating HFNC 40/70  Keep in ICU another day, with most recent extubation he failed at 48 hours requiring intubation and repeat bronchoscopy  CXR in a.m.  Continue IPV with nebulizer treatments and mucolytic's as needed  Of antibiotics status post 10-day course, colonized with corynebacterium stratum?  Monitor for need to reinitiate HCAP antibiotic coverage  Per daughter no tracheostomy or feeding tube would review case with her prior to reintubating unless it is emergently required      Aspiration pneumonia (HCC)- (present on admission)  Assessment & Plan  Trace stabilize after intubation and bronchoscopy  Explanation for inability to protect airway since cerebellar ICH is unclear  Head of bed >  30  Zosyn/Unasyn 10 day regimen completed 01/14   WBC trended down to normal and remains afebrile several days  Continue monitoring for recurrent healthcare associated pneumonia/aspiration pneumonia  No clinically apparent aspiration in recent days  Tx FOB 1/19 with copious secretions  BAL and bronc wash cultures from 1/19 revealing corynebacterium stratum, colonized?  Monitor for the need to resume HCAP antibiotic coverage  Follow-up CXR    A-fib (HCC)  Assessment & Plan  Cardiac rates remain controlled  LVH on 12/27 echo with diastolic dysfunction  Eliquis prophylaxis for CVA, no signs of bleeding [place]  Continue to optimize electrolytes, needs further potassium repletion  Continue cardiac monitoring    Encephalopathy  Assessment & Plan  Toxic metabolic encephalopathy resolving?  Plateaued at this point?  Serial neurologic exams going unchanged.  Hypercapnia resolved while on ventilator.  MRI does not reveal signs of anoxic brain injury or brainstem infarct that would explain a locked-in type syndrome.  Reassessing daily with family  Starting to follow 1/18, finally tolerating weaning sedation and SATs  Off all sedation and extubated 1/20  More alert and following better, give him more time  Therapies-PT/OT  Swallow eval when clinically appropriate    Acute kidney failure (HCC)- (present on admission)  Assessment & Plan  Maintain euvolemia and monitor fluid responsiveness (avoid NaCL and renal congestion)  MAP > 65 uses pressors or inotropic trial  Monitor volume status, continue serial BMP  Monitor urine output, Schwartz catheter  Significant insensible losses on review daily with RNs  Avoid nephrotoxic agents-not on any medications on review with clinical pharmacist that might cause ERWIN  Will function wax and wane but creatinine finally normalized at 1.2        Hypernatremia- (present on admission)  Assessment & Plan  Sodium trended up as well as creatinine  No fever and no clear insensible loss?  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 In review of yesterday's note (1/20/2024), there are no changes except as documented above.     Discussed patient condition and risk of morbidity and/or mortality with   Family, RN, RT, Pharmacy, Code status disscussed, Charge nurse / hot rounds, and Patient    The patient remains critically ill.  I have assessed and reassessed the respiratory status and made ventilator adjustments based upon arterial blood gas analysis, ventilator waveforms and airway mechanics.  I have assessed and reassessed the blood pressure, hemodynamics, cardiovascular status. This patient remains at high risk for worsening cardiopulmonary dysfunction and death without the above critical care interventions.    Critical care time = 45   minutes in directly providing and coordinating critical care and extensive data review.  No time overlap and excludes procedures.

## 2024-01-22 NOTE — CARE PLAN
The patient is Stable - Low risk of patient condition declining or worsening    Shift Goals  Clinical Goals: SBP <160, mobilize, oxygen stability  Patient Goals: DULCE MARIA  Family Goals: DULCE MARIA    Progress made toward(s) clinical / shift goals:    Problem: Hemodynamics  Goal: Patient's hemodynamics, fluid balance and neurologic status will be stable or improve  Outcome: Progressing     Problem: Fluid Volume  Goal: Fluid volume balance will be maintained  Outcome: Progressing     Problem: Urinary - Renal Perfusion  Goal: Ability to achieve and maintain adequate renal perfusion and functioning will improve  Outcome: Progressing     Problem: Respiratory  Goal: Patient will achieve/maintain optimum respiratory ventilation and gas exchange  Outcome: Progressing     Problem: Knowledge Deficit - Standard  Goal: Patient and family/care givers will demonstrate understanding of plan of care, disease process/condition, diagnostic tests and medications  Outcome: Progressing     Problem: Skin Integrity  Goal: Skin integrity is maintained or improved  Outcome: Progressing     Problem: Fall Risk  Goal: Patient will remain free from falls  Outcome: Progressing     Problem: Pain - Standard  Goal: Alleviation of pain or a reduction in pain to the patient’s comfort goal  Outcome: Progressing       Patient is not progressing towards the following goals:

## 2024-01-22 NOTE — CARE PLAN
Problem: Bronchoconstriction  Goal: Improve in air movement and diminished wheezing  Description: Target End Date:  2 to 3 days    1.  Implement inhaled treatments  2.  Evaluate and manage medication effects  Outcome: Progressing     Duoneb Q4 hours      Problem: Bronchopulmonary Hygiene  Goal: Increase mobilization of retained secretions  Description: Target End Date:  2 to 3 days    1.  Perform bronchopulmonary therapy as indicated by assessment  2.  Perform airway suctioning  3.  Perform actions to maintain patient airway  Outcome: Progressing     IPV QID      Problem: Humidified High Flow Nasal Cannula  Goal: Maintain adequate oxygenation dependent on patient condition  Description: Target End Date:  resolve prior to discharge or when underlying condition is resolved/stabilized    1.  Implement humidified high flow oxygen therapy  2.  Titrate high flow oxygen to maintain appropriate SpO2  Outcome: Progressing    40 LPM and 50%

## 2024-01-22 NOTE — PROGRESS NOTES
Critical Care Progress Note    Date of admission  1/4/2024    Chief Complaint  78 y.o. male admitted 1/4/2024 with cardiac arrest    Hospital Course  78 y.o. 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 He presented back for AMS and evaluated in ER and was discharged. He presented the next day 1/4/2024 for hypoxia and was admitted to medicine. He was found to have silent aspiration, lauren, urinary retention and hypernatremia and dehydration. He had a an echo 12/29/2023 LVH EF 60% grade II daistolic dysfunction mild MR/TR. He has not had an MRI on the prior admission. He today 1/9/2024 had afib w/ RVR and worsening hypoxia as well as worsening mental status and code stroke was called. His BS was 190. In the CT scanner gurgling with poor airway reflexes. He was intubated and shortly after had PEA arrest. CT head and CTA/P was negative for acute stroke.  He is full code and confirmed prior to this event by daughters.      01/11-Trial of extubation. GOC discussion with his daughter Jaymie. We will re-intubate if extubation is not successful.  01/12- Requiring HFNC 45L/70% FiO  01/13- Reintubated, MRI/MRA ordered to look for explanation for ongoing aspiration.    1/15 -Vent day 3 of second course on ventilator, MRI without obvious abnormality to account for aspiration  1/16 -Vent day 4, failed SAT, tachypneic with increased end-tidal CO2 with SBT's, not following  1/17 -Vent day 5, failed SAT again, not following  1/18 -Vent day 6 vent, better with SAT/SBT on 50% not ready for liberation starting to follow for the first time  1/19 - Tx FOB on vent - copious thick secretions, LLL BAL sent  1/20 - Extubated to HFNC 40/70, following better    Interval Problem Update  Reviewed last 24 hour events:  No acute changes overnight.   HR in 90s  -160s.  Received hydralazine x2 overnight.   Will increase norvasc to 10 daily   On HFNC 50%/ 40L    Received IPV QID, coughs well. NT suction prn  WBC 15.4 (13.8)  Hgb 10.7/plt 597  Creatinine 1.3, HCO3 28  Good -450cc every 4 hours  I/O -880cc in the past 24 hours, even fluid balance since admission   Sodium 141, K 3.7  -180s  Mobilized to level 3 to chair, weak to standing.   PT/OT to assist    BAL Cx + corynebacterium striatum  Completed total 7 day course of unasyn/piptazo     Addendum:   Had to NT suction today   Pt had a slight increase in HFNC requirement to 60%/40L    Review of Systems  Review of Systems   Constitutional:  Positive for malaise/fatigue. Negative for fever.   HENT:  Negative for sore throat.    Respiratory:  Positive for cough and sputum production. Negative for shortness of breath.    Cardiovascular:  Negative for chest pain.   Gastrointestinal:  Negative for nausea and vomiting.   Genitourinary:  Negative for hematuria.   Neurological:  Negative for headaches.   Limited somewhat by his speech    Vital Signs for last 24 hours   Temp:  [36.1 °C (97 °F)-36.4 °C (97.6 °F)] 36.1 °C (97 °F)  Pulse:  [74-99] 95  Resp:  [18-53] 30  BP: (117-165)/(57-85) 140/75  SpO2:  [89 %-99 %] 91 %    Hemodynamic parameters for last 24 hours       Respiratory Information for the last 24 hours         Physical Exam  Vitals reviewed.   Constitutional:       General: He is not in acute distress.     Appearance: He is ill-appearing (Looks better). He is not diaphoretic.      Interventions: He is restrained.      Comments: HFNC   HENT:      Head: Normocephalic.      Comments: Ecchymosis noted over the lateral right orbit improved     Mouth/Throat:      Mouth: Mucous membranes are moist.   Eyes:      General: No scleral icterus.     Pupils: Pupils are equal, round, and reactive to light.      Comments: Pupils are 3 to 4 mm nonreactive, mid position   Neck:      Vascular: No JVD.      Comments: IJ CVC  Cardiovascular:      Rate and Rhythm: Normal rate. Rhythm irregular.      Pulses: Normal pulses.       Heart sounds: No murmur heard.     No friction rub. No gallop.      Comments: AF  Pulmonary:      Effort: No respiratory distress.      Breath sounds: Examination of the right-lower field reveals decreased breath sounds. Examination of the left-lower field reveals decreased breath sounds. Decreased breath sounds and rales (Improved) present. No wheezing or rhonchi.      Comments: Diminished in the bases, no accessory muscle use.  Abdominal:      General: There is no distension.      Palpations: Abdomen is soft. There is no mass.      Tenderness: There is no abdominal tenderness. There is no right CVA tenderness, left CVA tenderness or guarding.   Genitourinary:     Comments: Lana  Musculoskeletal:      Cervical back: Neck supple. No rigidity.      Right lower leg: Edema present.      Left lower leg: Edema present.   Skin:     General: Skin is warm and dry.      Capillary Refill: Capillary refill takes less than 2 seconds.      Coloration: Skin is not cyanotic or mottled.      Nails: There is no clubbing.   Neurological:      Comments: More alert, nodding appropriately, cranial nerves intact, speech difficult to understand some of the time, oriented x 1-2?,  Moves all 4 to command with symmetric strength that is mildly reduced diffusely, sensation intact   Psychiatric:         Mood and Affect: Mood is not anxious.         Behavior: Behavior is not agitated. Behavior is cooperative.         Medications  Current Facility-Administered Medications   Medication Dose Route Frequency Provider Last Rate Last Admin    ipratropium-albuterol (DUONEB) nebulizer solution  3 mL Nebulization Q4H PRN (RT) Dani Bautista D.O.        acetaminophen (Tylenol) tablet 650 mg  650 mg Enteral Tube Q6HRS PRN Ag Dixon M.D.        oxyCODONE immediate-release (Roxicodone) tablet 5 mg  5 mg Enteral Tube Q4HRS PRN Ag Dixon M.D.        sodium bicarbonate 8.4 % for inhalation 3 mL  3 mL Inhalation Q4H PRN (RT) Ag Dixon  M.D.        insulin regular (HumuLIN R,NovoLIN R) injection  2-9 Units Subcutaneous Q6HRS Ag Dixon M.D.   2 Units at 01/22/24 0618    And    dextrose 10 % BOLUS 25 g  25 g Intravenous Q15 MIN PRN Ag Dixon M.D.        Respiratory Therapy Consult   Nebulization Continuous RT Manuel Antoine M.D.        senna-docusate (Pericolace Or Senokot S) 8.6-50 MG per tablet 2 Tablet  2 Tablet Enteral Tube BID Manuel Antoine M.D.   2 Tablet at 01/22/24 0601    And    polyethylene glycol/lytes (Miralax) Packet 1 Packet  1 Packet Enteral Tube QDAY PRN Manuel Antoine M.D.   1 Packet at 01/21/24 1724    And    magnesium hydroxide (Milk Of Magnesia) suspension 30 mL  30 mL Enteral Tube QDAY PRN Manuel Antoine M.D.   30 mL at 01/22/24 0601    And    bisacodyl (Dulcolax) suppository 10 mg  10 mg Rectal QDAY PRN Manuel Antoine M.D. MD Alert...ICU Electrolyte Replacement per Pharmacy   Other PHARMACY TO DOSE Manuel Antoine M.D.        amLODIPine (Norvasc) tablet 5 mg  5 mg Enteral Tube Q DAY Manuel Antoine M.D.   5 mg at 01/22/24 0601    hydrALAZINE (Apresoline) injection 10 mg  10 mg Intravenous Q6HRS PRN Manuel Antoine M.D.   10 mg at 01/22/24 0321    labetalol (Normodyne/Trandate) injection 20 mg  20 mg Intravenous Q4HRS PRN Manuel Antoine M.D.   20 mg at 01/21/24 1318    apixaban (Eliquis) tablet 5 mg  5 mg Enteral Tube BID Manuel Antoine M.D.   5 mg at 01/22/24 0601    Pharmacy Consult: Enteral tube insertion - review meds/change route/product selection  1 Each Other PHARMACY TO DOSE Rebecca Holman M.D.        haloperidol lactate (Haldol) injection 5 mg  5 mg Intravenous Q4HRS PRN Agapito Bonilla A.P.R.NAllie   5 mg at 01/16/24 1407       Fluids    Intake/Output Summary (Last 24 hours) at 1/22/2024 0727  Last data filed at 1/22/2024 0600  Gross per 24 hour   Intake 2640 ml   Output 2860 ml   Net -220 ml         Laboratory  Recent Labs     01/20/24  1515   ISTATAPH 7.407   ISTATAPCO2 44.1*   ISTATAPO2 81    ISTATATCO2 29   [id number] 96   ISTATARTHCO3 27.8*   ISTATARTBE 3   ISTATTEMP 97.7 F   ISTATFIO2 40   ISTATSPEC Arterial   ISTATAPHTC 7.415   [id number] 78               Recent Labs     01/20/24  0300 01/21/24  0345 01/22/24  0350   SODIUM 141 141 141   POTASSIUM 3.6 3.6 3.7   CHLORIDE 106 107 103   CO2 24 28 28   BUN 25* 20 23*   CREATININE 1.64* 1.27 1.33   CALCIUM 8.5 8.9 9.3       Recent Labs     01/20/24 0300 01/21/24  0345 01/22/24  0350   ALTSGPT  --   --  87*   ASTSGOT  --   --  44   ALKPHOSPHAT  --   --  144*   TBILIRUBIN  --   --  0.3   GLUCOSE 206* 185* 159*       Recent Labs     01/20/24 0300 01/21/24 0345 01/22/24  0350   WBC 11.6* 13.8* 15.4*   NEUTSPOLYS 81.50* 85.20* 82.10*   LYMPHOCYTES 10.10* 6.90* 9.70*   MONOCYTES 7.10 6.70 7.10   EOSINOPHILS 0.00 0.10 0.10   BASOPHILS 0.20 0.30 0.30   ASTSGOT  --   --  44   ALTSGPT  --   --  87*   ALKPHOSPHAT  --   --  144*   TBILIRUBIN  --   --  0.3       Recent Labs     01/20/24 0300 01/21/24  0345 01/22/24  0350   RBC 2.98* 3.26* 3.61*   HEMOGLOBIN 8.9* 9.8* 10.7*   HEMATOCRIT 27.7* 30.5* 34.2*   PLATELETCT 390 455* 597*         Imaging  MRA OF THE [place] OF [place] WITHIN NORMAL LIMITS.   Reviewed CT series as well as MRI brain  Reviewed CXRs    Assessment/Plan  * Acute respiratory failure with hypoxia (HCC)- (present on admission)  Assessment & Plan  Intubated date: 1/9/2024  Extubated 01/11/2024  Reintubated on 01/13/2024.  1/13 Bronchoscopy revealed thick tenacious secretions occluding multiple airways predominantly on the right.  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    Plan:   Continue HFNC to keep sat >88%  Upright position, HOB 45  Aggressive pulm hygiene, IPV, bicarb nebs  Avoid sedation   Low threshold for re-intubation     Per daughter no tracheostomy or feeding tube would review case with her prior to reintubating  unless it is emergently required        A-fib (HCC)  Assessment & Plan  Cardiac rates remain controlled  LVH on 12/27 echo with diastolic dysfunction  Eliquis prophylaxis for CVA, no signs of bleeding Lul  Continue to optimize electrolytes, needs further potassium repletion  Continue cardiac monitoring    Encephalopathy  Assessment & Plan  Toxic metabolic encephalopathy ongoing due to ongoing aspiration   Hypercapnia resolved while on ventilator.  MRI does not reveal signs of anoxic brain injury or brainstem infarct that would explain a locked-in type syndrome.  1/20 extubated.     Still high risk of aspiration  Therapies-PT/OT  Swallow eval when clinically appropriate  On tube feed    Advance care planning  Assessment & Plan  Nora is his daughter and surrogate decision maker  Plan for reintubation if necessary now  No plan for tracheostomy per daughter  No feeding tube surgically per daughter  Extubated 1/20 again, plan on reintubating for now  If appears to need reintubation would converse with daughter prior to reintubating unless urgently necessary  Palliative care consultation follow-up next week    Urinary retention  Assessment & Plan  Schwartz catheter    Hypernatremia- (present on admission)  Assessment & Plan  Resolved    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)  Assessment & Plan  S/p evacuation on 12/27 by Dr Patten  SBP < 160  CT head stable no acute edema  MRI Noted  Normonatremia remains the goal    Aspiration pneumonia (HCC)- (present on admission)  Assessment & Plan  Trace stabilize after intubation and bronchoscopy  Explanation for inability to protect airway since cerebellar ICH is unclear  Head of bed > 30  Zosyn/Unasyn 10 day regimen completed 01/14   WBC trended down to normal and remains afebrile several days  Continue monitoring for recurrent healthcare associated pneumonia/aspiration pneumonia  No clinically apparent aspiration in recent days  Tx FOB 1/19 with copious  secretions  BAL and bronc wash cultures from 1/19 revealing corynebacterium stratum, colonized?  Monitor for the need to resume HCAP antibiotic coverage  Follow-up CXR    Acute kidney failure (HCC)- (present on admission)  Assessment & Plan  Maintain euvolemia and monitor fluid responsiveness (avoid NaCL and renal congestion)  MAP > 65 uses pressors or inotropic trial  Monitor volume status, continue serial BMP  Monitor urine output, Schwartz catheter  Significant insensible losses on review daily with RNs  Avoid nephrotoxic agents-not on any medications on review with clinical pharmacist that might cause ERWIN  Will function wax and wane but creatinine finally normalized at 1.2             VTE:  NOAC  Ulcer: H2 Antagonist  Lines: Central Line  Ongoing indication addressed    I have performed a physical exam and reviewed and updated ROS and Plan today (1/22/2024). In review of yesterday's note (1/21/2024), there are no changes except as documented above.     Discussed patient condition and risk of morbidity and/or mortality with   Family, RN, RT, Pharmacy, Code status disscussed, Charge nurse / hot rounds, and Patient    The patient remains critically ill.  I have assessed and reassessed the respiratory status and made ventilator adjustments based upon arterial blood gas analysis, ventilator waveforms and airway mechanics.  I have assessed and reassessed the blood pressure, hemodynamics, cardiovascular status. This patient remains at high risk for worsening cardiopulmonary dysfunction and death without the above critical care interventions.    Critical care time = 65   minutes in directly providing and coordinating critical care and extensive data review.  No time overlap and excludes procedures.

## 2024-01-22 NOTE — DISCHARGE PLANNING
Care Transition Team Discharge Planning    Anticipated Discharge Information  Discharge Disposition: D/T to SNF with Medicare cert in anticipation of skilled care (03)    Chart reviewed and case discussed in ICU rounds. Pt extubated-1/20, on HFNC 30L, 40%. Pt NG+ (tolerating feeds), oriented to self, following commands, slurred speech.     SNF referrals to be resubmitted once pt is off HFNC.

## 2024-01-22 NOTE — DIETARY
"Nutrition support weekly update:  Day 18 of admit.  Lewis Mohr is a 78 y.o. male with admitting DX of Respiratory failure.      Tube feeding initiated on 1/6. Current TF via NG tube is Nepro with goal rate 45 ml/hr to provide 1944 kcals, 87 gm protein, and 785 ml free water per day. Current free water order is 200 ml Q 4 hours.     Assessment:  Weight 1/21: 90 kg via bed scale. Weight is overall increased from admit weight of 86.1 kg. Pt is +4.6L fluids per I/O, weight increase possibly r/t fluid-status.  Height: 5'10\" (177.8 cm)  BMI: 28.47 (overweight)    Weight used for assessment: 87.4 kg from admit  Calculation/Equation: MSJ x 1.2 = 1934 kcals  Calories/day: 1900 - 2200 (22 - 25 kcals/kg)  Grams Protein/day: 105 - 131 (1.2 - 1.5 gm/kg)    Evaluation:   Pt is tolerating TF at goal rate per RN.   Pt is on 40 L HFNC, extubated 1/20.   Pt extubated  Skin: groin redness, dependent/generalized edema noted to all extremities.  Labs: Glucose 159, BUN 23, GFR 55, ALT 87, Alk phos 144, A1C 6.7  Meds: SSI, senna, bowel protocol  Last BM: 1/18  CHO-controlled formula is appropriate with renal labs stabilizing.    Malnutrition risk: No new risk noted.     Recommendations/Plan:  Transition to CHO-controlled diet to better meet estimated needs. Provide TF Glucerna 1.2 with goal rate of 75 ml/hr to provide 2160 kcals, 108 gm protein, and 1449 ml free water per day.   Fluids per MD. Current free water flushes are adequate.   Diet upgrades per SLP.     RD following.             "

## 2024-01-22 NOTE — CARE PLAN
The patient is Stable - Low risk of patient condition declining or worsening    Shift Goals  Clinical Goals: SBP <160, mobilize, oxygen stability  Patient Goals: DULCE MARIA  Family Goals: DULCE MARIA    Progress made toward(s) clinical / shift goals:      Problem: Safety - Medical Restraint  Goal: Free from restraint(s) (Restraint for Interference with Medical Device)  Outcome: Progressing     Problem: Pain - Standard  Goal: Alleviation of pain or a reduction in pain to the patient’s comfort goal  Outcome: Progressing

## 2024-01-23 NOTE — CARE PLAN
The patient is Watcher - Medium risk of patient condition declining or worsening    Shift Goals  Clinical Goals: hemodynamic stability. monitor neuro, oxygen stability  Patient Goals: DULCE MARIA  Family Goals: DULCE MARIA    Progress made toward(s) clinical / shift goals:    Problem: Safety - Medical Restraint  Goal: Free from restraint(s) (Restraint for Interference with Medical Device)  Description: INTERVENTIONS:  1.  ONCE/SHIFT or MINIMUM Q12H: Assess and document the continuing need for restraints  2.  Q24H: Continued use of restraint requires LIP to perform face to face examination and written order  3.  Identify and implement measures to help patient regain control  4.  Educate patient/family on discontinuation criteria   5.  Assess patient's understanding and retention of education provided  6.  Assess readiness for release & initiate progressive release per protocol  7.  Identify and document criteria for restraints  Outcome: Progressing  Note: Pt remains in mitts due to continued attempts to remove equipment      Problem: Pain - Standard  Goal: Alleviation of pain or a reduction in pain to the patient’s comfort goal  Description: Target End Date:  Prior to discharge or change in level of care    Document on Vitals flowsheet    1.  Document pain using the appropriate pain scale per order or unit policy  2.  Educate and implement non-pharmacologic comfort measures (i.e. relaxation, distraction, massage, cold/heat therapy, etc.)  3.  Pain management medications as ordered  4.  Reassess pain after pain med administration per policy  5.  If opiods administered assess patient's response to pain medication is appropriate per POSS sedation scale  6.  Follow pain management plan developed in collaboration with patient and interdisciplinary team (including palliative care or pain specialists if applicable)  Outcome: Progressing

## 2024-01-23 NOTE — PROGRESS NOTES
Critical Care Progress Note    Date of admission  1/4/2024    Chief Complaint  78 y.o. male admitted 1/4/2024 with cardiac arrest    Hospital Course  78 y.o. 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 He presented back for AMS and evaluated in ER and was discharged. He presented the next day 1/4/2024 for hypoxia and was admitted to medicine. He was found to have silent aspiration, lauren, urinary retention and hypernatremia and dehydration. He had a an echo 12/29/2023 LVH EF 60% grade II daistolic dysfunction mild MR/TR. He has not had an MRI on the prior admission. He today 1/9/2024 had afib w/ RVR and worsening hypoxia as well as worsening mental status and code stroke was called. His BS was 190. In the CT scanner gurgling with poor airway reflexes. He was intubated and shortly after had PEA arrest. CT head and CTA/P was negative for acute stroke.  He is full code and confirmed prior to this event by daughters.      01/11-Trial of extubation. GOC discussion with his daughter Jaymie. We will re-intubate if extubation is not successful.  01/12- Requiring HFNC 45L/70% FiO  01/13- Reintubated, MRI/MRA ordered to look for explanation for ongoing aspiration.    1/15 -Vent day 3 of second course on ventilator, MRI without obvious abnormality to account for aspiration  1/16 -Vent day 4, failed SAT, tachypneic with increased end-tidal CO2 with SBT's, not following  1/17 -Vent day 5, failed SAT again, not following  1/18 -Vent day 6 vent, better with SAT/SBT on 50% not ready for liberation starting to follow for the first time  1/19 - Tx FOB on vent - copious thick secretions, LLL BAL sent  1/20 - Extubated to HFNC 40/70, following better    Interval Problem Update  Reviewed last 24 hour events:  No acute changes overnight.   Remains critically ill  Remains encephalopathic. Minimall open eyes and minimally following commands.   Requires NT suction  x1 yesterday, x1 this am.    Received IPV QID, coughs well. NT suction prn  Sat in cardiac chair yesterday   PT/OT to assist mobility   HFNC 50%/40L  HR in 90s  SBP in 130-140s  Afebrile  Creatinine 1.30  I/O -880cc in the past 24 hours, even fluid balance since admission   WBC 15.4 (13.8)  Hgb 10.7/plt 597    On eliquis  Off antibitoics since 1/14  On norvasc 10 daily   BAL Cx + corynebacterium striatum  Completed total 7 day course of unasyn/piptazo       Review of Systems  Review of Systems   Constitutional:  Positive for malaise/fatigue. Negative for fever.   HENT:  Negative for sore throat.    Respiratory:  Positive for cough and sputum production. Negative for shortness of breath.    Cardiovascular:  Negative for chest pain.   Gastrointestinal:  Negative for nausea and vomiting.   Genitourinary:  Negative for hematuria.   Neurological:  Negative for headaches.   All other systems reviewed and are negative.  Limited somewhat by his speech    Vital Signs for last 24 hours   Temp:  [35.8 °C (96.5 °F)-36.3 °C (97.4 °F)] 36.1 °C (97 °F)  Pulse:  [75-98] 88  Resp:  [22-50] 27  BP: (108-166)/(58-95) 138/74  SpO2:  [91 %-99 %] 91 %    Hemodynamic parameters for last 24 hours       Respiratory Information for the last 24 hours         Physical Exam  Vitals reviewed.   Constitutional:       General: He is not in acute distress.     Appearance: He is ill-appearing and toxic-appearing. He is not diaphoretic.      Interventions: He is restrained.      Comments: HFNC   HENT:      Head: Normocephalic.      Comments: Ecchymosis noted over the lateral right orbit improved     Mouth/Throat:      Mouth: Mucous membranes are moist.      Comments: HFNC nose piece in place  Eyes:      Comments: Pupils are 3 to 4 mm nonreactive, mid position   Neck:      Vascular: No JVD.      Comments: IJ CVC  Cardiovascular:      Rate and Rhythm: Normal rate.      Pulses: Normal pulses.      Heart sounds: No murmur heard.     No friction rub. No  gallop.   Pulmonary:      Effort: No respiratory distress.      Breath sounds: Examination of the right-lower field reveals decreased breath sounds. Examination of the left-lower field reveals decreased breath sounds. Decreased breath sounds and rales present. No wheezing or rhonchi.      Comments: Diminished in the bases, no accessory muscle use.  Abdominal:      General: There is no distension.      Palpations: Abdomen is soft.      Tenderness: There is no abdominal tenderness. There is no guarding.   Genitourinary:     Comments: Schwartz  Musculoskeletal:      Cervical back: Neck supple. No rigidity.      Right lower leg: Edema present.      Left lower leg: Edema present.   Skin:     General: Skin is warm and dry.      Capillary Refill: Capillary refill takes less than 2 seconds.      Coloration: Skin is not cyanotic or mottled.      Nails: There is no clubbing.   Psychiatric:         Mood and Affect: Mood is not anxious.         Behavior: Behavior is not agitated. Behavior is cooperative.         Medications  Current Facility-Administered Medications   Medication Dose Route Frequency Provider Last Rate Last Admin    ipratropium-albuterol (DUONEB) nebulizer solution  3 mL Nebulization Q4H PRN (RT) Dani Bautista D.O.        amLODIPine (Norvasc) tablet 10 mg  10 mg Enteral Tube Q DAY Dani Bautista D.O.   10 mg at 01/23/24 0508    acetaminophen (Tylenol) tablet 650 mg  650 mg Enteral Tube Q6HRS PRN Ag Dixon M.D.        sodium bicarbonate 8.4 % for inhalation 3 mL  3 mL Inhalation Q4H PRN (RT) Ag Dixon M.D.   3 mL at 01/22/24 1415    insulin regular (HumuLIN R,NovoLIN R) injection  2-9 Units Subcutaneous Q6HRS Ag Dixon M.D.   2 Units at 01/23/24 0045    And    dextrose 10 % BOLUS 25 g  25 g Intravenous Q15 MIN PRN Ag Dixon M.D.        Respiratory Therapy Consult   Nebulization Continuous RT Manuel Antoine M.D.        senna-docusate (Pericolace Or Senokot S) 8.6-50 MG per tablet 2  Tablet  2 Tablet Enteral Tube BID Manuel Antoine M.D.   2 Tablet at 01/23/24 0508    And    polyethylene glycol/lytes (Miralax) Packet 1 Packet  1 Packet Enteral Tube QDAY PRN Manuel Antoine M.D.   1 Packet at 01/21/24 1724    And    magnesium hydroxide (Milk Of Magnesia) suspension 30 mL  30 mL Enteral Tube QDAY PRN Manuel Antoine M.D.   30 mL at 01/22/24 0601    And    bisacodyl (Dulcolax) suppository 10 mg  10 mg Rectal QDAY PRN Manuel Antoine M.D.   10 mg at 01/23/24 0508    MD Alert...ICU Electrolyte Replacement per Pharmacy   Other PHARMACY TO DOSE Manuel Antoine M.D.        hydrALAZINE (Apresoline) injection 10 mg  10 mg Intravenous Q6HRS PRN Manuel Antoine M.D.   10 mg at 01/22/24 0321    labetalol (Normodyne/Trandate) injection 20 mg  20 mg Intravenous Q4HRS PRN Manuel Antoine M.D.   20 mg at 01/23/24 0117    apixaban (Eliquis) tablet 5 mg  5 mg Enteral Tube BID Manuel Antoine M.D.   5 mg at 01/23/24 0508    Pharmacy Consult: Enteral tube insertion - review meds/change route/product selection  1 Each Other PHARMACY TO DOSE Rebecca Holman M.D.        haloperidol lactate (Haldol) injection 5 mg  5 mg Intravenous Q4HRS PRN Agapito Bonilla, A.P.R.N.   5 mg at 01/16/24 1407       Fluids    Intake/Output Summary (Last 24 hours) at 1/23/2024 0920  Last data filed at 1/23/2024 0800  Gross per 24 hour   Intake 2440 ml   Output 3700 ml   Net -1260 ml         Laboratory  Recent Labs     01/20/24  1515   ISTATAPH 7.407   ISTATAPCO2 44.1*   ISTATAPO2 81   ISTATATCO2 29   FBVXBOL8CAR 96   ISTATARTHCO3 27.8*   ISTATARTBE 3   ISTATTEMP 97.7 F   ISTATFIO2 40   ISTATSPEC Arterial   ISTATAPHTC 7.415   [id number] 78               Recent Labs     01/21/24  0345 01/22/24  0350 01/23/24  0450   SODIUM 141 141 141   POTASSIUM 3.6 3.7 4.0   CHLORIDE 107 103 104   CO2 28 28 30   BUN 20 23* 30*   CREATININE 1.27 1.33 1.30   CALCIUM 8.9 9.3 9.4       Recent Labs     01/21/24  0345 01/22/24  0350 01/22/24  1612 01/23/24  0450    ALTSGPT  --  87*  --   --    ASTSGOT  --  44  --   --    ALKPHOSPHAT  --  144*  --   --    TBILIRUBIN  --  0.3  --   --    PREALBUMIN  --   --  10.3*  --    GLUCOSE 185* 159*  --  167*       Recent Labs     01/21/24  0345 01/22/24  0350 01/23/24  0450   WBC 13.8* 15.4* 13.6*   NEUTSPOLYS 85.20* 82.10* 79.60*   LYMPHOCYTES 6.90* 9.70* 11.20*   MONOCYTES 6.70 7.10 8.20   EOSINOPHILS 0.10 0.10 0.10   BASOPHILS 0.30 0.30 0.30   ASTSGOT  --  44  --    ALTSGPT  --  87*  --    ALKPHOSPHAT  --  144*  --    TBILIRUBIN  --  0.3  --        Recent Labs     01/21/24 0345 01/22/24  0350 01/23/24  0450   RBC 3.26* 3.61* 3.55*   HEMOGLOBIN 9.8* 10.7* 10.8*   HEMATOCRIT 30.5* 34.2* 33.1*   PLATELETCT 455* 597* 619*         Imaging  MRA OF THE [place] OF [place] WITHIN NORMAL LIMITS.   Reviewed CT series as well as MRI brain  Reviewed CXRs    Assessment/Plan  * Acute respiratory failure with hypoxia (HCC)- (present on admission)  Assessment & Plan  Intubated date: 1/9/2024  Extubated 01/11/2024  Reintubated on 01/13/2024.  1/13 Bronchoscopy revealed thick tenacious secretions occluding multiple airways predominantly on the right.  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    Plan:   Continue HFNC to keep sat >88%  Upright position, HOB 45  Aggressive pulm hygiene, IPV, bicarb nebs  Avoid sedation   Need frequent NT suctions  Low threshold for re-intubation   Per daughter no tracheostomy or feeding tube would review case with her prior to reintubating unless it is emergently required  Palliative care consulted and following.       A-fib (HCC)  Assessment & Plan  Cardiac rates remain controlled  LVH on 12/27 echo with diastolic dysfunction  Eliquis prophylaxis for CVA, no signs of bleeding [place]  Continue to optimize electrolytes, needs further potassium repletion  Continue cardiac  monitoring    Encephalopathy  Assessment & Plan  Toxic metabolic encephalopathy ongoing due to ongoing aspiration   Hypercapnia resolved while on ventilator.  MRI does not reveal signs of anoxic brain injury or brainstem infarct that would explain a locked-in type syndrome.  1/20 extubated.     Still high risk of aspiration  Therapies-PT/OT  Swallow eval when clinically appropriate  On tube feed    Advance care planning  Assessment & Plan  Nora is his daughter and surrogate decision maker  Plan for reintubation if necessary now  No plan for tracheostomy per daughter  No feeding tube surgically per daughter  Extubated 1/20 again, plan on reintubating for now  If appears to need reintubation would converse with daughter prior to reintubating unless urgently necessary  Palliative care consultation follow-up next week    Urinary retention  Assessment & Plan  Schwartz catheter    Hypernatremia- (present on admission)  Assessment & Plan  Resolved    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   Creatinine decreasing  Good UOP   Monitor urine output, Schwartz catheter         VTE:  NOAC  Ulcer: H2 Antagonist  Lines: Central Line  Ongoing indication addressed    I have performed a physical exam and reviewed and updated ROS and Plan today (1/23/2024). In review of yesterday's note (1/22/2024), there are no changes except as documented above.     Discussed patient condition and risk of morbidity and/or  mortality with   Family, RN, RT, Pharmacy, Code status disscussed, Charge nurse / hot rounds, and Patient    Critical care time = 60 minutes in directly providing and coordinating critical care and extensive data review.  No time overlap and excludes procedures.

## 2024-01-23 NOTE — CONSULTS
Inpatient Palliative Medicine - Follow Up     Lewis Mohr  78 y.o. male  [id number]     Location: [facility]  Pcp Pt States None     Referral Source:   Tj Hoyt M.d.     Reason for palliative medicine consultation and/or visit: ACP           Assessment and Plan:     GOAL(S) OF CARE = LONGEVITY     SYMPTOMS ETIOLOGY/CAUSES = multifactorial encephalopathy secondary to: recent cerebellar hemorrhage, aspiration PNA, UTI, ??dehydration/poor intake/volume depletion     PROGNOSIS = HIGHLY GUARDED, long term recovery and survival not yet assured     CODE STATUS = FULL, unchanged  1/9/2024 later upgraded to ICU and intubated, consistent with wishes expressed by patient's daughter Jaymie  1/11/2024 Tentative plan is to re-intubate again if failing first extubation attempt.   1/12/2024 Jaymie verified current GOC: unchanged. Re-intubate if needed. Patient on HFNC 45-50LPM.  1/20/2024 Extubated, remains on HFNC 30-40LPM        PALLIATIVE CARE TEAM INTERVENTIONS =   Medical updates to daughter Jaymie.  Will plan to meet with Jaymie tomorrow along with ICU attending Dr. Bautista @ [phone] to discuss:  Philosophy of care - no trach & DNAR DNI vs trach+PEG & LTAC &  FULL CODE  Possibility of repeat FEES to assess/confirm ongoing aspiration.  Patient more active today overall, though still requiring HFNC 30-40LPM worrisome of another relapse.  Will continue following.        Summary:   Lewis Mohr is a 78 y.o. 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 Patient was reportedly discharged to [place] for rehab 1/2/2024. However he has since declined, resulting in an ER visit 1/3/2024 and the current readmission starting 1/4/204.     Since readmission patient has been treated for multiple issues: aspiration pneumonia, urinary retention, hypernatremia, ERWIN, etc. Patient has remained  encephalopathic however, and dislodged NGT multiple times and remains NPO due to FEES+ silent aspiration.  -----------------------------------------------------------------------------------------------------------------------------------------  1/12/2023  Followed up with Dr. Coon over the phone. She barely just left the hospital when I went to reassess patient.  Patient was more active and communicative today, with mitten gloves both hands to prevent line pulling and still only mumbling unintelligibly for the most part.    From Jaymie's perspective, this is the best patient has seen for over a week, though still well below his prior baseline when he was originally discharged to [place] for rehab on 1/2/2024.    Jaymie wonders if another confirmatory speech eval FEES is needed if aspiration is still happening as we suspect. Informed Jaymie that we could further discuss this tomorrow with ICU attending Dr. Bautista, when she visits. Jaymie was agreeable.    Provided medical updates to Dr. Bautista, RN, and RT. It does appear we are fast approaching a decision point if we should consider definitively one way or another: no trach & DNAR DNI vs trach + LTACH + all interventions. Dr. Bautista and I will also plan to address this philosophical question more tomorrow during our meeting with Jaymie.    Patient otherwise remains status quo and continues to require extensive suctioning, which is concerning for continued silent aspiration.        1/12/2023  Provided brief update to patient's daughter over phone - off vent still on HFNC with slightly increased O2 demand 45==>50LPM. Patient remained encephalopathic and nonverbal, only able to unreliably follow simple commands at times. This remains far below his functional level at the time of his first discharge 1/2/2024 post craniotomy.     Jaymie verified that indeed the current plan remains to re-intubate again should patient fail his first extubation trial. Informed Jaymie that while I am not  "able to predict ultimate outcome, the creased O2 demand and poor mentation and NPO are all warning signs for potential reintubation. Informed Jaymie that repeated extubation failures would speak for a negative future outcome. Jaymie understands.     Provided emotional support and therapeutic presence. This latest event has been distressing to Jaymie and her family. She still has to work and patient's critical status had caused her a few \"panic attacks\" at times.     Jaymie and her  typically visit after 1630 on weekdays, after work. She plans to visit in person SAT afternoon 12/13/2024, hopefully after roads have been better plowed.           1/9/2023  Placed call to patient's daughter Jaymie and introduced my role and scope of practice. She was amenable to this visit. We reviewed patient's relevant history. According to Jaymie, patient has had a good overall health for decades not on any chronic medication until his last admission for his cerebellar hemorrhage.       Jaymie's initial impression as of patient's official discharge was good. \"Dad was still talking to me over the phone as of Sunday 12/31/23. Patient did gradually deteriorate after initial charge, with more slurred speech and unsuccessful rehab efforts at [facility].      The dysphagia and aphasia issues are relatively to Jaymie, about for last week. She and her sister wonders if patient's ongoing pneumonia, hyperkalemia, and low hydration/intake negatively affect his reocvery.     Clinical picture still evolving. I advised Jaymie that, if patient's dysphagia and aphasia remain unchanged, then pneumonia and pneumonitis can become recurrent due to continued aspiration... of course, that will also be dependent on if reversible secondary issues may improve patient's current level of function.     Lots of uncertainties... low intake volume, possible dehydration due to net negative I/O since admission, hyperkalemia uncertain if secondary in part to continued low intake " and multiple NGT dislodges and feed disruption, etc.     Informed Jaymie that, as multiple secondary and potentially reversible medical issues are also ongoing, I am unable to definitively prognosticate patient's prognosis or chance for definitive recovery at this point. Jaymie was accepting and stressed family's wishes for medical team to address all potentially reversible issues, to try to possibly recover patient's overall function to his status upon his initial discharge, if possible.     For now, Jaymie and her sister and other family members all want to continue aggressive care, consistent with full code status.  Jaymie does not recall any issue weaning patient off ventilator after his craniotomy 12/27/2023 either.        Note patient later developed afib RVR in the evening and was upgraded to ICU and also appropriately intubated for worsening respiratory distress.        Advance care planning:  The patient and/or legal decision maker has provided voluntary consent to discuss advance care planning. We discussed code status.   FULL     Thank you for allowing me the opportunity to participate in the care of Lewis Mohr     I spent a total of 60 minutes reviewing medical records, direct face-to-face time with the patient and/or family, documentation and coordination of care. This is separate from the time spent on advance care planning, which is documented above.           JOSH DENT DO (TIM)  [facility]   [id number]  [id number]  P: [phone]  F: [phone]  C: [phone]

## 2024-01-23 NOTE — CARE PLAN
Problem: Ventilation  Goal: Ability to achieve and maintain unassisted ventilation or tolerate decreased levels of ventilator support  Description: Target End Date:  4 days     Document on Vent flowsheet    1.  Support and monitor invasive and noninvasive mechanical ventilation  2.  Monitor ventilator weaning response  3.  Perform ventilator associated pneumonia prevention interventions  4.  Manage ventilation therapy by monitoring diagnostic test results  1/23/2024 0424 by Eleazar Stapleton, RRT  Outcome: Met  1/23/2024 0424 by Eleazar Stapleton, RRT  Outcome: Not Met     Problem: Humidified High Flow Nasal Cannula  Goal: Maintain adequate oxygenation dependent on patient condition  Description: Target End Date:  resolve prior to discharge or when underlying condition is resolved/stabilized    1.  Implement humidified high flow oxygen therapy  2.  Titrate high flow oxygen to maintain appropriate SpO2  Outcome: Not Met

## 2024-01-23 NOTE — CARE PLAN
Problem: Ventilation  Goal: Ability to achieve and maintain unassisted ventilation or tolerate decreased levels of ventilator support  Description: Target End Date:  4 days     Document on Vent flowsheet    1.  Support and monitor invasive and noninvasive mechanical ventilation  2.  Monitor ventilator weaning response  3.  Perform ventilator associated pneumonia prevention interventions  4.  Manage ventilation therapy by monitoring diagnostic test results  Outcome: Progressing       Problem: Bronchopulmonary Hygiene  Goal: Increase mobilization of retained secretions  Description: Target End Date:  2 to 3 days    1.  Perform bronchopulmonary therapy as indicated by assessment  2.  Perform airway suctioning  3.  Perform actions to maintain patient airway  Outcome: Progressing     Pt receiving IPV QID.    Problem: Humidified High Flow Nasal Cannula  Goal: Maintain adequate oxygenation dependent on patient condition  Description: Target End Date:  resolve prior to discharge or when underlying condition is resolved/stabilized    1.  Implement humidified high flow oxygen therapy  2.  Titrate high flow oxygen to maintain appropriate SpO2  Outcome: Progressing    HHFNC 40 LPM and 50%

## 2024-01-23 NOTE — THERAPY
"Speech Language Pathology   Clinical Swallow Evaluation     Patient Name: Lewis Mohr  AGE:  78 y.o., SEX:  male  Medical Record #: [id number]  Date of Service: 1/23/2024      History of Present Illness  79 y/o M admit 1/4 for AMS and hypoxia d/t respiratory failure. Pt w/ recent cerebellar ICH r/t fall, s/p crani on 12/27 w/ subsequent d/c to SNF. Pt was intubated 1/9- 1/11, re intubated 1/13-1/20.     Pt was seen by ST services for dysphagia w/ a FEES performed on 1/5 indicating severe oropharyngeal dysphagia w/ silent aspiration events on thin, mildly thick liquids, and liquidized consistencies.    PMHx: cerebellar hemorrhage (recent), ERWIN, adrenal mass L    CXR 1/22: \"1.  Slightly increased BILATERAL lung disease. This could be pulmonary edema or pneumonia.  2.  Possible small BILATERAL pleural effusions\"    General Information:  Vitals  O2 (LPM): 40  O2 Delivery Device: Heated High Flow Nasal Cannula  Level of Consciousness: Alert  Patient Behaviors: Confused, Restless  Orientation: Disoriented x 4  Follows Directives: No    Prior Living Situation & Level of Function:  Prior Services:  (Admitted from [facility])  Housing / Facility: 1 Story Apartment / Condo  Steps Into Home: 13  Equipment Owned: None  Lives with - Patient's Self Care Capacity: Alone and Able to Care For Self     Communication: Hx of cerebellar hemorrhage, pt denied working with ST (from previous eval, not reported by pt on this visit)  Swallowing: Hx of cerebellar hemorrhage, pt denied working with ST. Pt endorsing consuming a regular diet prior to admit. (from previous eval, not reported by pt on this visit)     Oral Mechanism Evaluation:  Dentition:  (Fair; Natural dentition)   Facial Symmetry: Pt did not follow commands to assess  Facial Sensation: Pt did not follow commands to assess     Labial Observations: Pt did not follow commands to assess   Lingual Observations: Pt did not follow commands to assess  Motor Speech: Pt only verbalized his " name- noted w/ severe dysarthria impacted by mentation       Laryngeal Function:  Secretion Management: Excess secretions, suctioning required  Voice Quality: Hoarse, Wet  Max Phonation Time (seconds): unable to assess d/t pt's mentation  S/Z Ratio: unable to assess d/t pt's mentation  Cough: Perceptually weak     Subjective  Pt seen alert, confused and restless in bed in bilateral soft wrist restraints. He was saturating on 40 L HF NC at 50% FiO2. He verbalized his name, but otherwise did not answer simple questions or follow simple commands. He had audible upper airway wetness and weak coughing upon entry    Assessment  Current Method of Nutrition: NPO until cleared by speech pathology, NGT  Positioning: Espino's (60-90 degrees)  Bolus Administration: SLP  O2 (LPM): 40 O2 Delivery Device: Heated High Flow Nasal Cannula  Factor(s) Affecting Performance: Impaired command following, Impaired mental status, Impaired endurance  Tracheostomy : No     Swallowing Trials:  Swallowing Trials  Ice: Impaired    Comments: SLP assisted w/ oral care to assess stimulability of pt's swallow response. Noted to be orally defensive but with encouragement, limited oral care was performed. He was restless w/ reduced attention to structured PO trials. Incomplete labial seal was noted (suspect d/t mentation) with anterior loss of several ice chips. Pt w/ inconsistent swallow response (suspected, to palpation) to ice chips. Noted w/ inconsistent wet, weak coughing- suspect r/t aspiration of secretions and trace PO. Further trials discontinued.     Clinical Impressions    Clinical signs of oropharyngeal dysphagia, likely acute & multifactorial r/t neurological deficits in the context of cerebellar ICH, AMS and laryngeal injury s/p multiple intubations w/ dystussia and dysphonia. Temporary means of alternative nutrition/hydration continue to be indicated as pt demo's high risk for aspiration on PO intake. Instrumentation is indicated to  define swallow physiology and determine ST POC but pt is not yet appropriate with regard to his participation and endurance.     Recommendations  Diet Consistency: NPO w/ alternative nutrition/hydration  Instrumentation: Instrumental swallow study pending clinical progress  Medication: Non Oral     Risk Management : Physical mobility, as tolerated  Oral Care: Q4h    SLP Treatment Plan  Treatment Plan: Dysphagia Treatment  SLP Frequency: 3x Per Week  Estimated Duration: Until Therapy Goals Met    Anticipated Discharge Needs  Discharge Recommendations: Recommend post-acute placement for additional speech therapy services prior to discharge home   Therapy Recommendations Upon DC: Dysphagia Training      Patient / Family Goals  Patient / Family Goal #1: RN reported pt's daughter was eager for pt to participate w/ ST  Short Term Goals  Short Term Goal # 1: Pt will demo improved secretion management as evidenced by reduced use of oral suctioning during tx session  Short Term Goal # 1 B : Pt will participate in prefeeding trials to demo readiness for an instrumental swallow study    Jessi Galvin, SLP

## 2024-01-23 NOTE — THERAPY
Speech Language Therapy Contact Note    Patient Name: Lewis Mohr  Age:  78 y.o., Sex:  male  Medical Record #: [id number]  Today's Date: 1/23/2024    Per EMR, pt currently remains intubated on this date. ST to follow to monitor for extubation. Please place new orders for CSE as appropriate. Thank you.

## 2024-01-24 PROBLEM — R41.0 DELIRIUM: Status: ACTIVE | Noted: 2024-01-01

## 2024-01-24 NOTE — DISCHARGE PLANNING
Case Management Discharge Planning    Chart reviewed and case discussed in ICU rounds:   Extubated on 1/20/24.   +HHFNC/NGT (tolerating feeds)/Q4H neurochecks/bilateral mittens.   Oriented to self only, intermittently following commands.  Continued Palliative GOC discussion with daughter Jaymie on 1/23/2024; Patient remains Full Code.     Referrals (SNF vs LTACH) to be submitted when appropriate (pending clinical progression).

## 2024-01-24 NOTE — THERAPY
Occupational Therapy  Daily Treatment     Patient Name: Lewis Mohr  Age:  78 y.o., Sex:  male  Medical Record #: [id number]  Today's Date: 1/24/2024     Precautions  Precautions: Fall Risk, Swallow Precautions, Nasogastric Tube  Comments: ETT    Assessment     Pt currently limited by decreased functional mobility, activity tolerance, cognition, sensation, strength, AROM, coordination, balance,  which are affecting pt's ability to complete ADLs/IADLs at baseline. Pt would benefit from OT services in the acute care setting to maximize functional recovery.      Plan    Treatment Plan Status: (P) Continue Current Treatment Plan  Type of Treatment: Self Care / Activities of Daily Living, Therapeutic Activity, Neuro Re-Education / Balance  Treatment Frequency: 3 Times per Week  Treatment Duration: Until Therapy Goals Met       Discharge Recommendations: (P) Recommend post-acute placement for additional occupational therapy services prior to discharge home       01/24/24 0843   Activities of Daily Living   Grooming Moderate Assist;Maximal Assist   Upper Body Dressing Total Assist   Lower Body Dressing Total Assist   Toileting Total Assist   Functional Mobility   Sit to Stand Maximal Assist   Bed, Chair, Wheelchair Transfer Unable to Participate   Short Term Goals   Short Term Goal # 1 pt will wash face with a cloth mod A   Goal Outcome # 1 Progressing slower than expected   Short Term Goal # 2 pt will maintain sitting in midine with SBA for ADLs   Goal Outcome # 2 Progressing slower than expected   Short Term Goal # 3 mod A with UB dressing   Goal Outcome # 3 Progressing slower than expected   Short Term Goal # 4 mod A with ADL txfs   Goal Outcome # 4 Progressing slower than expected   Occupational Therapy Treatment Plan    O.T. Treatment Plan Continue Current Treatment Plan   Anticipated Discharge Equipment and Recommendations   Discharge Recommendations Recommend post-acute placement for additional occupational therapy  services prior to discharge home

## 2024-01-24 NOTE — CARE PLAN
The patient is Watcher - Medium risk of patient condition declining or worsening    Shift Goals  Clinical Goals: airway protection, SBP < 160, NT suction as needed  Patient Goals: DULCE MARIA  Family Goals: DULCE AMRIA, none present    Progress made toward(s) clinical / shift goals:      Problem: Hemodynamics  Goal: Patient's hemodynamics, fluid balance and neurologic status will be stable or improve  Outcome: Progressing     Problem: Knowledge Deficit - Standard  Goal: Patient and family/care givers will demonstrate understanding of plan of care, disease process/condition, diagnostic tests and medications  Outcome: Progressing     Problem: Skin Integrity  Goal: Skin integrity is maintained or improved  Outcome: Progressing     Problem: Safety - Medical Restraint  Goal: Free from restraint(s) (Restraint for Interference with Medical Device)  Outcome: Progressing     Problem: Pain - Standard  Goal: Alleviation of pain or a reduction in pain to the patient’s comfort goal  Outcome: Progressing

## 2024-01-24 NOTE — CARE PLAN
Problem: Bronchopulmonary Hygiene  Goal: Increase mobilization of retained secretions  Description: Target End Date:  2 to 3 days    1.  Perform bronchopulmonary therapy as indicated by assessment  2.  Perform airway suctioning  3.  Perform actions to maintain patient airway  Outcome: Not Progressing     Problem: Humidified High Flow Nasal Cannula  Goal: Maintain adequate oxygenation dependent on patient condition  Description: Target End Date:  resolve prior to discharge or when underlying condition is resolved/stabilized    1.  Implement humidified high flow oxygen therapy  2.  Titrate high flow oxygen to maintain appropriate SpO2  Outcome: Not Progressing   HHFO 30L/40%  CPT QID  Bicarb/Duo Q4  Pt requiring multiple rounds of NT suctions; moderate amount of thick brown tan secretions. Pt having difficulty clearing on his own.

## 2024-01-24 NOTE — PROGRESS NOTES
Critical Care Progress Note    Date of admission  1/4/2024    Chief Complaint  78 y.o. male admitted 1/4/2024 with cardiac arrest    Hospital Course  78 y.o. 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 He presented back for AMS and evaluated in ER and was discharged. He presented the next day 1/4/2024 for hypoxia and was admitted to medicine. He was found to have silent aspiration, lauren, urinary retention and hypernatremia and dehydration. He had a an echo 12/29/2023 LVH EF 60% grade II daistolic dysfunction mild MR/TR. He has not had an MRI on the prior admission. He today 1/9/2024 had afib w/ RVR and worsening hypoxia as well as worsening mental status and code stroke was called. His BS was 190. In the CT scanner gurgling with poor airway reflexes. He was intubated and shortly after had PEA arrest. CT head and CTA/P was negative for acute stroke.  He is full code and confirmed prior to this event by daughters.      01/11-Trial of extubation. GOC discussion with his daughter Jaymie. We will re-intubate if extubation is not successful.  01/12- Requiring HFNC 45L/70% FiO  01/13- Reintubated, MRI/MRA ordered to look for explanation for ongoing aspiration.    1/15 -Vent day 3 of second course on ventilator, MRI without obvious abnormality to account for aspiration  1/16 -Vent day 4, failed SAT, tachypneic with increased end-tidal CO2 with SBT's, not following  1/17 -Vent day 5, failed SAT again, not following  1/18 -Vent day 6 vent, better with SAT/SBT on 50% not ready for liberation starting to follow for the first time  1/19 - Tx FOB on vent - copious thick secretions, LLL BAL sent  1/20 - Extubated to HFNC 40/70, following better    Interval Problem Update  Reviewed last 24 hour events:  No acute changes overnight.   Remains critically ill  HFNC 40%/30L  Had lots of NT suctions  Labetalol x1 overnight  BM x1 yesteday   Uop 1.6L output   HR  in 80s  SBP in 120-130s  Creaitnine 1.27  Pt receiving IPV, bicarb nebs,     On eliquis  Off antibitoics since 1/14  On norvasc 10 daily   BAL Cx + corynebacterium striatum  Completed total 7 day course of unasyn/piptazo       Review of Systems  Review of Systems   Reason unable to perform ROS: minimal due to mental status.   Constitutional:  Positive for malaise/fatigue.   Respiratory:  Positive for sputum production.    Gastrointestinal:  Negative for nausea and vomiting.   Neurological:  Negative for headaches.   All other systems reviewed and are negative.  Limited somewhat by his speech    Vital Signs for last 24 hours   Temp:  [35.9 °C (96.6 °F)-36.4 °C (97.5 °F)] 36.4 °C (97.5 °F)  Pulse:  [73-93] 78  Resp:  [18-28] 18  BP: (118-170)/(64-94) 146/73  SpO2:  [91 %-100 %] 94 %    Hemodynamic parameters for last 24 hours       Respiratory Information for the last 24 hours         Physical Exam  Vitals and nursing note reviewed.   Constitutional:       General: He is not in acute distress.     Appearance: He is ill-appearing and toxic-appearing. He is not diaphoretic.      Interventions: He is restrained.      Comments: [facility]   HENT:      Head: Normocephalic.      Comments: Ecchymosis noted over the lateral right orbit improved     Mouth/Throat:      Mouth: Mucous membranes are moist.      Comments: HFNC nose piece in place  Eyes:      Comments: Pupils are 3 to 4 mm nonreactive, mid position   Neck:      Vascular: No JVD.      Comments: IJ CVC  Cardiovascular:      Rate and Rhythm: Normal rate.      Pulses: Normal pulses.      Heart sounds: No murmur heard.     No friction rub. No gallop.   Pulmonary:      Effort: No respiratory distress.      Breath sounds: Examination of the right-lower field reveals decreased breath sounds. Examination of the left-lower field reveals decreased breath sounds. Decreased breath sounds and rales present. No wheezing or rhonchi.      Comments: Diminished in the bases, no accessory  muscle use.  Abdominal:      General: There is no distension.      Palpations: Abdomen is soft.      Tenderness: There is no abdominal tenderness. There is no guarding.   Genitourinary:     Comments: [facility]  Musculoskeletal:      Cervical back: Neck supple. No rigidity.      Right lower leg: Edema present.      Left lower leg: Edema present.   Skin:     General: Skin is warm and dry.      Capillary Refill: Capillary refill takes less than 2 seconds.      Coloration: Skin is not cyanotic or mottled.      Nails: There is no clubbing.   Psychiatric:         Mood and Affect: Mood is not anxious.         Behavior: Behavior is not agitated. Behavior is cooperative.         Medications  Current Facility-Administered Medications   Medication Dose Route Frequency Provider Last Rate Last Admin    ipratropium-albuterol (DUONEB) nebulizer solution  3 mL Nebulization Q4H PRN (RT) Dani Bautista D.O.        amLODIPine (Norvasc) tablet 10 mg  10 mg Enteral Tube Q DAY Dani Bautista D.O.   10 mg at 01/24/24 0510    acetaminophen (Tylenol) tablet 650 mg  650 mg Enteral Tube Q6HRS PRN Ag Dixon M.D.        sodium bicarbonate 8.4 % for inhalation 3 mL  3 mL Inhalation Q4H PRN (RT) Ag Dixon M.D.   3 mL at 01/22/24 1415    insulin regular (HumuLIN R,NovoLIN R) injection  2-9 Units Subcutaneous Q6HRS Ag Dixon M.D.   2 Units at 01/24/24 0519    And    dextrose 10 % BOLUS 25 g  25 g Intravenous Q15 MIN PRN Ag Dixon M.D.        Respiratory Therapy Consult   Nebulization Continuous RT Manuel Antoine M.D.        senna-docusate (Pericolace Or Senokot S) 8.6-50 MG per tablet 2 Tablet  2 Tablet Enteral Tube BID Manuel Antoine M.D.   2 Tablet at 01/24/24 0510    And    polyethylene glycol/lytes (Miralax) Packet 1 Packet  1 Packet Enteral Tube QDAY PRN Manuel Antoine M.D.   1 Packet at 01/21/24 1724    And    magnesium hydroxide (Milk Of Magnesia) suspension 30 mL  30 mL Enteral Tube QDAY PRN Manuel Antoine M.D.    30 mL at 01/22/24 0601    And    bisacodyl (Dulcolax) suppository 10 mg  10 mg Rectal QDAY PRN Manuel Antoine M.D.   10 mg at 01/23/24 0508    MD Alert...ICU Electrolyte Replacement per Pharmacy   Other PHARMACY TO DOSE Manuel Antoine M.D.        hydrALAZINE (Apresoline) injection 10 mg  10 mg Intravenous Q6HRS PRN Manuel Antoine M.D.   10 mg at 01/22/24 0321    labetalol (Normodyne/Trandate) injection 20 mg  20 mg Intravenous Q4HRS PRN Manuel Antoine M.D.   20 mg at 01/24/24 0211    apixaban (Eliquis) tablet 5 mg  5 mg Enteral Tube BID Manuel Antoine M.D.   5 mg at 01/24/24 0510    Pharmacy Consult: Enteral tube insertion - review meds/change route/product selection  1 Each Other PHARMACY TO DOSE Rebecca Holman M.D.        haloperidol lactate (Haldol) injection 5 mg  5 mg Intravenous Q4HRS PRN JENNY Quintanilla.P.RAllieN.   5 mg at 01/16/24 1407       Fluids    Intake/Output Summary (Last 24 hours) at 1/24/2024 0923  Last data filed at 1/24/2024 0800  Gross per 24 hour   Intake 3070 ml   Output 3500 ml   Net -430 ml         Laboratory                Recent Labs     01/22/24  0350 01/23/24  0450 01/24/24  0400   SODIUM 141 141 140   POTASSIUM 3.7 4.0 3.8   CHLORIDE 103 104 102   CO2 28 30 32   BUN 23* 30* 36*   CREATININE 1.33 1.30 1.27   CALCIUM 9.3 9.4 9.1       Recent Labs     01/22/24  0350 01/22/24  1612 01/23/24  0450 01/24/24  0400   ALTSGPT 87*  --   --   --    ASTSGOT 44  --   --   --    ALKPHOSPHAT 144*  --   --   --    TBILIRUBIN 0.3  --   --   --    PREALBUMIN  --  10.3*  --   --    GLUCOSE 159*  --  167* 177*       Recent Labs     01/22/24  0350 01/23/24  0450 01/24/24  0400   WBC 15.4* 13.6* 12.0*   NEUTSPOLYS 82.10* 79.60* 76.60*   LYMPHOCYTES 9.70* 11.20* 12.70*   MONOCYTES 7.10 8.20 9.50   EOSINOPHILS 0.10 0.10 0.00   BASOPHILS 0.30 0.30 0.50   ASTSGOT 44  --   --    ALTSGPT 87*  --   --    ALKPHOSPHAT 144*  --   --    TBILIRUBIN 0.3  --   --        Recent Labs     01/22/24  0350 01/23/24  0450  01/24/24  0400   RBC 3.61* 3.55* 3.49*   HEMOGLOBIN 10.7* 10.8* 10.5*   HEMATOCRIT 34.2* 33.1* 32.3*   PLATELETCT 597* 619* 643*         Imaging  MRA OF THE [place] OF [place] WITHIN NORMAL LIMITS.   Reviewed CT series as well as MRI brain  Reviewed CXRs    Assessment/Plan  * Acute respiratory failure with hypoxia (HCC)- (present on admission)  Assessment & Plan  Intubated date: 1/9/2024  Extubated 01/11/2024  Reintubated on 01/13/2024.  1/13 Bronchoscopy revealed thick tenacious secretions occluding multiple airways predominantly on the right.  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    Plan:   Continue weaning HFNC to keep sat >88%  Upright position, HOB 45  Aggressive pulm hygiene, CPT, bicarb nebs scheduled.   Avoid sedation   Need frequent NT suctions  Low threshold for re-intubation   Attempted to call daughter but went to [facility]  Palliative care consulted and following.       Delirium  Assessment & Plan  Likely metabolic encephalopathy from ongoing aspiration   Non pharmacologic therapy for delirium.   Start valproic acid.   Haldol PRN      A-fib (HCC)  Assessment & Plan  Cardiac rates remain controlled  LVH on 12/27 echo with diastolic dysfunction  Eliquis prophylaxis for CVA, no signs of bleeding [place]  Continue to optimize electrolytes, needs further potassium repletion  Continue cardiac monitoring    Encephalopathy  Assessment & Plan  Toxic metabolic encephalopathy ongoing due to ongoing aspiration   Hypercapnia resolved while on ventilator.  MRI does not reveal signs of anoxic brain injury or brainstem infarct that would explain a locked-in type syndrome.  1/20 extubated.     Still high risk of aspiration  Therapies-PT/OT  Swallow eval when clinically appropriate  On tube feed    Advance care planning  Assessment & Plan  Nora is his daughter and surrogate decision maker  Plan  for reintubation if necessary now  No plan for tracheostomy per daughter  No feeding tube surgically per daughter  Extubated 1/20 again, plan on reintubating for now  If appears to need reintubation would converse with daughter prior to reintubating unless urgently necessary  Palliative care consultation follow-up next week    Urinary retention  Assessment & Plan  Schwartz catheter    Hypernatremia- (present on admission)  Assessment & Plan  Resolved    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   Creatinine decreasing  Good UOP   Monitor urine output, Schwartz catheter         VTE:  NOAC  Ulcer: H2 Antagonist  Lines: Central Line  Ongoing indication addressed    I have performed a physical exam and reviewed and updated ROS and Plan today (1/24/2024). In review of yesterday's note (1/23/2024), there are no changes except as documented above.     Discussed patient condition and risk of morbidity and/or mortality with   Family, RN, RT, Pharmacy, Code status disscussed, Charge nurse / hot rounds, and Patient    Critical care time = 65 minutes in directly providing and coordinating critical care and extensive data review.  No time overlap and excludes procedures.

## 2024-01-24 NOTE — CARE PLAN
Problem: Bronchopulmonary Hygiene  Goal: Increase mobilization of retained secretions  Description: Target End Date:  2 to 3 days    1.  Perform bronchopulmonary therapy as indicated by assessment  2.  Perform airway suctioning  3.  Perform actions to maintain patient airway  Outcome: Not Progressing     Problem: Humidified High Flow Nasal Cannula  Goal: Maintain adequate oxygenation dependent on patient condition  Description: Target End Date:  resolve prior to discharge or when underlying condition is resolved/stabilized    1.  Implement humidified high flow oxygen therapy  2.  Titrate high flow oxygen to maintain appropriate SpO2  Outcome: Not Progressing   Pt is on 30L/40%, does not tolerate the IPV

## 2024-01-24 NOTE — THERAPY
Speech Language Pathology   Daily Treatment     Patient Name: Lewis Mohr  AGE:  78 y.o., SEX:  male  Medical Record #: [id number]  Date of Service: 1/24/2024    Precautions: Fall Risk, Swallow Precautions, Nasogastric Tube     Subjective  Pt cleared by RN for dysphagia treatment. Pt received alert, restless in bed w/ b/l mitts applied. Pt on 30L/40% FiO2. No verbalizations appreciated on this date. Pt required max levels of verbal/tactile cues (sternal rub, tactile stimulation) to maintain appropriate levels of alertness for PO trials. He had audible upper airway wetness and weak coughing upon entry/throughout treatment.     Assessment  With max levels of cues/encouragement, ice chip trials completed. Suction provided intermittently that removed marked amounts of thick secretions from the oral cavity. W/ use of oral suction, pt noted to become intermittently orally defensive. Pt w/ reduced attention to structured PO trials. Incomplete labial seal and anterior loss of several ice chips-suspect 2/2 mentation/oral defensiveness. Pt w/ suspected inconsistent swallow response. Noted w/ inconsistent wet, weak coughing- suspect r/t aspiration of secretions and trace PO. Further trials discontinued.      Clinical Impressions  Clinical signs of oropharyngeal dysphagia appreciated on this date, likely acute & multifactorial r/t neurological deficits in the context of cerebellar ICH, AMS and laryngeal injury s/p multiple intubations w/ dystussia and dysphonia. SLP recommending continuation of alternative nutrition/hydration as pt demo's high risk for aspiration on PO intake. Instrumentation is indicated to define swallow physiology and determine ST POC as appropriate.  Pt unable to participate on this date d/t ANABELLE/endurance.      Recommendations  Treatment Completed: Dysphagia Treatment  Diet Consistency: NPO w/ alternative nutrition/hydration  Instrumentation: Instrumental swallow study pending clinical progress  Medication:  Non Oral  Oral Care: Q4h    SLP Treatment Plan  Treatment Plan: Dysphagia Treatment  SLP Frequency: 3x Per Week  Estimated Duration: Until Therapy Goals Met    Anticipated Discharge Needs  Discharge Recommendations: Recommend post-acute placement for additional speech therapy services prior to discharge home  Therapy Recommendations Upon DC: Dysphagia Training, Patient / Family / Caregiver Education (pending cognitive linguistic communication evaluation)    Patient / Family Goals  Patient / Family Goal #1: RN reported pt's daughter was eager for pt to participate w/ ST  Goal #1 Outcome: Progressing as expected  Short Term Goals  Short Term Goal # 1: Pt will demo improved secretion management as evidenced by reduced use of oral suctioning during tx session  Goal Outcome # 1: Progressing slower than expected  Short Term Goal # 1 B : Pt will participate in prefeeding trials to demo readiness for an instrumental swallow study  Goal Outcome  # 1 B: Progressing slower than expected    Jeri Morfin, SLP

## 2024-01-24 NOTE — PROGRESS NOTES
Inpatient Palliative Medicine - Follow Up     Lewis Mohr  78 y.o. male  [id number]     Location: [facility]  Pcp Pt States None     Referral Source:   Tj Hoyt M.d.     Reason for palliative medicine consultation and/or visit: ACP           Assessment and Plan:     GOAL(S) OF CARE = LONGEVITY     SYMPTOMS ETIOLOGY/CAUSES = multifactorial encephalopathy secondary to: recent cerebellar hemorrhage, aspiration PNA, UTI, ??dehydration/poor intake/volume depletion     PROGNOSIS = HIGHLY GUARDED, long term recovery and survival not yet assured     CODE STATUS = FULL, unchanged  1/9/2024 later upgraded to ICU and intubated, consistent with wishes expressed by patient's daughter Jaymie  1/11/2024 Tentative plan is to re-intubate again if failing first extubation attempt.   1/12/2024 Jaymie verified current GOC: unchanged. Re-intubate if needed. Patient on HFNC 45-50LPM.  1/20/2024 Extubated, remains on HFNC 30-40LPM        PALLIATIVE CARE TEAM INTERVENTIONS =   Medical updates to daughter Jaymie, and patient's two friends Ross & Ed.  Jaymie and family encouraged to consider what to do and what not do, in the hypothetical situation if mechanical ventilation is indicated again for the 3rd time.  Trach and LTACH education to family.  Family wish for more treatments and time to help patient further declare himself.  Will continue following.        Summary:   Lewis Mohr is a 78 y.o. 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 Patient was reportedly discharged to [facility] for rehab 1/2/2024. However he has since declined, resulting in an ER visit 1/3/2024 and the current readmission starting 1/4/204.     Since readmission patient has been treated for multiple issues: aspiration pneumonia, urinary retention, hypernatremia, ERWIN, etc. Patient has remained encephalopathic however, and  dislodged NGT multiple times and remains NPO due to FEES+ silent aspiration.  -----------------------------------------------------------------------------------------------------------------------------------------  1/23/2024  Met bedside with patient, his daughter Jaymie, and patient's 2 friends Akash Bond.    Patient remained encephalopathic at time, though with stimulation, he did gradually become more interactive today. Compared to yesterday, his secretions and fatigue seemed somewhat worse.    Family and I discussed many things bedside:  1) I strongly encouraged Jaymie and her sister to consider the possibility of another respiratory failure requiring ventilation - should we or should we not for a 3rd time. Trach or no trach, as previously expressed by patient privately to Jaymie himself.  This is a hard decision. Jaymie will continue to discuss with her sister and try to come up with a plan for that hypothetical situation, if patient were to be in respiratory distress again.  2) Trach education - Patient previously expressed to Jaymie against trach... Family well aware the potentially permanent nature of trach, if patient for whatever reason cannot improve sufficiently to wean off.  By patient's own words, Jaymie leans against trach at this time, and hopes that decision won't have to be crossed soon.  3) LTACH - we discussed the possibility that if patient's respiratory function stays at HFNC level requiring a more long-term wean-off, then LTACH ([facility]) will be the kind of facility for patient to go for respiratory rehab post discharge first.  4) Typical brain injury and stroke treatment workflow.    Jaymie has recently dealt with another death in among close family members. She is well acquainted with the uncertainty of medicine, and the many uncertainties involved in patient's care.    For the time being, as patient has not definitive declared on way or another, family wish to continue all interventions to help patient  improve and definitively declare himself, one way or another.    No changes in GOC or code status at this time.          1/22/2024  Followed up with Dr. Coon over the phone. She barely just left the hospital when I went to reassess patient.  Patient was more active and communicative today, with mitten gloves both hands to prevent line pulling and still only mumbling unintelligibly for the most part.     From Jaymie's perspective, this is the best patient has seen for over a week, though still well below his prior baseline when he was originally discharged to [facility] for rehab on 1/2/2024.     Jaymie wonders if another confirmatory speech eval FEES is needed if aspiration is still happening as we suspect. Informed Jaymie that we could further discuss this tomorrow with ICU attending Dr. Bautista, when she visits. Jaymie was agreeable.     Provided medical updates to Dr. Bautista, RN, and RT. It does appear we are fast approaching a decision point if we should consider definitively one way or another: no trach & DNAR DNI vs trach + LTACH + all interventions. Dr. Bautista and I will also plan to address this philosophical question more tomorrow during our meeting with Jaymie.     Patient otherwise remains status quo and continues to require extensive suctioning, which is concerning for continued silent aspiration.           1/12/2023  Provided brief update to patient's daughter over phone - off vent still on HFNC with slightly increased O2 demand 45==>50LPM. Patient remained encephalopathic and nonverbal, only able to unreliably follow simple commands at times. This remains far below his functional level at the time of his first discharge 1/2/2024 post craniotomy.     Jaymie verified that indeed the current plan remains to re-intubate again should patient fail his first extubation trial. Informed Jaymie that while I am not able to predict ultimate outcome, the creased O2 demand and poor mentation and NPO are all warning signs for potential  "reintubation. Informed Jaymie that repeated extubation failures would speak for a negative future outcome. Jaymie understands.     Provided emotional support and therapeutic presence. This latest event has been distressing to Jaymie and her family. She still has to work and patient's critical status had caused her a few \"panic attacks\" at times.     Jaymie and her  typically visit after 1630 on weekdays, after work. She plans to visit in person SAT afternoon 12/13/2024, hopefully after roads have been better plowed.           1/9/2023  Placed call to patient's daughter Jaymie and introduced my role and scope of practice. She was amenable to this visit. We reviewed patient's relevant history. According to Jaymie, patient has had a good overall health for decades not on any chronic medication until his last admission for his cerebellar hemorrhage.       Jaymie's initial impression as of patient's official discharge was good. \"Dad was still talking to me over the phone as of Sunday 12/31/23. Patient did gradually deteriorate after initial charge, with more slurred speech and unsuccessful rehab efforts at [facility].      The dysphagia and aphasia issues are relatively to Jaymie, about for last week. She and her sister wonders if patient's ongoing pneumonia, hyperkalemia, and low hydration/intake negatively affect his reocvery.     Clinical picture still evolving. I advised Jaymie that, if patient's dysphagia and aphasia remain unchanged, then pneumonia and pneumonitis can become recurrent due to continued aspiration... of course, that will also be dependent on if reversible secondary issues may improve patient's current level of function.     Lots of uncertainties... low intake volume, possible dehydration due to net negative I/O since admission, hyperkalemia uncertain if secondary in part to continued low intake and multiple NGT dislodges and feed disruption, etc.     Informed Jaymie that, as multiple secondary and potentially " reversible medical issues are also ongoing, I am unable to definitively prognosticate patient's prognosis or chance for definitive recovery at this point. Jaymie was accepting and stressed family's wishes for medical team to address all potentially reversible issues, to try to possibly recover patient's overall function to his status upon his initial discharge, if possible.     For now, Jaymie and her sister and other family members all want to continue aggressive care, consistent with full code status.  Jaymie does not recall any issue weaning patient off ventilator after his craniotomy 12/27/2023 either.        Note patient later developed afib RVR in the evening and was upgraded to ICU and also appropriately intubated for worsening respiratory distress.        Advance care planning:  The patient and/or legal decision maker has provided voluntary consent to discuss advance care planning. We discussed code status.   FULL     Thank you for allowing me the opportunity to participate in the care of Lewis Mohr     I spent a total of 60 minutes reviewing medical records, direct face-to-face time with the patient and/or family, documentation and coordination of care. This is separate from the time spent on advance care planning, which is documented above.           DO Pritesh NELSON ([facility]   [address] [place]  [id number]  P: [phone]  F: [phone]  C: [phone]

## 2024-01-24 NOTE — CARE PLAN
The patient is Watcher - Medium risk of patient condition declining or worsening    Shift Goals  Clinical Goals: stable O2, SBP <160  Patient Goals: polo  Family Goals: polo    Progress made toward(s) clinical / shift goals:      Problem: Urinary - Renal Perfusion  Goal: Ability to achieve and maintain adequate renal perfusion and functioning will improve  Outcome: Progressing     Problem: Skin Integrity  Goal: Skin integrity is maintained or improved  Outcome: Progressing     Problem: Fall Risk  Goal: Patient will remain free from falls  Outcome: Progressing     Problem: Pain - Standard  Goal: Alleviation of pain or a reduction in pain to the patient’s comfort goal  Outcome: Progressing       Patient is not progressing towards the following goals:      Problem: Safety - Medical Restraint  Goal: Free from restraint(s) (Restraint for Interference with Medical Device)  Outcome: Not Progressing  Flowsheets (Taken 1/24/2024 8181)  Addressed this shift: Free from restraint(s) (restraint for interference with medical device):   ONCE/SHIFT or MINIMUM Every 12 hours: Assess and document the continuing need for restraints   Every 24 hours: Continued use of restraint requires Licensed Independent Practitioner to perform face to face examination and written order   Identify and implement measures to help patient regain control

## 2024-01-25 NOTE — PROGRESS NOTES
4 Eyes Skin Assessment Completed by CHARLIE Duncan and CHARLIE Mccollum.    Head WDL  Ears WDL  Nose WDL  Mouth WDL  Neck WDL  Breast/Chest WDL  Shoulder Blades WDL  Spine WDL  (R) Arm/Elbow/Hand WDL  (L) Arm/Elbow/Hand WDL  Abdomen WDL  Groin Redness and Excoriation  Scrotum/Coccyx/Buttocks WDL  (R) Leg WDL  (L) Leg WDL  (R) Heel/Foot/Toe WDL  (L) Heel/Foot/Toe WDL          Devices In Places ECG, Blood Pressure Cuff, Pulse Ox, Schwartz, OG/NG, Central Line, and Oxy Mask      Interventions In Place InterDry, Heel Mepilex, Pillows, Elbow Mepilex, Q2 Turns, Low Air Loss Mattress, and Barrier Cream    Possible Skin Injury No    Pictures Uploaded Into Epic No, needs to be completed  Wound Consult Placed N/A  RN Wound Prevention Protocol Ordered Yes

## 2024-01-25 NOTE — ASSESSMENT & PLAN NOTE
Likely metabolic encephalopathy from ongoing aspiration   Non pharmacologic therapy for delirium   Trial of Seroquel 50mg every 8 hours started today

## 2024-01-25 NOTE — CARE PLAN
The patient is Watcher - Medium risk of patient condition declining or worsening    Shift Goals  Clinical Goals: SBP<160, stable O2  Patient Goals: DULCE MARIA  Family Goals: DULCE MARIA    Progress made toward(s) clinical / shift goals:    Problem: Skin Integrity  Goal: Skin integrity is maintained or improved  Outcome: Progressing     Problem: Pain - Standard  Goal: Alleviation of pain or a reduction in pain to the patient’s comfort goal  Outcome: Progressing       Patient is not progressing towards the following goals:    N/a

## 2024-01-25 NOTE — THERAPY
Speech Language Pathology   Daily Treatment     Patient Name: Lewis Mohr  AGE:  78 y.o., SEX:  male  Medical Record #: [id number]  Date of Service: 1/25/2024      Precautions:  Precautions: Fall Risk, Swallow Precautions, Nasogastric Tube    Subjective  RN cleared patient for dysphagia tx session. Patient awake, alert, and just recently NT suctioned by RT. Patient received restless, confused, and nonverbal, attempting to removal oxymask at times despite bilateral mitts in place.     Assessment  Patient was provided with oral care via Kaushik Kit. Patient bit down several times on oral Yankouer and oral care swab, eventually biting off green swab, which had to be retrieved from oral cavity by this clinician.     Patient consumed PO trials of single ice chips only today. Patient presented with oral aversion at times, turning head away, pushing ice chips/bolus out from oral cavity, and attempting to spit ice out at times. Patient presented with coughing in >75% of PO trials, which is concerning for penetration/aspiration. Patient was unable to complete further PO trials or participate in dysphagia exercises today.     Clinical Impressions  Patient continues to present with s/sx of oropharyngeal dysphagia, evidenced by oral aversion, root-reflexes of biting down on spoon, Yankouer, and oral care swab, and pulling head away, as well as coughing on several trials of ice chips. Patient appears at high risk for aspiration and does not appear safe for PO intake or appropriate for FEES yet. Consider possible G-tube d/t prolonged dysphagia, hx of silent aspiration on previous FEES this admit, and continued oral aversion w/ difficulty managing secretions as well.     Recommendations  Treatment Completed: Dysphagia Treatment     Dysphagia Treatment  Diet Consistency: NPO/NGT  Instrumentation: Instrumental swallow study pending clinical progress  Medication: Non Oral  Supervision: 1:1 feeding with constant supervision (Ice chips  only)  Oral Care: Q4h    SLP Treatment Plan  Treatment Plan: Dysphagia Treatment  SLP Frequency: 3x Per Week  Estimated Duration: Until Therapy Goals Met    Anticipated Discharge Needs  Discharge Recommendations: Recommend post-acute placement for additional speech therapy services prior to discharge home  Therapy Recommendations Upon DC: Dysphagia Training, Community Re-Integration, Patient / Family / Caregiver Education    Patient / Family Goals  Patient / Family Goal #1: RN reported pt's daughter was eager for pt to participate w/ ST  Goal #1 Outcome: Progressing slower than expected  Short Term Goals  Short Term Goal # 1: Pt will demo improved secretion management as evidenced by reduced use of oral suctioning during tx session  Goal Outcome # 1: Progressing slower than expected  Short Term Goal # 1 B : Pt will participate in prefeeding trials to demo readiness for an instrumental swallow study  Goal Outcome  # 1 B: Progressing slower than expected    Yasmine Celeste, SLP

## 2024-01-25 NOTE — CARE PLAN
Problem: Bronchopulmonary Hygiene  Goal: Increase mobilization of retained secretions  Description: Target End Date:  2 to 3 days    1.  Perform bronchopulmonary therapy as indicated by assessment  2.  Perform airway suctioning  3.  Perform actions to maintain patient airway  Outcome: Progressing     Problem: Humidified High Flow Nasal Cannula  Goal: Maintain adequate oxygenation dependent on patient condition  Description: Target End Date:  resolve prior to discharge or when underlying condition is resolved/stabilized    1.  Implement humidified high flow oxygen therapy  2.  Titrate high flow oxygen to maintain appropriate SpO2  Outcome: Progressing   HHFNC 30L/40%   Bicarb Q4  DUO Q4

## 2024-01-25 NOTE — PROGRESS NOTES
Critical Care Progress Note    Date of admission  1/4/2024    Chief Complaint  78 y.o. male admitted 1/4/2024 with cardiac arrest    Hospital Course  78 y.o. 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 He presented back for AMS and evaluated in ER and was discharged. He presented the next day 1/4/2024 for hypoxia and was admitted to medicine. He was found to have silent aspiration, lauren, urinary retention and hypernatremia and dehydration. He had a an echo 12/29/2023 LVH EF 60% grade II daistolic dysfunction mild MR/TR. He has not had an MRI on the prior admission. He today 1/9/2024 had afib w/ RVR and worsening hypoxia as well as worsening mental status and code stroke was called. His BS was 190. In the CT scanner gurgling with poor airway reflexes. He was intubated and shortly after had PEA arrest. CT head and CTA/P was negative for acute stroke.  He is full code and confirmed prior to this event by daughters.      01/11-Trial of extubation. GOC discussion with his daughter Jaymie. We will re-intubate if extubation is not successful.  01/12- Requiring HFNC 45L/70% FiO  01/13- Reintubated, MRI/MRA ordered to look for explanation for ongoing aspiration.    1/15 -Vent day 3 of second course on ventilator, MRI without obvious abnormality to account for aspiration  1/16 -Vent day 4, failed SAT, tachypneic with increased end-tidal CO2 with SBT's, not following  1/17 -Vent day 5, failed SAT again, not following  1/18 -Vent day 6 vent, better with SAT/SBT on 50% not ready for liberation starting to follow for the first time  1/19 - Tx FOB on vent - copious thick secretions, LLL BAL sent  1/20 - Extubated to HFNC 40/70, following better    Interval Problem Update  Reviewed last 24 hour events:  No acute changes overnight.   Overall stable. Still at high risk of aspiration   NT suctioned multiple times, but delirium seems improving.   HFNC 40%/20L,  weaning slowly   HR in 80s  SBP in 120-130s  Creaitnine 1.43 (1.27)  Pt receiving IPV, bicarb nebs. Seems helping thin the secretions a bit.   Added valproic acid yesterday, will discontinue today     On eliquis  Off antibitoics since 1/14  On norvasc 10 daily   BAL Cx + corynebacterium striatum  Completed total 7 day course of unasyn/piptazo     Review of Systems  Review of Systems   Reason unable to perform ROS: minimal due to mental status.   Constitutional:  Positive for malaise/fatigue.   Respiratory:  Positive for sputum production.    Gastrointestinal:  Negative for nausea and vomiting.   Neurological:  Negative for headaches.   All other systems reviewed and are negative.  Limited somewhat by his speech    Vital Signs for last 24 hours   Temp:  [36.3 °C (97.4 °F)-36.5 °C (97.7 °F)] 36.5 °C (97.7 °F)  Pulse:  [79-96] 91  Resp:  [20-49] 22  BP: ()/(52-84) 121/71  SpO2:  [89 %-100 %] 96 %    Hemodynamic parameters for last 24 hours       Respiratory Information for the last 24 hours         Physical Exam  Vitals and nursing note reviewed.   Constitutional:       General: He is not in acute distress.     Appearance: He is ill-appearing and toxic-appearing. He is not diaphoretic.      Interventions: He is restrained.      Comments: [facility]   HENT:      Head: Normocephalic.      Comments: Ecchymosis noted over the lateral right orbit improved     Mouth/Throat:      Mouth: Mucous membranes are moist.      Comments: HFNC nose piece in place  Eyes:      Comments: Pupils are 3 to 4 mm nonreactive, mid position   Neck:      Vascular: No JVD.      Comments: IJ CVC  Cardiovascular:      Rate and Rhythm: Normal rate.      Pulses: Normal pulses.      Heart sounds: No murmur heard.     No friction rub. No gallop.   Pulmonary:      Effort: No respiratory distress.      Breath sounds: Examination of the right-lower field reveals decreased breath sounds. Examination of the left-lower field reveals decreased breath sounds.  Decreased breath sounds and rales present. No wheezing or rhonchi.      Comments: Diminished in the bases, no accessory muscle use.  Abdominal:      General: There is no distension.      Palpations: Abdomen is soft.      Tenderness: There is no abdominal tenderness. There is no guarding.   Genitourinary:     Comments: Schwartz  Musculoskeletal:      Cervical back: Neck supple. No rigidity.      Right lower leg: Edema present.      Left lower leg: Edema present.   Skin:     General: Skin is warm and dry.      Capillary Refill: Capillary refill takes less than 2 seconds.      Coloration: Skin is not cyanotic or mottled.      Nails: There is no clubbing.   Psychiatric:         Mood and Affect: Mood is not anxious.         Behavior: Behavior is not agitated. Behavior is cooperative.       Medications  Current Facility-Administered Medications   Medication Dose Route Frequency Provider Last Rate Last Admin    ipratropium-albuterol (DUONEB) nebulizer solution  3 mL Nebulization Q4HRS (RT) SANGEETA GroverO.   3 mL at 01/25/24 0705    sodium bicarbonate 8.4 % for inhalation 3 mL  3 mL Inhalation Q4HRS (RT) Dani Bautista D.O.   3 mL at 01/25/24 0710    valproic acid (Depakene) IR oral solution 125 mg  125 mg Enteral Tube Q8HRS Dani Bautista D.O.   125 mg at 01/25/24 0529    amLODIPine (Norvasc) tablet 10 mg  10 mg Enteral Tube Q DAY Dani Bautista D.O.   10 mg at 01/25/24 0529    acetaminophen (Tylenol) tablet 650 mg  650 mg Enteral Tube Q6HRS PRN Ag Dixon M.D.   650 mg at 01/24/24 2350    insulin regular (HumuLIN R,NovoLIN R) injection  2-9 Units Subcutaneous Q6HRS Ag Dixon M.D.   2 Units at 01/25/24 0532    And    dextrose 10 % BOLUS 25 g  25 g Intravenous Q15 MIN PRN Ag Dixon M.D.        Respiratory Therapy Consult   Nebulization Continuous RT Manuel Antoine M.D.        senna-docusate (Pericolace Or Senokot S) 8.6-50 MG per tablet 2 Tablet  2 Tablet Enteral Tube BID Manuel Antoine M.D.   2 Tablet at  01/25/24 0529    And    polyethylene glycol/lytes (Miralax) Packet 1 Packet  1 Packet Enteral Tube QDAY PRN Manuel Antoine M.D.   1 Packet at 01/21/24 1724    And    magnesium hydroxide (Milk Of Magnesia) suspension 30 mL  30 mL Enteral Tube QDAY PRN Manuel Antoine M.D.   30 mL at 01/22/24 0601    And    bisacodyl (Dulcolax) suppository 10 mg  10 mg Rectal QDAY PRN Manuel Antoine M.D.   10 mg at 01/23/24 0508    MD Alert...ICU Electrolyte Replacement per Pharmacy   Other PHARMACY TO DOSE Manuel Antoine M.D.        hydrALAZINE (Apresoline) injection 10 mg  10 mg Intravenous Q6HRS PRN Manuel Antoine M.D.   10 mg at 01/22/24 0321    labetalol (Normodyne/Trandate) injection 20 mg  20 mg Intravenous Q4HRS PRN Manuel Antoine M.D.   20 mg at 01/24/24 0211    apixaban (Eliquis) tablet 5 mg  5 mg Enteral Tube BID Manuel Antoine M.D.   5 mg at 01/25/24 0529    Pharmacy Consult: Enteral tube insertion - review meds/change route/product selection  1 Each Other PHARMACY TO DOSE Rebecca Holman M.D.        haloperidol lactate (Haldol) injection 5 mg  5 mg Intravenous Q4HRS PRN Agapito Bonilla, A.P.R.N.   5 mg at 01/16/24 1407       Fluids    Intake/Output Summary (Last 24 hours) at 1/25/2024 0932  Last data filed at 1/25/2024 0800  Gross per 24 hour   Intake 2040 ml   Output 3600 ml   Net -1560 ml         Laboratory                Recent Labs     01/23/24  0450 01/24/24  0400 01/25/24  0328   SODIUM 141 140 144   POTASSIUM 4.0 3.8 4.0   CHLORIDE 104 102 105   CO2 30 32 29   BUN 30* 36* 41*   CREATININE 1.30 1.27 1.43*   CALCIUM 9.4 9.1 9.5       Recent Labs     01/22/24  1612 01/23/24  0450 01/24/24  0400 01/25/24  0328   PREALBUMIN 10.3*  --   --   --    GLUCOSE  --  167* 177* 175*       Recent Labs     01/23/24  0450 01/24/24  0400 01/25/24  0328   WBC 13.6* 12.0* 12.0*   NEUTSPOLYS 79.60* 76.60* 71.70   LYMPHOCYTES 11.20* 12.70* 16.90*   MONOCYTES 8.20 9.50 10.00   EOSINOPHILS 0.10 0.00 0.00   BASOPHILS 0.30 0.50 0.60        Recent Labs     01/23/24  0450 01/24/24  0400 01/25/24  0328   RBC 3.55* 3.49* 3.49*   HEMOGLOBIN 10.8* 10.5* 10.3*   HEMATOCRIT 33.1* 32.3* 32.9*   PLATELETCT 619* 643* 673*         Imaging  MRA OF THE [place] OF [place] WITHIN NORMAL LIMITS.   Reviewed CT series as well as MRI brain  Reviewed CXRs    Assessment/Plan  * Acute respiratory failure with hypoxia (HCC)- (present on admission)  Assessment & Plan  Intubated date: 1/9/2024  Extubated 01/11/2024  Reintubated on 01/13/2024.  1/13 Bronchoscopy revealed thick tenacious secretions occluding multiple airways predominantly on the right.  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    Plan:   Continue weaning HFNC to keep sat >88%  Upright position, HOB 45  Aggressive pulm hygiene, CPT, bicarb nebs scheduled.   Avoid sedation   Need frequent NT suctions  Attempted to call daughter but went to voicemail yesterday. Tried again today [phone] and left a message to call back   Palliative care consulted and following.   Pt has been relatively stable in the past 48 hours and not worsening. Will transfer to [facility]       Delirium  Assessment & Plan  Likely metabolic encephalopathy from ongoing aspiration   Non pharmacologic therapy for delirium.   Stopped valproic acid due to concern of drug interaction with eliquis  Haldol PRN      A-fib (HCC)  Assessment & Plan  Cardiac rates remain controlled  LVH on 12/27 echo with diastolic dysfunction  Eliquis prophylaxis for CVA, no signs of bleeding Lul  Continue to optimize electrolytes, needs further potassium repletion  Continue cardiac monitoring    Encephalopathy  Assessment & Plan  Toxic metabolic encephalopathy ongoing due to ongoing aspiration   Hypercapnia resolved while on ventilator.  MRI does not reveal signs of anoxic brain injury or brainstem infarct that would explain a locked-in type  syndrome.  1/20 extubated.   1/23 started valproic acid.   1/24 valproic acid was discontinued due to concern of drug2 interaction with eliquis    Still high risk of aspiration  Therapies-PT/OT  Swallow eval when clinically appropriate  On tube feed    Advance care planning  Assessment & Plan  Nora is his daughter and surrogate decision maker  Plan for reintubation if necessary now  No plan for tracheostomy per daughter  No feeding tube surgically per daughter  Extubated 1/20 again, pt is full code.   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   Creatinine decreasing  Good UOP   Monitor urine output, Schwartz catheter         VTE:  NOAC  Ulcer: H2 Antagonist  Lines: Central Line  Ongoing indication addressed    I have performed a physical exam and reviewed and updated ROS and Plan today (1/25/2024). In review of yesterday's note (1/24/2024), there are no changes except as documented above.     Discussed patient condition and risk of morbidity and/or mortality with   Family, RN, RT, Pharmacy, Code status disscussed, Charge nurse / hot rounds, and Patient    Can be transferred to [facility]   D/w [facility]

## 2024-01-25 NOTE — ASSESSMENT & PLAN NOTE
LVH on 12/27 ECHO with diastolic dysfunction  Weight-based Lovenox prophylaxis for CVA  Continue cardiac monitoring.

## 2024-01-25 NOTE — PROGRESS NOTES
"Hospital Medicine Daily Progress Note    Date of Service  1/25/2024    Chief Complaint  Lewis Mohr is a 78 y.o. male admitted 1/4/2024 with hypoxic    Hospital Course  77yo recently here after a GLF and cerebellar hemorrhage.  He underwent crniotomy on 12/27/233 and then was DC'd to [facility] on 1/2/24.  Presented back unresponsive and hypoxic. Initially admitted to the floor [facility] aspiration pneumonia.  On 1/9 was increasingly altered, in AFib RVR and required intubation f/b PEA arrest.  Subsequently extubated to HFNC on 1/11, failed and was re-intubated  1/13.  Extubated again on 1/20    ROS is limited as pt reluctant to participate and confused.  Denies all and asks to be left alone    HR 90s  -120s  On 4 LNC    Interval Problem Update  ROS limited as pt is no cooperative; \"I'm fine\"    I have discussed this patient's plan of care and discharge plan at IDT rounds today with Case Management, Nursing, Nursing leadership, and other members of the IDT team.    Consultants/Specialty      Code Status  Full Code    Disposition  The patient is not medically cleared for discharge to home or a post-acute facility.    I have placed the appropriate orders for post-discharge needs.    Review of Systems  Review of Systems   Unable to perform ROS: Mental status change        Physical Exam  Temp:  [36.3 °C (97.4 °F)-36.5 °C (97.7 °F)] 36.5 °C (97.7 °F)  Pulse:  [79-96] 92  Resp:  [20-49] 22  BP: ()/(52-84) 109/74  SpO2:  [89 %-100 %] 94 %    Physical Exam  Constitutional:       General: He is not in acute distress.     Appearance: He is well-developed. He is not diaphoretic.   HENT:      Head: Normocephalic and atraumatic.   Eyes:      Conjunctiva/sclera: Conjunctivae normal.   Neck:      Vascular: No JVD.   Cardiovascular:      Rate and Rhythm: Normal rate.      Heart sounds: No murmur heard.     No gallop.   Pulmonary:      Effort: Pulmonary effort is normal. No respiratory distress.      Breath sounds: No " stridor. Rhonchi present. No wheezing or rales.   Abdominal:      Palpations: Abdomen is soft.      Tenderness: There is no abdominal tenderness. There is no guarding or rebound.   Musculoskeletal:      Right lower leg: No edema.      Left lower leg: No edema.   Skin:     General: Skin is warm and dry.      Capillary Refill: Capillary refill takes less than 2 seconds.      Findings: No rash.   Neurological:      Comments: Alert and interactive  O x 1  Exam limited as pt not wishing to pariticipate  Face symmetric  Speech clear  EOMI  Strength functional by observation       Fluids    Intake/Output Summary (Last 24 hours) at 1/25/2024 1138  Last data filed at 1/25/2024 1000  Gross per 24 hour   Intake 2040 ml   Output 3500 ml   Net -1460 ml       Laboratory  Recent Labs     01/23/24  0450 01/24/24  0400 01/25/24  0328   WBC 13.6* 12.0* 12.0*   RBC 3.55* 3.49* 3.49*   HEMOGLOBIN 10.8* 10.5* 10.3*   HEMATOCRIT 33.1* 32.3* 32.9*   MCV 93.2 92.6 94.3   MCH 30.4 30.1 29.5   MCHC 32.6 32.5 31.3*   RDW 45.1 44.5 45.1   PLATELETCT 619* 643* 673*   MPV 10.2 10.0 9.9     Recent Labs     01/23/24  0450 01/24/24  0400 01/25/24  0328   SODIUM 141 140 144   POTASSIUM 4.0 3.8 4.0   CHLORIDE 104 102 105   CO2 30 32 29   GLUCOSE 167* 177* 175*   BUN 30* 36* 41*   CREATININE 1.30 1.27 1.43*   CALCIUM 9.4 9.1 9.5                   Imaging  IR-MIDLINE CATHETER INSERTION WO GUIDANCE > AGE 3   Final Result                  Ultrasound-guided midline placement performed by qualified nursing staff    as above.          DX-CHEST-PORTABLE (1 VIEW)   Final Result      Improved interstitial opacities may represent improving edema and/or pneumonitis.      Mild nonspecific right basilar opacity and questionable tiny left upper lobe opacity. Correlate clinically for infection.      DX-CHEST-LIMITED (1 VIEW)   Final Result      1.  Slightly increased BILATERAL lung disease. This could be pulmonary edema or pneumonia.   2.  Possible small BILATERAL  pleural effusions      DX-CHEST-PORTABLE (1 VIEW)   Final Result         1.  Mild pulmonary edema and/or infiltrates   2.  Atherosclerosis      DX-ABDOMEN FOR TUBE PLACEMENT   Final Result      NG tube tip projects over the stomach.      DX-ABDOMEN FOR TUBE PLACEMENT   Final Result      1.  Enteric tube projects over the proximal stomach.      DX-CHEST-PORTABLE (1 VIEW)   Final Result      Lines and tubes appear adequate. Stable bibasilar airspace disease.      DX-CHEST-PORTABLE (1 VIEW)   Final Result         1.  Pulmonary edema and/or infiltrates are identified, which appear somewhat increased since the prior exam.   2.  Cardiomegaly      DX-CHEST-PORTABLE (1 VIEW)   Final Result      1.  New small left pleural effusion with associated atelectasis.   2.  Increased right basilar opacity most likely atelectasis however correlate clinically for infection.      DX-CHEST-PORTABLE (1 VIEW)   Final Result         1.  Pulmonary edema and/or infiltrates are identified, which are stable since the prior exam.   2.  Cardiomegaly      DX-CHEST-PORTABLE (1 VIEW)   Final Result      1.  No significant change.      MR-MRA HEAD-W/O   Final Result         MRA OF THE [place] WITHIN NORMAL LIMITS.      MR-BRAIN-W/O   Final Result      1.  Redemonstrated is scattered supratentorial and infratentorial areas of ischemia. No new lesions. There is increased conspicuity of the RIGHT frontal lobe lesion which could be due to recurrent ischemia or seizure activity.   2.  Unchanged RIGHT cerebellar hematoma evacuation cavity. As previously noted underlying mass is not excluded and follow-up MRI without and with contrast in approximately weeks is recommended.   3.  No new hemorrhage   4.  Atrophy   5.  White matter changes   6.  Small BILATERAL mastoid effusions      DX-CHEST-PORTABLE (1 VIEW)   Final Result      DX-ABDOMEN FOR TUBE PLACEMENT   Final Result      Enteric feeding tube terminates in the left upper quadrant projecting  over the expected location of the stomach.      DX-CHEST-LIMITED (1 VIEW)   Final Result      1.  Interval extubation   2.  Lower lung volumes with increased atelectasis superimposed on pulmonary edema or pneumonia      DX-CHEST-PORTABLE (1 VIEW)   Final Result         1.  Pulmonary edema and/or infiltrates are identified, which are somewhat decreased since the prior exam.   2.  Atherosclerosis      US-EXTREMITY VENOUS LOWER BILAT   Final Result      DX-CHEST-PORTABLE (1 VIEW)   Final Result         1.  Scattered hazy bilateral pulmonary infiltrates, slightly increased since prior study.   2.  Atherosclerosis      MR-BRAIN-W/O   Final Result      1.  A few punctate areas of acute infarcts in the right frontal and bilateral parietal lobes and left cerebellum.   2.  There is no brainstem infarct or diffuse anoxic injury.   3.  Mixed signal abnormality in the right cerebellum with the previous surgical intervention in keeping with the clinical diagnosis of right cerebellar hemorrhage evacuation. Follow-up study with contrast in 6 weeks is recommended to rule out any    underlying pathology.   4.  Mild cerebral volume loss.      DX-CHEST-PORTABLE (1 VIEW)   Final Result      CT-CTA NECK WITH & W/O-POST PROCESSING   Final Result      1.  No acute arterial abnormality detected. No significant arterial stenosis.      CT-CTA HEAD WITH & W/O-POST PROCESS   Final Result      CT angiogram of the [place] within normal limits.      CT-CEREBRAL PERFUSION ANALYSIS   Final Result      1.  Cerebral blood flow less than 30% likely representing completed infarct = 0 mL.      2.  T Max more than 6 seconds likely representing combination of completed infarct and ischemia = 0 mL.      3.  Mismatched volume likely representing ischemic brain/penumbra = None      4.  Please note that the cerebral perfusion was performed on the limited brain tissue around the basal ganglia region. Infarct/ischemia outside the CT perfusion sections  can be missed in this study.      CT-HEAD W/O   Final Result      1.  No evidence of acute territorial infarct, intracranial hemorrhage or mass lesion.   2.  Mild diffuse cerebral substance loss.   3.  Mild microangiopathic ischemic change versus demyelination or gliosis.   4.  Status post right suboccipital cranioplasty with underlying cerebellar hypoattenuation likely on the basis of evolving encephalomalacia.         DX-CHEST-PORTABLE (1 VIEW)   Final Result      1.  Intubation.   2.  Bilateral pulmonary infiltrates.      DX-CHEST-LIMITED (1 VIEW)   Final Result      DX-ABDOMEN FOR TUBE PLACEMENT   Final Result      Enteric feeding tube terminates with the tip projecting over the expected location of the distal stomach.      CT-CTA CHEST PULMONARY ARTERY W/ RECONS   Final Result         1.  No pulmonary embolus appreciated.   2.  Consolidations in bilateral lung bases an scattered hazy right pulmonary opacities, compatible with atelectasis with component of infiltrate.   3.  Indeterminate left adrenal nodule, follow-up adrenal protocol CT for further characterization as clinically appropriate.   4.  Atherosclerosis and atherosclerotic coronary artery disease.      DX-ABDOMEN FOR TUBE PLACEMENT   Final Result         1.  Nonspecific bowel gas pattern in the upper abdomen.   2.  Cardiomegaly   3.  Atherosclerosis   4.  Bibasilar atelectasis   5.  Nasogastric tube tip terminates overlying the expected location of the pylorus or first duodenal segment.      DX-ABDOMEN FOR TUBE PLACEMENT   Final Result         1.  Nonspecific bowel gas pattern in the upper abdomen.   2.  Nasogastric tube tip terminates overlying the expected location of the gastric antrum.   3.  Linear densities the bilateral lung bases favor atelectasis, component of infiltrate not excluded.      DX-ABDOMEN FOR TUBE PLACEMENT   Final Result      NG tube extends below the diaphragm with tip at the level of the gastric fundus. Side port is at the level  of the distal esophagus.      DX-ABDOMEN FOR TUBE PLACEMENT   Final Result      NG tube is noted with tip projecting over the right lower lung.      These findings were transmitted with GLEN ZELAYA on 01/07/2024. Tube was subsequently adjusted and is now present below the diaphragm.      DX-ABDOMEN FOR TUBE PLACEMENT   Final Result      DHT tip overlies the second portion of the duodenum      DX-ABDOMEN FOR TUBE PLACEMENT   Final Result      Feeding tube tip projects in the distal stomach or first portion of the duodenum.      DX-ABDOMEN FOR TUBE PLACEMENT   Final Result      Removal of nasogastric tube.      Feeding tube tip projects in the distal stomach.      No visualized thorax.      DX-ABDOMEN FOR TUBE PLACEMENT   Final Result         Gastric drainage tube with tip projecting over the expected area of the right lower lobe airway. Recommend removal.            CRITICAL RESULT READ BACK: Preliminary findings discussed with and critical read back performed by Dr. GLEN ZELAYA via telephone on 1/5/2024 6:22 PM      DX-ABDOMEN FOR TUBE PLACEMENT   Final Result      1.  Malpositioned enteric sump catheter which is folded back upon itself over the left paramedian distal one third mediastinum probably beyond the left mainstem bronchus and within the esophagus.   2.  A Voalte message was sent to GLEN ZELAYA at 1641 hours. Immediate response received.   3.  Per discussion, the patient has already pulled out the tube.      DX-CHEST-PORTABLE (1 VIEW)   Final Result      1.  Hypoinflation and pulmonary vascular congestion with mild bibasilar atelectasis.   2.  No lobar pneumonia or overt pulmonary edema.      CT-HEAD W/O   Final Result      1.  Diffuse atrophy and white matter changes.   2.  No acute intracranial hemorrhage or territorial infarct.   3.  RIGHT occipital craniotomy with underlying cerebellar encephalomalacia.              Assessment/Plan  * Acute respiratory failure with hypoxia (HCC)- (present on  admission)  Assessment & Plan  Intubated for probable aspiration pneumonia and has had dificulty coming off the vent  Resp status is tenuous  Should he fail again would need Trach though this is not consistent with daughters wishes.  Will need to cont to work with family   Scheduled hypertonic saline  Pulmonary toilet  Not cooperative for IS  Mobilize  Aspiration precuations    A-fib (HCC)  Assessment & Plan  Sinus on monitor today  Anitcoagulation with apixaban  Cont Tele    Hypoxic respiratory failure (HCC)- (present on admission)  Assessment & Plan  Requiring intubation with mechanical ventilation  Secondary to aspiration    Encephalopathy  Assessment & Plan  Recent hx of brain bleeding s/p surgery 12/27  CT head unremarkable  MRI/MRA 1/14 showing chronic changes of previous ischemic events and bleed unchanged from comparison imaging  Minimize sedating medications  Mobilize  Encourage family to visit  Window shades open  Sleep hygiene    Advance care planning  Assessment & Plan  Pt remains full code per dw my partner and pt's daughter    Urinary retention  Assessment & Plan  Continue bladder scan  Straight cath if bladder scan over 350cc    High anion gap metabolic acidosis  Assessment & Plan  resolved    Hypernatremia- (present on admission)  Assessment & Plan  Resolved  Cont to monitor as pt is on TF's at this point    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 Patten  Repeat imaging including MRI show changes consistent with known bleed but no new findings  Still needs a f/u MRI in 3-4 wks to confirm no underlying lesion as etiology of initial bleed    Aspiration pneumonia (HCC)- (present on admission)  Assessment & Plan  Has completed Abx's  Pt remains very high risk for repeat aspiration  SLP working with him and recommend continued NPO status  Aspiration precuations         VTE prophylaxis:    therapeutic  anticoagulation with eliquis 5 mg BID      I have performed a physical exam and reviewed and updated ROS and Plan today (1/25/2024). In review of yesterday's note (1/24/2024), there are no changes except as documented above.

## 2024-01-25 NOTE — PROCEDURES
Vascular Access Team    Date of Insertion: 01/25/2024  Arm Circumference: n/a  Line Length: 10  Line Size: 20  Vein Occupancy %: 31  Reason for Midline: access  Labs: WBC 12, , BUN 41, Cr 1.43, GFR 51, INR n/a    Orders confirmed, vessel patency confirmed with ultrasound. Risks and benefits of procedure explained to patient and education regarding line associated bloodstream infections provided. Questions answered.     PowerGlide Midline placed in RUE per licensed provider order with ultrasound guidance. 20g, 10 cm line placed in Brachial vein after 2 attempt(s). Unsuccessful on first attempt.  Catheter inserted with brisk blood return. Secured with 0cm external from insertion site.  Line flushed without resistance with 10 mL 0.9% normal saline.  Midline secured with Biopatch and Tegaderm.     Midline placement is confirmed by nurse using ultrasound and ability to flush and draw blood. Midline is appropriate for use at this time.  No X-ray is needed for placement confirmation. Pt tolerated procedure well.  Patient condition relayed to unit RN or ordering physician via this post procedure note in the EMR.    Ultrasound images uploaded to PACS and viewable in the EMR - yes  Ultrasound imaged printed and placed in paper chart - no      BARD PowerGlide Midline ref # [id number], Lot # [id number], Expiration Date 11/30/2024

## 2024-01-26 PROBLEM — A41.9 SEPTIC SHOCK (HCC): Status: ACTIVE | Noted: 2024-01-01

## 2024-01-26 PROBLEM — R65.21 SEPTIC SHOCK (HCC): Status: ACTIVE | Noted: 2024-01-01

## 2024-01-26 NOTE — PROGRESS NOTES
RT came to bedside to suction pt. Was able to get a moderate amount of brown liquid which looked like it may have been pts tube feed. Tube feeding stopped and MD notified. Said monitor respiratory status and that we can order a chest xray if pt become more hypoxic or there are any changes in respiratory status.

## 2024-01-26 NOTE — THERAPY
Occupational Therapy  Daily Treatment     Patient Name: Lewis Mohr  Age:  78 y.o., Sex:  male  Medical Record #: [id number]  Today's Date: 1/26/2024     Precautions  Precautions: (P) Fall Risk, Swallow Precautions  Comments: ETT    Assessment    Pt seen for follow up OT tx session, pt making minimal progress with participation and ability to meaningfully complete functional tasks. Will reduce pts frequency to 2x/wk due to minimal participation, continue to recommend post-acute placement.    Plan    Treatment Plan Status: (P) Modify Current Treatment Plan  Type of Treatment: Self Care / Activities of Daily Living, Therapeutic Activity, Neuro Re-Education / Balance  Treatment Frequency: (P) 2 Times per Week  Treatment Duration: (P) Until Therapy Goals Met    DC Equipment Recommendations: (P) Unable to determine at this time  Discharge Recommendations: (P) Recommend post-acute placement for additional occupational therapy services prior to discharge home    Objective       01/26/24 0952   Precautions   Precautions Fall Risk;Swallow Precautions   Cognition    Cognition / Consciousness X   Speech/ Communication Delayed Responses  (nods intermittently)   Level of Consciousness Alert   Ability To Follow Commands Unable to Follow 1 Step Commands   Safety Awareness Impaired;Impulsive   Attention Impaired   Initiation Impaired   Strength Upper Body   Comments Limited movement of RUE, LUE impulsively grabbing at lines   Balance   Sitting Balance (Static) Trace   Sitting Balance (Dynamic) Dependent   Weight Shift Sitting Absent   Skilled Intervention Verbal Cuing;Facilitation   Bed Mobility    Supine to Sit Total Assist   Sit to Supine Total Assist   Scooting Total Assist   Skilled Intervention Verbal Cuing;Facilitation;Compensatory Strategies   Activities of Daily Living   Grooming Total Assist   Upper Body Dressing Total Assist   Lower Body Dressing Total Assist   Skilled Intervention Verbal Cuing;Facilitation   How much help  from another person does the patient currently need...   Putting on and taking off regular lower body clothing? 1   Bathing (including washing, rinsing, and drying)? 1   Toileting, which includes using a toilet, bedpan, or urinal? 1   Putting on and taking off regular upper body clothing? 1   Taking care of personal grooming such as brushing teeth? 1   Eating meals? 1   6 Clicks Daily Activity Score 6   Activity Tolerance   Sitting Edge of Bed 6 min   Short Term Goals   Short Term Goal # 1 pt will wash face with a cloth mod A   Goal Outcome # 1 Progressing slower than expected   Short Term Goal # 2 pt will maintain sitting in midine with SBA for ADLs   Goal Outcome # 2 Progressing slower than expected   Short Term Goal # 3 mod A with UB dressing   Goal Outcome # 3 Progressing slower than expected   Short Term Goal # 4 mod A with ADL txfs   Goal Outcome # 4 Progressing slower than expected   Education Group   Education Provided Role of Occupational Therapist   Role of Occupational Therapist Patient Response Patient;Nonacceptance;Explanation;No Learning Evidence   Occupational Therapy Treatment Plan    O.T. Treatment Plan Modify Current Treatment Plan   Treatment Frequency 2 Times per Week   Duration Until Therapy Goals Met   Anticipated Discharge Equipment and Recommendations   DC Equipment Recommendations Unable to determine at this time   Discharge Recommendations Recommend post-acute placement for additional occupational therapy services prior to discharge home   Interdisciplinary Plan of Care Collaboration   IDT Collaboration with  Nursing;Physical Therapist   Patient Position at End of Therapy In Bed;Bed Alarm On;Call Light within Reach;Phone within Reach;Tray Table within Reach;Wrist Restraints Applied   Collaboration Comments RN updated

## 2024-01-26 NOTE — ASSESSMENT & PLAN NOTE
Ischemic ATN   Resolved    2/13  Worsening yesterday.  Repeat creatinine 1.74  Start IV fluids

## 2024-01-26 NOTE — PROGRESS NOTES
Hospital Medicine Daily Progress Note    Date of Service  1/26/2024    Chief Complaint  Lewis Mohr is a 78 y.o. male admitted 1/4/2024 with hypoxic    Hospital Course  77yo recently here after a GLF and cerebellar hemorrhage.  He underwent crniotomy on 12/27/233 and then was DC'd to [facility] on 1/2/24.  Presented back unresponsive and hypoxic. Initially admitted to the floor [facility] aspiration pneumonia.  On 1/9 was increasingly altered, in AFib RVR and required intubation f/b PEA arrest.  Subsequently extubated to HFNC on 1/11, failed and was re-intubated  1/13.  Extubated again on 1/20    Interval Problem Update  ROS limited by mental status .  Pt without complaints resting comfortably    DW daughter Jaymie by phone and in person    Overnight coughing up material which looks like TF's.  Feeds stopped    HR 90s  -120s  On 6 LNC up from 4 LNC yesterday  UOP 2750ml/24hrs on no diuretics    I have discussed this patient's plan of care and discharge plan at IDT rounds today with Case Management, Nursing, Nursing leadership, and other members of the IDT team.    Consultants/Specialty      Code Status  Full Code    Disposition  The patient is not medically cleared for discharge to home or a post-acute facility.    I have placed the appropriate orders for post-discharge needs.    Review of Systems  Review of Systems   Unable to perform ROS: Mental status change        Physical Exam  Temp:  [36.1 °C (97 °F)-36.5 °C (97.7 °F)] 36.5 °C (97.7 °F)  Pulse:  [] 100  Resp:  [18-66] 26  BP: (109-160)/(61-87) 130/72  SpO2:  [86 %-100 %] 91 %    Physical Exam  Constitutional:       General: He is not in acute distress.     Appearance: He is well-developed. He is not diaphoretic.   HENT:      Head: Normocephalic and atraumatic.   Eyes:      Conjunctiva/sclera: Conjunctivae normal.   Neck:      Vascular: No JVD.   Cardiovascular:      Rate and Rhythm: Normal rate.      Heart sounds: No murmur heard.     No gallop.    Pulmonary:      Effort: Pulmonary effort is normal. No respiratory distress.      Breath sounds: No stridor. Rhonchi present. No wheezing or rales.   Abdominal:      Palpations: Abdomen is soft.      Tenderness: There is no abdominal tenderness. There is no guarding or rebound.   Musculoskeletal:      Right lower leg: No edema.      Left lower leg: No edema.   Skin:     General: Skin is warm and dry.      Capillary Refill: Capillary refill takes less than 2 seconds.      Findings: No rash.   Neurological:      Comments: Alert and interactive  O x 1  Exam limited as pt not wishing to pariticipate  Face symmetric  Speech dysarthric  EOMI  Strength functional by observation       Fluids    Intake/Output Summary (Last 24 hours) at 1/26/2024 0707  Last data filed at 1/26/2024 0400  Gross per 24 hour   Intake 480 ml   Output 2750 ml   Net -2270 ml         Laboratory  Recent Labs     01/24/24  0400 01/25/24  0328 01/26/24  0347   WBC 12.0* 12.0* 14.6*   RBC 3.49* 3.49* 3.65*   HEMOGLOBIN 10.5* 10.3* 10.8*   HEMATOCRIT 32.3* 32.9* 34.5*   MCV 92.6 94.3 94.5   MCH 30.1 29.5 29.6   MCHC 32.5 31.3* 31.3*   RDW 44.5 45.1 46.0   PLATELETCT 643* 673* 747*   MPV 10.0 9.9 10.0       Recent Labs     01/24/24  0400 01/25/24  0328 01/26/24  0347   SODIUM 140 144 151*   POTASSIUM 3.8 4.0 4.2   CHLORIDE 102 105 111   CO2 32 29 33   GLUCOSE 177* 175* 171*   BUN 36* 41* 53*   CREATININE 1.27 1.43* 1.45*   CALCIUM 9.1 9.5 9.8                     Imaging  IR-MIDLINE CATHETER INSERTION WO GUIDANCE > AGE 3   Final Result                  Ultrasound-guided midline placement performed by qualified nursing staff    as above.          DX-CHEST-PORTABLE (1 VIEW)   Final Result      Improved interstitial opacities may represent improving edema and/or pneumonitis.      Mild nonspecific right basilar opacity and questionable tiny left upper lobe opacity. Correlate clinically for infection.      DX-CHEST-LIMITED (1 VIEW)   Final Result      1.   Slightly increased BILATERAL lung disease. This could be pulmonary edema or pneumonia.   2.  Possible small BILATERAL pleural effusions      DX-CHEST-PORTABLE (1 VIEW)   Final Result         1.  Mild pulmonary edema and/or infiltrates   2.  Atherosclerosis      DX-ABDOMEN FOR TUBE PLACEMENT   Final Result      NG tube tip projects over the stomach.      DX-ABDOMEN FOR TUBE PLACEMENT   Final Result      1.  Enteric tube projects over the proximal stomach.      DX-CHEST-PORTABLE (1 VIEW)   Final Result      Lines and tubes appear adequate. Stable bibasilar airspace disease.      DX-CHEST-PORTABLE (1 VIEW)   Final Result         1.  Pulmonary edema and/or infiltrates are identified, which appear somewhat increased since the prior exam.   2.  Cardiomegaly      DX-CHEST-PORTABLE (1 VIEW)   Final Result      1.  New small left pleural effusion with associated atelectasis.   2.  Increased right basilar opacity most likely atelectasis however correlate clinically for infection.      DX-CHEST-PORTABLE (1 VIEW)   Final Result         1.  Pulmonary edema and/or infiltrates are identified, which are stable since the prior exam.   2.  Cardiomegaly      DX-CHEST-PORTABLE (1 VIEW)   Final Result      1.  No significant change.      MR-MRA HEAD-W/O   Final Result         MRA OF THE [place] WITHIN NORMAL LIMITS.      MR-BRAIN-W/O   Final Result      1.  Redemonstrated is scattered supratentorial and infratentorial areas of ischemia. No new lesions. There is increased conspicuity of the RIGHT frontal lobe lesion which could be due to recurrent ischemia or seizure activity.   2.  Unchanged RIGHT cerebellar hematoma evacuation cavity. As previously noted underlying mass is not excluded and follow-up MRI without and with contrast in approximately weeks is recommended.   3.  No new hemorrhage   4.  Atrophy   5.  White matter changes   6.  Small BILATERAL mastoid effusions      DX-CHEST-PORTABLE (1 VIEW)   Final Result       DX-ABDOMEN FOR TUBE PLACEMENT   Final Result      Enteric feeding tube terminates in the left upper quadrant projecting over the expected location of the stomach.      DX-CHEST-LIMITED (1 VIEW)   Final Result      1.  Interval extubation   2.  Lower lung volumes with increased atelectasis superimposed on pulmonary edema or pneumonia      DX-CHEST-PORTABLE (1 VIEW)   Final Result         1.  Pulmonary edema and/or infiltrates are identified, which are somewhat decreased since the prior exam.   2.  Atherosclerosis      US-EXTREMITY VENOUS LOWER BILAT   Final Result      DX-CHEST-PORTABLE (1 VIEW)   Final Result         1.  Scattered hazy bilateral pulmonary infiltrates, slightly increased since prior study.   2.  Atherosclerosis      MR-BRAIN-W/O   Final Result      1.  A few punctate areas of acute infarcts in the right frontal and bilateral parietal lobes and left cerebellum.   2.  There is no brainstem infarct or diffuse anoxic injury.   3.  Mixed signal abnormality in the right cerebellum with the previous surgical intervention in keeping with the clinical diagnosis of right cerebellar hemorrhage evacuation. Follow-up study with contrast in 6 weeks is recommended to rule out any    underlying pathology.   4.  Mild cerebral volume loss.      DX-CHEST-PORTABLE (1 VIEW)   Final Result      CT-CTA NECK WITH & W/O-POST PROCESSING   Final Result      1.  No acute arterial abnormality detected. No significant arterial stenosis.      CT-CTA HEAD WITH & W/O-POST PROCESS   Final Result      CT angiogram of the [place] within normal limits.      CT-CEREBRAL PERFUSION ANALYSIS   Final Result      1.  Cerebral blood flow less than 30% likely representing completed infarct = 0 mL.      2.  T Max more than 6 seconds likely representing combination of completed infarct and ischemia = 0 mL.      3.  Mismatched volume likely representing ischemic brain/penumbra = None      4.  Please note that the cerebral perfusion was  performed on the limited brain tissue around the basal ganglia region. Infarct/ischemia outside the CT perfusion sections can be missed in this study.      CT-HEAD W/O   Final Result      1.  No evidence of acute territorial infarct, intracranial hemorrhage or mass lesion.   2.  Mild diffuse cerebral substance loss.   3.  Mild microangiopathic ischemic change versus demyelination or gliosis.   4.  Status post right suboccipital cranioplasty with underlying cerebellar hypoattenuation likely on the basis of evolving encephalomalacia.         DX-CHEST-PORTABLE (1 VIEW)   Final Result      1.  Intubation.   2.  Bilateral pulmonary infiltrates.      DX-CHEST-LIMITED (1 VIEW)   Final Result      DX-ABDOMEN FOR TUBE PLACEMENT   Final Result      Enteric feeding tube terminates with the tip projecting over the expected location of the distal stomach.      CT-CTA CHEST PULMONARY ARTERY W/ RECONS   Final Result         1.  No pulmonary embolus appreciated.   2.  Consolidations in bilateral lung bases an scattered hazy right pulmonary opacities, compatible with atelectasis with component of infiltrate.   3.  Indeterminate left adrenal nodule, follow-up adrenal protocol CT for further characterization as clinically appropriate.   4.  Atherosclerosis and atherosclerotic coronary artery disease.      DX-ABDOMEN FOR TUBE PLACEMENT   Final Result         1.  Nonspecific bowel gas pattern in the upper abdomen.   2.  Cardiomegaly   3.  Atherosclerosis   4.  Bibasilar atelectasis   5.  Nasogastric tube tip terminates overlying the expected location of the pylorus or first duodenal segment.      DX-ABDOMEN FOR TUBE PLACEMENT   Final Result         1.  Nonspecific bowel gas pattern in the upper abdomen.   2.  Nasogastric tube tip terminates overlying the expected location of the gastric antrum.   3.  Linear densities the bilateral lung bases favor atelectasis, component of infiltrate not excluded.      DX-ABDOMEN FOR TUBE PLACEMENT    Final Result      NG tube extends below the diaphragm with tip at the level of the gastric fundus. Side port is at the level of the distal esophagus.      DX-ABDOMEN FOR TUBE PLACEMENT   Final Result      NG tube is noted with tip projecting over the right lower lung.      These findings were transmitted with GLEN ZELAYA on 01/07/2024. Tube was subsequently adjusted and is now present below the diaphragm.      DX-ABDOMEN FOR TUBE PLACEMENT   Final Result      DHT tip overlies the second portion of the duodenum      DX-ABDOMEN FOR TUBE PLACEMENT   Final Result      Feeding tube tip projects in the distal stomach or first portion of the duodenum.      DX-ABDOMEN FOR TUBE PLACEMENT   Final Result      Removal of nasogastric tube.      Feeding tube tip projects in the distal stomach.      No visualized thorax.      DX-ABDOMEN FOR TUBE PLACEMENT   Final Result         Gastric drainage tube with tip projecting over the expected area of the right lower lobe airway. Recommend removal.            CRITICAL RESULT READ BACK: Preliminary findings discussed with and critical read back performed by Dr. GLEN ZELAYA via telephone on 1/5/2024 6:22 PM      DX-ABDOMEN FOR TUBE PLACEMENT   Final Result      1.  Malpositioned enteric sump catheter which is folded back upon itself over the left paramedian distal one third mediastinum probably beyond the left mainstem bronchus and within the esophagus.   2.  A Voalte message was sent to GLEN ZELAYA at 1641 hours. Immediate response received.   3.  Per discussion, the patient has already pulled out the tube.      DX-CHEST-PORTABLE (1 VIEW)   Final Result      1.  Hypoinflation and pulmonary vascular congestion with mild bibasilar atelectasis.   2.  No lobar pneumonia or overt pulmonary edema.      CT-HEAD W/O   Final Result      1.  Diffuse atrophy and white matter changes.   2.  No acute intracranial hemorrhage or territorial infarct.   3.  RIGHT occipital craniotomy with underlying  cerebellar encephalomalacia.              Assessment/Plan  * Acute respiratory failure with hypoxia (HCC)- (present on admission)  Assessment & Plan  Intubated for probable aspiration pneumonia and has had dificulty coming off the vent  Resp status is tenuous  Should he fail again would need Trach though this is not consistent with daughters wishes.  Will need to cont to work with family   Scheduled hypertonic saline NPPBs  Pulmonary toilet  Not cooperative for IS  Mobilize  Aspiration precuations    1/26  Appears to be coughing up TF material  Working on getting post pyloric NGT; TFs on hold for now  DW daughter who still wishes to pursue all measures    A-fib (HCC)  Assessment & Plan  Sinus on monitor today  Anitcoagulation with apixaban  Cont Tele    Hypoxic respiratory failure (HCC)- (present on admission)  Assessment & Plan  Requiring intubation with mechanical ventilation  Secondary to aspiration    Encephalopathy  Assessment & Plan  Recent hx of brain bleeding s/p surgery 12/27  CT head unremarkable  MRI/MRA 1/14 showing chronic changes of previous ischemic events and bleed unchanged from comparison imaging  Minimize sedating medications  Mobilize  Encourage family to visit  Window shades open  Sleep hygiene    Advance care planning  Assessment & Plan  Pt remains full code per dw my partner and pt's daughter    Urinary retention  Assessment & Plan  Continue bladder scan  Straight cath if bladder scan over 350cc    High anion gap metabolic acidosis  Assessment & Plan  resolved    Hypernatremia- (present on admission)  Assessment & Plan  Lost enteral access overnight  Start 0.45NS at 100ml/hr   Check BMP q6  Once we have eneteral access again will likely need higher amount of free H2O    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 Patten  Repeat imaging including MRI show changes consistent with known bleed but no new  findings  Still needs a f/u MRI in 3-4 wks to confirm no underlying lesion as etiology of initial bleed    Aspiration pneumonia (HCC)- (present on admission)  Assessment & Plan  Has completed Abx's  Pt remains very high risk for repeat aspiration  SLP working with him and recommend continued NPO status  Aspiration precuations    Acute kidney failure (HCC)- (present on admission)  Assessment & Plan  Pt has had problems with aspiration of TF's  Attempting to get NGT post pyloric however TF's on hold for now  Start 0.45NS 100ml/hr         VTE prophylaxis:    therapeutic anticoagulation with eliquis 5 mg BID      I have performed a physical exam and reviewed and updated ROS and Plan today (1/26/2024). In review of yesterday's note (1/25/2024), there are no changes except as documented above.

## 2024-01-26 NOTE — CARE PLAN
Problem: Bronchopulmonary Hygiene  Goal: Increase mobilization of retained secretions  Description: Target End Date:  2 to 3 days    1.  Perform bronchopulmonary therapy as indicated by assessment  2.  Perform airway suctioning  3.  Perform actions to maintain patient airway  Outcome: Progressing     6L Oxymask

## 2024-01-26 NOTE — CARE PLAN
The patient is Watcher - Medium risk of patient condition declining or worsening    Shift Goals  Clinical Goals: remain off HHFNC, SBP<160  Patient Goals: DULCE MARIA  Family Goals: DULCE MARIA    Progress made toward(s) clinical / shift goals:    Problem: Hemodynamics  Goal: Patient's hemodynamics, fluid balance and neurologic status will be stable or improve  Outcome: Progressing     Problem: Respiratory  Goal: Patient will achieve/maintain optimum respiratory ventilation and gas exchange  Outcome: Progressing  Note: Patient tolerating oxymask 4-5L, continuous pulse oximetry in place     Problem: Respiratory  Goal: Patient will achieve/maintain optimum respiratory ventilation and gas exchange  Outcome: Progressing  Note: Patient tolerating oxymask 4-5L, continuous pulse oximetry in place     Problem: Safety - Medical Restraint  Goal: Remains free of injury from restraints (Restraint for Interference with Medical Device)  Outcome: Progressing       Patient is not progressing towards the following goals:

## 2024-01-26 NOTE — THERAPY
Speech Language Pathology   Daily Treatment     Patient Name: Lewis Mohr  AGE:  78 y.o., SEX:  male  Medical Record #: [id number]  Date of Service: 1/26/2024      Precautions:  Precautions: Fall Risk, Swallow Precautions     Subjective  RN cleared patient for dysphagia tx session. Per RN, patient was coughing up secretions that looked like TF last night, so NGT was removed and is getting replaced w/ IRIS. Patient received awake, alert, and nonverbal. Consistent hiccupping and grimacing noted prior to any PO trials, which RN reported has been ongoing today.     Assessment  Patient tolerated oral care via Kaushik Kit today without biting off sponge. Patient still presents with oral aversion, evidenced by pulling head away from bolus when presented, resistance to oral care at times, closing mouth, etc. Patient required TTS to lips via ice chips for appropriate bolus acceptance. Patient presented with munch-chew pattern of ice chips and mastication appeared incomplete at times, with ice chips still noted in oral cavity after mastication stopped. Patient had significantly delayed and/or absent swallow at times despite max cues for same. Patient presented with delayed, wet coughing after all ice chips trials, which is concerning for penetration/aspiration. No further PO trials were given and patient was unable to complete dysphagia exercises d/t impaired mentation and poor command following.      Clinical Impressions  Patient continues to present with s/sx of oropharyngeal dysphagia, evidenced by oral aversion and coughing on several trials of ice chips. Patient appears at high risk for aspiration and does not appear safe for PO intake or appropriate for FEES yet. Recommend NPO/NGT. Consider possible G-tube d/t prolonged dysphagia, hx of silent aspiration on previous FEES this admit, and continued oral aversion w/ difficulty managing secretions as well.     Recommendations  Treatment Completed: Dysphagia Treatment      Dysphagia Treatment  Diet Consistency: NPO/NGT  Instrumentation: Instrumental swallow study pending clinical progress  Medication: Non Oral  Supervision: 1:1 feeding with constant supervision (Ice chips only)  Oral Care: Q4h    SLP Treatment Plan  Treatment Plan: Dysphagia Treatment  SLP Frequency: 3x Per Week  Estimated Duration: Until Therapy Goals Met    Anticipated Discharge Needs  Discharge Recommendations: Recommend post-acute placement for additional speech therapy services prior to discharge home  Therapy Recommendations Upon DC: Dysphagia Training, Community Re-Integration, Patient / Family / Caregiver Education    Patient / Family Goals  Patient / Family Goal #1: RN reported pt's daughter was eager for pt to participate w/ ST  Goal #1 Outcome: Progressing slower than expected  Short Term Goals  Short Term Goal # 1: Pt will demo improved secretion management as evidenced by reduced use of oral suctioning during tx session  Goal Outcome # 1: Progressing slower than expected  Short Term Goal # 1 B : Pt will participate in prefeeding trials to demo readiness for an instrumental swallow study  Goal Outcome  # 1 B: Progressing slower than expected    Yasmine Celeste, SLP

## 2024-01-26 NOTE — THERAPY
Physical Therapy   Daily Treatment     Patient Name: Lewis Mohr  Age:  78 y.o., Sex:  male  Medical Record #: [id number]  Today's Date: 1/26/2024     Precautions  Precautions: Fall Risk;Swallow Precautions;Nasogastric Tube    Assessment    Patient continues to be limited by impaired arousal and attention, apparent impaired cognition, impaired communication, impaired balance and coordination, functional weakness, and decreased activity tolerance. He nodded yes when asked if he was agreeable to OOB activity and initiated BLE OOB. Unfortunately he required total A to maintain sitting balance at EOB and was unable or unwilling to follow further commands. He attempted to return to supine following 1-2 minutes sitting EOB. Decreasing PT frequency given limited ability to meaningfully participate with skilled therapy however patient should mobilize to EOB at least 1x/day with RN staff to prevent hospital acquired debility.    Plan    Treatment Plan Status: Modify Current Treatment Plan  Type of Treatment: Bed Mobility, Equipment, Gait Training, Neuro Re-Education / Balance, Self Care / Home Evaluation, Therapeutic Activities, Therapeutic Exercise  Treatment Frequency: 2 Times per Week  Treatment Duration: Until Therapy Goals Met    DC Equipment Recommendations: Unable to determine at this time  Discharge Recommendations:  (requires assist for mobility, at current level minimally participatory with therapy)      Subjective    RN cleared patient for therapy, received in bed, nodded agreement to participation     Objective       01/26/24 0948   Charge Group   Charges  Yes   PT Therapeutic Activities (Units) 1   Total Time Spent   PT Total Time Yes   PT Therapeutic Activities Time Spent (Mins) 11   PT Total Time Spent (Calculated) 11   Precautions   Precautions Fall Risk;Swallow Precautions;Nasogastric Tube   Vitals   O2 Delivery Device Oxymask   Cognition    Cognition / Consciousness X   Level of Consciousness   (lethargic)    Ability To Follow Commands Unable to Follow 1 Step Commands  (followed two commands at start of session then no command following)   Safety Awareness Impaired;Impulsive   Attention Impaired   Initiation Impaired   Comments bed seeking behavior following 1-2 minutes sitting at EOB with assist   Active ROM Lower Body    Comments Intermittent spontaneous movement, not through full range; moved LLE more than RLE   Balance   Sitting Balance (Static) Trace   Sitting Balance (Dynamic) Dependent   Weight Shift Sitting Absent   Skilled Intervention Verbal Cuing;Compensatory Strategies;Facilitation;Sequencing;Postural Facilitation   Bed Mobility    Supine to Sit Total Assist   Sit to Supine Total Assist   Scooting Total Assist   Skilled Intervention Verbal Cuing;Compensatory Strategies;Sequencing;Facilitation;Postural Facilitation   Gait Analysis   Gait Level Of Assist Unable to Participate   Functional Mobility   Sit to Stand Unable to Participate   Bed, Chair, Wheelchair Transfer Unable to Participate   Comments attempts at standing deferred given limited ability to follow commands and performance with sitting EOB   ICU Target Mobility Level   ICU Mobility - Targeted Level Level 2   How much difficulty does the patient currently have...   Turning over in bed (including adjusting bedclothes, sheets and blankets)? 1   Sitting down on and standing up from a chair with arms (e.g., wheelchair, bedside commode, etc.) 1   Moving from lying on back to sitting on the side of the bed? 1   How much help from another person does the patient currently need...   Moving to and from a bed to a chair (including a wheelchair)? 1   Need to walk in a hospital room? 1   Climbing 3-5 steps with a railing? 1   6 clicks Mobility Score 6   Short Term Goals    Short Term Goal # 1 Pt will perform bed mobility with min A to progress function in 6 visits.   Goal Outcome # 1 goal not met   Short Term Goal # 2 Pt will perform sit<>stand with min A to  progress towards functional transfers in 6 visits.   Goal Outcome # 2 Goal not met   Short Term Goal # 3 Pt will maintain seated balance at EOB with standby assist for 2 min to progress function in 6 visits.   Goal Outcome # 3 Goal not met   Physical Therapy Treatment Plan   Physical Therapy Treatment Plan Modify Current Treatment Plan   Treatment Frequency 2 Times per Week   Anticipated Discharge Equipment and Recommendations   DC Equipment Recommendations Unable to determine at this time   Discharge Recommendations   (requires assist for mobility, at current level minimally participatory with therapy)   Interdisciplinary Plan of Care Collaboration   IDT Collaboration with  Nursing;Occupational Therapist   Patient Position at End of Therapy In Bed;Bed Alarm On;Call Light within Reach   Collaboration Comments RN aware of visit, response   Session Information   Date / Session Number  1/26-3 (2/2, 1/29)

## 2024-01-27 PROBLEM — R65.21 SEPTIC SHOCK (HCC): Chronic | Status: ACTIVE | Noted: 2024-01-01

## 2024-01-27 PROBLEM — A41.9 SEPTIC SHOCK (HCC): Chronic | Status: ACTIVE | Noted: 2024-01-01

## 2024-01-27 NOTE — CARE PLAN
Problem: Bronchopulmonary Hygiene  Goal: Increase mobilization of retained secretions  Description: Target End Date:  2 to 3 days    1.  Perform bronchopulmonary therapy as indicated by assessment  2.  Perform airway suctioning  3.  Perform actions to maintain patient airway  Outcome: Progressing     CPT QID

## 2024-01-27 NOTE — PROGRESS NOTES
Pt is going to ICU and they are going to intubate here first. Per Dr. Babin increase levo to 0.3 as they are about to give paralytics. Levo increased as instructed.

## 2024-01-27 NOTE — PROGRESS NOTES
Call from monitor tech that pts HR had increased. When I went into the room pt was in the 140-150s. BP had dropped to 70/46, recheck was 66/46, and then 83/53. MD notified, stat EKG ordered. MD was present a bedside a minute later. 1L NS bolus started and atropine pulled just in case was was in SVT, zoll was present (EKG showed sinus tach). Dr. Babin came to bedside with Dr. Wray. Dr. Babin requested a one time push of neosynephrine 300 mcg and to start levo. Shyam given BP before was 86/50 and HR was 144 after dose Bp was 109/58 and . Levo was started and stopped almost immediately as the shyam took affect. Bp started dropping into the 70s and 80s again so levo was restarted. The decision was made that the pt would be going to the ICU and they were going to intubate.

## 2024-01-27 NOTE — PROGRESS NOTES
[facility] ICU Progress Note      Admit Date: 1/4/2024  ICU Day: 2 in CICU (was in [facility] ICU earlier this admission)    Resident(s): Ayaka Horner M.D.  Attending: Dr. Allison Moura MD    Date & Time:   1/27/2024   6:53 AM       Patient ID:    Name:             Lewis Mohr     YOB: 1945  Age:                 78 y.o.  male   MRN:               [id number]    HPI and hospital course:  78-year-old male patient who has had a complex history of multiple recent hospitalizations in the past month.     Initially presented 12/27/23  after a roommate reportedly found him down on the floor surrounded in his vomit. 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 He was discharged back to a rehab facility around 1/2/24.     Returned to the hospital on 1/3 w/ complaints of new onset slurred speech. Per EDP notes, he reportedly had some dysarthria and some expressive aphasia which was noted on his prior hospitalization. He had no new changes on CT head hence was discharged back to rehab from the ED.    Came back to the ED 1/4/2024, due to concerns for altered mentation. This time around, he was found to be hypoxic and hypotensive, hence admitted under hospitalist service for presumed AHRF secondary to aspiration. He was also found to have ERWIN, urinary retention, dehydration, and hypernatremia.     On 1/9, he developed Afib w/ RVR and worsening hypoxia and mental status hence code stroke was called. He had PEA arrest due to which underwent CPR and ROSC was achieved. He was intubated and subsequently transferred to the [facility] ICU.     During the course of stay in RICU,   Was extubated 1/12 to HFNC 45L/70% FiO2 -> had to be reintubated on 1/13 -> extubated on 1/20 to HFNC 40L/70% FiO2. He was stable for the next 5 days, transferred to [facility] on 1/25.     Interval Events:  Overnight on 1/26, he seems to have had another episode  of aspiration event, became tachycardic and hypotensive. Was reintubated and admitted to the ICU for management of recurrent shock, likely septic from the aspiration event. Started on nor epi and underwent bronchoscopy last night.   He's stable as of this morning on 1/27.   Failed SAT, will attempt SBT on precedex.   Last BM noted to be 1/23, will escalate bowel regimen.  Started on 10U lantus for blood sugars management, since they're now going up likely due to stress dose steroids.   Palliative care follow up is pending.     Consultants:  Critical care  Neurology  Palliative care    Procedures:  1/9/2024: CPR for PEA arrest  1/9/2024: ET intubation  1/9/2024: Bronchoscopy  1/9/2024: Right IJ CVC placement  1/9/2024: EEG  1/13/2024: Repeat intubation  1/13/2024: Bronchoscopy  1/19/2024: Bronchoscopy  1/25/2024: Midline catheter placement  1/26/2024: Repeat intubation  1/26/2024: Bronchoscopy      Review of Systems   Unable to perform ROS: Intubated       PHYSICAL EXAM    Physical Exam  Constitutional:       General: He is not in acute distress.     Appearance: He is well-developed. He is not diaphoretic.   HENT:      Head: Normocephalic and atraumatic.   Eyes:      Conjunctiva/sclera: Conjunctivae normal.   Neck:      Vascular: No JVD.   Cardiovascular:      Rate and Rhythm: Normal rate.      Heart sounds: No murmur heard.     No gallop.   Pulmonary:      Effort: Pulmonary effort is normal. No respiratory distress.      Breath sounds: No stridor. Rhonchi present. No wheezing or rales.   Abdominal:      Palpations: Abdomen is soft.      Tenderness: There is no abdominal tenderness. There is no guarding or rebound.   Musculoskeletal:      Right lower leg: No edema.      Left lower leg: No edema.   Skin:     General: Skin is warm and dry.      Capillary Refill: Capillary refill takes less than 2 seconds.      Findings: No rash.   Neurological:      Comments: Currently intubated.  Sedated with Precedex.  Moving all 4  extremities.  Face is symmetric.          Respiratory:  Vent Mode: APVCMV  Respiration: 17, Pulse Oximetry: 100 %    Chest Tube Drains:    Recent Labs     24  0319   ISTATAPH 7.500 7.444   ISTATAPCO2 33.8 44.0*   ISTATAPO2 53* 123*   ISTATATCO2 27 31   YBDEHNH4TUS 90* 99   ISTATARTHCO3 26.4* 30.2*   ISTATARTBE 3 6*   ISTATTEMP 98.5 F 98.4 F   ISTATFIO2 50 50   ISTATSPEC Arterial Arterial   ISTATAPHTC 7.501* 7.446   WDSPDNUN3UU 53* 123*       HemoDynamics:  Pulse: 71 Blood Pressure : 115/58      Neuro:      Fluids:       Intake/Output Summary (Last 24 hours) at 2024 0653  Last data filed at 2024 0600  Gross per 24 hour   Intake 2669.86 ml   Output 1000 ml   Net 1669.86 ml       Weight: 86.6 kg (190 lb 14.7 oz)  Body mass index is 27.39 kg/m².    Recent Labs     24   SODIUM 144 151* 152*   POTASSIUM 4.0 4.2 4.6   CHLORIDE 105 111 113*   CO2 29 33 26   BUN 41* 53* 59*   CREATININE 1.43* 1.45* 1.74*   MAGNESIUM  --   --  2.6*   CALCIUM 9.5 9.8 9.7       GI/Nutrition:  Recent Labs     24   ALTSGPT  --   --  88*   ASTSGOT  --   --  49*   ALKPHOSPHAT  --   --  165*   TBILIRUBIN  --   --  0.6   GLUCOSE 175* 171* 198*       Heme:  Recent Labs     24  0558   RBC 3.65* 4.10* 3.16*   HEMOGLOBIN 10.8* 12.1* 9.4*   HEMATOCRIT 34.5* 40.0* 31.6*   PLATELETCT 747* 943* 754*       Infectious Disease:  Temp  Av.7 °C (98 °F)  Min: 36.3 °C (97.3 °F)  Max: 37 °C (98.6 °F)  Recent Labs     24  0328 24  0347 24  2257 24  0558   WBC 12.0* 14.6* 22.9* 20.3*   NEUTSPOLYS 71.70 80.20* 83.20*  --    LYMPHOCYTES 16.90* 10.00* 8.50*  --    MONOCYTES 10.00 8.40 6.70  --    EOSINOPHILS 0.00 0.00 0.50  --    BASOPHILS 0.60 0.50 0.40  --    ASTSGOT  --   --  49*  --    ALTSGPT  --   --  88*  --    ALKPHOSPHAT  --   --  165*  --    TBILIRUBIN  --   --  0.6  --        Meds:    1/2 NS   100 mL/hr at 01/27/24 0600    norepinephrine        NORepinephrine  0-1 mcg/kg/min (Ideal) 0.12 mcg/kg/min (01/27/24 0639)    Respiratory Therapy Consult        famotidine  20 mg      Or    famotidine  20 mg      senna-docusate  2 Tablet      And    polyethylene glycol/lytes  1 Packet      And    magnesium hydroxide  30 mL      And    bisacodyl  10 mg      MD Alert...Adult ICU Electrolyte Replacement per Pharmacy        lidocaine  2 mL      dexmedetomidine (Precedex) infusion  0-1.5 mcg/kg/hr (Ideal) Stopped (01/27/24 0540)    fentaNYL  25 mcg      Or    fentaNYL  50 mcg      Or    fentaNYL  100 mcg      piperacillin-tazobactam  4.5 g Stopped (01/27/24 0643)    linezolid (ZYVOX) IV  600 mg Stopped (01/27/24 0642)    phenylephrine infusion  0-5 mcg/kg/min (Ideal) Stopped (01/27/24 0016)    phenylephrine        hydrocortisone sodium succinate PF  50 mg      vasopressin (Vasostrict) 20 Units in  mL Infusion  0.03 Units/min Stopped (01/27/24 0200)    ipratropium-albuterol  3 mL      sodium bicarbonate  3 mL      acetaminophen  650 mg      insulin regular  2-9 Units      And    dextrose bolus  25 g      hydrALAZINE  10 mg      labetalol  20 mg      apixaban  5 mg      Pharmacy  1 Each      haloperidol lactate  5 mg            Assessment and Plan:  Septic shock (HCC)  Assessment & Plan  This is Septic shock Not present on admission  SIRS criteria identified on my evaluation include: Tachycardia, with heart rate greater than 90 BPM and Tachypnea, with respirations greater than 20 per minute  Clinical indicators of end organ dysfunction include Hypotension with decrease in systolic blood pressure of more than 40mmHg and Acute Respiratory Failure - (mechanical ventilation or BiPap or PaO2/FiO2 ratio < 300)  Indicators of septic shock include: Sepsis present and persistent hypotension despite fluid resuscitation   Sources is: aspiration pna  Sepsis protocol initiated  Crystalloid Fluid Administration: Fluid  resuscitation ordered per standard protocol - 30 mL/kg per current or ideal body weight  IV antibiotics as appropriate for source of sepsis  Reassessment: I have reassessed the patient's hemodynamic status    1/27,   Lactic acidosis has resolved  Continue linezolid and zosyn  Serial procal  Map > 65, on levo    Aspiration pneumonia (HCC)  Assessment & Plan  Has been having recurrent aspiration events this admission. SLP was following him but had to be reintubated overnight on 1/26 likely due to another aspiration event.   NPO at this time  High risk aspiration precuations  Palliative consult pending  On zosyn and linezolid at this time    Acute kidney failure (HCC)  Assessment & Plan  Renal function slowly worsening since admission. Scr now up to 1.88 on 1/27.   Adequate urine output at this time, on pressors  Will monitor for now    * Acute respiratory failure with hypoxia (HCC)  Assessment & Plan  Suspected to be due to recurrent aspiration in the setting of recent cerebellar ICH. Has been intubated thrice this admission, difficulty coming off the ventilator.   Daughter reportedly prefers against tracheostomy per chart review.   Scheduled hypertonic saline NPPBs  Pulmonary toilet, IPV  High risk aspiration precuations  Palliative care follow up pending  ABCDEF bundle  Failed SAT on 1/27, will try SBT on precedex    Delirium  Assessment & Plan  Likely metabolic encephalopathy from ongoing aspiration   Had to be placed on precedex overnight on 1/26    A-fib (HCC)  Assessment & Plan  Sinus on monitor today  Anitcoagulation with apixaban  Cont Tele    Encephalopathy  Assessment & Plan  Recent hx of brain bleeding s/p surgery 12/27  CT head unremarkable  MRI/MRA 1/14 showing chronic changes of previous ischemic events and bleed unchanged from comparison imaging  Minimize sedating medications  Mobilize  Encourage family to visit  Window shades open  Sleep hygiene    High anion gap metabolic acidosis  Assessment &  Plan  resolved    Hypernatremia  Assessment & Plan  Doesn't have enteral access at this time.   Na level improving with 1/2 NS at 100ml/hour --> continue the same on 1/27  Check BMP everyday    History of cerebellar hemorrhage  Assessment & Plan  He was recently admitted and underwent surgery on 12/27 by Dr. Patten  Repeat imaging including MRI show changes consistent with known bleed but no new findings  Still needs a f/u MRI in 3-4 wks to confirm no underlying lesion as etiology of initial bleed    Hypoxic respiratory failure (HCC)  Assessment & Plan  Requiring intubation with mechanical ventilation  Secondary to aspiration    Advance care planning  Assessment & Plan  At this time, pt is full code. Pending family discussion with daughter.     Urinary retention  Assessment & Plan  Has fernando catheter in place    Respiratory failure with hypoxia (HCC)  Assessment & Plan  As noted under AHRF             Quality Measures:  Feeding:  NPO  Analgesia: Tylenol fentanyl  Sedation: Fentanyl, Precedex  Thromboprophylaxis: Therapeutic Eliquis 5 mg twice daily  Head of bed: >30 degrees  Ulcer prophylaxis: Famotidine twice daily  Glycemic control: Lantus 10 units daily and insulin sliding scale  Bowel care: Bowel regimen  Indwelling lines: Right upper arm midline catheter, 1 peripheral IV, Fernando catheter, ET tube  Deescalation of antibiotics: Linezolid and Zosyn      CODE STATUS:   Full code  DISPO:    Depends on response to treatment

## 2024-01-27 NOTE — ASSESSMENT & PLAN NOTE
This is Septic shock Not present on admission  SIRS criteria identified on my evaluation include: Tachycardia, with heart rate greater than 90 BPM and Tachypnea, with respirations greater than 20 per minute  Clinical indicators of end organ dysfunction include Hypotension with decrease in systolic blood pressure of more than 40mmHg and Acute Respiratory Failure - (mechanical ventilation or BiPap or PaO2/FiO2 ratio < 300)  Indicators of septic shock include: Sepsis present and persistent hypotension despite fluid resuscitation   Sources is: aspiration pna  Sepsis protocol initiated  Crystalloid Fluid Administration: Fluid resuscitation ordered per standard protocol - 30 mL/kg per current or ideal body weight  IV antibiotics as appropriate for source of sepsis  Reassessment: I have reassessed the patient's hemodynamic status    1/27 = Lactic acidosis has resolved    1/28  Discontinued linezolid since MRSA nares is neg  Continue zosyn  Map > 65, trying to wean off levo, added on midodrine on 1/28  F/U final BAL results

## 2024-01-27 NOTE — HOSPITAL COURSE
Mr. 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  Unfortunately, the patient was readmitted to [facility] on 1/4/2024 for altered mental status and hypoxia.  He was admitted to the medicine services with hypernatremia, dehydration, and ERWIN with urinary retention.  He was found to be silently aspirating.  On 1/9/2024, the patient went into AF with RVR with worsening hypoxia and mental status changes.  A code stroke was called.  While in the CT scanner the patient was not protecting his airway and therefore was intubated.  He had a PEA arrest shortly afterward.  He was transferred to the ICU.  The patient was extubated on 1/11/2024 and required HFNC at 45L at 70% on 1/12.  Unfortunately, the patient was reintubated on 1/13.  He remained intubated until 1/20/2024 and then extubated to HFNC at 40L/70%.  He was transferred to [facility] on 1/25.  On the evening of 1/26 the critical care team was reconsulted due to the patient becoming suddenly tachycardic in the 140-150s with hypotension.  He was given IVF and a dose of phenylephrine.  He was not protecting his airway and required emergent intubation and bronchoscopy then transferred back to the ICU.  1/27 - VD2, agitated with SAT, levo infusing, trial SBT, creat 1.88, Na 149-->1/2 NS infusing

## 2024-01-27 NOTE — CARE PLAN
The patient is Watcher - Medium risk of patient condition declining or worsening    Shift Goals  Clinical Goals: remain off HHFNC, SBP<160  Patient Goals: polo  Family Goals: polo    Progress made toward(s) clinical / shift goals:    Problem: Safety - Medical Restraint  Goal: Remains free of injury from restraints (Restraint for Interference with Medical Device)  Description: INTERVENTIONS:  1. Determine that other, less restrictive measures have been tried or would not be effective before applying the restraint  2. Evaluate the patient's condition at the time of restraint application  3. Educate patient/family regarding the reason for restraint  4. Q2H: Monitor safety, psychosocial status, comfort, circulation, respiratory status, LOC, nutrition and hydration  Outcome: Progressing     Problem: Pain - Standard  Goal: Alleviation of pain or a reduction in pain to the patient’s comfort goal  Description: Target End Date:  Prior to discharge or change in level of care    Document on Vitals flowsheet    1.  Document pain using the appropriate pain scale per order or unit policy  2.  Educate and implement non-pharmacologic comfort measures (i.e. relaxation, distraction, massage, cold/heat therapy, etc.)  3.  Pain management medications as ordered  4.  Reassess pain after pain med administration per policy  5.  If opiods administered assess patient's response to pain medication is appropriate per POSS sedation scale  6.  Follow pain management plan developed in collaboration with patient and interdisciplinary team (including palliative care or pain specialists if applicable)  Outcome: Progressing  Note: Pain monitor

## 2024-01-27 NOTE — PROCEDURES
Intubation    Date/Time: 1/26/2024 9:48 PM    Performed by: Donnie Babin M.D.  Authorized by: Donnie Babin M.D.    Consent:     Consent obtained:  Emergent situation    Consent given by:  Patient    Risks discussed:  Aspiration, bleeding and hypoxia    Alternatives discussed:  No treatment  Pre-procedure details:     Patient status:  Altered mental status    Pretreatment meds: etomidate.    Paralytics:  Rocuronium  Procedure details:     Preoxygenation:  Nonrebreather mask    CPR in progress: no      Intubation method:  Oral    Oral intubation technique:  Video-assisted    Laryngoscope type:  GlideScope    Laryngoscope blade:  Mac 4    Cormack-Lehane Classification:  Grade 1    Tube size (mm):  7.5    Tube type:  Cuffed    Number of attempts:  1    Ventilation between attempts: no      Cricoid pressure: no      Tube visualized through cords: yes    Placement assessment:     ETT to teeth:  25    Tube secured with:  ETT lopez    Breath sounds:  Equal    Placement verification: chest rise, condensation, ETCO2 detector and fiberoptic scope      Chest x-ray findings: pending stat CXR.  Comments:      Patient was intubated for on going hypoxia and aspiration           03-Dec-2023 10:58

## 2024-01-27 NOTE — PROCEDURES
Date: 1/26/2024    Procedure: Bronchoscopy with Therapeutic suctioning and BAL    Indication: Infiltrate/atlectasis    Physician:  Donnie Babin M.D.    Consent:  emergent    Procedure:  A time out occurred with the patient name, medical record number, allergies, and consent with right procedure and indication with bedside nurse and if able the patient. The patient was connected to a monitor and had continuous pulse oximeter. The patient was sedated with etomidate and rocuronium. The bronchoscope was insert down the left and right bronchi to the terminal bronchioles. Therapeutic suction of secretion was provided mid trachea with white/green secretions and down the right mainstem and right lower lobes as well and left lower lobe and mainstem bronchus very proximal friability with some bleeding in left bronchus. A BAL was preformed in the right lower lobe. The patient tolerated the procedure without any immediate complications with minimal <5ml of blood loss.     Findings continue to be aspirating with friable proximal airways and some bleeding from left main bronchus.     Donnie Babin MD  Critical Care Medicine

## 2024-01-27 NOTE — ASSESSMENT & PLAN NOTE
Likely metabolic encephalopathy from ongoing aspiration   Had to be placed on precedex overnight on 1/26  Trial of quetiapine 50mg TID starting 1/28

## 2024-01-27 NOTE — CARE PLAN
The patient is Watcher - Medium risk of patient condition declining or worsening    Shift Goals  Clinical Goals: SPO2% will remain above 90%  Patient Goals: polo  Family Goals: polo    Progress made toward(s) clinical / shift goals:          Problem: Knowledge Deficit - Standard  Goal: Patient and family/care givers will demonstrate understanding of plan of care, disease process/condition, diagnostic tests and medications  Outcome: Progressing  Note: Pt educated regarding plan of care and medications. All questions answered.      Problem: Safety - Medical Restraint  Goal: Remains free of injury from restraints (Restraint for Interference with Medical Device)  1/26/2024 1644 by Jack Kerr R.N.  Outcome: Progressing  Flowsheets (Taken 1/26/2024 1644)  Addressed this shift: Remains free of injury from restraints (restraint for interference with medical device):   Determine that other, less restrictive measures have been tried or would not be effective before applying the restraint   Evaluate the patient's condition at the time of restraint application   Inform patient/family regarding the reason for restraint   Every 2 hours: Monitor safety, psychosocial status, comfort, nutrition and hydration  1/26/2024 1644 by Jack Kerr R.N.  Reactivated     Problem: Pain - Standard  Goal: Alleviation of pain or a reduction in pain to the patient’s comfort goal  Outcome: Progressing  Note: Pt assessed for pain regularly and medicated PRN per MAR.

## 2024-01-27 NOTE — CONSULTS
Critical Care Consultation    Date of consult: 1/26/2024    Referring Physician  JANNETH Rader    Reason for Consultation  Tachycardia, shock, hypoxic respiratory failure and aspiration    History of Presenting Illness  78 y.o. 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 He presented back for AMS and evaluated in ER and was discharged. He presented the next day 1/4/2024 for hypoxia and was admitted to medicine. He was found to have silent aspiration, lauren, urinary retention and hypernatremia and dehydration. He had a an echo 12/29/2023 LVH EF 60% grade II daistolic dysfunction mild MR/TR. He has not had an MRI on the prior admission. He today 1/9/2024 had afib w/ RVR and worsening hypoxia as well as worsening mental status and code stroke was called. His BS was 190. In the CT scanner gurgling with poor airway reflexes. He was intubated and shortly after had PEA arrest. CT head and CTA/P was negative for acute stroke.  He is full code and confirmed prior to this event by daughters.      01/11-Trial of extubation. GOC discussion with his daughter Jaymie. We will re-intubate if extubation is not successful.  01/12- Requiring HFNC 45L/70% FiO  01/13- Reintubated, MRI/MRA ordered to look for explanation for ongoing aspiration.  1/15 -Vent day 3 of second course on ventilator, MRI without obvious abnormality to account for aspiration  1/16 -Vent day 4, failed SAT, tachypneic with increased end-tidal CO2 with SBT's, not following  1/17 -Vent day 5, failed SAT again, not following  1/18 -Vent day 6 vent, better with SAT/SBT on 50% not ready for liberation starting to follow for the first time  1/19 - Tx FOB on vent - copious thick secretions, LLL BAL sent  1/20 - Extubated to HFNC 40/70, following better  Left ICU for IMCU on 1/25    Called tonight to evaluate for shock and sudden tachycardia to 140-150's  He was given fluids and a dose of  neosynephrine and improved. He was gurgling and aspirating. He had recurrent shock and his CXR look horrible. He had another episode of shock so I moved him to the ICU. He was intubated and placed on norepinephrine and bronch.     Code Status  Full Code    Review of Systems  Review of Systems   Unable to perform ROS: Mental status change       Past Medical History   has no past medical history on file.    Surgical History   has no past surgical history on file.    Family History  family history is not on file.    Social History   reports that he has never smoked. He has never used smokeless tobacco. He reports current alcohol use. He reports that he does not use drugs.    Medications  Home Medications       Reviewed by Aria Snyder (Pharmacy Tech) on 01/04/24 at 1521  Med List Status: Complete     Medication Last Dose Status   acetaminophen (TYLENOL) 325 MG Tab UNK Active   amLODIPine (NORVASC) 10 MG Tab UNK Active   bisacodyl (DULCOLAX) 10 MG Suppos UNK Active   docusate sodium (COLACE) 100 MG Cap UNK Active   lisinopril (PRINIVIL) 40 MG tablet UNK Active   senna-docusate (PERICOLACE OR SENOKOT S) 8.6-50 MG Tab UNK Active                  Current Facility-Administered Medications   Medication Dose Route Frequency Provider Last Rate Last Admin    1/2 NS infusion   Intravenous Continuous Richardson Marcum D.O. 100 mL/hr at 01/26/24 2123 Rate Verify at 01/26/24 2123    NOREPINEPHRINE-SODIUM CHLORIDE 8-0.9 MG/250ML-% IV SOLN             norepinephrine (Levophed) 8 mg in 250 mL NS infusion (premix)  0-1 mcg/kg/min (Ideal) Intravenous Continuous Donnie Babin M.D. 16.4 mL/hr at 01/26/24 2227 0.12 mcg/kg/min at 01/26/24 2227    Respiratory Therapy Consult   Nebulization Continuous RT Donnie Babin M.D.        famotidine (Pepcid) tablet 20 mg  20 mg Enteral Tube Q12HRS Donnie Babin M.D.        Or    famotidine (Pepcid) injection 20 mg  20 mg Intravenous Q12HRS Donnie Babin M.D.         senna-docusate (Pericolace Or Senokot S) 8.6-50 MG per tablet 2 Tablet  2 Tablet Enteral Tube BID Donnie Babin M.D.        And    polyethylene glycol/lytes (Miralax) Packet 1 Packet  1 Packet Enteral Tube QDAY PRN Donnie Babin M.D.        And    magnesium hydroxide (Milk Of Magnesia) suspension 30 mL  30 mL Enteral Tube QDAY PRN Donnie Babin M.D.        And    bisacodyl (Dulcolax) suppository 10 mg  10 mg Rectal QDAY PRN Donnie Babin M.D. MD Alert...ICU Electrolyte Replacement per Pharmacy   Other PHARMACY TO DOSE Donnie Babin M.D.        lidocaine (Xylocaine) 1 % injection 2 mL  2 mL Tracheal Tube Q30 MIN PRN Donnie Babin M.D.        dexmedetomidine (PRECEDEX) 400 mcg/100mL NS premix infusion  0-1.5 mcg/kg/hr (Ideal) Intravenous Continuous Donnie Babin M.D.        fentaNYL (Sublimaze) injection 25 mcg  25 mcg Intravenous Q HOUR PRN Donnie Babin M.D.        Or    fentaNYL (Sublimaze) injection 50 mcg  50 mcg Intravenous Q HOUR PRN Donnie Babin M.D.        Or    fentaNYL (Sublimaze) injection 100 mcg  100 mcg Intravenous Q HOUR PRN Donnie Babin M.D.        piperacillin-tazobactam (Zosyn) 4.5 g in  mL IVPB  4.5 g Intravenous Once Donnie Babin M.D.        And    [START ON 1/27/2024] piperacillin-tazobactam (Zosyn) 4.5 g in  mL IVPB  4.5 g Intravenous Q8HRS Donnie Babin M.D.        Linezolid (Zyvox) premix 600 mg  600 mg Intravenous Q12HRS Donnie Babin M.D.        phenylephrine 40 mg/250 mL NS premix  0-5 mcg/kg/min (Ideal) Intravenous Continuous Donnie Babin M.D.        ipratropium-albuterol (DUONEB) nebulizer solution  3 mL Nebulization Q4HRS (RT) Dani Bautista D.O.   3 mL at 01/26/24 1802    sodium bicarbonate 8.4 % for inhalation 3 mL  3 mL Inhalation Q4HRS (RT) SANGEETA GroverOAllie   3 mL at 01/26/24 1802    acetaminophen (Tylenol) tablet 650 mg  650 mg Enteral Tube Q6HRS PRN Ag Dixon M.D.   650 mg at 01/24/24 2986     insulin regular (HumuLIN R,NovoLIN R) injection  2-9 Units Subcutaneous Q6HRS Ag Dixon M.D.   2 Units at 01/26/24 1206    And    dextrose 10 % BOLUS 25 g  25 g Intravenous Q15 MIN PRN Ag Dixon M.D.        hydrALAZINE (Apresoline) injection 10 mg  10 mg Intravenous Q6HRS PRN Manuel Antoine M.D.   10 mg at 01/22/24 0321    labetalol (Normodyne/Trandate) injection 20 mg  20 mg Intravenous Q4HRS PRN Manuel Antoine M.D.   20 mg at 01/24/24 0211    apixaban (Eliquis) tablet 5 mg  5 mg Enteral Tube BID Manuel Antoine M.D.   5 mg at 01/26/24 1852    Pharmacy Consult: Enteral tube insertion - review meds/change route/product selection  1 Each Other PHARMACY TO DOSE Rebecca Holman M.D.        haloperidol lactate (Haldol) injection 5 mg  5 mg Intravenous Q4HRS PRN GOYO QuintanillaP.MARYSE   5 mg at 01/16/24 1407       Allergies  No Known Allergies    Vital Signs last 24 hours  Temp:  [36.1 °C (97 °F)-37 °C (98.6 °F)] 37 °C (98.6 °F)  Pulse:  [] 145  Resp:  [17-82] 34  BP: ()/() 95/50  SpO2:  [86 %-100 %] 97 %    Physical Exam  Physical Exam  Vitals and nursing note reviewed.   Constitutional:       General: He is not in acute distress.     Appearance: He is ill-appearing.      Comments: Ill appearing and gurgling   HENT:      Mouth/Throat:      Mouth: Mucous membranes are dry.   Eyes:      Pupils: Pupils are equal, round, and reactive to light.   Cardiovascular:      Rate and Rhythm: Tachycardia present.      Heart sounds: No murmur heard.  Pulmonary:      Effort: Respiratory distress present.      Breath sounds: Rhonchi present.   Abdominal:      General: There is no distension.      Palpations: There is no mass.      Tenderness: There is no abdominal tenderness.      Hernia: No hernia is present.   Musculoskeletal:         General: No swelling or tenderness.   Skin:     Coloration: Skin is pale. Skin is not jaundiced.   Neurological:      Comments: He is awake moving all extremities not  following commands.          Fluids    Intake/Output Summary (Last 24 hours) at 1/26/2024 2235  Last data filed at 1/26/2024 2228  Gross per 24 hour   Intake 733.42 ml   Output 2000 ml   Net -1266.58 ml       Laboratory  Recent Results (from the past 48 hour(s))   POCT glucose device results    Collection Time: 01/24/24 11:17 PM   Result Value Ref Range    POC Glucose, Blood 180 (H) 65 - 99 mg/dL   CBC with Differential    Collection Time: 01/25/24  3:28 AM   Result Value Ref Range    WBC 12.0 (H) 4.8 - 10.8 K/uL    RBC 3.49 (L) 4.70 - 6.10 M/uL    Hemoglobin 10.3 (L) 14.0 - 18.0 g/dL    Hematocrit 32.9 (L) 42.0 - 52.0 %    MCV 94.3 81.4 - 97.8 fL    MCH 29.5 27.0 - 33.0 pg    MCHC 31.3 (L) 32.3 - 36.5 g/dL    RDW 45.1 35.9 - 50.0 fL    Platelet Count 673 (H) 164 - 446 K/uL    MPV 9.9 9.0 - 12.9 fL    Neutrophils-Polys 71.70 44.00 - 72.00 %    Lymphocytes 16.90 (L) 22.00 - 41.00 %    Monocytes 10.00 0.00 - 13.40 %    Eosinophils 0.00 0.00 - 6.90 %    Basophils 0.60 0.00 - 1.80 %    Immature Granulocytes 0.80 0.00 - 0.90 %    Nucleated RBC 0.00 0.00 - 0.20 /100 WBC    Neutrophils (Absolute) 8.58 (H) 1.82 - 7.42 K/uL    Lymphs (Absolute) 2.03 1.00 - 4.80 K/uL    Monos (Absolute) 1.20 (H) 0.00 - 0.85 K/uL    Eos (Absolute) 0.00 0.00 - 0.51 K/uL    Baso (Absolute) 0.07 0.00 - 0.12 K/uL    Immature Granulocytes (abs) 0.10 0.00 - 0.11 K/uL    NRBC (Absolute) 0.00 K/uL   Basic Metabolic Panel (BMP)    Collection Time: 01/25/24  3:28 AM   Result Value Ref Range    Sodium 144 135 - 145 mmol/L    Potassium 4.0 3.6 - 5.5 mmol/L    Chloride 105 96 - 112 mmol/L    Co2 29 20 - 33 mmol/L    Glucose 175 (H) 65 - 99 mg/dL    Bun 41 (H) 8 - 22 mg/dL    Creatinine 1.43 (H) 0.50 - 1.40 mg/dL    Calcium 9.5 8.5 - 10.5 mg/dL    Anion Gap 10.0 7.0 - 16.0   ESTIMATED GFR    Collection Time: 01/25/24  3:28 AM   Result Value Ref Range    GFR (CKD-EPI) 51 (A) >60 mL/min/1.73 m 2   POCT glucose device results    Collection Time: 01/25/24  5:31  AM   Result Value Ref Range    POC Glucose, Blood 164 (H) 65 - 99 mg/dL   POCT glucose device results    Collection Time: 01/25/24 11:41 AM   Result Value Ref Range    POC Glucose, Blood 183 (H) 65 - 99 mg/dL   POCT glucose device results    Collection Time: 01/25/24  6:23 PM   Result Value Ref Range    POC Glucose, Blood 190 (H) 65 - 99 mg/dL   POCT glucose device results    Collection Time: 01/26/24 12:08 AM   Result Value Ref Range    POC Glucose, Blood 168 (H) 65 - 99 mg/dL   CBC with Differential    Collection Time: 01/26/24  3:47 AM   Result Value Ref Range    WBC 14.6 (H) 4.8 - 10.8 K/uL    RBC 3.65 (L) 4.70 - 6.10 M/uL    Hemoglobin 10.8 (L) 14.0 - 18.0 g/dL    Hematocrit 34.5 (L) 42.0 - 52.0 %    MCV 94.5 81.4 - 97.8 fL    MCH 29.6 27.0 - 33.0 pg    MCHC 31.3 (L) 32.3 - 36.5 g/dL    RDW 46.0 35.9 - 50.0 fL    Platelet Count 747 (H) 164 - 446 K/uL    MPV 10.0 9.0 - 12.9 fL    Neutrophils-Polys 80.20 (H) 44.00 - 72.00 %    Lymphocytes 10.00 (L) 22.00 - 41.00 %    Monocytes 8.40 0.00 - 13.40 %    Eosinophils 0.00 0.00 - 6.90 %    Basophils 0.50 0.00 - 1.80 %    Immature Granulocytes 0.90 0.00 - 0.90 %    Nucleated RBC 0.00 0.00 - 0.20 /100 WBC    Neutrophils (Absolute) 11.72 (H) 1.82 - 7.42 K/uL    Lymphs (Absolute) 1.46 1.00 - 4.80 K/uL    Monos (Absolute) 1.22 (H) 0.00 - 0.85 K/uL    Eos (Absolute) 0.00 0.00 - 0.51 K/uL    Baso (Absolute) 0.07 0.00 - 0.12 K/uL    Immature Granulocytes (abs) 0.13 (H) 0.00 - 0.11 K/uL    NRBC (Absolute) 0.00 K/uL   Basic Metabolic Panel (BMP)    Collection Time: 01/26/24  3:47 AM   Result Value Ref Range    Sodium 151 (H) 135 - 145 mmol/L    Potassium 4.2 3.6 - 5.5 mmol/L    Chloride 111 96 - 112 mmol/L    Co2 33 20 - 33 mmol/L    Glucose 171 (H) 65 - 99 mg/dL    Bun 53 (H) 8 - 22 mg/dL    Creatinine 1.45 (H) 0.50 - 1.40 mg/dL    Calcium 9.8 8.5 - 10.5 mg/dL    Anion Gap 7.0 7.0 - 16.0   ESTIMATED GFR    Collection Time: 01/26/24  3:47 AM   Result Value Ref Range    GFR  (CKD-EPI) 50 (A) >60 mL/min/1.73 m 2   POCT glucose device results    Collection Time: 24  5:13 AM   Result Value Ref Range    POC Glucose, Blood 147 (H) 65 - 99 mg/dL   POCT glucose device results    Collection Time: 24 12:04 PM   Result Value Ref Range    POC Glucose, Blood 197 (H) 65 - 99 mg/dL   POCT glucose device results    Collection Time: 24  6:17 PM   Result Value Ref Range    POC Glucose, Blood 123 (H) 65 - 99 mg/dL   EKG    Collection Time: 24  8:34 PM   Result Value Ref Range    Report       [facility]    Test Date:  2024  Pt Name:    NADIR SIN                  Department: [facility]  MRN:        [id number]                      Room:       [facility]  Gender:     Male                         Technician: NOE  :        [id number]                   Requested By:FE DAWSON  Order #:    [id number]                    Reading MD:    Measurements  Intervals                                Axis  Rate:       144                          P:          0  NV:         76                           QRS:        32  QRSD:       88                           T:          46  QT:         299  QTc:        463    Interpretive Statements  Sinus tachycardia  ST depression, probably rate related  Compared to ECG 2024 15:07:26  ST (T wave) deviation now present  Atrial fibrillation no longer present  Intraventricular conduction delay no longer present     POCT glucose device results    Collection Time: 24  8:42 PM   Result Value Ref Range    POC Glucose, Blood 168 (H) 65 - 99 mg/dL       Imaging  DX-CHEST-LIMITED (1 VIEW)   Final Result         1.  Pulmonary edema and/or infiltrates are identified, which appear somewhat increased since the prior exam.   2.  Cardiomegaly   3.  Atherosclerosis      DX-ABDOMEN FOR TUBE PLACEMENT   Final Result         Feeding tube with tip projecting over the expected area of the distal stomach.      DX-ABDOMEN FOR TUBE PLACEMENT   Final Result      The  nasogastric tube terminates over the antrum of the stomach.      DX-CHEST-PORTABLE (1 VIEW)   Final Result         Ill-defined hazy opacities in the right mid lung and left lower lobe are similar to prior.      IR-MIDLINE CATHETER INSERTION WO GUIDANCE > AGE 3   Final Result                  Ultrasound-guided midline placement performed by qualified nursing staff    as above.          DX-CHEST-PORTABLE (1 VIEW)   Final Result      Improved interstitial opacities may represent improving edema and/or pneumonitis.      Mild nonspecific right basilar opacity and questionable tiny left upper lobe opacity. Correlate clinically for infection.      DX-CHEST-LIMITED (1 VIEW)   Final Result      1.  Slightly increased BILATERAL lung disease. This could be pulmonary edema or pneumonia.   2.  Possible small BILATERAL pleural effusions      DX-CHEST-PORTABLE (1 VIEW)   Final Result         1.  Mild pulmonary edema and/or infiltrates   2.  Atherosclerosis      DX-ABDOMEN FOR TUBE PLACEMENT   Final Result      NG tube tip projects over the stomach.      DX-ABDOMEN FOR TUBE PLACEMENT   Final Result      1.  Enteric tube projects over the proximal stomach.      DX-CHEST-PORTABLE (1 VIEW)   Final Result      Lines and tubes appear adequate. Stable bibasilar airspace disease.      DX-CHEST-PORTABLE (1 VIEW)   Final Result         1.  Pulmonary edema and/or infiltrates are identified, which appear somewhat increased since the prior exam.   2.  Cardiomegaly      DX-CHEST-PORTABLE (1 VIEW)   Final Result      1.  New small left pleural effusion with associated atelectasis.   2.  Increased right basilar opacity most likely atelectasis however correlate clinically for infection.      DX-CHEST-PORTABLE (1 VIEW)   Final Result         1.  Pulmonary edema and/or infiltrates are identified, which are stable since the prior exam.   2.  Cardiomegaly      DX-CHEST-PORTABLE (1 VIEW)   Final Result      1.  No significant change.      MR-MRA  HEAD-W/O   Final Result         MRA OF THE [place] OF [place] WITHIN NORMAL LIMITS.      MR-BRAIN-W/O   Final Result      1.  Redemonstrated is scattered supratentorial and infratentorial areas of ischemia. No new lesions. There is increased conspicuity of the RIGHT frontal lobe lesion which could be due to recurrent ischemia or seizure activity.   2.  Unchanged RIGHT cerebellar hematoma evacuation cavity. As previously noted underlying mass is not excluded and follow-up MRI without and with contrast in approximately weeks is recommended.   3.  No new hemorrhage   4.  Atrophy   5.  White matter changes   6.  Small BILATERAL mastoid effusions      DX-CHEST-PORTABLE (1 VIEW)   Final Result      DX-ABDOMEN FOR TUBE PLACEMENT   Final Result      Enteric feeding tube terminates in the left upper quadrant projecting over the expected location of the stomach.      DX-CHEST-LIMITED (1 VIEW)   Final Result      1.  Interval extubation   2.  Lower lung volumes with increased atelectasis superimposed on pulmonary edema or pneumonia      DX-CHEST-PORTABLE (1 VIEW)   Final Result         1.  Pulmonary edema and/or infiltrates are identified, which are somewhat decreased since the prior exam.   2.  Atherosclerosis      US-EXTREMITY VENOUS LOWER BILAT   Final Result      DX-CHEST-PORTABLE (1 VIEW)   Final Result         1.  Scattered hazy bilateral pulmonary infiltrates, slightly increased since prior study.   2.  Atherosclerosis      MR-BRAIN-W/O   Final Result      1.  A few punctate areas of acute infarcts in the right frontal and bilateral parietal lobes and left cerebellum.   2.  There is no brainstem infarct or diffuse anoxic injury.   3.  Mixed signal abnormality in the right cerebellum with the previous surgical intervention in keeping with the clinical diagnosis of right cerebellar hemorrhage evacuation. Follow-up study with contrast in 6 weeks is recommended to rule out any    underlying pathology.   4.  Mild cerebral  volume loss.      DX-CHEST-PORTABLE (1 VIEW)   Final Result      CT-CTA NECK WITH & W/O-POST PROCESSING   Final Result      1.  No acute arterial abnormality detected. No significant arterial stenosis.      CT-CTA HEAD WITH & W/O-POST PROCESS   Final Result      CT angiogram of the [place] within normal limits.      CT-CEREBRAL PERFUSION ANALYSIS   Final Result      1.  Cerebral blood flow less than 30% likely representing completed infarct = 0 mL.      2.  T Max more than 6 seconds likely representing combination of completed infarct and ischemia = 0 mL.      3.  Mismatched volume likely representing ischemic brain/penumbra = None      4.  Please note that the cerebral perfusion was performed on the limited brain tissue around the basal ganglia region. Infarct/ischemia outside the CT perfusion sections can be missed in this study.      CT-HEAD W/O   Final Result      1.  No evidence of acute territorial infarct, intracranial hemorrhage or mass lesion.   2.  Mild diffuse cerebral substance loss.   3.  Mild microangiopathic ischemic change versus demyelination or gliosis.   4.  Status post right suboccipital cranioplasty with underlying cerebellar hypoattenuation likely on the basis of evolving encephalomalacia.         DX-CHEST-PORTABLE (1 VIEW)   Final Result      1.  Intubation.   2.  Bilateral pulmonary infiltrates.      DX-CHEST-LIMITED (1 VIEW)   Final Result      DX-ABDOMEN FOR TUBE PLACEMENT   Final Result      Enteric feeding tube terminates with the tip projecting over the expected location of the distal stomach.      CT-CTA CHEST PULMONARY ARTERY W/ RECONS   Final Result         1.  No pulmonary embolus appreciated.   2.  Consolidations in bilateral lung bases an scattered hazy right pulmonary opacities, compatible with atelectasis with component of infiltrate.   3.  Indeterminate left adrenal nodule, follow-up adrenal protocol CT for further characterization as clinically appropriate.   4.   Atherosclerosis and atherosclerotic coronary artery disease.      DX-ABDOMEN FOR TUBE PLACEMENT   Final Result         1.  Nonspecific bowel gas pattern in the upper abdomen.   2.  Cardiomegaly   3.  Atherosclerosis   4.  Bibasilar atelectasis   5.  Nasogastric tube tip terminates overlying the expected location of the pylorus or first duodenal segment.      DX-ABDOMEN FOR TUBE PLACEMENT   Final Result         1.  Nonspecific bowel gas pattern in the upper abdomen.   2.  Nasogastric tube tip terminates overlying the expected location of the gastric antrum.   3.  Linear densities the bilateral lung bases favor atelectasis, component of infiltrate not excluded.      DX-ABDOMEN FOR TUBE PLACEMENT   Final Result      NG tube extends below the diaphragm with tip at the level of the gastric fundus. Side port is at the level of the distal esophagus.      DX-ABDOMEN FOR TUBE PLACEMENT   Final Result      NG tube is noted with tip projecting over the right lower lung.      These findings were transmitted with GLEN ZELAYA on 01/07/2024. Tube was subsequently adjusted and is now present below the diaphragm.      DX-ABDOMEN FOR TUBE PLACEMENT   Final Result      DHT tip overlies the second portion of the duodenum      DX-ABDOMEN FOR TUBE PLACEMENT   Final Result      Feeding tube tip projects in the distal stomach or first portion of the duodenum.      DX-ABDOMEN FOR TUBE PLACEMENT   Final Result      Removal of nasogastric tube.      Feeding tube tip projects in the distal stomach.      No visualized thorax.      DX-ABDOMEN FOR TUBE PLACEMENT   Final Result         Gastric drainage tube with tip projecting over the expected area of the right lower lobe airway. Recommend removal.            CRITICAL RESULT READ BACK: Preliminary findings discussed with and critical read back performed by Dr. GLEN ZELAYA via telephone on 1/5/2024 6:22 PM      DX-ABDOMEN FOR TUBE PLACEMENT   Final Result      1.  Malpositioned enteric sump  catheter which is folded back upon itself over the left paramedian distal one third mediastinum probably beyond the left mainstem bronchus and within the esophagus.   2.  A Voalte message was sent to GLEN ZELAYA at 1641 hours. Immediate response received.   3.  Per discussion, the patient has already pulled out the tube.      DX-CHEST-PORTABLE (1 VIEW)   Final Result      1.  Hypoinflation and pulmonary vascular congestion with mild bibasilar atelectasis.   2.  No lobar pneumonia or overt pulmonary edema.      CT-HEAD W/O   Final Result      1.  Diffuse atrophy and white matter changes.   2.  No acute intracranial hemorrhage or territorial infarct.   3.  RIGHT occipital craniotomy with underlying cerebellar encephalomalacia.         DX-ABDOMEN FOR TUBE PLACEMENT    (Results Pending)   DX-CHEST-PORTABLE (1 VIEW)    (Results Pending)   DX-CHEST-PORTABLE (1 VIEW)    (Results Pending)       Assessment/Plan  * Acute respiratory failure with hypoxia (HCC)- (present on admission)  Assessment & Plan  Intubated date: 1/9/2024-1/11, 1/13-1/20, 1/25-?  Goal saturation > 88%  Monitor ventilator waveforms and titrate flow/peep and volumes according.   Lung protective ventilation strategy w/ A-F bundle  CXR: monitor lung volumes and tube/line placement  VAP bundle prevention, oral care, post pyloric feeding  Head of bed > 30 degree  GI prophylaxis  Daily awakening and SBT trials unless contraindicated  Monitor for liberation  Respiratory treatments: prn    Will need to discuss trach if patient still want aggressive care and if so would do it immediately      Delirium  Assessment & Plan  Likely metabolic encephalopathy from ongoing aspiration   Non pharmacologic therapy for delirium.     Septic shock (HCC)  Assessment & Plan  This is Septic shock Not present on admission  SIRS criteria identified on my evaluation include: Tachycardia, with heart rate greater than 90 BPM and Tachypnea, with respirations greater than 20 per  minute  Clinical indicators of end organ dysfunction include Hypotension with decrease in systolic blood pressure of more than 40mmHg and Acute Respiratory Failure - (mechanical ventilation or BiPap or PaO2/FiO2 ratio < 300)  Indicators of septic shock include: Sepsis present and persistent hypotension despite fluid resuscitation   Sources is: aspiration pna  Sepsis protocol initiated  Crystalloid Fluid Administration: Fluid resuscitation ordered per standard protocol - 30 mL/kg per current or ideal body weight  IV antibiotics as appropriate for source of sepsis  Reassessment: I have reassessed the patient's hemodynamic status    Serial lactate and IVF  linezolid, zosyn, check viral swab, MRSA, blood x2, cortisol, BAL  Serial procal  Map > 65    Assessment & Plan  Shock state resolving  Maintain intravascular euvolemia  Titrate norepinephrine for map > 65        A-fib (HCC)  Assessment & Plan  LVH on 12/27 echo with diastolic dysfunction  Eliquis prophylaxis for CVA, no signs of bleeding Lul  Continue to optimize electrolytes, needs further potassium repletion  Continue cardiac monitoring    Encephalopathy  Assessment & Plan  Toxic metabolic encephalopathy ongoing due to ongoing aspiration   Hypercapnia resolved while on ventilator.  MRI does not reveal signs of anoxic brain injury or brainstem infarct that would explain a locked-in type syndrome.  1/20 extubated.   1/23 started valproic acid.   1/24 valproic acid was discontinued due to concern of drug2 interaction with eliquis    Still high risk of aspiration  Therapies-PT/OT  Swallow eval when clinically appropriate  On tube feed    Advance care planning  Assessment & Plan  Nora is his daughter and surrogate decision maker  Plan for reintubation if necessary now  No plan for tracheostomy per daughter  No feeding tube surgically per daughter  Extubated 1/20 again, pt is full code.   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)  Assessment & Plan  S/p evacuation on 12/27 by Dr Patten  SBP < 160  CT head stable no acute edema  MRI Noted  Normonatremia remains the goal    Aspiration pneumonia (HCC)- (present on admission)  Assessment & Plan  On going aspiration and resp failure section   Zosyn/Unasyn 10 day regimen completed 01/14   Tx FOB 1/19 with copious secretions, 1/25 with large amounts  BAL and bronc wash cultures from 1/19 revealing corynebacterium stratum, 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    The patient remains critically ill respiratory failure and septic shock with titration of ventilator and norepinephrine gtt.  Critical care time = 108 minutes in directly providing and coordinating critical care and extensive data review.  No time overlap and excludes procedures.

## 2024-01-27 NOTE — CARE PLAN
Problem: Bronchopulmonary Hygiene  Goal: Increase mobilization of retained secretions  Description: Target End Date:  2 to 3 days    1.  Perform bronchopulmonary therapy as indicated by assessment  2.  Perform airway suctioning  3.  Perform actions to maintain patient airway  1/27/2024 0106 by Jack Torres, RRT  Outcome: Not Met  1/27/2024 0105 by Jack Torres, RRT  Outcome: Not Met     Problem: Ventilation  Goal: Ability to achieve and maintain unassisted ventilation or tolerate decreased levels of ventilator support  Description: Target End Date:  4 days     Document on Vent flowsheet    1.  Support and monitor invasive and noninvasive mechanical ventilation  2.  Monitor ventilator weaning response  3.  Perform ventilator associated pneumonia prevention interventions  4.  Manage ventilation therapy by monitoring diagnostic test results  Outcome: Not Met   Vent day 1  7.5 ETT, 25 cm at the teeth  CMV- 16/440/+8, 50% FIO2  Sodium Bicarb and Duoneb Q4, IPV QID

## 2024-01-27 NOTE — ASSESSMENT & PLAN NOTE
This is Septic shock Not present on admission  SIRS criteria identified on my evaluation include: Tachycardia, with heart rate greater than 90 BPM and Tachypnea, with respirations greater than 20 per minute  Clinical indicators of end organ dysfunction include Hypotension with decrease in systolic blood pressure of more than 40mmHg and Acute Respiratory Failure - (mechanical ventilation or BiPap or PaO2/FiO2 ratio < 300)  Indicators of septic shock include: Sepsis present and persistent hypotension despite fluid resuscitation   Sources is: aspiration pna  Sepsis protocol initiated  Crystalloid Fluid Administration: Fluid resuscitation ordered per standard protocol - 30 mL/kg per current or ideal body weight  IV antibiotics as appropriate for source of sepsis  Reassessment: I have reassessed the patient's hemodynamic status    Resolved

## 2024-01-27 NOTE — PROGRESS NOTES
Critical Care Progress Note    Date of admission  1/4/2024    Chief Complaint  78 y.o. male admitted 1/4/2024 with acute hypoxic respiratory failure    Hospital Course  Mr. 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  Unfortunately, the patient was readmitted to [facility] on 1/4/2024 for altered mental status and hypoxia.  He was admitted to the medicine services with hypernatremia, dehydration, and ERWIN with urinary retention.  He was found to be silently aspirating.  On 1/9/2024, the patient went into AF with RVR with worsening hypoxia and mental status changes.  A code stroke was called.  While in the CT scanner the patient was not protecting his airway and therefore was intubated.  He had a PEA arrest shortly afterward.  He was transferred to the ICU.  The patient was extubated on 1/11/2024 and required HFNC at 45L at 70% on 1/12.  Unfortunately, the patient was reintubated on 1/13.  He remained intubated until 1/20/2024 and then extubated to HFNC at 40L/70%.  He was transferred to IMCU on 1/25.  On the evening of 1/26 the critical care team was reconsulted due to the patient becoming suddenly tachycardic in the 140-150s with hypotension.  He was given IVF and a dose of phenylephrine.  He was not protecting his airway and required emergent intubation and bronchoscopy then transferred back to the ICU.    Interval Problem Update  Reviewed last 24 hour events:   - s/p bronch with therapeutic suctioning of secretion and BAL   - RASS -1  - Precedex at 0.7   - SAT-->agitated and grabbed ETT   - not following commands   - moves x 4   - RASS of -1 to +2   - SR 60-70s   - -130s   - levo at 0.08   - afebrile   - right nare iris at 10cc/hr   - last BM on 1/23   - UOP of 900cc overnight, fernando   - right midline   - 1/2NS at 100cc/hr   - Na at 148   - VD2   - CXR(reviewed): perihilar fullness,  otherwsie clear   - no SBT   - apixiban   - day 2 of zosyn/linezold   - BAL pending from 1/26   - hydrocortisone   - BG in 290-360s   - day 2 of zosyn/vanco   - WBCs 20   - Hb 9.4   - platelets 754   - Na 149   - K 4.9   - creat 1.88   - Phos 3.4   - Mg 2.6    Review of Systems  Review of Systems   Unable to perform ROS: Intubated        Vital Signs for last 24 hours   Temp:  [36.3 °C (97.3 °F)-37 °C (98.6 °F)] 37 °C (98.6 °F)  Pulse:  [] 71  Resp:  [17-82] 17  BP: ()/() 115/58  SpO2:  [86 %-100 %] 100 %    Hemodynamic parameters for last 24 hours       Respiratory Information for the last 24 hours  Vent Mode: APVCMV  Rate (breaths/min): 16  Vt Target (mL): 440  PEEP/CPAP: 8  MAP: 11  Control VTE (exp VT): 468    Physical Exam   Physical Exam  Vitals and nursing note reviewed.   Constitutional:       Appearance: He is ill-appearing.   HENT:      Head: Normocephalic and atraumatic.      Right Ear: External ear normal.      Left Ear: External ear normal.      Nose: Nose normal. No rhinorrhea.      Mouth/Throat:      Mouth: Mucous membranes are moist.      Comments: ETT in place  Eyes:      General: No scleral icterus.     Conjunctiva/sclera: Conjunctivae normal.      Pupils: Pupils are equal, round, and reactive to light.   Cardiovascular:      Rate and Rhythm: Normal rate and regular rhythm.      Heart sounds: No murmur heard.  Pulmonary:      Breath sounds: No wheezing.      Comments: Breathing comfortably on the ventilator, slight coarse breath sounds  Chest:      Chest wall: No tenderness.   Abdominal:      Palpations: Abdomen is soft.      Tenderness: There is abdominal tenderness. There is no guarding or rebound.      Comments: ?tender right upper quadrant   Genitourinary:     Comments: Schwartz in place  Musculoskeletal:         General: Normal range of motion.      Cervical back: Normal range of motion and neck supple.      Right lower leg: No edema.      Left lower leg: No edema.    Lymphadenopathy:      Cervical: No cervical adenopathy.   Skin:     General: Skin is warm and dry.      Capillary Refill: Capillary refill takes 2 to 3 seconds.      Findings: No rash.   Neurological:      Mental Status: He is alert.      Cranial Nerves: No cranial nerve deficit.      Sensory: No sensory deficit.      Motor: No weakness.      Comments: Restless and agitated at time, moving all x 4   Psychiatric:      Comments: Unable to assess         Medications  Current Facility-Administered Medications   Medication Dose Route Frequency Provider Last Rate Last Admin    1/2 NS infusion   Intravenous Continuous Richardson Marcum D.O. 100 mL/hr at 01/27/24 0600 Rate Verify at 01/27/24 0600    NOREPINEPHRINE-SODIUM CHLORIDE 8-0.9 MG/250ML-% IV SOLN             norepinephrine (Levophed) 8 mg in 250 mL NS infusion (premix)  0-1 mcg/kg/min (Ideal) Intravenous Continuous Donnie Babin M.D. 16.4 mL/hr at 01/27/24 0639 0.12 mcg/kg/min at 01/27/24 0639    Respiratory Therapy Consult   Nebulization Continuous RT Donnie Babin M.D.        famotidine (Pepcid) tablet 20 mg  20 mg Enteral Tube Q12HRS Donnie Babin M.D.   20 mg at 01/27/24 0545    Or    famotidine (Pepcid) injection 20 mg  20 mg Intravenous Q12HRS Donnie Babin M.D.        senna-docusate (Pericolace Or Senokot S) 8.6-50 MG per tablet 2 Tablet  2 Tablet Enteral Tube BID Donnie Babin M.D.   2 Tablet at 01/27/24 0544    And    polyethylene glycol/lytes (Miralax) Packet 1 Packet  1 Packet Enteral Tube QDAY PRN Donnie Babin M.D.        And    magnesium hydroxide (Milk Of Magnesia) suspension 30 mL  30 mL Enteral Tube QDAY PRN Donnie Babin M.D.        And    bisacodyl (Dulcolax) suppository 10 mg  10 mg Rectal QDAY PRN Donnie Babin M.D.        MD Alert...ICU Electrolyte Replacement per Pharmacy   Other PHARMACY TO DOSE Donnie Babin M.D.        lidocaine (Xylocaine) 1 % injection 2 mL  2 mL Tracheal Tube Q30 MIN PRN Donnie  LAUREN Babin M.D.        dexmedetomidine (PRECEDEX) 400 mcg/100mL NS premix infusion  0-1.5 mcg/kg/hr (Ideal) Intravenous Continuous Donnie Babin M.D.   Stopped at 01/27/24 0540    fentaNYL (Sublimaze) injection 25 mcg  25 mcg Intravenous Q HOUR PRN Donnie Babin M.D.        Or    fentaNYL (Sublimaze) injection 50 mcg  50 mcg Intravenous Q HOUR PRN Donnie Babin M.D.        Or    fentaNYL (Sublimaze) injection 100 mcg  100 mcg Intravenous Q HOUR PRN Donnie Babin M.D.        piperacillin-tazobactam (Zosyn) 4.5 g in  mL IVPB  4.5 g Intravenous Q8HRS Donnie Babin M.D.   Stopped at 01/27/24 0643    Linezolid (Zyvox) premix 600 mg  600 mg Intravenous Q12HRS Donnie Babin M.D.   Stopped at 01/27/24 0642    phenylephrine 40 mg/250 mL NS premix  0-5 mcg/kg/min (Ideal) Intravenous Continuous Donnie Babin M.D.   Stopped at 01/27/24 0016    PHENYLEPHRINE HCL-NACL 1-0.9 MG/10ML-% IV SOSY             hydrocortisone sodium succinate PF (Solu-CORTEF) 100 MG injection 50 mg  50 mg Intravenous Q6HRS Donnie Babin M.D.   50 mg at 01/27/24 0545    vasopressin (Vasostrict) 20 Units in  mL Infusion  0.03 Units/min Intravenous Continuous Dez Wen M.D.   Stopped at 01/27/24 0200    ipratropium-albuterol (DUONEB) nebulizer solution  3 mL Nebulization Q4HRS (RT) SANGEETA GroverOAllie   3 mL at 01/27/24 0325    sodium bicarbonate 8.4 % for inhalation 3 mL  3 mL Inhalation Q4HRS (RT) SANGEETA GroverOAllie   3 mL at 01/27/24 0324    acetaminophen (Tylenol) tablet 650 mg  650 mg Enteral Tube Q6HRS PRN Ag Dixon M.D.   650 mg at 01/24/24 2350    insulin regular (HumuLIN R,NovoLIN R) injection  2-9 Units Subcutaneous Q6HRS Ag Dixon M.D.   6 Units at 01/27/24 0601    And    dextrose 10 % BOLUS 25 g  25 g Intravenous Q15 MIN PRN Ag Dixon M.D.        hydrALAZINE (Apresoline) injection 10 mg  10 mg Intravenous Q6HRS PRN Manuel Antoine M.D.   10 mg at 01/22/24 6433     labetalol (Normodyne/Trandate) injection 20 mg  20 mg Intravenous Q4HRS PRN Manuel Antoine M.D.   20 mg at 01/24/24 0211    apixaban (Eliquis) tablet 5 mg  5 mg Enteral Tube BID Manuel Antoine M.D.   5 mg at 01/27/24 0600    Pharmacy Consult: Enteral tube insertion - review meds/change route/product selection  1 Each Other PHARMACY TO DOSE Rebecca Holman M.D.        haloperidol lactate (Haldol) injection 5 mg  5 mg Intravenous Q4HRS PRN Agapito Bonilla A.P.R.NAllie   5 mg at 01/16/24 1407       Fluids    Intake/Output Summary (Last 24 hours) at 1/27/2024 0720  Last data filed at 1/27/2024 0600  Gross per 24 hour   Intake 2669.86 ml   Output 1000 ml   Net 1669.86 ml       Laboratory  Recent Labs     01/26/24 2231 01/27/24  0319   ISTATAPH 7.500 7.444   ISTATAPCO2 33.8 44.0*   ISTATAPO2 53* 123*   ISTATATCO2 27 31   OENHIHG9FPZ 90* 99   ISTATARTHCO3 26.4* 30.2*   ISTATARTBE 3 6*   ISTATTEMP 98.5 F 98.4 F   ISTATFIO2 50 50   ISTATSPEC Arterial Arterial   ISTATAPHTC 7.501* 7.446   BVBDYSIV9MW 53* 123*         Recent Labs     01/26/24 0347 01/26/24 2257 01/27/24  0558   SODIUM 151* 152* 149*   POTASSIUM 4.2 4.6 4.9   CHLORIDE 111 113* 114*   CO2 33 26 26   BUN 53* 59* 60*   CREATININE 1.45* 1.74* 1.88*   MAGNESIUM  --  2.6* 2.6*   PHOSPHORUS  --   --  3.4   CALCIUM 9.8 9.7 8.5     Recent Labs     01/26/24 0347 01/26/24 2257 01/27/24  0558   ALTSGPT  --  88*  --    ASTSGOT  --  49*  --    ALKPHOSPHAT  --  165*  --    TBILIRUBIN  --  0.6  --    GLUCOSE 171* 198* 365*     Recent Labs     01/26/24 0347 01/26/24 2257 01/27/24  0558   WBC 14.6* 22.9* 20.3*   NEUTSPOLYS 80.20* 83.20* 87.60*   LYMPHOCYTES 10.00* 8.50* 7.40*   MONOCYTES 8.40 6.70 3.70   EOSINOPHILS 0.00 0.50 0.20   BASOPHILS 0.50 0.40 0.30   ASTSGOT  --  49*  --    ALTSGPT  --  88*  --    ALKPHOSPHAT  --  165*  --    TBILIRUBIN  --  0.6  --      Recent Labs     01/26/24 0347 01/26/24 2257 01/27/24  0558   RBC 3.65* 4.10* 3.16*   HEMOGLOBIN 10.8* 12.1* 9.4*    HEMATOCRIT 34.5* 40.0* 31.6*   PLATELETCT 747* 943* 754*       Imaging  MRI brain 1/14:  1.  Redemonstrated is scattered supratentorial and infratentorial areas of ischemia. No new lesions. There is increased conspicuity of the RIGHT frontal lobe lesion which could be due to recurrent ischemia or seizure activity.  2.  Unchanged RIGHT cerebellar hematoma evacuation cavity. As previously noted underlying mass is not excluded and follow-up MRI without and with contrast in approximately weeks is recommended.  3.  No new hemorrhage  4.  Atrophy  5.  White matter changes  6.  Small BILATERAL mastoid effusions    Assessment/Plan  * Acute respiratory failure with hypoxia (HCC)- (present on admission)  Assessment & Plan  Intubated date: 1/9/2024-1/11, 1/13-1/20, 1/25-?  Goal saturation > 90%  Monitor ventilator waveforms and titrate flow/peep and volumes according.   Lung protective ventilation strategy w/ A-F bundle  CXR: monitor lung volumes and tube/line placement  VAP bundle prevention, oral care, post pyloric feeding  Head of bed > 30 degree  GI prophylaxis  Daily awakening and SBT trials unless contraindicated  Monitor for liberation  Respiratory treatments: prn  Will need to discuss trach if patient still want aggressive care      Delirium  Assessment & Plan  Likely metabolic encephalopathy from ongoing aspiration   Non pharmacologic therapy for delirium     Septic shock (HCC)  Assessment & Plan  This is Septic shock Not present on admission  SIRS criteria identified on my evaluation include: Tachycardia, with heart rate greater than 90 BPM and Tachypnea, with respirations greater than 20 per minute  Clinical indicators of end organ dysfunction include Hypotension with decrease in systolic blood pressure of more than 40mmHg and Acute Respiratory Failure - (mechanical ventilation or BiPap or PaO2/FiO2 ratio < 300)  Indicators of septic shock include: Sepsis present and persistent hypotension despite fluid  resuscitation   Sources is: aspiration pna  Sepsis protocol initiated  Crystalloid Fluid Administration: Fluid resuscitation ordered per standard protocol - 30 mL/kg per current or ideal body weight  IV antibiotics as appropriate for source of sepsis  Reassessment: I have reassessed the patient's hemodynamic status    Serial lactate and IVF  linezolid, zosyn, check viral swab, MRSA, blood x2, BAL  Serial procal  Map > 65  I am actively titrating levophed for MAP goals  Cortisol 24-->stop stress dose steroids    Assessment & Plan  Shock state resolving  Maintain intravascular euvolemia  Titrate norepinephrine for map > 65        A-fib (HCC)  Assessment & Plan  LVH on 12/27 echo with diastolic dysfunction  Eliquis prophylaxis for CVA  Continue to optimize electrolytes, needs further potassium repletion  Continue cardiac monitoring.    Encephalopathy  Assessment & Plan  Toxic metabolic encephalopathy ongoing due to ongoing aspiration   Hypercapnia resolved while on ventilator.  MRI does not reveal signs of anoxic brain injury or brainstem infarct that would explain a locked-in type syndrome.  1/20 extubated.   1/23 started valproic acid.   1/24 valproic acid was discontinued due to concern of drug interaction with eliquis  Still high risk of aspiration  Limit sedation  ?need for seroquel  ?need for repeat brain imaging  PT/OT when able    Advance care planning  Assessment & Plan  Nora is his daughter and surrogate decision maker  Plan for reintubation if necessary now  No plan for tracheostomy per daughter  No feeding tube surgically per daughter  Extubated 1/20 again, pt is full code.   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)  Assessment & Plan  S/p evacuation on 12/27 by Dr Patten  SBP < 160  CT head stable no acute edema  MRI reviewed from 1/14  Normonatremia remains the goal    Aspiration pneumonia (HCC)- (present on admission)  Assessment & Plan  Ongoing aspiration    Zosyn/Unasyn 10 day regimen completed 01/14   Tx FOB 1/19 with copious secretions, 1/25 with large amounts  BAL and bronc wash cultures from 1/19 revealing corynebacterium stratum, 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 In review of yesterday's note (1/26/2024), there are no changes except as documented above.     Discussed patient condition and risk of morbidity and/or mortality with RN, RT, Pharmacy, [employer] resident, and Charge nurse / hot rounds    The patient remains critically ill.  I have assessed and reassessed the respiratory status and made ventilator adjustments based upon arterial blood gas analysis, ventilator waveforms and airway mechanics.  I have assessed and reassessed the blood pressure, hemodynamics, cardiovascular status. This patient remains at high risk for worsening cardiopulmonary dysfunction and death without the above critical care interventions.    Critical care time = 106 minutes in directly providing and coordinating critical care and extensive data review.  No time overlap and excludes procedures.

## 2024-01-27 NOTE — PROGRESS NOTES
Dr. Babin at bedside for emergent  intubation.     2136 14mg of Etomidate given   2136 75 mg of rocuronium given  2136 /97   2137 7.5 ETT at  23 cm at the teeth  positive color change noted    2139 Timeout for bronchoscopy procedure   2139 Levophed at 0.4  2139 ETT now at 25cm   2143 /100 Levo decreased to 0.2   2145- /96 Levo stopped   2146 procedure stopped. Patient being transferred to [facility]

## 2024-01-28 NOTE — PROGRESS NOTES
Critical Care Progress Note    Date of admission  1/4/2024    Chief Complaint  78 y.o. male admitted 1/4/2024 with acute hypoxic respiratory failure    Hospital Course  Mr. 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  Unfortunately, the patient was readmitted to [facility] on 1/4/2024 for altered mental status and hypoxia.  He was admitted to the medicine services with hypernatremia, dehydration, and ERWIN with urinary retention.  He was found to be silently aspirating.  On 1/9/2024, the patient went into AF with RVR with worsening hypoxia and mental status changes.  A code stroke was called.  While in the CT scanner the patient was not protecting his airway and therefore was intubated.  He had a PEA arrest shortly afterward.  He was transferred to the ICU.  The patient was extubated on 1/11/2024 and required HFNC at 45L at 70% on 1/12.  Unfortunately, the patient was reintubated on 1/13.  He remained intubated until 1/20/2024 and then extubated to HFNC at 40L/70%.  He was transferred to IMCU on 1/25.  On the evening of 1/26 the critical care team was reconsulted due to the patient becoming suddenly tachycardic in the 140-150s with hypotension.  He was given IVF and a dose of phenylephrine.  He was not protecting his airway and required emergent intubation and bronchoscopy then transferred back to the ICU.  1/27 - VD2, agitated with SAT, levo infusing, trial SBT, creat 1.88, Na 149-->1/2 NS infusing    Interval Problem Update  Reviewed last 24 hour events:   - no events overnight   - agitated during SAT-->ativan and fentanyl given   - not following   - SB 40-50s   - -180s   - levo at 0.04   - Precedex at 1   - TFs at goal   - UOP of 600cc overnight, Schwartz   - BM prior to intubation   - attempt edge of bed today   - VD3   - CXR(reviewed): interstitial changes noted and bilateral perihilar  fullness   - failed SBT due to apnea   - IPV/bicarb   - scant blood suctioned out   - Apixiban   - MRSA is negative   - BAL: many GNR   - WBCs 13   - Hb 8.9   - -170s   - Na 147   - K 3.1   - creat 1.64   - Phos 2.4   - day 2 of linezolid and zosyn, stop linezolid    Yesterday's Events:   - s/p bronch with therapeutic suctioning of secretion and BAL   - RASS -1  - Precedex at 0.7   - SAT-->agitated and grabbed ETT   - not following commands   - moves x 4   - RASS of -1 to +2   - SR 60-70s   - -130s   - levo at 0.08   - afebrile   - right nare iris at 10cc/hr   - last BM on 1/23   - UOP of 900cc overnight, fernando   - right midline   - 1/2NS at 100cc/hr   - Na at 148   - VD2   - CXR(reviewed): perihilar fullness, otherwsie clear   - no SBT   - apixiban   - day 2 of zosyn/linezold   - BAL pending from 1/26   - hydrocortisone-->stopped   - BG in 290-360s   - day 2 of zosyn/vanco   - WBCs 20   - Hb 9.4   - platelets 754   - Na 149   - K 4.9   - creat 1.88   - Phos 3.4   - Mg 2.6    Review of Systems  Review of Systems   Unable to perform ROS: Intubated        Vital Signs for last 24 hours   Temp:  [36.1 °C (97 °F)-36.4 °C (97.5 °F)] 36.3 °C (97.4 °F)  Pulse:  [53-69] 54  Resp:  [14-47] 16  BP: ()/(38-88) 159/69  SpO2:  [92 %-100 %] 100 %    Hemodynamic parameters for last 24 hours       Respiratory Information for the last 24 hours  Vent Mode: APVCMV  Rate (breaths/min): 16  Vt Target (mL): 440  PEEP/CPAP: 8  P Support: 5  MAP: 11  Control VTE (exp VT): 434    Physical Exam   Physical Exam  Vitals and nursing note reviewed.   Constitutional:       General: He is not in acute distress.     Appearance: He is ill-appearing. He is not toxic-appearing.   HENT:      Head: Normocephalic and atraumatic.      Right Ear: External ear normal.      Left Ear: External ear normal.      Nose: Nose normal. No rhinorrhea.      Mouth/Throat:      Mouth: Mucous membranes are moist.      Comments: ETT in place  Eyes:       General: No scleral icterus.     Conjunctiva/sclera: Conjunctivae normal.      Pupils: Pupils are equal, round, and reactive to light.   Cardiovascular:      Rate and Rhythm: Normal rate and regular rhythm.      Heart sounds: No murmur heard.  Pulmonary:      Breath sounds: No wheezing.      Comments: Breathing comfortably on the ventilator, clear   Chest:      Chest wall: No tenderness.   Abdominal:      Palpations: Abdomen is soft.      Tenderness: There is no abdominal tenderness. There is no guarding or rebound.   Genitourinary:     Comments: Schwartz in place  Musculoskeletal:         General: Normal range of motion.      Cervical back: Normal range of motion and neck supple.      Right lower leg: No edema.      Left lower leg: No edema.   Lymphadenopathy:      Cervical: No cervical adenopathy.   Skin:     General: Skin is warm and dry.      Capillary Refill: Capillary refill takes 2 to 3 seconds.      Findings: No rash.   Neurological:      Mental Status: He is alert.      Cranial Nerves: No cranial nerve deficit.      Sensory: No sensory deficit.      Motor: No weakness.      Comments: Restless and agitated at time, moving all x 4, not following commands   Psychiatric:      Comments: Unable to assess         Medications  Current Facility-Administered Medications   Medication Dose Route Frequency Provider Last Rate Last Admin    potassium chloride (Kcl) ivpb 10 mEq  10 mEq Intravenous Q HOUR Adwoa Adams D.O.   Stopped at 01/28/24 0759    midodrine (Proamatine) tablet 10 mg  10 mg Enteral Tube Q8HRS Allison Moura M.D.        QUEtiapine (SEROquel) tablet 50 mg  50 mg Enteral Tube Q8HRS Allison Moura M.D.        famotidine (Pepcid) tablet 20 mg  20 mg Enteral Tube DAILY Allison Moura M.D.   20 mg at 01/28/24 0518    Or    famotidine (Pepcid) injection 20 mg  20 mg Intravenous DAILY Allison Moura M.D.        insulin GLARGINE (Lantus,Semglee) injection  10 Units Subcutaneous QAM INSULIN Allison SHARPE  JANNETH Moura   10 Units at 01/28/24 0555    1/2 NS infusion   Intravenous Continuous Richardson Marcum D.O.   Stopped at 01/28/24 0446    norepinephrine (Levophed) 8 mg in 250 mL NS infusion (premix)  0-1 mcg/kg/min (Ideal) Intravenous Continuous Donnie Bbain M.D. 5.5 mL/hr at 01/28/24 0335 0.04 mcg/kg/min at 01/28/24 0335    Respiratory Therapy Consult   Nebulization Continuous RT Donnie Babin M.D.        senna-docusate (Pericolace Or Senokot S) 8.6-50 MG per tablet 2 Tablet  2 Tablet Enteral Tube BID Donnie Babin M.D.   2 Tablet at 01/28/24 0518    And    polyethylene glycol/lytes (Miralax) Packet 1 Packet  1 Packet Enteral Tube QDAY PRN Donnie Babin M.D.   1 Packet at 01/27/24 1715    And    magnesium hydroxide (Milk Of Magnesia) suspension 30 mL  30 mL Enteral Tube QDAY PRN Donnie Babin M.D.        And    bisacodyl (Dulcolax) suppository 10 mg  10 mg Rectal QDAY PRN Donnie Babin M.D. MD Alert...ICU Electrolyte Replacement per Pharmacy   Other PHARMACY TO DOSE Donnie Babin M.D.        lidocaine (Xylocaine) 1 % injection 2 mL  2 mL Tracheal Tube Q30 MIN PRN Donnie Babin M.D.        dexmedetomidine (PRECEDEX) 400 mcg/100mL NS premix infusion  0-1.5 mcg/kg/hr (Ideal) Intravenous Continuous Donnie Babin M.D. 18.3 mL/hr at 01/28/24 0709 1 mcg/kg/hr at 01/28/24 0709    fentaNYL (Sublimaze) injection 25 mcg  25 mcg Intravenous Q HOUR PRN Donnie Babin M.D.        Or    fentaNYL (Sublimaze) injection 50 mcg  50 mcg Intravenous Q HOUR PRN Donnie Babin M.D.        Or    fentaNYL (Sublimaze) injection 100 mcg  100 mcg Intravenous Q HOUR PRN Donnie Babin M.D.   100 mcg at 01/28/24 0400    piperacillin-tazobactam (Zosyn) 4.5 g in  mL IVPB  4.5 g Intravenous Q8HRS Donnie Babin M.D. 25 mL/hr at 01/28/24 0522 4.5 g at 01/28/24 0522    Linezolid (Zyvox) premix 600 mg  600 mg Intravenous Q12HRS Donnie Babin M.D.   Stopped at 01/27/24 1825     phenylephrine 40 mg/250 mL NS premix  0-5 mcg/kg/min (Ideal) Intravenous Continuous Donnie Babin M.D.   Stopped at 01/27/24 0016    vasopressin (Vasostrict) 20 Units in  mL Infusion  0.03 Units/min Intravenous Continuous Dez Wen M.D.   Stopped at 01/27/24 0200    ipratropium-albuterol (DUONEB) nebulizer solution  3 mL Nebulization Q4HRS (RT) SANGEETA GroverO.   3 mL at 01/28/24 0314    sodium bicarbonate 8.4 % for inhalation 3 mL  3 mL Inhalation Q4HRS (RT) SANGEETA GroverO.   3 mL at 01/28/24 0314    acetaminophen (Tylenol) tablet 650 mg  650 mg Enteral Tube Q6HRS PRN Ag Dixon M.D.   650 mg at 01/24/24 2350    insulin regular (HumuLIN R,NovoLIN R) injection  2-9 Units Subcutaneous Q6HRS Ag Dixon M.D.   2 Units at 01/28/24 0555    And    dextrose 10 % BOLUS 25 g  25 g Intravenous Q15 MIN PRN Ag Dixon M.D.        hydrALAZINE (Apresoline) injection 10 mg  10 mg Intravenous Q6HRS PRN Manuel Antoine M.D.   10 mg at 01/22/24 0321    labetalol (Normodyne/Trandate) injection 20 mg  20 mg Intravenous Q4HRS PRN Manuel Antoine M.D.   20 mg at 01/24/24 0211    apixaban (Eliquis) tablet 5 mg  5 mg Enteral Tube BID Manuel Antoine M.D.   5 mg at 01/28/24 0600    Pharmacy Consult: Enteral tube insertion - review meds/change route/product selection  1 Each Other PHARMACY TO DOSE Rebecca Holman M.D.        haloperidol lactate (Haldol) injection 5 mg  5 mg Intravenous Q4HRS PRN Agapito Bonilla A.P.R.NAllie   5 mg at 01/28/24 0213       Fluids    Intake/Output Summary (Last 24 hours) at 1/28/2024 0710  Last data filed at 1/28/2024 0534  Gross per 24 hour   Intake 40929.49 ml   Output 1625 ml   Net 44268.49 ml         Laboratory  Recent Labs     01/26/24  2231 01/27/24  0319 01/28/24  0412   ISTATAPH 7.500 7.444 7.424   ISTATAPCO2 33.8 44.0* 39.6*   ISTATAPO2 53* 123* 86   ISTATATCO2 27 31 27   [id number] 90* 99 97   ISTATARTHCO3 26.4* 30.2* 25.9*   ISTATARTBE 3 6* 1   ISTATTEMP 98.5  F 98.4 F 95.7 F   ISTATFIO2 50 50 30   ISTATSPEC Arterial Arterial Arterial   ISTATAPHTC 7.501* 7.446 7.448   [id number] 53* 123* 78           Recent Labs     01/26/24 2257 01/27/24 0558 01/28/24  0147   SODIUM 152* 149* 147*   POTASSIUM 4.6 4.9 3.1*   CHLORIDE 113* 114* 112   CO2 26 26 27   BUN 59* 60* 46*   CREATININE 1.74* 1.88* 1.64*   MAGNESIUM 2.6* 2.6* 2.4   PHOSPHORUS  --  3.4 2.4*   CALCIUM 9.7 8.5 8.7       Recent Labs     01/26/24 2257 01/27/24 0558 01/28/24 0147   ALTSGPT 88*  --   --    ASTSGOT 49*  --   --    ALKPHOSPHAT 165*  --   --    TBILIRUBIN 0.6  --   --    GLUCOSE 198* 365* 159*       Recent Labs     01/26/24 2257 01/27/24 0558 01/28/24 0147   WBC 22.9* 20.3* 13.1*   NEUTSPOLYS 83.20* 87.60* 78.90*   LYMPHOCYTES 8.50* 7.40* 13.80*   MONOCYTES 6.70 3.70 6.00   EOSINOPHILS 0.50 0.20 0.00   BASOPHILS 0.40 0.30 0.40   ASTSGOT 49*  --   --    ALTSGPT 88*  --   --    ALKPHOSPHAT 165*  --   --    TBILIRUBIN 0.6  --   --        Recent Labs     01/26/24 2257 01/27/24 0558 01/28/24  0147   RBC 4.10* 3.16* 2.97*   HEMOGLOBIN 12.1* 9.4* 8.9*   HEMATOCRIT 40.0* 31.6* 28.4*   PLATELETCT 943* 754* 581*         Imaging  MRI brain 1/14:  1.  Redemonstrated is scattered supratentorial and infratentorial areas of ischemia. No new lesions. There is increased conspicuity of the RIGHT frontal lobe lesion which could be due to recurrent ischemia or seizure activity.  2.  Unchanged RIGHT cerebellar hematoma evacuation cavity. As previously noted underlying mass is not excluded and follow-up MRI without and with contrast in approximately weeks is recommended.  3.  No new hemorrhage  4.  Atrophy  5.  White matter changes  6.  Small BILATERAL mastoid effusions    Assessment/Plan  * Acute respiratory failure with hypoxia (HCC)- (present on admission)  Assessment & Plan  Intubated date: 1/9/2024-1/11, 1/13-1/20, 1/25-?  Goal saturation > 90%  Monitor ventilator waveforms and titrate flow/peep and volumes  according.   Lung protective ventilation strategy w/ A-F bundle  CXR: monitor lung volumes and tube/line placement  VAP bundle prevention, oral care, post pyloric feeding  Head of bed > 30 degree  GI prophylaxis  Respiratory treatments: prn  SAT/SBT ongoing, but not following for liberation  Start Seroquel 50mg every 8 hours for agitation  Will need to readdress possible tracheostomy with daughter if unable to be liberated      Delirium  Assessment & Plan  Likely metabolic encephalopathy from ongoing aspiration   Non pharmacologic therapy for delirium   Trial of Seroquel 50mg every 8 hours started today    Septic shock (HCC)  Assessment & Plan  This is Septic shock Not present on admission  SIRS criteria identified on my evaluation include: Tachycardia, with heart rate greater than 90 BPM and Tachypnea, with respirations greater than 20 per minute  Clinical indicators of end organ dysfunction include Hypotension with decrease in systolic blood pressure of more than 40mmHg and Acute Respiratory Failure - (mechanical ventilation or BiPap or PaO2/FiO2 ratio < 300)  Indicators of septic shock include: Sepsis present and persistent hypotension despite fluid resuscitation   Sources is: aspiration pna  Sepsis protocol initiated  Crystalloid Fluid Administration: Fluid resuscitation ordered per standard protocol - 30 mL/kg per current or ideal body weight  IV antibiotics as appropriate for source of sepsis  Reassessment: I have reassessed the patient's hemodynamic status    Serial lactate and IVF  linezolid, zosyn, check viral swab, MRSA, blood x2, BAL  Serial procal  Map > 65  I am actively titrating levophed for MAP goals  Repeat cortisol  Start midodrine 10mg every 8 hours    Assessment & Plan  Shock state resolving  Maintain intravascular euvolemia  Titrate norepinephrine for map > 65        A-fib (HCC)  Assessment & Plan  LVH on 12/27 echo with diastolic dysfunction  Eliquis prophylaxis for CVA  Continue to optimize  electrolytes, needs further potassium repletion  Continue cardiac monitoring.    Encephalopathy  Assessment & Plan  Toxic metabolic encephalopathy ongoing due to ongoing aspiration   Hypercapnia resolved while on ventilator.  MRI does not reveal signs of anoxic brain injury or brainstem infarct that would explain a locked-in type syndrome.  1/20 extubated.   1/23 started valproic acid.   1/24 valproic acid was discontinued due to concern of drug interaction with eliquis  Still high risk of aspiration  Limit sedation  Trial of seroquel 50mg every 8 hours   ?need for repeat brain imaging  PT/OT when able    Advance care planning  Assessment & Plan  Nora is his daughter and surrogate decision maker  Plan for reintubation if necessary now  No plan for tracheostomy per daughter  No feeding tube surgically per daughter  Extubated 1/20 again, pt is full code.   If appears to need reintubation would converse with daughter prior to reintubating unless urgently necessary  Palliative care consultation follow-up next week  Will need to discuss goals of care again and whether tracheostomy is next step    Urinary retention  Assessment & Plan  Schwartz catheter    High anion gap metabolic acidosis  Assessment & Plan  Resolved     Hypernatremia- (present on admission)  Assessment & Plan  Improving with 1/2NS infusion  Serial BMP    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)  Assessment & Plan  S/p evacuation on 12/27 by Dr Patten  SBP < 160  CT head stable no acute edema  MRI reviewed from 1/14  Normonatremia remains the goal    Aspiration pneumonia (HCC)- (present on admission)  Assessment & Plan  Ongoing aspiration    Zosyn/Unasyn 10 day regimen completed 01/14   Tx FOB 1/19 with copious secretions, 1/25 with large amounts  BAL and bronc wash cultures from 1/19 revealing corynebacterium stratum, 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 In review of yesterday's note (1/27/2024), there are no changes except as documented above.     Discussed patient condition and risk of morbidity and/or mortality with RN, RT, Pharmacy, [employer] resident, and Charge nurse / hot rounds    The patient remains critically ill.  I have assessed and reassessed the respiratory status and made ventilator adjustments based upon arterial blood gas analysis, ventilator waveforms and airway mechanics.  I have assessed and reassessed the blood pressure, hemodynamics, cardiovascular status. This patient remains at high risk for worsening cardiopulmonary dysfunction and death without the above critical care interventions.    Critical care time = 108 minutes in directly providing and coordinating critical care and extensive data review.  No time overlap and excludes procedures.

## 2024-01-28 NOTE — CARE PLAN
Problem: Bronchopulmonary Hygiene  Goal: Increase mobilization of retained secretions  Description: Target End Date:  2 to 3 days    1.  Perform bronchopulmonary therapy as indicated by assessment  2.  Perform airway suctioning  3.  Perform actions to maintain patient airway  Outcome: Progressing     Problem: Ventilation  Goal: Ability to achieve and maintain unassisted ventilation or tolerate decreased levels of ventilator support  Description: Target End Date:  4 days     Document on Vent flowsheet    1.  Support and monitor invasive and noninvasive mechanical ventilation  2.  Monitor ventilator weaning response  3.  Perform ventilator associated pneumonia prevention interventions  4.  Manage ventilation therapy by monitoring diagnostic test results  Outcome: Progressing

## 2024-01-28 NOTE — CARE PLAN
The patient is Watcher - Medium risk of patient condition declining or worsening    Shift Goals  Clinical Goals: RASS +1 to -1, mobility, titrate down vasopressors  Patient Goals: DULCE MARIA  Family Goals: DULCE MARIA    Progress made toward(s) clinical / shift goals:    Problem: Hemodynamics  Goal: Patient's hemodynamics, fluid balance and neurologic status will be stable or improve  Outcome: Progressing     Problem: Fluid Volume  Goal: Fluid volume balance will be maintained  Outcome: Progressing     Problem: Skin Integrity  Goal: Skin integrity is maintained or improved  Outcome: Progressing     Problem: Safety - Medical Restraint  Goal: Remains free of injury from restraints (Restraint for Interference with Medical Device)  Outcome: Progressing     Problem: Pain - Standard  Goal: Alleviation of pain or a reduction in pain to the patient’s comfort goal  Outcome: Progressing       Patient is not progressing towards the following goals:

## 2024-01-28 NOTE — PROGRESS NOTES
0300: Notified MD Adams of patient's potassium (3.1), orders for 10 mEq potassium x four doses.    0430: Attempting to place new IV, pt agitated and thrashing. Orders for 2 mg of Ativan.    0439: Updated MD Adams that this RN was unable to start new US guided IV on pt. Per MD Adams, okay to pause 0.45 NS in order to run potassium replacement. Will restart 0.45 NS upon completion of potassium.

## 2024-01-28 NOTE — CARE PLAN
The patient is Watcher - Medium risk of patient condition declining or worsening    Shift Goals  Clinical Goals: RASS +1 to -1, mobility, titrate down vasopressors  Patient Goals: DULCE MARIA  Family Goals: DULCE MARIA    Progress made toward(s) clinical / shift goals:    Problem: Safety - Medical Restraint  Goal: Remains free of injury from restraints (Restraint for Interference with Medical Device)  Outcome: Progressing  Flowsheets (Taken 1/28/2024 0223)  Addressed this shift: Remains free of injury from restraints (restraint for interference with medical device):   Determine that other, less restrictive measures have been tried or would not be effective before applying the restraint   Evaluate the patient's condition at the time of restraint application   Inform patient/family regarding the reason for restraint   Every 2 hours: Monitor safety, psychosocial status, comfort, nutrition and hydration  Note: Pt remains injury free  Goal: Free from restraint(s) (Restraint for Interference with Medical Device)  Outcome: Progressing  Flowsheets (Taken 1/28/2024 0223)  Addressed this shift: Free from restraint(s) (restraint for interference with medical device):   ONCE/SHIFT or MINIMUM Every 12 hours: Assess and document the continuing need for restraints   Every 24 hours: Continued use of restraint requires Licensed Independent Practitioner to perform face to face examination and written order   Identify and implement measures to help patient regain control  Note: Pt requires mechanical restraints to prevent pt from pulling at life sustaining tubes and lines  Goal: Remains free of injury from restraints (Restraint for Interference with Medical Device)  Outcome: Progressing  Flowsheets (Taken 1/28/2024 0223)  Addressed this shift: Remains free of injury from restraints (restraint for interference with medical device):   Determine that other, less restrictive measures have been tried or would not be effective before applying the  restraint   Evaluate the patient's condition at the time of restraint application   Inform patient/family regarding the reason for restraint   Every 2 hours: Monitor safety, psychosocial status, comfort, nutrition and hydration     Problem: Pain - Standard  Goal: Alleviation of pain or a reduction in pain to the patient’s comfort goal  Outcome: Progressing  Note: Pain managed with IV analgesics       Patient is not progressing towards the following goals:

## 2024-01-28 NOTE — PROGRESS NOTES
[facility] ICU Progress Note      Admit Date: 1/4/2024  ICU Day: 3 in CICU (was in [facility] ICU earlier this admission)    Resident(s): Ayaka Horner M.D.  Attending: Dr. Allison Moura MD    Date & Time:   1/28/2024   10:40 AM       Patient ID:    Name:             Lewis Mohr     YOB: 1945  Age:                 78 y.o.  male   MRN:               [id number]    HPI and hospital course:  78-year-old male patient who has had a complex history of multiple recent hospitalizations in the past month.     Initially presented 12/27/23  after a roommate reportedly found him down on the floor surrounded in his vomit. 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 He was discharged back to a rehab facility around 1/2/24.     Returned to the hospital on 1/3 w/ complaints of new onset slurred speech. Per EDP notes, he reportedly had some dysarthria and some expressive aphasia which was noted on his prior hospitalization. He had no new changes on CT head hence was discharged back to rehab from the ED.    Came back to the ED 1/4/2024, due to concerns for altered mentation. This time around, he was found to be hypoxic and hypotensive, hence admitted under hospitalist service for presumed AHRF secondary to aspiration. He was also found to have ERWIN, urinary retention, dehydration, and hypernatremia.     On 1/9, he developed Afib w/ RVR and worsening hypoxia and mental status hence code stroke was called. He had PEA arrest due to which underwent CPR and ROSC was achieved. He was intubated and subsequently transferred to the [facility] ICU.     During the course of stay in RICU,   Was extubated 1/12 to HFNC 45L/70% FiO2 -> had to be reintubated on 1/13 -> extubated on 1/20 to HFNC 40L/70% FiO2. He was stable for the next 5 days, transferred to IMCU on 1/25.     Overnight on 1/26, he seems to have had another episode of aspiration  event, became tachycardic and hypotensive. Was reintubated and admitted to the ICU for management of recurrent shock, likely septic from the aspiration event. Started on nor epi and underwent bronchoscopy on transfer. His sugars started to uptrend hence started on insulin glargine 10U daily in addition to the insulin sliding scale.    Interval Events:  Started on Midodrine to help with hypotension, will try to wean off Levophed.   Got agitated during SAT this morning. Was given ativan and fentanyl. Trial of seroquel 50mg TID ordered today.   BAL from 1/26 growing GNRs, MRSA nares neg. Hence stopped linezolid and continued zosyn.   Palliative care follow up is pending.     Consultants:  Critical care  Neurology  Palliative care    Procedures:  1/9/2024: CPR for PEA arrest  1/9/2024: ET intubation  1/9/2024: Bronchoscopy  1/9/2024: Right IJ CVC placement  1/9/2024: EEG  1/13/2024: Repeat intubation  1/13/2024: Bronchoscopy  1/19/2024: Bronchoscopy  1/25/2024: Midline catheter placement  1/26/2024: Repeat intubation  1/26/2024: Bronchoscopy      Review of Systems   Unable to perform ROS: Intubated       PHYSICAL EXAM    Physical Exam  Constitutional:       General: He is not in acute distress.     Appearance: He is well-developed. He is not diaphoretic.   HENT:      Head: Normocephalic and atraumatic.   Eyes:      Conjunctiva/sclera: Conjunctivae normal.   Neck:      Vascular: No JVD.   Cardiovascular:      Rate and Rhythm: Normal rate.      Heart sounds: No murmur heard.     No gallop.   Pulmonary:      Effort: Pulmonary effort is normal. No respiratory distress.      Breath sounds: No stridor. Rhonchi present. No wheezing or rales.   Abdominal:      Palpations: Abdomen is soft.      Tenderness: There is no abdominal tenderness. There is no guarding or rebound.   Musculoskeletal:      Right lower leg: No edema.      Left lower leg: No edema.   Skin:     General: Skin is warm and dry.      Capillary Refill: Capillary  refill takes less than 2 seconds.      Findings: No rash.   Neurological:      Comments: Currently intubated.  Sedated with Precedex.  Moving all 4 extremities.  Face is symmetric.          Respiratory:  Vent Mode: APVCMV  Respiration: 15, Pulse Oximetry: 99 %    Chest Tube Drains:    Recent Labs     249 24  0412   ISTATAPH 7.500 7.444 7.424   ISTATAPCO2 33.8 44.0* 39.6*   ISTATAPO2 53* 123* 86   ISTATATCO2 27 31 27   AWXTSGQ2NMG 90* 99 97   ISTATARTHCO3 26.4* 30.2* 25.9*   ISTATARTBE 3 6* 1   ISTATTEMP 98.5 F 98.4 F 95.7 F   ISTATFIO2 50 50 30   ISTATSPEC Arterial Arterial Arterial   ISTATAPHTC 7.501* 7.446 7.448   [id number] 53* 123* 78         HemoDynamics:  Pulse: (!) 49 Blood Pressure : 138/68      Neuro:      Fluids:       Intake/Output Summary (Last 24 hours) at 2024 0653  Last data filed at 2024 0600  Gross per 24 hour   Intake 2669.86 ml   Output 1000 ml   Net 1669.86 ml          Body mass index is 27.39 kg/m².    Recent Labs     24  0558 24  014   SODIUM 152* 149* 147*   POTASSIUM 4.6 4.9 3.1*   CHLORIDE 113* 114* 112   CO2    BUN 59* 60* 46*   CREATININE 1.74* 1.88* 1.64*   MAGNESIUM 2.6* 2.6* 2.4   PHOSPHORUS  --  3.4 2.4*   CALCIUM 9.7 8.5 8.7         GI/Nutrition:  Recent Labs     24  0558 24  014   ALTSGPT 88*  --   --    ASTSGOT 49*  --   --    ALKPHOSPHAT 165*  --   --    TBILIRUBIN 0.6  --   --    GLUCOSE 198* 365* 159*         Heme:  Recent Labs     24  0558 01/28/24  0147   RBC 4.10* 3.16* 2.97*   HEMOGLOBIN 12.1* 9.4* 8.9*   HEMATOCRIT 40.0* 31.6* 28.4*   PLATELETCT 943* 754* 581*         Infectious Disease:  Temp  Av.2 °C (97.2 °F)  Min: 36.1 °C (97 °F)  Max: 36.3 °C (97.4 °F)  Recent Labs     24  2257 24  0558 24   WBC 22.9* 20.3* 13.1*   NEUTSPOLYS 83.20* 87.60* 78.90*   LYMPHOCYTES 8.50* 7.40* 13.80*   MONOCYTES 6.70 3.70 6.00    EOSINOPHILS 0.50 0.20 0.00   BASOPHILS 0.40 0.30 0.40   ASTSGOT 49*  --   --    ALTSGPT 88*  --   --    ALKPHOSPHAT 165*  --   --    TBILIRUBIN 0.6  --   --          Meds:   midodrine  10 mg      QUEtiapine  50 mg      famotidine  20 mg      Or    famotidine  20 mg      insulin GLARGINE  10 Units      1/2 NS   Stopped (01/28/24 0446)    NORepinephrine  0-1 mcg/kg/min (Ideal) 0.04 mcg/kg/min (01/28/24 0335)    Respiratory Therapy Consult        senna-docusate  2 Tablet      And    polyethylene glycol/lytes  1 Packet      And    magnesium hydroxide  30 mL      And    bisacodyl  10 mg      MD Alert...Adult ICU Electrolyte Replacement per Pharmacy        lidocaine  2 mL      dexmedetomidine (Precedex) infusion  0-1.5 mcg/kg/hr (Ideal) 1 mcg/kg/hr (01/28/24 0709)    fentaNYL  25 mcg      Or    fentaNYL  50 mcg      Or    fentaNYL  100 mcg      piperacillin-tazobactam  4.5 g Stopped (01/28/24 0922)    ipratropium-albuterol  3 mL      sodium bicarbonate  3 mL      acetaminophen  650 mg      insulin regular  2-9 Units      And    dextrose bolus  25 g      hydrALAZINE  10 mg      labetalol  20 mg      apixaban  5 mg      Pharmacy  1 Each      haloperidol lactate  5 mg            Assessment and Plan:  Septic shock (HCC)  Assessment & Plan  This is Septic shock Not present on admission  SIRS criteria identified on my evaluation include: Tachycardia, with heart rate greater than 90 BPM and Tachypnea, with respirations greater than 20 per minute  Clinical indicators of end organ dysfunction include Hypotension with decrease in systolic blood pressure of more than 40mmHg and Acute Respiratory Failure - (mechanical ventilation or BiPap or PaO2/FiO2 ratio < 300)  Indicators of septic shock include: Sepsis present and persistent hypotension despite fluid resuscitation   Sources is: aspiration pna  Sepsis protocol initiated  Crystalloid Fluid Administration: Fluid resuscitation ordered per standard protocol - 30 mL/kg per current  or ideal body weight  IV antibiotics as appropriate for source of sepsis  Reassessment: I have reassessed the patient's hemodynamic status    1/27 = Lactic acidosis has resolved    1/28  Discontinued linezolid since MRSA nares is neg  Continue zosyn  Map > 65, trying to wean off levo, added on midodrine on 1/28  F/U final BAL results    Aspiration pneumonia (HCC)  Assessment & Plan  Has been having recurrent aspiration events this admission. SLP was following him but had to be reintubated overnight on 1/26 likely due to another aspiration event.   NPO at this time, holding tube feeds  High risk aspiration precuations  Palliative follow up pending  On zosyn at this time  BAL from 1/26 growing GNR    Acute kidney failure (HCC)  Assessment & Plan  Renal function initially started to worsen and then improve, 1.74 > 1.88 > 1.64.  Adequate urine output at this time, on pressors, trying to wean off levophed. Added on midodrine on 1/28.   Will monitor for now    * Acute respiratory failure with hypoxia (HCC)  Assessment & Plan  Suspected to be due to recurrent aspiration in the setting of recent cerebellar ICH. Has been intubated thrice this admission, difficulty coming off the ventilator. Daughter reportedly prefers against tracheostomy per chart review, however palliative care follow up is pending.   Pt will likely need trach if plan to continue ventilator support.   Pulmonary toilet, IPV  High risk aspiration precuations  ABCDEF bundle      Delirium  Assessment & Plan  Likely metabolic encephalopathy from ongoing aspiration   Had to be placed on precedex overnight on 1/26  Trial of quetiapine 50mg TID starting 1/28    A-fib (HCC)  Assessment & Plan  Sinus on monitor today  Anitcoagulation with apixaban  Cont Tele    Encephalopathy  Assessment & Plan  Recent hx of cerebellar ICH s/p right occipital craniotomy 12/27. MRI/MRA 1/14 showing chronic changes of previous ischemic events and bleed unchanged from comparison  imaging.   Minimize sedating medications, trial of quetiapine 50mg TID started on 1/28  Mobilize  Encourage family to visit  Window shades open  Sleep hygiene    High anion gap metabolic acidosis  Assessment & Plan  resolved    Hypernatremia  Assessment & Plan  Doesn't have enteral access at this time.   Na level improving with 1/2 NS at 100ml/hour --> continue the same on 1/28  Check BMP everyday    History of cerebellar hemorrhage  Assessment & Plan  He was recently admitted and underwent surgery on 12/27 by Dr. Patten  Repeat imaging including MRI show changes consistent with known bleed but no new findings  Still needs a f/u MRI in 3-4 wks to confirm no underlying lesion as etiology of initial bleed    Hypoxic respiratory failure (HCC)  Assessment & Plan  Requiring intubation with mechanical ventilation  Secondary to aspiration    Advance care planning  Assessment & Plan  At this time, pt is full code. Pending family discussion with daughter.     Urinary retention  Assessment & Plan  Has fernando catheter in place    Respiratory failure with hypoxia (HCC)  Assessment & Plan  As noted under AHRF             Quality Measures:  Feeding:  NPO  Analgesia: Tylenol fentanyl  Sedation: Fentanyl, Precedex  Thromboprophylaxis: Therapeutic Eliquis 5 mg twice daily  Head of bed: >30 degrees  Ulcer prophylaxis: Famotidine twice daily  Glycemic control: Lantus 10 units daily and insulin sliding scale  Bowel care: Bowel regimen  Indwelling lines: Right upper arm midline catheter, 1 peripheral IV, Fernando catheter, ET tube  Deescalation of antibiotics: Zosyn      CODE STATUS:   Full code  DISPO:    Depends on response to treatment

## 2024-01-28 NOTE — PROGRESS NOTES
Monitor summary:    Sinus bradyicardia to normal sinus rhythm with rates from the 50s to 60s  1st degree HB  .20/.06/.49

## 2024-01-28 NOTE — PROGRESS NOTES
Called MD Adams. Pt agitated and attempting to pull at ET tube and as well as attempted to knee staff in the face. MD Adams to bedside to assess. Okay to administer 5 mg Haldol.

## 2024-01-28 NOTE — CARE PLAN
Problem: Ventilation  Goal: Ability to achieve and maintain unassisted ventilation or tolerate decreased levels of ventilator support  Description: Target End Date:  4 days     Document on Vent flowsheet    1.  Support and monitor invasive and noninvasive mechanical ventilation  2.  Monitor ventilator weaning response  3.  Perform ventilator associated pneumonia prevention interventions  4.  Manage ventilation therapy by monitoring diagnostic test results  Outcome: Not Progressing   Vent day 3  7.5 ETT, 25 cm at the teeth  CMV 16/440/+8, 30% FIO2  Duoneb and Sodium Bicarb Q4, IPV QID

## 2024-01-29 PROBLEM — R41.0 DELIRIUM: Status: RESOLVED | Noted: 2024-01-01 | Resolved: 2024-01-01

## 2024-01-29 NOTE — CONSULTS
Palliative   Consult received, patient currently being followed by Palliative care/Dr. Miguel Headley.   Thank you,       Traci Livingston, MSN, APRN, ACNPC-AG

## 2024-01-29 NOTE — PROGRESS NOTES
RN attempted to reach patient daughter regarding MRI screening due to patient inability to answer questions appropriately at this time.

## 2024-01-29 NOTE — CARE PLAN
The patient is Watcher - Medium risk of patient condition declining or worsening    Shift Goals  Clinical Goals: RASS +1 to -1, improve neuro  Patient Goals: DULCE MARIA  Family Goals: DULCE MARIA    Progress made toward(s) clinical / shift goals:    Problem: Safety - Medical Restraint  Goal: Remains free of injury from restraints (Restraint for Interference with Medical Device)  Outcome: Progressing  Flowsheets (Taken 1/28/2024 0223)  Addressed this shift: Remains free of injury from restraints (restraint for interference with medical device):   Determine that other, less restrictive measures have been tried or would not be effective before applying the restraint   Evaluate the patient's condition at the time of restraint application   Inform patient/family regarding the reason for restraint   Every 2 hours: Monitor safety, psychosocial status, comfort, nutrition and hydration  Note: Pt remains injury free  Goal: Free from restraint(s) (Restraint for Interference with Medical Device)  Outcome: Progressing  Flowsheets (Taken 1/28/2024 0223)  Addressed this shift: Free from restraint(s) (restraint for interference with medical device):   ONCE/SHIFT or MINIMUM Every 12 hours: Assess and document the continuing need for restraints   Every 24 hours: Continued use of restraint requires Licensed Independent Practitioner to perform face to face examination and written order   Identify and implement measures to help patient regain control  Note: Pt requires mechanical restraints to prevent pt from pulling at life sustaining tubes and lines  Goal: Remains free of injury from restraints (Restraint for Interference with Medical Device)  Outcome: Progressing  Flowsheets (Taken 1/28/2024 0223)  Addressed this shift: Remains free of injury from restraints (restraint for interference with medical device):   Determine that other, less restrictive measures have been tried or would not be effective before applying the restraint   Evaluate the  patient's condition at the time of restraint application   Inform patient/family regarding the reason for restraint   Every 2 hours: Monitor safety, psychosocial status, comfort, nutrition and hydration     Problem: Pain - Standard  Goal: Alleviation of pain or a reduction in pain to the patient’s comfort goal  Outcome: Progressing  Note: Pain managed with IV analgesics       Patient is not progressing towards the following goals:

## 2024-01-29 NOTE — PROGRESS NOTES
Critical Care Progress Note    Date of admission  1/4/2024    Chief Complaint  78 y.o. male admitted 1/4/2024 with acute hypoxic respiratory failure    Hospital Course  Mr. 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  Unfortunately, the patient was readmitted to [facility] on 1/4/2024 for altered mental status and hypoxia.  He was admitted to the medicine services with hypernatremia, dehydration, and ERWIN with urinary retention.  He was found to be silently aspirating.  On 1/9/2024, the patient went into AF with RVR with worsening hypoxia and mental status changes.  A code stroke was called.  While in the CT scanner the patient was not protecting his airway and therefore was intubated.  He had a PEA arrest shortly afterward.  He was transferred to the ICU.  The patient was extubated on 1/11/2024 and required HFNC at 45L at 70% on 1/12.  Unfortunately, the patient was reintubated on 1/13.  He remained intubated until 1/20/2024 and then extubated to HFNC at 40L/70%.  He was transferred to IMCU on 1/25.  On the evening of 1/26 the critical care team was reconsulted due to the patient becoming suddenly tachycardic in the 140-150s with hypotension.  He was given IVF and a dose of phenylephrine.  He was not protecting his airway and required emergent intubation and bronchoscopy then transferred back to the ICU.  1/27 - VD2, agitated with SAT, levo infusing, trial SBT, creat 1.88, Na 149-->1/2 NS infusing  1/28 VD 3 agitated on the vent    Interval Problem Update  Reviewed last 24 hour events:   VD 3 8/30%   Precedex    Vasopressors weaned   Empiric zosyn   Home DOAC   Elevated Na on FWF    Pall med consult pending; would prefer the plan for trach vs no trach prior to next extubation attempt       Review of Systems  Review of Systems   Unable to perform ROS: Intubated        Vital Signs for last 24  hours   Temp:  [36 °C (96.8 °F)-36.2 °C (97.1 °F)] 36 °C (96.8 °F)  Pulse:  [44-52] 47  Resp:  [14-39] 16  BP: ()/(52-93) 122/70  SpO2:  [95 %-100 %] 98 %    Hemodynamic parameters for last 24 hours       Respiratory Information for the last 24 hours  Vent Mode: Spont  Rate (breaths/min): 16  Vt Target (mL): 440  PEEP/CPAP: 8  P Support: 5  MAP: 9.7  Control VTE (exp VT): 442    Physical Exam   Physical Exam  Vitals and nursing note reviewed.   Constitutional:       General: He is not in acute distress.     Appearance: He is ill-appearing. He is not toxic-appearing.   HENT:      Head: Normocephalic and atraumatic.      Right Ear: External ear normal.      Left Ear: External ear normal.      Nose: Nose normal. No rhinorrhea.      Mouth/Throat:      Mouth: Mucous membranes are moist.      Comments: ETT in place  Eyes:      General: No scleral icterus.     Conjunctiva/sclera: Conjunctivae normal.      Pupils: Pupils are equal, round, and reactive to light.   Cardiovascular:      Rate and Rhythm: Normal rate and regular rhythm.      Heart sounds: No murmur heard.  Pulmonary:      Breath sounds: No wheezing.      Comments: Breathing comfortably on the ventilator, clear   Chest:      Chest wall: No tenderness.   Abdominal:      Palpations: Abdomen is soft.      Tenderness: There is no abdominal tenderness. There is no guarding or rebound.   Genitourinary:     Comments: Schwartz in place  Musculoskeletal:         General: Normal range of motion.      Cervical back: Normal range of motion and neck supple.      Right lower leg: No edema.      Left lower leg: No edema.   Lymphadenopathy:      Cervical: No cervical adenopathy.   Skin:     General: Skin is warm and dry.      Capillary Refill: Capillary refill takes 2 to 3 seconds.      Findings: No rash.   Neurological:      Mental Status: He is alert.      Cranial Nerves: No cranial nerve deficit.      Sensory: No sensory deficit.      Motor: No weakness.      Comments:  Restless and agitated at time, moving all x 4, not following    Psychiatric:      Comments: Unable to assess         Medications  Current Facility-Administered Medications   Medication Dose Route Frequency Provider Last Rate Last Admin    famotidine (Pepcid) tablet 20 mg  20 mg Enteral Tube BID Luke Estrada M.D.        Or    famotidine (Pepcid) injection 20 mg  20 mg Intravenous BID Luke Estrada M.D.        midodrine (Proamatine) tablet 10 mg  10 mg Enteral Tube Q8HRS Allison Moura M.D.   10 mg at 01/29/24 0536    QUEtiapine (SEROquel) tablet 50 mg  50 mg Enteral Tube Q8HRS Allison Moura M.D.   50 mg at 01/29/24 0535    insulin GLARGINE (Lantus,Semglee) injection  10 Units Subcutaneous QAM INSULIN Allison Moura M.D.   10 Units at 01/28/24 0555    norepinephrine (Levophed) 8 mg in 250 mL NS infusion (premix)  0-1 mcg/kg/min (Ideal) Intravenous Continuous Donnie Babin M.D.   Stopped at 01/28/24 0925    Respiratory Therapy Consult   Nebulization Continuous RT Donnie Babin M.D.        senna-docusate (Pericolace Or Senokot S) 8.6-50 MG per tablet 2 Tablet  2 Tablet Enteral Tube BID Donnie Babin M.D.   2 Tablet at 01/29/24 0535    And    polyethylene glycol/lytes (Miralax) Packet 1 Packet  1 Packet Enteral Tube QDAY PRN Donnie Babin M.D.   1 Packet at 01/27/24 1715    And    magnesium hydroxide (Milk Of Magnesia) suspension 30 mL  30 mL Enteral Tube QDAY PRN Donnie Babin M.D.        And    bisacodyl (Dulcolax) suppository 10 mg  10 mg Rectal QDAY PRN Donnie Babin M.D.        MD Alert...ICU Electrolyte Replacement per Pharmacy   Other PHARMACY TO DOSE Donnie Babin M.D.        lidocaine (Xylocaine) 1 % injection 2 mL  2 mL Tracheal Tube Q30 MIN PRN Donnie Babin M.D.        dexmedetomidine (PRECEDEX) 400 mcg/100mL NS premix infusion  0-1.5 mcg/kg/hr (Ideal) Intravenous Continuous Donnie Babin M.D. 21.9 mL/hr at 01/29/24 0911 1.2 mcg/kg/hr at 01/29/24 0911     fentaNYL (Sublimaze) injection 25 mcg  25 mcg Intravenous Q HOUR PRN Donnie Babin M.D.        Or    fentaNYL (Sublimaze) injection 50 mcg  50 mcg Intravenous Q HOUR PRN Donnie Babin M.D.        Or    fentaNYL (Sublimaze) injection 100 mcg  100 mcg Intravenous Q HOUR PRN Donnie Babin M.D.   100 mcg at 01/29/24 0821    piperacillin-tazobactam (Zosyn) 4.5 g in  mL IVPB  4.5 g Intravenous Q8HRS Donnie Babin M.D. 25 mL/hr at 01/29/24 0433 4.5 g at 01/29/24 0433    acetaminophen (Tylenol) tablet 650 mg  650 mg Enteral Tube Q6HRS PRN Ag Dixon M.D.   650 mg at 01/24/24 2350    insulin regular (HumuLIN R,NovoLIN R) injection  2-9 Units Subcutaneous Q6HRS Ag Dixon M.D.   2 Units at 01/28/24 1215    And    dextrose 10 % BOLUS 25 g  25 g Intravenous Q15 MIN PRN Ag Dixon M.D.        apixaban (Eliquis) tablet 5 mg  5 mg Enteral Tube BID Manuel Antoine M.D.   5 mg at 01/29/24 0536    Pharmacy Consult: Enteral tube insertion - review meds/change route/product selection  1 Each Other PHARMACY TO DOSE Rebecca Holman M.D.        haloperidol lactate (Haldol) injection 5 mg  5 mg Intravenous Q4HRS PRN JENNY Quintanilla.P.RAllieN.   5 mg at 01/28/24 0213       Fluids    Intake/Output Summary (Last 24 hours) at 1/29/2024 0914  Last data filed at 1/29/2024 0600  Gross per 24 hour   Intake 697.4 ml   Output 1300 ml   Net -602.6 ml       Laboratory  Recent Labs     01/26/24  2231 01/27/24  0319 01/28/24  0412   ISTATAPH 7.500 7.444 7.424   ISTATAPCO2 33.8 44.0* 39.6*   ISTATAPO2 53* 123* 86   ISTATATCO2 27 31 27   [id number] 90* 99 97   ISTATARTHCO3 26.4* 30.2* 25.9*   ISTATARTBE 3 6* 1   ISTATTEMP 98.5 F 98.4 F 95.7 F   ISTATFIO2 50 50 30   ISTATSPEC Arterial Arterial Arterial   ISTATAPHTC 7.501* 7.446 7.448   [id number] 53* 123* 78         Recent Labs     01/27/24  0558 01/28/24  0147 01/28/24  1036 01/29/24  0251   SODIUM 149* 147* 149* 149*   POTASSIUM 4.9 3.1* 4.3 3.4*   CHLORIDE 114*  112 116* 116*   CO2 26 27 27 26   BUN 60* 46* 40* 38*   CREATININE 1.88* 1.64* 1.31 1.22   MAGNESIUM 2.6* 2.4  --  2.4   PHOSPHORUS 3.4 2.4*  --  3.1   CALCIUM 8.5 8.7 8.8 8.7     Recent Labs     01/26/24 2257 01/27/24 0558 01/28/24 0147 01/28/24 1036 01/29/24  0251   ALTSGPT 88*  --   --  52* 46   ASTSGOT 49*  --   --  21 16   ALKPHOSPHAT 165*  --   --  118* 113*   TBILIRUBIN 0.6  --   --  0.3 0.3   GLUCOSE 198*   < > 159* 214* 94    < > = values in this interval not displayed.     Recent Labs     01/26/24 2257 01/27/24 0558 01/28/24 0147 01/28/24 1036 01/29/24  0251   WBC 22.9* 20.3* 13.1*  --  6.6   NEUTSPOLYS 83.20* 87.60* 78.90*  --  77.40*   LYMPHOCYTES 8.50* 7.40* 13.80*  --  15.50*   MONOCYTES 6.70 3.70 6.00  --  6.10   EOSINOPHILS 0.50 0.20 0.00  --  0.00   BASOPHILS 0.40 0.30 0.40  --  0.50   ASTSGOT 49*  --   --  21 16   ALTSGPT 88*  --   --  52* 46   ALKPHOSPHAT 165*  --   --  118* 113*   TBILIRUBIN 0.6  --   --  0.3 0.3     Recent Labs     01/27/24 0558 01/28/24 0147 01/29/24  0251   RBC 3.16* 2.97* 3.13*   HEMOGLOBIN 9.4* 8.9* 9.3*   HEMATOCRIT 31.6* 28.4* 29.8*   PLATELETCT 754* 581* 518*       Imaging  MRI brain 1/14:  1.  Redemonstrated is scattered supratentorial and infratentorial areas of ischemia. No new lesions. There is increased conspicuity of the RIGHT frontal lobe lesion which could be due to recurrent ischemia or seizure activity.  2.  Unchanged RIGHT cerebellar hematoma evacuation cavity. As previously noted underlying mass is not excluded and follow-up MRI without and with contrast in approximately weeks is recommended.  3.  No new hemorrhage  4.  Atrophy  5.  White matter changes  6.  Small BILATERAL mastoid effusions    Assessment/Plan  * Acute respiratory failure with hypoxia (HCC)- (present on admission)  Assessment & Plan  Intubated date: 1/9/2024-1/11, 1/13-1/20, 1/25-?  Goal saturation > 90%  Monitor ventilator waveforms and titrate flow/peep and volumes according.    Lung protective ventilation strategy w/ A-F bundle  CXR: monitor lung volumes and tube/line placement  VAP bundle prevention, oral care, post pyloric feeding  Head of bed > 30 degree  GI prophylaxis  Respiratory treatments: prn  SAT/SBT ongoing, but not following for liberation  Start Seroquel 50mg every 8 hours for agitation  Will need to readdress possible tracheostomy with daughter if unable to be liberated      Septic shock (HCC)  Assessment & Plan  This is Septic shock Not present on admission  SIRS criteria identified on my evaluation include: Tachycardia, with heart rate greater than 90 BPM and Tachypnea, with respirations greater than 20 per minute  Clinical indicators of end organ dysfunction include Hypotension with decrease in systolic blood pressure of more than 40mmHg and Acute Respiratory Failure - (mechanical ventilation or BiPap or PaO2/FiO2 ratio < 300)  Indicators of septic shock include: Sepsis present and persistent hypotension despite fluid resuscitation   Sources is: aspiration pna  Sepsis protocol initiated  Crystalloid Fluid Administration: Fluid resuscitation ordered per standard protocol - 30 mL/kg per current or ideal body weight  IV antibiotics as appropriate for source of sepsis  Reassessment: I have reassessed the patient's hemodynamic status    Empiric zosyn  Follow cultures  Vasopressors for MAP > 65    Assessment & Plan  Shock state resolving  Maintain intravascular euvolemia  Titrate norepinephrine for map > 65        A-fib (HCC)  Assessment & Plan  LVH on 12/27 echo with diastolic dysfunction  Eliquis prophylaxis for CVA  Continue to optimize electrolytes, needs further potassium repletion  Continue cardiac monitoring.    Encephalopathy  Assessment & Plan  Toxic metabolic encephalopathy    1/20 extubated.   1/23 started valproic acid.   1/24 valproic acid was discontinued due to concern of drug interaction with eliquis    Delirium bundle     Advance care planning  Assessment &  Plan  Nora is his daughter and surrogate decision maker  Plan for reintubation if necessary now  No plan for tracheostomy per daughter  No feeding tube surgically per daughter  Extubated 1/20 again, pt is full code.   If appears to need reintubation would converse with daughter prior to reintubating unless urgently necessary  Palliative care consultation follow-up next week  Will need to discuss goals of care again and whether tracheostomy is next step    Urinary retention  Assessment & Plan  Schwartz catheter    High anion gap metabolic acidosis  Assessment & Plan  Resolved     Hypernatremia- (present on admission)  Assessment & Plan  FWF  Trend    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)  Assessment & Plan  S/p evacuation on 12/27 by Dr Patten  SBP < 160  CT head stable no acute edema  MRI reviewed from 1/14  Normonatremia remains the goal    Aspiration pneumonia (HCC)- (present on admission)  Assessment & Plan  Ongoing aspiration    Zosyn/Unasyn 10 day regimen completed 01/14   Tx FOB 1/19 with copious secretions, 1/25 with large amounts  BAL and bronc wash cultures from 1/19 revealing corynebacterium stratum, 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 In review of yesterday's note (1/28/2024), there are no changes except as documented above.     Discussed patient condition and risk of morbidity and/or mortality with RN, RT, Pharmacy, [employer] resident, and Charge nurse / hot rounds      Critical care time = 67 minutes in directly providing and coordinating critical care and extensive data review.  No  time overlap and excludes procedures.

## 2024-01-29 NOTE — CARE PLAN
Problem: Ventilation  Goal: Ability to achieve and maintain unassisted ventilation or tolerate decreased levels of ventilator support  Description: Target End Date:  4 days     Document on Vent flowsheet    1.  Support and monitor invasive and noninvasive mechanical ventilation  2.  Monitor ventilator weaning response  3.  Perform ventilator associated pneumonia prevention interventions  4.  Manage ventilation therapy by monitoring diagnostic test results  Outcome: Not Met   Vd4 16/440/8/30

## 2024-01-29 NOTE — CARE PLAN
Problem: Ventilation  Goal: Ability to achieve and maintain unassisted ventilation or tolerate decreased levels of ventilator support  Description: Target End Date:  4 days     Document on Vent flowsheet    1.  Support and monitor invasive and noninvasive mechanical ventilation  2.  Monitor ventilator weaning response  3.  Perform ventilator associated pneumonia prevention interventions  4.  Manage ventilation therapy by monitoring diagnostic test results  Outcome: Not Met  Ventilator Daily Summary    Vent Day # 4  Airway: 7.5@25    Ventilator settings: 16 440 8 30%  Weaning trials: spont in am for 2hrs    Plan: Continue current ventilator settings and wean mechanical ventilation as tolerated per physician orders.

## 2024-01-29 NOTE — CARE PLAN
Problem: Ventilation  Goal: Ability to achieve and maintain unassisted ventilation or tolerate decreased levels of ventilator support  Description: Target End Date:  4 days     Document on Vent flowsheet    1.  Support and monitor invasive and noninvasive mechanical ventilation  2.  Monitor ventilator weaning response  3.  Perform ventilator associated pneumonia prevention interventions  4.  Manage ventilation therapy by monitoring diagnostic test results  Outcome: Progressing     Problem: Bronchopulmonary Hygiene  Goal: Increase mobilization of retained secretions  Description: Target End Date:  2 to 3 days    1.  Perform bronchopulmonary therapy as indicated by assessment  2.  Perform airway suctioning  3.  Perform actions to maintain patient airway  Outcome: Met

## 2024-01-29 NOTE — DIETARY
Nutrition support weekly update:  Day 25 of admit.  Lewis Mohr is a 78 y.o. male with admitting DX of acute hypoxia respiratory failure with hypoxia.    Tube feeding initiated on 1/6. Current TF via NG is Glucerna 1.2 with goal rate of 75 ml/hr providing 2160 kcals, 108 gm protein, and 1449 ml free water per day.     Assessment:  Weight 86.6 kg (190 lbs) via bed scale 1/26  Re-estimate of nutritional needs not indicated at this time.      Evaluation:   TF currently running at 25 ml/hr, not adequately meeting patients needs.   Restless and agitated at time, moving all x 4, not following.    Labs: Na 149, K+ 3.4, Cl 116, Bun 38  Meds: Precedex, tylenol, pepcid, fentanyl, haldol, SSI, bowel regimen, Klyte  Skin: 3+ edema to the RUE, 2+ edema to the LUE.   +BM 1/23  Current feeding remains appropriate. Continue to advance to goal rate as able.     Malnutrition risk: Pt at risk with TF rate fluctuating.     Recommendations/Plan:  Continue Glucerna 1.2 with goal rate of 75 ml/hr providing 2160 kcals, 108 gm protein, and 1449 ml free water per day.   Advance to goal rate as medically feasible.   Fluids per MD.      RD following.

## 2024-01-30 NOTE — CARE PLAN
The patient is Watcher - Medium risk of patient condition declining or worsening    Shift Goals  Clinical Goals: Improve mentation and agitation for optimal waking cycles to attempt for extubation, palliative care consultation for goals of care with daughter  Patient Goals: DULCE MARIA  Family Goals: DULCE MARIA    Progress made toward(s) clinical / shift goals:    Problem: Hemodynamics  Goal: Patient's hemodynamics, fluid balance and neurologic status will be stable or improve  Outcome: Progressing     Problem: Safety - Medical Restraint  Goal: Remains free of injury from restraints (Restraint for Interference with Medical Device)  Outcome: Progressing     Problem: Pain - Standard  Goal: Alleviation of pain or a reduction in pain to the patient’s comfort goal  Outcome: Progressing       Patient is not progressing towards the following goals:

## 2024-01-30 NOTE — CARE PLAN
The patient is Watcher - Medium risk of patient condition declining or worsening    Shift Goals  Clinical Goals: decreased agitation  Patient Goals: DULCE MARIA  Family Goals: DULCE MARIA    Progress made toward(s) clinical / shift goals:        Problem: Hemodynamics  Goal: Patient's hemodynamics, fluid balance and neurologic status will be stable or improve  Outcome: Progressing     Problem: Fluid Volume  Goal: Fluid volume balance will be maintained  Outcome: Progressing     Problem: Urinary - Renal Perfusion  Goal: Ability to achieve and maintain adequate renal perfusion and functioning will improve  Outcome: Progressing     Problem: Skin Integrity  Goal: Skin integrity is maintained or improved  Outcome: Progressing     Problem: Fall Risk  Goal: Patient will remain free from falls  Outcome: Progressing     Problem: Safety - Medical Restraint  Goal: Remains free of injury from restraints (Restraint for Interference with Medical Device)  Outcome: Progressing  Goal: Free from restraint(s) (Restraint for Interference with Medical Device)  Outcome: Progressing     Problem: Pain - Standard  Goal: Alleviation of pain or a reduction in pain to the patient’s comfort goal  Outcome: Progressing       Patient is not progressing towards the following goals:      Problem: Respiratory  Goal: Patient will achieve/maintain optimum respiratory ventilation and gas exchange  Outcome: Not Progressing  Note: Apnea during spt     Problem: Knowledge Deficit - Standard  Goal: Patient and family/care givers will demonstrate understanding of plan of care, disease process/condition, diagnostic tests and medications  Outcome: Not Progressing  Note: No evidence of learning

## 2024-01-30 NOTE — PROGRESS NOTES
Sinus bradycardia, rate 40-60  WA .185, QRS .109, QTC .505     Episode of afib rvr 150-160

## 2024-01-30 NOTE — CARE PLAN
Problem: Ventilation  Goal: Ability to achieve and maintain unassisted ventilation or tolerate decreased levels of ventilator support  Description: Target End Date:  4 days     Document on Vent flowsheet    1.  Support and monitor invasive and noninvasive mechanical ventilation  2.  Monitor ventilator weaning response  3.  Perform ventilator associated pneumonia prevention interventions  4.  Manage ventilation therapy by monitoring diagnostic test results  Outcome: Progressing     Ventilator Daily Summary    Vent Day #5  Airway: 7.5/25    Ventilator settings: 16/440/+8/30  Weaning trials:   Respiratory Procedures:     Plan: Continue current ventilator settings and wean mechanical ventilation as tolerated per physician orders.

## 2024-01-30 NOTE — DIETARY
Nutrition Services Brief Update:    Problem: Nutritional:  Goal: Nutrition support tolerated and meeting greater than 85% of estimated needs  Outcome: Met    TF back at goal of 75 ml/hr.     RD following.

## 2024-01-30 NOTE — PROGRESS NOTES
Pt Transported with this RN, RT and transport to MRI at 2205 and back to the floor at 2245.  Agitation and restlessness occurred at different times, all treated by MAR.

## 2024-01-30 NOTE — CARE PLAN
The patient is Watcher - Medium risk of patient condition declining or worsening    Shift Goals  Clinical Goals: Improve mentation and agitation for optimal waking cycles to attempt for extubation, palliative care consultation for goals of care with daughter  Patient Goals: DULCE MARIA  Family Goals: DULCE MARIA    Progress made toward(s) clinical / shift goals:        Problem: Hemodynamics  Goal: Patient's hemodynamics, fluid balance and neurologic status will be stable or improve  Outcome: Progressing     Problem: Fluid Volume  Goal: Fluid volume balance will be maintained  Outcome: Progressing     Problem: Urinary - Renal Perfusion  Goal: Ability to achieve and maintain adequate renal perfusion and functioning will improve  Outcome: Progressing     Problem: Knowledge Deficit - Standard  Goal: Patient and family/care givers will demonstrate understanding of plan of care, disease process/condition, diagnostic tests and medications  Outcome: Progressing     Problem: Skin Integrity  Goal: Skin integrity is maintained or improved  Outcome: Progressing     Problem: Safety - Medical Restraint  Goal: Remains free of injury from restraints (Restraint for Interference with Medical Device)  Outcome: Progressing  Goal: Free from restraint(s) (Restraint for Interference with Medical Device)  Outcome: Progressing     Problem: Pain - Standard  Goal: Alleviation of pain or a reduction in pain to the patient’s comfort goal  Outcome: Progressing

## 2024-01-30 NOTE — PROGRESS NOTES
Inpatient Palliative Medicine - Follow Up     Lewis Mohr  78 y.o. male  [id number]     Location: [facility]  Pcp Pt States None     Referral Source:   Tj Hoyt M.d.     Reason for palliative medicine consultation and/or visit: ACP           Assessment and Plan:     GOAL(S) OF CARE = LONGEVITY     SYMPTOMS ETIOLOGY/CAUSES = multifactorial encephalopathy secondary to: recent cerebellar hemorrhage, aspiration PNA, UTI, ??dehydration/poor intake/volume depletion     PROGNOSIS = increasingly likely terminal as in days-to-weeks now, judging by patient's repeated failure on HFNC basically ventilator dependent at this point     CODE STATUS = FULL, unchanged  1/9/2024 later upgraded to ICU and intubated, consistent with wishes expressed by patient's daughter Jaymie  1/11/2024 Tentative plan is to re-intubate again if failing first extubation attempt.   1/12/2024 Jaymie verified current GOC: unchanged. Re-intubate if needed. Patient on HFNC 45-50LPM.  1/20/2024 Extubated, remains on HFNC 30-40LPM  1/26/2024 R-intubtion again        PALLIATIVE CARE TEAM INTERVENTIONS =   Medical updates to daughter Jaymie, and patient's two friends Varun & Akash.  Jaymie would like optimization next few days.  Jaymie is leaning towards one last/permanent extubation later this week after optimization, but needs to consult with rest of family before finalizing. Ideally that will be closer to weekend, when family could visit easier.  Jaymie is NOT requesting a tracheostomy.  Jaymie is agreeable to repeat brain MRI or other form of brain imaging again.  Jaymie will be here again TUE 1/30 around [id number]. I will follow up with her again.        Summary:   Lewis Mohr is a 78 y.o. 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 Patient was reportedly discharged to [place] for rehab 1/2/2024. However he has since  "declined, resulting in an ER visit 1/3/2024 and the current readmission starting .     Since readmission patient has been treated for multiple issues: aspiration pneumonia, urinary retention, hypernatremia, ERWIN, etc. Patient has remained encephalopathic however, and dislodged NGT multiple times and remains NPO due to FEES+ silent aspiration.  -----------------------------------------------------------------------------------------------------------------------------------------  2024  Met with patient's daughter Jaymie bedside to follow up on care.    Jaymie expressed surprise that patient was intubated again without a more in-depth discussion prior to reintubation.... Bedside RN and I provided emotional support and expressed our condolences for this very acute turn of event last 2024 night, and apologized for any sub-optimal communication on medical team's behalf that might have happened last few days. Jaymie was very understanding.    Going forward, Jaymie would reach out to her 2 other sisters to discuss what they want to do as a family.  Jaymie is glad to hear about the repeat brain MRI, with the hope for potentially some more info on patient's continued poor mentation and aspiration.    Jaymie indicated her preferences for the time bein) Continued medical optimization, or \"as much as possible,\" in her own words.  2) Jaymie would reach out to rest of family to gather up their preferences.  3) Jaymie indicated that, if possible, she would prefer extubation to happen later this week, closer to weekend, after further optimization.  4) Jaymie is leaning towards one last, permanent extubation at this time.  She is NOT requesting a tracheostomy.    The implications of terminal extubation appeared quite clear to Jaymie. Emotional support provided as she cried and grappled with patient's current state of health.    Jaymie is planning to return again tomorrow TUE 2024 again between [id number] to follow up on " care, and to update medical team on her family's preferences going forward.        1/23/2024  Met bedside with patient, his daughter Jaymie, and patient's 2 friends Akash Bond.    Patient remained encephalopathic at time, though with stimulation, he did gradually become more interactive today. Compared to yesterday, his secretions and fatigue seemed somewhat worse.    Family and I discussed many things bedside:  1) I strongly encouraged Jaymie and her sister to consider the possibility of another respiratory failure requiring ventilation - should we or should we not for a 3rd time. Trach or no trach, as previously expressed by patient privately to Jaymie himself.  This is a hard decision. Jaymie will continue to discuss with her sister and try to come up with a plan for that hypothetical situation, if patient were to be in respiratory distress again.  2) Trach education - Patient previously expressed to Jaymie against trach... Family well aware the potentially permanent nature of trach, if patient for whatever reason cannot improve sufficiently to wean off.  By patient's own words, Jaymie leans against trach at this time, and hopes that decision won't have to be crossed soon.  3) LTACH - we discussed the possibility that if patient's respiratory function stays at HFNC level requiring a more long-term wean-off, then LT[facility] ([facility]) will be the kind of facility for patient to go for respiratory rehab post discharge first.  4) Typical brain injury and stroke treatment workflow.    Jaymie has recently dealt with another death in among close family members. She is well acquainted with the uncertainty of medicine, and the many uncertainties involved in patient's care.    For the time being, as patient has not definitive declared on way or another, family wish to continue all interventions to help patient improve and definitively declare himself, one way or another.    No changes in GOC or code status at this  time.          1/22/2024  Followed up with Dr. Coon over the phone. She barely just left the hospital when I went to reassess patient.  Patient was more active and communicative today, with mitten gloves both hands to prevent line pulling and still only mumbling unintelligibly for the most part.     From Jaymie's perspective, this is the best patient has seen for over a week, though still well below his prior baseline when he was originally discharged to [facility] for rehab on 1/2/2024.     Jaymie wonders if another confirmatory speech eval FEES is needed if aspiration is still happening as we suspect. Informed Jaymie that we could further discuss this tomorrow with ICU attending Dr. Bautista, when she visits. Jaymie was agreeable.     Provided medical updates to Dr. Bautista, RN, and RT. It does appear we are fast approaching a decision point if we should consider definitively one way or another: no trach & DNAR DNI vs trach + LTACH + all interventions. Dr. Bautista and I will also plan to address this philosophical question more tomorrow during our meeting with Jaymie.     Patient otherwise remains status quo and continues to require extensive suctioning, which is concerning for continued silent aspiration.           1/12/2023  Provided brief update to patient's daughter over phone - off vent still on HFNC with slightly increased O2 demand 45==>50LPM. Patient remained encephalopathic and nonverbal, only able to unreliably follow simple commands at times. This remains far below his functional level at the time of his first discharge 1/2/2024 post craniotomy.     Jaymie verified that indeed the current plan remains to re-intubate again should patient fail his first extubation trial. Informed Jaymie that while I am not able to predict ultimate outcome, the creased O2 demand and poor mentation and NPO are all warning signs for potential reintubation. Informed Jaymie that repeated extubation failures would speak for a negative future outcome.  "Jaymie understands.     Provided emotional support and therapeutic presence. This latest event has been distressing to Jaymie and her family. She still has to work and patient's critical status had caused her a few \"panic attacks\" at times.     Jaymie and her  typically visit after 1630 on weekdays, after work. She plans to visit in person SAT afternoon 12/13/2024, hopefully after roads have been better plowed.           1/9/2023  Placed call to patient's daughter Jaymie and introduced my role and scope of practice. She was amenable to this visit. We reviewed patient's relevant history. According to Jaymie, patient has had a good overall health for decades not on any chronic medication until his last admission for his cerebellar hemorrhage.       Jaymie's initial impression as of patient's official discharge was good. \"Dad was still talking to me over the phone as of Sunday 12/31/23. Patient did gradually deteriorate after initial charge, with more slurred speech and unsuccessful rehab efforts at [place].      The dysphagia and aphasia issues are relatively to Jaymie, about for last week. She and her sister wonders if patient's ongoing pneumonia, hyperkalemia, and low hydration/intake negatively affect his reocvery.     Clinical picture still evolving. I advised Jaymie that, if patient's dysphagia and aphasia remain unchanged, then pneumonia and pneumonitis can become recurrent due to continued aspiration... of course, that will also be dependent on if reversible secondary issues may improve patient's current level of function.     Lots of uncertainties... low intake volume, possible dehydration due to net negative I/O since admission, hyperkalemia uncertain if secondary in part to continued low intake and multiple NGT dislodges and feed disruption, etc.     Informed Jaymie that, as multiple secondary and potentially reversible medical issues are also ongoing, I am unable to definitively prognosticate patient's prognosis or " chance for definitive recovery at this point. Jaymie was accepting and stressed family's wishes for medical team to address all potentially reversible issues, to try to possibly recover patient's overall function to his status upon his initial discharge, if possible.     For now, Jaymie and her sister and other family members all want to continue aggressive care, consistent with full code status.  Jaymie does not recall any issue weaning patient off ventilator after his craniotomy 12/27/2023 either.        Note patient later developed afib RVR in the evening and was upgraded to ICU and also appropriately intubated for worsening respiratory distress.        Advance care planning:  The patient and/or legal decision maker has provided voluntary consent to discuss advance care planning. We discussed code status.   FULL     Thank you for allowing me the opportunity to participate in the care of Lewis Mohr     I spent a total of 35 minutes reviewing medical records, direct face-to-face time with the patient and/or family, documentation and coordination of care. This is separate from the time spent on advance care planning, which is documented above.           DO Pritesh NELSON ([facility]   [id number]  [id number]  P: [phone]  F: [phone]  C: [phone]

## 2024-01-30 NOTE — THERAPY
Speech Language Therapy Contact Note    Patient Name: Lewis Mohr  Age:  78 y.o., Sex:  male  Medical Record #: [id number]  Today's Date: 1/30/2024    Pt was intubated on 1/26; ST to hold dysphagia tx at this time and f/u when appropriate.

## 2024-01-30 NOTE — PROGRESS NOTES
Critical Care Progress Note    Date of admission  1/4/2024    Chief Complaint  78 y.o. male admitted 1/4/2024 with acute hypoxic respiratory failure    Hospital Course  Mr. 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  Unfortunately, the patient was readmitted to [facility] on 1/4/2024 for altered mental status and hypoxia.  He was admitted to the medicine services with hypernatremia, dehydration, and ERWIN with urinary retention.  He was found to be silently aspirating.  On 1/9/2024, the patient went into AF with RVR with worsening hypoxia and mental status changes.  A code stroke was called.  While in the CT scanner the patient was not protecting his airway and therefore was intubated.  He had a PEA arrest shortly afterward.  He was transferred to the ICU.  The patient was extubated on 1/11/2024 and required HFNC at 45L at 70% on 1/12.  Unfortunately, the patient was reintubated on 1/13.  He remained intubated until 1/20/2024 and then extubated to HFNC at 40L/70%.  He was transferred to IMCU on 1/25.  On the evening of 1/26 the critical care team was reconsulted due to the patient becoming suddenly tachycardic in the 140-150s with hypotension.  He was given IVF and a dose of phenylephrine.  He was not protecting his airway and required emergent intubation and bronchoscopy then transferred back to the ICU.  1/27 - VD2, agitated with SAT, levo infusing, trial SBT, creat 1.88, Na 149-->1/2 NS infusing  1/28 VD 3 agitated on the vent  1/29 VD 4; pall medicine consult  1/30 VD 5; MRI brain stable      Interval Problem Update  Reviewed last 24 hour events:   VD 5 8/30%   Precedex    Vasopressors weaned   Empiric zosyn   Home DOAC   Diuril x 1   Increase  q4h   Stop sedating medications and re-eval   MRI brain unchanged    Ongoing goals of care - one way extubation vs trach    Review of  Systems  Review of Systems   Unable to perform ROS: Intubated        Vital Signs for last 24 hours   Temp:  [36.1 °C (96.9 °F)-36.7 °C (98.1 °F)] 36.7 °C (98.1 °F)  Pulse:  [42-62] 44  Resp:  [13-43] 17  BP: ()/(50-94) 130/70  SpO2:  [95 %-100 %] 97 %    Hemodynamic parameters for last 24 hours       Respiratory Information for the last 24 hours  Vent Mode: APVCMV  Rate (breaths/min): 16  Vt Target (mL): 440  PEEP/CPAP: 8  MAP: 10  Length of Weaning Trial (Hours): 2  Control VTE (exp VT): 435    Physical Exam   Physical Exam  Vitals and nursing note reviewed.   Constitutional:       General: He is not in acute distress.     Appearance: He is ill-appearing. He is not toxic-appearing.   HENT:      Head: Normocephalic and atraumatic.      Right Ear: External ear normal.      Left Ear: External ear normal.      Nose: Nose normal. No rhinorrhea.      Mouth/Throat:      Mouth: Mucous membranes are moist.      Comments: ETT in place  Eyes:      General: No scleral icterus.     Conjunctiva/sclera: Conjunctivae normal.      Pupils: Pupils are equal, round, and reactive to light.   Cardiovascular:      Rate and Rhythm: Normal rate and regular rhythm.      Heart sounds: No murmur heard.  Pulmonary:      Breath sounds: No wheezing.      Comments: Breathing comfortably on the ventilator, clear   Chest:      Chest wall: No tenderness.   Abdominal:      Palpations: Abdomen is soft.      Tenderness: There is no abdominal tenderness. There is no guarding or rebound.   Genitourinary:     Comments: Schwartz in place  Musculoskeletal:         General: Normal range of motion.      Cervical back: Normal range of motion and neck supple.      Right lower leg: No edema.      Left lower leg: No edema.   Lymphadenopathy:      Cervical: No cervical adenopathy.   Skin:     General: Skin is warm and dry.      Capillary Refill: Capillary refill takes 2 to 3 seconds.      Findings: No rash.   Neurological:      Mental Status: He is alert.       Cranial Nerves: No cranial nerve deficit.      Sensory: No sensory deficit.      Motor: No weakness.      Comments: Restless and agitated at time, moving all x 4, not following    Psychiatric:      Comments: Unable to assess         Medications  Current Facility-Administered Medications   Medication Dose Route Frequency Provider Last Rate Last Admin    chlorothiazide (Diuril) 500 mg in NS 50 mL IVPB  500 mg Intravenous Once Luke Estrada M.D.        potassium bicarbonate (Klyte) effervescent tablet 25 mEq  25 mEq Enteral Tube Once Luke Estrada M.D.        famotidine (Pepcid) tablet 20 mg  20 mg Enteral Tube BID Luke Estrada M.D.   20 mg at 01/30/24 0511    Or    famotidine (Pepcid) injection 20 mg  20 mg Intravenous BID Luke Estrada M.D.        hydrALAZINE (Apresoline) injection 10 mg  10 mg Intravenous Q6HRS PRN Luke Estrada M.D.   10 mg at 01/29/24 1715    midodrine (Proamatine) tablet 10 mg  10 mg Enteral Tube Q8HRS Allison Moura M.D.   10 mg at 01/30/24 0511    Respiratory Therapy Consult   Nebulization Continuous RT Donnie Babin M.D.        senna-docusate (Pericolace Or Senokot S) 8.6-50 MG per tablet 2 Tablet  2 Tablet Enteral Tube BID Donnie Babin M.D.   2 Tablet at 01/30/24 0508    And    polyethylene glycol/lytes (Miralax) Packet 1 Packet  1 Packet Enteral Tube QDAY PRN Donnie Babin M.D.   1 Packet at 01/30/24 0511    And    magnesium hydroxide (Milk Of Magnesia) suspension 30 mL  30 mL Enteral Tube QDAY PRN Donnie Babin M.D.   30 mL at 01/30/24 0511    And    bisacodyl (Dulcolax) suppository 10 mg  10 mg Rectal QDAY PRN Donnie Babin M.D.        MD Alert...ICU Electrolyte Replacement per Pharmacy   Other PHARMACY TO DOSE Donnie Babin M.D.        lidocaine (Xylocaine) 1 % injection 2 mL  2 mL Tracheal Tube Q30 MIN PRN Donnie Babin M.D.        dexmedetomidine (PRECEDEX) 400 mcg/100mL NS premix infusion  0-1.5 mcg/kg/hr (Ideal) Intravenous  Continuous Donnie Babin M.D. 27.4 mL/hr at 01/30/24 0549 1.5 mcg/kg/hr at 01/30/24 0549    fentaNYL (Sublimaze) injection 25 mcg  25 mcg Intravenous Q HOUR PRN Donnie Babin M.D.        Or    fentaNYL (Sublimaze) injection 50 mcg  50 mcg Intravenous Q HOUR PRN Donnie Babin M.D.        Or    fentaNYL (Sublimaze) injection 100 mcg  100 mcg Intravenous Q HOUR PRN Donnie Babin M.D.   100 mcg at 01/30/24 0539    piperacillin-tazobactam (Zosyn) 4.5 g in  mL IVPB  4.5 g Intravenous Q8HRS Donnie Babin M.D. 25 mL/hr at 01/30/24 0445 4.5 g at 01/30/24 0445    acetaminophen (Tylenol) tablet 650 mg  650 mg Enteral Tube Q6HRS PRN Ag Dixon M.D.   650 mg at 01/24/24 2350    insulin regular (HumuLIN R,NovoLIN R) injection  2-9 Units Subcutaneous Q6HRS Ag Dixon M.D.   2 Units at 01/30/24 0517    And    dextrose 10 % BOLUS 25 g  25 g Intravenous Q15 MIN PRN Ag Dixon M.D.        apixaban (Eliquis) tablet 5 mg  5 mg Enteral Tube BID Manuel Antoine M.D.   5 mg at 01/30/24 0512    Pharmacy Consult: Enteral tube insertion - review meds/change route/product selection  1 Each Other PHARMACY TO DOSE Rebecca Holman M.D.           Fluids    Intake/Output Summary (Last 24 hours) at 1/30/2024 0805  Last data filed at 1/30/2024 0600  Gross per 24 hour   Intake 2100.23 ml   Output 1495 ml   Net 605.23 ml       Laboratory  Recent Labs     01/28/24  0412   ISTATAPH 7.424   ISTATAPCO2 39.6*   ISTATAPO2 86   ISTATATCO2 27   VADFLEQ9QWN 97   ISTATARTHCO3 25.9*   ISTATARTBE 1   ISTATTEMP 95.7 F   ISTATFIO2 30   ISTATSPEC Arterial   ISTATAPHTC 7.448   [id number] 78         Recent Labs     01/28/24  0147 01/28/24  1036 01/29/24  0251 01/30/24  0400   SODIUM 147* 149* 149* 150*   POTASSIUM 3.1* 4.3 3.4* 3.8   CHLORIDE 112 116* 116* 117*   CO2 27 27 26 22   BUN 46* 40* 38* 33*   CREATININE 1.64* 1.31 1.22 1.10   MAGNESIUM 2.4  --  2.4 2.3   PHOSPHORUS 2.4*  --  3.1 3.0   CALCIUM 8.7 8.8 8.7 8.9      Recent Labs     01/28/24  1036 01/29/24  0251 01/30/24  0400   ALTSGPT 52* 46 35   ASTSGOT 21 16 17   ALKPHOSPHAT 118* 113* 114*   TBILIRUBIN 0.3 0.3 0.2   GLUCOSE 214* 94 178*     Recent Labs     01/28/24  0147 01/28/24  1036 01/29/24  0251 01/30/24  0400   WBC 13.1*  --  6.6 5.9   NEUTSPOLYS 78.90*  --  77.40* 73.40*   LYMPHOCYTES 13.80*  --  15.50* 15.70*   MONOCYTES 6.00  --  6.10 6.30   EOSINOPHILS 0.00  --  0.00 3.60   BASOPHILS 0.40  --  0.50 0.70   ASTSGOT  --  21 16 17   ALTSGPT  --  52* 46 35   ALKPHOSPHAT  --  118* 113* 114*   TBILIRUBIN  --  0.3 0.3 0.2     Recent Labs     01/28/24  0147 01/29/24  0251 01/30/24  0400   RBC 2.97* 3.13* 3.13*   HEMOGLOBIN 8.9* 9.3* 9.3*   HEMATOCRIT 28.4* 29.8* 31.5*   PLATELETCT 581* 518* 498*       Imaging  MRI brain 1/14:  1.  Redemonstrated is scattered supratentorial and infratentorial areas of ischemia. No new lesions. There is increased conspicuity of the RIGHT frontal lobe lesion which could be due to recurrent ischemia or seizure activity.  2.  Unchanged RIGHT cerebellar hematoma evacuation cavity. As previously noted underlying mass is not excluded and follow-up MRI without and with contrast in approximately weeks is recommended.  3.  No new hemorrhage  4.  Atrophy  5.  White matter changes  6.  Small BILATERAL mastoid effusions    Assessment/Plan  * Acute respiratory failure with hypoxia (HCC)- (present on admission)  Assessment & Plan  Intubated date: 1/9/2024-1/11, 1/13-1/20, 1/25-?  Goal saturation > 90%  Monitor ventilator waveforms and titrate flow/peep and volumes according.   Lung protective ventilation strategy w/ A-F bundle  CXR: monitor lung volumes and tube/line placement  VAP bundle prevention, oral care, post pyloric feeding  Head of bed > 30 degree  GI prophylaxis  Respiratory treatments: prn  SAT/SBT ongoing, but not following for liberation      Septic shock (HCC)  Assessment & Plan  This is Septic shock Not present on admission  SIRS  criteria identified on my evaluation include: Tachycardia, with heart rate greater than 90 BPM and Tachypnea, with respirations greater than 20 per minute  Clinical indicators of end organ dysfunction include Hypotension with decrease in systolic blood pressure of more than 40mmHg and Acute Respiratory Failure - (mechanical ventilation or BiPap or PaO2/FiO2 ratio < 300)  Indicators of septic shock include: Sepsis present and persistent hypotension despite fluid resuscitation   Sources is: aspiration pna  Sepsis protocol initiated  Crystalloid Fluid Administration: Fluid resuscitation ordered per standard protocol - 30 mL/kg per current or ideal body weight  IV antibiotics as appropriate for source of sepsis  Reassessment: I have reassessed the patient's hemodynamic status    Empiric zosyn  Follow cultures  Vasopressors for MAP > 65    Assessment & Plan  Shock state resolving  Maintain intravascular euvolemia  Titrate norepinephrine for map > 65        A-fib (HCC)  Assessment & Plan  LVH on 12/27 echo with diastolic dysfunction  Eliquis prophylaxis for CVA  Continue to optimize electrolytes, needs further potassium repletion  Continue cardiac monitoring.    Encephalopathy  Assessment & Plan  Toxic metabolic encephalopathy    1/20 extubated.   1/23 started valproic acid.   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)  Assessment & Plan  S/p evacuation on 12/27 by Dr Patten  SBP < 160  CT head stable no acute edema  MRI reviewed from 1/14  Normonatremia remains the goal    Aspiration  pneumonia (HCC)- (present on admission)  Assessment & Plan  Ongoing aspiration    Zosyn/Unasyn 10 day regimen completed 01/14   Tx FOB 1/19 with copious secretions, 1/25 with large amounts  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 In review of yesterday's note (1/29/2024), there are no changes except as documented above.     Discussed patient condition and risk of morbidity and/or mortality with RN, RT, Pharmacy, [employer] resident, and Charge nurse / hot rounds      Critical care time = 58 minutes in directly providing and coordinating critical care and extensive data review.  No time overlap and excludes procedures.

## 2024-01-30 NOTE — PROGRESS NOTES
Daughter at bedside.  Dr. Headley contacted to meet with daughter as requested.      Discussion regarding goals of care approached with RN, MD Headley, and daughter at bedside.    Daughter was not fully aware of the patient being re-intubated for a third time on the evening of 1/26, until she called for an update on the night of 1/27.  Daughter had previously discussed not wanting to put the endotracheal tube back in place if the patient was not going to be able to sustain off the breathing machine.  Daughter understanding to the emergency of the situation.    Discussed at length the options moving forward with trache discussion as well as extubating without any future intention of re-intubation.    Trache is something that the daughter does not seem to be fitting for her father, but will discuss this matter with the patient's other [place] this [place].      Daughter would like to plan to meet with the Palliative Care MD tomorrow 1/30 around 16:30 again at bedside to discuss the plan of care that she and patients additional daughter will confirm this evening mutually.     Dr. Headley will update Dr. Estrada of plan to meet and discuss patient & family goals.

## 2024-01-31 NOTE — PROGRESS NOTES
Bedside report received from CHARLIE Dukes. Assumed patient care. No signs of distress at this time. Tele monitor on, rhythm and monitor summary verified. All lines IV traced, medications and rates verified. Safety precautions in place.

## 2024-01-31 NOTE — PROGRESS NOTES
Attempted to contact Jaymie, . Onur daughter, but no one answered x 2 today. Left a voicemail, I will hopefully be able to speak with her soon regarding the plan of care for her father.     Luke Estrada M.D.

## 2024-01-31 NOTE — CARE PLAN
Problem: Ventilation  Goal: Ability to achieve and maintain unassisted ventilation or tolerate decreased levels of ventilator support  Description: Target End Date:  4 days     Document on Vent flowsheet    1.  Support and monitor invasive and noninvasive mechanical ventilation  2.  Monitor ventilator weaning response  3.  Perform ventilator associated pneumonia prevention interventions  4.  Manage ventilation therapy by monitoring diagnostic test results  Outcome: Progressing  Note:   Ventilator Daily Summary    Vent Day # 4  Airway: 7.5 @ 25    Ventilator settings: APVCMV 16, 440, 8, 30%  Weaning trials: Pt failed AM SBT but passed PM SBT. Could not pull parameters per the pt was agitated.       Plan: Continue current ventilator settings and wean mechanical ventilation as tolerated per physician orders.

## 2024-01-31 NOTE — CARE PLAN
Problem: Ventilation  Goal: Ability to achieve and maintain unassisted ventilation or tolerate decreased levels of ventilator support  Description: Target End Date:  4 days     Document on Vent flowsheet    1.  Support and monitor invasive and noninvasive mechanical ventilation  2.  Monitor ventilator weaning response  3.  Perform ventilator associated pneumonia prevention interventions  4.  Manage ventilation therapy by monitoring diagnostic test results  Outcome: Progressing     Ventilator Daily Summary    Vent Day #5  Airway: 7.5/25    Ventilator settings: 16/440/8/30  Weaning trials:   Respiratory Procedures:     Plan: Continue current ventilator settings and wean mechanical ventilation as tolerated per physician orders.

## 2024-01-31 NOTE — PROGRESS NOTES
Critical Care Progress Note    Date of admission  1/4/2024    Chief Complaint  78 y.o. male admitted 1/4/2024 with acute hypoxic respiratory failure    Hospital Course  Mr. 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  Unfortunately, the patient was readmitted to [facility] on 1/4/2024 for altered mental status and hypoxia.  He was admitted to the medicine services with hypernatremia, dehydration, and ERWIN with urinary retention.  He was found to be silently aspirating.  On 1/9/2024, the patient went into AF with RVR with worsening hypoxia and mental status changes.  A code stroke was called.  While in the CT scanner the patient was not protecting his airway and therefore was intubated.  He had a PEA arrest shortly afterward.  He was transferred to the ICU.  The patient was extubated on 1/11/2024 and required HFNC at 45L at 70% on 1/12.  Unfortunately, the patient was reintubated on 1/13.  He remained intubated until 1/20/2024 and then extubated to HFNC at 40L/70%.  He was transferred to IMCU on 1/25.  On the evening of 1/26 the critical care team was reconsulted due to the patient becoming suddenly tachycardic in the 140-150s with hypotension.  He was given IVF and a dose of phenylephrine.  He was not protecting his airway and required emergent intubation and bronchoscopy then transferred back to the ICU.  1/27 - VD2, agitated with SAT, levo infusing, trial SBT, creat 1.88, Na 149-->1/2 NS infusing  1/28 VD 3 agitated on the vent  1/29 VD 4; pall medicine consult  1/30 VD 5; MRI brain stable  1/31 VD 6; remains encephalopathic       Interval Problem Update  Reviewed last 24 hour events:   VD 6 8/30%   Precedex    Home DOAC   Diuril x 1    q4h   MRI brain unchanged    Ongoing goals of care - one way extubation vs trach    Review of Systems  Review of Systems   Unable to perform ROS:  Intubated        Vital Signs for last 24 hours   Temp:  [35.8 °C (96.5 °F)-36.9 °C (98.5 °F)] 36.9 °C (98.5 °F)  Pulse:  [43-91] 76  Resp:  [] 126  BP: ()/() 143/81  SpO2:  [86 %-100 %] 98 %    Hemodynamic parameters for last 24 hours       Respiratory Information for the last 24 hours  Vent Mode: APVCMV  Rate (breaths/min): 16  Vt Target (mL): 440  PEEP/CPAP: 8  P Support: 5  MAP: 11  Length of Weaning Trial (Hours): .20  Control VTE (exp VT): 646    Physical Exam   Physical Exam  Vitals and nursing note reviewed.   Constitutional:       General: He is not in acute distress.     Appearance: He is ill-appearing. He is not toxic-appearing.   HENT:      Head: Normocephalic and atraumatic.      Right Ear: External ear normal.      Left Ear: External ear normal.      Nose: Nose normal. No rhinorrhea.      Mouth/Throat:      Mouth: Mucous membranes are moist.      Comments: ETT in place  Eyes:      General: No scleral icterus.     Conjunctiva/sclera: Conjunctivae normal.      Pupils: Pupils are equal, round, and reactive to light.   Cardiovascular:      Rate and Rhythm: Normal rate and regular rhythm.      Heart sounds: No murmur heard.  Pulmonary:      Breath sounds: No wheezing.      Comments: Breathing comfortably on the ventilator, clear   Chest:      Chest wall: No tenderness.   Abdominal:      Palpations: Abdomen is soft.      Tenderness: There is no abdominal tenderness. There is no guarding or rebound.   Genitourinary:     Comments: Schwartz in place  Musculoskeletal:         General: Normal range of motion.      Cervical back: Normal range of motion and neck supple.      Right lower leg: No edema.      Left lower leg: No edema.   Lymphadenopathy:      Cervical: No cervical adenopathy.   Skin:     General: Skin is warm and dry.      Capillary Refill: Capillary refill takes 2 to 3 seconds.      Findings: No rash.   Neurological:      Mental Status: He is alert.      Cranial Nerves: No cranial nerve  deficit.      Sensory: No sensory deficit.      Motor: No weakness.      Comments: Restless and agitated at time, moving all x 4, not following    Psychiatric:      Comments: Unable to assess         Medications  Current Facility-Administered Medications   Medication Dose Route Frequency Provider Last Rate Last Admin    famotidine (Pepcid) tablet 20 mg  20 mg Enteral Tube BID Luke Estrada M.D.   20 mg at 01/31/24 0506    Or    famotidine (Pepcid) injection 20 mg  20 mg Intravenous BID Luke Estrada M.D.   20 mg at 01/30/24 1600    hydrALAZINE (Apresoline) injection 10 mg  10 mg Intravenous Q6HRS PRN Luke Estrada M.D.   10 mg at 01/31/24 0116    midodrine (Proamatine) tablet 10 mg  10 mg Enteral Tube Q8HRS SHILPI [email or web address]   10 mg at 01/30/24 2226    Respiratory Therapy Consult   Nebulization Continuous RT Donnie Babin M.D.        senna-docusate (Pericolace Or Senokot S) 8.6-50 MG per tablet 2 Tablet  2 Tablet Enteral Tube BID Donnie Babin M.D.   2 Tablet at 01/30/24 1600    And    polyethylene glycol/lytes (Miralax) Packet 1 Packet  1 Packet Enteral Tube QDAY PRN Donnie Babin M.D.   1 Packet at 01/30/24 1559    And    magnesium hydroxide (Milk Of Magnesia) suspension 30 mL  30 mL Enteral Tube QDAY PRN Donnie Babin M.D.   30 mL at 01/30/24 0511    And    bisacodyl (Dulcolax) suppository 10 mg  10 mg Rectal QDAY PRN Donnie Babin M.D. MD Alert...ICU Electrolyte Replacement per Pharmacy   Other PHARMACY TO DOSE Donnie Babin M.D.        lidocaine (Xylocaine) 1 % injection 2 mL  2 mL Tracheal Tube Q30 MIN PRN Donnie Babin M.D.        dexmedetomidine (PRECEDEX) 400 mcg/100mL NS premix infusion  0-1.5 mcg/kg/hr (Ideal) Intravenous Continuous Donnie Babin M.D.   Stopped at 01/31/24 0636    fentaNYL (Sublimaze) injection 25 mcg  25 mcg Intravenous Q HOUR PRN Donnie Babin M.D.        Or    fentaNYL (Sublimaze) injection 50 mcg  50 mcg Intravenous Q HOUR PRN  Donnie Babin M.D.   50 mcg at 01/30/24 1508    Or    fentaNYL (Sublimaze) injection 100 mcg  100 mcg Intravenous Q HOUR PRN Donnie Babin M.D.   100 mcg at 01/31/24 0801    acetaminophen (Tylenol) tablet 650 mg  650 mg Enteral Tube Q6HRS PRN Ag Dixon M.D.   650 mg at 01/24/24 2350    insulin regular (HumuLIN R,NovoLIN R) injection  2-9 Units Subcutaneous Q6HRS Ag Dixon M.D.   2 Units at 01/31/24 0031    And    dextrose 10 % BOLUS 25 g  25 g Intravenous Q15 MIN PRN Ag Dixon M.D.        apixaban (Eliquis) tablet 5 mg  5 mg Enteral Tube BID Manuel Antoine M.D.   5 mg at 01/31/24 0508    Pharmacy Consult: Enteral tube insertion - review meds/change route/product selection  1 Each Other PHARMACY TO DOSE Rebecca Holman M.D.           Fluids    Intake/Output Summary (Last 24 hours) at 1/31/2024 0933  Last data filed at 1/31/2024 0800  Gross per 24 hour   Intake 3879.77 ml   Output 2650 ml   Net 1229.77 ml       Laboratory            Recent Labs     01/29/24  0251 01/30/24  0400 01/31/24  0413   SODIUM 149* 150* 146*   POTASSIUM 3.4* 3.8 4.2   CHLORIDE 116* 117* 114*   CO2 26 22 24   BUN 38* 33* 30*   CREATININE 1.22 1.10 1.18   MAGNESIUM 2.4 2.3 2.1   PHOSPHORUS 3.1 3.0 3.2   CALCIUM 8.7 8.9 8.9     Recent Labs     01/29/24  0251 01/30/24  0400 01/31/24  0413   ALTSGPT 46 35 37   ASTSGOT 16 17 24   ALKPHOSPHAT 113* 114* 117*   TBILIRUBIN 0.3 0.2 <0.2   GLUCOSE 94 178* 110*     Recent Labs     01/29/24  0251 01/30/24  0400 01/31/24  0413   WBC 6.6 5.9 9.1   NEUTSPOLYS 77.40* 73.40* 78.60*   LYMPHOCYTES 15.50* 15.70* 13.00*   MONOCYTES 6.10 6.30 7.50   EOSINOPHILS 0.00 3.60 0.00   BASOPHILS 0.50 0.70 0.20   ASTSGOT 16 17 24   ALTSGPT 46 35 37   ALKPHOSPHAT 113* 114* 117*   TBILIRUBIN 0.3 0.2 <0.2     Recent Labs     01/29/24  0251 01/30/24  0400 01/31/24  0413   RBC 3.13* 3.13* 3.21*   HEMOGLOBIN 9.3* 9.3* 9.5*   HEMATOCRIT 29.8* 31.5* 30.9*   PLATELETCT 518* 498* 532*        Imaging  MRI brain 1/14:  1.  Redemonstrated is scattered supratentorial and infratentorial areas of ischemia. No new lesions. There is increased conspicuity of the RIGHT frontal lobe lesion which could be due to recurrent ischemia or seizure activity.  2.  Unchanged RIGHT cerebellar hematoma evacuation cavity. As previously noted underlying mass is not excluded and follow-up MRI without and with contrast in approximately weeks is recommended.  3.  No new hemorrhage  4.  Atrophy  5.  White matter changes  6.  Small BILATERAL mastoid effusions    Assessment/Plan  * Acute respiratory failure with hypoxia (HCC)- (present on admission)  Assessment & Plan  Intubated date: 1/9/2024-1/11, 1/13-1/20, 1/25-?  Goal saturation > 90%  Monitor ventilator waveforms and titrate flow/peep and volumes according.   Lung protective ventilation strategy w/ A-F bundle  CXR: monitor lung volumes and tube/line placement  VAP bundle prevention, oral care, post pyloric feeding  Head of bed > 30 degree  GI prophylaxis  Respiratory treatments: prn  SAT/SBT ongoing, but not following for liberation      Septic shock (HCC)  Assessment & Plan  This is Septic shock Not present on admission  SIRS criteria identified on my evaluation include: Tachycardia, with heart rate greater than 90 BPM and Tachypnea, with respirations greater than 20 per minute  Clinical indicators of end organ dysfunction include Hypotension with decrease in systolic blood pressure of more than 40mmHg and Acute Respiratory Failure - (mechanical ventilation or BiPap or PaO2/FiO2 ratio < 300)  Indicators of septic shock include: Sepsis present and persistent hypotension despite fluid resuscitation   Sources is: aspiration pna  Sepsis protocol initiated  Crystalloid Fluid Administration: Fluid resuscitation ordered per standard protocol - 30 mL/kg per current or ideal body weight  IV antibiotics as appropriate for source of sepsis  Reassessment: I have reassessed the  patient's hemodynamic status    Resolved     Assessment & Plan  Shock state resolving  Maintain intravascular euvolemia  Titrate norepinephrine for map > 65        A-fib (HCC)  Assessment & Plan  LVH on 12/27 echo with diastolic dysfunction  Eliquis prophylaxis for CVA  Continue to optimize electrolytes, needs further potassium repletion  Continue cardiac monitoring.    Encephalopathy  Assessment & Plan  Toxic metabolic encephalopathy    1/20 extubated.   1/23 started valproic acid.   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)  Assessment & Plan  S/p evacuation on 12/27 by Dr Patten  SBP < 160  MRI unchanged   Normonatremia remains the goal    Aspiration pneumonia (HCC)- (present on admission)  Assessment & Plan  Ongoing aspiration      Tx FOB 1/19 with copious secretions, 1/25 with large amounts  BAL and bronc wash cultures from 1/19 revealing corynebacterium stratum, 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 In review of yesterday's note (1/30/2024), there are no changes except as documented above.     Discussed patient condition and risk of morbidity and/or mortality with RN, RT, Pharmacy, [employer] [facility] resident, and  Charge nurse / hot rounds      Critical care time = 50 minutes in directly providing and coordinating critical care and extensive data review.  No time overlap and excludes procedures.

## 2024-01-31 NOTE — CARE PLAN
The patient is Watcher - Medium risk of patient condition declining or worsening    Shift Goals  Clinical Goals: RASS <2 >-2, oxygenation, hemodynamic stability  Patient Goals: DULCE MARIA  Family Goals: DULCE MARIA    Progress made toward(s) clinical / shift goals:    Problem: Hemodynamics  Goal: Patient's hemodynamics, fluid balance and neurologic status will be stable or improve  Outcome: Progressing     Problem: Fluid Volume  Goal: Fluid volume balance will be maintained  Outcome: Progressing     Problem: Urinary - Renal Perfusion  Goal: Ability to achieve and maintain adequate renal perfusion and functioning will improve  Outcome: Progressing     Problem: Safety - Medical Restraint  Goal: Remains free of injury from restraints (Restraint for Interference with Medical Device)  Outcome: Progressing     Problem: Pain - Standard  Goal: Alleviation of pain or a reduction in pain to the patient’s comfort goal  Outcome: Progressing       Patient is not progressing towards the following goals:

## 2024-01-31 NOTE — CARE PLAN
The patient is Stable - Low risk of patient condition declining or worsening    Shift Goals  Clinical Goals: extubate, goals of care  Patient Goals: DULCE MARIA  Family Goals: DULCE MARIA    Progress made toward(s) clinical / shift goals:    Problem: Skin Integrity  Goal: Skin integrity is maintained or improved  Description: Target End Date:  Prior to discharge or change in level of care    Document interventions on Skin Risk/Navarro flowsheet groups and corresponding LDA    1.  Assess and monitor skin integrity, appearance and/or temperature  2.  Assess risk factors for impaired skin integrity and/or pressures ulcers  3.  Implement precautions to protect skin integrity in collaboration with interdisciplinary team  4.  Implement pressure ulcer prevention protocol if at risk for skin breakdown  5.  Confirm wound care consult if at risk for skin breakdown  6.  Ensure patient use of pressure relieving devices  (Low air loss bed, waffle overlay, heel protectors, ROHO cushion, etc)  Outcome: Progressing     Problem: Safety - Medical Restraint  Goal: Remains free of injury from restraints (Restraint for Interference with Medical Device)  Description: INTERVENTIONS:  1. Determine that other, less restrictive measures have been tried or would not be effective before applying the restraint  2. Evaluate the patient's condition at the time of restraint application  3. Educate patient/family regarding the reason for restraint  4. Q2H: Monitor safety, psychosocial status, comfort, circulation, respiratory status, LOC, nutrition and hydration  Outcome: Progressing  Flowsheets (Taken 1/28/2024 0223 by Jaimie Butterfield, R.N.)  Addressed this shift: Remains free of injury from restraints (restraint for interference with medical device):   Determine that other, less restrictive measures have been tried or would not be effective before applying the restraint   Evaluate the patient's condition at the time of restraint application   Inform  patient/family regarding the reason for restraint   Every 2 hours: Monitor safety, psychosocial status, comfort, nutrition and hydration  Goal: Free from restraint(s) (Restraint for Interference with Medical Device)  Description: INTERVENTIONS:  1.  ONCE/SHIFT or MINIMUM Q12H: Assess and document the continuing need for restraints  2.  Q24H: Continued use of restraint requires LIP to perform face to face examination and written order  3.  Identify and implement measures to help patient regain control  4.  Educate patient/family on discontinuation criteria   5.  Assess patient's understanding and retention of education provided  6.  Assess readiness for release & initiate progressive release per protocol  7.  Identify and document criteria for restraints  Outcome: Progressing  Goal: Remains free of injury from restraints (Restraint for Interference with Medical Device)  Description: INTERVENTIONS:  1. Determine that other, less restrictive measures have been tried or would not be effective before applying the restraint  2. Evaluate the patient's condition at the time of restraint application  3. Educate patient/family regarding the reason for restraint  4. Q2H: Monitor safety, psychosocial status, comfort, circulation, respiratory status, LOC, nutrition and hydration  Outcome: Progressing     Problem: Pain - Standard  Goal: Alleviation of pain or a reduction in pain to the patient’s comfort goal  Description: Target End Date:  Prior to discharge or change in level of care    Document on Vitals flowsheet    1.  Document pain using the appropriate pain scale per order or unit policy  2.  Educate and implement non-pharmacologic comfort measures (i.e. relaxation, distraction, massage, cold/heat therapy, etc.)  3.  Pain management medications as ordered  4.  Reassess pain after pain med administration per policy  5.  If opiods administered assess patient's response to pain medication is appropriate per POSS sedation  scale  6.  Follow pain management plan developed in collaboration with patient and interdisciplinary team (including palliative care or pain specialists if applicable)  Outcome: Progressing  Flowsheets  Taken 1/31/2024 0831 by Lauren Garcia RMACY  Critical-Care Pain Observation Score: 3  Taken 1/26/2024 0400 by Zakiya Winkler RMACY  Non Verbal Scale:   Calm   Unlabored Breathing  Taken 1/12/2024 0600 by Kristy Swanson R.N.  Pain Rating Scale (NPRS): 0  Taken 1/8/2024 0800 by Nisha Bales R.N.  PAINAD Score: 2       Patient is not progressing towards the following goals:

## 2024-02-01 NOTE — CARE PLAN
Problem: Ventilation  Goal: Ability to achieve and maintain unassisted ventilation or tolerate decreased levels of ventilator support  Description: Target End Date:  4 days     Document on Vent flowsheet    1.  Support and monitor invasive and noninvasive mechanical ventilation  2.  Monitor ventilator weaning response  3.  Perform ventilator associated pneumonia prevention interventions  4.  Manage ventilation therapy by monitoring diagnostic test results  Outcome: Progressing  Note:   Ventilator Daily Summary    Vent Day # 5  Airway: 7.5 @ 25 at the T    Ventilator settings: APVCMV 16, 440, 8, 30%  Weaning trials: Pt failed AM SBT after 20 minutes / Pt lasted 1 hr for PM SBT  Parameters: VC 1,000 ml, NIF -12, RSBI 43       Plan: Continue current ventilator settings and wean mechanical ventilation as tolerated per physician orders.

## 2024-02-01 NOTE — THERAPY
Physical Therapy   Daily Treatment     Patient Name: Lewis Mohr  Age:  78 y.o., Sex:  male  Medical Record #: [id number]  Today's Date: 1/31/2024     Precautions  Precautions: Fall Risk;Nasogastric Tube  Comments: ETT    Assessment    Pt min alert, RN stated that she gave the pt some sedation medication due to pt reaching for tubes this AM. Assisted pt to EOB and assisted with PROM Lower extremities. Pt initially very rigid, improved with reps. Pt not participatory and required total assist with all mobility. Pt's BP decreased, assisted pt back to bed. MD attempting to call daughter concerning POC.    Plan    Treatment Plan Status: Continue Current Treatment Plan  Type of Treatment: Bed Mobility, Equipment, Gait Training, Neuro Re-Education / Balance, Self Care / Home Evaluation, Therapeutic Activities, Therapeutic Exercise  Treatment Frequency: 2 Times per Week  Treatment Duration: Until Therapy Goals Met    DC Equipment Recommendations: Unable to determine at this time  Discharge Recommendations: Recommend post-acute placement for additional physical therapy services prior to discharge home      Subjective    Pt resting in bed, bilat wrist restrained.      Objective       01/31/24 1419   Precautions   Precautions Fall Risk;Nasogastric Tube   Comments ETT   Vitals   Vitals Comments Pt's BP decreased at EOB, assisted back to bed, BP decreased more, RN aware and in room assisting with meds at end of visit   Cognition    Cognition / Consciousness X   Speech/ Communication Unable to Communicate  (ETT)   Level of Consciousness Responds to voice   Ability To Follow Commands Unable to Follow 1 Step Commands   Safety Awareness Impaired;Impulsive   Attention Impaired   Initiation Impaired   Comments upon entry, pt trying to reach to tubes (restrained), min eye opening during visit, min participation, RN stated that pt was agitated this AM and she gave pt some sedation medications   Passive ROM Lower Body   Comments pt with  rigidity initially in bilat LE, PROM improved with time   Active ROM Lower Body    Comments spontaneous movements while in bed   Sitting Lower Body Exercises   Comments provided PROM 10 reps DF/PF, knee ext/flex, hip flexion   Vision   Vision Comments min eye opening   Balance   Sitting Balance (Static) Trace +   Sitting Balance (Dynamic) Dependent   Weight Shift Sitting Absent   Skilled Intervention Facilitation;Verbal Cuing   Comments pt with limited particiaption with EOB sitting balance   Bed Mobility    Supine to Sit Total Assist   Sit to Supine Total Assist   Scooting Total Assist   Rolling Total Assist to Rt.;Total Assist to Lt.   Skilled Intervention Verbal Cuing;Facilitation;Compensatory Strategies   Functional Mobility   Sit to Stand Unable to Participate   Skilled Intervention Facilitation;Verbal Cuing   How much difficulty does the patient currently have...   Turning over in bed (including adjusting bedclothes, sheets and blankets)? 1   Sitting down on and standing up from a chair with arms (e.g., wheelchair, bedside commode, etc.) 1   Moving from lying on back to sitting on the side of the bed? 1   How much help from another person does the patient currently need...   Moving to and from a bed to a chair (including a wheelchair)? 1   Need to walk in a hospital room? 1   Climbing 3-5 steps with a railing? 1   6 clicks Mobility Score 6   Activity Tolerance   Sitting Edge of Bed 6 minutes   Comments limited activity tolerance due to limited alertness and participation, decrease in BP   Short Term Goals    Short Term Goal # 1 Pt will perform bed mobility with min A to progress function in 6 visits.   Goal Outcome # 1 goal not met   Short Term Goal # 2 Pt will perform sit<>stand with min A to progress towards functional transfers in 6 visits.   Goal Outcome # 2 Goal not met   Short Term Goal # 3 Pt will maintain seated balance at EOB with standby assist for 2 min to progress function in 6 visits.   Goal  Outcome # 3 Goal not met   Education Group   Education Provided Role of Physical Therapist   Role of Physical Therapist Patient Response Patient;Nonacceptance;Explanation;No Learning Evidence   Physical Therapy Treatment Plan   Physical Therapy Treatment Plan Continue Current Treatment Plan   Treatment Plan  Bed Mobility;Equipment;Gait Training;Neuro Re-Education / Balance;Self Care / Home Evaluation;Therapeutic Activities;Therapeutic Exercise   Treatment Frequency 2 Times per Week   Duration Until Therapy Goals Met   Anticipated Discharge Equipment and Recommendations   DC Equipment Recommendations Unable to determine at this time   Discharge Recommendations Recommend post-acute placement for additional physical therapy services prior to discharge home   Interdisciplinary Plan of Care Collaboration   IDT Collaboration with  Nursing;Occupational Therapist   Patient Position at End of Therapy In Bed;Bed Alarm On;Call Light within Reach;Tray Table within Reach;Wrist Restraints Applied   Collaboration Comments RN assisted during mobility   Session Information   Date / Session Number  1/31 4(1/2, 2/4)

## 2024-02-01 NOTE — PROGRESS NOTES
Bedside report received from CHARLIE López. Assumed patient care. No signs of distress at this time. Tele monitor on, rhythm and monitor summary verified. All lines IV traced, medications and rates verified. Safety precautions in place.

## 2024-02-01 NOTE — PROGRESS NOTES
Critical Care Progress Note    Date of admission  1/4/2024    Chief Complaint  78 y.o. male admitted 1/4/2024 with acute hypoxic respiratory failure    Hospital Course  Mr. 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  Unfortunately, the patient was readmitted to [facility] on 1/4/2024 for altered mental status and hypoxia.  He was admitted to the medicine services with hypernatremia, dehydration, and ERWIN with urinary retention.  He was found to be silently aspirating.  On 1/9/2024, the patient went into AF with RVR with worsening hypoxia and mental status changes.  A code stroke was called.  While in the CT scanner the patient was not protecting his airway and therefore was intubated.  He had a PEA arrest shortly afterward.  He was transferred to the ICU.  The patient was extubated on 1/11/2024 and required HFNC at 45L at 70% on 1/12.  Unfortunately, the patient was reintubated on 1/13.  He remained intubated until 1/20/2024 and then extubated to HFNC at 40L/70%.  He was transferred to IMCU on 1/25.  On the evening of 1/26 the critical care team was reconsulted due to the patient becoming suddenly tachycardic in the 140-150s with hypotension.  He was given IVF and a dose of phenylephrine.  He was not protecting his airway and required emergent intubation and bronchoscopy then transferred back to the ICU.  1/27 - VD2, agitated with SAT, levo infusing, trial SBT, creat 1.88, Na 149-->1/2 NS infusing  1/28 VD 3 agitated on the vent  1/29 VD 4; pall medicine consult  1/30 VD 5; MRI brain stable  1/31 VD 6; remains encephalopathic   2/1 VD 7; significantly improved mental status      Interval Problem Update  Reviewed last 24 hour events:   Improved mental status  VD 7 8/30%   Precedex    Home DOAC   Improved Na level   Lasix 10 mg IV qd    cc q4h (down from 250 cc)   MRI brain  unchanged    Attempted to contact Jaymie at 8:30 AM, VM left. Called again around 11 AM without answer.     He passed SAT/SBT and was successfully extubated. Ideally today we could formulate a plan with family regarding reintubation if he fails.     Review of Systems  Review of Systems   Unable to perform ROS: Intubated        Vital Signs for last 24 hours   Temp:  [36.1 °C (96.9 °F)-36.9 °C (98.5 °F)] 36.4 °C (97.5 °F)  Pulse:  [52-91] 53  Resp:  [15-43] 16  BP: ()/(47-91) 155/78  SpO2:  [92 %-100 %] 97 %    Hemodynamic parameters for last 24 hours       Respiratory Information for the last 24 hours  Vent Mode: APVCMV  Rate (breaths/min): 16  Vt Target (mL): 440  PEEP/CPAP: 8  P Support: 50  MAP: 9.6  Length of Weaning Trial (Hours): 1hr 8min  Control VTE (exp VT): 407    Physical Exam   Physical Exam  Vitals and nursing note reviewed.   Constitutional:       General: He is not in acute distress.     Appearance: He is ill-appearing. He is not toxic-appearing.   HENT:      Head: Normocephalic and atraumatic.      Right Ear: External ear normal.      Left Ear: External ear normal.      Nose: Nose normal. No rhinorrhea.      Mouth/Throat:      Mouth: Mucous membranes are moist.      Comments: ETT in place  Eyes:      General: No scleral icterus.     Conjunctiva/sclera: Conjunctivae normal.      Pupils: Pupils are equal, round, and reactive to light.   Cardiovascular:      Rate and Rhythm: Normal rate and regular rhythm.      Heart sounds: No murmur heard.  Pulmonary:      Comments: Equal and symmetric breath sounds with mechanical ventilation  Abdominal:      Palpations: Abdomen is soft.      Tenderness: There is no abdominal tenderness. There is no guarding or rebound.   Genitourinary:     Comments: Schwartz in place  Musculoskeletal:         General: Normal range of motion.      Cervical back: Normal range of motion and neck supple.      Right lower leg: No edema.      Left lower leg: No edema.   Lymphadenopathy:       Cervical: No cervical adenopathy.   Skin:     General: Skin is warm and dry.      Capillary Refill: Capillary refill takes less than 2 seconds.      Findings: No rash.   Neurological:      Mental Status: He is alert.      Cranial Nerves: No cranial nerve deficit.      Sensory: No sensory deficit.      Motor: No weakness.      Comments: Awake and alert, purposeful, regards, moves all 4 extremities    Psychiatric:      Comments: Unable to assess         Medications  Current Facility-Administered Medications   Medication Dose Route Frequency Provider Last Rate Last Admin    famotidine (Pepcid) tablet 20 mg  20 mg Enteral Tube BID Luke Estrada M.D.   20 mg at 02/01/24 0531    Or    famotidine (Pepcid) injection 20 mg  20 mg Intravenous BID Luke Estrada M.D.   20 mg at 01/30/24 1600    hydrALAZINE (Apresoline) injection 10 mg  10 mg Intravenous Q6HRS PRN Luke Estrada M.D.   10 mg at 01/31/24 0116    midodrine (Proamatine) tablet 10 mg  10 mg Enteral Tube Q8HRS Elsy Homlan D.O.   10 mg at 02/01/24 0530    Respiratory Therapy Consult   Nebulization Continuous RT Donnie Babin M.D.        senna-docusate (Pericolace Or Senokot S) 8.6-50 MG per tablet 2 Tablet  2 Tablet Enteral Tube BID Donnie Babin M.D.   2 Tablet at 01/30/24 1600    And    polyethylene glycol/lytes (Miralax) Packet 1 Packet  1 Packet Enteral Tube QDAY PRN Donnie Babin M.D.   1 Packet at 01/30/24 1559    And    magnesium hydroxide (Milk Of Magnesia) suspension 30 mL  30 mL Enteral Tube QDAY PRN Donnie Babin M.D.   30 mL at 01/30/24 0511    And    bisacodyl (Dulcolax) suppository 10 mg  10 mg Rectal QDAY PRN Donnie Babin M.D. MD Alert...ICU Electrolyte Replacement per Pharmacy   Other PHARMACY TO DOSE Donnie Babin M.D.        lidocaine (Xylocaine) 1 % injection 2 mL  2 mL Tracheal Tube Q30 MIN PRN Donnie Babin M.D.        dexmedetomidine (PRECEDEX) 400 mcg/100mL NS premix infusion  0-1.5 mcg/kg/hr  (Ideal) Intravenous Continuous Donnie Babin M.D.   Stopped at 02/01/24 0623    fentaNYL (Sublimaze) injection 25 mcg  25 mcg Intravenous Q HOUR PRN Donnie Babin M.D.        Or    fentaNYL (Sublimaze) injection 50 mcg  50 mcg Intravenous Q HOUR PRN Donnie Babin M.D.   50 mcg at 01/31/24 1630    Or    fentaNYL (Sublimaze) injection 100 mcg  100 mcg Intravenous Q HOUR PRN Donnie Babin M.D.   100 mcg at 02/01/24 0435    acetaminophen (Tylenol) tablet 650 mg  650 mg Enteral Tube Q6HRS PRN Ag Dixon M.D.   650 mg at 01/24/24 2350    insulin regular (HumuLIN R,NovoLIN R) injection  2-9 Units Subcutaneous Q6HRS Ag Dixon M.D.   2 Units at 02/01/24 0529    And    dextrose 10 % BOLUS 25 g  25 g Intravenous Q15 MIN PRN Ag Dixon M.D.        apixaban (Eliquis) tablet 5 mg  5 mg Enteral Tube BID Manuel Antoine M.D.   5 mg at 02/01/24 0531    Pharmacy Consult: Enteral tube insertion - review meds/change route/product selection  1 Each Other PHARMACY TO DOSE Rebecca Holman M.D.           Fluids    Intake/Output Summary (Last 24 hours) at 2/1/2024 0710  Last data filed at 2/1/2024 0637  Gross per 24 hour   Intake 3829.76 ml   Output 2720 ml   Net 1109.76 ml         Laboratory            Recent Labs     01/30/24  0400 01/31/24  0413 02/01/24  0315   SODIUM 150* 146* 142   POTASSIUM 3.8 4.2 4.4   CHLORIDE 117* 114* 108   CO2 22 24 24   BUN 33* 30* 27*   CREATININE 1.10 1.18 1.19   MAGNESIUM 2.3 2.1 2.1   PHOSPHORUS 3.0 3.2 3.5   CALCIUM 8.9 8.9 8.7       Recent Labs     01/30/24  0400 01/31/24  0413 02/01/24  0315   ALTSGPT 35 37 46   ASTSGOT 17 24 28   ALKPHOSPHAT 114* 117* 124*   TBILIRUBIN 0.2 <0.2 <0.2   GLUCOSE 178* 110* 183*       Recent Labs     01/30/24  0400 01/31/24  0413 02/01/24  0315   WBC 5.9 9.1 8.3   NEUTSPOLYS 73.40* 78.60* 68.90   LYMPHOCYTES 15.70* 13.00* 20.60*   MONOCYTES 6.30 7.50 9.30   EOSINOPHILS 3.60 0.00 0.00   BASOPHILS 0.70 0.20 0.40   ASTSGOT 17 24 28    ALTSGPT 35 37 46   ALKPHOSPHAT 114* 117* 124*   TBILIRUBIN 0.2 <0.2 <0.2       Recent Labs     01/30/24  0400 01/31/24  0413 02/01/24  0315   RBC 3.13* 3.21* 3.03*   HEMOGLOBIN 9.3* 9.5* 9.0*   HEMATOCRIT 31.5* 30.9* 29.5*   PLATELETCT 498* 532* 461*         Imaging  MRI brain 1/14:  1.  Redemonstrated is scattered supratentorial and infratentorial areas of ischemia. No new lesions. There is increased conspicuity of the RIGHT frontal lobe lesion which could be due to recurrent ischemia or seizure activity.  2.  Unchanged RIGHT cerebellar hematoma evacuation cavity. As previously noted underlying mass is not excluded and follow-up MRI without and with contrast in approximately weeks is recommended.  3.  No new hemorrhage  4.  Atrophy  5.  White matter changes  6.  Small BILATERAL mastoid effusions    Assessment/Plan  * Acute respiratory failure with hypoxia (HCC)- (present on admission)  Assessment & Plan  Intubated date: 1/9/2024-1/11, 1/13-1/20, 1/25-?  Goal saturation > 90%  Monitor ventilator waveforms and titrate flow/peep and volumes according.   Lung protective ventilation strategy w/ A-F bundle  CXR: monitor lung volumes and tube/line placement  VAP bundle prevention, oral care, post pyloric feeding  Head of bed > 30 degree  GI prophylaxis  Respiratory treatments: prn  SAT/SBT ongoing, but not following for liberation      Septic shock (HCC)  Assessment & Plan  This is Septic shock Not present on admission  SIRS criteria identified on my evaluation include: Tachycardia, with heart rate greater than 90 BPM and Tachypnea, with respirations greater than 20 per minute  Clinical indicators of end organ dysfunction include Hypotension with decrease in systolic blood pressure of more than 40mmHg and Acute Respiratory Failure - (mechanical ventilation or BiPap or PaO2/FiO2 ratio < 300)  Indicators of septic shock include: Sepsis present and persistent hypotension despite fluid resuscitation   Sources is: aspiration  pna  Sepsis protocol initiated  Crystalloid Fluid Administration: Fluid resuscitation ordered per standard protocol - 30 mL/kg per current or ideal body weight  IV antibiotics as appropriate for source of sepsis  Reassessment: I have reassessed the patient's hemodynamic status    Resolved     Assessment & Plan  Shock state resolving  Maintain intravascular euvolemia  Titrate norepinephrine for map > 65        A-fib (HCC)  Assessment & Plan  LVH on 12/27 echo with diastolic dysfunction  Eliquis prophylaxis for CVA  Continue to optimize electrolytes, needs further potassium repletion  Continue cardiac monitoring.    Encephalopathy  Assessment & Plan  Toxic metabolic encephalopathy    1/20 extubated.   1/23 started valproic acid.   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)  Assessment & Plan  S/p evacuation on 12/27 by Dr Patten  SBP < 160  MRI unchanged   Normonatremia remains the goal    Aspiration pneumonia (HCC)- (present on admission)  Assessment & Plan  Ongoing aspiration      Tx FOB 1/19 with copious secretions, 1/25 with large amounts  BAL and bronc wash cultures from 1/19 revealing corynebacterium stratum, 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 In review of yesterday's note (1/31/2024), there are no changes except as documented above.     Discussed patient condition and risk of morbidity and/or mortality with RN, RT, Pharmacy, [employer] resident, and Charge nurse / hot rounds      Critical care time = 51 minutes in directly providing and coordinating critical care and extensive data review.  No time overlap and excludes procedures.

## 2024-02-01 NOTE — CARE PLAN
The patient is Watcher - Medium risk of patient condition declining or worsening    Shift Goals  Clinical Goals: Rass <+2 >-2, oxygenation  Patient Goals: DULCE MARIA  Family Goals: DULCE MARIA    Progress made toward(s) clinical / shift goals:    Problem: Hemodynamics  Goal: Patient's hemodynamics, fluid balance and neurologic status will be stable or improve  Outcome: Progressing     Problem: Fluid Volume  Goal: Fluid volume balance will be maintained  Outcome: Progressing     Problem: Knowledge Deficit - Standard  Goal: Patient and family/care givers will demonstrate understanding of plan of care, disease process/condition, diagnostic tests and medications  Outcome: Progressing     Problem: Safety - Medical Restraint  Goal: Remains free of injury from restraints (Restraint for Interference with Medical Device)  Outcome: Progressing     Problem: Pain - Standard  Goal: Alleviation of pain or a reduction in pain to the patient’s comfort goal  Outcome: Progressing       Patient is not progressing towards the following goals:

## 2024-02-01 NOTE — THERAPY
Occupational Therapy  Daily Treatment     Patient Name: Lewis Mohr  Age:  78 y.o., Sex:  male  Medical Record #: [id number]  Today's Date: 1/31/2024    Precautions: Fall Risk, Nasogastric Tube  Comments: intubated    Assessment    Pt seen for OT tx, limited by arousal state likely related to medications. Pt with minimal active participation, required totalA for bed mobility and sitting balance. Pt spontaneously moving BUEs intermittently but not following cues for purposeful movements at this time. Engagement potentially limited by medication to reduce impulsively pulling at ICU lines/tubes. Acute OT to continue to follow as long as it aligns with the goals of care.    Plan    Treatment Plan Status: Continue Current Treatment Plan  Type of Treatment: Self Care / Activities of Daily Living, Therapeutic Activity, Neuro Re-Education / Balance  Treatment Frequency: 2 Times per Week  Treatment Duration: Until Therapy Goals Met    DC Equipment Recommendations: Unable to determine at this time  Discharge Recommendations: Recommend post-acute placement for additional occupational therapy services prior to discharge home     Objective       01/31/24 1421   Precautions   Precautions Fall Risk;Nasogastric Tube   Vitals   Patient BP Position Supine   Blood Pressure  (!) 78/47   Vitals Comments 96/50 sitting EOB, 78/47 once returned to supine. RN present and adjusting medication as appropriate   Pain 0 - 10 Group   Therapist Pain Assessment Post Activity Pain Same as Prior to Activity;Nurse Notified;0  (no indication of pain)   Cognition    Cognition / Consciousness X   Speech/ Communication Unable to Communicate   Level of Consciousness Responds to voice   Ability To Follow Commands Unable to Follow 1 Step Commands   Safety Awareness Impaired;Impulsive   Attention Impaired   Initiation Impaired   Comments   (RN reports pt on dex 2/2 impulsivity)   Active ROM Upper Body   Active ROM Upper Body  X   Comments bilateral UE movement  appreciated but not to cues   Balance   Sitting Balance (Static) Trace +   Sitting Balance (Dynamic) Dependent   Weight Shift Sitting Absent   Skilled Intervention Facilitation;Tactile Cuing;Verbal Cuing   Comments lethargy likely impacted balance   Bed Mobility    Supine to Sit Total Assist   Sit to Supine Total Assist   Scooting Total Assist   Rolling Total Assist to Rt.;Total Assist to Lt.   Skilled Intervention Verbal Cuing;Tactile Cuing;Facilitation   Activities of Daily Living   Eating Total Assist   Grooming Total Assist   Upper Body Dressing Total Assist   Lower Body Dressing Total Assist   Toileting Total Assist   Skilled Intervention Verbal Cuing;Facilitation   How much help from another person does the patient currently need...   Putting on and taking off regular lower body clothing? 1   Bathing (including washing, rinsing, and drying)? 1   Toileting, which includes using a toilet, bedpan, or urinal? 1   Putting on and taking off regular upper body clothing? 1   Taking care of personal grooming such as brushing teeth? 1   Eating meals? 1   6 Clicks Daily Activity Score 6   Modified [place] (mRS)   Modified [place] Score 1   Functional Mobility   Sit to Stand Unable to Participate   Activity Tolerance   Sitting Edge of Bed 5-7min   Comments session terminated 2/2 low BP   Patient / Family Goals   Patient / Family Goal #1 none stated   Short Term Goals   Short Term Goal # 1 pt will wash face with a cloth mod A   Goal Outcome # 1 Goal not met   Short Term Goal # 2 pt will maintain sitting in midine with SBA for ADLs   Goal Outcome # 2 Goal not met   Short Term Goal # 3 mod A with UB dressing   Goal Outcome # 3 Goal not met   Short Term Goal # 4 mod A with ADL txfs   Goal Outcome # 4 Goal not met   Education Group   Education Provided Role of Occupational Therapist   Role of Occupational Therapist Patient Response Patient;Explanation;No Learning Evidence   Occupational Therapy Treatment Plan    O.T. Treatment  Plan Continue Current Treatment Plan   Anticipated Discharge Equipment and Recommendations   DC Equipment Recommendations Unable to determine at this time   Discharge Recommendations Recommend post-acute placement for additional occupational therapy services prior to discharge home

## 2024-02-02 NOTE — CARE PLAN
The patient is Watcher - Medium risk of patient condition declining or worsening    Shift Goals  Clinical Goals: Rass <+2 >-2, oxygenation  Patient Goals: DULCE MARIA  Family Goals: DULCE MARIA    Progress made toward(s) clinical / shift goals:    Problem: Hemodynamics  Goal: Patient's hemodynamics, fluid balance and neurologic status will be stable or improve  Outcome: Progressing     Problem: Fluid Volume  Goal: Fluid volume balance will be maintained  Outcome: Progressing     Problem: Mechanical Ventilation  Goal: Patient will be able to express needs and understand communication  Outcome: Progressing     Problem: Physical Regulation  Goal: Diagnostic test results will improve  Outcome: Progressing     Problem: Knowledge Deficit - Standard  Goal: Patient and family/care givers will demonstrate understanding of plan of care, disease process/condition, diagnostic tests and medications  Outcome: Progressing     Problem: Skin Integrity  Goal: Skin integrity is maintained or improved  Outcome: Progressing     Problem: Safety - Medical Restraint  Goal: Remains free of injury from restraints (Restraint for Interference with Medical Device)  Outcome: Progressing     Problem: Pain - Standard  Goal: Alleviation of pain or a reduction in pain to the patient’s comfort goal  Outcome: Progressing       Patient is not progressing towards the following goals:

## 2024-02-02 NOTE — CARE PLAN
The patient is Watcher - Medium risk of patient condition declining or worsening    Shift Goals  Clinical Goals: Rass <+2 >-2, oxygenation  Patient Goals: DULCE MARIA  Family Goals: DULCE MARIA    Progress made toward(s) clinical / shift goals:        Problem: Hemodynamics  Goal: Patient's hemodynamics, fluid balance and neurologic status will be stable or improve  Outcome: Progressing     Problem: Knowledge Deficit - Standard  Goal: Patient and family/care givers will demonstrate understanding of plan of care, disease process/condition, diagnostic tests and medications  Outcome: Progressing     Problem: Skin Integrity  Goal: Skin integrity is maintained or improved  Outcome: Progressing     Problem: Fall Risk  Goal: Patient will remain free from falls  Outcome: Progressing     Problem: Safety - Medical Restraint  Goal: Remains free of injury from restraints (Restraint for Interference with Medical Device)  Outcome: Progressing     Problem: Pain - Standard  Goal: Alleviation of pain or a reduction in pain to the patient’s comfort goal  Outcome: Progressing

## 2024-02-02 NOTE — PROGRESS NOTES
Patient sat EOB with nurse and CNA. When patient got back into bed after mobilizing, patients HR went up to the 160's. Pressures dropped to the 80's. MD notified. EKG ordered. 500 ml LR bolus ordered.

## 2024-02-02 NOTE — PROGRESS NOTES
Pt noted to have Hypotension with MAP~60 and HR~160. Pt was given 500mL LR and was found to be fluid responsive. Of note, pt did miss his dose of Midodrine earlier today, so that was given as well.     -EKG = SVT with rate related ST depressions    #Hypovolemia (most likely from poor PO intake)    Plan:  -500LR bolus (second bolus tonight) due to persistent tachycardia of ~140  -continue Midodrine for now

## 2024-02-02 NOTE — PROGRESS NOTES
Critical Care Progress Note    Date of admission  1/4/2024    Chief Complaint  78 y.o. male admitted 1/4/2024 with acute hypoxic respiratory failure    Hospital Course  Mr. 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  Unfortunately, the patient was readmitted to [facility] on 1/4/2024 for altered mental status and hypoxia.  He was admitted to the medicine services with hypernatremia, dehydration, and ERWIN with urinary retention.  He was found to be silently aspirating.  On 1/9/2024, the patient went into AF with RVR with worsening hypoxia and mental status changes.  A code stroke was called.  While in the CT scanner the patient was not protecting his airway and therefore was intubated.  He had a PEA arrest shortly afterward.  He was transferred to the ICU.  The patient was extubated on 1/11/2024 and required HFNC at 45L at 70% on 1/12.  Unfortunately, the patient was reintubated on 1/13.  He remained intubated until 1/20/2024 and then extubated to HFNC at 40L/70%.  He was transferred to IMCU on 1/25.  On the evening of 1/26 the critical care team was reconsulted due to the patient becoming suddenly tachycardic in the 140-150s with hypotension.  He was given IVF and a dose of phenylephrine.  He was not protecting his airway and required emergent intubation and bronchoscopy then transferred back to the ICU.  1/27 - VD2, agitated with SAT, levo infusing, trial SBT, creat 1.88, Na 149-->1/2 NS infusing  1/28 VD 3 agitated on the vent  1/29 VD 4; pall medicine consult  1/30 VD 5; MRI brain stable  1/31 VD 6; remains encephalopathic   2/1 VD 7; significantly improved mental status  2/2 Remains extubated on HFNC 40/40      Interval Problem Update  Reviewed last 24 hour events:   Easily awakes   Home DOAC   Na level still WNL; decrease FWF to 100 cc from 200 cc   Lasix 10 mg IV BID   CXR with bilateral  opacities edema vs aspiration    MRI brain unchanged       Review of Systems  Review of Systems   Unable to perform ROS: Critical illness        Vital Signs for last 24 hours   Temp:  [36.3 °C (97.3 °F)-37.7 °C (99.8 °F)] 37.7 °C (99.8 °F)  Pulse:  [] 89  Resp:  [15-29] 18  BP: (113-167)/(61-91) 155/80  SpO2:  [93 %-98 %] 95 %    Hemodynamic parameters for last 24 hours       Respiratory Information for the last 24 hours       Physical Exam   Physical Exam  Vitals and nursing note reviewed.   Constitutional:       General: He is not in acute distress.     Appearance: He is ill-appearing. He is not toxic-appearing.   HENT:      Head: Normocephalic and atraumatic.      Right Ear: External ear normal.      Left Ear: External ear normal.      Nose: Nose normal. No rhinorrhea.      Mouth/Throat:      Mouth: Mucous membranes are moist.   Eyes:      General: No scleral icterus.     Conjunctiva/sclera: Conjunctivae normal.      Pupils: Pupils are equal, round, and reactive to light.   Cardiovascular:      Rate and Rhythm: Normal rate and regular rhythm.      Heart sounds: No murmur heard.  Pulmonary:      Effort: Pulmonary effort is normal. No respiratory distress.   Abdominal:      Palpations: Abdomen is soft.      Tenderness: There is no abdominal tenderness. There is no guarding or rebound.   Genitourinary:     Comments: Schwartz in place  Musculoskeletal:         General: Normal range of motion.      Cervical back: Normal range of motion and neck supple.      Right lower leg: No edema.      Left lower leg: No edema.   Lymphadenopathy:      Cervical: No cervical adenopathy.   Skin:     General: Skin is warm and dry.      Capillary Refill: Capillary refill takes less than 2 seconds.      Findings: No rash.   Neurological:      Mental Status: He is alert.      Cranial Nerves: No cranial nerve deficit.      Sensory: No sensory deficit.      Motor: No weakness.      Comments: Awakes, interacts appropriately, follows,  moves all 4 extremities         Medications  Current Facility-Administered Medications   Medication Dose Route Frequency Provider Last Rate Last Admin    potassium bicarbonate (Klyte) effervescent tablet 25 mEq  25 mEq Enteral Tube Once Luke Estrada M.D.        hydrALAZINE (Apresoline) injection 10 mg  10 mg Intravenous Q2HRS PRN Wilber Hunter M.D.   10 mg at 02/04/24 0114    amantadine (Symmetrel) tablet 100 mg  100 mg Enteral Tube DAILY Luke Etsrada M.D.   100 mg at 02/04/24 0602    acetylcysteine (Mucomyst) 20 % solution 3 mL  3 mL Inhalation Q4HRS Luke Estrada M.D.   3 mL at 02/04/24 0934    nystatin (Mycostatin) powder   Topical BID Luke Estrada M.D.   Given at 02/04/24 0601    furosemide (Lasix) injection 10 mg  10 mg Intravenous BID DIURETIC Luke Estrada M.D.   10 mg at 02/04/24 0601    ampicillin/sulbactam (Unasyn) 3 g in  mL IVPB  3 g Intravenous Q6HRS Luke Estrada M.D.   Stopped at 02/04/24 0632    ipratropium-albuterol (DUONEB) nebulizer solution  3 mL Nebulization Q4H PRN (RT) Luke Estrada M.D.   3 mL at 02/04/24 0303    Respiratory Therapy Consult   Nebulization Continuous RT Donnie Babin M.D.        senna-docusate (Pericolace Or Senokot S) 8.6-50 MG per tablet 2 Tablet  2 Tablet Enteral Tube BID Donnie Babin M.D.   2 Tablet at 02/04/24 0601    And    polyethylene glycol/lytes (Miralax) Packet 1 Packet  1 Packet Enteral Tube QDAY PRN Donnie Babin M.D.   1 Packet at 02/03/24 1706    And    magnesium hydroxide (Milk Of Magnesia) suspension 30 mL  30 mL Enteral Tube QDAY PRN Donnie Babin M.D.   30 mL at 01/30/24 0511    And    bisacodyl (Dulcolax) suppository 10 mg  10 mg Rectal QDAY PRN Donnie Babin M.D.        MD Alert...ICU Electrolyte Replacement per Pharmacy   Other PHARMACY TO DOSE Donnie Babin M.D.        acetaminophen (Tylenol) tablet 650 mg  650 mg Enteral Tube Q6HRS PRN Ag Dixon M.D.   650 mg at 01/24/24 5047     insulin regular (HumuLIN R,NovoLIN R) injection  2-9 Units Subcutaneous Q6HRS Ag Dixon M.D.   2 Units at 02/04/24 0609    And    dextrose 10 % BOLUS 25 g  25 g Intravenous Q15 MIN PRN Ag Dixon M.D.        apixaban (Eliquis) tablet 5 mg  5 mg Enteral Tube BID Manuel Antoine M.D.   5 mg at 02/04/24 0601    Pharmacy Consult: Enteral tube insertion - review meds/change route/product selection  1 Each Other PHARMACY TO DOSE Rebecca Holman M.D.           Fluids    Intake/Output Summary (Last 24 hours) at 2/4/2024 1115  Last data filed at 2/4/2024 0800  Gross per 24 hour   Intake 2235.02 ml   Output 2325 ml   Net -89.98 ml       Laboratory            Recent Labs     02/02/24 0318 02/03/24 0328 02/04/24  0330   SODIUM 136 141 143   POTASSIUM 3.9 3.8 3.9   CHLORIDE 103 105 104   CO2 22 26 28   BUN 22 18 21   CREATININE 1.10 1.16 1.25   MAGNESIUM 2.1 2.2 2.3   PHOSPHORUS 2.7 2.6 3.1   CALCIUM 8.9 9.3 9.4     Recent Labs     02/02/24 0318 02/03/24  0328 02/04/24  0330   ALTSGPT 102* 92* 66*   ASTSGOT 76* 39 30   ALKPHOSPHAT 129* 133* 121*   TBILIRUBIN 0.2 0.2 0.2   GLUCOSE 161* 156* 163*     Recent Labs     02/02/24 0318 02/03/24  0328 02/04/24  0330   WBC 17.0* 14.5* 11.5*   NEUTSPOLYS 81.90* 79.80* 67.10   LYMPHOCYTES 9.40* 10.50* 16.00*   MONOCYTES 6.90 7.20 12.60   EOSINOPHILS 0.00 0.00 0.10   BASOPHILS 0.20 0.40 0.50   ASTSGOT 76* 39 30   ALTSGPT 102* 92* 66*   ALKPHOSPHAT 129* 133* 121*   TBILIRUBIN 0.2 0.2 0.2     Recent Labs     02/02/24 0318 02/03/24  0328 02/04/24  0330   RBC 3.16* 3.10* 3.12*   HEMOGLOBIN 9.4* 9.3* 9.4*   HEMATOCRIT 29.0* 28.6* 28.9*   PLATELETCT 537* 510* 487*       Imaging  MRI brain 1/14:  1.  Redemonstrated is scattered supratentorial and infratentorial areas of ischemia. No new lesions. There is increased conspicuity of the RIGHT frontal lobe lesion which could be due to recurrent ischemia or seizure activity.  2.  Unchanged RIGHT cerebellar hematoma evacuation cavity.  As previously noted underlying mass is not excluded and follow-up MRI without and with contrast in approximately weeks is recommended.  3.  No new hemorrhage  4.  Atrophy  5.  White matter changes  6.  Small BILATERAL mastoid effusions    Assessment/Plan  * Acute respiratory failure with hypoxia (HCC)- (present on admission)  Assessment & Plan  Intubated date: 1/9/2024-1/11, 1/13-1/20, 1/25-2/1  Recurrent aspiration    HFNC for SpO2 > 90%  Pulmonary hygiene   Mucolytics  Empiric aspiration PNA coverage started 2/2    Remains at risk for aspiration and reintubation - ongoing goals of care, daughter wants him to remain full code      A-fib (HCC)  Assessment & Plan  LVH on 12/27 ECHO with diastolic dysfunction  Eliquis prophylaxis for CVA  Optimize electrolytes   Continue cardiac monitoring.    Encephalopathy  Assessment & Plan  Toxic metabolic + ICU hypoactive delirium    2/3 improved mentation but somnolence - amantadine 100 mg qd    Advance care planning  Assessment & Plan  Following ICH he has recurrent aspiration and will remain high risk for reintubation; several goals of care conversations with the daughter. 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)  Assessment & Plan  S/p evacuation on 12/27 by Dr Patten  SBP < 160  Normonatremia     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 In review of yesterday's note (2/3/2024), there are no changes except as documented above.     Discussed patient condition and risk of morbidity and/or mortality with RN, RT, Pharmacy, [facility] resident, and Charge nurse / hot rounds      Critical care time = 56 minutes in directly providing and coordinating critical care and extensive data review.  No time overlap and excludes procedures.

## 2024-02-02 NOTE — PROGRESS NOTES
Monitor summary    SR-ST     60's-140's brief episode of SVT in the 160s    0.21/0.06/0.41

## 2024-02-02 NOTE — PROGRESS NOTES
After reaching her by phone, Jaymie came to bedside and I was able to provide updates and discuss plan of care. At first she was annoyed we extubated without speaking with her (though we tried twice by phone prior to this) I expressed how well appearing he was and how he appropriately and insistently want the tube out; plus he easily passed all parameters. I also expressed that waiting on the vent would subject him to unnecessary suffering and possible complications.     We are both happy with his unexpected progress although I have stated my reservations about his long-term ability to protect his airway. She would like him reintubated in the event of a decompensation. He participates in conversations appropriately but closes his eyes and will not respond when discusses of reintubation are had. I do not know what a reintubation will bridge to, perhaps enabling time for other family members to say goodbye who would have to travel cross country from [place].     Luke Estrada M.D.    DATE OF SERVICE:   6/3/2022     PREOPERATIVE DIAGNOSIS:   Left  femur intertrochanteric fracture     POSTOPERATIVE DIAGNOSIS:   Left femur intertrochanteric fracture     PROCEDURE(S) PERFORMED:   Left femur intramedullary nailing     SURGEON:   Josias Oakley MD     ASSISTANT:   -     ANESTHESIOLOGIST:   Ulysses Zamorano DO     ANESTHESIA TYPE:   General     ESTIMATED BLOOD LOSS:   250 mL     COMPLICATIONS:   None     SPECIMENS:   None     IMPLANTS:  Ying Gamma nail [id number] degrees  Ying Lag Screw 95 mm  [place] locking screw x2, 45 mm and 50 mm     FINDINGS:  Displaced peritrochanteric fracture of the left hip.     DESCRIPTION OF PROCEDURE(S):    INDICATIONS: 86 year old year-old female who sustained a fall from standing height on June 2nd 2022 with trauma to her left hip..  Radiographic examination is significant for a displaced fracture of the left femur.  Clinical examination is significant for inability to bear weight and pain on motion of the affected hip.  Past medical history significant for coronary artery disease with angioplasty less than 6 months ago, hypertension, myocardial infarction less than 6 months ago, ventricular fibrillation, atrial fibrillation, hypothyroidism .  Risks, benefits and expectations of the proposed procedure were discussed.  Although there is increased risk of mortality and morbidity due to age and comorbidities, potential benefits include increase early mobility and better pain control due to stabilization of fracture.  Patient was agreeable to proceed with surgical treatment.  All questions were answered.    SURGICAL APPROACH:  Lateral hip incision  ANTIBIOTIC PROPHYLAXIS: IV Ancef pharmacy dosing  BLOOD LOSS PREVENTION:     Consent and operative site were verified preoperatively. In the operative room, the patient was identified. Prophylactic antibiotic and antifibrinolytic were administered as described above. General anesthesia was provided by the  anesthesiology team.  The patient was positioned in the supine position on a traction table with the operative side foot secured on a  padded boot and with the perineum against a padded countertraction post.  The non-operative lower extremity was placed in the lithotomy positioner to allow for fluoroscopic access.  The ipsilateral upper extremity was secured across the chest and the contralateral rested in a padded arm board at 90 degrees from the patient's body.  Bony prominences and peripheral nerves were protected with soft padding.  Fluoroscopic iamging was used throughout the procedure to confirm critical steps.  Closed manipulation of the fracture site was performed for satisfactory alignment. Standard prepped and draping of the operative hip area to below the knee level done.      Superficial anatomic landmarks were identified and confirmed with fluoroscopy. A 3-4 cm skin incision about 3 cm proximal to the greater trochanteric was performed followed by hemostasis with electrocautery.  The fascia was penetrated with a blunt clamp to reach the tip of the greater trochanter and muscle fibers were gently .  Then, an awl was inserted starting at the tip of the trochanter and advanced to the level of the lesser trochanter under fluoroscopic guidance.  a threaded guidewire was inserted through the awl.  Entry Reamer was used to the level of the lesser truck.  A ball-tipped guidewire was advanced into the femoral canal to the level of the superior pole of the patella. A measuring device was used to determine length from the superior pole of the patella to the tip of the greater trochanter.      Sequential reaming was performed to 12 mm diameter.  An intramedullary susanna measuring 380 mm x 11 mm x 125° was inserted.  A targeting jig was used to direct lag screw drill bit.  Optimal placement of lag screw guidewire was confirmed with fluoroscopy with perpendicular views.  Screw length was measured followed by  satisfactory insertion of screw with image guidance. Two distal locking screws were placed using free-hand/perfect [place] technique under image guidance. Final implant position was verified proximally. Wounds were irrigated with normal saline. Layered closure 0 Vicryl, 2-0 Vicryl and staples for skin closure was performed. Aquacel dressings covered the wounds. The patient was successfully transferred to post anesthesia care unit.                  DVT/PE prophylaxis:  Lovenox 40 milligram subcutaneously daily x 4 weeks  Weight-bearing status:   Weight-bearing as tolerated  Range of motion status:  No restrictions

## 2024-02-02 NOTE — CARE PLAN
Problem: Ventilation  Goal: Ability to achieve and maintain unassisted ventilation or tolerate decreased levels of ventilator support  Description: Target End Date:  4 days     Document on Vent flowsheet    1.  Support and monitor invasive and noninvasive mechanical ventilation  2.  Monitor ventilator weaning response  3.  Perform ventilator associated pneumonia prevention interventions  4.  Manage ventilation therapy by monitoring diagnostic test results  Outcome: Met     Problem: Humidified High Flow Nasal Cannula  Goal: Maintain adequate oxygenation dependent on patient condition  Description: Target End Date:  resolve prior to discharge or when underlying condition is resolved/stabilized    1.  Implement humidified high flow oxygen therapy  2.  Titrate high flow oxygen to maintain appropriate SpO2  2/1/2024 1738 by Panchito Lau  Flowsheets  Taken 2/1/2024 1600 by Luke Griffiths RMARIYA.  O2 (LPM): 30  FiO2%: 30 %  Taken 1/11/2024 1749 by Claudio Mendenhall, RRT  Indication: All other patients: SpO2 less than 90% despite conventional supplemental oxygen devices  2/1/2024 1737 by Panchito Lau  Outcome: Progressing

## 2024-02-03 PROBLEM — A41.9 SEPTIC SHOCK (HCC): Status: RESOLVED | Noted: 2024-01-01 | Resolved: 2024-01-01

## 2024-02-03 PROBLEM — R65.21 SEPTIC SHOCK (HCC): Status: RESOLVED | Noted: 2024-01-01 | Resolved: 2024-01-01

## 2024-02-03 PROBLEM — J96.91 RESPIRATORY FAILURE WITH HYPOXIA (HCC): Status: RESOLVED | Noted: 2020-12-09 | Resolved: 2024-01-01

## 2024-02-03 PROBLEM — E87.29 HIGH ANION GAP METABOLIC ACIDOSIS: Status: RESOLVED | Noted: 2024-01-01 | Resolved: 2024-01-01

## 2024-02-03 NOTE — ASSESSMENT & PLAN NOTE
Recurrent aspiration    HFNC   Pulmonary hygiene   Mucolytics  Empiric aspiration PNA coverage started 2/2

## 2024-02-03 NOTE — WOUND TEAM
[facility]  Inpatient Services  Initial Wound and Skin Care Evaluation    Admission Date: 1/4/2024     Last order of IP CONSULT TO WOUND CARE was found on 2/1/2024 from Hospital Encounter on 1/4/2024     HPI, PMH, SH: Reviewed    History reviewed. No pertinent surgical history.  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 Contact wound team if area fails to progress, deteriorates or with any questions/concerns if something comes up before next scheduled follow up (See below as to whether wound is following and frequency of wound follow up)  Not following, consult as needed  - groin and left arm  Wound Team will return to assess sacrum- unable to turn patient without assistance, staff not available at this time.    WOUND HISTORY:   Mr. 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  Unfortunately, the patient was readmitted to [facility] on 1/4/2024 for altered mental status and hypoxia.  He was admitted to the medicine services with hypernatremia, dehydration, and ERWIN with urinary retention.  He was found to be silently  aspirating.  On 1/9/2024, the patient went into AF with RVR with worsening hypoxia and mental status changes.  A code stroke was called.  While in the CT scanner the patient was not protecting his airway and therefore was intubated.  He had a PEA arrest shortly afterward.  He was transferred to the ICU.  The patient was extubated on 1/11/2024 and required HFNC at 45L at 70% on 1/12.  Unfortunately, the patient was reintubated on 1/13.  He remained intubated until 1/20/2024 and then extubated to HFNC at 40L/70%.  He was transferred to [facility] on 1/25.  On the evening of 1/26 the critical care team was reconsulted due to the patient becoming suddenly tachycardic in the 140-150s with hypotension.  He was given IVF and a dose of phenylephrine.  He was not protecting his airway and required emergent intubation and bronchoscopy then transferred back to the ICU.  1/27 - VD2, agitated with SAT, levo infusing, trial SBT, creat 1.88, Na 149-->1/2 NS infusing  1/28 VD 3 agitated on the vent  1/29 VD 4; pall medicine consult  1/30 VD 5; MRI brain stable  1/31 VD 6; remains encephalopathic   2/1 VD 7; significantly improved mental status  2/2 Remains extubated on HFNC 40/40  2/3 HFNC, airway continues to be tenuous        WOUND ASSESSMENT/LDA  Wound 01/31/24 Partial Thickness Wound Arm Left (Active)   Date First Assessed/Time First Assessed: 01/31/24 0926   Primary Wound Type: Partial Thickness Wound  Location: Arm  Laterality: Left      Assessments 2/3/2024 10:00 AM   Wound Image      Site Assessment Red;Drainage;Edema;Fragile   Periwound Assessment Dry;Pink;Blistered;Edema   Margins Attached edges;Defined edges   Closure Secondary intention   Drainage Amount Scant   Drainage Description Serous   Treatments Cleansed;Nonselective debridement;Site care;Offloading   Wound Cleansing Approved Wound Cleanser   Periwound Protectant Mepitel   Dressing Status Removed   Dressing Changed Changed   Dressing Cleansing/Solutions Not Applicable    Dressing Options Mepitel One;Silicone Adhesive Foam   Dressing Change/Treatment Frequency Weekly, and As Needed   NEXT Dressing Change/Treatment Date 02/10/24   NEXT Weekly Photo (Inpatient Only) 02/07/24   Wound Team Following Not following   Non-staged Wound Description Partial thickness       Moisture Associated Skin Damage Groin (Active)   No First Observed Date or First Observed Time found.   Wound Location : Groin      Assessments 2/3/2024 10:00 AM   Wound Image      NEXT Weekly Photo (Inpatient Only) 02/07/24   Drainage Amount None   Periwound Assessment Red;Maceration;Rash   IAD Cleansing Foam Cleanser/Washcloth   Periwound Protectant Barrier Paste   IAD Containment Device Indwelling Catheter   Length (cm) 6   Width (cm) 3   WOUND NURSE ONLY - Time Spent with Patient (mins) 30        Vascular:    LETA:   No results found.    Lab Values:    Lab Results   Component Value Date/Time    WBC 14.5 (H) 02/03/2024 03:28 AM    RBC 3.10 (L) 02/03/2024 03:28 AM    HEMOGLOBIN 9.3 (L) 02/03/2024 03:28 AM    HEMATOCRIT 28.6 (L) 02/03/2024 03:28 AM    CREACTPROT 5.90 (H) 01/22/2024 04:12 PM    HBA1C 6.7 (H) 01/22/2024 10:25 AM         Culture Results show:  No results found for this or any previous visit (from the past 720 hour(s)).    Pain Level/Medicated:  None, Tolerated without pain medication       INTERVENTIONS BY WOUND TEAM:  Chart and images reviewed. Discussed with bedside RN. All areas of concern (based on picture review, LDA review and discussion with bedside RN) have been thoroughly assessed. Documentation of areas based on significant findings. This RN in to assess patient. Performed standard wound care which includes appropriate positioning, dressing removal and non-selective debridement. Pictures and measurements obtained weekly if/when required.    Wound:  left arm - open blisters  Preparation for Dressing removal: Removed without difficulty  Cleansed/Non-selectively Debrided with:  Wound cleanser and  Gauze  [place] wound: Cleansed with Wound cleanser and Gauze, Prepped with No Sting  Primary Dressing:  mepitel one  Secondary (Outer) Dressing: silicone foam dressing     Wound:  groin - suspected moisture associated dermatitis  Preparation for Dressing removal: Open to air  Cleansed/Non-selectively Debrided with:  Perineal Wipes (Barrier wipes)  [place] wound: Cleansed with Perineal Wipes (Barrier wipes), Prepped with Barrier paste; nystatin powder ordered per protocol  Primary Dressing: Interdry cloth    Advanced Wound Care Discharge Planning  Number of Clinicians necessary to complete wound care: 2 (need to people to turn)  Is patient requiring IV pain medications for dressing changes:  No   Length of time for dressing change 30 min. (This does not include chart review, pre-medication time, set up, clean up or time spent charting.)    Interdisciplinary consultation: Patient, Bedside RN     EVALUATION / RATIONALE FOR TREATMENT:     Date:  02/03/24  Wound Status:  Initial evaluation    Patient with open partial thickness blisters to left arm with scant serous drainage. Suspect wounds may be from arm edema. Cleansed and applied no sting and mepitel one and covered with silicone dressing.     Bilateral inguinal folds with blanching redness and mild maceration, suspected moisture associated dermatitis. Cleansed and applied barrier paste. Nystatin powder and Interdry cloth ordered.    Unable to turn patient unassisted to assess sacrum at this time. Wound team will return to assess sacrum at a later time. Most recent photo in chart is from 1/26/24.       Goals: Steady decrease in wound area and depth weekly.    NURSING PLAN OF CARE ORDERS:  Dressing changes: See Dressing Care orders  Skin care: See Skin Care orders    NUTRITION RECOMMENDATIONS   Wound Team Recommendations:  N/A    DIET ORDERS (From admission to next 24h)       Start     Ordered    01/26/24 0152  Diet NPO Restrict to: Strict (okay to receive meds through the  NG/OG tube)  ALL MEALS        Question Answer Comment   Type: Now    Diet NPO Restrict to: Strict okay to receive meds through the NG/OG tube       01/26/24 2154 01/22/24 1349  Diet: Diet Tube Feed; Formula: Other (Specify formula comments in the field to the right) (Glucerna 1.2; Start at 45 ml/hr and advance per protocol to goal rate); Goal Rate (mL/Hour): 75; Duration: 24 HR  ALL MEALS        Question Answer Comment   Diet Diet Tube Feed    Formula: Other (Specify formula comments in the field to the right) Glucerna 1.2; Start at 45 ml/hr and advance per protocol to goal rate   Goal Rate (mL/Hour) 75    Route Enteral Tube    Duration 24 HR        01/22/24 1349                    PREVENTATIVE INTERVENTIONS:    Q shift Navarro - performed per nursing policy  Q shift pressure point assessments - performed per nursing policy    Surface/Positioning  ICU Low Airloss - Currently in Place  Reposition q 2 hours - Currently in Place  TAPs Turning system - Currently in Place    Offloading/Redistribution  Heel float boots (Prevalon boot) - Ordered  Float Heels off Bed with Pillows - Currently in Place           Containment/Moisture Prevention    Schwartz Catheter - Currently in Place  Barrier paste - Currently in Place  Antifungal treatment - Ordered  Interdry - Ordered    Anticipated discharge plans:  TBD        Vac Discharge Needs:  Vac Discharge plan is purely a recommendation from wound team and not a requirement for discharge unless otherwise stated by physician.  Not Applicable Pt not on a wound vac

## 2024-02-03 NOTE — THERAPY
Physical Therapy   Daily Treatment     Patient Name: Lewis Mohr  Age:  78 y.o., Sex:  male  Medical Record #: [id number]  Today's Date: 2/2/2024     Precautions  Precautions: Fall Risk;Nasogastric Tube    Assessment    Pt with improved arousal, no verbal output; stable vitals with mobility; no command following for exercises, sitting better after stand, posterior rigidity noted; will follow;     Plan    Treatment Plan Status: Continue Current Treatment Plan  Type of Treatment: Bed Mobility, Equipment, Gait Training, Neuro Re-Education / Balance, Self Care / Home Evaluation, Therapeutic Activities, Therapeutic Exercise  Treatment Frequency: 2 Times per Week  Treatment Duration: Until Therapy Goals Met    DC Equipment Recommendations: Unable to determine at this time  Discharge Recommendations: Recommend post-acute placement for additional physical therapy services prior to discharge home      Abridged Subjective/Objective       02/02/24 1515   Cognition    Cognition / Consciousness X   Speech/ Communication Mute;Unable to Communicate   Level of Consciousness Responds to voice   Ability To Follow Commands Unable to Follow 1 Step Commands   Safety Awareness Impaired   Attention Impaired   Initiation Impaired   Comments nodding more; eyes open and more alert; sitting better but does not verbalize;   Balance   Sitting Balance (Static) Poor +   Sitting Balance (Dynamic) Poor   Standing Balance (Static) Trace +   Standing Balance (Dynamic) Trace   Weight Shift Sitting Poor   Weight Shift Standing Absent   Skilled Intervention Postural Facilitation;Sequencing;Tactile Cuing;Verbal Cuing   Comments B UE support in sitting/standing; stiff and posterior lean initiatlly; no weight acceptance with standing and sitting better post   Bed Mobility    Supine to Sit Total Assist   Sit to Supine Total Assist   Gait Analysis   Gait Level Of Assist Unable to Participate   Functional Mobility   Sit to Stand Total Assist  (of 2)   Bed,  Chair, Wheelchair Transfer Unable to Participate   Short Term Goals    Short Term Goal # 1 Pt will perform bed mobility with min A to progress function in 6 visits.   Goal Outcome # 1 goal not met   Short Term Goal # 2 Pt will perform sit<>stand with min A to progress towards functional transfers in 6 visits.   Goal Outcome # 2 Goal not met   Short Term Goal # 3 Pt will demonstrate fair sitting balance during LE exercises without UE support within 6 visits to demonstrate improved participation in PT sessions.   Goal Outcome # 3 Goal not met

## 2024-02-03 NOTE — CARE PLAN
The patient is Watcher - Medium risk of patient condition declining or worsening    Shift Goals  Clinical Goals: stable hemodynamics, decreased oxygen needs  Patient Goals: DULCE MARIA  Family Goals: DULCE MARIA    Progress made toward(s) clinical / shift goals:    Problem: Hemodynamics  Goal: Patient's hemodynamics, fluid balance and neurologic status will be stable or improve  Outcome: Progressing     Problem: Fluid Volume  Goal: Fluid volume balance will be maintained  Outcome: Progressing     Problem: Respiratory  Goal: Patient will achieve/maintain optimum respiratory ventilation and gas exchange  Outcome: Progressing     Problem: Skin Integrity  Goal: Skin integrity is maintained or improved  Outcome: Progressing     Problem: Safety - Medical Restraint  Goal: Remains free of injury from restraints (Restraint for Interference with Medical Device)  Outcome: Progressing  Goal: Free from restraint(s) (Restraint for Interference with Medical Device)  Outcome: Progressing  Goal: Remains free of injury from restraints (Restraint for Interference with Medical Device)  Outcome: Progressing     Problem: Pain - Standard  Goal: Alleviation of pain or a reduction in pain to the patient’s comfort goal  Outcome: Progressing       Patient is not progressing towards the following goals:      Problem: Knowledge Deficit - Standard  Goal: Patient and family/care givers will demonstrate understanding of plan of care, disease process/condition, diagnostic tests and medications  Outcome: Not Progressing

## 2024-02-03 NOTE — PROGRESS NOTES
Inpatient Palliative Medicine - Follow Up (SIGN OFF)     Lewis Mohr  78 y.o. male  [id number]     Location: [facility]  Pcp Pt States None     Referral Source:   Tj Hoyt M.d.     Reason for palliative medicine consultation and/or visit: ACP           Assessment and Plan:     GOAL(S) OF CARE = LONGEVITY     SYMPTOMS ETIOLOGY/CAUSES = multifactorial encephalopathy secondary to: recent cerebellar hemorrhage, aspiration PNA, UTI, ??dehydration/poor intake/volume depletion     PROGNOSIS = increasingly likely terminal as in days-to-weeks now, judging by patient's repeated failure on HFNC basically ventilator dependent at this point     CODE STATUS = FULL  1/9/2024 later upgraded to ICU and intubated, consistent with wishes expressed by patient's daughter Jaymie  1/11/2024 Tentative plan is to re-intubate again if failing first extubation attempt.   1/12/2024 Jaymie verified current GOC: unchanged. Re-intubate if needed. Patient on HFNC 45-50LPM.  1/20/2024 Extubated, remains on HFNC 30-40LPM  1/26/2024 R-intubtion again  2/01/2024 Extubation again          PALLIATIVE CARE TEAM INTERVENTIONS =   Medical updates to daughter Jaymie  Recommend daily updates to Jaymie by ICU attending and team members.  Recommend up-to-date communications to Jaymie for important interventions/events. She is far from satisfied in this regard.    CLINICAL DECISION seems straightforward at this time - If re-intubated again, then the only options will be TRACH vs TERMINAL EXTUBATION.  Recommend ICU team leaders to follow up & make firm recommendations at this point, after multiple failed extubation trials, to prevent unnecessary suffering/distress on patient's part.          Medical team is not obligated to offer non-beneficial interventions contrary to best practices/guidelines, especially in the face of mounting failures and suffering/distress.    I believe Jaymie and her family's needs can be best served by having an up-to-date single contact - the  ICU team - at this point I do not believe palliative care's continued following will contribute to Jaymie's support, as it seems information is difficult to cross over teams.  We will sign off at this time, to simplify medical team's communication with Jaymie.  Again I recommend and stress the importance of regular daily communication with Jaymie, and communications prior to important clinical interventions.  Palliative care input can never replace primary team's expertly updates.            Summary:   Lewis Mohr is a 78 y.o. 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 Patient was reportedly discharged to [facility] for rehab 1/2/2024. However he has since declined, resulting in an ER visit 1/3/2024 and the current readmission starting 1/4/204.     Since readmission patient has been treated for multiple issues: aspiration pneumonia, urinary retention, hypernatremia, ERWIN, etc. Patient has remained encephalopathic however, and dislodged NGT multiple times and remains NPO due to FEES+ silent aspiration.  -----------------------------------------------------------------------------------------------------------------------------------------  2/2/2024  Brief meeting with patient's daughter Jaymie. She expressed frustrations with communications so far. She complained about difficulties of having to speak to multiple providers and somehow information does not always seem to cross over, despite universal EMR chart access....    She does not want to talk about the plan forward with me today, stating she had already had a good discussion with ICU attending earlier.    Jaymie reiterated she needed to be contacted ahead for any important clinical events, whether intubation or extubation or other interventions etc.... She remained frustrated how many things have happened and she seemed to  "always find out after the fact, from her perspective.        2024  Met with patient's daughter Jaymie bedside to follow up on care.     Jaymie expressed surprise that patient was intubated again without a more in-depth discussion prior to reintubation.... Bedside RN and I provided emotional support and expressed our condolences for this very acute turn of event last 2024 night, and apologized for any sub-optimal communication on medical team's behalf that might have happened last few days. Jaymie was very understanding.     Going forward, Jaymie would reach out to her 2 other sisters to discuss what they want to do as a family.  Jaymie is glad to hear about the repeat brain MRI, with the hope for potentially some more info on patient's continued poor mentation and aspiration.     Jaymie indicated her preferences for the time bein) Continued medical optimization, or \"as much as possible,\" in her own words.  2) Jaymie would reach out to rest of family to gather up their preferences.  3) Jaymie indicated that, if possible, she would prefer extubation to happen later this week, closer to weekend, after further optimization.  4) Jaymie is leaning towards one last, permanent extubation at this time.  She is NOT requesting a tracheostomy.     The implications of terminal extubation appeared quite clear to Jaymie. Emotional support provided as she cried and grappled with patient's current state of health.     Jaymie is planning to return again tomorrow TUE 2024 again between [id number] to follow up on care, and to update medical team on her family's preferences going forward.           2024  Met bedside with patient, his daughter Jaymie, and patient's 2 friends kAash Bond.     Patient remained encephalopathic at time, though with stimulation, he did gradually become more interactive today. Compared to yesterday, his secretions and fatigue seemed somewhat worse.     Family and I discussed many things bedside:  1) I " strongly encouraged Jaymie and her sister to consider the possibility of another respiratory failure requiring ventilation - should we or should we not for a 3rd time. Trach or no trach, as previously expressed by patient privately to Jaymie himself.  This is a hard decision. Jaymie will continue to discuss with her sister and try to come up with a plan for that hypothetical situation, if patient were to be in respiratory distress again.  2) Trach education - Patient previously expressed to Jaymie against trach... Family well aware the potentially permanent nature of trach, if patient for whatever reason cannot improve sufficiently to wean off.  By patient's own words, Jaymie leans against trach at this time, and hopes that decision won't have to be crossed soon.  3) [facility] - we discussed the possibility that if patient's respiratory function stays at HFNC level requiring a more long-term wean-off, then [facility] ([facility]) will be the kind of facility for patient to go for respiratory rehab post discharge first.  4) Typical brain injury and stroke treatment workflow.     Jaymie has recently dealt with another death in among close family members. She is well acquainted with the uncertainty of medicine, and the many uncertainties involved in patient's care.     For the time being, as patient has not definitive declared on way or another, family wish to continue all interventions to help patient improve and definitively declare himself, one way or another.     No changes in GOC or code status at this time.              1/22/2024  Followed up with Dr. Coon over the phone. She barely just left the hospital when I went to reassess patient.  Patient was more active and communicative today, with mitten gloves both hands to prevent line pulling and still only mumbling unintelligibly for the most part.     From Jaymie's perspective, this is the best patient has seen for over a week, though still well below his prior baseline when he was originally  "discharged to [facility] for rehab on 1/2/2024.     Jaymie wonders if another confirmatory speech eval FEES is needed if aspiration is still happening as we suspect. Informed Jaymie that we could further discuss this tomorrow with ICU attending Dr. Bautista, when she visits. Jaymie was agreeable.     Provided medical updates to Dr. Bautista, RN, and RT. It does appear we are fast approaching a decision point if we should consider definitively one way or another: no trach & DNAR DNI vs trach + [facility] + all interventions. Dr. Bautista and I will also plan to address this philosophical question more tomorrow during our meeting with Jaymie.     Patient otherwise remains status quo and continues to require extensive suctioning, which is concerning for continued silent aspiration.           1/12/2023  Provided brief update to patient's daughter over phone - off vent still on HFNC with slightly increased O2 demand 45==>50LPM. Patient remained encephalopathic and nonverbal, only able to unreliably follow simple commands at times. This remains far below his functional level at the time of his first discharge 1/2/2024 post craniotomy.     Jaymie verified that indeed the current plan remains to re-intubate again should patient fail his first extubation trial. Informed Jaymie that while I am not able to predict ultimate outcome, the creased O2 demand and poor mentation and NPO are all warning signs for potential reintubation. Informed Jaymei that repeated extubation failures would speak for a negative future outcome. Jaymie understands.     Provided emotional support and therapeutic presence. This latest event has been distressing to Jaymie and her family. She still has to work and patient's critical status had caused her a few \"panic attacks\" at times.     Jaymie and her  typically visit after 1630 on weekdays, after work. She plans to visit in person SAT afternoon 12/13/2024, hopefully after roads have been better plowed.           1/9/2023  Placed " "call to patient's daughter Jaymie and introduced my role and scope of practice. She was amenable to this visit. We reviewed patient's relevant history. According to Jaymie, patient has had a good overall health for decades not on any chronic medication until his last admission for his cerebellar hemorrhage.       Jaymie's initial impression as of patient's official discharge was good. \"Dad was still talking to me over the phone as of Sunday 12/31/23. Patient did gradually deteriorate after initial charge, with more slurred speech and unsuccessful rehab efforts at [facility].      The dysphagia and aphasia issues are relatively to Jaymie, about for last week. She and her sister wonders if patient's ongoing pneumonia, hyperkalemia, and low hydration/intake negatively affect his reocvery.     Clinical picture still evolving. I advised Jaymie that, if patient's dysphagia and aphasia remain unchanged, then pneumonia and pneumonitis can become recurrent due to continued aspiration... of course, that will also be dependent on if reversible secondary issues may improve patient's current level of function.     Lots of uncertainties... low intake volume, possible dehydration due to net negative I/O since admission, hyperkalemia uncertain if secondary in part to continued low intake and multiple NGT dislodges and feed disruption, etc.     Informed Jaymie that, as multiple secondary and potentially reversible medical issues are also ongoing, I am unable to definitively prognosticate patient's prognosis or chance for definitive recovery at this point. Jaymie was accepting and stressed family's wishes for medical team to address all potentially reversible issues, to try to possibly recover patient's overall function to his status upon his initial discharge, if possible.     For now, Jaymie and her sister and other family members all want to continue aggressive care, consistent with full code status.  Jaymie does not recall any issue weaning patient " off ventilator after his craniotomy 12/27/2023 either.        Note patient later developed afib RVR in the evening and was upgraded to ICU and also appropriately intubated for worsening respiratory distress.        Advance care planning:  The patient and/or legal decision maker has provided voluntary consent to discuss advance care planning. We discussed code status.   FULL     Thank you for allowing me the opportunity to participate in the care of Lewis Mohr     I spent a total of 35 minutes reviewing medical records, direct face-to-face time with the patient and/or family, documentation and coordination of care. This is separate from the time spent on advance care planning, which is documented above.           DO Pritesh NELSON ([facility]   [id number] [place]  [id number]  P: [phone]  F: [phone]  C: [phone]

## 2024-02-03 NOTE — CARE PLAN
The patient is Watcher - Medium risk of patient condition declining or worsening    Shift Goals  Clinical Goals: Wean O2, stable hemodynamic  Patient Goals: DULCE MARIA  Family Goals: DULCE MARIA    Progress made toward(s) clinical / shift goals:      Problem: Hemodynamics  Goal: Patient's hemodynamics, fluid balance and neurologic status will be stable or improve  Outcome: Progressing     Problem: Respiratory  Goal: Patient will achieve/maintain optimum respiratory ventilation and gas exchange  Outcome: Progressing     Problem: Safety - Medical Restraint  Goal: Remains free of injury from restraints (Restraint for Interference with Medical Device)  Outcome: Progressing

## 2024-02-03 NOTE — PROGRESS NOTES
Critical Care Progress Note    Date of admission  1/4/2024    Chief Complaint  78 y.o. male admitted 1/4/2024 with acute hypoxic respiratory failure    Hospital Course  Mr. 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  Unfortunately, the patient was readmitted to [facility] on 1/4/2024 for altered mental status and hypoxia.  He was admitted to the medicine services with hypernatremia, dehydration, and ERWIN with urinary retention.  He was found to be silently aspirating.  On 1/9/2024, the patient went into AF with RVR with worsening hypoxia and mental status changes.  A code stroke was called.  While in the CT scanner the patient was not protecting his airway and therefore was intubated.  He had a PEA arrest shortly afterward.  He was transferred to the ICU.  The patient was extubated on 1/11/2024 and required HFNC at 45L at 70% on 1/12.  Unfortunately, the patient was reintubated on 1/13.  He remained intubated until 1/20/2024 and then extubated to HFNC at 40L/70%.  He was transferred to IMCU on 1/25.  On the evening of 1/26 the critical care team was reconsulted due to the patient becoming suddenly tachycardic in the 140-150s with hypotension.  He was given IVF and a dose of phenylephrine.  He was not protecting his airway and required emergent intubation and bronchoscopy then transferred back to the ICU.  1/27 - VD2, agitated with SAT, levo infusing, trial SBT, creat 1.88, Na 149-->1/2 NS infusing  1/28 VD 3 agitated on the vent  1/29 VD 4; pall medicine consult  1/30 VD 5; MRI brain stable  1/31 VD 6; remains encephalopathic   2/1 VD 7; significantly improved mental status  2/2 Remains extubated on HFNC 40/40  2/3 HFNC, airway continues to be tenuous       Interval Problem Update  Reviewed last 24 hour events:   Awakes to voice   Home DOAC   Na level still WNL;  cc q4 hours   Lasix 10  mg IV BID   I/O - 1.7 L + 8.5 on admission   Empiric unasyn for aspiration PNA   Amantadine to improve wakefulness        Review of Systems  Review of Systems   Unable to perform ROS: Critical illness        Vital Signs for last 24 hours   Temp:  [36.8 °C (98.2 °F)-37.2 °C (99 °F)] 37 °C (98.6 °F)  Pulse:  [] 97  Resp:  [17-40] 24  BP: (115-179)/(55-91) 132/68  SpO2:  [93 %-98 %] 97 %    Hemodynamic parameters for last 24 hours       Respiratory Information for the last 24 hours       Physical Exam   Physical Exam  Vitals and nursing note reviewed.   Constitutional:       General: He is not in acute distress.     Appearance: He is ill-appearing. He is not toxic-appearing.   HENT:      Head: Normocephalic and atraumatic.      Right Ear: External ear normal.      Left Ear: External ear normal.      Nose: Nose normal. No rhinorrhea.      Mouth/Throat:      Mouth: Mucous membranes are moist.   Eyes:      General: No scleral icterus.     Conjunctiva/sclera: Conjunctivae normal.      Pupils: Pupils are equal, round, and reactive to light.   Cardiovascular:      Rate and Rhythm: Normal rate and regular rhythm.      Heart sounds: No murmur heard.  Pulmonary:      Effort: Pulmonary effort is normal. No respiratory distress.   Abdominal:      Palpations: Abdomen is soft.      Tenderness: There is no abdominal tenderness. There is no guarding or rebound.   Genitourinary:     Comments: Schwartz in place  Musculoskeletal:         General: Normal range of motion.      Cervical back: Normal range of motion and neck supple.      Right lower leg: No edema.      Left lower leg: No edema.   Lymphadenopathy:      Cervical: No cervical adenopathy.   Skin:     General: Skin is warm and dry.      Capillary Refill: Capillary refill takes less than 2 seconds.      Findings: No rash.   Neurological:      Mental Status: He is alert.      Cranial Nerves: No cranial nerve deficit.      Sensory: No sensory deficit.      Motor: No weakness.       Comments: Somnolent but awakes, moves all 4 extremities, regards normally         Medications  Current Facility-Administered Medications   Medication Dose Route Frequency Provider Last Rate Last Admin    hydrALAZINE (Apresoline) injection 10 mg  10 mg Intravenous Q2HRS PRN Wilber Hunter M.D.   10 mg at 02/03/24 0108    amantadine (Symmetrel) tablet 100 mg  100 mg Enteral Tube DAILY Luke Estrada M.D.   100 mg at 02/03/24 0843    acetylcysteine (Mucomyst) 20 % solution 3 mL  3 mL Inhalation Q4HRS Luke Estrada M.D.        furosemide (Lasix) injection 10 mg  10 mg Intravenous BID DIURETIC Luke Estrada M.D.   10 mg at 02/03/24 0538    ampicillin/sulbactam (Unasyn) 3 g in  mL IVPB  3 g Intravenous Q6HRS Luke Estrada M.D.   Stopped at 02/03/24 0611    ipratropium-albuterol (DUONEB) nebulizer solution  3 mL Nebulization Q4H PRN (RT) Luke Estrada M.D.   3 mL at 02/02/24 0144    midodrine (Proamatine) tablet 10 mg  10 mg Enteral Tube Q8HRS Elsy Holman D.O.   10 mg at 02/01/24 2221    Respiratory Therapy Consult   Nebulization Continuous RT Donnie Babin M.D.        senna-docusate (Pericolace Or Senokot S) 8.6-50 MG per tablet 2 Tablet  2 Tablet Enteral Tube BID Donnie Babin M.D.   2 Tablet at 02/03/24 0538    And    polyethylene glycol/lytes (Miralax) Packet 1 Packet  1 Packet Enteral Tube QDAY PRN Donnie Babin M.D.   1 Packet at 01/30/24 1559    And    magnesium hydroxide (Milk Of Magnesia) suspension 30 mL  30 mL Enteral Tube QDAY PRN Donnie Babin M.D.   30 mL at 01/30/24 0511    And    bisacodyl (Dulcolax) suppository 10 mg  10 mg Rectal QDAY PRN Donnie Babin M.D.        MD Alert...ICU Electrolyte Replacement per Pharmacy   Other PHARMACY TO DOSE Donnie Babin M.D.        acetaminophen (Tylenol) tablet 650 mg  650 mg Enteral Tube Q6HRS PRN Ag Dixon M.D.   650 mg at 01/24/24 2350    insulin regular (HumuLIN R,NovoLIN R) injection  2-9 Units  Subcutaneous Q6HRS Ag Dixon M.D.   2 Units at 02/03/24 0551    And    dextrose 10 % BOLUS 25 g  25 g Intravenous Q15 MIN PRN Ag Dixon M.D.        apixaban (Eliquis) tablet 5 mg  5 mg Enteral Tube BID Manuel Antoine M.D.   5 mg at 02/03/24 0538    Pharmacy Consult: Enteral tube insertion - review meds/change route/product selection  1 Each Other PHARMACY TO DOSE Rebecca Holman M.D.           Fluids    Intake/Output Summary (Last 24 hours) at 2/3/2024 0848  Last data filed at 2/3/2024 0630  Gross per 24 hour   Intake 1146.84 ml   Output 3260 ml   Net -2113.16 ml       Laboratory            Recent Labs     02/01/24 0315 02/02/24 0318 02/03/24  0328   SODIUM 142 136 141   POTASSIUM 4.4 3.9 3.8   CHLORIDE 108 103 105   CO2 24 22 26   BUN 27* 22 18   CREATININE 1.19 1.10 1.16   MAGNESIUM 2.1 2.1 2.2   PHOSPHORUS 3.5 2.7 2.6   CALCIUM 8.7 8.9 9.3     Recent Labs     02/01/24 0315 02/02/24 0318 02/03/24  0328   ALTSGPT 46 102* 92*   ASTSGOT 28 76* 39   ALKPHOSPHAT 124* 129* 133*   TBILIRUBIN <0.2 0.2 0.2   GLUCOSE 183* 161* 156*     Recent Labs     02/01/24 0315 02/02/24 0318 02/03/24  0328   WBC 8.3 17.0* 14.5*   NEUTSPOLYS 68.90 81.90* 79.80*   LYMPHOCYTES 20.60* 9.40* 10.50*   MONOCYTES 9.30 6.90 7.20   EOSINOPHILS 0.00 0.00 0.00   BASOPHILS 0.40 0.20 0.40   ASTSGOT 28 76* 39   ALTSGPT 46 102* 92*   ALKPHOSPHAT 124* 129* 133*   TBILIRUBIN <0.2 0.2 0.2     Recent Labs     02/01/24 0315 02/02/24 0318 02/03/24  0328   RBC 3.03* 3.16* 3.10*   HEMOGLOBIN 9.0* 9.4* 9.3*   HEMATOCRIT 29.5* 29.0* 28.6*   PLATELETCT 461* 537* 510*       Imaging  MRI brain 2/1  Small faint focus of high signal noted on diffusion-weighted imaging in the right frontal deep white matter without definite corresponding ADC signal abnormality. Again noted is and operative cavity with hemosiderin staining in the right cerebellum   corresponding to resection site  Age-related volume loss noted with prominent sulci, cisterns and  ventricles. Ventricular dilatation is proportionate to the degree of cerebral volume loss. Nonspecific T2 hyperintensities are noted in the periventricular and deep white matter, most   likely related to chronic microvascular ischemia.  Visualized intracranial arterial flow voids are within normal limits.  Bone marrow signal in the calvarium is within normal limits.  Included portions of the paranasal sinuses are within normal limits.  Bilateral mastoid effusion noted.  Included portions of the orbits are within normal limits    Assessment/Plan  * Acute respiratory failure with hypoxia (HCC)- (present on admission)  Assessment & Plan  Intubated date: 1/9/2024-1/11, 1/13-1/20, 1/25-2/1  Recurrent aspiration    HFNC for SpO2 > 90%  Pulmonary hygiene   Mucolytics  Empiric aspiration PNA coverage started 2/2    Remains at risk for aspiration and reintubation - ongoing goals of care, daughter wants him to remain full code      A-fib (HCC)  Assessment & Plan  LVH on 12/27 ECHO with diastolic dysfunction  Eliquis prophylaxis for CVA  Optimize electrolytes   Continue cardiac monitoring.    Encephalopathy  Assessment & Plan  Toxic metabolic + ICU hypoactive delirium    2/3 improved mentation but somnolence - amantadine 100 mg qd    Advance care planning  Assessment & Plan  Following ICH he has recurrent aspiration and will remain high risk for reintubation; several goals of care conversations with the daughter. 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)  Assessment & Plan  S/p evacuation on 12/27 by Dr Patten  SBP < 160  Normonatremia     Aspiration pneumonia (HCC)- (present on admission)  Assessment & Plan  Tx FOB 1/19 with copious secretions, 1/25  with large amount of secretions  BAL and bronc wash cultures from 1/19 revealing corynebacterium stratum, 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 In review of yesterday's note (2/2/2024), there are no changes except as documented above.     Discussed patient condition and risk of morbidity and/or mortality with RN, RT, Pharmacy, [employer] resident, and Charge nurse / hot rounds      Critical care time = 38 minutes in directly providing and coordinating critical care and extensive data review.  No time overlap and excludes procedures.

## 2024-02-04 PROBLEM — R53.81 DEBILITY: Status: ACTIVE | Noted: 2024-01-01

## 2024-02-04 NOTE — PROGRESS NOTES
Critical Care Progress Note    Date of admission  1/4/2024    Chief Complaint  78 y.o. male admitted 1/4/2024 with acute hypoxic respiratory failure    Hospital Course  Mr. 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  Unfortunately, the patient was readmitted to [facility] on 1/4/2024 for altered mental status and hypoxia.  He was admitted to the medicine services with hypernatremia, dehydration, and ERWIN with urinary retention.  He was found to be silently aspirating.  On 1/9/2024, the patient went into AF with RVR with worsening hypoxia and mental status changes.  A code stroke was called.  While in the CT scanner the patient was not protecting his airway and therefore was intubated.  He had a PEA arrest shortly afterward.  He was transferred to the ICU.  The patient was extubated on 1/11/2024 and required HFNC at 45L at 70% on 1/12.  Unfortunately, the patient was reintubated on 1/13.  He remained intubated until 1/20/2024 and then extubated to HFNC at 40L/70%.  He was transferred to IMCU on 1/25.  On the evening of 1/26 the critical care team was reconsulted due to the patient becoming suddenly tachycardic in the 140-150s with hypotension.  He was given IVF and a dose of phenylephrine.  He was not protecting his airway and required emergent intubation and bronchoscopy then transferred back to the ICU.  1/27 - VD2, agitated with SAT, levo infusing, trial SBT, creat 1.88, Na 149-->1/2 NS infusing  1/28 VD 3 agitated on the vent  1/29 VD 4; pall medicine consult  1/30 VD 5; MRI brain stable  1/31 VD 6; remains encephalopathic   2/1 VD 7; significantly improved mental status  2/2 Remains extubated on HFNC 40/40  2/3 HFNC, airway continues to be tenuous   2/4 HFNC 25/35; improved wakefulness, improved cough      Interval Problem Update  Reviewed last 24 hour events:   Awakes to voice   Home  DOAC   Na level still WNL;  cc q4 hours   Lasix 10 mg IV BID   I/O - 888 L + 9 on admission   Empiric unasyn for aspiration PNA   Ok to remove fernando      Daughter updated over the phone, informed he is stable to transfer to [facility]    Review of Systems  Review of Systems   Unable to perform ROS: Critical illness        Vital Signs for last 24 hours   Temp:  [36.3 °C (97.3 °F)-37.7 °C (99.8 °F)] 37.7 °C (99.8 °F)  Pulse:  [] 89  Resp:  [15-29] 18  BP: (113-167)/(61-91) 155/80  SpO2:  [93 %-98 %] 95 %    Hemodynamic parameters for last 24 hours       Respiratory Information for the last 24 hours       Physical Exam   Physical Exam  Vitals and nursing note reviewed.   Constitutional:       General: He is not in acute distress.     Appearance: He is ill-appearing. He is not toxic-appearing.   HENT:      Head: Normocephalic and atraumatic.      Right Ear: External ear normal.      Left Ear: External ear normal.      Nose: Nose normal. No rhinorrhea.      Mouth/Throat:      Mouth: Mucous membranes are moist.   Eyes:      General: No scleral icterus.     Conjunctiva/sclera: Conjunctivae normal.      Pupils: Pupils are equal, round, and reactive to light.   Cardiovascular:      Rate and Rhythm: Normal rate and regular rhythm.      Heart sounds: No murmur heard.  Pulmonary:      Effort: Pulmonary effort is normal. No respiratory distress.   Abdominal:      Palpations: Abdomen is soft.      Tenderness: There is no abdominal tenderness. There is no guarding or rebound.   Genitourinary:     Comments: Fernando in place  Musculoskeletal:         General: Normal range of motion.      Cervical back: Normal range of motion and neck supple.      Right lower leg: No edema.      Left lower leg: No edema.   Lymphadenopathy:      Cervical: No cervical adenopathy.   Skin:     General: Skin is warm and dry.      Capillary Refill: Capillary refill takes less than 2 seconds.      Findings: No rash.   Neurological:      Mental Status:  He is alert.      Cranial Nerves: No cranial nerve deficit.      Sensory: No sensory deficit.      Motor: No weakness.      Comments: Somnolent but awakes, moves all 4 extremities, regards normally         Medications  Current Facility-Administered Medications   Medication Dose Route Frequency Provider Last Rate Last Admin    hydrALAZINE (Apresoline) injection 10 mg  10 mg Intravenous Q2HRS PRN Wilber Hunter M.D.   10 mg at 02/04/24 0114    amantadine (Symmetrel) tablet 100 mg  100 mg Enteral Tube DAILY Luke Estrada M.D.   100 mg at 02/04/24 0602    acetylcysteine (Mucomyst) 20 % solution 3 mL  3 mL Inhalation Q4HRS Luke Estrada M.D.   3 mL at 02/04/24 0934    nystatin (Mycostatin) powder   Topical BID Luke Estrada M.D.   Given at 02/04/24 0601    furosemide (Lasix) injection 10 mg  10 mg Intravenous BID DIURETIC Luke Estrada M.D.   10 mg at 02/04/24 0601    ampicillin/sulbactam (Unasyn) 3 g in  mL IVPB  3 g Intravenous Q6HRS Luke Estrada M.D. 200 mL/hr at 02/04/24 1116 3 g at 02/04/24 1116    ipratropium-albuterol (DUONEB) nebulizer solution  3 mL Nebulization Q4H PRN (RT) Luke Estrada M.D.   3 mL at 02/04/24 0303    Respiratory Therapy Consult   Nebulization Continuous RT Donnie Babin M.D.        senna-docusate (Pericolace Or Senokot S) 8.6-50 MG per tablet 2 Tablet  2 Tablet Enteral Tube BID Donnie Babin M.D.   2 Tablet at 02/04/24 0601    And    polyethylene glycol/lytes (Miralax) Packet 1 Packet  1 Packet Enteral Tube QDAY PRN Donnie Babin M.D.   1 Packet at 02/03/24 1706    And    magnesium hydroxide (Milk Of Magnesia) suspension 30 mL  30 mL Enteral Tube QDAY PRN Donnie Babin M.D.   30 mL at 01/30/24 0511    And    bisacodyl (Dulcolax) suppository 10 mg  10 mg Rectal QDAY PRN Donnie Babin M.D.        MD Alert...ICU Electrolyte Replacement per Pharmacy   Other PHARMACY TO DOSE Donnie Babin M.D.        acetaminophen (Tylenol) tablet 650 mg  650  mg Enteral Tube Q6HRS PRN Ag Dixon M.D.   650 mg at 01/24/24 2350    insulin regular (HumuLIN R,NovoLIN R) injection  2-9 Units Subcutaneous Q6HRS Ag Dixon M.D.   2 Units at 02/04/24 1118    And    dextrose 10 % BOLUS 25 g  25 g Intravenous Q15 MIN PRN Ag Dixon M.D.        apixaban (Eliquis) tablet 5 mg  5 mg Enteral Tube BID Manuel Antoine M.D.   5 mg at 02/04/24 0601    Pharmacy Consult: Enteral tube insertion - review meds/change route/product selection  1 Each Other PHARMACY TO DOSE Rebecca Holman M.D.           Fluids    Intake/Output Summary (Last 24 hours) at 2/4/2024 1119  Last data filed at 2/4/2024 0800  Gross per 24 hour   Intake 2235.02 ml   Output 2325 ml   Net -89.98 ml       Laboratory            Recent Labs     02/02/24 0318 02/03/24  0328 02/04/24  0330   SODIUM 136 141 143   POTASSIUM 3.9 3.8 3.9   CHLORIDE 103 105 104   CO2 22 26 28   BUN 22 18 21   CREATININE 1.10 1.16 1.25   MAGNESIUM 2.1 2.2 2.3   PHOSPHORUS 2.7 2.6 3.1   CALCIUM 8.9 9.3 9.4     Recent Labs     02/02/24 0318 02/03/24  0328 02/04/24  0330   ALTSGPT 102* 92* 66*   ASTSGOT 76* 39 30   ALKPHOSPHAT 129* 133* 121*   TBILIRUBIN 0.2 0.2 0.2   GLUCOSE 161* 156* 163*     Recent Labs     02/02/24 0318 02/03/24  0328 02/04/24  0330   WBC 17.0* 14.5* 11.5*   NEUTSPOLYS 81.90* 79.80* 67.10   LYMPHOCYTES 9.40* 10.50* 16.00*   MONOCYTES 6.90 7.20 12.60   EOSINOPHILS 0.00 0.00 0.10   BASOPHILS 0.20 0.40 0.50   ASTSGOT 76* 39 30   ALTSGPT 102* 92* 66*   ALKPHOSPHAT 129* 133* 121*   TBILIRUBIN 0.2 0.2 0.2     Recent Labs     02/02/24 0318 02/03/24  0328 02/04/24  0330   RBC 3.16* 3.10* 3.12*   HEMOGLOBIN 9.4* 9.3* 9.4*   HEMATOCRIT 29.0* 28.6* 28.9*   PLATELETCT 537* 510* 487*       Imaging  MRI brain 2/1  Small faint focus of high signal noted on diffusion-weighted imaging in the right frontal deep white matter without definite corresponding ADC signal abnormality. Again noted is and operative cavity with  hemosiderin staining in the right cerebellum   corresponding to resection site  Age-related volume loss noted with prominent sulci, cisterns and ventricles. Ventricular dilatation is proportionate to the degree of cerebral volume loss. Nonspecific T2 hyperintensities are noted in the periventricular and deep white matter, most   likely related to chronic microvascular ischemia.  Visualized intracranial arterial flow voids are within normal limits.  Bone marrow signal in the calvarium is within normal limits.  Included portions of the paranasal sinuses are within normal limits.  Bilateral mastoid effusion noted.  Included portions of the orbits are within normal limits    Assessment/Plan  * Acute respiratory failure with hypoxia (HCC)- (present on admission)  Assessment & Plan  Intubated date: 1/9/2024-1/11, 1/13-1/20, 1/25-2/1  Recurrent aspiration    HFNC for SpO2 > 90%  Pulmonary hygiene   Mucolytics  Empiric aspiration PNA coverage started 2/2    Remains at risk for aspiration and reintubation - ongoing goals of care, daughter wants him to remain full code      A-fib (HCC)  Assessment & Plan  LVH on 12/27 ECHO with diastolic dysfunction  Eliquis prophylaxis for CVA  Optimize electrolytes   Continue cardiac monitoring.    Encephalopathy  Assessment & Plan  Toxic metabolic + ICU hypoactive delirium    2/3 improved mentation but somnolence - amantadine 100 mg qd    Advance care planning  Assessment & Plan  Following ICH he has recurrent aspiration and will remain high risk for reintubation; several goals of care conversations with the daughter. 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)  Assessment & Plan  S/p evacuation on 12/27 by   Earline  SBP < 160  Normonatremia     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 In review of yesterday's note (2/3/2024), there are no changes except as documented above.     Discussed patient condition and risk of morbidity and/or mortality with RN, RT, Pharmacy, [employer] resident, and Charge nurse / hot rounds

## 2024-02-04 NOTE — PROGRESS NOTES
Pt  brought over to [facility] by CHARLIE mcnair at 1455. Pt temp 97.4F, wt 79.7kg, /89, Spo2 94 on High flow NC 25L at 35%. Hr 84. Pt is A&O to self, calm, and tolerating transition well. PT is able to follow some commands and reports no pain. Iris in the right nare is running diabetic source at 75mL/hr. Also has a TKO running in the right PIV. Pt  also has a new condom cath. No urine in the bag.

## 2024-02-04 NOTE — CARE PLAN
Problem: Safety - Medical Restraint  Goal: Remains free of injury from restraints (Restraint for Interference with Medical Device)  Outcome: Progressing     Problem: Knowledge Deficit - Standard  Goal: Patient and family/care givers will demonstrate understanding of plan of care, disease process/condition, diagnostic tests and medications  Outcome: Progressing     Problem: Pain - Standard  Goal: Alleviation of pain or a reduction in pain to the patient’s comfort goal  Outcome: Progressing   The patient is Watcher - Medium risk of patient condition declining or worsening    Shift Goals  Clinical Goals: SBP less than 160, secretion control  Patient Goals: Rest  Family Goals: DULCE MARIA    Progress made toward(s) clinical / shift goals:  rest, mobilize, no injury from restraints     Patient is not progressing towards the following goals:

## 2024-02-04 NOTE — CARE PLAN
Problem: Knowledge Deficit - Standard  Goal: Patient and family/care givers will demonstrate understanding of plan of care, disease process/condition, diagnostic tests and medications  2/4/2024 1521 by Emily Chan R.N.  Outcome: Progressing     Problem: Safety - Medical Restraint  Goal: Remains free of injury from restraints (Restraint for Interference with Medical Device)  Outcome: Progressing  Goal: Free from restraint(s) (Restraint for Interference with Medical Device)  Outcome: Met     Problem: Pain - Standard  Goal: Alleviation of pain or a reduction in pain to the patient’s comfort goal  2/4/2024 1521 by Emily Chan R.N.  Outcome: Progressing     The patient is Watcher - Medium risk of patient condition declining or worsening    Shift Goals  Clinical Goals: BP < 160, rest  Patient Goals: polo  Family Goals: polo    Progress made toward(s) clinical / shift goals:  pt to imcu, restraints d/c family updated on poc     Patient is not progressing towards the following goals:

## 2024-02-04 NOTE — CARE PLAN
The patient is Watcher - Medium risk of patient condition declining or worsening    Shift Goals  Clinical Goals: SBP less than 160, secretion control  Patient Goals: Rest  Family Goals: DULCE MARIA    Progress made toward(s) clinical / shift goals:    Problem: Safety - Medical Restraint  Goal: Remains free of injury from restraints (Restraint for Interference with Medical Device)  Outcome: Progressing  Note: Restraint checks and charting in place     Problem: Pain - Standard  Goal: Alleviation of pain or a reduction in pain to the patient’s comfort goal  Outcome: Progressing  Note: CPOT 0 throughout shift       Patient is not progressing towards the following goals:

## 2024-02-05 NOTE — THERAPY
Speech Language Pathology   Clinical Swallow Evaluation     Patient Name: Lewis Mohr  AGE:  78 y.o., SEX:  male  Medical Record #: [id number]  Date of Service: 2024      History of Present Illness  Pt is a 78 y.o. M who presented 23 with ALOC and low oxygen levels. Admitting dx of respiratory failure. Pt w/ recent cerebellar hemorrhage who underwent craniotomy by Dr. Patten neurosurgery on 23 2/2 fall. Pt subsequently discharged to SNF. Pt w/ return to hospital 1/3/24 because of slurred speech and CT head was done which was unremarkable for acute processes, again pt discharged to SNF.     Intubation hx:  - ,  - ,  -     CMHx: Aspiration PNA, respiratory failure, ERWIN, Hx of cerebellar hemorrhage     PMHx: Respiratory failure with hypoxia, Leucocytosis, Adrenal mass L    No hx of ST per EMR.     CXR 2/3/24:  Cardiac silhouette is normal in size. No airspace infiltrate, pleural effusion, or pneumothorax. Dobbhoff tube extends beyond the proximal duodenum and lower margin of the image.     CT Head 24:  1.  No evidence of acute territorial infarct, intracranial hemorrhage or mass lesion.  2.  Mild diffuse cerebral substance loss.  3.  Mild microangiopathic ischemic change versus demyelination or gliosis.  4.  Status post right suboccipital cranioplasty with underlying cerebellar hypoattenuation likely on the basis of evolving encephalomalacia.      General Information:  Vitals  O2 (LPM): 25  O2 Delivery Device: Heated High Flow Nasal Cannula  Level of Consciousness: Alert, Awake  Orientation: Self, , General place (Place given an option of 3)  Follows Directives: Inconsistent      Prior Living Situation & Level of Function:  Communication: Hx of cerebellar hemorrhage, pt denied working with ST  Swallowing: Hx of cerebellar hemorrhage, pt denied working with ST       Oral Mechanism Evaluation:  Dentition: Fair, Natural dentition   Facial Symmetry: Pt did not follow commands to  assess  Facial Sensation: Pt did not follow commands to assess     Labial Observations: Pt did not follow commands to assess   Lingual Observations: Midline  Motor Speech: Mild-mod dysarthria            Laryngeal Function:  Secretion Management: Adequate  Voice Quality: Hoarse  Cough: Perceptually weak       Subjective  Patient seen on this date for a repeat clinical swallow evaluation s/p change in status. Now extubated on 25L/35% FiO2 via HFNC. Pt received awake, oriented to self, place (given an option of 3), and . Vocal quality was mildly hoarse, mild upper airway congested noted.       Assessment  Current Method of Nutrition: NPO until cleared by speech pathology (pulled NG)  Positioning: Espino's (60-90 degrees)  Bolus Administration: SLP  O2 (LPM): 25 O2 Delivery Device: Heated High Flow Nasal Cannula  Factor(s) Affecting Performance: Impaired endurance, Impaired mental status, Impaired command following  Tracheostomy : No         Swallowing Trials:  Swallowing Trials  Ice: WFL  Thin Liquid (TN0): Impaired  Liquidised (LQ3): Impaired    Comments: Adequate bolus acceptance and containment. Onset of swallow trigger was mild-moderately delayed. No overt s/sx of aspiration with limited trials (2/2 aspiration risk) with 2 single ice chips and 1/2 tsp of applesauce x2. Immediate coughing response occurred in 1/2 trials of 1/2 tsp of un-thickened water and 1/2 tsp of applesauce concerning for airway invasion.       Clinical Impressions  Patient with severe oropharyngeal dysphagia noted on FEES performed 24. Suspect decline in swallow function given multiple and prolonged intubations since initial diagnostic swallow study. Pt is without source of nutrition and has had a prolonged NPO status. A diagnostic swallow study is indicated to further assess oropharyngeal function and guide SLP POC.       Recommendations  Diet Consistency: NPO  Instrumentation: FEES  Medication: Non Oral  Supervision: 1:1 feeding with  constant supervision  Oral Care: Q4h         SLP Treatment Plan  Treatment Plan: Dysphagia Treatment, Patient/Family/Caregiver Training  SLP Frequency: 3x Per Week  Estimated Duration: Until Therapy Goals Met      Anticipated Discharge Needs  Discharge Recommendations: Recommend post-acute placement for additional speech therapy services prior to discharge home   Therapy Recommendations Upon DC: Dysphagia Training, Patient / Family / Caregiver Education        Patient / Family Goals  Patient / Family Goal #1: RN reported pt's daughter was eager for pt to participate w/ ST  Goal #1 Outcome: Progressing slower than expected  Short Term Goals  Short Term Goal # 1: Pt will demo improved secretion management as evidenced by reduced use of oral suctioning during tx session  Goal Outcome # 1: Progressing as expected  Short Term Goal # 1 B : Pt will participate in prefeeding trials to demo readiness for an instrumental swallow study  Goal Outcome  # 1 B: Progressing as expected      Krystal Macias, SLP

## 2024-02-05 NOTE — PROGRESS NOTES
[facility] Medicine Daily Progress Note    Date of Service  2/4/2024    Chief Complaint  Lewis Mohr is a 78 y.o. male admitted 1/4/2024 with respiratory failure    Hospital Course  Mr. 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  Unfortunately, the patient was readmitted to [facility] on 1/4/2024 for altered mental status and hypoxia.  He was admitted to the medicine services with hypernatremia, dehydration, and ERWIN with urinary retention.  He was found to be silently aspirating.  On 1/9/2024, the patient went into AF with RVR with worsening hypoxia and mental status changes.  A code stroke was called.  While in the CT scanner the patient was not protecting his airway and therefore was intubated.  He had a PEA arrest shortly afterward.  He was transferred to the ICU.  The patient was extubated on 1/11/2024 and required HFNC at 45L at 70% on 1/12.  Unfortunately, the patient was reintubated on 1/13.  He remained intubated until 1/20/2024 and then extubated to HFNC at 40L/70%.  He was transferred to IMCU on 1/25.  On the evening of 1/26 the critical care team was reconsulted due to the patient becoming suddenly tachycardic in the 140-150s with hypotension.  He was given IVF and a dose of phenylephrine.  He was not protecting his airway and required emergent intubation and bronchoscopy then transferred back to the ICU.  During his ICU stay the patient was intermittently agitated, requiring pressor therapy, continue to be significantly encephalopathic, eventually had improvement and was extubated to high flow nasal cannula, patient with difficulty with secretions and protecting his airway per intensivist  Extensive discussion was held with family in terms of goals of care and ongoing difficulties.  On my evaluation on 2/4 the patient is lethargic, he remains on high flow nasal cannula, 25 L, 35%, he  follows sluggishly, he has difficulty speaking.  He appears profoundly weak      Interval Problem Update  Patient seen and examined today.  Data, Medication data reviewed.  Case discussed with nursing as available.  Plan of Care reviewed with patient and notified of changes.  2/4, the patient is afebrile, heart rate in the 80s, respiration unlabored in the 20 range, the patient is saturating in the high 90s on 25 L, 35% high flow nasal cannula oxygen, blood pressure in the 150s to 170s over 80s,  Laboratory data white second 11.5, hemoglobin 9.4, platelet count 487, chemistry with a sodium of 143, potassium 3.9, chloride 104, glucose 163, albumin 2.7, glucose in the 160s to 170s    I have discussed this patient's plan of care and discharge plan at IDT rounds today with Case Management, Nursing, Nursing leadership, and other members of the IDT team.    Consultants/Specialty  critical care  Palliative care    Code Status  Full Code    Disposition  The patient is not medically cleared for discharge to home or a post-acute facility.  Anticipate discharge to: a long-term acute care hospital    I have placed the appropriate orders for post-discharge needs.    Review of Systems  Review of Systems   Unable to perform ROS: Medical condition        Physical Exam  Temp:  [36.3 °C (97.4 °F)-37.7 °C (99.8 °F)] 36.3 °C (97.4 °F)  Pulse:  [] 83  Resp:  [12-24] 18  BP: (113-171)/(61-91) 153/77  SpO2:  [92 %-99 %] 99 %    Physical Exam  Vitals and nursing note reviewed.   Constitutional:       Appearance: He is well-developed. He is ill-appearing.      Comments: Pt seen and examined.  Lethargic, weak   HENT:      Head: Normocephalic and atraumatic.      Nose:      Comments: High flow nasal cannula  Feeding tube     Mouth/Throat:      Mouth: Mucous membranes are dry.   Eyes:      Pupils: Pupils are equal, round, and reactive to light.   Cardiovascular:      Rate and Rhythm: Normal rate and regular rhythm.      Heart sounds:  Normal heart sounds.   Pulmonary:      Effort: Pulmonary effort is normal.      Breath sounds: Rhonchi present.   Abdominal:      General: Bowel sounds are normal.      Palpations: Abdomen is soft.   Musculoskeletal:         General: Normal range of motion.      Cervical back: Normal range of motion and neck supple.   Skin:     General: Skin is warm and dry.      Coloration: Skin is pale.   Neurological:      General: No focal deficit present.      Mental Status: He is disoriented.      Motor: Weakness present.   Psychiatric:         Behavior: Behavior normal.         Fluids    Intake/Output Summary (Last 24 hours) at 2/4/2024 1818  Last data filed at 2/4/2024 1710  Gross per 24 hour   Intake 2245.74 ml   Output 1675 ml   Net 570.74 ml       Laboratory  Recent Labs     02/02/24 0318 02/03/24 0328 02/04/24  0330   WBC 17.0* 14.5* 11.5*   RBC 3.16* 3.10* 3.12*   HEMOGLOBIN 9.4* 9.3* 9.4*   HEMATOCRIT 29.0* 28.6* 28.9*   MCV 91.8 92.3 92.6   MCH 29.7 30.0 30.1   MCHC 32.4 32.5 32.5   RDW 45.6 46.3 46.6   PLATELETCT 537* 510* 487*   MPV 10.6 10.9 10.9     Recent Labs     02/02/24 0318 02/03/24 0328 02/04/24  0330   SODIUM 136 141 143   POTASSIUM 3.9 3.8 3.9   CHLORIDE 103 105 104   CO2 22 26 28   GLUCOSE 161* 156* 163*   BUN 22 18 21   CREATININE 1.10 1.16 1.25   CALCIUM 8.9 9.3 9.4                   Imaging  DX-ABDOMEN FOR TUBE PLACEMENT   Final Result      Feeding tube tip overlies the duodenum.      DX-CHEST-PORTABLE (1 VIEW)   Final Result      No acute process.      DX-CHEST-PORTABLE (1 VIEW)   Final Result      1.  Interval extubation.   2.  Mild interstitial pulmonary edema.   3.  Possible minimal/trace left pleural effusion.      DX-CHEST-LIMITED (1 VIEW)   Final Result      Stable appearance of the chest.      MR-BRAIN-W/O   Final Result         No acute intracranial process.      Age-related volume loss and chronic microvascular ischemic changes.      Postoperative changes in the right cerebellum with  resection cavity and hemosiderin staining.      Bilateral mastoid effusion.      DX-CHEST-PORTABLE (1 VIEW)   Final Result         1.  Interstitial pulmonary parenchymal prominence suggest chronic underlying lung disease, component of interstitial edema and/or infiltrates not excluded.   2.  Cardiomegaly   3.  Atherosclerosis      DX-CHEST-PORTABLE (1 VIEW)   Final Result         1.  Pulmonary edema and/or infiltrates are identified, which are stable since the prior exam.   2.  Cardiomegaly   3.  Atherosclerosis      DX-CHEST-PORTABLE (1 VIEW)   Final Result         1.  Pulmonary edema and/or infiltrates are identified, which are stable since the prior exam.   2.  Cardiomegaly   3.  Atherosclerosis      DX-CHEST-LIMITED (1 VIEW)   Final Result         1.  Pulmonary edema and/or infiltrates are identified, which appear somewhat increased since the prior exam.   2.  Cardiomegaly   3.  Atherosclerosis      DX-ABDOMEN FOR TUBE PLACEMENT   Final Result         Feeding tube with tip projecting over the expected area of the distal stomach.      DX-ABDOMEN FOR TUBE PLACEMENT   Final Result      The nasogastric tube terminates over the antrum of the stomach.      DX-CHEST-PORTABLE (1 VIEW)   Final Result         Ill-defined hazy opacities in the right mid lung and left lower lobe are similar to prior.      IR-MIDLINE CATHETER INSERTION WO GUIDANCE > AGE 3   Final Result                  Ultrasound-guided midline placement performed by qualified nursing staff    as above.          DX-CHEST-PORTABLE (1 VIEW)   Final Result      Improved interstitial opacities may represent improving edema and/or pneumonitis.      Mild nonspecific right basilar opacity and questionable tiny left upper lobe opacity. Correlate clinically for infection.      DX-CHEST-LIMITED (1 VIEW)   Final Result      1.  Slightly increased BILATERAL lung disease. This could be pulmonary edema or pneumonia.   2.  Possible small BILATERAL pleural effusions       DX-CHEST-PORTABLE (1 VIEW)   Final Result         1.  Mild pulmonary edema and/or infiltrates   2.  Atherosclerosis      DX-ABDOMEN FOR TUBE PLACEMENT   Final Result      NG tube tip projects over the stomach.      DX-ABDOMEN FOR TUBE PLACEMENT   Final Result      1.  Enteric tube projects over the proximal stomach.      DX-CHEST-PORTABLE (1 VIEW)   Final Result      Lines and tubes appear adequate. Stable bibasilar airspace disease.      DX-CHEST-PORTABLE (1 VIEW)   Final Result         1.  Pulmonary edema and/or infiltrates are identified, which appear somewhat increased since the prior exam.   2.  Cardiomegaly      DX-CHEST-PORTABLE (1 VIEW)   Final Result      1.  New small left pleural effusion with associated atelectasis.   2.  Increased right basilar opacity most likely atelectasis however correlate clinically for infection.      DX-CHEST-PORTABLE (1 VIEW)   Final Result         1.  Pulmonary edema and/or infiltrates are identified, which are stable since the prior exam.   2.  Cardiomegaly      DX-CHEST-PORTABLE (1 VIEW)   Final Result      1.  No significant change.      MR-MRA HEAD-W/O   Final Result         MRA OF THE [place] WITHIN NORMAL LIMITS.      MR-BRAIN-W/O   Final Result      1.  Redemonstrated is scattered supratentorial and infratentorial areas of ischemia. No new lesions. There is increased conspicuity of the RIGHT frontal lobe lesion which could be due to recurrent ischemia or seizure activity.   2.  Unchanged RIGHT cerebellar hematoma evacuation cavity. As previously noted underlying mass is not excluded and follow-up MRI without and with contrast in approximately weeks is recommended.   3.  No new hemorrhage   4.  Atrophy   5.  White matter changes   6.  Small BILATERAL mastoid effusions      DX-CHEST-PORTABLE (1 VIEW)   Final Result      DX-ABDOMEN FOR TUBE PLACEMENT   Final Result      Enteric feeding tube terminates in the left upper quadrant projecting over the expected  location of the stomach.      DX-CHEST-LIMITED (1 VIEW)   Final Result      1.  Interval extubation   2.  Lower lung volumes with increased atelectasis superimposed on pulmonary edema or pneumonia      DX-CHEST-PORTABLE (1 VIEW)   Final Result         1.  Pulmonary edema and/or infiltrates are identified, which are somewhat decreased since the prior exam.   2.  Atherosclerosis      US-EXTREMITY VENOUS LOWER BILAT   Final Result      DX-CHEST-PORTABLE (1 VIEW)   Final Result         1.  Scattered hazy bilateral pulmonary infiltrates, slightly increased since prior study.   2.  Atherosclerosis      MR-BRAIN-W/O   Final Result      1.  A few punctate areas of acute infarcts in the right frontal and bilateral parietal lobes and left cerebellum.   2.  There is no brainstem infarct or diffuse anoxic injury.   3.  Mixed signal abnormality in the right cerebellum with the previous surgical intervention in keeping with the clinical diagnosis of right cerebellar hemorrhage evacuation. Follow-up study with contrast in 6 weeks is recommended to rule out any    underlying pathology.   4.  Mild cerebral volume loss.      DX-CHEST-PORTABLE (1 VIEW)   Final Result      CT-CTA NECK WITH & W/O-POST PROCESSING   Final Result      1.  No acute arterial abnormality detected. No significant arterial stenosis.      CT-CTA HEAD WITH & W/O-POST PROCESS   Final Result      CT angiogram of the [place] within normal limits.      CT-CEREBRAL PERFUSION ANALYSIS   Final Result      1.  Cerebral blood flow less than 30% likely representing completed infarct = 0 mL.      2.  T Max more than 6 seconds likely representing combination of completed infarct and ischemia = 0 mL.      3.  Mismatched volume likely representing ischemic brain/penumbra = None      4.  Please note that the cerebral perfusion was performed on the limited brain tissue around the basal ganglia region. Infarct/ischemia outside the CT perfusion sections can be missed in  this study.      CT-HEAD W/O   Final Result      1.  No evidence of acute territorial infarct, intracranial hemorrhage or mass lesion.   2.  Mild diffuse cerebral substance loss.   3.  Mild microangiopathic ischemic change versus demyelination or gliosis.   4.  Status post right suboccipital cranioplasty with underlying cerebellar hypoattenuation likely on the basis of evolving encephalomalacia.         DX-CHEST-PORTABLE (1 VIEW)   Final Result      1.  Intubation.   2.  Bilateral pulmonary infiltrates.      DX-CHEST-LIMITED (1 VIEW)   Final Result      DX-ABDOMEN FOR TUBE PLACEMENT   Final Result      Enteric feeding tube terminates with the tip projecting over the expected location of the distal stomach.      CT-CTA CHEST PULMONARY ARTERY W/ RECONS   Final Result         1.  No pulmonary embolus appreciated.   2.  Consolidations in bilateral lung bases an scattered hazy right pulmonary opacities, compatible with atelectasis with component of infiltrate.   3.  Indeterminate left adrenal nodule, follow-up adrenal protocol CT for further characterization as clinically appropriate.   4.  Atherosclerosis and atherosclerotic coronary artery disease.      DX-ABDOMEN FOR TUBE PLACEMENT   Final Result         1.  Nonspecific bowel gas pattern in the upper abdomen.   2.  Cardiomegaly   3.  Atherosclerosis   4.  Bibasilar atelectasis   5.  Nasogastric tube tip terminates overlying the expected location of the pylorus or first duodenal segment.      DX-ABDOMEN FOR TUBE PLACEMENT   Final Result         1.  Nonspecific bowel gas pattern in the upper abdomen.   2.  Nasogastric tube tip terminates overlying the expected location of the gastric antrum.   3.  Linear densities the bilateral lung bases favor atelectasis, component of infiltrate not excluded.      DX-ABDOMEN FOR TUBE PLACEMENT   Final Result      NG tube extends below the diaphragm with tip at the level of the gastric fundus. Side port is at the level of the distal  esophagus.      DX-ABDOMEN FOR TUBE PLACEMENT   Final Result      NG tube is noted with tip projecting over the right lower lung.      These findings were transmitted with GLEN ZELAYA on 01/07/2024. Tube was subsequently adjusted and is now present below the diaphragm.      DX-ABDOMEN FOR TUBE PLACEMENT   Final Result      DHT tip overlies the second portion of the duodenum      DX-ABDOMEN FOR TUBE PLACEMENT   Final Result      Feeding tube tip projects in the distal stomach or first portion of the duodenum.      DX-ABDOMEN FOR TUBE PLACEMENT   Final Result      Removal of nasogastric tube.      Feeding tube tip projects in the distal stomach.      No visualized thorax.      DX-ABDOMEN FOR TUBE PLACEMENT   Final Result         Gastric drainage tube with tip projecting over the expected area of the right lower lobe airway. Recommend removal.            CRITICAL RESULT READ BACK: Preliminary findings discussed with and critical read back performed by Dr. GLEN ZELAYA via telephone on 1/5/2024 6:22 PM      DX-ABDOMEN FOR TUBE PLACEMENT   Final Result      1.  Malpositioned enteric sump catheter which is folded back upon itself over the left paramedian distal one third mediastinum probably beyond the left mainstem bronchus and within the esophagus.   2.  A Voalte message was sent to GLEN ZELAYA at 1641 hours. Immediate response received.   3.  Per discussion, the patient has already pulled out the tube.      DX-CHEST-PORTABLE (1 VIEW)   Final Result      1.  Hypoinflation and pulmonary vascular congestion with mild bibasilar atelectasis.   2.  No lobar pneumonia or overt pulmonary edema.      CT-HEAD W/O   Final Result      1.  Diffuse atrophy and white matter changes.   2.  No acute intracranial hemorrhage or territorial infarct.   3.  RIGHT occipital craniotomy with underlying cerebellar encephalomalacia.              Assessment/Plan  * Acute respiratory failure with hypoxia (HCC)- (present on admission)  Assessment  & Plan  Intubated date: 1/9/2024-1/11, 1/13-1/20, 1/25-2/1  Recurrent aspiration  HFNC for SpO2 > 90%  Pulmonary hygiene   Mucolytics  Empiric aspiration PNA coverage started 2/2  Remains at risk for aspiration and reintubation - ongoing goals of care, daughter wants him to remain full code      A-fib (HCC)  Assessment & Plan  LVH on 12/27 ECHO with diastolic dysfunction  Eliquis prophylaxis for CVA  Optimize electrolytes   Continue cardiac monitoring.    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  S/p zosyn course   2/2 Increasing infiltrates on CXR + hypoxia + leukocytosis and purulent secretions - started on unasyn   Monitor cultures closely,    Debility  Assessment & Plan  Prolonged hospitalization, prior cerebral insult  Will likely require longer term rehabilitation, therapy, consider LTAC referral    Hypoxic respiratory failure (HCC)- (present on admission)  Assessment & Plan  Required multiple episodes of mechanical ventilation, intubation,  Unable to protect airway effectively  Ongoing high flow nasal cannula to add Peep    Encephalopathy  Assessment & Plan  Toxic metabolic + ICU hypoactive delirium    2/3 improved mentation but somnolence - amantadine 100 mg qd  Close monitoring    Advance care planning  Assessment & Plan  Following ICH he has recurrent aspiration and will remain high risk for reintubation; several goals of care conversations with the daughter. 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 Patten  Repeat imaging including MRI show changes consistent with known bleed but no new findings  Still needs a f/u MRI in 3-4 wks to confirm no underlying lesion as etiology of initial bleed      Acute kidney failure (HCC)- (present on admission)  Assessment & Plan  Ischemic ATN   Resolved    Strict I/Os  Renally dosed medications  Avoid nephrotoxic agents as able  Trend     Plan  Restraints for safety, and to prevent vital catheters to be removed  Continue with amantadine  Complete antibiotics through 2/7  Ongoing anticoagulation  Mild forced diuresis, monitor daily  Electrolyte balance  Close laboratory follow-up  PT OT SLP follow-up  Consider LTAC referral  See orders  Patient is has a high medical complexity, complex decision making and is at high risk for complication, morbidity, and mortality.  My total time spent caring for the patient on the day of the encounter was 54 minutes.   This does not include time spent on separately billable procedures/tests.     VTE prophylaxis:    therapeutic anticoagulation with eliquis 5 mg BID      I have performed a physical exam and reviewed and updated ROS and Plan today (2/4/2024). In review of yesterday's note (2/3/2024), there are no changes except as documented above.      Please note that this dictation was created using voice recognition software. I have made every reasonable attempt to correct obvious errors, but I expect that there are errors of grammar and possibly context that I did not discover before finalizing the note.

## 2024-02-05 NOTE — PROGRESS NOTES
Pt had emesis episode with tube feed. Tube feeding was stopped, pt was suctioned, HOB was elevated. Imaging was ordered for placement of IRIS. Dr. Up was updated.

## 2024-02-05 NOTE — THERAPY
Occupational Therapy  Daily Treatment     Patient Name: Lewis Mohr  Age:  78 y.o., Sex:  male  Medical Record #: [id number]  Today's Date: 2/5/2024       Precautions: Fall Risk, Swallow Precautions    Assessment  Pt was seen for OT tx in collaboration w/PT given medical complexity and limited activity tolerance. Pt remains w/low arousal, but did follow 1 step commands and was able to participate in seated ADL's w/hand over hand facilitation and cues. Pt had improved arousal/attention towards end of session, w/1-2 word verbal output as well as a 1x smile and 1x thumbs up. Pt's frequency will be increased. OT will continue to follow.     Plan  Treatment Plan Status: Modify Current Treatment Plan  Type of Treatment: Self Care / Activities of Daily Living, Therapeutic Activity, Neuro Re-Education / Balance  Treatment Frequency: 4 Times per Week  Treatment Duration: Until Therapy Goals Met    DC Equipment Recommendations: Unable to determine at this time  Discharge Recommendations: Recommend post-acute placement for additional occupational therapy services prior to discharge home    Subjective  Thumbs up     Objective     02/05/24 1153   Treatment Charges   Charges Yes   OT Self Care / ADL (Units) 1   OT Therapy Activity (Units) 1   Total Time Spent   OT Time Spent Yes   OT Self Care / ADL (Minutes) 15   OT Therapy Activity (Minutes) 20   OT Total Time Spent (Calculated) 35   Precautions   Precautions Fall Risk;Swallow Precautions   Comments -   Pain 0 - 10 Group   Therapist Pain Assessment 0;Nurse Notified   Cognition    Cognition / Consciousness X   Speech/ Communication Delayed Responses   Level of Consciousness Alert   Ability To Follow Commands 1 Step   Safety Awareness Impaired   New Learning Impaired   Attention Impaired   Initiation Impaired   Comments Slow to process/respond, slight improvement towards end of session did verbalize 1-2 tx but tended to just stare and occsionlly nod   Passive ROM Upper Body    Passive ROM Upper Body WDL   Active ROM Upper Body   Active ROM Upper Body  X   Comments tends to more RUE more than L both impacted by tone and poor gross coordination   Strength Upper Body   Upper Body Strength  X   Gross Strength Generalized Weakness, Equal Bilaterally.    Other Treatments   Other Treatments Provided OT focused on grooming, and oral care as well as UB dressing and reaching tasks w/BUE   Balance   Sitting Balance (Static) Fair -   Sitting Balance (Dynamic) Poor +   Standing Balance (Static) Trace   Standing Balance (Dynamic) Trace   Weight Shift Sitting Poor   Weight Shift Standing Absent   Skilled Intervention Verbal Cuing;Tactile Cuing;Facilitation;Compensatory Strategies   Comments after facilitation was able to sit EOB w/close SBA/CGA tends to lean to R   Bed Mobility    Supine to Sit Maximal Assist   Sit to Supine Maximal Assist   Skilled Intervention Tactile Cuing;Verbal Cuing;Facilitation   Comments slow to initiate simple movements poor motor planning   Activities of Daily Living   Grooming Maximal Assist;Seated   Upper Body Dressing Maximal Assist   Lower Body Dressing Maximal Assist   Toileting Maximal Assist   Skilled Intervention Verbal Cuing;Tactile Cuing;Facilitation;Compensatory Strategies   Comments able to reach towards objects w/RUE, but little to no functional grasp or coordination impacting tool use   How much help from another person does the patient currently need...   6 Clicks Daily Activity Score 12   Functional Mobility   Sit to Stand Maximal Assist   Bed, Chair, Wheelchair Transfer Unable to Participate   Toilet Transfers Unable to Participate   Mobility EOB sit>Stand BTB   Skilled Intervention Verbal Cuing;Tactile Cuing;Facilitation;Compensatory Strategies   Visual Perception   Visual Perception  X   Comments neglect vs inattention to the right   Activity Tolerance   Comments no overt signs of pain or fatigue but BP was soft   Patient / Family Goals   Patient / Family  Goal #1 none stated   Short Term Goals   Short Term Goal # 1 pt will wash face with a cloth mod A   Goal Outcome # 1 Goal not met   Short Term Goal # 2 pt will maintain sitting in midine with SBA for ADLs   Goal Outcome # 2 Goal not met   Short Term Goal # 3 mod A with UB dressing   Goal Outcome # 3 Goal not met   Short Term Goal # 4 mod A with ADL txfs   Goal Outcome # 4 Goal not met   Education Group   Role of Occupational Therapist Patient Response Patient;Explanation;No Learning Evidence   Occupational Therapy Treatment Plan    O.T. Treatment Plan Modify Current Treatment Plan   Treatment Frequency 4 Times per Week   Anticipated Discharge Equipment and Recommendations   DC Equipment Recommendations Unable to determine at this time   Discharge Recommendations Recommend post-acute placement for additional occupational therapy services prior to discharge home   Interdisciplinary Plan of Care Collaboration   IDT Collaboration with  Nursing;Physical Therapist   Patient Position at End of Therapy In Bed;Call Light within Reach;Tray Table within Reach;Phone within Reach   Collaboration Comments RN aware of OT tx and pts efforts   Session Information   Date / Session Number  2/5 #5 (2/4, 2/6)

## 2024-02-05 NOTE — DIETARY
Nutrition support weekly update:  Day 32 of admit.  Lewis Mohr is a 78 y.o. male with admitting DX of respiratory failure with hypoxia.    Tube feeding initiated on 1/6. Pt previously on Glucerna 1.2 @ 75 mL/hr providing 2160 kcals, 108 grams protein and 1449 mL free water per day     Assessment:  Weight 85.8 kg via bed scale, needs estimated based on bed scale wt 87.4 kg.   Re-estimate of nutritional needs is not indicated at this time.      Evaluation:   Pt previously intubated, extubated 2/1. TF last documented @ goal 2/4 @ 1400. Per Chart IRIS placed yesterday and confirmed @ 8:45 PM. Per discussion with RN via voalte plan to place new IRIS today-waiting for charge nurse.  Pt seen and assessed by SLP today 2/5. Pt failed FEES. Found to have severe oropharyngeal dysphagia. Recommending long term source of nutrition. MD updated and pending discussion with family   Current clinical picture and MD progress notes reviewed. Pt admitted with respiratory failure. Intubated/extubated multiple times during admit (1/9-1/11, 1/13-1/20 and 1/25-2/1). Extubated 2/1 and per [facility] plan not to re-intubate.   Labs (2/5) Glu 146 (H)  Meds: lasix, senna  Skin: partial thickness of left arm, generalized edema, 2+ dependent right upper extremity edema noted, 1+ edema to upper left extremity and bilateral lower extremities.   +BM 2/4  Current feeding remains appropriate       Malnutrition risk: No new criteria assessed    Recommendations/Plan:  Once IRIS replaced and placement confirmed, continue Glucerna 1.2 @ 75 mL/hr  Consideration of long term nutrition support   Fluids per MD  Monitor weights    RD following

## 2024-02-05 NOTE — CARE PLAN
Your procedure is scheduled with Naresh Hernandez MD on 3/24/2020 at 11:00 am (will typically need to be there 1 1/2 - 2 hours prior to your surgery time), at [facility]. Please enter in the [facility] around back of the hospital. You can expect to be contacted by the prep education nurses at the hospital 1-3 days prior to your surgery to confirm arrival and surgery time. Occasionally these times may be different then the time given at your appointment due to the possibility of emergency surgeries, last minute cancellations or surgical cases before yours taking shorter or longer time. They will also review your medications, medical history and discuss eating/drinking preoperatively.     Please arrive to register for your procedure at:  [place] [id number] [facility].  [place] [id number] [phone]    The following appointment(s) have been scheduled for you:    Pre-operative history & physical done on 3/5/2020 with Dr. Hernandez  Surgical consent was signed with Dr. Hernandez on 3/5/2020  Post-operative visit after surgery with Dr. Hernandez at the [facility] on 4/9/2020 at 12:40 pm    Here are instructions for your surgery:    - Do not eat or drink anything after midnight before your surgery unless otherwise instructed  - You will need someone to drive you home and remain with you for up to 24 hours after you have been discharged from the hospital, if you show up the day of surgery without a ride, you may be rescheduled  -Do not take any anti-inflammatory medications (examples include Mobic, Ibuprofen, Naproxen or Aleve) 7 days prior to your surgery. You may take Tylenol for any pain. If you take any blood thinners you will be given instruction on pre-op management of these from either your surgeon, primary care doctor, cardiologist or anticoagulation clinic prior to your surgery (examples of blood thinners prescribed could be but are not limited to  The patient is Watcher - Medium risk of patient condition declining or worsening    Shift Goals  Clinical Goals: BP <160, wean O2,  Patient Goals: DULCE MARIA  Family Goals: DULCE MARIA    Progress made toward(s) clinical / shift goals:    Problem: Hemodynamics  Goal: Patient's hemodynamics, fluid balance and neurologic status will be stable or improve  Outcome: Progressing     Problem: Urinary - Renal Perfusion  Goal: Ability to achieve and maintain adequate renal perfusion and functioning will improve  Outcome: Progressing     Problem: Respiratory  Goal: Patient will achieve/maintain optimum respiratory ventilation and gas exchange  Outcome: Progressing     Problem: Skin Integrity  Goal: Skin integrity is maintained or improved  Outcome: Progressing     Problem: Fall Risk  Goal: Patient will remain free from falls  Outcome: Progressing     Problem: Safety - Medical Restraint  Goal: Remains free of injury from restraints (Restraint for Interference with Medical Device)  Outcome: Progressing     Problem: Pain - Standard  Goal: Alleviation of pain or a reduction in pain to the patient’s comfort goal  Outcome: Progressing       Patient is not progressing towards the following goals:      Problem: Knowledge Deficit - Standard  Goal: Patient and family/care givers will demonstrate understanding of plan of care, disease process/condition, diagnostic tests and medications  Outcome: Not Progressing      Aspirin, Plavix, Coumadin, Warfarin). Please check with your physician if you are questioning any of the medications you take.     If you have any work related and/or disability forms that need to be completed after talking to your Human Resources department regarding time off after your surgery, please bring these forms to our clinic and give to any of the patient care representatives at the . They will ask you to complete a Authorization for Release of Information that will need to be signed. It takes approximately 7-10 business days to complete these forms.    Please call our office if you develop any signs of infection (examples include cold, fever, toothache, cough, nausea, diarrhea, vomiting, etc) prior to your surgery. Your surgery may need to be rescheduled if you have any kind of infection. You should also not have surgery if you have any acute illness or recent significant life event.     If you have any questions or need to cancel your surgery, please contact [phone] and ask to be transferred to the surgery department.

## 2024-02-05 NOTE — THERAPY
Speech Language Pathology   Flexible Endoscopic Evaluation of Swallowing (FEES)        Patient Name: Lewis Mohr  AGE:  78 y.o., SEX:  male  Medical Record #: [id number]  Date of Service: 2/5/2024      History of Present Illness  Pt is a 78 y.o. M who presented 1/4/23 with ALOC and low oxygen levels. Admitting dx of respiratory failure. Pt w/ recent cerebellar hemorrhage who underwent craniotomy by Dr. Patten neurosurgery on 12/27/23 2/2 fall. Pt subsequently discharged to SNF. Pt w/ return to hospital 1/3/24 because of slurred speech and CT head was done which was unremarkable for acute processes, again pt discharged to SNF.      Intubation hx: 1/9 - 1/11, 1/13 - 1/20, 1/27 - 2/1     CMHx: Aspiration PNA, respiratory failure, REWIN, Hx of cerebellar hemorrhage      PMHx: Respiratory failure with hypoxia, Leucocytosis, Adrenal mass L     No hx of ST per EMR.      CXR 2/3/24:  Cardiac silhouette is normal in size. No airspace infiltrate, pleural effusion, or pneumothorax. Dobbhoff tube extends beyond the proximal duodenum and lower margin of the image.      CT Head 1/9/24:  1.  No evidence of acute territorial infarct, intracranial hemorrhage or mass lesion.  2.  Mild diffuse cerebral substance loss.  3.  Mild microangiopathic ischemic change versus demyelination or gliosis.  4.  Status post right suboccipital cranioplasty with underlying cerebellar hypoattenuation likely on the basis of evolving encephalomalacia.      Pertinent Information  Current Method of Nutrition: NPO until cleared by speech pathology  Patient Behaviors: Drowsy, Fatigue   Dentition: Fair, Natural dentition   Feeding Tube: None   Tracheostomy: No           Factor(s) Affecting Performance: Impaired endurance, Impaired mental status, Impaired command following     Discussed the risks, benefits, and alternatives of the FEES procedure. Patient/family acknowledged and agreed to proceed.      Assessment  Flexible Endoscopic Evaluation of Swallowing (FEES)  completed at bedside today. The endoscope was passed transnasally via Left nare to evaluate the anatomy and physiology of swallowing. Pt tolerated the procedure with no apparent distress.    Anatomic Findings: Space occupying lesions on posterior aspect of true vocal cords consistent with intubation hx. Mild edema of ventricular folds.    Vocal Fold Motion: Bilateral movement (mild glottic gap with phonation, complete adduction with cough, sluggish L vocal cord)   Secretion Management: Adequate  PO Trials: Ice Chips, Thin Liquid, Mildly Thick Liquid, Liquidised      Consistency PAS Score Timing Residue Comments   Thin Liquid 8 During swallow, Pre Swallow Vallecular Residue: None (0%)  Pyriform Sinus Residue: None (0%)    Mildly Thick 8 Pre Swallow, During Swallow Vallecular Residue: Trace (1%-5%)  Pyriform Sinus Residue: Trace (1%-5%)    Liquidised 8 During Swallow Vallecular Residue: Moderate (25%-50%)  Pyriform Sinus Residue: Trace (1%-5%)      Penetration-Aspiration Scale (PAS)  1     No contrast enters airway  2     Contrast enters the airway, remains above the vocal folds, and is ejected from the airway (not seen in the airway at the end of the swallow).  3     Contrast enters the airway, remains above the vocal folds, and is not ejected from the airway (is seen in the airway after the swallow).  4     Contrast enters the airway, contacts the vocal folds, and is ejected from the airway.  5     Contrast enters the airway, contacts the vocal folds, and is not ejected from the airway  6     Contrast enters the airway, crosses the plane of the vocal folds, and is ejected from the airway.  7     Contrast enters the airway, crosses the plane of the vocal folds, and is not ejected from the airway despite effort.  8     Contrast enters the airway, crosses the plane of the vocal folds, is not ejected from the airway and there is no response to aspiration.      Oral phase:   Mild-moderately delayed onset of  swallow.  Impaired bolus control 2/2 impaired lingual-velar seal resulted in inconsistent laryngeal penetration and aspiration before the swallow with trials of MTL and thin liquids.       Pharyngeal phase:  Reduced tongue base retraction resulted in mild-moderate vallecular residue following trials of applesauce.  Impaired laryngeal vestibule closure resulted in CARI aspiration of all trials tested including single ice chips. Patient was not sensate to significant amount of residue in anterior trachea just below anterior commissure.       Silent aspiration (PAS 8) w/ applesauce    Compensatory Strategies:  Cued second swallow reduced pharyngeal residue.  Cued cough cleared residue from airway inconsistently.     Severity Rating:  Severity Rating: HORACE  HORACE: Severe      Clinical Impressions  The pt presents with a severe oropharyngeal dysphagia  likely acute (r/t AMS, debility from illness, and multiple and prolonged intubations) on subacute (r/t recent cerebellar stroke).  Swallow safety is significant impaired; swallow efficiency is impaired. Pt appears to be at high risk for aspiration PNA, and high for malnutrition/dehydration. Non-oral nutrition is indicated. A repeat swallowing diagnostic is indicated prior to diet advancement related to silent nature of aspiration events. Swallow prognosis is guarded given tenuous respiratory status and severity of dysphagia; patient appears to be a fair candidate for behavioral swallow rehabilitation.      Recommendations  Diet Consistency: NPO with consideration for more long term source of nutrition (MD updated - pending discussion with family)   -OK for 3-5 small single ice chips following oral care with RN only to reduce xerostomia and disuse atrophy   Medication: Non Oral  Supervision: 1:1 feeding with constant supervision  Oral Care: Q4h  Additional Instrumentation: Repeat diagnostic study when clinically appropriate         SLP Treatment Plan  Treatment Plan:  Dysphagia Treatment, Patient/Family/Caregiver Training  SLP Frequency: 3x Per Week  Estimated Duration: Until Therapy Goals Met      Anticipated Discharge Needs  Discharge Recommendations: Recommend post-acute placement for additional speech therapy services prior to discharge home   Therapy Recommendations Upon DC: Dysphagia Training, Patient / Family / Caregiver Education       Patient / Family Goals  Patient / Family Goal #1: RN reported pt's daughter was eager for pt to participate w/ ST  Goal #1 Outcome: Progressing slower than expected  Short Term Goal # 1: Pt will demo improved secretion management as evidenced by reduced use of oral suctioning during tx session  Goal Outcome # 1: Progressing as expected  Short Term Goal # 1 B : Pt will participate in prefeeding trials to demo readiness for an instrumental swallow study  Goal Outcome  # 1 B: Progressing as expected  Short Term Goal # 2: Patient will perform dysphagia exercises targeting BoT. pharyngeal squeeze, and airway protection with good accuracy given min cues.      Krystal Macias, SLP

## 2024-02-05 NOTE — PROGRESS NOTES
4 Eyes Skin Assessment Completed by CHARLIE Blackmon and CHARLIE Monaco.    Head WDL  Ears WDL  Nose WDL  Mouth WDL  Neck WDL  Breast/Chest WDL  Shoulder Blades WDL  Spine WDL  (R) Arm/Elbow/Hand Redness and Blanching on elbow  (L) Arm/Elbow/Hand Redness and Scab, skin tear, wound on forearm, bruising on upper arm  Abdomen WDL  Groin Redness, Excoriation, and Rash  Scrotum/Coccyx/Buttocks Redness and Excoriation  (R) Leg WDL  (L) Leg WDL  (R) Heel/Foot/Toe WDL  (L) Heel/Foot/Toe WDL          Devices In Places ECG, Blood Pressure Cuff, Pulse Ox, SCD's, Cortrak, Condom Cath, and HFNC      Interventions In Place NC Cheek Stickers, InterDry, Heel Mepilex, Waffle Overlay, TAP System, Pillows, Q2 Turns, Low Air Loss Mattress, ZFlo Pillow, and Heels Loaded W/Pillows    Possible Skin Injury Yes    Pictures Uploaded Into Epic Yes  Wound Consult Placed Yes  RN Wound Prevention Protocol Ordered Yes

## 2024-02-05 NOTE — PROGRESS NOTES
[facility] Medicine Daily Progress Note    Date of Service  2/5/2024    Chief Complaint  Lewis Mohr is a 78 y.o. male admitted 1/4/2024 with respiratory failure    Hospital Course  Mr. 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  Unfortunately, the patient was readmitted to [facility] on 1/4/2024 for altered mental status and hypoxia.  He was admitted to the medicine services with hypernatremia, dehydration, and ERWIN with urinary retention.  He was found to be silently aspirating.  On 1/9/2024, the patient went into AF with RVR with worsening hypoxia and mental status changes.  A code stroke was called.  While in the CT scanner the patient was not protecting his airway and therefore was intubated.  He had a PEA arrest shortly afterward.  He was transferred to the ICU.  The patient was extubated on 1/11/2024 and required HFNC at 45L at 70% on 1/12.  Unfortunately, the patient was reintubated on 1/13.  He remained intubated until 1/20/2024 and then extubated to HFNC at 40L/70%.  He was transferred to IMCU on 1/25.  On the evening of 1/26 the critical care team was reconsulted due to the patient becoming suddenly tachycardic in the 140-150s with hypotension.  He was given IVF and a dose of phenylephrine.  He was not protecting his airway and required emergent intubation and bronchoscopy then transferred back to the ICU.  During his ICU stay the patient was intermittently agitated, requiring pressor therapy, continue to be significantly encephalopathic, eventually had improvement and was extubated to high flow nasal cannula, patient with difficulty with secretions and protecting his airway per intensivist  Extensive discussion was held with family in terms of goals of care and ongoing difficulties.  On my evaluation on 2/4 the patient is lethargic, he remains on high flow nasal cannula, 25 L, 35%, he  follows sluggishly, he has difficulty speaking.  He appears profoundly weak      Interval Problem Update  Patient seen and examined today.  Data, Medication data reviewed.  Case discussed with nursing as available.  Plan of Care reviewed with patient and notified of changes.  2/4, the patient is afebrile, heart rate in the 80s, respiration unlabored in the 20 range, the patient is saturating in the high 90s on 25 L, 35% high flow nasal cannula oxygen, blood pressure in the 150s to 170s over 80s,  Laboratory data white second 11.5, hemoglobin 9.4, platelet count 487, chemistry with a sodium of 143, potassium 3.9, chloride 104, glucose 163, albumin 2.7, glucose in the 160s to 170s  2/5 the patient failed F EES, n.p.o. recommendation,  Patient pulled feeding tube, to be replaced  The patient has current chest pain, no increasing weakness, the patient is able to communicate,  Currently afebrile, heart rate in the 90s, respiration unlabored, the patient is on 25 L, 35% FiO2, satting in the mid to high 90s, blood pressure 120s over 70s to 150s over 90s,    I have discussed this patient's plan of care and discharge plan at IDT rounds today with Case Management, Nursing, Nursing leadership, and other members of the IDT team.    Consultants/Specialty  critical care  Palliative care    Code Status  Full Code    Disposition    Anticipate discharge to: skilled nursing facility    I have placed the appropriate orders for post-discharge needs.    Review of Systems  Review of Systems   Unable to perform ROS: Medical condition        Physical Exam  Temp:  [36.3 °C (97.4 °F)] 36.3 °C (97.4 °F)  Pulse:  [81-99] 94  Resp:  [8-44] 23  BP: (140-173)/(71-93) 155/87  SpO2:  [92 %-99 %] 98 %    Physical Exam  Vitals and nursing note reviewed.   Constitutional:       Appearance: He is well-developed. He is ill-appearing.      Comments: Pt seen and examined.  Lethargic, weak   HENT:      Head: Normocephalic and atraumatic.      Nose:       Comments: High flow nasal cannula  Feeding tube     Mouth/Throat:      Mouth: Mucous membranes are dry.   Eyes:      Pupils: Pupils are equal, round, and reactive to light.   Cardiovascular:      Rate and Rhythm: Normal rate and regular rhythm.      Heart sounds: Normal heart sounds.   Pulmonary:      Effort: Pulmonary effort is normal.      Breath sounds: Rhonchi present.   Abdominal:      General: Bowel sounds are normal.      Palpations: Abdomen is soft.   Musculoskeletal:         General: Normal range of motion.      Cervical back: Normal range of motion and neck supple.   Skin:     General: Skin is warm and dry.      Coloration: Skin is pale.   Neurological:      General: No focal deficit present.      Mental Status: He is disoriented.      Motor: Weakness present.   Psychiatric:         Behavior: Behavior normal.         Fluids    Intake/Output Summary (Last 24 hours) at 2/5/2024 0754  Last data filed at 2/4/2024 1710  Gross per 24 hour   Intake 860 ml   Output 525 ml   Net 335 ml         Laboratory  Recent Labs     02/03/24 0328 02/04/24  0330 02/05/24  0331   WBC 14.5* 11.5* 11.5*   RBC 3.10* 3.12* 3.26*   HEMOGLOBIN 9.3* 9.4* 9.8*   HEMATOCRIT 28.6* 28.9* 30.3*   MCV 92.3 92.6 92.9   MCH 30.0 30.1 30.1   MCHC 32.5 32.5 32.3   RDW 46.3 46.6 47.1   PLATELETCT 510* 487* 451*   MPV 10.9 10.9 10.7       Recent Labs     02/03/24 0328 02/04/24  0330 02/05/24  0331   SODIUM 141 143 143   POTASSIUM 3.8 3.9 3.6   CHLORIDE 105 104 106   CO2 26 28 26   GLUCOSE 156* 163* 146*   BUN 18 21 21   CREATININE 1.16 1.25 1.23   CALCIUM 9.3 9.4 9.6                     Imaging  DX-ABDOMEN FOR TUBE PLACEMENT   Final Result      Feeding tube tip overlies the duodenum.      DX-CHEST-PORTABLE (1 VIEW)   Final Result      No acute process.      DX-CHEST-PORTABLE (1 VIEW)   Final Result      1.  Interval extubation.   2.  Mild interstitial pulmonary edema.   3.  Possible minimal/trace left pleural effusion.      DX-CHEST-LIMITED (1  VIEW)   Final Result      Stable appearance of the chest.      MR-BRAIN-W/O   Final Result         No acute intracranial process.      Age-related volume loss and chronic microvascular ischemic changes.      Postoperative changes in the right cerebellum with resection cavity and hemosiderin staining.      Bilateral mastoid effusion.      DX-CHEST-PORTABLE (1 VIEW)   Final Result         1.  Interstitial pulmonary parenchymal prominence suggest chronic underlying lung disease, component of interstitial edema and/or infiltrates not excluded.   2.  Cardiomegaly   3.  Atherosclerosis      DX-CHEST-PORTABLE (1 VIEW)   Final Result         1.  Pulmonary edema and/or infiltrates are identified, which are stable since the prior exam.   2.  Cardiomegaly   3.  Atherosclerosis      DX-CHEST-PORTABLE (1 VIEW)   Final Result         1.  Pulmonary edema and/or infiltrates are identified, which are stable since the prior exam.   2.  Cardiomegaly   3.  Atherosclerosis      DX-CHEST-LIMITED (1 VIEW)   Final Result         1.  Pulmonary edema and/or infiltrates are identified, which appear somewhat increased since the prior exam.   2.  Cardiomegaly   3.  Atherosclerosis      DX-ABDOMEN FOR TUBE PLACEMENT   Final Result         Feeding tube with tip projecting over the expected area of the distal stomach.      DX-ABDOMEN FOR TUBE PLACEMENT   Final Result      The nasogastric tube terminates over the antrum of the stomach.      DX-CHEST-PORTABLE (1 VIEW)   Final Result         Ill-defined hazy opacities in the right mid lung and left lower lobe are similar to prior.      IR-MIDLINE CATHETER INSERTION WO GUIDANCE > AGE 3   Final Result                  Ultrasound-guided midline placement performed by qualified nursing staff    as above.          DX-CHEST-PORTABLE (1 VIEW)   Final Result      Improved interstitial opacities may represent improving edema and/or pneumonitis.      Mild nonspecific right basilar opacity and questionable tiny  left upper lobe opacity. Correlate clinically for infection.      DX-CHEST-LIMITED (1 VIEW)   Final Result      1.  Slightly increased BILATERAL lung disease. This could be pulmonary edema or pneumonia.   2.  Possible small BILATERAL pleural effusions      DX-CHEST-PORTABLE (1 VIEW)   Final Result         1.  Mild pulmonary edema and/or infiltrates   2.  Atherosclerosis      DX-ABDOMEN FOR TUBE PLACEMENT   Final Result      NG tube tip projects over the stomach.      DX-ABDOMEN FOR TUBE PLACEMENT   Final Result      1.  Enteric tube projects over the proximal stomach.      DX-CHEST-PORTABLE (1 VIEW)   Final Result      Lines and tubes appear adequate. Stable bibasilar airspace disease.      DX-CHEST-PORTABLE (1 VIEW)   Final Result         1.  Pulmonary edema and/or infiltrates are identified, which appear somewhat increased since the prior exam.   2.  Cardiomegaly      DX-CHEST-PORTABLE (1 VIEW)   Final Result      1.  New small left pleural effusion with associated atelectasis.   2.  Increased right basilar opacity most likely atelectasis however correlate clinically for infection.      DX-CHEST-PORTABLE (1 VIEW)   Final Result         1.  Pulmonary edema and/or infiltrates are identified, which are stable since the prior exam.   2.  Cardiomegaly      DX-CHEST-PORTABLE (1 VIEW)   Final Result      1.  No significant change.      MR-MRA HEAD-W/O   Final Result         MRA OF THE [place] WITHIN NORMAL LIMITS.      MR-BRAIN-W/O   Final Result      1.  Redemonstrated is scattered supratentorial and infratentorial areas of ischemia. No new lesions. There is increased conspicuity of the RIGHT frontal lobe lesion which could be due to recurrent ischemia or seizure activity.   2.  Unchanged RIGHT cerebellar hematoma evacuation cavity. As previously noted underlying mass is not excluded and follow-up MRI without and with contrast in approximately weeks is recommended.   3.  No new hemorrhage   4.  Atrophy   5.   White matter changes   6.  Small BILATERAL mastoid effusions      DX-CHEST-PORTABLE (1 VIEW)   Final Result      DX-ABDOMEN FOR TUBE PLACEMENT   Final Result      Enteric feeding tube terminates in the left upper quadrant projecting over the expected location of the stomach.      DX-CHEST-LIMITED (1 VIEW)   Final Result      1.  Interval extubation   2.  Lower lung volumes with increased atelectasis superimposed on pulmonary edema or pneumonia      DX-CHEST-PORTABLE (1 VIEW)   Final Result         1.  Pulmonary edema and/or infiltrates are identified, which are somewhat decreased since the prior exam.   2.  Atherosclerosis      US-EXTREMITY VENOUS LOWER BILAT   Final Result      DX-CHEST-PORTABLE (1 VIEW)   Final Result         1.  Scattered hazy bilateral pulmonary infiltrates, slightly increased since prior study.   2.  Atherosclerosis      MR-BRAIN-W/O   Final Result      1.  A few punctate areas of acute infarcts in the right frontal and bilateral parietal lobes and left cerebellum.   2.  There is no brainstem infarct or diffuse anoxic injury.   3.  Mixed signal abnormality in the right cerebellum with the previous surgical intervention in keeping with the clinical diagnosis of right cerebellar hemorrhage evacuation. Follow-up study with contrast in 6 weeks is recommended to rule out any    underlying pathology.   4.  Mild cerebral volume loss.      DX-CHEST-PORTABLE (1 VIEW)   Final Result      CT-CTA NECK WITH & W/O-POST PROCESSING   Final Result      1.  No acute arterial abnormality detected. No significant arterial stenosis.      CT-CTA HEAD WITH & W/O-POST PROCESS   Final Result      CT angiogram of the [place] within normal limits.      CT-CEREBRAL PERFUSION ANALYSIS   Final Result      1.  Cerebral blood flow less than 30% likely representing completed infarct = 0 mL.      2.  T Max more than 6 seconds likely representing combination of completed infarct and ischemia = 0 mL.      3.  Mismatched  volume likely representing ischemic brain/penumbra = None      4.  Please note that the cerebral perfusion was performed on the limited brain tissue around the basal ganglia region. Infarct/ischemia outside the CT perfusion sections can be missed in this study.      CT-HEAD W/O   Final Result      1.  No evidence of acute territorial infarct, intracranial hemorrhage or mass lesion.   2.  Mild diffuse cerebral substance loss.   3.  Mild microangiopathic ischemic change versus demyelination or gliosis.   4.  Status post right suboccipital cranioplasty with underlying cerebellar hypoattenuation likely on the basis of evolving encephalomalacia.         DX-CHEST-PORTABLE (1 VIEW)   Final Result      1.  Intubation.   2.  Bilateral pulmonary infiltrates.      DX-CHEST-LIMITED (1 VIEW)   Final Result      DX-ABDOMEN FOR TUBE PLACEMENT   Final Result      Enteric feeding tube terminates with the tip projecting over the expected location of the distal stomach.      CT-CTA CHEST PULMONARY ARTERY W/ RECONS   Final Result         1.  No pulmonary embolus appreciated.   2.  Consolidations in bilateral lung bases an scattered hazy right pulmonary opacities, compatible with atelectasis with component of infiltrate.   3.  Indeterminate left adrenal nodule, follow-up adrenal protocol CT for further characterization as clinically appropriate.   4.  Atherosclerosis and atherosclerotic coronary artery disease.      DX-ABDOMEN FOR TUBE PLACEMENT   Final Result         1.  Nonspecific bowel gas pattern in the upper abdomen.   2.  Cardiomegaly   3.  Atherosclerosis   4.  Bibasilar atelectasis   5.  Nasogastric tube tip terminates overlying the expected location of the pylorus or first duodenal segment.      DX-ABDOMEN FOR TUBE PLACEMENT   Final Result         1.  Nonspecific bowel gas pattern in the upper abdomen.   2.  Nasogastric tube tip terminates overlying the expected location of the gastric antrum.   3.  Linear densities the  bilateral lung bases favor atelectasis, component of infiltrate not excluded.      DX-ABDOMEN FOR TUBE PLACEMENT   Final Result      NG tube extends below the diaphragm with tip at the level of the gastric fundus. Side port is at the level of the distal esophagus.      DX-ABDOMEN FOR TUBE PLACEMENT   Final Result      NG tube is noted with tip projecting over the right lower lung.      These findings were transmitted with GLEN ZELAYA on 01/07/2024. Tube was subsequently adjusted and is now present below the diaphragm.      DX-ABDOMEN FOR TUBE PLACEMENT   Final Result      DHT tip overlies the second portion of the duodenum      DX-ABDOMEN FOR TUBE PLACEMENT   Final Result      Feeding tube tip projects in the distal stomach or first portion of the duodenum.      DX-ABDOMEN FOR TUBE PLACEMENT   Final Result      Removal of nasogastric tube.      Feeding tube tip projects in the distal stomach.      No visualized thorax.      DX-ABDOMEN FOR TUBE PLACEMENT   Final Result         Gastric drainage tube with tip projecting over the expected area of the right lower lobe airway. Recommend removal.            CRITICAL RESULT READ BACK: Preliminary findings discussed with and critical read back performed by Dr. GLEN ZELAYA via telephone on 1/5/2024 6:22 PM      DX-ABDOMEN FOR TUBE PLACEMENT   Final Result      1.  Malpositioned enteric sump catheter which is folded back upon itself over the left paramedian distal one third mediastinum probably beyond the left mainstem bronchus and within the esophagus.   2.  A Voalte message was sent to GLEN ZELAYA at 1641 hours. Immediate response received.   3.  Per discussion, the patient has already pulled out the tube.      DX-CHEST-PORTABLE (1 VIEW)   Final Result      1.  Hypoinflation and pulmonary vascular congestion with mild bibasilar atelectasis.   2.  No lobar pneumonia or overt pulmonary edema.      CT-HEAD W/O   Final Result      1.  Diffuse atrophy and white matter changes.    2.  No acute intracranial hemorrhage or territorial infarct.   3.  RIGHT occipital craniotomy with underlying cerebellar encephalomalacia.              Assessment/Plan  * Acute respiratory failure with hypoxia (HCC)- (present on admission)  Assessment & Plan  Intubated date: 1/9/2024-1/11, 1/13-1/20, 1/25-2/1  Recurrent aspiration  HFNC for SpO2 > 90%  Pulmonary hygiene   Mucolytics  Empiric aspiration PNA coverage started 2/2  Remains at risk for aspiration and reintubation - ongoing goals of care, daughter wants him to remain full code      A-fib (HCC)  Assessment & Plan  LVH on 12/27 ECHO with diastolic dysfunction  Eliquis prophylaxis for CVA  Optimize electrolytes   Continue cardiac monitoring.    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  S/p zosyn course   2/2 Increasing infiltrates on CXR + hypoxia + leukocytosis and purulent secretions - started on unasyn   Monitor cultures closely,    Debility  Assessment & Plan  Prolonged hospitalization, prior cerebral insult  Will likely require longer term rehabilitation, therapy, consider LTAC referral    Hypoxic respiratory failure (HCC)- (present on admission)  Assessment & Plan  Required multiple episodes of mechanical ventilation, intubation,  Unable to protect airway effectively  Ongoing high flow nasal cannula to add Peep    Encephalopathy  Assessment & Plan  Toxic metabolic + ICU hypoactive delirium    2/3 improved mentation but somnolence - amantadine 100 mg qd  Close monitoring    Advance care planning  Assessment & Plan  Following ICH he has recurrent aspiration and will remain high risk for reintubation; several goals of care conversations with the daughter. 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 Patten  Repeat imaging including MRI show changes consistent with known bleed but no new findings  Still needs a f/u MRI in 3-4 wks to confirm no underlying lesion as etiology of initial bleed      Acute kidney failure (HCC)- (present on admission)  Assessment & Plan  Ischemic ATN   Resolved    Strict I/Os  Renally dosed medications  Avoid nephrotoxic agents as able  Trend     Plan  GI consult for PEG tube  Speech therapy recommends PEG tube, discussed with daughter at the bedside, she is in approval  Restraints for safety, and to prevent vital catheters to be removed  Continue with amantadine  Complete antibiotics through 2/7  Ongoing anticoagulation  Mild forced diuresis, monitor daily  Electrolyte balance  Close laboratory follow-up  PT OT SLP follow-up  Consider LTAC referral  See orders  Patient is has a high medical complexity, complex decision making and is at high risk for complication, morbidity, and mortality.  My total time spent caring for the patient on the day of the encounter was 55 minutes.   This does not include time spent on separately billable procedures/tests.     VTE prophylaxis:    therapeutic anticoagulation with weight-based lovenox BID, 1 mg/kg      I have performed a physical exam and reviewed and updated ROS and Plan today (2/5/2024). In review of yesterday's note (2/4/2024), there are no changes except as documented above.      Please note that this dictation was created using voice recognition software. I have made every reasonable attempt to correct obvious errors, but I expect that there are errors of grammar and possibly context that I did not discover before finalizing the  note.

## 2024-02-05 NOTE — CARE PLAN
The patient is Watcher - Medium risk of patient condition declining or worsening    Shift Goals  Clinical Goals: hemodynamic stability, wean supplemental o2,  Patient Goals: DULCE MARIA  Family Goals: DULCE MARIA    Progress made toward(s) clinical / shift goals:    Problem: Hemodynamics  Goal: Patient's hemodynamics, fluid balance and neurologic status will be stable or improve  Outcome: Progressing     Problem: Knowledge Deficit - Standard  Goal: Patient and family/care givers will demonstrate understanding of plan of care, disease process/condition, diagnostic tests and medications  Outcome: Not Progressing  Note: Pt educated regarding plan of care and medications. All questions answered.   No evidence of learning r/t AMS     Problem: Skin Integrity  Goal: Skin integrity is maintained or improved  Outcome: Progressing       Patient is not progressing towards the following goals:      Problem: Knowledge Deficit - Standard  Goal: Patient and family/care givers will demonstrate understanding of plan of care, disease process/condition, diagnostic tests and medications  Outcome: Not Progressing  Note: Pt educated regarding plan of care and medications. All questions answered.   No evidence of learning r/t AMS

## 2024-02-05 NOTE — PROGRESS NOTES
Xray confirmed placement of iris overlying duodenum. This RN restarted tube feeding for patient at 45ml/hr per order.

## 2024-02-06 PROBLEM — Z71.89 ADVANCE CARE PLANNING: Status: RESOLVED | Noted: 2024-01-01 | Resolved: 2024-01-01

## 2024-02-06 PROBLEM — J96.91 HYPOXIC RESPIRATORY FAILURE (HCC): Status: RESOLVED | Noted: 2024-01-01 | Resolved: 2024-01-01

## 2024-02-06 NOTE — THERAPY
"Physical Therapy   Daily Treatment     Patient Name: Lewis Mohr  Age:  78 y.o., Sex:  male  Medical Record #: [id number]  Today's Date: 2/5/2024     Precautions  Precautions: Fall Risk;Swallow Precautions  Comments: -    Assessment    Pt more alert, whispers, minimal communication, follows 50% commands. On HFNC, Spo2 WDL. Pt min rigid and posterior pushing with trunk with sitting up at EOB. Able to sit at EOB and find COG. Practiced LE proprioception exercises and Deisi. Pt will continue to benefit from acute physical therapy to assist towards established goals. Anticipate pt will benefit from post acute placement upon DC from hospital.         Plan    Treatment Plan Status: Modify Current Treatment Plan  Type of Treatment: Bed Mobility, Equipment, Gait Training, Neuro Re-Education / Balance, Self Care / Home Evaluation, Therapeutic Activities, Therapeutic Exercise  Treatment Frequency: 3 Times per Week  Treatment Duration: Until Therapy Goals Met    DC Equipment Recommendations: Unable to determine at this time  Discharge Recommendations: Recommend post-acute placement for additional physical therapy services prior to discharge home      Subjective    Pt raised eyebrows upon introduction, required motivation to assist pt, pt lethargic.     Objective       02/05/24 1154   Precautions   Precautions Fall Risk;Swallow Precautions   Vitals   Pulse Oximetry 96 %   O2 Delivery Device Heated High Flow Nasal Cannula   Vitals Comments no s/s distress   Pain 0 - 10 Group   Therapist Pain Assessment 0   Cognition    Cognition / Consciousness X   Speech/ Communication Delayed Responses  (reduced vocal intensity)   Level of Consciousness Alert   Ability To Follow Commands 1 Step   Safety Awareness Impaired   New Learning Impaired   Attention Impaired   Initiation Impaired   Comments pt lethargic upon entry, slow to respond, replies \"I don't know\" to questions often, more participatory by the middle of the session   Neuro-Muscular " Treatments   Neuro-Muscular Treatments Anterior weight shift;Facilitation;Tactile Cuing;Verbal Cuing   Comments proprioception activities with LEs while at EOB   Vision   Vision Comments pt stated that he wears glasses, they are not in the room   Balance   Sitting Balance (Static) Fair -   Sitting Balance (Dynamic) Poor +   Standing Balance (Static) Trace   Standing Balance (Dynamic) Trace   Weight Shift Sitting Poor   Weight Shift Standing Absent   Skilled Intervention Verbal Cuing;Tactile Cuing;Compensatory Strategies   Comments improved sitting balance once pt in COG   Bed Mobility    Supine to Sit Maximal Assist   Sit to Supine Maximal Assist   Skilled Intervention Tactile Cuing;Verbal Cuing;Facilitation   Gait Analysis   Gait Level Of Assist Unable to Participate   Functional Mobility   Sit to Stand Maximal Assist   Skilled Intervention Facilitation;Verbal Cuing;Sequencing   How much difficulty does the patient currently have...   Turning over in bed (including adjusting bedclothes, sheets and blankets)? 1   Sitting down on and standing up from a chair with arms (e.g., wheelchair, bedside commode, etc.) 1   Moving from lying on back to sitting on the side of the bed? 1   How much help from another person does the patient currently need...   Moving to and from a bed to a chair (including a wheelchair)? 1   Need to walk in a hospital room? 1   Climbing 3-5 steps with a railing? 1   6 clicks Mobility Score 6   Activity Tolerance   Sitting Edge of Bed 9 minutes   Standing 1 minute   Short Term Goals    Short Term Goal # 1 Pt will perform bed mobility with min A to progress function in 6 visits.   Goal Outcome # 1 Progressing slower than expected   Short Term Goal # 2 Pt will perform sit<>stand with min A to progress towards functional transfers in 6 visits.   Goal Outcome # 2 Progressing slower than expected   Short Term Goal # 3 Pt will demonstrate fair sitting balance during LE exercises without UE support within  6 visits to demonstrate improved participation in PT sessions.   Goal Outcome # 3 Progressing slower than expected   Education Group   Education Provided Role of Physical Therapist   Role of Physical Therapist Patient Response Patient;Nonacceptance;Explanation;No Learning Evidence   Physical Therapy Treatment Plan   Physical Therapy Treatment Plan Modify Current Treatment Plan   Treatment Plan  Bed Mobility;Equipment;Gait Training;Neuro Re-Education / Balance;Self Care / Home Evaluation;Therapeutic Activities;Therapeutic Exercise   Treatment Frequency 3 Times per Week   Duration Until Therapy Goals Met   Anticipated Discharge Equipment and Recommendations   DC Equipment Recommendations Unable to determine at this time   Discharge Recommendations Recommend post-acute placement for additional physical therapy services prior to discharge home   Interdisciplinary Plan of Care Collaboration   IDT Collaboration with  Nursing;Occupational Therapist   Patient Position at End of Therapy In Bed;Call Light within Reach;Tray Table within Reach;Phone within Reach;Bed Alarm On   Collaboration Comments RN updated   Session Information   Date / Session Number  2/5 6(1/3, 2/11)   OTHER   Modified Plan Change to Three Times per Week

## 2024-02-06 NOTE — CONSULTS
Gastroenterology Initial Consult Note               Author:  Kendra Ferraro M.D. Date & Time Created: 2/5/2024 7:55 PM       Patient ID:  Name:             Lewis Morh    YOB: 1945  Age:                 78 y.o.  male  MRN:               [id number]      Referring Provider:  Dmitri Up MD        Presenting Chief Complaint:  Inability to feed self      History of Present Illness:    This is a 78 [facility] wheelchair on 1/4/24.  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 Admitted with respiratory failure thought to be aspiration pneumonitis.  This admission he was intubated for inability to protect his airway and shortly thereafter he had a PEA arrest and transferred to ICU.  He has been reintubated 2 additional times this admission.    Yesterday he failed FEES and recommended to be NPO.  Patient has pulled feeding tube which has been replaced.  He is in restraints. He is on 35% HFNC for recent respiratory failure.      Review of Systems:  Review of Systems   Unable to perform ROS: Dementia             Past Medical History:  History reviewed. No pertinent past medical history.  Active Hospital Problems    Diagnosis     Debility [R53.81]     Hypoxic respiratory failure (HCC) [J96.91]     A-fib (HCC) [I48.91]     Encephalopathy [G93.40]     Advance care planning [Z71.89]     Urinary retention [R33.9]     Aspiration pneumonia (HCC) [J69.0]     History of cerebellar hemorrhage [Z86.79]     Hypernatremia [E87.0]     Acute respiratory failure with hypoxia (HCC) [J96.01]     Acute kidney failure (HCC) [N17.9]          Past Surgical History:  History reviewed. No pertinent surgical history.        Hospital Medications:  Current Facility-Administered Medications   Medication Dose Frequency Provider Last Rate Last Admin    potassium bicarbonate (Klyte) effervescent tablet 50 mEq  50 mEq Once Dmitri Up M.D.         enoxaparin (Lovenox) inj 80 mg  1 mg/kg Q12HRS Dmitri Up M.D.   80 mg at 02/05/24 1238    hydrALAZINE (Apresoline) injection 10 mg  10 mg Q2HRS PRN Wilber Hunter M.D.   10 mg at 02/05/24 1233    amantadine (Symmetrel) tablet 100 mg  100 mg DAILY Luke Estrada M.D.   100 mg at 02/04/24 0602    acetylcysteine (Mucomyst) 20 % solution 3 mL  3 mL Q4HRS Luke Estrada M.D.   3 mL at 02/05/24 1859    nystatin (Mycostatin) powder   BID Luke Estrdaa M.D.   Given at 02/05/24 1820    furosemide (Lasix) injection 10 mg  10 mg BID DIURETIC Luke Estrada M.D.   10 mg at 02/05/24 1825    ampicillin/sulbactam (Unasyn) 3 g in  mL IVPB  3 g Q6HRS Luke Estrada M.D.   Stopped at 02/05/24 1849    ipratropium-albuterol (DUONEB) nebulizer solution  3 mL Q4H PRN (RT) Luke Estrada M.D.   3 mL at 02/05/24 1859    Respiratory Therapy Consult   Continuous RT Donnie Babin M.D.        senna-docusate (Pericolace Or Senokot S) 8.6-50 MG per tablet 2 Tablet  2 Tablet BID Donnie Babin M.D.   2 Tablet at 02/04/24 0601    And    polyethylene glycol/lytes (Miralax) Packet 1 Packet  1 Packet QDAY PRN Donnie Babin M.D.   1 Packet at 02/03/24 1706    And    magnesium hydroxide (Milk Of Magnesia) suspension 30 mL  30 mL QDAY PRN Donnie Babin M.D.   30 mL at 01/30/24 0511    And    bisacodyl (Dulcolax) suppository 10 mg  10 mg QDAY PRN Donnie Babin M.D. MD Alert...ICU Electrolyte Replacement per Pharmacy   PHARMACY TO DOSE Donnie Babin M.D.        acetaminophen (Tylenol) tablet 650 mg  650 mg Q6HRS PRN Ag Dixon M.D.   650 mg at 01/24/24 2350    insulin regular (HumuLIN R,NovoLIN R) injection  2-9 Units Q6HRS Ag Dixon M.D.   2 Units at 02/04/24 1809    And    dextrose 10 % BOLUS 25 g  25 g Q15 MIN PRN Ag Dixon M.D.        Pharmacy Consult: Enteral tube insertion - review meds/change route/product selection  1 Each PHARMACY TO DOSE Rebecca Holman,  M.D.       Last reviewed on 1/4/2024  3:21 PM by Aria Snyder       Current Outpatient Medications:  No medications prior to admission.         Medication Allergies:  No Known Allergies      Family Medical History:  No family history on file.      Social History:  Social History     Socioeconomic History    Marital status: Single     Spouse name: Not on file    Number of children: Not on file    Years of education: Not on file    Highest education level: Not on file   Occupational History    Not on file   Tobacco Use    Smoking status: Never    Smokeless tobacco: Never   Vaping Use    Vaping Use: Never used   Substance and Sexual Activity    Alcohol use: Yes     Comment: occ    Drug use: Never    Sexual activity: Not on file   Other Topics Concern    Not on file   Social History Narrative    Not on file     Social Determinants of Health     Financial Resource Strain: Not on file   Food Insecurity: Not on file   Transportation Needs: Not on file   Physical Activity: Insufficiently Active (4/26/2023)    Exercise Vital Sign     Days of Exercise per Week: 2 days     Minutes of Exercise per Session: 30 min   Stress: No Stress Concern Present (4/26/2023)    [employer] - Occupational Stress Questionnaire     Feeling of Stress : Only a little   Social Connections: Unknown (4/26/2023)    Social Connection and Isolation Panel [NHANES]     Frequency of Communication with Friends and Family: Three times a week     Frequency of Social Gatherings with Friends and Family: Three times a week     Attends Orthodoxy Services: Never     Active Member of Clubs or Organizations: No     Attends Club or Organization Meetings: Never     Marital Status: Patient refused   Intimate Partner Violence: Not on file   Housing Stability: High Risk (4/26/2023)    Housing Stability Vital Sign     Unable to Pay for Housing in the Last Year: Yes     Number of Places Lived in the Last Year: 1     Unstable Housing in  "the Last Year: No         Vital signs:  Weight/BMI: Body mass index is 27.14 kg/m².  BP (!) 144/82   Pulse 92   Temp 36.1 °C (97 °F) (Temporal)   Resp 17   Ht 1.778 m (5' 10\")   Wt 85.8 kg (189 lb 2.5 oz)   SpO2 99%   Vitals:    02/05/24 1440 02/05/24 1600 02/05/24 1640 02/05/24 1902   BP: 125/69 (!) 144/82     Pulse: 93   92   Resp: (!) 21 16  17   Temp:       TempSrc:       SpO2:   98% 99%   Weight:       Height:         Oxygen Therapy:  Pulse Oximetry: 99 %, O2 (LPM): 25 (pt. has been on HHFNC), FiO2%: 35 %, O2 Delivery Device: Heated High Flow Nasal Cannula    Intake/Output Summary (Last 24 hours) at 2/5/2024 1955  Last data filed at 2/5/2024 1819  Gross per 24 hour   Intake --   Output 300 ml   Net -300 ml         Physical Exam:  Physical Exam  Constitutional:       Appearance: He is ill-appearing.      Comments: Sleepy, did not open eyes to my voice   HENT:      Head: Normocephalic and atraumatic.      Nose: Nose normal.      Mouth/Throat:      Mouth: Mucous membranes are moist.   Cardiovascular:      Rate and Rhythm: Normal rate.      Heart sounds: Normal heart sounds.   Pulmonary:      Comments: Weak cough, few rhonchi, no breath sounds in bases, 35% HFNC  Abdominal:      General: Bowel sounds are normal. There is no distension.      Palpations: Abdomen is soft.      Tenderness: There is no abdominal tenderness.   Skin:     General: Skin is warm and dry.   Neurological:      Mental Status: Mental status is at baseline.                 Labs:  Recent Labs     02/03/24 0328 02/04/24  0330 02/05/24  0331   SODIUM 141 143 143   POTASSIUM 3.8 3.9 3.6   CHLORIDE 105 104 106   CO2 26 28 26   BUN 18 21 21   CREATININE 1.16 1.25 1.23   MAGNESIUM 2.2 2.3 2.4   PHOSPHORUS 2.6 3.1 3.1   CALCIUM 9.3 9.4 9.6     Recent Labs     02/03/24  0328 02/04/24  0330 02/05/24  0331 02/05/24  1640   ALTSGPT 92* 66* 55*  --    ASTSGOT 39 30 25  --    ALKPHOSPHAT 133* 121* 133*  --    TBILIRUBIN 0.2 0.2 0.3  --    PREALBUMIN  -- "   --   --  16.1*   GLUCOSE 156* 163* 146*  --      Recent Labs     02/03/24 0328 02/04/24 0330 02/05/24 0331   WBC 14.5* 11.5* 11.5*   NEUTSPOLYS 79.80* 67.10 75.20*   LYMPHOCYTES 10.50* 16.00* 13.00*   MONOCYTES 7.20 12.60 9.60   EOSINOPHILS 0.00 0.10 0.10   BASOPHILS 0.40 0.50 0.40   ASTSGOT 39 30 25   ALTSGPT 92* 66* 55*   ALKPHOSPHAT 133* 121* 133*   TBILIRUBIN 0.2 0.2 0.3     Recent Labs     02/03/24 0328 02/04/24 0330 02/05/24 0331   RBC 3.10* 3.12* 3.26*   HEMOGLOBIN 9.3* 9.4* 9.8*   HEMATOCRIT 28.6* 28.9* 30.3*   PLATELETCT 510* 487* 451*     Recent Results (from the past 24 hour(s))   POCT glucose device results    Collection Time: 02/05/24 12:20 AM   Result Value Ref Range    POC Glucose, Blood 137 (H) 65 - 99 mg/dL   CBC With Differential    Collection Time: 02/05/24  3:31 AM   Result Value Ref Range    WBC 11.5 (H) 4.8 - 10.8 K/uL    RBC 3.26 (L) 4.70 - 6.10 M/uL    Hemoglobin 9.8 (L) 14.0 - 18.0 g/dL    Hematocrit 30.3 (L) 42.0 - 52.0 %    MCV 92.9 81.4 - 97.8 fL    MCH 30.1 27.0 - 33.0 pg    MCHC 32.3 32.3 - 36.5 g/dL    RDW 47.1 35.9 - 50.0 fL    Platelet Count 451 (H) 164 - 446 K/uL    MPV 10.7 9.0 - 12.9 fL    Neutrophils-Polys 75.20 (H) 44.00 - 72.00 %    Lymphocytes 13.00 (L) 22.00 - 41.00 %    Monocytes 9.60 0.00 - 13.40 %    Eosinophils 0.10 0.00 - 6.90 %    Basophils 0.40 0.00 - 1.80 %    Immature Granulocytes 1.70 (H) 0.00 - 0.90 %    Nucleated RBC 0.00 0.00 - 0.20 /100 WBC    Neutrophils (Absolute) 8.64 (H) 1.82 - 7.42 K/uL    Lymphs (Absolute) 1.50 1.00 - 4.80 K/uL    Monos (Absolute) 1.10 (H) 0.00 - 0.85 K/uL    Eos (Absolute) 0.01 0.00 - 0.51 K/uL    Baso (Absolute) 0.05 0.00 - 0.12 K/uL    Immature Granulocytes (abs) 0.20 (H) 0.00 - 0.11 K/uL    NRBC (Absolute) 0.00 K/uL   Comp Metabolic Panel    Collection Time: 02/05/24  3:31 AM   Result Value Ref Range    Sodium 143 135 - 145 mmol/L    Potassium 3.6 3.6 - 5.5 mmol/L    Chloride 106 96 - 112 mmol/L    Co2 26 20 - 33 mmol/L     Anion Gap 11.0 7.0 - 16.0    Glucose 146 (H) 65 - 99 mg/dL    Bun 21 8 - 22 mg/dL    Creatinine 1.23 0.50 - 1.40 mg/dL    Calcium 9.6 8.5 - 10.5 mg/dL    Correct Calcium 10.6 (H) 8.5 - 10.5 mg/dL    AST(SGOT) 25 12 - 45 U/L    ALT(SGPT) 55 (H) 2 - 50 U/L    Alkaline Phosphatase 133 (H) 30 - 99 U/L    Total Bilirubin 0.3 0.1 - 1.5 mg/dL    Albumin 2.8 (L) 3.2 - 4.9 g/dL    Total Protein 6.9 6.0 - 8.2 g/dL    Globulin 4.1 (H) 1.9 - 3.5 g/dL    A-G Ratio 0.7 g/dL   Magnesium    Collection Time: 02/05/24  3:31 AM   Result Value Ref Range    Magnesium 2.4 1.5 - 2.5 mg/dL   Phosphorus    Collection Time: 02/05/24  3:31 AM   Result Value Ref Range    Phosphorus 3.1 2.5 - 4.5 mg/dL   ESTIMATED GFR    Collection Time: 02/05/24  3:31 AM   Result Value Ref Range    GFR (CKD-EPI) 61 >60 mL/min/1.73 m 2   POCT glucose device results    Collection Time: 02/05/24  6:12 AM   Result Value Ref Range    POC Glucose, Blood 144 (H) 65 - 99 mg/dL   POCT glucose device results    Collection Time: 02/05/24 12:37 PM   Result Value Ref Range    POC Glucose, Blood 143 (H) 65 - 99 mg/dL   CRP Quantitive (Non-Cardiac)    Collection Time: 02/05/24  4:40 PM   Result Value Ref Range    Stat C-Reactive Protein 2.72 (H) 0.00 - 0.75 mg/dL   Prealbumin    Collection Time: 02/05/24  4:40 PM   Result Value Ref Range    Pre-Albumin 16.1 (L) 18.0 - 38.0 mg/dL         Radiology Review:  DX-ABDOMEN FOR TUBE PLACEMENT   Final Result      1.  Enteric tube overlies the gastric body.      DX-ABDOMEN FOR TUBE PLACEMENT   Final Result      Feeding tube tip overlies the duodenum.      DX-CHEST-PORTABLE (1 VIEW)   Final Result      No acute process.      DX-CHEST-PORTABLE (1 VIEW)   Final Result      1.  Interval extubation.   2.  Mild interstitial pulmonary edema.   3.  Possible minimal/trace left pleural effusion.      DX-CHEST-LIMITED (1 VIEW)   Final Result      Stable appearance of the chest.      MR-BRAIN-W/O   Final Result         No acute intracranial  process.      Age-related volume loss and chronic microvascular ischemic changes.      Postoperative changes in the right cerebellum with resection cavity and hemosiderin staining.      Bilateral mastoid effusion.      DX-CHEST-PORTABLE (1 VIEW)   Final Result         1.  Interstitial pulmonary parenchymal prominence suggest chronic underlying lung disease, component of interstitial edema and/or infiltrates not excluded.   2.  Cardiomegaly   3.  Atherosclerosis      DX-CHEST-PORTABLE (1 VIEW)   Final Result         1.  Pulmonary edema and/or infiltrates are identified, which are stable since the prior exam.   2.  Cardiomegaly   3.  Atherosclerosis      DX-CHEST-PORTABLE (1 VIEW)   Final Result         1.  Pulmonary edema and/or infiltrates are identified, which are stable since the prior exam.   2.  Cardiomegaly   3.  Atherosclerosis      DX-CHEST-LIMITED (1 VIEW)   Final Result         1.  Pulmonary edema and/or infiltrates are identified, which appear somewhat increased since the prior exam.   2.  Cardiomegaly   3.  Atherosclerosis      DX-ABDOMEN FOR TUBE PLACEMENT   Final Result         Feeding tube with tip projecting over the expected area of the distal stomach.      DX-ABDOMEN FOR TUBE PLACEMENT   Final Result      The nasogastric tube terminates over the antrum of the stomach.      DX-CHEST-PORTABLE (1 VIEW)   Final Result         Ill-defined hazy opacities in the right mid lung and left lower lobe are similar to prior.      IR-MIDLINE CATHETER INSERTION WO GUIDANCE > AGE 3   Final Result                  Ultrasound-guided midline placement performed by qualified nursing staff    as above.          DX-CHEST-PORTABLE (1 VIEW)   Final Result      Improved interstitial opacities may represent improving edema and/or pneumonitis.      Mild nonspecific right basilar opacity and questionable tiny left upper lobe opacity. Correlate clinically for infection.      DX-CHEST-LIMITED (1 VIEW)   Final Result      1.   Slightly increased BILATERAL lung disease. This could be pulmonary edema or pneumonia.   2.  Possible small BILATERAL pleural effusions      DX-CHEST-PORTABLE (1 VIEW)   Final Result         1.  Mild pulmonary edema and/or infiltrates   2.  Atherosclerosis      DX-ABDOMEN FOR TUBE PLACEMENT   Final Result      NG tube tip projects over the stomach.      DX-ABDOMEN FOR TUBE PLACEMENT   Final Result      1.  Enteric tube projects over the proximal stomach.      DX-CHEST-PORTABLE (1 VIEW)   Final Result      Lines and tubes appear adequate. Stable bibasilar airspace disease.      DX-CHEST-PORTABLE (1 VIEW)   Final Result         1.  Pulmonary edema and/or infiltrates are identified, which appear somewhat increased since the prior exam.   2.  Cardiomegaly      DX-CHEST-PORTABLE (1 VIEW)   Final Result      1.  New small left pleural effusion with associated atelectasis.   2.  Increased right basilar opacity most likely atelectasis however correlate clinically for infection.      DX-CHEST-PORTABLE (1 VIEW)   Final Result         1.  Pulmonary edema and/or infiltrates are identified, which are stable since the prior exam.   2.  Cardiomegaly      DX-CHEST-PORTABLE (1 VIEW)   Final Result      1.  No significant change.      MR-MRA HEAD-W/O   Final Result         MRA OF THE [place] WITHIN NORMAL LIMITS.      MR-BRAIN-W/O   Final Result      1.  Redemonstrated is scattered supratentorial and infratentorial areas of ischemia. No new lesions. There is increased conspicuity of the RIGHT frontal lobe lesion which could be due to recurrent ischemia or seizure activity.   2.  Unchanged RIGHT cerebellar hematoma evacuation cavity. As previously noted underlying mass is not excluded and follow-up MRI without and with contrast in approximately weeks is recommended.   3.  No new hemorrhage   4.  Atrophy   5.  White matter changes   6.  Small BILATERAL mastoid effusions      DX-CHEST-PORTABLE (1 VIEW)   Final Result       DX-ABDOMEN FOR TUBE PLACEMENT   Final Result      Enteric feeding tube terminates in the left upper quadrant projecting over the expected location of the stomach.      DX-CHEST-LIMITED (1 VIEW)   Final Result      1.  Interval extubation   2.  Lower lung volumes with increased atelectasis superimposed on pulmonary edema or pneumonia      DX-CHEST-PORTABLE (1 VIEW)   Final Result         1.  Pulmonary edema and/or infiltrates are identified, which are somewhat decreased since the prior exam.   2.  Atherosclerosis      US-EXTREMITY VENOUS LOWER BILAT   Final Result      DX-CHEST-PORTABLE (1 VIEW)   Final Result         1.  Scattered hazy bilateral pulmonary infiltrates, slightly increased since prior study.   2.  Atherosclerosis      MR-BRAIN-W/O   Final Result      1.  A few punctate areas of acute infarcts in the right frontal and bilateral parietal lobes and left cerebellum.   2.  There is no brainstem infarct or diffuse anoxic injury.   3.  Mixed signal abnormality in the right cerebellum with the previous surgical intervention in keeping with the clinical diagnosis of right cerebellar hemorrhage evacuation. Follow-up study with contrast in 6 weeks is recommended to rule out any    underlying pathology.   4.  Mild cerebral volume loss.      DX-CHEST-PORTABLE (1 VIEW)   Final Result      CT-CTA NECK WITH & W/O-POST PROCESSING   Final Result      1.  No acute arterial abnormality detected. No significant arterial stenosis.      CT-CTA HEAD WITH & W/O-POST PROCESS   Final Result      CT angiogram of the [place] within normal limits.      CT-CEREBRAL PERFUSION ANALYSIS   Final Result      1.  Cerebral blood flow less than 30% likely representing completed infarct = 0 mL.      2.  T Max more than 6 seconds likely representing combination of completed infarct and ischemia = 0 mL.      3.  Mismatched volume likely representing ischemic brain/penumbra = None      4.  Please note that the cerebral perfusion was  performed on the limited brain tissue around the basal ganglia region. Infarct/ischemia outside the CT perfusion sections can be missed in this study.      CT-HEAD W/O   Final Result      1.  No evidence of acute territorial infarct, intracranial hemorrhage or mass lesion.   2.  Mild diffuse cerebral substance loss.   3.  Mild microangiopathic ischemic change versus demyelination or gliosis.   4.  Status post right suboccipital cranioplasty with underlying cerebellar hypoattenuation likely on the basis of evolving encephalomalacia.         DX-CHEST-PORTABLE (1 VIEW)   Final Result      1.  Intubation.   2.  Bilateral pulmonary infiltrates.      DX-CHEST-LIMITED (1 VIEW)   Final Result      DX-ABDOMEN FOR TUBE PLACEMENT   Final Result      Enteric feeding tube terminates with the tip projecting over the expected location of the distal stomach.      CT-CTA CHEST PULMONARY ARTERY W/ RECONS   Final Result         1.  No pulmonary embolus appreciated.   2.  Consolidations in bilateral lung bases an scattered hazy right pulmonary opacities, compatible with atelectasis with component of infiltrate.   3.  Indeterminate left adrenal nodule, follow-up adrenal protocol CT for further characterization as clinically appropriate.   4.  Atherosclerosis and atherosclerotic coronary artery disease.      DX-ABDOMEN FOR TUBE PLACEMENT   Final Result         1.  Nonspecific bowel gas pattern in the upper abdomen.   2.  Cardiomegaly   3.  Atherosclerosis   4.  Bibasilar atelectasis   5.  Nasogastric tube tip terminates overlying the expected location of the pylorus or first duodenal segment.      DX-ABDOMEN FOR TUBE PLACEMENT   Final Result         1.  Nonspecific bowel gas pattern in the upper abdomen.   2.  Nasogastric tube tip terminates overlying the expected location of the gastric antrum.   3.  Linear densities the bilateral lung bases favor atelectasis, component of infiltrate not excluded.      DX-ABDOMEN FOR TUBE PLACEMENT    Final Result      NG tube extends below the diaphragm with tip at the level of the gastric fundus. Side port is at the level of the distal esophagus.      DX-ABDOMEN FOR TUBE PLACEMENT   Final Result      NG tube is noted with tip projecting over the right lower lung.      These findings were transmitted with GLEN ZELAYA on 01/07/2024. Tube was subsequently adjusted and is now present below the diaphragm.      DX-ABDOMEN FOR TUBE PLACEMENT   Final Result      DHT tip overlies the second portion of the duodenum      DX-ABDOMEN FOR TUBE PLACEMENT   Final Result      Feeding tube tip projects in the distal stomach or first portion of the duodenum.      DX-ABDOMEN FOR TUBE PLACEMENT   Final Result      Removal of nasogastric tube.      Feeding tube tip projects in the distal stomach.      No visualized thorax.      DX-ABDOMEN FOR TUBE PLACEMENT   Final Result         Gastric drainage tube with tip projecting over the expected area of the right lower lobe airway. Recommend removal.            CRITICAL RESULT READ BACK: Preliminary findings discussed with and critical read back performed by Dr. GLEN ZELAYA via telephone on 1/5/2024 6:22 PM      DX-ABDOMEN FOR TUBE PLACEMENT   Final Result      1.  Malpositioned enteric sump catheter which is folded back upon itself over the left paramedian distal one third mediastinum probably beyond the left mainstem bronchus and within the esophagus.   2.  A Voalte message was sent to GLEN ZELAYA at 1641 hours. Immediate response received.   3.  Per discussion, the patient has already pulled out the tube.      DX-CHEST-PORTABLE (1 VIEW)   Final Result      1.  Hypoinflation and pulmonary vascular congestion with mild bibasilar atelectasis.   2.  No lobar pneumonia or overt pulmonary edema.      CT-HEAD W/O   Final Result      1.  Diffuse atrophy and white matter changes.   2.  No acute intracranial hemorrhage or territorial infarct.   3.  RIGHT occipital craniotomy with underlying  cerebellar encephalomalacia.               [facility] (Data Review):   -Records reviewed and summarized in current documentation  -I personally reviewed and interpreted the laboratory results  -I personally reviewed the radiology images    Assessment/Recommendations:    IMPRESSION;   Oropharyngeal dysphagia   Acute respiratory failure.  On high flow nasal cannula.  Has been intubated 3 times this admission.  High risk for anesthesia.   Atrial fibrillation   Aspiration pneumonia   History of cerebellar hemorrhage    RECS:  --Ck INR  --Received Eliquis 2/4/24 am.  Has been stopped  --Patient is not a candidate for anesthesia at this time and most likely would require intubation.  I would like to see him stronger and on less oxygen before PEG placement.  Also concerned about risk of his pulling out G tube shortly after placement which is high risk for catastrophic event  --Please reconsult us for EGD with PEG placement when out of C    Signing off.  Discussed with Dr. Puga.      Kendra Ferraro M.D.          Core Quality Measures   Reviewed items:  Labs, Medications and Radiology reports reviewed

## 2024-02-06 NOTE — CARE PLAN
Problem: Humidified High Flow Nasal Cannula  Goal: Maintain adequate oxygenation dependent on patient condition  Description: Target End Date:  resolve prior to discharge or when underlying condition is resolved/stabilized    1.  Implement humidified high flow oxygen therapy  2.  Titrate high flow oxygen to maintain appropriate SpO2  2/6/2024 0421 by Misha Qureshi, RRT  Outcome: Progressing  Flowsheets  Taken 2/6/2024 0230 by Misha Qureshi, RRT  O2 (LPM): 25  FiO2%: 35 %  Taken 1/11/2024 1749 by Claudio Mendenhall, KIAH  Indication: All other patients: SpO2 less than 90% despite conventional supplemental oxygen devices

## 2024-02-06 NOTE — CARE PLAN
The patient is Watcher - Medium risk of patient condition declining or worsening    Shift Goals  Clinical Goals: hemodynamic stability, stable neuro checks  Patient Goals: DULCE MARIA  Family Goals: DULCE MARIA    Progress made toward(s) clinical / shift goals:    Problem: Fluid Volume  Goal: Fluid volume balance will be maintained  Outcome: Progressing     Problem: Urinary - Renal Perfusion  Goal: Ability to achieve and maintain adequate renal perfusion and functioning will improve  Outcome: Progressing     Problem: Pain - Standard  Goal: Alleviation of pain or a reduction in pain to the patient’s comfort goal  Outcome: Progressing       Patient is not progressing towards the following goals:      Problem: Hemodynamics  Goal: Patient's hemodynamics, fluid balance and neurologic status will be stable or improve  Outcome: Not Progressing  Note: IVF running, BP stabilized, VS taken Q4 hours, pt on continuous cardiac monitoring.      Problem: Respiratory  Goal: Patient will achieve/maintain optimum respiratory ventilation and gas exchange  Outcome: Not Progressing  Flowsheets  Taken 2/6/2024 0053 by Lucina Coelho RAllieNAllie  O2 Delivery Device: Heated High Flow Nasal Cannula  Deep Breathe and Cough: Needs Coaching  Taken 2/4/2024 1200 by Emily Chan RAllieNAllie  Incentive Spirometer: Patient Unable to Perform  Taken 2/3/2024 1600 by Ebony Nair R.N.  Suction Frequency: Suctioned Three or Four Times Per Encounter  Note: Lung sounds and respiratory effort assessed regularly. RT protocol in effect. Pt given breathing treatments and encouraged to cough PRN.       Problem: Physical Regulation  Goal: Signs and symptoms of infection will decrease  Outcome: Not Progressing     Problem: Knowledge Deficit - Standard  Goal: Patient and family/care givers will demonstrate understanding of plan of care, disease process/condition, diagnostic tests and medications  Outcome: Not Progressing  Note: Pt educated regarding plan of care and  medications. All questions answered.      Problem: Fall Risk  Goal: Patient will remain free from falls  Outcome: Not Progressing  Note: Fall precautions in place. Bed in lowest position. Non-skid socks in place. Personal possessions within reach. Mobility sign on door. Bed-alarm on. Call light within reach. Pt educated regarding fall prevention and states understanding.

## 2024-02-06 NOTE — THERAPY
"Speech Language Pathology   Daily Treatment     Patient Name: Lewis Mohr  AGE:  78 y.o., SEX:  male  Medical Record #: [id number]  Date of Service: 2/6/2024      Precautions:  Precautions: Fall Risk, Swallow Precautions, Nasogastric Tube         Subjective  RN cleared patient for dysphagia management. Pt asleep upon arrival, easily arousable. Hoarse vocal quality appreciated. Pt agreeable to SLP tasks. Per chart, GI consulted for possible PEG placement; however, per GI pt \"is not a candidate for anesthesia at this time and most likely would require intubation. I would like to see him stronger and on less oxygen before PEG placement.\"       Assessment  Reviewed results of FEES, including SLP recommendations and swallow-rehabilitation. Provided education regarding effortful swallow exercise. Ice chips utilized to facilitate exercise. Pt completed effortful swallow x15 with fair accuracy and moderate cues. Immediate weak, congested cough response x1. This clinician cued pt to cough and re-swallow intermittently throughout trials. Discussed plan of care including continuation of NPO/NGT and ongoing swallow rehabilitation, pt stated understanding.       Clinical Impressions  Per FEES completed 2/5, patient presents with severe oropharyngeal dysphagia. Recommend continuation of NPO with alternate source of nutrition except minimal ice chips with RN following diligent oral care for comfort, oral hydration, reduced risk of disuse atrophy, and pharyngeal secretion management. SLP to follow.       Recommendations  Treatment Completed: Dysphagia Treatment     Dysphagia Treatment  Diet Consistency: NPO/NGT (3-5 small single ice chips following oral care with RN only)  Instrumentation: Instrumental swallow study pending clinical progress  Medication: Non Oral  Oral Care: Q4h       SLP Treatment Plan  Treatment Plan: Dysphagia Treatment  SLP Frequency: 3x Per Week  Estimated Duration: Until Therapy Goals Met      Anticipated " Discharge Needs  Discharge Recommendations: Recommend post-acute placement for additional speech therapy services prior to discharge home  Therapy Recommendations Upon DC: Dysphagia Training, Patient / Family / Caregiver Education      Patient / Family Goals  Patient / Family Goal #1: RN reported pt's daughter was eager for pt to participate w/ ST  Goal #1 Outcome: Progressing slower than expected  Short Term Goals  Short Term Goal # 1: Pt will demo improved secretion management as evidenced by reduced use of oral suctioning during tx session  Goal Outcome # 1: Progressing as expected  Short Term Goal # 1 B : Pt will participate in prefeeding trials to demo readiness for an instrumental swallow study  Goal Outcome  # 1 B: Progressing as expected  Short Term Goal # 2: Patient will perform dysphagia exercises targeting BoT. pharyngeal squeeze, and airway protection with good accuracy given min cues.  Goal Outcome # 2 : Progressing as expected      Oscar Jennings, SLP

## 2024-02-06 NOTE — PROGRESS NOTES
[facility] Medicine Daily Progress Note    Date of Service  2/6/2024    Chief Complaint  Lewis Mohr is a 78 y.o. male admitted 1/4/2024 with altered mental status    Hospital Course  Mr. 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  Unfortunately, the patient was readmitted to [facility] on 1/4/2024 for altered mental status and hypoxia.  He was admitted to the medicine services with hypernatremia, dehydration, and ERWIN with urinary retention.  He was found to be silently aspirating.  On 1/9/2024, the patient went into AF with RVR with worsening hypoxia and mental status changes.  A code stroke was called.  While in the CT scanner the patient was not protecting his airway and therefore was intubated.  He had a PEA arrest shortly afterward.  He was transferred to the ICU.  The patient was extubated on 1/11/2024 and required HFNC at 45L at 70% on 1/12.  Unfortunately, the patient was reintubated on 1/13.  He remained intubated until 1/20/2024 and then extubated to HFNC at 40L/70%.  He was transferred to IMCU on 1/25.  On the evening of 1/26 the critical care team was reconsulted due to the patient becoming suddenly tachycardic in the 140-150s with hypotension.  He was given IVF and a dose of phenylephrine.  He was not protecting his airway and required emergent intubation and bronchoscopy then transferred back to the ICU.  During his ICU stay the patient was intermittently agitated, requiring pressor therapy, continue to be significantly encephalopathic, eventually had improvement and was extubated to high flow nasal cannula, patient with difficulty with secretions and protecting his airway per intensivist    Interval Problem Update  2/6: Mr. Mohr was evaluated in the CU. His potassium is low at 3.3. He is on weight-based lovenox.  He is in soft wrist restraints. He is on high flow oxygen. He is   oriented only to name.     I have discussed this patient's plan of care and discharge plan at IDT rounds today with Case Management, Nursing, Nursing leadership, and other members of the IDT team.    Consultants/Specialty  GI. I discussed in person with Dr. Ferraro about possible PEG but she has deferred as he is on high flow oxygen.    Code Status  Full Code    Disposition  The patient is not medically cleared for discharge to home or a post-acute facility.      I have placed the appropriate orders for post-discharge needs.    Review of Systems  Review of Systems   Unable to perform ROS: Acuity of condition        Physical Exam  Temp:  [36.1 °C (97 °F)-36.7 °C (98.1 °F)] 36.7 °C (98.1 °F)  Pulse:  [] 100  Resp:  [11-56] 18  BP: ()/() 163/81  SpO2:  [93 %-99 %] 98 %    Physical Exam  Vitals and nursing note reviewed.   Constitutional:       General: He is not in acute distress.     Appearance: He is ill-appearing.   Cardiovascular:      Rate and Rhythm: Normal rate.   Pulmonary:      Comments: High flow nasal cannula oxygen at 30 L 30%  Normal work of breathing with crackles throughout and overall poor air movement  Abdominal:      General: There is no distension.      Tenderness: There is no abdominal tenderness.   Musculoskeletal:      Comments: Soft wrist restraints   Neurological:      Mental Status: He is alert.      Comments: Oriented to name only         Fluids    Intake/Output Summary (Last 24 hours) at 2/6/2024 0726  Last data filed at 2/6/2024 0430  Gross per 24 hour   Intake 300 ml   Output 300 ml   Net 0 ml       Laboratory  Recent Labs     02/04/24  0330 02/05/24  0331 02/06/24  0350   WBC 11.5* 11.5* 9.5   RBC 3.12* 3.26* 3.29*   HEMOGLOBIN 9.4* 9.8* 9.9*   HEMATOCRIT 28.9* 30.3* 30.6*   MCV 92.6 92.9 93.0   MCH 30.1 30.1 30.1   MCHC 32.5 32.3 32.4   RDW 46.6 47.1 48.4   PLATELETCT 487* 451* 437   MPV 10.9 10.7 10.7     Recent Labs     02/04/24  0330 02/05/24  0331 02/06/24  0357    SODIUM 143 143 145   POTASSIUM 3.9 3.6 3.3*   CHLORIDE 104 106 106   CO2 28 26 27   GLUCOSE 163* 146* 150*   BUN 21 21 21   CREATININE 1.25 1.23 1.33   CALCIUM 9.4 9.6 9.6     Recent Labs     02/06/24  0350   INR 1.34*               Imaging  DX-ABDOMEN FOR TUBE PLACEMENT   Final Result      1.  Enteric tube overlies the gastric body.      DX-ABDOMEN FOR TUBE PLACEMENT   Final Result      Feeding tube tip overlies the duodenum.      DX-CHEST-PORTABLE (1 VIEW)   Final Result      No acute process.      DX-CHEST-PORTABLE (1 VIEW)   Final Result      1.  Interval extubation.   2.  Mild interstitial pulmonary edema.   3.  Possible minimal/trace left pleural effusion.      DX-CHEST-LIMITED (1 VIEW)   Final Result      Stable appearance of the chest.      MR-BRAIN-W/O   Final Result         No acute intracranial process.      Age-related volume loss and chronic microvascular ischemic changes.      Postoperative changes in the right cerebellum with resection cavity and hemosiderin staining.      Bilateral mastoid effusion.      DX-CHEST-PORTABLE (1 VIEW)   Final Result         1.  Interstitial pulmonary parenchymal prominence suggest chronic underlying lung disease, component of interstitial edema and/or infiltrates not excluded.   2.  Cardiomegaly   3.  Atherosclerosis      DX-CHEST-PORTABLE (1 VIEW)   Final Result         1.  Pulmonary edema and/or infiltrates are identified, which are stable since the prior exam.   2.  Cardiomegaly   3.  Atherosclerosis      DX-CHEST-PORTABLE (1 VIEW)   Final Result         1.  Pulmonary edema and/or infiltrates are identified, which are stable since the prior exam.   2.  Cardiomegaly   3.  Atherosclerosis      DX-CHEST-LIMITED (1 VIEW)   Final Result         1.  Pulmonary edema and/or infiltrates are identified, which appear somewhat increased since the prior exam.   2.  Cardiomegaly   3.  Atherosclerosis      DX-ABDOMEN FOR TUBE PLACEMENT   Final Result         Feeding tube with tip  projecting over the expected area of the distal stomach.      DX-ABDOMEN FOR TUBE PLACEMENT   Final Result      The nasogastric tube terminates over the antrum of the stomach.      DX-CHEST-PORTABLE (1 VIEW)   Final Result         Ill-defined hazy opacities in the right mid lung and left lower lobe are similar to prior.      IR-MIDLINE CATHETER INSERTION WO GUIDANCE > AGE 3   Final Result                  Ultrasound-guided midline placement performed by qualified nursing staff    as above.          DX-CHEST-PORTABLE (1 VIEW)   Final Result      Improved interstitial opacities may represent improving edema and/or pneumonitis.      Mild nonspecific right basilar opacity and questionable tiny left upper lobe opacity. Correlate clinically for infection.      DX-CHEST-LIMITED (1 VIEW)   Final Result      1.  Slightly increased BILATERAL lung disease. This could be pulmonary edema or pneumonia.   2.  Possible small BILATERAL pleural effusions      DX-CHEST-PORTABLE (1 VIEW)   Final Result         1.  Mild pulmonary edema and/or infiltrates   2.  Atherosclerosis      DX-ABDOMEN FOR TUBE PLACEMENT   Final Result      NG tube tip projects over the stomach.      DX-ABDOMEN FOR TUBE PLACEMENT   Final Result      1.  Enteric tube projects over the proximal stomach.      DX-CHEST-PORTABLE (1 VIEW)   Final Result      Lines and tubes appear adequate. Stable bibasilar airspace disease.      DX-CHEST-PORTABLE (1 VIEW)   Final Result         1.  Pulmonary edema and/or infiltrates are identified, which appear somewhat increased since the prior exam.   2.  Cardiomegaly      DX-CHEST-PORTABLE (1 VIEW)   Final Result      1.  New small left pleural effusion with associated atelectasis.   2.  Increased right basilar opacity most likely atelectasis however correlate clinically for infection.      DX-CHEST-PORTABLE (1 VIEW)   Final Result         1.  Pulmonary edema and/or infiltrates are identified, which are stable since the prior exam.    2.  Cardiomegaly      DX-CHEST-PORTABLE (1 VIEW)   Final Result      1.  No significant change.      MR-MRA HEAD-W/O   Final Result         MRA OF THE [place] WITHIN NORMAL LIMITS.      MR-BRAIN-W/O   Final Result      1.  Redemonstrated is scattered supratentorial and infratentorial areas of ischemia. No new lesions. There is increased conspicuity of the RIGHT frontal lobe lesion which could be due to recurrent ischemia or seizure activity.   2.  Unchanged RIGHT cerebellar hematoma evacuation cavity. As previously noted underlying mass is not excluded and follow-up MRI without and with contrast in approximately weeks is recommended.   3.  No new hemorrhage   4.  Atrophy   5.  White matter changes   6.  Small BILATERAL mastoid effusions      DX-CHEST-PORTABLE (1 VIEW)   Final Result      DX-ABDOMEN FOR TUBE PLACEMENT   Final Result      Enteric feeding tube terminates in the left upper quadrant projecting over the expected location of the stomach.      DX-CHEST-LIMITED (1 VIEW)   Final Result      1.  Interval extubation   2.  Lower lung volumes with increased atelectasis superimposed on pulmonary edema or pneumonia      DX-CHEST-PORTABLE (1 VIEW)   Final Result         1.  Pulmonary edema and/or infiltrates are identified, which are somewhat decreased since the prior exam.   2.  Atherosclerosis      US-EXTREMITY VENOUS LOWER BILAT   Final Result      DX-CHEST-PORTABLE (1 VIEW)   Final Result         1.  Scattered hazy bilateral pulmonary infiltrates, slightly increased since prior study.   2.  Atherosclerosis      MR-BRAIN-W/O   Final Result      1.  A few punctate areas of acute infarcts in the right frontal and bilateral parietal lobes and left cerebellum.   2.  There is no brainstem infarct or diffuse anoxic injury.   3.  Mixed signal abnormality in the right cerebellum with the previous surgical intervention in keeping with the clinical diagnosis of right cerebellar hemorrhage evacuation. Follow-up  study with contrast in 6 weeks is recommended to rule out any    underlying pathology.   4.  Mild cerebral volume loss.      DX-CHEST-PORTABLE (1 VIEW)   Final Result      CT-CTA NECK WITH & W/O-POST PROCESSING   Final Result      1.  No acute arterial abnormality detected. No significant arterial stenosis.      CT-CTA HEAD WITH & W/O-POST PROCESS   Final Result      CT angiogram of the [place] within normal limits.      CT-CEREBRAL PERFUSION ANALYSIS   Final Result      1.  Cerebral blood flow less than 30% likely representing completed infarct = 0 mL.      2.  T Max more than 6 seconds likely representing combination of completed infarct and ischemia = 0 mL.      3.  Mismatched volume likely representing ischemic brain/penumbra = None      4.  Please note that the cerebral perfusion was performed on the limited brain tissue around the basal ganglia region. Infarct/ischemia outside the CT perfusion sections can be missed in this study.      CT-HEAD W/O   Final Result      1.  No evidence of acute territorial infarct, intracranial hemorrhage or mass lesion.   2.  Mild diffuse cerebral substance loss.   3.  Mild microangiopathic ischemic change versus demyelination or gliosis.   4.  Status post right suboccipital cranioplasty with underlying cerebellar hypoattenuation likely on the basis of evolving encephalomalacia.         DX-CHEST-PORTABLE (1 VIEW)   Final Result      1.  Intubation.   2.  Bilateral pulmonary infiltrates.      DX-CHEST-LIMITED (1 VIEW)   Final Result      DX-ABDOMEN FOR TUBE PLACEMENT   Final Result      Enteric feeding tube terminates with the tip projecting over the expected location of the distal stomach.      CT-CTA CHEST PULMONARY ARTERY W/ RECONS   Final Result         1.  No pulmonary embolus appreciated.   2.  Consolidations in bilateral lung bases an scattered hazy right pulmonary opacities, compatible with atelectasis with component of infiltrate.   3.  Indeterminate left  adrenal nodule, follow-up adrenal protocol CT for further characterization as clinically appropriate.   4.  Atherosclerosis and atherosclerotic coronary artery disease.      DX-ABDOMEN FOR TUBE PLACEMENT   Final Result         1.  Nonspecific bowel gas pattern in the upper abdomen.   2.  Cardiomegaly   3.  Atherosclerosis   4.  Bibasilar atelectasis   5.  Nasogastric tube tip terminates overlying the expected location of the pylorus or first duodenal segment.      DX-ABDOMEN FOR TUBE PLACEMENT   Final Result         1.  Nonspecific bowel gas pattern in the upper abdomen.   2.  Nasogastric tube tip terminates overlying the expected location of the gastric antrum.   3.  Linear densities the bilateral lung bases favor atelectasis, component of infiltrate not excluded.      DX-ABDOMEN FOR TUBE PLACEMENT   Final Result      NG tube extends below the diaphragm with tip at the level of the gastric fundus. Side port is at the level of the distal esophagus.      DX-ABDOMEN FOR TUBE PLACEMENT   Final Result      NG tube is noted with tip projecting over the right lower lung.      These findings were transmitted with GLEN ZELAYA on 01/07/2024. Tube was subsequently adjusted and is now present below the diaphragm.      DX-ABDOMEN FOR TUBE PLACEMENT   Final Result      DHT tip overlies the second portion of the duodenum      DX-ABDOMEN FOR TUBE PLACEMENT   Final Result      Feeding tube tip projects in the distal stomach or first portion of the duodenum.      DX-ABDOMEN FOR TUBE PLACEMENT   Final Result      Removal of nasogastric tube.      Feeding tube tip projects in the distal stomach.      No visualized thorax.      DX-ABDOMEN FOR TUBE PLACEMENT   Final Result         Gastric drainage tube with tip projecting over the expected area of the right lower lobe airway. Recommend removal.            CRITICAL RESULT READ BACK: Preliminary findings discussed with and critical read back performed by Dr. GLEN ZELAYA via telephone on  1/5/2024 6:22 PM      DX-ABDOMEN FOR TUBE PLACEMENT   Final Result      1.  Malpositioned enteric sump catheter which is folded back upon itself over the left paramedian distal one third mediastinum probably beyond the left mainstem bronchus and within the esophagus.   2.  A Voalte message was sent to GLEN NATHALIE at 1641 hours. Immediate response received.   3.  Per discussion, the patient has already pulled out the tube.      DX-CHEST-PORTABLE (1 VIEW)   Final Result      1.  Hypoinflation and pulmonary vascular congestion with mild bibasilar atelectasis.   2.  No lobar pneumonia or overt pulmonary edema.      CT-HEAD W/O   Final Result      1.  Diffuse atrophy and white matter changes.   2.  No acute intracranial hemorrhage or territorial infarct.   3.  RIGHT occipital craniotomy with underlying cerebellar encephalomalacia.              Assessment/Plan  * Acute respiratory failure with hypoxia (HCC)- (present on admission)  Assessment & Plan  Intubated date: 1/9/2024-1/11, 1/13-1/20, 1/25-2/1  Recurrent aspiration pneumonitis  HFNC for SpO2 > 90% which is being titrated  Pulmonary hygiene   Mucolytics  Empiric aspiration PNA coverage started 2/2  Remains at risk for aspiration and reintubation - ongoing goals of care, daughter wants him to remain full code    His overall prognosis is quite poor given his recurrent aspirations that of which are unlikely to change with a PEG tube  Further discussions will be had with daughter and consider an ethics consult    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 Patten  Repeat imaging including MRI show changes consistent with known bleed but no new findings  Still needs a f/u MRI in 3-4 wks to confirm no underlying lesion as etiology of initial bleed      A-fib (HCC)  Assessment & Plan  LVH on 12/27 ECHO with diastolic dysfunction  Weight-based Lovenox prophylaxis for  CVA  Continue cardiac monitoring.    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 In review of yesterday's note (2/5/2024), there are no changes except as documented above.

## 2024-02-07 PROBLEM — R13.10 DYSPHAGIA: Status: ACTIVE | Noted: 2024-01-01

## 2024-02-07 NOTE — PROGRESS NOTES
[facility] Medicine Daily Progress Note    Date of Service  2/7/2024    Chief Complaint  Lewis Mohr is a 78 y.o. male admitted 1/4/2024 with altered mental status    Hospital Course  Mr. 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  Unfortunately, the patient was readmitted to [facility] on 1/4/2024 for altered mental status and hypoxia.  He was admitted to the medicine services with hypernatremia, dehydration, and ERWIN with urinary retention.  He was found to be silently aspirating.  On 1/9/2024, the patient went into AF with RVR with worsening hypoxia and mental status changes.  A code stroke was called.  While in the CT scanner the patient was not protecting his airway and therefore was intubated.  He had a PEA arrest shortly afterward.  He was transferred to the ICU.  The patient was extubated on 1/11/2024 and required HFNC at 45L at 70% on 1/12.  Unfortunately, the patient was reintubated on 1/13.  He remained intubated until 1/20/2024 and then extubated to HFNC at 40L/70%.  He was transferred to IMCU on 1/25.  On the evening of 1/26 the critical care team was reconsulted due to the patient becoming suddenly tachycardic in the 140-150s with hypotension.  He was given IVF and a dose of phenylephrine.  He was not protecting his airway and required emergent intubation and bronchoscopy then transferred back to the ICU.  During his ICU stay the patient was intermittently agitated, requiring pressor therapy, continue to be significantly encephalopathic, eventually had improvement and was extubated to high flow nasal cannula, patient with difficulty with secretions and protecting his airway per intensivist    Interval Problem Update  2/6: Mr. Mohr was evaluated in the CU. His potassium is low at 3.3. He is on weight-based lovenox.  He is in soft wrist restraints. He is on high flow oxygen. He is   oriented only to name.   2/7: Mr. Mohr was seen in the IMCU. His Na is up at 148.  Stop IV Lasix.  He is on high flow oxygen. Chest xray ordered for this morning. He has nasogastric feeding and failed a swallow eval by speech path yesterday.  I have asked his nurse to call me once his daughter comes in due to his andrew his condition with her.  His high flow will be tapered as tolerated.    I have discussed this patient's plan of care and discharge plan at IDT rounds today with Case Management, Nursing, Nursing leadership, and other members of the IDT team.    Consultants/Specialty  GI.   Critical care  Code Status  Full Code    Disposition  The patient is not medically cleared for discharge to home or a post-acute facility.      I have placed the appropriate orders for post-discharge needs.    Review of Systems  Review of Systems   Unable to perform ROS: Acuity of condition        Physical Exam  Temp:  [36 °C (96.8 °F)-36.7 °C (98 °F)] 36.7 °C (98 °F)  Pulse:  [] 102  Resp:  [12-23] 20  BP: (122-176)/(70-95) 143/85  SpO2:  [92 %-99 %] 97 %    Physical Exam  Vitals and nursing note reviewed.   Constitutional:       General: He is not in acute distress.     Appearance: He is ill-appearing.   Cardiovascular:      Rate and Rhythm: Normal rate.   Pulmonary:      Comments: High flow nasal cannula oxygen at 30 L 30%  Normal work of breathing with crackles throughout and overall poor air movement  Abdominal:      General: There is no distension.      Tenderness: There is no abdominal tenderness.   Musculoskeletal:      Comments: Soft wrist restraints   Neurological:      Mental Status: He is alert.      Comments: Oriented to name only         Fluids    Intake/Output Summary (Last 24 hours) at 2/7/2024 0658  Last data filed at 2/7/2024 0600  Gross per 24 hour   Intake 3146.97 ml   Output 650 ml   Net 2496.97 ml         Laboratory  Recent Labs     02/05/24  0331 02/06/24  0350 02/07/24  0248   WBC 11.5* 9.5 10.1   RBC  3.26* 3.29* 3.73*   HEMOGLOBIN 9.8* 9.9* 10.9*   HEMATOCRIT 30.3* 30.6* 33.8*   MCV 92.9 93.0 90.6   MCH 30.1 30.1 29.2   MCHC 32.3 32.4 32.2*   RDW 47.1 48.4 47.5   PLATELETCT 451* 437 448*   MPV 10.7 10.7 10.4       Recent Labs     02/05/24  0331 02/06/24  0350 02/06/24  1555 02/07/24  0248   SODIUM 143 145  --  148*   POTASSIUM 3.6 3.3* 3.6 3.3*   CHLORIDE 106 106  --  109   CO2 26 27  --  27   GLUCOSE 146* 150*  --  155*   BUN 21 21  --  29*   CREATININE 1.23 1.33  --  1.38   CALCIUM 9.6 9.6  --  10.1       Recent Labs     02/06/24  0350   INR 1.34*                 Imaging  DX-ABDOMEN FOR TUBE PLACEMENT   Final Result      1.  Enteric tube overlies the gastric body.      DX-ABDOMEN FOR TUBE PLACEMENT   Final Result      Feeding tube tip overlies the duodenum.      DX-CHEST-PORTABLE (1 VIEW)   Final Result      No acute process.      DX-CHEST-PORTABLE (1 VIEW)   Final Result      1.  Interval extubation.   2.  Mild interstitial pulmonary edema.   3.  Possible minimal/trace left pleural effusion.      DX-CHEST-LIMITED (1 VIEW)   Final Result      Stable appearance of the chest.      MR-BRAIN-W/O   Final Result         No acute intracranial process.      Age-related volume loss and chronic microvascular ischemic changes.      Postoperative changes in the right cerebellum with resection cavity and hemosiderin staining.      Bilateral mastoid effusion.      DX-CHEST-PORTABLE (1 VIEW)   Final Result         1.  Interstitial pulmonary parenchymal prominence suggest chronic underlying lung disease, component of interstitial edema and/or infiltrates not excluded.   2.  Cardiomegaly   3.  Atherosclerosis      DX-CHEST-PORTABLE (1 VIEW)   Final Result         1.  Pulmonary edema and/or infiltrates are identified, which are stable since the prior exam.   2.  Cardiomegaly   3.  Atherosclerosis      DX-CHEST-PORTABLE (1 VIEW)   Final Result         1.  Pulmonary edema and/or infiltrates are identified, which are stable since  the prior exam.   2.  Cardiomegaly   3.  Atherosclerosis      DX-CHEST-LIMITED (1 VIEW)   Final Result         1.  Pulmonary edema and/or infiltrates are identified, which appear somewhat increased since the prior exam.   2.  Cardiomegaly   3.  Atherosclerosis      DX-ABDOMEN FOR TUBE PLACEMENT   Final Result         Feeding tube with tip projecting over the expected area of the distal stomach.      DX-ABDOMEN FOR TUBE PLACEMENT   Final Result      The nasogastric tube terminates over the antrum of the stomach.      DX-CHEST-PORTABLE (1 VIEW)   Final Result         Ill-defined hazy opacities in the right mid lung and left lower lobe are similar to prior.      IR-MIDLINE CATHETER INSERTION WO GUIDANCE > AGE 3   Final Result                  Ultrasound-guided midline placement performed by qualified nursing staff    as above.          DX-CHEST-PORTABLE (1 VIEW)   Final Result      Improved interstitial opacities may represent improving edema and/or pneumonitis.      Mild nonspecific right basilar opacity and questionable tiny left upper lobe opacity. Correlate clinically for infection.      DX-CHEST-LIMITED (1 VIEW)   Final Result      1.  Slightly increased BILATERAL lung disease. This could be pulmonary edema or pneumonia.   2.  Possible small BILATERAL pleural effusions      DX-CHEST-PORTABLE (1 VIEW)   Final Result         1.  Mild pulmonary edema and/or infiltrates   2.  Atherosclerosis      DX-ABDOMEN FOR TUBE PLACEMENT   Final Result      NG tube tip projects over the stomach.      DX-ABDOMEN FOR TUBE PLACEMENT   Final Result      1.  Enteric tube projects over the proximal stomach.      DX-CHEST-PORTABLE (1 VIEW)   Final Result      Lines and tubes appear adequate. Stable bibasilar airspace disease.      DX-CHEST-PORTABLE (1 VIEW)   Final Result         1.  Pulmonary edema and/or infiltrates are identified, which appear somewhat increased since the prior exam.   2.  Cardiomegaly      DX-CHEST-PORTABLE (1 VIEW)    Final Result      1.  New small left pleural effusion with associated atelectasis.   2.  Increased right basilar opacity most likely atelectasis however correlate clinically for infection.      DX-CHEST-PORTABLE (1 VIEW)   Final Result         1.  Pulmonary edema and/or infiltrates are identified, which are stable since the prior exam.   2.  Cardiomegaly      DX-CHEST-PORTABLE (1 VIEW)   Final Result      1.  No significant change.      MR-MRA HEAD-W/O   Final Result         MRA OF THE [place] OF [place] WITHIN NORMAL LIMITS.      MR-BRAIN-W/O   Final Result      1.  Redemonstrated is scattered supratentorial and infratentorial areas of ischemia. No new lesions. There is increased conspicuity of the RIGHT frontal lobe lesion which could be due to recurrent ischemia or seizure activity.   2.  Unchanged RIGHT cerebellar hematoma evacuation cavity. As previously noted underlying mass is not excluded and follow-up MRI without and with contrast in approximately weeks is recommended.   3.  No new hemorrhage   4.  Atrophy   5.  White matter changes   6.  Small BILATERAL mastoid effusions      DX-CHEST-PORTABLE (1 VIEW)   Final Result      DX-ABDOMEN FOR TUBE PLACEMENT   Final Result      Enteric feeding tube terminates in the left upper quadrant projecting over the expected location of the stomach.      DX-CHEST-LIMITED (1 VIEW)   Final Result      1.  Interval extubation   2.  Lower lung volumes with increased atelectasis superimposed on pulmonary edema or pneumonia      DX-CHEST-PORTABLE (1 VIEW)   Final Result         1.  Pulmonary edema and/or infiltrates are identified, which are somewhat decreased since the prior exam.   2.  Atherosclerosis      US-EXTREMITY VENOUS LOWER BILAT   Final Result      DX-CHEST-PORTABLE (1 VIEW)   Final Result         1.  Scattered hazy bilateral pulmonary infiltrates, slightly increased since prior study.   2.  Atherosclerosis      MR-BRAIN-W/O   Final Result      1.  A few punctate areas  of acute infarcts in the right frontal and bilateral parietal lobes and left cerebellum.   2.  There is no brainstem infarct or diffuse anoxic injury.   3.  Mixed signal abnormality in the right cerebellum with the previous surgical intervention in keeping with the clinical diagnosis of right cerebellar hemorrhage evacuation. Follow-up study with contrast in 6 weeks is recommended to rule out any    underlying pathology.   4.  Mild cerebral volume loss.      DX-CHEST-PORTABLE (1 VIEW)   Final Result      CT-CTA NECK WITH & W/O-POST PROCESSING   Final Result      1.  No acute arterial abnormality detected. No significant arterial stenosis.      CT-CTA HEAD WITH & W/O-POST PROCESS   Final Result      CT angiogram of the [place] within normal limits.      CT-CEREBRAL PERFUSION ANALYSIS   Final Result      1.  Cerebral blood flow less than 30% likely representing completed infarct = 0 mL.      2.  T Max more than 6 seconds likely representing combination of completed infarct and ischemia = 0 mL.      3.  Mismatched volume likely representing ischemic brain/penumbra = None      4.  Please note that the cerebral perfusion was performed on the limited brain tissue around the basal ganglia region. Infarct/ischemia outside the CT perfusion sections can be missed in this study.      CT-HEAD W/O   Final Result      1.  No evidence of acute territorial infarct, intracranial hemorrhage or mass lesion.   2.  Mild diffuse cerebral substance loss.   3.  Mild microangiopathic ischemic change versus demyelination or gliosis.   4.  Status post right suboccipital cranioplasty with underlying cerebellar hypoattenuation likely on the basis of evolving encephalomalacia.         DX-CHEST-PORTABLE (1 VIEW)   Final Result      1.  Intubation.   2.  Bilateral pulmonary infiltrates.      DX-CHEST-LIMITED (1 VIEW)   Final Result      DX-ABDOMEN FOR TUBE PLACEMENT   Final Result      Enteric feeding tube terminates with the tip  projecting over the expected location of the distal stomach.      CT-CTA CHEST PULMONARY ARTERY W/ RECONS   Final Result         1.  No pulmonary embolus appreciated.   2.  Consolidations in bilateral lung bases an scattered hazy right pulmonary opacities, compatible with atelectasis with component of infiltrate.   3.  Indeterminate left adrenal nodule, follow-up adrenal protocol CT for further characterization as clinically appropriate.   4.  Atherosclerosis and atherosclerotic coronary artery disease.      DX-ABDOMEN FOR TUBE PLACEMENT   Final Result         1.  Nonspecific bowel gas pattern in the upper abdomen.   2.  Cardiomegaly   3.  Atherosclerosis   4.  Bibasilar atelectasis   5.  Nasogastric tube tip terminates overlying the expected location of the pylorus or first duodenal segment.      DX-ABDOMEN FOR TUBE PLACEMENT   Final Result         1.  Nonspecific bowel gas pattern in the upper abdomen.   2.  Nasogastric tube tip terminates overlying the expected location of the gastric antrum.   3.  Linear densities the bilateral lung bases favor atelectasis, component of infiltrate not excluded.      DX-ABDOMEN FOR TUBE PLACEMENT   Final Result      NG tube extends below the diaphragm with tip at the level of the gastric fundus. Side port is at the level of the distal esophagus.      DX-ABDOMEN FOR TUBE PLACEMENT   Final Result      NG tube is noted with tip projecting over the right lower lung.      These findings were transmitted with GLEN ZELAYA on 01/07/2024. Tube was subsequently adjusted and is now present below the diaphragm.      DX-ABDOMEN FOR TUBE PLACEMENT   Final Result      DHT tip overlies the second portion of the duodenum      DX-ABDOMEN FOR TUBE PLACEMENT   Final Result      Feeding tube tip projects in the distal stomach or first portion of the duodenum.      DX-ABDOMEN FOR TUBE PLACEMENT   Final Result      Removal of nasogastric tube.      Feeding tube tip projects in the distal stomach.       No visualized thorax.      DX-ABDOMEN FOR TUBE PLACEMENT   Final Result         Gastric drainage tube with tip projecting over the expected area of the right lower lobe airway. Recommend removal.            CRITICAL RESULT READ BACK: Preliminary findings discussed with and critical read back performed by Dr. GLEN ZELAYA via telephone on 1/5/2024 6:22 PM      DX-ABDOMEN FOR TUBE PLACEMENT   Final Result      1.  Malpositioned enteric sump catheter which is folded back upon itself over the left paramedian distal one third mediastinum probably beyond the left mainstem bronchus and within the esophagus.   2.  A Voalte message was sent to GLEN ZELAYA at 1641 hours. Immediate response received.   3.  Per discussion, the patient has already pulled out the tube.      DX-CHEST-PORTABLE (1 VIEW)   Final Result      1.  Hypoinflation and pulmonary vascular congestion with mild bibasilar atelectasis.   2.  No lobar pneumonia or overt pulmonary edema.      CT-HEAD W/O   Final Result      1.  Diffuse atrophy and white matter changes.   2.  No acute intracranial hemorrhage or territorial infarct.   3.  RIGHT occipital craniotomy with underlying cerebellar encephalomalacia.              Assessment/Plan  * Acute respiratory failure with hypoxia (HCC)- (present on admission)  Assessment & Plan  Intubated date: 1/9/2024-1/11, 1/13-1/20, 1/25-2/1  Recurrent aspiration pneumonitis  HFNC for SpO2 > 90% which is being titrated  Pulmonary hygiene   Mucolytics  Empiric aspiration PNA coverage was given  Remains at risk for aspiration and reintubation - ongoing goals of care, daughter wants him to remain full code    His overall prognosis is quite poor given his recurrent aspirations that of which are unlikely to change with a PEG tube  Further discussions will be had with daughter and consider an ethics consult if he has decompensation in his condition  Stop IV Lasix  He is too encephalopathic to follow commands with incentive  spirometry    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 Patten  Repeat imaging including MRI show changes consistent with known bleed but no new findings  Still needs a f/u MRI in 3-4 wks to confirm no underlying lesion as etiology of initial bleed      Dysphagia- (present on admission)  Assessment & Plan  Speech pathology continues to follow him.  He has had multiple aspiration events.  He has nasogastric feeding.  Gastroenterology was consulted for a PEG tube but he was on high flow oxygen and they may be reconsulted once his oxygen requirements are down.    A-fib (HCC)  Assessment & Plan  LVH on 12/27 ECHO with diastolic dysfunction  Weight-based Lovenox prophylaxis for CVA  Continue cardiac monitoring.    Debility- (present on admission)  Assessment & Plan  Prolonged hospitalization, prior cerebral insult  He will need lifelong placement    Encephalopathy- (present on admission)  Assessment & Plan  Toxic metabolic + ICU hypoactive delirium  This has persisted  MRI brain 1/29/24 was unremarkable for acute processes.    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 In review of yesterday's note (2/6/2024), there are no changes except as documented above.

## 2024-02-07 NOTE — DISCHARGE PLANNING
Case Management Discharge Planning    Admission Date: 1/4/2024  GMLOS: 7.5  ALOS: 34    6-Clicks ADL Score: 12  6-Clicks Mobility Score: 6  PT and/or OT Eval ordered: Yes  Post-acute Referrals Ordered: Yes  Post-acute Choice Obtained: No  Has referral(s) been sent to post-acute provider:  Yes      Anticipated Discharge Dispo: Discharge Disposition: D/T to Medicare cert LTCH w/planned hosp IP readmit (91)    Action(s) Taken:   Pt discussed in IDT rounds.  Pt is receiving HHFNC 20L currently, has been intubated x 3.  LTACH order placed per protocol.  Referral ssent to Post Acute Medical via epic.    Medically Clear: No    Next Steps:   Follow up with Post Acute Medical.  Follow up with patient and daughter.    Barriers to Discharge: Medical clearance and Pending Placement

## 2024-02-07 NOTE — CARE PLAN
Problem: Humidified High Flow Nasal Cannula  Goal: Maintain adequate oxygenation dependent on patient condition  Description: Target End Date:  resolve prior to discharge or when underlying condition is resolved/stabilized    1.  Implement humidified high flow oxygen therapy  2.  Titrate high flow oxygen to maintain appropriate SpO2  2/7/2024 0516 by Misha Qureshi, RRT  Outcome: Progressing  Flowsheets  Taken 2/7/2024 0400 by Elvin Aguila R.N.  O2 (LPM): 20  FiO2%: 30 %  Taken 1/11/2024 1749 by Claudio Mendenhall, RRT  Indication: All other patients: SpO2 less than 90% despite conventional supplemental oxygen devices

## 2024-02-07 NOTE — THERAPY
"Occupational Therapy  Daily Treatment     Patient Name: Lewis Mohr  Age:  78 y.o., Sex:  male  Medical Record #: [id number]  Today's Date: 2/7/2024     Precautions: Fall Risk, Nasogastric Tube  Comments: labile BP labile affect and endurnace    Assessment  Pt was seen for OT tx, pt continues to require 2 skilled therapists for safe mobility and ADL participation. Pt has fluctuations in arousal, and initiation. Pt today verbalized need to use the bathroom therefore facilitated txf to BS, however upon reaching BS pt became more lethargic, Vitals were stable throughout session. Pt's functional status is impacted by poor motor planning, weakness, arousal and likely visual deficits. OT will continue to follow.     Plan  Treatment Plan Status: Continue Current Treatment Plan  Type of Treatment: Self Care / Activities of Daily Living, Therapeutic Activity, Neuro Re-Education / Balance  Treatment Frequency: 4 Times per Week  Treatment Duration: Until Therapy Goals Met    DC Equipment Recommendations: Unable to determine at this time  Discharge Recommendations: Recommend post-acute placement for additional occupational therapy services prior to discharge home    Subjective  \" I need to go to the bathroom\"      Objective       02/07/24 1019   Treatment Charges   Charges Yes   OT Self Care / ADL (Units) 2   Total Time Spent   OT Time Spent Yes   OT Self Care / ADL (Minutes) 41   OT Total Time Spent (Calculated) 41   Precautions   Precautions Fall Risk;Nasogastric Tube   Comments labile BP labile affect and endurnace   Pain 0 - 10 Group   Therapist Pain Assessment 0;Nurse Notified   Cognition    Cognition / Consciousness X   Speech/ Communication Delayed Responses   Level of Consciousness Alert   Ability To Follow Commands 1 Step   Safety Awareness Impaired   New Learning Impaired   Attention Impaired   Initiation Impaired   Comments more verbal this am stating \"I need to go the bathroom\"  and was attempting to initiate OOB but " became less responsive and had decreased initiation through out session   Passive ROM Upper Body   Passive ROM Upper Body WDL   Active ROM Upper Body   Active ROM Upper Body  X   Comments limited by initiation and possible weakness vs coordination   Strength Upper Body   Upper Body Strength  X   Gross Strength Generalized Weakness, Equal Bilaterally.    Other Treatments   Other Treatments Provided Pt requesting to go to bathroom has been incontient of bowel/bladder facilited txf to [facility], pt remains inconsistent w/motor planning and strength in all extremities requiring x2 assist   Balance   Sitting Balance (Static) Fair -   Sitting Balance (Dynamic) Poor +   Standing Balance (Static) Poor   Standing Balance (Dynamic) Poor -   Weight Shift Sitting Poor   Weight Shift Standing Poor   Skilled Intervention Verbal Cuing;Tactile Cuing;Postural Facilitation;Facilitation;Compensatory Strategies   Comments required physical assist to maintain upright sitting balance on [facility] this session   Bed Mobility    Supine to Sit Moderate Assist   Sit to Supine Maximal Assist   Scooting Maximal Assist   Rolling Maximal Assist to Rt.;Maximum Assist to Lt.   Skilled Intervention Verbal Cuing;Tactile Cuing;Postural Facilitation;Facilitation;Compensatory Strategies   Activities of Daily Living   Grooming Maximal Assist;Seated   Upper Body Dressing Maximal Assist   Lower Body Dressing Maximal Assist   Toileting Total Assist   Skilled Intervention Verbal Cuing;Tactile Cuing;Facilitation;Compensatory Strategies   Comments inconsisent w/initation and motor planning of simple tasks appears to fatigue quickly   How much help from another person does the patient currently need...   6 Clicks Daily Activity Score 12   Functional Mobility   Sit to Stand Maximal Assist   Bed, Chair, Wheelchair Transfer Maximal Assist   Toilet Transfers Maximal Assist   Transfer Method Squat Pivot   Skilled Intervention Verbal Cuing;Tactile  Cuing;Facilitation;Compensatory Strategies   Comments x2 assist for safety initiation of  mobility decreased through out session however vitals all stable   Visual Perception   Visual Perception  X   Comments neglect vs inattention to the right   Activity Tolerance   Comments poor initially more alert and participatory but appeared to fatigue througout session   Patient / Family Goals   Patient / Family Goal #1 none stated   Goal #1 Outcome Goal met   Short Term Goals   Short Term Goal # 1 pt will wash face with a cloth mod A   Goal Outcome # 1 Goal not met   Short Term Goal # 2 pt will maintain sitting in midine with SBA for ADLs   Goal Outcome # 2 Goal not met   Short Term Goal # 3 mod A with UB dressing   Goal Outcome # 3 Goal not met   Short Term Goal # 4 mod A with ADL txfs   Goal Outcome # 4 Goal not met   Education Group   Role of Occupational Therapist Patient Response Patient;No Learning Evidence   Occupational Therapy Treatment Plan    O.T. Treatment Plan Continue Current Treatment Plan   Anticipated Discharge Equipment and Recommendations   DC Equipment Recommendations Unable to determine at this time   Discharge Recommendations Recommend post-acute placement for additional occupational therapy services prior to discharge home   Interdisciplinary Plan of Care Collaboration   IDT Collaboration with  Nursing;Physical Therapist   Patient Position at End of Therapy In Bed;Wrist Restraints Applied;Call Light within Reach;Tray Table within Reach;Phone within Reach   Collaboration Comments RN aware of session   Session Information   Date / Session Number  2/7 #6 (1/4, 2/13)

## 2024-02-07 NOTE — CARE PLAN
The patient is Watcher - Medium risk of patient condition declining or worsening    Shift Goals  Clinical Goals: Wean o2  Patient Goals: DULCE MARIA  Family Goals: NA    Progress made toward(s) clinical / shift goals:    Problem: Hemodynamics  Goal: Patient's hemodynamics, fluid balance and neurologic status will be stable or improve  Outcome: Progressing     Problem: Respiratory  Goal: Patient will achieve/maintain optimum respiratory ventilation and gas exchange  Outcome: Progressing     Problem: Knowledge Deficit - Standard  Goal: Patient and family/care givers will demonstrate understanding of plan of care, disease process/condition, diagnostic tests and medications  Outcome: Progressing       Patient is not progressing towards the following goals:

## 2024-02-07 NOTE — DIETARY
Nutrition Services Brief Update:    Problem: Nutritional:  Goal: Nutrition support tolerated and meeting greater than 85% of estimated needs  Outcome: MET. TF @ goal.    RD following. Of note, pt likely needs daily K+ supplementation.

## 2024-02-07 NOTE — CARE PLAN
The patient is Watcher - Medium risk of patient condition declining or worsening       Problem: Safety - Medical Restraint  Goal: Remains free of injury from restraints (Restraint for Interference with Medical Device)  Outcome: Progressing       Patient is not progressing towards the following goals:

## 2024-02-07 NOTE — THERAPY
Physical Therapy   Daily Treatment     Patient Name: Lewis Mohr  Age:  78 y.o., Sex:  male  Medical Record #: [id number]  Today's Date: 2/7/2024     Precautions  Precautions: Fall Risk;Nasogastric Tube    Assessment    Pt with eyes open and min restless, pt requested to use the bathroom for BM. Pt able to initiate rolling and then required max A for bed mobility, squat pivot 2 person assist to BSC. Pt became lethargic while on BSC, HR increased to 160s and then decreased back to low 100s. Pt assisted back to bed and required more assistance with transfer. Will attempt to assess vision or dizziness at next visit. Pt tends to keep eyes closed during mobility. Anticipate pt will benefit from post acute placement upon DC from hospital.       Plan    Treatment Plan Status: Continue Current Treatment Plan  Type of Treatment: Bed Mobility, Equipment, Gait Training, Neuro Re-Education / Balance, Self Care / Home Evaluation, Therapeutic Activities, Therapeutic Exercise  Treatment Frequency: 3 Times per Week  Treatment Duration: Until Therapy Goals Met    DC Equipment Recommendations: Unable to determine at this time  Discharge Recommendations: Recommend post-acute placement for additional physical therapy services prior to discharge home      Subjective    Pt resting in bed.     Objective       02/07/24 1018   Precautions   Precautions Fall Risk;Nasogastric Tube   Vitals   O2 Delivery Device Heated High Flow Nasal Cannula   Pain 0 - 10 Group   Therapist Pain Assessment 0   Cognition    Cognition / Consciousness X   Speech/ Communication Delayed Responses   Level of Consciousness Alert   Ability To Follow Commands 1 Step  (following 25% commands)   Safety Awareness Impaired   New Learning Impaired   Attention Impaired   Initiation Impaired   Comments pt able to initiate to EOB and stating he needed to use the bathroom. Less initiation during at end of session.   Balance   Sitting Balance (Static) Fair -   Sitting Balance  (Dynamic) Poor +   Standing Balance (Static) Poor -   Standing Balance (Dynamic) Poor -   Weight Shift Sitting Poor   Weight Shift Standing Poor   Skilled Intervention Verbal Cuing;Tactile Cuing;Compensatory Strategies   Bed Mobility    Supine to Sit Moderate Assist   Sit to Supine Maximal Assist   Scooting Maximal Assist   Rolling Maximal Assist to Rt.;Maximum Assist to Lt.   Skilled Intervention Tactile Cuing;Verbal Cuing;Facilitation   Comments pt initated rolling to sit up   Gait Analysis   Gait Level Of Assist Unable to Participate   Functional Mobility   Sit to Stand Maximal Assist   Bed, Chair, Wheelchair Transfer Maximal Assist  (2 person assist)   Toilet Transfers Maximal Assist  (2 person assist)   Transfer Method Squat Pivot   Skilled Intervention Facilitation;Verbal Cuing;Sequencing   Comments pt with limited quad and gluteal muscle initiation with sit to stand   How much difficulty does the patient currently have...   Turning over in bed (including adjusting bedclothes, sheets and blankets)? 1   Sitting down on and standing up from a chair with arms (e.g., wheelchair, bedside commode, etc.) 1   Moving from lying on back to sitting on the side of the bed? 1   How much help from another person does the patient currently need...   Moving to and from a bed to a chair (including a wheelchair)? 1   Need to walk in a hospital room? 1   Climbing 3-5 steps with a railing? 1   6 clicks Mobility Score 6   Activity Tolerance   Sitting in Chair 7 minutes on BSC   Sitting Edge of Bed 3 minutes total   Standing unable to come to full stand   Comments pt lethargic while on BSC and back in bed   Short Term Goals    Short Term Goal # 1 Pt will perform bed mobility with min A to progress function in 6 visits.   Goal Outcome # 1 Progressing slower than expected   Short Term Goal # 2 Pt will perform sit<>stand with min A to progress towards functional transfers in 6 visits.   Goal Outcome # 2 Progressing slower than  expected   Short Term Goal # 3 Pt will demonstrate fair sitting balance during LE exercises without UE support within 6 visits to demonstrate improved participation in PT sessions.   Goal Outcome # 3 Progressing slower than expected   Education Group   Education Provided Role of Physical Therapist   Role of Physical Therapist Patient Response Patient;Nonacceptance;Explanation;No Learning Evidence   Physical Therapy Treatment Plan   Physical Therapy Treatment Plan Continue Current Treatment Plan   Treatment Plan  Bed Mobility;Equipment;Gait Training;Neuro Re-Education / Balance;Self Care / Home Evaluation;Therapeutic Activities;Therapeutic Exercise   Treatment Frequency 3 Times per Week   Duration Until Therapy Goals Met   Anticipated Discharge Equipment and Recommendations   DC Equipment Recommendations Unable to determine at this time   Discharge Recommendations Recommend post-acute placement for additional physical therapy services prior to discharge home   Interdisciplinary Plan of Care Collaboration   IDT Collaboration with  Nursing;Occupational Therapist  Patient benefited from co-treatment with Occupational Therapist for the following reason(s):  Patient required 2 person assistance for safety and to provide effective interventions. Each discipline assisted patient with appropriate and separate goals.     Patient Position at End of Therapy In Bed;Bed Alarm On;Call Light within Reach   Collaboration Comments RN updated   Session Information   Date / Session Number  2/7 7(2/3, 2/11)

## 2024-02-07 NOTE — CARE PLAN
The patient is Watcher - Medium risk of patient condition declining or worsening    Shift Goals  Clinical Goals: safety, wean o2, hemodynamic stability  Patient Goals: DULCE MARIA  Family Goals: DULCE MARIA    Progress made toward(s) clinical / shift goals:    Problem: Respiratory  Goal: Patient will achieve/maintain optimum respiratory ventilation and gas exchange  Outcome: Progressing     Problem: Skin Integrity  Goal: Skin integrity is maintained or improved  Outcome: Progressing     Problem: Safety - Medical Restraint  Goal: Remains free of injury from restraints (Restraint for Interference with Medical Device)  Outcome: Progressing       Patient is not progressing towards the following goals:      Problem: Hemodynamics  Goal: Patient's hemodynamics, fluid balance and neurologic status will be stable or improve  Outcome: Not Progressing   Pt SBP above goal of 180, pt medicated with hydralazine see MAR.

## 2024-02-07 NOTE — CARE PLAN
The patient is Watcher - Medium risk of patient condition declining or worsening    Shift Goals  Clinical Goals: safety, wean o2, hemodynamic stability  Patient Goals: DULCE MARIA  Family Goals: DULCE MARIA    Progress made toward(s) clinical / shift goals:    Problem: Hemodynamics  Goal: Patient's hemodynamics, fluid balance and neurologic status will be stable or improve  Outcome: Progressing     Problem: Fluid Volume  Goal: Fluid volume balance will be maintained  Outcome: Progressing     Problem: Respiratory  Goal: Patient will achieve/maintain optimum respiratory ventilation and gas exchange  Outcome: Progressing     Problem: Safety - Medical Restraint  Goal: Remains free of injury from restraints (Restraint for Interference with Medical Device)  Outcome: Progressing     Problem: Pain - Standard  Goal: Alleviation of pain or a reduction in pain to the patient’s comfort goal  Outcome: Progressing       Patient is not progressing towards the following goals:

## 2024-02-08 NOTE — ASSESSMENT & PLAN NOTE
Speech pathology continues to follow him.  He has had multiple aspiration events.  He has nasogastric feeding.  Gastroenterology was consulted for a PEG tube but he was on high flow oxygen and they may be reconsulted once his oxygen requirements are down.  His daughter will check with her sister and they will come up with a plan as to if they want another attempt at a PEG tube.  Still holding on peg/g tube as per daughter decision   Cont on trickling tube feed oly 10 ml/hr   Will consider to slowly advance tomorrow     2/12: unruly increase rate to 20 ml/hr today

## 2024-02-08 NOTE — CARE PLAN
The patient is Watcher - Medium risk of patient condition declining or worsening    Shift Goals  Clinical Goals: safety, wean o2, PEG tube, hemodynamic stability  Patient Goals: rest  Family Goals: DULCE MARIA    Progress made toward(s) clinical / shift goals:    Problem: Hemodynamics  Goal: Patient's hemodynamics, fluid balance and neurologic status will be stable or improve  Outcome: Progressing     Problem: Fluid Volume  Goal: Fluid volume balance will be maintained  Outcome: Progressing     Problem: Urinary - Renal Perfusion  Goal: Ability to achieve and maintain adequate renal perfusion and functioning will improve  Outcome: Progressing     Problem: Respiratory  Goal: Patient will achieve/maintain optimum respiratory ventilation and gas exchange  Outcome: Progressing     Problem: Safety - Medical Restraint  Goal: Remains free of injury from restraints (Restraint for Interference with Medical Device)  Outcome: Progressing       Patient is not progressing towards the following goals:

## 2024-02-08 NOTE — CARE PLAN
The patient is Stable - Low risk of patient condition declining or worsening    Shift Goals  Clinical Goals: safety, wean o2, PEG tube, hemodynamic stability  Patient Goals: rest  Family Goals: DULCE MARIA    Progress made toward(s) clinical / shift goals:    Problem: Hemodynamics  Goal: Patient's hemodynamics, fluid balance and neurologic status will be stable or improve  Outcome: Progressing     Problem: Respiratory  Goal: Patient will achieve/maintain optimum respiratory ventilation and gas exchange  Outcome: Progressing     Problem: Safety - Medical Restraint  Goal: Remains free of injury from restraints (Restraint for Interference with Medical Device)  Outcome: Progressing

## 2024-02-08 NOTE — PROGRESS NOTES
Upon changing pt linens, RN assessed distended lower midline abdomen firm to touch and pt grimace when touched, pt bladder scanned, pt retaining 1100ml on scanner, MD Oropeza notified, order for fernando obtained, temp fernando inserted, 1200ml out on insertion.

## 2024-02-08 NOTE — PROGRESS NOTES
[facility] Medicine Daily Progress Note    Date of Service  2/8/2024    Chief Complaint  Lewis Mohr is a 78 y.o. male admitted 1/4/2024 with altered mental status    Hospital Course  Mr. 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  Unfortunately, the patient was readmitted to [facility] on 1/4/2024 for altered mental status and hypoxia.  He was admitted to the medicine services with hypernatremia, dehydration, and ERWIN with urinary retention.  He was found to be silently aspirating.  On 1/9/2024, the patient went into AF with RVR with worsening hypoxia and mental status changes.  A code stroke was called.  While in the CT scanner the patient was not protecting his airway and therefore was intubated.  He had a PEA arrest shortly afterward.  He was transferred to the ICU.  The patient was extubated on 1/11/2024 and required HFNC at 45L at 70% on 1/12.  Unfortunately, the patient was reintubated on 1/13.  He remained intubated until 1/20/2024 and then extubated to HFNC at 40L/70%.  He was transferred to IMCU on 1/25.  On the evening of 1/26 the critical care team was reconsulted due to the patient becoming suddenly tachycardic in the 140-150s with hypotension.  He was given IVF and a dose of phenylephrine.  He was not protecting his airway and required emergent intubation and bronchoscopy then transferred back to the ICU.  During his ICU stay the patient was intermittently agitated, requiring pressor therapy, continue to be significantly encephalopathic, eventually had improvement and was extubated to high flow nasal cannula, patient with difficulty with secretions and protecting his airway per intensivist    Interval Problem Update  2/6: Mr. Mohr was evaluated in the CU. His potassium is low at 3.3. He is on weight-based lovenox.  He is in soft wrist restraints. He is on high flow oxygen. He is   oriented only to name.   2/7: Mr. Mohr was seen in the IMCU. His Na is up at 148.  Stop IV Lasix.  He is on high flow oxygen. Chest xray ordered for this morning. He has nasogastric feeding and failed a swallow eval by speech path yesterday.  I have asked his nurse to call me once his daughter comes in due to his andrew his condition with her.  His high flow will be tapered as tolerated.  2/8: Mr. Mohr was evaluated in the IMCU.His Na went up to 149 despite stopping IV lasix. Cr also went up from 1.38 to 1.56 thus increase free water flushes. A fernando was placed over night due to 1.2 liters urine. He remains on NG feeding. He is now on 2 liters of oxygen. He is requiring soft wrist restraints as he will pull on his tubes.  I met with his daughter at bedside we had a long and candid discussion about his overall very guarded condition.  He is now on 2 L but remains confused and is at very high risk of aspiration whether he has a nasogastric tube or PEG tube.  She is going to speak with her sister and will hopefully come up with a decision tomorrow.  She is optimistic that he is doing much better but his recent history would suggest that he likely will continue to aspirate with resultant decompensation his respiratory status.  30 minutes were spent that of which over 50% time was spent in counseling at bedside.    I have discussed this patient's plan of care and discharge plan at IDT rounds today with Case Management, Nursing, Nursing leadership, and other members of the IDT team.    Consultants/Specialty  GI.   Critical care  Code Status  Full Code    Disposition  The patient is not medically cleared for discharge to home or a post-acute facility.  Anticipate discharge to: a long-term acute care hospital    I have placed the appropriate orders for post-discharge needs.    Review of Systems  Review of Systems   Unable to perform ROS: Acuity of condition        Physical Exam  Temp:  [36.7 °C (98 °F)-37 °C (98.6 °F)] 36.7  °C (98 °F)  Pulse:  [] 84  Resp:  [16-24] 24  BP: ()/() 151/81  SpO2:  [94 %-100 %] 100 %    Physical Exam  Vitals and nursing note reviewed.   Constitutional:       General: He is not in acute distress.     Appearance: He is ill-appearing.   Cardiovascular:      Rate and Rhythm: Normal rate.   Pulmonary:      Comments: Nasal cannula oxygen 2 liters.  Normal work of breathing with few crackles   Coarse cough  Abdominal:      General: There is no distension.      Tenderness: There is no abdominal tenderness.   Genitourinary:     Comments: Schwartz catheter  Musculoskeletal:      Comments: Soft wrist restraints  No swelling   Neurological:      Mental Status: He is alert.      Comments: Oriented to name only  confused         Fluids    Intake/Output Summary (Last 24 hours) at 2/8/2024 0713  Last data filed at 2/8/2024 0600  Gross per 24 hour   Intake 2250 ml   Output 1700 ml   Net 550 ml         Laboratory  Recent Labs     02/06/24  0350 02/07/24  0248   WBC 9.5 10.1   RBC 3.29* 3.73*   HEMOGLOBIN 9.9* 10.9*   HEMATOCRIT 30.6* 33.8*   MCV 93.0 90.6   MCH 30.1 29.2   MCHC 32.4 32.2*   RDW 48.4 47.5   PLATELETCT 437 448*   MPV 10.7 10.4       Recent Labs     02/06/24  0350 02/06/24  1555 02/07/24  0248 02/08/24  0322   SODIUM 145  --  148* 149*   POTASSIUM 3.3* 3.6 3.3* 3.8   CHLORIDE 106  --  109 107   CO2 27  --  27 31   GLUCOSE 150*  --  155* 172*   BUN 21  --  29* 33*   CREATININE 1.33  --  1.38 1.56*   CALCIUM 9.6  --  10.1 10.0       Recent Labs     02/06/24  0350   INR 1.34*                 Imaging  DX-ABDOMEN FOR TUBE PLACEMENT   Final Result      Enteric feeding tube extending to about the level of the gastric antrum or pylorus.      DX-ABDOMEN FOR TUBE PLACEMENT   Final Result      Feeding tube tip at the proximal stomach.      DX-CHEST-PORTABLE (1 VIEW)   Final Result         1.  Interstitial pulmonary parenchymal prominence suggest chronic underlying lung disease, component of interstitial  edema and/or infiltrates not excluded.   2.  Atherosclerosis      DX-ABDOMEN FOR TUBE PLACEMENT   Final Result      1.  Enteric tube overlies the gastric body.      DX-ABDOMEN FOR TUBE PLACEMENT   Final Result      Feeding tube tip overlies the duodenum.      DX-CHEST-PORTABLE (1 VIEW)   Final Result      No acute process.      DX-CHEST-PORTABLE (1 VIEW)   Final Result      1.  Interval extubation.   2.  Mild interstitial pulmonary edema.   3.  Possible minimal/trace left pleural effusion.      DX-CHEST-LIMITED (1 VIEW)   Final Result      Stable appearance of the chest.      MR-BRAIN-W/O   Final Result         No acute intracranial process.      Age-related volume loss and chronic microvascular ischemic changes.      Postoperative changes in the right cerebellum with resection cavity and hemosiderin staining.      Bilateral mastoid effusion.      DX-CHEST-PORTABLE (1 VIEW)   Final Result         1.  Interstitial pulmonary parenchymal prominence suggest chronic underlying lung disease, component of interstitial edema and/or infiltrates not excluded.   2.  Cardiomegaly   3.  Atherosclerosis      DX-CHEST-PORTABLE (1 VIEW)   Final Result         1.  Pulmonary edema and/or infiltrates are identified, which are stable since the prior exam.   2.  Cardiomegaly   3.  Atherosclerosis      DX-CHEST-PORTABLE (1 VIEW)   Final Result         1.  Pulmonary edema and/or infiltrates are identified, which are stable since the prior exam.   2.  Cardiomegaly   3.  Atherosclerosis      DX-CHEST-LIMITED (1 VIEW)   Final Result         1.  Pulmonary edema and/or infiltrates are identified, which appear somewhat increased since the prior exam.   2.  Cardiomegaly   3.  Atherosclerosis      DX-ABDOMEN FOR TUBE PLACEMENT   Final Result         Feeding tube with tip projecting over the expected area of the distal stomach.      DX-ABDOMEN FOR TUBE PLACEMENT   Final Result      The nasogastric tube terminates over the antrum of the stomach.       DX-CHEST-PORTABLE (1 VIEW)   Final Result         Ill-defined hazy opacities in the right mid lung and left lower lobe are similar to prior.      IR-MIDLINE CATHETER INSERTION WO GUIDANCE > AGE 3   Final Result                  Ultrasound-guided midline placement performed by qualified nursing staff    as above.          DX-CHEST-PORTABLE (1 VIEW)   Final Result      Improved interstitial opacities may represent improving edema and/or pneumonitis.      Mild nonspecific right basilar opacity and questionable tiny left upper lobe opacity. Correlate clinically for infection.      DX-CHEST-LIMITED (1 VIEW)   Final Result      1.  Slightly increased BILATERAL lung disease. This could be pulmonary edema or pneumonia.   2.  Possible small BILATERAL pleural effusions      DX-CHEST-PORTABLE (1 VIEW)   Final Result         1.  Mild pulmonary edema and/or infiltrates   2.  Atherosclerosis      DX-ABDOMEN FOR TUBE PLACEMENT   Final Result      NG tube tip projects over the stomach.      DX-ABDOMEN FOR TUBE PLACEMENT   Final Result      1.  Enteric tube projects over the proximal stomach.      DX-CHEST-PORTABLE (1 VIEW)   Final Result      Lines and tubes appear adequate. Stable bibasilar airspace disease.      DX-CHEST-PORTABLE (1 VIEW)   Final Result         1.  Pulmonary edema and/or infiltrates are identified, which appear somewhat increased since the prior exam.   2.  Cardiomegaly      DX-CHEST-PORTABLE (1 VIEW)   Final Result      1.  New small left pleural effusion with associated atelectasis.   2.  Increased right basilar opacity most likely atelectasis however correlate clinically for infection.      DX-CHEST-PORTABLE (1 VIEW)   Final Result         1.  Pulmonary edema and/or infiltrates are identified, which are stable since the prior exam.   2.  Cardiomegaly      DX-CHEST-PORTABLE (1 VIEW)   Final Result      1.  No significant change.      MR-MRA HEAD-W/O   Final Result         MRA OF THE [place] OF [place] WITHIN  NORMAL LIMITS.      MR-BRAIN-W/O   Final Result      1.  Redemonstrated is scattered supratentorial and infratentorial areas of ischemia. No new lesions. There is increased conspicuity of the RIGHT frontal lobe lesion which could be due to recurrent ischemia or seizure activity.   2.  Unchanged RIGHT cerebellar hematoma evacuation cavity. As previously noted underlying mass is not excluded and follow-up MRI without and with contrast in approximately weeks is recommended.   3.  No new hemorrhage   4.  Atrophy   5.  White matter changes   6.  Small BILATERAL mastoid effusions      DX-CHEST-PORTABLE (1 VIEW)   Final Result      DX-ABDOMEN FOR TUBE PLACEMENT   Final Result      Enteric feeding tube terminates in the left upper quadrant projecting over the expected location of the stomach.      DX-CHEST-LIMITED (1 VIEW)   Final Result      1.  Interval extubation   2.  Lower lung volumes with increased atelectasis superimposed on pulmonary edema or pneumonia      DX-CHEST-PORTABLE (1 VIEW)   Final Result         1.  Pulmonary edema and/or infiltrates are identified, which are somewhat decreased since the prior exam.   2.  Atherosclerosis      US-EXTREMITY VENOUS LOWER BILAT   Final Result      DX-CHEST-PORTABLE (1 VIEW)   Final Result         1.  Scattered hazy bilateral pulmonary infiltrates, slightly increased since prior study.   2.  Atherosclerosis      MR-BRAIN-W/O   Final Result      1.  A few punctate areas of acute infarcts in the right frontal and bilateral parietal lobes and left cerebellum.   2.  There is no brainstem infarct or diffuse anoxic injury.   3.  Mixed signal abnormality in the right cerebellum with the previous surgical intervention in keeping with the clinical diagnosis of right cerebellar hemorrhage evacuation. Follow-up study with contrast in 6 weeks is recommended to rule out any    underlying pathology.   4.  Mild cerebral volume loss.      DX-CHEST-PORTABLE (1 VIEW)   Final Result      CT-CTA  NECK WITH & W/O-POST PROCESSING   Final Result      1.  No acute arterial abnormality detected. No significant arterial stenosis.      CT-CTA HEAD WITH & W/O-POST PROCESS   Final Result      CT angiogram of the [place] within normal limits.      CT-CEREBRAL PERFUSION ANALYSIS   Final Result      1.  Cerebral blood flow less than 30% likely representing completed infarct = 0 mL.      2.  T Max more than 6 seconds likely representing combination of completed infarct and ischemia = 0 mL.      3.  Mismatched volume likely representing ischemic brain/penumbra = None      4.  Please note that the cerebral perfusion was performed on the limited brain tissue around the basal ganglia region. Infarct/ischemia outside the CT perfusion sections can be missed in this study.      CT-HEAD W/O   Final Result      1.  No evidence of acute territorial infarct, intracranial hemorrhage or mass lesion.   2.  Mild diffuse cerebral substance loss.   3.  Mild microangiopathic ischemic change versus demyelination or gliosis.   4.  Status post right suboccipital cranioplasty with underlying cerebellar hypoattenuation likely on the basis of evolving encephalomalacia.         DX-CHEST-PORTABLE (1 VIEW)   Final Result      1.  Intubation.   2.  Bilateral pulmonary infiltrates.      DX-CHEST-LIMITED (1 VIEW)   Final Result      DX-ABDOMEN FOR TUBE PLACEMENT   Final Result      Enteric feeding tube terminates with the tip projecting over the expected location of the distal stomach.      CT-CTA CHEST PULMONARY ARTERY W/ RECONS   Final Result         1.  No pulmonary embolus appreciated.   2.  Consolidations in bilateral lung bases an scattered hazy right pulmonary opacities, compatible with atelectasis with component of infiltrate.   3.  Indeterminate left adrenal nodule, follow-up adrenal protocol CT for further characterization as clinically appropriate.   4.  Atherosclerosis and atherosclerotic coronary artery disease.      DX-ABDOMEN  FOR TUBE PLACEMENT   Final Result         1.  Nonspecific bowel gas pattern in the upper abdomen.   2.  Cardiomegaly   3.  Atherosclerosis   4.  Bibasilar atelectasis   5.  Nasogastric tube tip terminates overlying the expected location of the pylorus or first duodenal segment.      DX-ABDOMEN FOR TUBE PLACEMENT   Final Result         1.  Nonspecific bowel gas pattern in the upper abdomen.   2.  Nasogastric tube tip terminates overlying the expected location of the gastric antrum.   3.  Linear densities the bilateral lung bases favor atelectasis, component of infiltrate not excluded.      DX-ABDOMEN FOR TUBE PLACEMENT   Final Result      NG tube extends below the diaphragm with tip at the level of the gastric fundus. Side port is at the level of the distal esophagus.      DX-ABDOMEN FOR TUBE PLACEMENT   Final Result      NG tube is noted with tip projecting over the right lower lung.      These findings were transmitted with GLEN ZELAYA on 01/07/2024. Tube was subsequently adjusted and is now present below the diaphragm.      DX-ABDOMEN FOR TUBE PLACEMENT   Final Result      DHT tip overlies the second portion of the duodenum      DX-ABDOMEN FOR TUBE PLACEMENT   Final Result      Feeding tube tip projects in the distal stomach or first portion of the duodenum.      DX-ABDOMEN FOR TUBE PLACEMENT   Final Result      Removal of nasogastric tube.      Feeding tube tip projects in the distal stomach.      No visualized thorax.      DX-ABDOMEN FOR TUBE PLACEMENT   Final Result         Gastric drainage tube with tip projecting over the expected area of the right lower lobe airway. Recommend removal.            CRITICAL RESULT READ BACK: Preliminary findings discussed with and critical read back performed by Dr. GLEN ZELAYA via telephone on 1/5/2024 6:22 PM      DX-ABDOMEN FOR TUBE PLACEMENT   Final Result      1.  Malpositioned enteric sump catheter which is folded back upon itself over the left paramedian distal one  third mediastinum probably beyond the left mainstem bronchus and within the esophagus.   2.  A Voalte message was sent to GLEN ZELAYA at 1641 hours. Immediate response received.   3.  Per discussion, the patient has already pulled out the tube.      DX-CHEST-PORTABLE (1 VIEW)   Final Result      1.  Hypoinflation and pulmonary vascular congestion with mild bibasilar atelectasis.   2.  No lobar pneumonia or overt pulmonary edema.      CT-HEAD W/O   Final Result      1.  Diffuse atrophy and white matter changes.   2.  No acute intracranial hemorrhage or territorial infarct.   3.  RIGHT occipital craniotomy with underlying cerebellar encephalomalacia.              Assessment/Plan  * Acute respiratory failure with hypoxia (HCC)- (present on admission)  Assessment & Plan  Intubated date: 1/9/2024-1/11, 1/13-1/20, 1/25-2/1  Recurrent aspiration pneumonitis    Empiric aspiration PNA coverage was given  Remains at risk for aspiration and reintubation - ongoing goals of care, daughter wants him to remain full code    His overall prognosis is quite poor given his recurrent aspirations that of which are unlikely to change with a PEG tube.  This was discussed with his daughter on 2/8/2024 and she will discuss with her sister.    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 Patten  Repeat imaging including MRI show changes consistent with known bleed but no new findings  Still needs a f/u MRI in 3-4 wks to confirm no underlying lesion as etiology of initial bleed      Dysphagia- (present on admission)  Assessment & Plan  Speech pathology continues to follow him.  He has had multiple aspiration events.  He has nasogastric feeding.  Gastroenterology was consulted for a PEG tube but he was on high flow  oxygen and they may be reconsulted once his oxygen requirements are down.  His daughter will check with her sister and they will come up with a plan as to if they want another attempt at a PEG tube.    A-fib (HCC)  Assessment & Plan  LVH on 12/27 ECHO with diastolic dysfunction  Weight-based Lovenox prophylaxis for CVA  Continue cardiac monitoring.    Debility- (present on admission)  Assessment & Plan  Prolonged hospitalization, prior cerebral insult  He will need lifelong placement    Encephalopathy- (present on admission)  Assessment & Plan  Toxic metabolic + ICU hypoactive delirium  This has persisted  MRI brain 1/29/24 was unremarkable for acute processes.    Urinary retention- (present on admission)  Assessment & Plan  Schwartz was replaced on 2/8 due to 1.2 liters of urine retention.  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   Trend with a basic metabolic panel in the morning         VTE prophylaxis:    therapeutic anticoagulation with weight-based lovenox BID, 1 mg/kg      I have performed a physical exam and reviewed and updated ROS and Plan today (2/8/2024). In review of yesterday's note (2/7/2024), there are no changes except as documented above.

## 2024-02-09 NOTE — PROGRESS NOTES
[facility] Medicine Daily Progress Note    Date of Service  2/9/2024    Chief Complaint  Lewis Mohr is a 78 y.o. male admitted 1/4/2024 with altered mental status    Hospital Course  Mr. 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  Unfortunately, the patient was readmitted to [facility] on 1/4/2024 for altered mental status and hypoxia.  He was admitted to the medicine services with hypernatremia, dehydration, and ERWIN with urinary retention.  He was found to be silently aspirating.  On 1/9/2024, the patient went into AF with RVR with worsening hypoxia and mental status changes.  A code stroke was called.  While in the CT scanner the patient was not protecting his airway and therefore was intubated.  He had a PEA arrest shortly afterward.  He was transferred to the ICU.  The patient was extubated on 1/11/2024 and required HFNC at 45L at 70% on 1/12.  Unfortunately, the patient was reintubated on 1/13.  He remained intubated until 1/20/2024 and then extubated to HFNC at 40L/70%.  He was transferred to IMCU on 1/25.  On the evening of 1/26 the critical care team was reconsulted due to the patient becoming suddenly tachycardic in the 140-150s with hypotension.  He was given IVF and a dose of phenylephrine.  He was not protecting his airway and required emergent intubation and bronchoscopy then transferred back to the ICU.  During his ICU stay the patient was intermittently agitated, requiring pressor therapy, continue to be significantly encephalopathic, eventually had improvement and was extubated to high flow nasal cannula, patient with difficulty with secretions and protecting his airway per intensivist    Interval Problem Update  2/6: Mr. Mohr was evaluated in the CU. His potassium is low at 3.3. He is on weight-based lovenox.  He is in soft wrist restraints. He is on high flow oxygen. He is   oriented only to name.   2/7: Mr. Mohr was seen in the IMCU. His Na is up at 148.  Stop IV Lasix.  He is on high flow oxygen. Chest xray ordered for this morning. He has nasogastric feeding and failed a swallow eval by speech path yesterday.  I have asked his nurse to call me once his daughter comes in due to his andrew his condition with her.  His high flow will be tapered as tolerated.  2/8: Mr. Mohr was evaluated in the CU.His Na went up to 149 despite stopping IV lasix. Cr also went up from 1.38 to 1.56 thus increase free water flushes. A fernando was placed over night due to 1.2 liters urine. He remains on NG feeding. He is now on 2 liters of oxygen. He is requiring soft wrist restraints as he will pull on his tubes.  I met with his daughter at bedside we had a long and candid discussion about his overall very guarded condition.  He is now on 2 L but remains confused and is at very high risk of aspiration whether he has a nasogastric tube or PEG tube.  She is going to speak with her sister and will hopefully come up with a decision tomorrow.  She is optimistic that he is doing much better but his recent history would suggest that he likely will continue to aspirate with resultant decompensation his respiratory status.  30 minutes were spent that of which over 50% time was spent in counseling at bedside.  2/9: Patient seen and examined, resting in bed, afebrile, transferred out of ICU yesterday now on 2L of oxygen family deciding regarding PEG tube   Will try to discuss with family again today  tube feed will help with feeding but will not solve his aspiration problem and patient continues to be high risk of aspiration and worsening respiratory status   Family would like patient to be full code.    Addendum: I met with patient's daughter Jaymie today . Discussed with her regarding PEG/G tube and as per daughter will hold off right now. She knows that this will not solve his problem of aspiration and still remains  high risk despite PEG/G tube placement.  We then discussed regarding his code status and how to proceed if his respiratory status worsen again and per daughter she woul like to switch him to DNR/DNI at this time.  I have switched his code status to DNR?DNI as per daughter wishes   I have discussed this patient's plan of care and discharge plan at IDT rounds today with Case Management, Nursing, Nursing leadership, and other members of the IDT team.    Consultants/Specialty  GI.   Critical care  Code Status  DNAR/DNI    Disposition  The patient is not medically cleared for discharge to home or a post-acute facility.      I have placed the appropriate orders for post-discharge needs.    Review of Systems  Review of Systems   Unable to perform ROS: Acuity of condition        Physical Exam  Temp:  [36.2 °C (97.2 °F)-36.7 °C (98.1 °F)] 36.5 °C (97.7 °F)  Pulse:  [82-98] 86  Resp:  [16-18] 18  BP: (138-146)/(70-92) 142/89  SpO2:  [95 %-98 %] 96 %    Physical Exam  Vitals and nursing note reviewed.   Constitutional:       General: He is not in acute distress.     Appearance: He is ill-appearing.   Cardiovascular:      Rate and Rhythm: Normal rate.   Pulmonary:      Comments: Nasal cannula oxygen 2 liters.  Normal work of breathing with few crackles   Coarse cough  Abdominal:      General: There is no distension.      Tenderness: There is no abdominal tenderness.   Genitourinary:     Comments: Schwartz catheter  Musculoskeletal:      Comments: Soft wrist restraints  No swelling   Neurological:      Mental Status: He is alert.      Comments: Oriented to name only  confused         Fluids    Intake/Output Summary (Last 24 hours) at 2/9/2024 1654  Last data filed at 2/9/2024 1530  Gross per 24 hour   Intake 310 ml   Output 1425 ml   Net -1115 ml       Laboratory  Recent Labs     02/07/24  0248   WBC 10.1   RBC 3.73*   HEMOGLOBIN 10.9*   HEMATOCRIT 33.8*   MCV 90.6   MCH 29.2   MCHC 32.2*   RDW 47.5   PLATELETCT 448*   MPV 10.4      Recent Labs     02/07/24  0248 02/08/24  0322 02/09/24  0303   SODIUM 148* 149* 152*   POTASSIUM 3.3* 3.8 3.7   CHLORIDE 109 107 110   CO2 27 31 30   GLUCOSE 155* 172* 139*   BUN 29* 33* 32*   CREATININE 1.38 1.56* 1.69*   CALCIUM 10.1 10.0 9.9                     Imaging  DX-ABDOMEN FOR TUBE PLACEMENT   Final Result      Enteric feeding tube extending to about the level of the gastric antrum or pylorus.      DX-ABDOMEN FOR TUBE PLACEMENT   Final Result      Feeding tube tip at the proximal stomach.      DX-CHEST-PORTABLE (1 VIEW)   Final Result         1.  Interstitial pulmonary parenchymal prominence suggest chronic underlying lung disease, component of interstitial edema and/or infiltrates not excluded.   2.  Atherosclerosis      DX-ABDOMEN FOR TUBE PLACEMENT   Final Result      1.  Enteric tube overlies the gastric body.      DX-ABDOMEN FOR TUBE PLACEMENT   Final Result      Feeding tube tip overlies the duodenum.      DX-CHEST-PORTABLE (1 VIEW)   Final Result      No acute process.      DX-CHEST-PORTABLE (1 VIEW)   Final Result      1.  Interval extubation.   2.  Mild interstitial pulmonary edema.   3.  Possible minimal/trace left pleural effusion.      DX-CHEST-LIMITED (1 VIEW)   Final Result      Stable appearance of the chest.      MR-BRAIN-W/O   Final Result         No acute intracranial process.      Age-related volume loss and chronic microvascular ischemic changes.      Postoperative changes in the right cerebellum with resection cavity and hemosiderin staining.      Bilateral mastoid effusion.      DX-CHEST-PORTABLE (1 VIEW)   Final Result         1.  Interstitial pulmonary parenchymal prominence suggest chronic underlying lung disease, component of interstitial edema and/or infiltrates not excluded.   2.  Cardiomegaly   3.  Atherosclerosis      DX-CHEST-PORTABLE (1 VIEW)   Final Result         1.  Pulmonary edema and/or infiltrates are identified, which are stable since the prior exam.   2.   Cardiomegaly   3.  Atherosclerosis      DX-CHEST-PORTABLE (1 VIEW)   Final Result         1.  Pulmonary edema and/or infiltrates are identified, which are stable since the prior exam.   2.  Cardiomegaly   3.  Atherosclerosis      DX-CHEST-LIMITED (1 VIEW)   Final Result         1.  Pulmonary edema and/or infiltrates are identified, which appear somewhat increased since the prior exam.   2.  Cardiomegaly   3.  Atherosclerosis      DX-ABDOMEN FOR TUBE PLACEMENT   Final Result         Feeding tube with tip projecting over the expected area of the distal stomach.      DX-ABDOMEN FOR TUBE PLACEMENT   Final Result      The nasogastric tube terminates over the antrum of the stomach.      DX-CHEST-PORTABLE (1 VIEW)   Final Result         Ill-defined hazy opacities in the right mid lung and left lower lobe are similar to prior.      IR-MIDLINE CATHETER INSERTION WO GUIDANCE > AGE 3   Final Result                  Ultrasound-guided midline placement performed by qualified nursing staff    as above.          DX-CHEST-PORTABLE (1 VIEW)   Final Result      Improved interstitial opacities may represent improving edema and/or pneumonitis.      Mild nonspecific right basilar opacity and questionable tiny left upper lobe opacity. Correlate clinically for infection.      DX-CHEST-LIMITED (1 VIEW)   Final Result      1.  Slightly increased BILATERAL lung disease. This could be pulmonary edema or pneumonia.   2.  Possible small BILATERAL pleural effusions      DX-CHEST-PORTABLE (1 VIEW)   Final Result         1.  Mild pulmonary edema and/or infiltrates   2.  Atherosclerosis      DX-ABDOMEN FOR TUBE PLACEMENT   Final Result      NG tube tip projects over the stomach.      DX-ABDOMEN FOR TUBE PLACEMENT   Final Result      1.  Enteric tube projects over the proximal stomach.      DX-CHEST-PORTABLE (1 VIEW)   Final Result      Lines and tubes appear adequate. Stable bibasilar airspace disease.      DX-CHEST-PORTABLE (1 VIEW)   Final Result          1.  Pulmonary edema and/or infiltrates are identified, which appear somewhat increased since the prior exam.   2.  Cardiomegaly      DX-CHEST-PORTABLE (1 VIEW)   Final Result      1.  New small left pleural effusion with associated atelectasis.   2.  Increased right basilar opacity most likely atelectasis however correlate clinically for infection.      DX-CHEST-PORTABLE (1 VIEW)   Final Result         1.  Pulmonary edema and/or infiltrates are identified, which are stable since the prior exam.   2.  Cardiomegaly      DX-CHEST-PORTABLE (1 VIEW)   Final Result      1.  No significant change.      MR-MRA HEAD-W/O   Final Result         MRA OF THE [place] WITHIN NORMAL LIMITS.      MR-BRAIN-W/O   Final Result      1.  Redemonstrated is scattered supratentorial and infratentorial areas of ischemia. No new lesions. There is increased conspicuity of the RIGHT frontal lobe lesion which could be due to recurrent ischemia or seizure activity.   2.  Unchanged RIGHT cerebellar hematoma evacuation cavity. As previously noted underlying mass is not excluded and follow-up MRI without and with contrast in approximately weeks is recommended.   3.  No new hemorrhage   4.  Atrophy   5.  White matter changes   6.  Small BILATERAL mastoid effusions      DX-CHEST-PORTABLE (1 VIEW)   Final Result      DX-ABDOMEN FOR TUBE PLACEMENT   Final Result      Enteric feeding tube terminates in the left upper quadrant projecting over the expected location of the stomach.      DX-CHEST-LIMITED (1 VIEW)   Final Result      1.  Interval extubation   2.  Lower lung volumes with increased atelectasis superimposed on pulmonary edema or pneumonia      DX-CHEST-PORTABLE (1 VIEW)   Final Result         1.  Pulmonary edema and/or infiltrates are identified, which are somewhat decreased since the prior exam.   2.  Atherosclerosis      US-EXTREMITY VENOUS LOWER BILAT   Final Result      DX-CHEST-PORTABLE (1 VIEW)   Final Result         1.   Scattered hazy bilateral pulmonary infiltrates, slightly increased since prior study.   2.  Atherosclerosis      MR-BRAIN-W/O   Final Result      1.  A few punctate areas of acute infarcts in the right frontal and bilateral parietal lobes and left cerebellum.   2.  There is no brainstem infarct or diffuse anoxic injury.   3.  Mixed signal abnormality in the right cerebellum with the previous surgical intervention in keeping with the clinical diagnosis of right cerebellar hemorrhage evacuation. Follow-up study with contrast in 6 weeks is recommended to rule out any    underlying pathology.   4.  Mild cerebral volume loss.      DX-CHEST-PORTABLE (1 VIEW)   Final Result      CT-CTA NECK WITH & W/O-POST PROCESSING   Final Result      1.  No acute arterial abnormality detected. No significant arterial stenosis.      CT-CTA HEAD WITH & W/O-POST PROCESS   Final Result      CT angiogram of the [place] [facility] [place] within normal limits.      CT-CEREBRAL PERFUSION ANALYSIS   Final Result      1.  Cerebral blood flow less than 30% likely representing completed infarct = 0 mL.      2.  T Max more than 6 seconds likely representing combination of completed infarct and ischemia = 0 mL.      3.  Mismatched volume likely representing ischemic brain/penumbra = None      4.  Please note that the cerebral perfusion was performed on the limited brain tissue around the basal ganglia region. Infarct/ischemia outside the CT perfusion sections can be missed in this study.      CT-HEAD W/O   Final Result      1.  No evidence of acute territorial infarct, intracranial hemorrhage or mass lesion.   2.  Mild diffuse cerebral substance loss.   3.  Mild microangiopathic ischemic change versus demyelination or gliosis.   4.  Status post right suboccipital cranioplasty with underlying cerebellar hypoattenuation likely on the basis of evolving encephalomalacia.         DX-CHEST-PORTABLE (1 VIEW)   Final Result      1.  Intubation.   2.  Bilateral  pulmonary infiltrates.      DX-CHEST-LIMITED (1 VIEW)   Final Result      DX-ABDOMEN FOR TUBE PLACEMENT   Final Result      Enteric feeding tube terminates with the tip projecting over the expected location of the distal stomach.      CT-CTA CHEST PULMONARY ARTERY W/ RECONS   Final Result         1.  No pulmonary embolus appreciated.   2.  Consolidations in bilateral lung bases an scattered hazy right pulmonary opacities, compatible with atelectasis with component of infiltrate.   3.  Indeterminate left adrenal nodule, follow-up adrenal protocol CT for further characterization as clinically appropriate.   4.  Atherosclerosis and atherosclerotic coronary artery disease.      DX-ABDOMEN FOR TUBE PLACEMENT   Final Result         1.  Nonspecific bowel gas pattern in the upper abdomen.   2.  Cardiomegaly   3.  Atherosclerosis   4.  Bibasilar atelectasis   5.  Nasogastric tube tip terminates overlying the expected location of the pylorus or first duodenal segment.      DX-ABDOMEN FOR TUBE PLACEMENT   Final Result         1.  Nonspecific bowel gas pattern in the upper abdomen.   2.  Nasogastric tube tip terminates overlying the expected location of the gastric antrum.   3.  Linear densities the bilateral lung bases favor atelectasis, component of infiltrate not excluded.      DX-ABDOMEN FOR TUBE PLACEMENT   Final Result      NG tube extends below the diaphragm with tip at the level of the gastric fundus. Side port is at the level of the distal esophagus.      DX-ABDOMEN FOR TUBE PLACEMENT   Final Result      NG tube is noted with tip projecting over the right lower lung.      These findings were transmitted with GLEN ZELAYA on 01/07/2024. Tube was subsequently adjusted and is now present below the diaphragm.      DX-ABDOMEN FOR TUBE PLACEMENT   Final Result      DHT tip overlies the second portion of the duodenum      DX-ABDOMEN FOR TUBE PLACEMENT   Final Result      Feeding tube tip projects in the distal stomach or  first portion of the duodenum.      DX-ABDOMEN FOR TUBE PLACEMENT   Final Result      Removal of nasogastric tube.      Feeding tube tip projects in the distal stomach.      No visualized thorax.      DX-ABDOMEN FOR TUBE PLACEMENT   Final Result         Gastric drainage tube with tip projecting over the expected area of the right lower lobe airway. Recommend removal.            CRITICAL RESULT READ BACK: Preliminary findings discussed with and critical read back performed by Dr. GLEN ZELAYA via telephone on 1/5/2024 6:22 PM      DX-ABDOMEN FOR TUBE PLACEMENT   Final Result      1.  Malpositioned enteric sump catheter which is folded back upon itself over the left paramedian distal one third mediastinum probably beyond the left mainstem bronchus and within the esophagus.   2.  A Voalte message was sent to GLEN ZELAYA at 1641 hours. Immediate response received.   3.  Per discussion, the patient has already pulled out the tube.      DX-CHEST-PORTABLE (1 VIEW)   Final Result      1.  Hypoinflation and pulmonary vascular congestion with mild bibasilar atelectasis.   2.  No lobar pneumonia or overt pulmonary edema.      CT-HEAD W/O   Final Result      1.  Diffuse atrophy and white matter changes.   2.  No acute intracranial hemorrhage or territorial infarct.   3.  RIGHT occipital craniotomy with underlying cerebellar encephalomalacia.              Assessment/Plan  * Acute respiratory failure with hypoxia (HCC)- (present on admission)  Assessment & Plan  Intubated date: 1/9/2024-1/11, 1/13-1/20, 1/25-2/1  Recurrent aspiration pneumonitis    Empiric aspiration PNA coverage was given  Remains at risk for aspiration and reintubation - ongoing goals of care, daughter wants him to remain full code    His overall prognosis is quite poor given his recurrent aspirations that of which are unlikely to change with a PEG tube.  This was discussed with his daughter on 2/8/2024 and she will discuss with her sister.    He had been on  IV Lasix which has been stopped  If he can follow commands with incentive spirometry this will help with atelectasis    Dysphagia- (present on admission)  Assessment & Plan  Speech pathology continues to follow him.  He has had multiple aspiration events.  He has nasogastric feeding.  Gastroenterology was consulted for a PEG tube but he was on high flow oxygen and they may be reconsulted once his oxygen requirements are down.  His daughter will check with her sister and they will come up with a plan as to if they want another attempt at a PEG tube.    Debility- (present on admission)  Assessment & Plan  Prolonged hospitalization, prior cerebral insult  He will need lifelong placement    A-fib (HCC)  Assessment & Plan  LVH on 12/27 ECHO with diastolic dysfunction  Weight-based Lovenox prophylaxis for CVA  Continue cardiac monitoring.    Encephalopathy- (present on admission)  Assessment & Plan  Toxic metabolic + ICU hypoactive delirium  This has persisted  MRI brain 1/29/24 was unremarkable for acute processes.    Urinary retention- (present on admission)  Assessment & Plan  Schwartz was replaced on 2/8 due to 1.2 liters of urine retention.  Start terazosin     Hypernatremia- (present on admission)  Assessment & Plan  Stopped IV Lasix  Increase free water via NG tube to 250 ml q6 hours.   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 review of tests) obtaining and/or reviewing separately obtained history. Performing a medically appropriate examination and/ evaluation.  Counseling and educating the patient/family/caregiver.  Ordering medications, tests, or procedures.  Referring and communicating with other health care professionals.  Documenting clinical information in EPIC.  Independently interpreting results and communicating results to patient/family/caregiver.  Care coordination.     I have performed a physical exam and reviewed and updated ROS and Plan today (2/9/2024). In review of yesterday's note (2/8/2024), there are no changes except as documented above.

## 2024-02-09 NOTE — THERAPY
Physical Therapy   Daily Treatment     Patient Name: Lewis Mohr  Age:  78 y.o., Sex:  male  Medical Record #: [id number]  Today's Date: 2/9/2024     Precautions  Precautions: Fall Risk;Swallow Precautions;Nasogastric Tube    Assessment    Pt with much improved participation and arousal, however nonsensical and disoriented answers; following exercise and standing commands today with weaker right UE/LE and trunk lean; able to hold balance without UE support and progress from max to mod standing; cognition will dictate progress, recommend SNF when medically cleared at this time.     Plan    Treatment Plan Status: Continue Current Treatment Plan  Type of Treatment: Bed Mobility, Equipment, Gait Training, Neuro Re-Education / Balance, Self Care / Home Evaluation, Therapeutic Activities, Therapeutic Exercise  Treatment Frequency: 3 Times per Week  Treatment Duration: Until Therapy Goals Met    DC Equipment Recommendations: Unable to determine at this time  Discharge Recommendations: Recommend post-acute placement for additional physical therapy services prior to discharge home      Abridged Subjective/Objective     02/09/24 1235   Cognition    Cognition / Consciousness X   Speech/ Communication Delayed Responses   Level of Consciousness Alert   Ability To Follow Commands 1 Step   Safety Awareness Impaired;Impulsive   New Learning Impaired   Attention Impaired   Comments most conversational he has been; following function based commands ~ 5 sec attention span; utilizing left >right; nonsensical answers to analytical and declarative questions;   Sitting Lower Body Exercises   Sitting Lower Body Exercises Yes   Long Arc Quad 1 set of 10;Right   Other Exercises left hip extension manual resistance   Balance   Sitting Balance (Static) Fair -   Sitting Balance (Dynamic) Poor +   Standing Balance (Static) Poor   Standing Balance (Dynamic) Poor -   Weight Shift Sitting Fair   Weight Shift Standing Poor   Skilled Intervention  Sequencing;Postural Facilitation;Verbal Cuing;Tactile Cuing   Comments right lean, holding onto railing on left; posterior lean and weight bearing;   Bed Mobility    Supine to Sit Minimal Assist  (pulling on therapist with right UE; raised HOB; allowed increased time, attempts to lay self back down;)   Sit to Supine   (max; odd return to bed, once knew session was completed, closed eyes and laid down posteriorly; vitals stable on check; has happened in other sessions)   Gait Analysis   Gait Level Of Assist Unable to Participate   Comments dovetailed to attempt ambulatory efforts; pt weight bearing on lateral left foot and picking right off floor,   Functional Mobility   Sit to Stand Maximal Assist  (first rep; mod 2nd; right knee blocking;)   Bed, Chair, Wheelchair Transfer Unable to Participate  (2/2 arousal and command following)   Short Term Goals    Short Term Goal # 1 Pt will perform bed mobility with min A to progress function in 6 visits.   Goal Outcome # 1 goal not met   Short Term Goal # 2 Pt will perform sit<>stand with min A to progress towards functional transfers in 6 visits.   Goal Outcome # 2 Goal not met   Short Term Goal # 3 Pt will demonstrate fair sitting balance during LE exercises without UE support within 6 visits to demonstrate improved participation in PT sessions.   Goal Outcome # 3 Goal not met

## 2024-02-09 NOTE — THERAPY
Speech Language Pathology   Daily Treatment     Patient Name: Lewis Mohr  AGE:  78 y.o., SEX:  male  Medical Record #: [id number]  Date of Service: 2/9/2024      Precautions:  Precautions: Fall Risk, Swallow Precautions, Nasogastric Tube    Subjective  RN cleared patient for dysphagia tx session. Patient received awake, alert, and with TF-colored secretions in oral cavity, which is consistent with RN report of patient likely aspirating TF last night. TF was already turned off but RN made aware and patient was suctioned with [place] and provided oral care w/ Kaushik Kit prior to any PO trials.     Assessment  Patient more awake, alert, and participatory than previous tx sessions with this clinician. Patient consumed 10-15 single ice chips during session. Patient demonstrated appropriate bolus acceptance and adequate containment. However, patient required verbal cues for mastication and initiation of pharyngeal swallow, as he intermittently held ice chip in oral cavity without triggering a swallow unless cued, followed by coughing. Cough was noted to be strong and productive. Patient coughed on >80% of PO trials of single ice chips despite cues for quick, effortful swallow. Patient was able to complete ~8 reps of CTAR today, which is improvement.     Clinical Impressions  Patient presents with known severe oropharyngeal swallow function, as evidenced by FEESx2 during this admit. Patient would benefit from remaining NPO with long-term nutrition source when deemed stable and appropriate for same. Patient okay for 3-5 single ice chips per hour after oral care and when awake/alert to mitigate disuse atrophy of oropharyngeal musculature. SLP is following.       Recommendations  Treatment Completed: Dysphagia Treatment     Dysphagia Treatment  Diet Consistency: NPO/NGT; 2-5 small ice chips following oral care w/ RN only  Instrumentation: Instrumental swallow study pending clinical progress  Medication: Non Oral  Oral Care:  Q4h    SLP Treatment Plan  Treatment Plan: Dysphagia Treatment  SLP Frequency: 3x Per Week  Estimated Duration: Until Therapy Goals Met    Anticipated Discharge Needs  Discharge Recommendations: Recommend post-acute placement for additional speech therapy services prior to discharge home  Therapy Recommendations Upon DC: Dysphagia Training, Community Re-Integration, Patient / Family / Caregiver Education    Patient / Family Goals  Patient / Family Goal #1: RN reported pt's daughter was eager for pt to participate w/ ST  Goal #1 Outcome: Progressing slower than expected  Short Term Goals  Short Term Goal # 1: Pt will demo improved secretion management as evidenced by reduced use of oral suctioning during tx session  Goal Outcome # 1: Progressing as expected  Short Term Goal # 1 B : Pt will participate in prefeeding trials to demo readiness for an instrumental swallow study  Goal Outcome  # 1 B: Progressing as expected  Short Term Goal # 2: Patient will perform dysphagia exercises targeting BoT. pharyngeal squeeze, and airway protection with good accuracy given min cues.  Goal Outcome # 2 : Progressing as expected    Yasmine Celeste, SLP

## 2024-02-09 NOTE — PROGRESS NOTES
4 Eyes Skin Assessment Completed by CHARLIE López and CHARLIE Olea.    Head WDL  Ears WDL  Nose Redness R nare, septum  Mouth WDL  Neck WDL  Breast/Chest WDL  Shoulder Blades WDL  Spine WDL  (R) Arm/Elbow/Hand Bruising  (L) Arm/Elbow/Hand Redness, Discoloration, and Rash  Abdomen WDL  Groin Redness and Excoriation  Scrotum/Coccyx/Buttocks Redness and Excoriation  (R) Leg WDL  (L) Leg WDL  (R) Heel/Foot/Toe WDL  (L) Heel/Foot/Toe WDL          Devices In Places Tele Box, Blood Pressure Cuff, Pulse Ox, Schwartz, and Cortrak      Interventions In Place NC W/Ear Foams, Heel Mepilex, Waffle Overlay, Pillows, Q2 Turns, and Pressure Redistribution Mattress    Possible Skin Injury No    Pictures Uploaded Into Epic Yes  Wound Consult Placed N/A  RN Wound Prevention Protocol Ordered No

## 2024-02-09 NOTE — ED NOTES
Assumed care with Juan Manuel GUERRA,.Due to condition pt will be considered watcher for deteriorating condition, with Q1hr checks.     Pt head elevated to 30 degrees to prevent aspiration. Pt is in 2 point restraints @ BL wrists for safety. Bed alarm on with bed in lowest position. Upon assessment pt is A&OX1, person only. Pt lung sounds BL were clear. During assessment pt did have secretions resembling his tube feeding. Attempted too draw back on tube feeding and was unsuccessful, Pt bowel sounds were absent, Contacted NP Mario regarding this recommended too stop feeding tonight, administer IV Reglan and check Procal in A.M..     Assessed pt skin, barrier cream placed on sacral area, Sacral pad contraindicated due to stool incontinence Schwartz output assessed and site assessed WDL. Midline in R arm assessed. Wounds on left arm assessed and are clean with new bandage. Neuro check completed. Pt turned on left side with blocks for comfort and feet placed in pads. ROM completed for restraint care. Oral care completed.

## 2024-02-09 NOTE — DISCHARGE PLANNING
RNCM reached out to [facility] regarding referral. There needs to be a discharge plan in place prior to acceptance. RNZHEN reached out to [facility]. RNZHEN spoke with Negrita of [facility]. Negrita is requesting new referral once patient is medically cleared. DARLENE has reached out to leadership for direction.    16:28- Per leadership, waiting for GOC discussion with family. Once patient is medically cleared and off restraints, then referrals can be re-sent.

## 2024-02-09 NOTE — DIETARY
Nutrition Update: Current TF on hold due to concerns for aspiration.     Day 36 of admit.  Lewis Mohr is a 78 y.o. male with admitting DX of Respiratory failure (HCC) [J96.90]  Hypoxic respiratory failure (HCC) [J96.91]  Respiratory failure with hypoxia (HCC) [J96.91].  Patient being followed to optimize nutrition.    SLP seen pt today, recommending NPO/NGT with 2-5 small ice chips following oral care with RN only.   Reviewed with MD, plans are to start TF slowly and monitor tolerance. Discussed small bowel placement of feeding tube. MD explained pt having issues with aspirating on his own secretions and plan it to trial trickle feed with current TF placement with addition of Reglan. MD states family decision regarding PEG placement pending.   Per KUB on 2/7: Enteric feeding tube extending to about the level of the gastric antrum or pylorus.   Pt started on Reglan.   GI: + BM noted on 2/8      Problem: Nutritional:  Goal: Achieve adequate nutritional intake  Description: TF to meet 100% of estimated needs.   Outcome: Goal Not met.       Plan/Rec:   Per discussion with MD: Trialing trickle feeds with current TF placement.  RD adjusted TF orders to Glucerna 1.2 @ 10 ml/hr, hold at this rate.   Monitor tolerance closely, RD to follow and adjust TF advancement as needed/as okay per MD. Previous TF goal rate is Glucerna 1.2 @ 75 ml/hr.   Fluids per MD.   RD following.

## 2024-02-09 NOTE — THERAPY
Occupational Therapy  Daily Treatment     Patient Name: Lewis Mohr  Age:  78 y.o., Sex:  male  Medical Record #: [id number]  Today's Date: 2/9/2024     Precautions  Precautions: (P) Fall Risk, Swallow Precautions, Nasogastric Tube  Comments: labile BP labile affect and endurnace    Assessment    Patient tolerated session well. He was more alert today than in previous sessions. He showed good muscle memory with grooming tasks just not oriented to current task. He did not realize that he did not have the tools for oral hygiene in his hand or the wash cloth in his hand. He did flop backwards at the end of session but this OT unsure if this was medical or behavioral.     Plan    Treatment Plan Status: (P) Continue Current Treatment Plan  Type of Treatment: Self Care / Activities of Daily Living, Therapeutic Activity, Neuro Re-Education / Balance  Treatment Frequency: 4 Times per Week  Treatment Duration: Until Therapy Goals Met    DC Equipment Recommendations: (P) Unable to determine at this time  Discharge Recommendations: (P) Recommend post-acute placement for additional occupational therapy services prior to discharge home    Subjective    Patient supine in bed with OT arrived. Co treat with PT due to patient dependent transfer and need for restraints due to pulling at lines and leads when unrestrained.      Objective       02/09/24 1236   Treatment Charges   Charges Yes   OT Self Care / ADL (Units) 1   OT Therapy Activity (Units) 1   Total Time Spent   OT Time Spent Yes   OT Self Care / ADL (Minutes) 15   OT Therapy Activity (Minutes) 15    Services   Is patient using  services for this encounter? No   Precautions   Precautions Fall Risk;Swallow Precautions;Nasogastric Tube   Vitals   O2 (LPM) 2   O2 Delivery Device Nasal Cannula   Pain 0 - 10 Group   Therapist Pain Assessment 0;Prior to Activity   Non Verbal Descriptors   Non Verbal Scale  Calm   Balance   Sitting Balance (Static) Fair -    Sitting Balance (Dynamic) Poor +   Standing Balance (Static) Poor   Standing Balance (Dynamic) Poor -   Weight Shift Sitting Fair   Weight Shift Standing Poor   Skilled Intervention Sequencing;Postural Facilitation;Verbal Cuing;Tactile Cuing   Comments Posterior lean, R lateral lean   Bed Mobility    Supine to Sit Minimal Assist   Sit to Supine Total Assist  (Patient  told session was done. He seemed to throw himself posteriorly and was total assist to return to supine and took some time to become responsive again. Nursing was present and vitals were WNL.)   Scooting Minimal Assist   Skilled Intervention Tactile Cuing;Verbal Cuing   Activities of Daily Living   Grooming Maximal Assist   Skilled Intervention Facilitation;Sequencing   Comments Patient requied [facility] assist to complete oral hygiene. He attempted to use R hand and was unable to hold toothette effectively. He also was unable to notice when he dropped the washcloth while wahing face and continued with the motion.   How much help from another person does the patient currently need...   Putting on and taking off regular lower body clothing? 2   Bathing (including washing, rinsing, and drying)? 2   Toileting, which includes using a toilet, bedpan, or urinal? 2   Putting on and taking off regular upper body clothing? 2   Taking care of personal grooming such as brushing teeth? 2   Eating meals? 2   6 Clicks Daily Activity Score 12   Modified Manuela (mRS)   Modified Martin Score 1   Functional Mobility   Sit to Stand Maximal Assist   Bed, Chair, Wheelchair Transfer Unable to Participate   Activity Tolerance   Sitting in Chair NT   Sitting Edge of Bed >5mins   Short Term Goals   Short Term Goal # 1 pt will wash face with a cloth mod A   Goal Outcome # 1 Progressing as expected   Short Term Goal # 2 pt will maintain sitting in midine with SBA for ADLs   Goal Outcome # 2 Progressing as expected   Short Term Goal # 3 mod A with UB dressing   Goal Outcome # 3  Progressing as expected   Short Term Goal # 4 mod A with ADL txfs   Goal Outcome # 4 Progressing as expected   Education Group   Education Provided Role of Occupational Therapist   Role of Occupational Therapist Patient Response Patient;No Learning Evidence   Occupational Therapy Treatment Plan    O.T. Treatment Plan Continue Current Treatment Plan   Anticipated Discharge Equipment and Recommendations   DC Equipment Recommendations Unable to determine at this time   Discharge Recommendations Recommend post-acute placement for additional occupational therapy services prior to discharge home   Interdisciplinary Plan of Care Collaboration   IDT Collaboration with  Nursing;Physical Therapist   Patient Position at End of Therapy Bed Alarm On;Wrist Restraints Applied;Call Light within Reach;Tray Table within Reach   Collaboration Comments Outcome of session   Session Information   Date / Session Number  2/9 -7(2/4,2/13)

## 2024-02-10 NOTE — PROGRESS NOTES
Assumed care of pt.  Report received.  Pt alert to self, following commands.  Pt resting in bed with bilateral soft wrist restraints in place.  NG tube in right nare wnl.  Tube feeding at 10ml/hour infusing.  Pt denies pain.  Respirations even and unlabored.  Pt positioned for comfort.  Call light in reach.  Bed in low position. Bed alarm on.

## 2024-02-10 NOTE — CARE PLAN
Problem: Hemodynamics  Goal: Patient's hemodynamics, fluid balance and neurologic status will be stable or improve  2/9/2024 2051 by Dez Hughes R.N.  Outcome: Progressing  2/9/2024 2050 by Dez Hughes R.N.  Outcome: Progressing     Problem: Fluid Volume  Goal: Fluid volume balance will be maintained  2/9/2024 2051 by Dez Hughes R.N.  Outcome: Progressing  2/9/2024 2050 by Dez Hughes R.N.  Outcome: Progressing     Problem: Urinary - Renal Perfusion  Goal: Ability to achieve and maintain adequate renal perfusion and functioning will improve  2/9/2024 2051 by Dez Hughes R.N.  Outcome: Progressing  2/9/2024 2050 by Dez Hughes R.N.  Outcome: Progressing     Problem: Respiratory  Goal: Patient will achieve/maintain optimum respiratory ventilation and gas exchange  2/9/2024 2051 by Dez Hughes R.N.  Outcome: Progressing  2/9/2024 2050 by Dez Hughes R.N.  Outcome: Progressing     Problem: Mechanical Ventilation  Goal: Patient will be able to express needs and understand communication  2/9/2024 2051 by Dez Hughes R.N.  Outcome: Progressing  2/9/2024 2050 by Dez Hughes R.N.  Outcome: Progressing     Problem: Physical Regulation  Goal: Diagnostic test results will improve  2/9/2024 2051 by Dez Hughes R.N.  Outcome: Progressing  2/9/2024 2050 by Dez Hughes R.N.  Outcome: Progressing  Goal: Signs and symptoms of infection will decrease  2/9/2024 2051 by Dez Hughes R.N.  Outcome: Progressing  2/9/2024 2050 by Dez Hughes R.N.  Outcome: Progressing     Problem: Knowledge Deficit - Standard  Goal: Patient and family/care givers will demonstrate understanding of plan of care, disease process/condition, diagnostic tests and medications  2/9/2024 2051 by Dez Hughes R.N.  Outcome: Progressing  2/9/2024 2050 by Dez Hughes R.N.  Outcome: Progressing     Problem: Skin Integrity  Goal: Skin integrity is maintained or improved  2/9/2024 2051 by Dez Hughes  MARYSE  Outcome: Progressing  2/9/2024 2050 by Dez Hughes R.N.  Outcome: Progressing     Problem: Fall Risk  Goal: Patient will remain free from falls  2/9/2024 2051 by Dez Hughes R.N.  Outcome: Progressing  2/9/2024 2050 by Dez Hughes R.N.  Outcome: Progressing     Problem: Safety - Medical Restraint  Goal: Remains free of injury from restraints (Restraint for Interference with Medical Device)  2/9/2024 2051 by Dez Hughes R.N.  Outcome: Progressing  2/9/2024 2050 by Dez Hughes R.N.  Outcome: Progressing  Goal: Remains free of injury from restraints (Restraint for Interference with Medical Device)  2/9/2024 2051 by Dez Hughes R.N.  Outcome: Progressing  2/9/2024 2050 by Dez Hughes R.N.  Outcome: Progressing     Problem: Pain - Standard  Goal: Alleviation of pain or a reduction in pain to the patient’s comfort goal  2/9/2024 2051 by Dez Hughes R.N.  Outcome: Progressing  2/9/2024 2050 by Dez Hughes R.N.  Outcome: Progressing   The patient is Watcher - Medium risk of patient condition declining or worsening    Shift Goals  Clinical Goals: vitals wnl, restraint monitoring, safety  Patient Goals: comfort  Family Goals: polo    Progress made toward(s) clinical / shift goals:    Pt confused but following commands    Patient is not progressing towards the following goals:

## 2024-02-10 NOTE — PROGRESS NOTES
Pt with attempts to get out of bed. Pt states he need to go to someones house.  Pt legs placed bach in bed and pt positioned for comfort.  Pt reoriented to his situation. Will continue to monitor. Bed alarm on.

## 2024-02-10 NOTE — PROGRESS NOTES
[facility] Medicine Daily Progress Note    Date of Service  2/10/2024    Chief Complaint  Lewis Mohr is a 78 y.o. male admitted 1/4/2024 with altered mental status    Hospital Course  Mr. 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  Unfortunately, the patient was readmitted to [facility] on 1/4/2024 for altered mental status and hypoxia.  He was admitted to the medicine services with hypernatremia, dehydration, and ERWIN with urinary retention.  He was found to be silently aspirating.  On 1/9/2024, the patient went into AF with RVR with worsening hypoxia and mental status changes.  A code stroke was called.  While in the CT scanner the patient was not protecting his airway and therefore was intubated.  He had a PEA arrest shortly afterward.  He was transferred to the ICU.  The patient was extubated on 1/11/2024 and required HFNC at 45L at 70% on 1/12.  Unfortunately, the patient was reintubated on 1/13.  He remained intubated until 1/20/2024 and then extubated to HFNC at 40L/70%.  He was transferred to IMCU on 1/25.  On the evening of 1/26 the critical care team was reconsulted due to the patient becoming suddenly tachycardic in the 140-150s with hypotension.  He was given IVF and a dose of phenylephrine.  He was not protecting his airway and required emergent intubation and bronchoscopy then transferred back to the ICU.  During his ICU stay the patient was intermittently agitated, requiring pressor therapy, continue to be significantly encephalopathic, eventually had improvement and was extubated to high flow nasal cannula, patient with difficulty with secretions and protecting his airway per intensivist    Interval Problem Update  2/6: Mr. Mohr was evaluated in the CU. His potassium is low at 3.3. He is on weight-based lovenox.  He is in soft wrist restraints. He is on high flow oxygen. He is   oriented only to name.   2/7: Mr. Mohr was seen in the IMCU. His Na is up at 148.  Stop IV Lasix.  He is on high flow oxygen. Chest xray ordered for this morning. He has nasogastric feeding and failed a swallow eval by speech path yesterday.  I have asked his nurse to call me once his daughter comes in due to his andrew his condition with her.  His high flow will be tapered as tolerated.  2/8: Mr. Mohr was evaluated in the CU.His Na went up to 149 despite stopping IV lasix. Cr also went up from 1.38 to 1.56 thus increase free water flushes. A fernando was placed over night due to 1.2 liters urine. He remains on NG feeding. He is now on 2 liters of oxygen. He is requiring soft wrist restraints as he will pull on his tubes.  I met with his daughter at bedside we had a long and candid discussion about his overall very guarded condition.  He is now on 2 L but remains confused and is at very high risk of aspiration whether he has a nasogastric tube or PEG tube.  She is going to speak with her sister and will hopefully come up with a decision tomorrow.  She is optimistic that he is doing much better but his recent history would suggest that he likely will continue to aspirate with resultant decompensation his respiratory status.  30 minutes were spent that of which over 50% time was spent in counseling at bedside.  2/9: Patient seen and examined, resting in bed, afebrile, transferred out of ICU yesterday now on 2L of oxygen family deciding regarding PEG tube   Will try to discuss with family again today  tube feed will help with feeding but will not solve his aspiration problem and patient continues to be high risk of aspiration and worsening respiratory status   Family would like patient to be full code.    Addendum: I met with patient's daughter Jaymie today . Discussed with her regarding PEG/G tube and as per daughter will hold off right now. She knows that this will not solve his problem of aspiration and still remains  high risk despite PEG/G tube placement.  We then discussed regarding his code status and how to proceed if his respiratory status worsen again and per daughter she woul like to switch him to DNR/DNI at this time.  I have switched his code status to DNR?DNI as per daughter wishes   2/10: Patient seen and examined, resting in bed, afebrile, still confused, still high risk of aspiration. Will cont on trickle amount of tube feed through coretrack  Hypernatremia: will start on 1/2 NS also on free water flushes     I have discussed this patient's plan of care and discharge plan at IDT rounds today with Case Management, Nursing, Nursing leadership, and other members of the IDT team.    Consultants/Specialty  GI.   Critical care  Code Status  DNAR/DNI    Disposition  The patient is not medically cleared for discharge to home or a post-acute facility.      I have placed the appropriate orders for post-discharge needs.    Review of Systems  Review of Systems   Unable to perform ROS: Acuity of condition        Physical Exam  Temp:  [36.2 °C (97.2 °F)-36.5 °C (97.7 °F)] 36.2 °C (97.2 °F)  Pulse:  [86-94] 87  Resp:  [16-18] 16  BP: (135-145)/(88-91) 145/91  SpO2:  [95 %-97 %] 96 %    Physical Exam  Vitals and nursing note reviewed.   Constitutional:       General: He is not in acute distress.     Appearance: He is ill-appearing.   Cardiovascular:      Rate and Rhythm: Normal rate.   Pulmonary:      Comments: Nasal cannula oxygen 2 liters.  Normal work of breathing with few crackles   Coarse cough  Abdominal:      General: There is no distension.      Tenderness: There is no abdominal tenderness.   Genitourinary:     Comments: Schwartz catheter  Musculoskeletal:      Comments: Soft wrist restraints  No swelling   Neurological:      Mental Status: He is alert.      Comments: Oriented to name only  confused         Fluids    Intake/Output Summary (Last 24 hours) at 2/10/2024 1223  Last data filed at 2/10/2024 0800  Gross per 24 hour    Intake 410 ml   Output 850 ml   Net -440 ml       Laboratory  Recent Labs     02/10/24  0243   WBC 8.3   RBC 3.49*   HEMOGLOBIN 10.3*   HEMATOCRIT 32.9*   MCV 94.3   MCH 29.5   MCHC 31.3*   RDW 51.1*   PLATELETCT 322   MPV 10.5     Recent Labs     02/08/24  0322 02/09/24  0303 02/10/24  0243   SODIUM 149* 152* 153*   POTASSIUM 3.8 3.7 3.5*   CHLORIDE 107 110 115*   CO2 31 30 28   GLUCOSE 172* 139* 131*   BUN 33* 32* 34*   CREATININE 1.56* 1.69* 1.64*   CALCIUM 10.0 9.9 9.9                     Imaging  DX-ABDOMEN FOR TUBE PLACEMENT   Final Result      Enteric feeding tube extending to about the level of the gastric antrum or pylorus.      DX-ABDOMEN FOR TUBE PLACEMENT   Final Result      Feeding tube tip at the proximal stomach.      DX-CHEST-PORTABLE (1 VIEW)   Final Result         1.  Interstitial pulmonary parenchymal prominence suggest chronic underlying lung disease, component of interstitial edema and/or infiltrates not excluded.   2.  Atherosclerosis      DX-ABDOMEN FOR TUBE PLACEMENT   Final Result      1.  Enteric tube overlies the gastric body.      DX-ABDOMEN FOR TUBE PLACEMENT   Final Result      Feeding tube tip overlies the duodenum.      DX-CHEST-PORTABLE (1 VIEW)   Final Result      No acute process.      DX-CHEST-PORTABLE (1 VIEW)   Final Result      1.  Interval extubation.   2.  Mild interstitial pulmonary edema.   3.  Possible minimal/trace left pleural effusion.      DX-CHEST-LIMITED (1 VIEW)   Final Result      Stable appearance of the chest.      MR-BRAIN-W/O   Final Result         No acute intracranial process.      Age-related volume loss and chronic microvascular ischemic changes.      Postoperative changes in the right cerebellum with resection cavity and hemosiderin staining.      Bilateral mastoid effusion.      DX-CHEST-PORTABLE (1 VIEW)   Final Result         1.  Interstitial pulmonary parenchymal prominence suggest chronic underlying lung disease, component of interstitial edema  and/or infiltrates not excluded.   2.  Cardiomegaly   3.  Atherosclerosis      DX-CHEST-PORTABLE (1 VIEW)   Final Result         1.  Pulmonary edema and/or infiltrates are identified, which are stable since the prior exam.   2.  Cardiomegaly   3.  Atherosclerosis      DX-CHEST-PORTABLE (1 VIEW)   Final Result         1.  Pulmonary edema and/or infiltrates are identified, which are stable since the prior exam.   2.  Cardiomegaly   3.  Atherosclerosis      DX-CHEST-LIMITED (1 VIEW)   Final Result         1.  Pulmonary edema and/or infiltrates are identified, which appear somewhat increased since the prior exam.   2.  Cardiomegaly   3.  Atherosclerosis      DX-ABDOMEN FOR TUBE PLACEMENT   Final Result         Feeding tube with tip projecting over the expected area of the distal stomach.      DX-ABDOMEN FOR TUBE PLACEMENT   Final Result      The nasogastric tube terminates over the antrum of the stomach.      DX-CHEST-PORTABLE (1 VIEW)   Final Result         Ill-defined hazy opacities in the right mid lung and left lower lobe are similar to prior.      IR-MIDLINE CATHETER INSERTION WO GUIDANCE > AGE 3   Final Result                  Ultrasound-guided midline placement performed by qualified nursing staff    as above.          DX-CHEST-PORTABLE (1 VIEW)   Final Result      Improved interstitial opacities may represent improving edema and/or pneumonitis.      Mild nonspecific right basilar opacity and questionable tiny left upper lobe opacity. Correlate clinically for infection.      DX-CHEST-LIMITED (1 VIEW)   Final Result      1.  Slightly increased BILATERAL lung disease. This could be pulmonary edema or pneumonia.   2.  Possible small BILATERAL pleural effusions      DX-CHEST-PORTABLE (1 VIEW)   Final Result         1.  Mild pulmonary edema and/or infiltrates   2.  Atherosclerosis      DX-ABDOMEN FOR TUBE PLACEMENT   Final Result      NG tube tip projects over the stomach.      DX-ABDOMEN FOR TUBE PLACEMENT   Final  Result      1.  Enteric tube projects over the proximal stomach.      DX-CHEST-PORTABLE (1 VIEW)   Final Result      Lines and tubes appear adequate. Stable bibasilar airspace disease.      DX-CHEST-PORTABLE (1 VIEW)   Final Result         1.  Pulmonary edema and/or infiltrates are identified, which appear somewhat increased since the prior exam.   2.  Cardiomegaly      DX-CHEST-PORTABLE (1 VIEW)   Final Result      1.  New small left pleural effusion with associated atelectasis.   2.  Increased right basilar opacity most likely atelectasis however correlate clinically for infection.      DX-CHEST-PORTABLE (1 VIEW)   Final Result         1.  Pulmonary edema and/or infiltrates are identified, which are stable since the prior exam.   2.  Cardiomegaly      DX-CHEST-PORTABLE (1 VIEW)   Final Result      1.  No significant change.      MR-MRA HEAD-W/O   Final Result         MRA OF THE [place] WITHIN NORMAL LIMITS.      MR-BRAIN-W/O   Final Result      1.  Redemonstrated is scattered supratentorial and infratentorial areas of ischemia. No new lesions. There is increased conspicuity of the RIGHT frontal lobe lesion which could be due to recurrent ischemia or seizure activity.   2.  Unchanged RIGHT cerebellar hematoma evacuation cavity. As previously noted underlying mass is not excluded and follow-up MRI without and with contrast in approximately weeks is recommended.   3.  No new hemorrhage   4.  Atrophy   5.  White matter changes   6.  Small BILATERAL mastoid effusions      DX-CHEST-PORTABLE (1 VIEW)   Final Result      DX-ABDOMEN FOR TUBE PLACEMENT   Final Result      Enteric feeding tube terminates in the left upper quadrant projecting over the expected location of the stomach.      DX-CHEST-LIMITED (1 VIEW)   Final Result      1.  Interval extubation   2.  Lower lung volumes with increased atelectasis superimposed on pulmonary edema or pneumonia      DX-CHEST-PORTABLE (1 VIEW)   Final Result         1.   Pulmonary edema and/or infiltrates are identified, which are somewhat decreased since the prior exam.   2.  Atherosclerosis      US-EXTREMITY VENOUS LOWER BILAT   Final Result      DX-CHEST-PORTABLE (1 VIEW)   Final Result         1.  Scattered hazy bilateral pulmonary infiltrates, slightly increased since prior study.   2.  Atherosclerosis      MR-BRAIN-W/O   Final Result      1.  A few punctate areas of acute infarcts in the right frontal and bilateral parietal lobes and left cerebellum.   2.  There is no brainstem infarct or diffuse anoxic injury.   3.  Mixed signal abnormality in the right cerebellum with the previous surgical intervention in keeping with the clinical diagnosis of right cerebellar hemorrhage evacuation. Follow-up study with contrast in 6 weeks is recommended to rule out any    underlying pathology.   4.  Mild cerebral volume loss.      DX-CHEST-PORTABLE (1 VIEW)   Final Result      CT-CTA NECK WITH & W/O-POST PROCESSING   Final Result      1.  No acute arterial abnormality detected. No significant arterial stenosis.      CT-CTA HEAD WITH & W/O-POST PROCESS   Final Result      CT angiogram of the [place] within normal limits.      CT-CEREBRAL PERFUSION ANALYSIS   Final Result      1.  Cerebral blood flow less than 30% likely representing completed infarct = 0 mL.      2.  T Max more than 6 seconds likely representing combination of completed infarct and ischemia = 0 mL.      3.  Mismatched volume likely representing ischemic brain/penumbra = None      4.  Please note that the cerebral perfusion was performed on the limited brain tissue around the basal ganglia region. Infarct/ischemia outside the CT perfusion sections can be missed in this study.      CT-HEAD W/O   Final Result      1.  No evidence of acute territorial infarct, intracranial hemorrhage or mass lesion.   2.  Mild diffuse cerebral substance loss.   3.  Mild microangiopathic ischemic change versus demyelination or gliosis.    4.  Status post right suboccipital cranioplasty with underlying cerebellar hypoattenuation likely on the basis of evolving encephalomalacia.         DX-CHEST-PORTABLE (1 VIEW)   Final Result      1.  Intubation.   2.  Bilateral pulmonary infiltrates.      DX-CHEST-LIMITED (1 VIEW)   Final Result      DX-ABDOMEN FOR TUBE PLACEMENT   Final Result      Enteric feeding tube terminates with the tip projecting over the expected location of the distal stomach.      CT-CTA CHEST PULMONARY ARTERY W/ RECONS   Final Result         1.  No pulmonary embolus appreciated.   2.  Consolidations in bilateral lung bases an scattered hazy right pulmonary opacities, compatible with atelectasis with component of infiltrate.   3.  Indeterminate left adrenal nodule, follow-up adrenal protocol CT for further characterization as clinically appropriate.   4.  Atherosclerosis and atherosclerotic coronary artery disease.      DX-ABDOMEN FOR TUBE PLACEMENT   Final Result         1.  Nonspecific bowel gas pattern in the upper abdomen.   2.  Cardiomegaly   3.  Atherosclerosis   4.  Bibasilar atelectasis   5.  Nasogastric tube tip terminates overlying the expected location of the pylorus or first duodenal segment.      DX-ABDOMEN FOR TUBE PLACEMENT   Final Result         1.  Nonspecific bowel gas pattern in the upper abdomen.   2.  Nasogastric tube tip terminates overlying the expected location of the gastric antrum.   3.  Linear densities the bilateral lung bases favor atelectasis, component of infiltrate not excluded.      DX-ABDOMEN FOR TUBE PLACEMENT   Final Result      NG tube extends below the diaphragm with tip at the level of the gastric fundus. Side port is at the level of the distal esophagus.      DX-ABDOMEN FOR TUBE PLACEMENT   Final Result      NG tube is noted with tip projecting over the right lower lung.      These findings were transmitted with GLEN ZELAYA on 01/07/2024. Tube was subsequently adjusted and is now present below  the diaphragm.      DX-ABDOMEN FOR TUBE PLACEMENT   Final Result      DHT tip overlies the second portion of the duodenum      DX-ABDOMEN FOR TUBE PLACEMENT   Final Result      Feeding tube tip projects in the distal stomach or first portion of the duodenum.      DX-ABDOMEN FOR TUBE PLACEMENT   Final Result      Removal of nasogastric tube.      Feeding tube tip projects in the distal stomach.      No visualized thorax.      DX-ABDOMEN FOR TUBE PLACEMENT   Final Result         Gastric drainage tube with tip projecting over the expected area of the right lower lobe airway. Recommend removal.            CRITICAL RESULT READ BACK: Preliminary findings discussed with and critical read back performed by Dr. GLEN ZELAYA via telephone on 1/5/2024 6:22 PM      DX-ABDOMEN FOR TUBE PLACEMENT   Final Result      1.  Malpositioned enteric sump catheter which is folded back upon itself over the left paramedian distal one third mediastinum probably beyond the left mainstem bronchus and within the esophagus.   2.  A Voalte message was sent to GLEN ZELAYA at 1641 hours. Immediate response received.   3.  Per discussion, the patient has already pulled out the tube.      DX-CHEST-PORTABLE (1 VIEW)   Final Result      1.  Hypoinflation and pulmonary vascular congestion with mild bibasilar atelectasis.   2.  No lobar pneumonia or overt pulmonary edema.      CT-HEAD W/O   Final Result      1.  Diffuse atrophy and white matter changes.   2.  No acute intracranial hemorrhage or territorial infarct.   3.  RIGHT occipital craniotomy with underlying cerebellar encephalomalacia.              Assessment/Plan  * Acute respiratory failure with hypoxia (HCC)- (present on admission)  Assessment & Plan  Intubated date: 1/9/2024-1/11, 1/13-1/20, 1/25-2/1  Recurrent aspiration pneumonitis    Empiric aspiration PNA coverage was given  Remains at risk for aspiration and reintubation - ongoing goals of care, daughter wants him to remain full  code    His overall prognosis is quite poor given his recurrent aspirations that of which are unlikely to change with a PEG tube.  This was discussed with his daughter on 2/8/2024 and she will discuss with her sister.    He had been on IV Lasix which has been stopped  If he can follow commands with incentive spirometry this will help with atelectasis    Dysphagia- (present on admission)  Assessment & Plan  Speech pathology continues to follow him.  He has had multiple aspiration events.  He has nasogastric feeding.  Gastroenterology was consulted for a PEG tube but he was on high flow oxygen and they may be reconsulted once his oxygen requirements are down.  His daughter will check with her sister and they will come up with a plan as to if they want another attempt at a PEG tube.    Debility- (present on admission)  Assessment & Plan  Prolonged hospitalization, prior cerebral insult  He will need lifelong placement    A-fib (HCC)  Assessment & Plan  LVH on 12/27 ECHO with diastolic dysfunction  Weight-based Lovenox prophylaxis for CVA  Continue cardiac monitoring.    Encephalopathy- (present on admission)  Assessment & Plan  Toxic metabolic + ICU hypoactive delirium  This has persisted  MRI brain 1/29/24 was unremarkable for acute processes.    Urinary retention- (present on admission)  Assessment & Plan  Schwartz was replaced on 2/8 due to 1.2 liters of urine retention.  Start terazosin     Hypernatremia- (present on admission)  Assessment & Plan  Stopped IV Lasix  Increase free water via NG tube to 250 ml q6 hours.   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 review of tests) obtaining and/or reviewing separately obtained history. Performing a medically appropriate examination and/ evaluation.  Counseling and educating the patient/family/caregiver.  Ordering medications, tests, or procedures.  Referring and communicating with other health care professionals.  Documenting clinical information in EPIC.  Independently interpreting results and communicating results to patient/family/caregiver.  Care coordination.      VTE prophylaxis: Lovenox ppx     I have performed a physical exam and reviewed and updated ROS and Plan today (2/10/2024). In review of yesterday's note (2/9/2024), there are no changes except as documented above.

## 2024-02-10 NOTE — PROGRESS NOTES
Monitor Summary  Rhythm: SR  Rate: 88-95  Ectopy: rare PVC, rare trigeminy, PSVT to 140's  Measurements: 0.17/0.08/0.38            ---12 hr Chart Review---

## 2024-02-10 NOTE — CARE PLAN
The patient is Watcher - Medium risk of patient condition declining or worsening    Shift Goals  Clinical Goals: Patient will remain safe throughout shift. Respiratory status will improve.  Patient Goals: DULCE MARIA  Family Goals: DULCE MARIA    Progress made toward(s) clinical / shift goals:      Patient is not progressing towards the following goals:      Problem: Hemodynamics  Goal: Patient's hemodynamics, fluid balance and neurologic status will be stable or improve  Outcome: Progressing     Problem: Fluid Volume  Goal: Fluid volume balance will be maintained  Outcome: Progressing     Problem: Urinary - Renal Perfusion  Goal: Ability to achieve and maintain adequate renal perfusion and functioning will improve  Outcome: Progressing     Problem: Respiratory  Goal: Patient will achieve/maintain optimum respiratory ventilation and gas exchange  Outcome: Progressing     Problem: Mechanical Ventilation  Goal: Patient will be able to express needs and understand communication  Outcome: Progressing     Problem: Physical Regulation  Goal: Diagnostic test results will improve  Outcome: Progressing  Goal: Signs and symptoms of infection will decrease  Outcome: Progressing     Problem: Knowledge Deficit - Standard  Goal: Patient and family/care givers will demonstrate understanding of plan of care, disease process/condition, diagnostic tests and medications  Outcome: Progressing     Problem: Skin Integrity  Goal: Skin integrity is maintained or improved  Outcome: Progressing     Problem: Fall Risk  Goal: Patient will remain free from falls  Outcome: Progressing     Problem: Safety - Medical Restraint  Goal: Remains free of injury from restraints (Restraint for Interference with Medical Device)  Outcome: Progressing  Goal: Remains free of injury from restraints (Restraint for Interference with Medical Device)  Outcome: Progressing     Problem: Pain - Standard  Goal: Alleviation of pain or a reduction in pain to the patient’s comfort  goal  Outcome: Progressing

## 2024-02-10 NOTE — DIETARY
Nutrition Services Brief Update:    Problem: Nutritional:  Goal: Nutrition support tolerated and meeting greater than 85% of estimated needs  Outcome: Goal not met    Per RN, pt is tolerating tickle feeds at 10 ml/hr. RD communicated with MD about advancing tube feeds and per MD, continue with current trickle feeds for now.       RD following.

## 2024-02-11 NOTE — CARE PLAN
The patient is   Problem: Skin Integrity  Goal: Skin integrity is maintained or improved  Outcome: Progressing   Patient turned and repositioned every two hours. Cream applied to sacrum and groin. Skin integrity maintained.     Problem: Safety - Medical Restraint  Goal: Remains free of injury from restraints (Restraint for Interference with Medical Device)  Outcome: Progressing   Range of motion exercises performed. Patient remains free from injury.     Shift Goals  Clinical Goals: safety, maintain skin integrity  Patient Goals: comfort  Family Goals: polo

## 2024-02-11 NOTE — PROGRESS NOTES
[facility] Medicine Daily Progress Note    Date of Service  2/11/2024    Chief Complaint  Lewis Mohr is a 78 y.o. male admitted 1/4/2024 with altered mental status    Hospital Course  Mr. 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  Unfortunately, the patient was readmitted to [facility] on 1/4/2024 for altered mental status and hypoxia.  He was admitted to the medicine services with hypernatremia, dehydration, and ERWIN with urinary retention.  He was found to be silently aspirating.  On 1/9/2024, the patient went into AF with RVR with worsening hypoxia and mental status changes.  A code stroke was called.  While in the CT scanner the patient was not protecting his airway and therefore was intubated.  He had a PEA arrest shortly afterward.  He was transferred to the ICU.  The patient was extubated on 1/11/2024 and required HFNC at 45L at 70% on 1/12.  Unfortunately, the patient was reintubated on 1/13.  He remained intubated until 1/20/2024 and then extubated to HFNC at 40L/70%.  He was transferred to IMCU on 1/25.  On the evening of 1/26 the critical care team was reconsulted due to the patient becoming suddenly tachycardic in the 140-150s with hypotension.  He was given IVF and a dose of phenylephrine.  He was not protecting his airway and required emergent intubation and bronchoscopy then transferred back to the ICU.  During his ICU stay the patient was intermittently agitated, requiring pressor therapy, continue to be significantly encephalopathic, eventually had improvement and was extubated to high flow nasal cannula, patient with difficulty with secretions and protecting his airway per intensivist    Interval Problem Update  2/6: Mr. Mohr was evaluated in the CU. His potassium is low at 3.3. He is on weight-based lovenox.  He is in soft wrist restraints. He is on high flow oxygen. He is   oriented only to name.   2/7: Mr. Mohr was seen in the IMCU. His Na is up at 148.  Stop IV Lasix.  He is on high flow oxygen. Chest xray ordered for this morning. He has nasogastric feeding and failed a swallow eval by speech path yesterday.  I have asked his nurse to call me once his daughter comes in due to his andrew his condition with her.  His high flow will be tapered as tolerated.  2/8: Mr. Mohr was evaluated in the CU.His Na went up to 149 despite stopping IV lasix. Cr also went up from 1.38 to 1.56 thus increase free water flushes. A fernando was placed over night due to 1.2 liters urine. He remains on NG feeding. He is now on 2 liters of oxygen. He is requiring soft wrist restraints as he will pull on his tubes.  I met with his daughter at bedside we had a long and candid discussion about his overall very guarded condition.  He is now on 2 L but remains confused and is at very high risk of aspiration whether he has a nasogastric tube or PEG tube.  She is going to speak with her sister and will hopefully come up with a decision tomorrow.  She is optimistic that he is doing much better but his recent history would suggest that he likely will continue to aspirate with resultant decompensation his respiratory status.  30 minutes were spent that of which over 50% time was spent in counseling at bedside.  2/9: Patient seen and examined, resting in bed, afebrile, transferred out of ICU yesterday now on 2L of oxygen family deciding regarding PEG tube   Will try to discuss with family again today  tube feed will help with feeding but will not solve his aspiration problem and patient continues to be high risk of aspiration and worsening respiratory status   Family would like patient to be full code.    Addendum: I met with patient's daughter Jaymie today . Discussed with her regarding PEG/G tube and as per daughter will hold off right now. She knows that this will not solve his problem of aspiration and still remains  high risk despite PEG/G tube placement.  We then discussed regarding his code status and how to proceed if his respiratory status worsen again and per daughter she woul like to switch him to DNR/DNI at this time.  I have switched his code status to DNR?DNI as per daughter wishes   2/10: Patient seen and examined, resting in bed, afebrile, still confused, still high risk of aspiration. Will cont on trickle amount of tube feed through coretrack  Hypernatremia: will start on 1/2 NS also on free water flushes   2/11: Patient seen and examined, regarding his hypernatremia sodium of 152 will start on D5 at 50 ml per hour unruly cont on free water flushes   Regarding dysphagia still high risk of aspiration will cont with trickling of tube feed 10 ml /hr also today will see if we can slowly advance starting tomorrow     I have discussed this patient's plan of care and discharge plan at IDT rounds today with Case Management, Nursing, Nursing leadership, and other members of the IDT team.    Consultants/Specialty  GI.   Critical care  Code Status  DNAR/DNI    Disposition  Medically Cleared  I have placed the appropriate orders for post-discharge needs.    Review of Systems  Review of Systems   Unable to perform ROS: Acuity of condition        Physical Exam  Temp:  [36.4 °C (97.5 °F)-36.7 °C (98.1 °F)] 36.5 °C (97.7 °F)  Pulse:  [78-94] 78  Resp:  [16-17] 17  BP: (133-148)/(78-96) 146/94  SpO2:  [93 %-98 %] 93 %    Physical Exam  Vitals and nursing note reviewed.   Constitutional:       General: He is not in acute distress.     Appearance: He is ill-appearing.   Cardiovascular:      Rate and Rhythm: Normal rate.   Pulmonary:      Comments: Nasal cannula oxygen 2 liters.  Normal work of breathing with few crackles   Coarse cough  Abdominal:      General: There is no distension.      Tenderness: There is no abdominal tenderness.   Genitourinary:     Comments: Schwartz catheter  Musculoskeletal:      Comments: Soft wrist restraints  No  swelling   Neurological:      Mental Status: He is alert.      Comments: Oriented to name only  confused         Fluids    Intake/Output Summary (Last 24 hours) at 2/11/2024 1147  Last data filed at 2/11/2024 1100  Gross per 24 hour   Intake 1262.3 ml   Output 1370 ml   Net -107.7 ml       Laboratory  Recent Labs     02/10/24  0243   WBC 8.3   RBC 3.49*   HEMOGLOBIN 10.3*   HEMATOCRIT 32.9*   MCV 94.3   MCH 29.5   MCHC 31.3*   RDW 51.1*   PLATELETCT 322   MPV 10.5     Recent Labs     02/09/24  0303 02/10/24  0243 02/10/24  1651 02/11/24  0436   SODIUM 152* 153* 152* 152*   POTASSIUM 3.7 3.5*  --  3.7   CHLORIDE 110 115*  --  113*   CO2 30 28  --  28   GLUCOSE 139* 131*  --  125*   BUN 32* 34*  --  32*   CREATININE 1.69* 1.64*  --  1.58*   CALCIUM 9.9 9.9  --  10.2                     Imaging  DX-ABDOMEN FOR TUBE PLACEMENT   Final Result      Enteric feeding tube extending to about the level of the gastric antrum or pylorus.      DX-ABDOMEN FOR TUBE PLACEMENT   Final Result      Feeding tube tip at the proximal stomach.      DX-CHEST-PORTABLE (1 VIEW)   Final Result         1.  Interstitial pulmonary parenchymal prominence suggest chronic underlying lung disease, component of interstitial edema and/or infiltrates not excluded.   2.  Atherosclerosis      DX-ABDOMEN FOR TUBE PLACEMENT   Final Result      1.  Enteric tube overlies the gastric body.      DX-ABDOMEN FOR TUBE PLACEMENT   Final Result      Feeding tube tip overlies the duodenum.      DX-CHEST-PORTABLE (1 VIEW)   Final Result      No acute process.      DX-CHEST-PORTABLE (1 VIEW)   Final Result      1.  Interval extubation.   2.  Mild interstitial pulmonary edema.   3.  Possible minimal/trace left pleural effusion.      DX-CHEST-LIMITED (1 VIEW)   Final Result      Stable appearance of the chest.      MR-BRAIN-W/O   Final Result         No acute intracranial process.      Age-related volume loss and chronic microvascular ischemic changes.      Postoperative  changes in the right cerebellum with resection cavity and hemosiderin staining.      Bilateral mastoid effusion.      DX-CHEST-PORTABLE (1 VIEW)   Final Result         1.  Interstitial pulmonary parenchymal prominence suggest chronic underlying lung disease, component of interstitial edema and/or infiltrates not excluded.   2.  Cardiomegaly   3.  Atherosclerosis      DX-CHEST-PORTABLE (1 VIEW)   Final Result         1.  Pulmonary edema and/or infiltrates are identified, which are stable since the prior exam.   2.  Cardiomegaly   3.  Atherosclerosis      DX-CHEST-PORTABLE (1 VIEW)   Final Result         1.  Pulmonary edema and/or infiltrates are identified, which are stable since the prior exam.   2.  Cardiomegaly   3.  Atherosclerosis      DX-CHEST-LIMITED (1 VIEW)   Final Result         1.  Pulmonary edema and/or infiltrates are identified, which appear somewhat increased since the prior exam.   2.  Cardiomegaly   3.  Atherosclerosis      DX-ABDOMEN FOR TUBE PLACEMENT   Final Result         Feeding tube with tip projecting over the expected area of the distal stomach.      DX-ABDOMEN FOR TUBE PLACEMENT   Final Result      The nasogastric tube terminates over the antrum of the stomach.      DX-CHEST-PORTABLE (1 VIEW)   Final Result         Ill-defined hazy opacities in the right mid lung and left lower lobe are similar to prior.      IR-MIDLINE CATHETER INSERTION WO GUIDANCE > AGE 3   Final Result                  Ultrasound-guided midline placement performed by qualified nursing staff    as above.          DX-CHEST-PORTABLE (1 VIEW)   Final Result      Improved interstitial opacities may represent improving edema and/or pneumonitis.      Mild nonspecific right basilar opacity and questionable tiny left upper lobe opacity. Correlate clinically for infection.      DX-CHEST-LIMITED (1 VIEW)   Final Result      1.  Slightly increased BILATERAL lung disease. This could be pulmonary edema or pneumonia.   2.  Possible  Detail Level: Simple small BILATERAL pleural effusions      DX-CHEST-PORTABLE (1 VIEW)   Final Result         1.  Mild pulmonary edema and/or infiltrates   2.  Atherosclerosis      DX-ABDOMEN FOR TUBE PLACEMENT   Final Result      NG tube tip projects over the stomach.      DX-ABDOMEN FOR TUBE PLACEMENT   Final Result      1.  Enteric tube projects over the proximal stomach.      DX-CHEST-PORTABLE (1 VIEW)   Final Result      Lines and tubes appear adequate. Stable bibasilar airspace disease.      DX-CHEST-PORTABLE (1 VIEW)   Final Result         1.  Pulmonary edema and/or infiltrates are identified, which appear somewhat increased since the prior exam.   2.  Cardiomegaly      DX-CHEST-PORTABLE (1 VIEW)   Final Result      1.  New small left pleural effusion with associated atelectasis.   2.  Increased right basilar opacity most likely atelectasis however correlate clinically for infection.      DX-CHEST-PORTABLE (1 VIEW)   Final Result         1.  Pulmonary edema and/or infiltrates are identified, which are stable since the prior exam.   2.  Cardiomegaly      DX-CHEST-PORTABLE (1 VIEW)   Final Result      1.  No significant change.      MR-MRA HEAD-W/O   Final Result         MRA OF THE [place] WITHIN NORMAL LIMITS.      MR-BRAIN-W/O   Final Result      1.  Redemonstrated is scattered supratentorial and infratentorial areas of ischemia. No new lesions. There is increased conspicuity of the RIGHT frontal lobe lesion which could be due to recurrent ischemia or seizure activity.   2.  Unchanged RIGHT cerebellar hematoma evacuation cavity. As previously noted underlying mass is not excluded and follow-up MRI without and with contrast in approximately weeks is recommended.   3.  No new hemorrhage   4.  Atrophy   5.  White matter changes   6.  Small BILATERAL mastoid effusions      DX-CHEST-PORTABLE (1 VIEW)   Final Result      DX-ABDOMEN FOR TUBE PLACEMENT   Final Result      Enteric feeding tube terminates in the left upper  Samples Given: Trade size cetaphil moisturizer quadrant projecting over the expected location of the stomach.      DX-CHEST-LIMITED (1 VIEW)   Final Result      1.  Interval extubation   2.  Lower lung volumes with increased atelectasis superimposed on pulmonary edema or pneumonia      DX-CHEST-PORTABLE (1 VIEW)   Final Result         1.  Pulmonary edema and/or infiltrates are identified, which are somewhat decreased since the prior exam.   2.  Atherosclerosis      US-EXTREMITY VENOUS LOWER BILAT   Final Result      DX-CHEST-PORTABLE (1 VIEW)   Final Result         1.  Scattered hazy bilateral pulmonary infiltrates, slightly increased since prior study.   2.  Atherosclerosis      MR-BRAIN-W/O   Final Result      1.  A few punctate areas of acute infarcts in the right frontal and bilateral parietal lobes and left cerebellum.   2.  There is no brainstem infarct or diffuse anoxic injury.   3.  Mixed signal abnormality in the right cerebellum with the previous surgical intervention in keeping with the clinical diagnosis of right cerebellar hemorrhage evacuation. Follow-up study with contrast in 6 weeks is recommended to rule out any    underlying pathology.   4.  Mild cerebral volume loss.      DX-CHEST-PORTABLE (1 VIEW)   Final Result      CT-CTA NECK WITH & W/O-POST PROCESSING   Final Result      1.  No acute arterial abnormality detected. No significant arterial stenosis.      CT-CTA HEAD WITH & W/O-POST PROCESS   Final Result      CT angiogram of the [place] within normal limits.      CT-CEREBRAL PERFUSION ANALYSIS   Final Result      1.  Cerebral blood flow less than 30% likely representing completed infarct = 0 mL.      2.  T Max more than 6 seconds likely representing combination of completed infarct and ischemia = 0 mL.      3.  Mismatched volume likely representing ischemic brain/penumbra = None      4.  Please note that the cerebral perfusion was performed on the limited brain tissue around the basal ganglia region. Infarct/ischemia outside the CT  Continue Regimen: Moisturize QD, triamcinolone for flares BID up to 2 weeks if needed perfusion sections can be missed in this study.      CT-HEAD W/O   Final Result      1.  No evidence of acute territorial infarct, intracranial hemorrhage or mass lesion.   2.  Mild diffuse cerebral substance loss.   3.  Mild microangiopathic ischemic change versus demyelination or gliosis.   4.  Status post right suboccipital cranioplasty with underlying cerebellar hypoattenuation likely on the basis of evolving encephalomalacia.         DX-CHEST-PORTABLE (1 VIEW)   Final Result      1.  Intubation.   2.  Bilateral pulmonary infiltrates.      DX-CHEST-LIMITED (1 VIEW)   Final Result      DX-ABDOMEN FOR TUBE PLACEMENT   Final Result      Enteric feeding tube terminates with the tip projecting over the expected location of the distal stomach.      CT-CTA CHEST PULMONARY ARTERY W/ RECONS   Final Result         1.  No pulmonary embolus appreciated.   2.  Consolidations in bilateral lung bases an scattered hazy right pulmonary opacities, compatible with atelectasis with component of infiltrate.   3.  Indeterminate left adrenal nodule, follow-up adrenal protocol CT for further characterization as clinically appropriate.   4.  Atherosclerosis and atherosclerotic coronary artery disease.      DX-ABDOMEN FOR TUBE PLACEMENT   Final Result         1.  Nonspecific bowel gas pattern in the upper abdomen.   2.  Cardiomegaly   3.  Atherosclerosis   4.  Bibasilar atelectasis   5.  Nasogastric tube tip terminates overlying the expected location of the pylorus or first duodenal segment.      DX-ABDOMEN FOR TUBE PLACEMENT   Final Result         1.  Nonspecific bowel gas pattern in the upper abdomen.   2.  Nasogastric tube tip terminates overlying the expected location of the gastric antrum.   3.  Linear densities the bilateral lung bases favor atelectasis, component of infiltrate not excluded.      DX-ABDOMEN FOR TUBE PLACEMENT   Final Result      NG tube extends below the diaphragm with tip at the level of the gastric fundus. Side  port is at the level of the distal esophagus.      DX-ABDOMEN FOR TUBE PLACEMENT   Final Result      NG tube is noted with tip projecting over the right lower lung.      These findings were transmitted with GLEN ZELAYA on 01/07/2024. Tube was subsequently adjusted and is now present below the diaphragm.      DX-ABDOMEN FOR TUBE PLACEMENT   Final Result      DHT tip overlies the second portion of the duodenum      DX-ABDOMEN FOR TUBE PLACEMENT   Final Result      Feeding tube tip projects in the distal stomach or first portion of the duodenum.      DX-ABDOMEN FOR TUBE PLACEMENT   Final Result      Removal of nasogastric tube.      Feeding tube tip projects in the distal stomach.      No visualized thorax.      DX-ABDOMEN FOR TUBE PLACEMENT   Final Result         Gastric drainage tube with tip projecting over the expected area of the right lower lobe airway. Recommend removal.            CRITICAL RESULT READ BACK: Preliminary findings discussed with and critical read back performed by Dr. GLEN ZELAYA via telephone on 1/5/2024 6:22 PM      DX-ABDOMEN FOR TUBE PLACEMENT   Final Result      1.  Malpositioned enteric sump catheter which is folded back upon itself over the left paramedian distal one third mediastinum probably beyond the left mainstem bronchus and within the esophagus.   2.  A Voalte message was sent to GLEN ZELAYA at 1641 hours. Immediate response received.   3.  Per discussion, the patient has already pulled out the tube.      DX-CHEST-PORTABLE (1 VIEW)   Final Result      1.  Hypoinflation and pulmonary vascular congestion with mild bibasilar atelectasis.   2.  No lobar pneumonia or overt pulmonary edema.      CT-HEAD W/O   Final Result      1.  Diffuse atrophy and white matter changes.   2.  No acute intracranial hemorrhage or territorial infarct.   3.  RIGHT occipital craniotomy with underlying cerebellar encephalomalacia.              Assessment/Plan  * Acute respiratory failure with hypoxia (HCC)-  (present on admission)  Assessment & Plan  Intubated date: 1/9/2024-1/11, 1/13-1/20, 1/25-2/1  Recurrent aspiration pneumonitis    Empiric aspiration PNA coverage was given  Remains at risk for aspiration and reintubation - ongoing goals of care, daughter wants him to remain full code    His overall prognosis is quite poor given his recurrent aspirations that of which are unlikely to change with a PEG tube.  This was discussed with his daughter on 2/8/2024 and she will discuss with her sister.    He had been on IV Lasix which has been stopped  If he can follow commands with incentive spirometry this will help with atelectasis    He is still high risk aspiration and to develop respiratory failure       Dysphagia- (present on admission)  Assessment & Plan  Speech pathology continues to follow him.  He has had multiple aspiration events.  He has nasogastric feeding.  Gastroenterology was consulted for a PEG tube but he was on high flow oxygen and they may be reconsulted once his oxygen requirements are down.  His daughter will check with her sister and they will come up with a plan as to if they want another attempt at a PEG tube.  Still holding on peg/g tube as per daughter decision   Cont on trickling tube feed oly 10 ml/hr   Will consider to slowly advance tomorrow     Debility- (present on admission)  Assessment & Plan  Prolonged hospitalization, prior cerebral insult  He will need lifelong placement    A-fib (HCC)  Assessment & Plan  LVH on 12/27 ECHO with diastolic dysfunction  Weight-based Lovenox prophylaxis for CVA  Continue cardiac monitoring.    Encephalopathy- (present on admission)  Assessment & Plan  Toxic metabolic + ICU hypoactive delirium  This has persisted  MRI brain 1/29/24 was unremarkable for acute processes.    Urinary retention- (present on admission)  Assessment & Plan  Schwartz was replaced on 2/8 due to 1.2 liters of urine retention.  Start terazosin     Hypernatremia- (present on  admission)  Assessment & Plan  Stopped IV Lasix  Increase free water via NG tube to 250 ml q6 hours.   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 review of tests) obtaining and/or reviewing separately obtained history. Performing a medically appropriate examination and/ evaluation.  Counseling and educating the patient/family/caregiver.  Ordering medications, tests, or procedures.  Referring and communicating with other health care professionals.  Documenting clinical information in EPIC.  Independently interpreting results and communicating results to patient/family/caregiver.  Care coordination.         VTE prophylaxis: Lovenox ppx     I have performed a physical exam and reviewed and updated ROS and Plan today (2/11/2024). In review of yesterday's note (2/10/2024), there are no changes except as documented above.

## 2024-02-11 NOTE — CARE PLAN
The patient is Stable - Low risk of patient condition declining or worsening    Shift Goals  Clinical Goals: tube feedings  Patient Goals: comfort  Family Goals: DULCE MARIA    Progress made toward(s) clinical / shift goals:    Problem: Safety - Medical Restraint  Goal: Remains free of injury from restraints (Restraint for Interference with Medical Device)  Description: INTERVENTIONS:  1. Determine that other, less restrictive measures have been tried or would not be effective before applying the restraint  2. Evaluate the patient's condition at the time of restraint application  3. Educate patient/family regarding the reason for restraint  4. Q2H: Monitor safety, psychosocial status, comfort, circulation, respiratory status, LOC, nutrition and hydration  Flowsheets (Taken 2/10/2024 1619)  Addressed this shift: Remains free of injury from restraints (restraint for interference with medical device):   Inform patient/family regarding the reason for restraint   Evaluate the patient's condition at the time of restraint application   Every 2 hours: Monitor safety, psychosocial status, comfort, nutrition and hydration  Goal: Remains free of injury from restraints (Restraint for Interference with Medical Device)  Description: INTERVENTIONS:  1. Determine that other, less restrictive measures have been tried or would not be effective before applying the restraint  2. Evaluate the patient's condition at the time of restraint application  3. Educate patient/family regarding the reason for restraint  4. Q2H: Monitor safety, psychosocial status, comfort, circulation, respiratory status, LOC, nutrition and hydration  Flowsheets (Taken 2/10/2024 1619)  Addressed this shift: Remains free of injury from restraints (restraint for interference with medical device):   Inform patient/family regarding the reason for restraint   Evaluate the patient's condition at the time of restraint application   Every 2 hours: Monitor safety, psychosocial  status, comfort, nutrition and hydration       Patient is not progressing towards the following goals:    Problem: Discharge Barriers/Planning  Goal: Patient's continuum of care needs are met  Description: Target End Date:  Prior to discharge or change in level of care    1.  Identify potential discharge barriers on admission and throughout hospitalization  2.  Collaborate with Case Management, , Clinical Educators, Navigators and others on the transitional care team to meet discharge needs  3.  Involve patient/family/caregivers in setting and prioritizing goals for hospitalization and discharge  4.  Ensure Flu vaccinations are addressed  5.  Inquire if patient is interested in the Meds to Bed program  6.  Ensure patient and family/caregiver are able to demonstrate use of equipment as prescribed  7.  Ensure patient and family/caregiver can verbalize understanding of patient education  8.  Explain discharge instructions and medication reconciliation to patient and family/caregiver  Flowsheets (Taken 2/10/2024 6730)  Continuum of Care Needs:   Provided and explained discharge instructions   Communicated discharge barriers to interdisciplinary tream   Involved patient/family/support system in discharge process   Collaborated with Case Management/   Assessed for discharge barriers

## 2024-02-11 NOTE — PROGRESS NOTES
Assumed care of pt. Report received.  Pt alert to self, following commands.  Pt still confused and occasionally answers questions with inappropriate responses.  Pt denies pain.  Respirations even and unlabored.  Bilateral wrist restraints intact.  Tube feeding to right nare NGT intact and going at 10ml/hour.  Pt positioned for comfort.  Call light in reach.  Bed in low position.  Bed alarm on.

## 2024-02-11 NOTE — PROGRESS NOTES
Report received from night shift RN, assumed care of pt. Pt A&Ox1. Plan of care discussed with pt, labs and chart reviewed. All needs met at this time. Tele box on. On 2L O2 via nasal canula. Call light within reach, bed locked and in lowest position. All fall precautions and hourly rounding in place.

## 2024-02-11 NOTE — PROGRESS NOTES
Monitor Summary  Rhythm: SR  Rate: 80-90  Ectopy: rare PVC  Measurements: 0.16/0.08/0.42            ---12 hr Chart Review---

## 2024-02-11 NOTE — CARE PLAN
Problem: Hemodynamics  Goal: Patient's hemodynamics, fluid balance and neurologic status will be stable or improve  Outcome: Progressing     Problem: Fluid Volume  Goal: Fluid volume balance will be maintained  Outcome: Progressing     Problem: Urinary - Renal Perfusion  Goal: Ability to achieve and maintain adequate renal perfusion and functioning will improve  Outcome: Progressing     Problem: Respiratory  Goal: Patient will achieve/maintain optimum respiratory ventilation and gas exchange  Outcome: Progressing     Problem: Mechanical Ventilation  Goal: Patient will be able to express needs and understand communication  Outcome: Progressing     Problem: Physical Regulation  Goal: Diagnostic test results will improve  Outcome: Progressing  Goal: Signs and symptoms of infection will decrease  Outcome: Progressing     Problem: Knowledge Deficit - Standard  Goal: Patient and family/care givers will demonstrate understanding of plan of care, disease process/condition, diagnostic tests and medications  Outcome: Progressing     Problem: Skin Integrity  Goal: Skin integrity is maintained or improved  Outcome: Progressing     Problem: Fall Risk  Goal: Patient will remain free from falls  Outcome: Progressing     Problem: Safety - Medical Restraint  Goal: Remains free of injury from restraints (Restraint for Interference with Medical Device)  Outcome: Progressing  Goal: Remains free of injury from restraints (Restraint for Interference with Medical Device)  Outcome: Progressing     Problem: Pain - Standard  Goal: Alleviation of pain or a reduction in pain to the patient’s comfort goal  Outcome: Progressing     Problem: Discharge Barriers/Planning  Goal: Patient's continuum of care needs are met  Outcome: Progressing   The patient is Stable - Low risk of patient condition declining or worsening    Shift Goals  Clinical Goals: vitals wnl, monitor tube feeding and restraints  Patient Goals: comfort, safety  Family Goals:  polo    Progress made toward(s) clinical / shift goals:    Pt confused but following commands    Patient is not progressing towards the following goals:

## 2024-02-12 NOTE — CARE PLAN
The patient is Watcher - Medium risk of patient condition declining or worsening    Shift Goals  Clinical Goals: comfort, rest, nutrition  Patient Goals: rest  Family Goals: polo    Progress made toward(s) clinical / shift goals:      Patient is not progressing towards the following goals:      Problem: Safety - Medical Restraint  Goal: Remains free of injury from restraints (Restraint for Interference with Medical Device)  Outcome: Progressing  Goal: Remains free of injury from restraints (Restraint for Interference with Medical Device)  Outcome: Progressing

## 2024-02-12 NOTE — PROGRESS NOTES
Report received, assume care. Pt resting comfortable, Pt remains on 1 liter via NC, ET to right nare running Glucern at 10ml/hr. Wrist restraints in place. Order up to date. Door remains open. Hourly rounding.   1230- Midline occluded, US-IR notified to assess and place a new Midline.  Per verbal order from Dr. Mckinnon to increase Glucernia to 20ml/hr. Read back to MD.   1300- Notified Dr. Mckinnon on occluded midline.  Dr. Mckinnon okay if IV team places PIV for IV access.

## 2024-02-12 NOTE — CARE PLAN
Problem: Safety - Medical Restraint  Goal: Remains free of injury from restraints (Restraint for Interference with Medical Device)  Outcome: Progressing  Flowsheets (Taken 2/10/2024 1619 by Curt Rice, R.N.)  Addressed this shift: Remains free of injury from restraints (restraint for interference with medical device):   Inform patient/family regarding the reason for restraint   Evaluate the patient's condition at the time of restraint application   Every 2 hours: Monitor safety, psychosocial status, comfort, nutrition and hydration  Goal: Remains free of injury from restraints (Restraint for Interference with Medical Device)  Outcome: Progressing  Flowsheets (Taken 2/10/2024 1619 by Curt Rice, R.N.)  Addressed this shift: Remains free of injury from restraints (restraint for interference with medical device):   Inform patient/family regarding the reason for restraint   Evaluate the patient's condition at the time of restraint application   Every 2 hours: Monitor safety, psychosocial status, comfort, nutrition and hydration   The patient is Stable - Low risk of patient condition declining or worsening    Shift Goals  Clinical Goals: comfort, rest, nutrition  Patient Goals: rest  Family Goals: polo    Progress made toward(s) clinical / shift goals:  Inform patient/family regarding the reason for restraint   Evaluate the patient's condition at the time of restraint application   Every 2 hours: Monitor safety, psychosocial status, comfort, nutrition and hydration    Patient is not progressing towards the following goals:

## 2024-02-12 NOTE — DIETARY
"Nutrition support weekly update:  Day 39 of admit.  Lewis Mohr is a 78 y.o. male with admitting DX of Respiratory failure (HCC) [J96.90]  Hypoxic respiratory failure (HCC) [J96.91]  Respiratory failure with hypoxia (HCC) [J96.91]    Tube feeding initiated on 1/6. Current TF via NGT is Glucerna 1.2, goal rate of 75 ml/hr (which will provide 2160 kcal, 108 g pro, 1449 mL free water daily).    Assessment:  Weight 2/12: 85.8 kg via bed scale. Wt +2.7kg over the past week. Current nutrition needs assessment based on wt 87.4 kg, admit wt 86.1 kg. Re-estimate of nutritional needs not indicated due to differences in calculations would be very minor w/ small wt fluctuations.    Evaluation:   Pt has been on trickle feeds at 10 ml/hr. Per MD note, plan to slowly advance TF rate and monitor tolerance. Holding off on Gtube/PEG at this time per pt's dtr's wishes. Per chart notes: \"Even G tube or PEG tube placment will not slove his aspiration problem as he is high risk of aspirating his own secretion.\" TF rate increased to 20 ml/hr today per chart notes.   Labs: Na 149, K+ 3.5, glu 121, BUN 26, crea 1.449, GFR 48, pre-alb 18.3, POC glu/24 hrs = 110-121; CRP <0.30  MAR: D5 @ 50 ml/hr (204 kcal q24hrs), pepcid, SSI (dose not req), reglan, pericolace  Skin: no documented staged wounds; trace edema to all extremities  +BM: 2/12, type 7  RN CM looking into PAMS for DC    Malnutrition risk: TF last documented at goal rate on 2/8 late afternoon    Recommendations/Plan:  Continue TF formula and regimen. Goal is Glucerna 1.2 at 75 mL/hr. TF rate advancement as medically feasible/safe is per MD discretion.  Additional fluids per MD.   Monitor weight trends.   RD following for further discussions of plan of care w/ family.    RD continues to follow.    "

## 2024-02-12 NOTE — CARE PLAN
The patient is Stable - Low risk of patient condition declining or worsening    Shift Goals  Clinical Goals: comfort, rest, nutrition  Patient Goals: rest  Family Goals: polo    Progress made toward(s) clinical / shift goals:      Problem: Skin Integrity  Goal: Skin integrity is maintained or improved  Outcome: Progressing     Problem: Fall Risk  Goal: Patient will remain free from falls  Outcome: Progressing     Problem: Safety - Medical Restraint  Goal: Remains free of injury from restraints (Restraint for Interference with Medical Device)  Outcome: Progressing       Patient is not progressing towards the following goals:

## 2024-02-12 NOTE — PROGRESS NOTES
VAT RN received call that RN was having issue with midline. Assessed line and it was determined that someone other than VAT changed the dressing and had inadvertently kinked line following removal of STAT lock. Dressing changed.

## 2024-02-12 NOTE — PROGRESS NOTES
[facility] Medicine Daily Progress Note    Date of Service  2/12/2024    Chief Complaint  Lewis Mohr is a 78 y.o. male admitted 1/4/2024 with altered mental status    Hospital Course  Mr. 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  Unfortunately, the patient was readmitted to [facility] on 1/4/2024 for altered mental status and hypoxia.  He was admitted to the medicine services with hypernatremia, dehydration, and ERWIN with urinary retention.  He was found to be silently aspirating.  On 1/9/2024, the patient went into AF with RVR with worsening hypoxia and mental status changes.  A code stroke was called.  While in the CT scanner the patient was not protecting his airway and therefore was intubated.  He had a PEA arrest shortly afterward.  He was transferred to the ICU.  The patient was extubated on 1/11/2024 and required HFNC at 45L at 70% on 1/12.  Unfortunately, the patient was reintubated on 1/13.  He remained intubated until 1/20/2024 and then extubated to HFNC at 40L/70%.  He was transferred to IMCU on 1/25.  On the evening of 1/26 the critical care team was reconsulted due to the patient becoming suddenly tachycardic in the 140-150s with hypotension.  He was given IVF and a dose of phenylephrine.  He was not protecting his airway and required emergent intubation and bronchoscopy then transferred back to the ICU.  During his ICU stay the patient was intermittently agitated, requiring pressor therapy, continue to be significantly encephalopathic, eventually had improvement and was extubated to high flow nasal cannula, patient with difficulty with secretions and protecting his airway per intensivist    Interval Problem Update  2/6: Mr. Mohr was evaluated in the CU. His potassium is low at 3.3. He is on weight-based lovenox.  He is in soft wrist restraints. He is on high flow oxygen. He is   oriented only to name.   2/7: Mr. Mohr was seen in the IMCU. His Na is up at 148.  Stop IV Lasix.  He is on high flow oxygen. Chest xray ordered for this morning. He has nasogastric feeding and failed a swallow eval by speech path yesterday.  I have asked his nurse to call me once his daughter comes in due to his andrew his condition with her.  His high flow will be tapered as tolerated.  2/8: Mr. Mohr was evaluated in the CU.His Na went up to 149 despite stopping IV lasix. Cr also went up from 1.38 to 1.56 thus increase free water flushes. A fernando was placed over night due to 1.2 liters urine. He remains on NG feeding. He is now on 2 liters of oxygen. He is requiring soft wrist restraints as he will pull on his tubes.  I met with his daughter at bedside we had a long and candid discussion about his overall very guarded condition.  He is now on 2 L but remains confused and is at very high risk of aspiration whether he has a nasogastric tube or PEG tube.  She is going to speak with her sister and will hopefully come up with a decision tomorrow.  She is optimistic that he is doing much better but his recent history would suggest that he likely will continue to aspirate with resultant decompensation his respiratory status.  30 minutes were spent that of which over 50% time was spent in counseling at bedside.  2/9: Patient seen and examined, resting in bed, afebrile, transferred out of ICU yesterday now on 2L of oxygen family deciding regarding PEG tube   Will try to discuss with family again today  tube feed will help with feeding but will not solve his aspiration problem and patient continues to be high risk of aspiration and worsening respiratory status   Family would like patient to be full code.    Addendum: I met with patient's daughter Jaymie today . Discussed with her regarding PEG/G tube and as per daughter will hold off right now. She knows that this will not solve his problem of aspiration and still remains  high risk despite PEG/G tube placement.  We then discussed regarding his code status and how to proceed if his respiratory status worsen again and per daughter she woul like to switch him to DNR/DNI at this time.  I have switched his code status to DNR?DNI as per daughter wishes   2/10: Patient seen and examined, resting in bed, afebrile, still confused, still high risk of aspiration. Will cont on trickle amount of tube feed through coretrack  Hypernatremia: will start on 1/2 NS also on free water flushes   2/11: Patient seen and examined, regarding his hypernatremia sodium of 152 will start on D5 at 50 ml per hour unruly cont on free water flushes   Regarding dysphagia still high risk of aspiration will cont with trickling of tube feed 10 ml /hr also today will see if we can slowly advance starting tomorrow   2/12: Patient seen and examined still AOX1, will increase tube feed today to 20 ml.hr and will monitor if he can tolerate   Daughter want to hold howard for now on G tube/PEG tube   Regarding his hypernatremia cont on D5 and free water flushes slowly trending down.  CM looking to see if he is a candidate for LTACH   I have discussed this patient's plan of care and discharge plan at IDT rounds today with Case Management, Nursing, Nursing leadership, and other members of the IDT team.    Consultants/Specialty  GI.   Critical care    Code Status  DNAR/DNI    Disposition  The patient is not medically cleared for discharge to home or a post-acute facility.      I have placed the appropriate orders for post-discharge needs.    Review of Systems  Review of Systems   Unable to perform ROS: Acuity of condition        Physical Exam  Temp:  [36.3 °C (97.3 °F)-36.6 °C (97.9 °F)] 36.5 °C (97.7 °F)  Pulse:  [72-79] 75  Resp:  [16-18] 16  BP: (146-159)/(88-96) 154/94  SpO2:  [94 %-98 %] 97 %    Physical Exam  Vitals and nursing note reviewed.   Constitutional:       General: He is not in acute distress.     Appearance: He is  ill-appearing.   Cardiovascular:      Rate and Rhythm: Normal rate.   Pulmonary:      Comments: Nasal cannula oxygen 2 liters.  Normal work of breathing with few crackles   Coarse cough  Abdominal:      General: There is no distension.      Tenderness: There is no abdominal tenderness.   Genitourinary:     Comments: Schwartz catheter  Musculoskeletal:      Comments: Soft wrist restraints  No swelling   Neurological:      Mental Status: He is alert.      Comments: Oriented to name only  confused         Fluids    Intake/Output Summary (Last 24 hours) at 2/12/2024 1210  Last data filed at 2/12/2024 1128  Gross per 24 hour   Intake 2758.03 ml   Output 1300 ml   Net 1458.03 ml       Laboratory  Recent Labs     02/10/24  0243   WBC 8.3   RBC 3.49*   HEMOGLOBIN 10.3*   HEMATOCRIT 32.9*   MCV 94.3   MCH 29.5   MCHC 31.3*   RDW 51.1*   PLATELETCT 322   MPV 10.5     Recent Labs     02/11/24  1808 02/12/24  0018 02/12/24  0549   SODIUM 149* 150* 148*   POTASSIUM 3.8 3.6 3.5*   CHLORIDE 113* 113* 111   CO2 25 27 27   GLUCOSE 132* 129* 123*   BUN 31* 30* 28*   CREATININE 1.61* 1.56* 1.47*   CALCIUM 9.4 9.4 9.5                     Imaging  DX-ABDOMEN FOR TUBE PLACEMENT   Final Result      Enteric feeding tube extending to about the level of the gastric antrum or pylorus.      DX-ABDOMEN FOR TUBE PLACEMENT   Final Result      Feeding tube tip at the proximal stomach.      DX-CHEST-PORTABLE (1 VIEW)   Final Result         1.  Interstitial pulmonary parenchymal prominence suggest chronic underlying lung disease, component of interstitial edema and/or infiltrates not excluded.   2.  Atherosclerosis      DX-ABDOMEN FOR TUBE PLACEMENT   Final Result      1.  Enteric tube overlies the gastric body.      DX-ABDOMEN FOR TUBE PLACEMENT   Final Result      Feeding tube tip overlies the duodenum.      DX-CHEST-PORTABLE (1 VIEW)   Final Result      No acute process.      DX-CHEST-PORTABLE (1 VIEW)   Final Result      1.  Interval extubation.    2.  Mild interstitial pulmonary edema.   3.  Possible minimal/trace left pleural effusion.      DX-CHEST-LIMITED (1 VIEW)   Final Result      Stable appearance of the chest.      MR-BRAIN-W/O   Final Result         No acute intracranial process.      Age-related volume loss and chronic microvascular ischemic changes.      Postoperative changes in the right cerebellum with resection cavity and hemosiderin staining.      Bilateral mastoid effusion.      DX-CHEST-PORTABLE (1 VIEW)   Final Result         1.  Interstitial pulmonary parenchymal prominence suggest chronic underlying lung disease, component of interstitial edema and/or infiltrates not excluded.   2.  Cardiomegaly   3.  Atherosclerosis      DX-CHEST-PORTABLE (1 VIEW)   Final Result         1.  Pulmonary edema and/or infiltrates are identified, which are stable since the prior exam.   2.  Cardiomegaly   3.  Atherosclerosis      DX-CHEST-PORTABLE (1 VIEW)   Final Result         1.  Pulmonary edema and/or infiltrates are identified, which are stable since the prior exam.   2.  Cardiomegaly   3.  Atherosclerosis      DX-CHEST-LIMITED (1 VIEW)   Final Result         1.  Pulmonary edema and/or infiltrates are identified, which appear somewhat increased since the prior exam.   2.  Cardiomegaly   3.  Atherosclerosis      DX-ABDOMEN FOR TUBE PLACEMENT   Final Result         Feeding tube with tip projecting over the expected area of the distal stomach.      DX-ABDOMEN FOR TUBE PLACEMENT   Final Result      The nasogastric tube terminates over the antrum of the stomach.      DX-CHEST-PORTABLE (1 VIEW)   Final Result         Ill-defined hazy opacities in the right mid lung and left lower lobe are similar to prior.      IR-MIDLINE CATHETER INSERTION WO GUIDANCE > AGE 3   Final Result                  Ultrasound-guided midline placement performed by qualified nursing staff    as above.          DX-CHEST-PORTABLE (1 VIEW)   Final Result      Improved interstitial  opacities may represent improving edema and/or pneumonitis.      Mild nonspecific right basilar opacity and questionable tiny left upper lobe opacity. Correlate clinically for infection.      DX-CHEST-LIMITED (1 VIEW)   Final Result      1.  Slightly increased BILATERAL lung disease. This could be pulmonary edema or pneumonia.   2.  Possible small BILATERAL pleural effusions      DX-CHEST-PORTABLE (1 VIEW)   Final Result         1.  Mild pulmonary edema and/or infiltrates   2.  Atherosclerosis      DX-ABDOMEN FOR TUBE PLACEMENT   Final Result      NG tube tip projects over the stomach.      DX-ABDOMEN FOR TUBE PLACEMENT   Final Result      1.  Enteric tube projects over the proximal stomach.      DX-CHEST-PORTABLE (1 VIEW)   Final Result      Lines and tubes appear adequate. Stable bibasilar airspace disease.      DX-CHEST-PORTABLE (1 VIEW)   Final Result         1.  Pulmonary edema and/or infiltrates are identified, which appear somewhat increased since the prior exam.   2.  Cardiomegaly      DX-CHEST-PORTABLE (1 VIEW)   Final Result      1.  New small left pleural effusion with associated atelectasis.   2.  Increased right basilar opacity most likely atelectasis however correlate clinically for infection.      DX-CHEST-PORTABLE (1 VIEW)   Final Result         1.  Pulmonary edema and/or infiltrates are identified, which are stable since the prior exam.   2.  Cardiomegaly      DX-CHEST-PORTABLE (1 VIEW)   Final Result      1.  No significant change.      MR-MRA HEAD-W/O   Final Result         MRA OF THE [place] WITHIN NORMAL LIMITS.      MR-BRAIN-W/O   Final Result      1.  Redemonstrated is scattered supratentorial and infratentorial areas of ischemia. No new lesions. There is increased conspicuity of the RIGHT frontal lobe lesion which could be due to recurrent ischemia or seizure activity.   2.  Unchanged RIGHT cerebellar hematoma evacuation cavity. As previously noted underlying mass is not excluded and  follow-up MRI without and with contrast in approximately weeks is recommended.   3.  No new hemorrhage   4.  Atrophy   5.  White matter changes   6.  Small BILATERAL mastoid effusions      DX-CHEST-PORTABLE (1 VIEW)   Final Result      DX-ABDOMEN FOR TUBE PLACEMENT   Final Result      Enteric feeding tube terminates in the left upper quadrant projecting over the expected location of the stomach.      DX-CHEST-LIMITED (1 VIEW)   Final Result      1.  Interval extubation   2.  Lower lung volumes with increased atelectasis superimposed on pulmonary edema or pneumonia      DX-CHEST-PORTABLE (1 VIEW)   Final Result         1.  Pulmonary edema and/or infiltrates are identified, which are somewhat decreased since the prior exam.   2.  Atherosclerosis      US-EXTREMITY VENOUS LOWER BILAT   Final Result      DX-CHEST-PORTABLE (1 VIEW)   Final Result         1.  Scattered hazy bilateral pulmonary infiltrates, slightly increased since prior study.   2.  Atherosclerosis      MR-BRAIN-W/O   Final Result      1.  A few punctate areas of acute infarcts in the right frontal and bilateral parietal lobes and left cerebellum.   2.  There is no brainstem infarct or diffuse anoxic injury.   3.  Mixed signal abnormality in the right cerebellum with the previous surgical intervention in keeping with the clinical diagnosis of right cerebellar hemorrhage evacuation. Follow-up study with contrast in 6 weeks is recommended to rule out any    underlying pathology.   4.  Mild cerebral volume loss.      DX-CHEST-PORTABLE (1 VIEW)   Final Result      CT-CTA NECK WITH & W/O-POST PROCESSING   Final Result      1.  No acute arterial abnormality detected. No significant arterial stenosis.      CT-CTA HEAD WITH & W/O-POST PROCESS   Final Result      CT angiogram of the [place] within normal limits.      CT-CEREBRAL PERFUSION ANALYSIS   Final Result      1.  Cerebral blood flow less than 30% likely representing completed infarct = 0 mL.       2.  T Max more than 6 seconds likely representing combination of completed infarct and ischemia = 0 mL.      3.  Mismatched volume likely representing ischemic brain/penumbra = None      4.  Please note that the cerebral perfusion was performed on the limited brain tissue around the basal ganglia region. Infarct/ischemia outside the CT perfusion sections can be missed in this study.      CT-HEAD W/O   Final Result      1.  No evidence of acute territorial infarct, intracranial hemorrhage or mass lesion.   2.  Mild diffuse cerebral substance loss.   3.  Mild microangiopathic ischemic change versus demyelination or gliosis.   4.  Status post right suboccipital cranioplasty with underlying cerebellar hypoattenuation likely on the basis of evolving encephalomalacia.         DX-CHEST-PORTABLE (1 VIEW)   Final Result      1.  Intubation.   2.  Bilateral pulmonary infiltrates.      DX-CHEST-LIMITED (1 VIEW)   Final Result      DX-ABDOMEN FOR TUBE PLACEMENT   Final Result      Enteric feeding tube terminates with the tip projecting over the expected location of the distal stomach.      CT-CTA CHEST PULMONARY ARTERY W/ RECONS   Final Result         1.  No pulmonary embolus appreciated.   2.  Consolidations in bilateral lung bases an scattered hazy right pulmonary opacities, compatible with atelectasis with component of infiltrate.   3.  Indeterminate left adrenal nodule, follow-up adrenal protocol CT for further characterization as clinically appropriate.   4.  Atherosclerosis and atherosclerotic coronary artery disease.      DX-ABDOMEN FOR TUBE PLACEMENT   Final Result         1.  Nonspecific bowel gas pattern in the upper abdomen.   2.  Cardiomegaly   3.  Atherosclerosis   4.  Bibasilar atelectasis   5.  Nasogastric tube tip terminates overlying the expected location of the pylorus or first duodenal segment.      DX-ABDOMEN FOR TUBE PLACEMENT   Final Result         1.  Nonspecific bowel gas pattern in the upper abdomen.    2.  Nasogastric tube tip terminates overlying the expected location of the gastric antrum.   3.  Linear densities the bilateral lung bases favor atelectasis, component of infiltrate not excluded.      DX-ABDOMEN FOR TUBE PLACEMENT   Final Result      NG tube extends below the diaphragm with tip at the level of the gastric fundus. Side port is at the level of the distal esophagus.      DX-ABDOMEN FOR TUBE PLACEMENT   Final Result      NG tube is noted with tip projecting over the right lower lung.      These findings were transmitted with GLEN ZELAYA on 01/07/2024. Tube was subsequently adjusted and is now present below the diaphragm.      DX-ABDOMEN FOR TUBE PLACEMENT   Final Result      DHT tip overlies the second portion of the duodenum      DX-ABDOMEN FOR TUBE PLACEMENT   Final Result      Feeding tube tip projects in the distal stomach or first portion of the duodenum.      DX-ABDOMEN FOR TUBE PLACEMENT   Final Result      Removal of nasogastric tube.      Feeding tube tip projects in the distal stomach.      No visualized thorax.      DX-ABDOMEN FOR TUBE PLACEMENT   Final Result         Gastric drainage tube with tip projecting over the expected area of the right lower lobe airway. Recommend removal.            CRITICAL RESULT READ BACK: Preliminary findings discussed with and critical read back performed by Dr. GLEN ZELAYA via telephone on 1/5/2024 6:22 PM      DX-ABDOMEN FOR TUBE PLACEMENT   Final Result      1.  Malpositioned enteric sump catheter which is folded back upon itself over the left paramedian distal one third mediastinum probably beyond the left mainstem bronchus and within the esophagus.   2.  A Voalte message was sent to GLEN ZELAYA at 1641 hours. Immediate response received.   3.  Per discussion, the patient has already pulled out the tube.      DX-CHEST-PORTABLE (1 VIEW)   Final Result      1.  Hypoinflation and pulmonary vascular congestion with mild bibasilar atelectasis.   2.  No  lobar pneumonia or overt pulmonary edema.      CT-HEAD W/O   Final Result      1.  Diffuse atrophy and white matter changes.   2.  No acute intracranial hemorrhage or territorial infarct.   3.  RIGHT occipital craniotomy with underlying cerebellar encephalomalacia.              Assessment/Plan  * Acute respiratory failure with hypoxia (HCC)- (present on admission)  Assessment & Plan  Intubated date: 1/9/2024-1/11, 1/13-1/20, 1/25-2/1  Recurrent aspiration pneumonitis    Empiric aspiration PNA coverage was given  Remains at risk for aspiration and reintubation - ongoing goals of care, daughter wants him to remain full code    His overall prognosis is quite poor given his recurrent aspirations that of which are unlikely to change with a PEG tube.  This was discussed with his daughter on 2/8/2024 and she will discuss with her sister.    He had been on IV Lasix which has been stopped  If he can follow commands with incentive spirometry this will help with atelectasis    He is still high risk aspiration and to develop respiratory failure       Dysphagia- (present on admission)  Assessment & Plan  Speech pathology continues to follow him.  He has had multiple aspiration events.  He has nasogastric feeding.  Gastroenterology was consulted for a PEG tube but he was on high flow oxygen and they may be reconsulted once his oxygen requirements are down.  His daughter will check with her sister and they will come up with a plan as to if they want another attempt at a PEG tube.  Still holding on peg/g tube as per daughter decision   Cont on trickling tube feed oly 10 ml/hr   Will consider to slowly advance tomorrow     2/12: unruly increase rate to 20 ml/hr today     Debility- (present on admission)  Assessment & Plan  Prolonged hospitalization, prior cerebral insult  He will need lifelong placement    A-fib (HCC)  Assessment & Plan  LVH on 12/27 ECHO with diastolic dysfunction  Weight-based Lovenox prophylaxis for CVA  Continue  cardiac monitoring.    Encephalopathy- (present on admission)  Assessment & Plan  Toxic metabolic + ICU hypoactive delirium  This has persisted  MRI brain 1/29/24 was unremarkable for acute processes.    Urinary retention- (present on admission)  Assessment & Plan  Schwartz was replaced on 2/8 due to 1.2 liters of urine retention.  Start terazosin     Hypernatremia- (present on admission)  Assessment & Plan  Stopped IV Lasix  Increase free water via NG tube to 250 ml q6 hours.   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  Even G tube or PEG tube placment will not slove his aspiration problem as he is high risk of aspirating his own secretion       Acute kidney failure (HCC)- (present on admission)  Assessment & Plan  Ischemic ATN   Resolved        VTE prophylaxis: Lovenox ppx     Greater than 51 minutes spent prepping to see patient (e.g. review of tests) obtaining and/or reviewing separately obtained history. Performing a medically appropriate examination and/ evaluation.  Counseling and educating the patient/family/caregiver.  Ordering medications, tests, or procedures.  Referring and communicating with other health care professionals.  Documenting clinical  information in EPIC.  Independently interpreting results and communicating results to patient/family/caregiver.  Care coordination.      I have performed a physical exam and reviewed and updated ROS and Plan today (2/12/2024). In review of yesterday's note (2/11/2024), there are no changes except as documented above.

## 2024-02-13 PROBLEM — G93.41 ACUTE METABOLIC ENCEPHALOPATHY: Status: ACTIVE | Noted: 2024-01-01

## 2024-02-13 PROBLEM — Z51.5 COMFORT MEASURES ONLY STATUS: Status: ACTIVE | Noted: 2024-01-01

## 2024-02-13 PROBLEM — R40.20 COMA (HCC): Status: ACTIVE | Noted: 2024-01-01

## 2024-02-13 PROBLEM — R57.9 SHOCK CIRCULATORY (HCC): Status: ACTIVE | Noted: 2024-01-01

## 2024-02-13 NOTE — PROGRESS NOTES
Monitor Summary:     Rhythm: SR  Rate: 71-89  Ectopy: (R)PVC, (R)PAC, (R) Coup  Measurements: .16/.09/.42                     12 Hour Chart Check

## 2024-02-13 NOTE — CARE PLAN
The patient is Stable - Low risk of patient condition declining or worsening    Shift Goals  Clinical Goals: safety, comfort, rest  Patient Goals: rest  Family Goals: polo    Progress made toward(s) clinical / shift goals:      Problem: Skin Integrity  Goal: Skin integrity is maintained or improved  Outcome: Progressing     Problem: Fall Risk  Goal: Patient will remain free from falls  Outcome: Progressing     Problem: Safety - Medical Restraint  Goal: Remains free of injury from restraints (Restraint for Interference with Medical Device)  Outcome: Progressing       Patient is not progressing towards the following goals:

## 2024-02-13 NOTE — DISCHARGE PLANNING
Responded to rapid response for respiratory distress. Daughter has been notified, she is unable to get here for 2.5-3 hrs. Rapid team team providing supportive care at this time.

## 2024-02-13 NOTE — PROGRESS NOTES
Assumed pt care after bedside report from Linda GUERRA. Pt noted to have loose BM, per report this is 3rd loose stool. MD notified, new order received, sample collected and sent to lab. Special contact isolation precautions initiated. Pt initially hypotensive at 78/49, rechecked by this RN and pt up to 102/59.

## 2024-02-13 NOTE — PROGRESS NOTES
Change in patient condition due to: Respiratory  Rapid Response called at: 1406  Physician Sabrina notified at 1406 already @ bedside     See Code Blue timeline for rapid response events. Patient Remained on Unit

## 2024-02-13 NOTE — DISCHARGE PLANNING
Case Management Discharge Planning    Admission Date: 1/4/2024  GMLOS: 7.5  ALOS: 40    6-Clicks ADL Score: 12  6-Clicks Mobility Score: 7  PT and/or OT Eval ordered: Yes  Post-acute Referrals Ordered: Yes  Post-acute Choice Obtained: Yes  Has referral(s) been sent to post-acute provider:  Yes      Anticipated Discharge Dispo: Discharge Disposition: D/T to Medicare cert [facility] w/planned hosp IP readmit (91)    DME Needed: No    Action(s) Taken: Updated Provider/Nurse on Discharge Plan    Escalations Completed: None    Medically Clear: No    Next Steps: Discussed in rounds. Hospitalist to discuss plan of care with family, May consider CC or hospice. Pt was living with room-mate in 2nd floor apt. Will need GH placement if hospice is planned.     Barriers to Discharge: Medical clearance and Pending Placement    Is the patient up for discharge tomorrow: No

## 2024-02-13 NOTE — PROGRESS NOTES
Rapid Response Note    I was called to bedside per charge nurse due to acute deterioration in respiratory status following aspiration event.  According to the bedside nurse, patient had significant nausea vomiting resulting in aspiration and subsequent hypoxia with SpO2 in the 80s.    Patient's blood pressures decreasing to the 60s systolic.    Patient remaining encephalopathic minimally arousable.  Lungs with coarse breath sounds bilaterally.      Assessment and plan:  # Aspiration pneumonia  #Acute respiratory failure with hypoxia  #Coma  #Shock    Tube feedings were discontinued.  Respiratory therapy was consulted to place the patient on high flow oxygen.    Patient was maintaining SpO2 greater than 90% on 60 LPM high flow oxygen with FiO2 100%.  Additionally requiring 15 LPM oxygen mask.    I called the patient's daughter Jaymie Banda.  She stated that she was currently at work and could not get back to the hospital for the next 2 hours.    Due to patient's hemodynamic instability, he was given 1 L IV fluid bolus with systolic blood pressures still very low with MAP in the 60s.      I ordered phenylephrine 100 mcg IV push followed by additional 1 liter IV fluid bolus and started a rate of  mL/h.  Additionally, I gave hydrocortisone 100 mg IV.  Blood pressures stabilized with an AP greater than 65 systolic blood pressures improving to the 90s to 100s.    Patient was also started on IV Unasyn.        Patient is critically ill.   The patient continues to have: Acute respiratory failure with hypoxia due to aspiration event, shock, and encephalopathy  The vital organ system that is affected is the: Respiratory, cardiovascular, and neurologic  If untreated there is a high chance of deterioration into: Fulminant respiratory failure, cardiovascular collapse, PEA cardiac arrest, and eventually death.   The critical care that I am providing today is: Extensive data review with frequent monitoring of patient's vital  signs and clinical assessment at bedside.  Time spent coordinating care with the rapid response nursing team and bedside nurse.  I have given patient IV fluid boluses as well as IV hydrocortisone and IV phenylephrine push to keep patient's blood pressures stable.  Time spent discussing the case also with the patient's daughter in regards to continued plan of treatment.  Time spent discussing the case with the charge nurse as well.  The critical that has been undertaken is medically complex.   There has been no overlap in critical care time.   Critical Care Time not including procedures: 38 minutes      Addendum 4:45 PM  Patient's daughter Jaymie arrived.  In agreement to transitioning to comfort care measures only.  Daughter requesting that she be notified if the patient moved to a different room.  She wants to keep the patient comfortable and out of pain.      Comfort care measures initiated with IV morphine and Ativan.  Discontinuing tube feeds.

## 2024-02-13 NOTE — PROGRESS NOTES
[facility] Medicine Daily Progress Note    Date of Service  2/13/2024    Chief Complaint  Lewis Mohr is a 78 y.o. male admitted 1/4/2024 with altered mental status    Hospital Course  Mr. 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  Unfortunately, the patient was readmitted to [facility] on 1/4/2024 for altered mental status and hypoxia.  He was admitted to the medicine services with hypernatremia, dehydration, and ERWIN with urinary retention.  He was found to be silently aspirating.  On 1/9/2024, the patient went into AF with RVR with worsening hypoxia and mental status changes.  A code stroke was called.  While in the CT scanner the patient was not protecting his airway and therefore was intubated.  He had a PEA arrest shortly afterward.  He was transferred to the ICU.  The patient was extubated on 1/11/2024 and required HFNC at 45L at 70% on 1/12.  Unfortunately, the patient was reintubated on 1/13.  He remained intubated until 1/20/2024 and then extubated to HFNC at 40L/70%.  He was transferred to IMCU on 1/25.  On the evening of 1/26 the critical care team was reconsulted due to the patient becoming suddenly tachycardic in the 140-150s with hypotension.  He was given IVF and a dose of phenylephrine.  He was not protecting his airway and required emergent intubation and bronchoscopy then transferred back to the ICU.  During his ICU stay the patient was intermittently agitated, requiring pressor therapy, continue to be significantly encephalopathic, eventually had improvement and was extubated to high flow nasal cannula, patient with difficulty with secretions and protecting his airway per intensivist    Interval Problem Update  2/6: Mr. Mohr was evaluated in the CU. His potassium is low at 3.3. He is on weight-based lovenox.  He is in soft wrist restraints. He is on high flow oxygen. He is   oriented only to name.   2/7: Mr. Mohr was seen in the IMCU. His Na is up at 148.  Stop IV Lasix.  He is on high flow oxygen. Chest xray ordered for this morning. He has nasogastric feeding and failed a swallow eval by speech path yesterday.  I have asked his nurse to call me once his daughter comes in due to his andrew his condition with her.  His high flow will be tapered as tolerated.  2/8: Mr. Mohr was evaluated in the CU.His Na went up to 149 despite stopping IV lasix. Cr also went up from 1.38 to 1.56 thus increase free water flushes. A fernando was placed over night due to 1.2 liters urine. He remains on NG feeding. He is now on 2 liters of oxygen. He is requiring soft wrist restraints as he will pull on his tubes.  I met with his daughter at bedside we had a long and candid discussion about his overall very guarded condition.  He is now on 2 L but remains confused and is at very high risk of aspiration whether he has a nasogastric tube or PEG tube.  She is going to speak with her sister and will hopefully come up with a decision tomorrow.  She is optimistic that he is doing much better but his recent history would suggest that he likely will continue to aspirate with resultant decompensation his respiratory status.  30 minutes were spent that of which over 50% time was spent in counseling at bedside.  2/9: Patient seen and examined, resting in bed, afebrile, transferred out of ICU yesterday now on 2L of oxygen family deciding regarding PEG tube   Will try to discuss with family again today  tube feed will help with feeding but will not solve his aspiration problem and patient continues to be high risk of aspiration and worsening respiratory status   Family would like patient to be full code.    Addendum: I met with patient's daughter Jaymie today . Discussed with her regarding PEG/G tube and as per daughter will hold off right now. She knows that this will not solve his problem of aspiration and still remains  high risk despite PEG/G tube placement.  We then discussed regarding his code status and how to proceed if his respiratory status worsen again and per daughter she woul like to switch him to DNR/DNI at this time.  I have switched his code status to DNR?DNI as per daughter wishes   2/10: Patient seen and examined, resting in bed, afebrile, still confused, still high risk of aspiration. Will cont on trickle amount of tube feed through coretrack  Hypernatremia: will start on 1/2 NS also on free water flushes   2/11: Patient seen and examined, regarding his hypernatremia sodium of 152 will start on D5 at 50 ml per hour unruly cont on free water flushes   Regarding dysphagia still high risk of aspiration will cont with trickling of tube feed 10 ml /hr also today will see if we can slowly advance starting tomorrow   2/12: Patient seen and examined still AOX1, will increase tube feed today to 20 ml.hr and will monitor if he can tolerate   Daughter want to hold howard for now on G tube/PEG tube   Regarding his hypernatremia cont on D5 and free water flushes slowly trending down.  CM looking to see if he is a candidate for LTACH     Above per previous hospitalist.    2/13:  Diarrhea this morning.  C diff testing ordered.   On 1 LPM oxygen mask.  Tolerating tube feeds at goal 60 mL/hr.  Mumbling.  Alert and oriented x1.  Schwartz with dark urine.  Called daughter and left message to discussed goal of care.      I have discussed this patient's plan of care and discharge plan at IDT rounds today with Case Management, Nursing, Nursing leadership, and other members of the IDT team.    Consultants/Specialty  GI.   Critical care    Code Status  DNAR/DNI    Disposition  The patient is not medically cleared for discharge to home or a post-acute facility.  Anticipate discharge to: skilled nursing facility    I have placed the appropriate orders for post-discharge needs.    Review of Systems  Review of Systems   Unable to perform ROS: Mental  acuity        Physical Exam  Temp:  [36 °C (96.8 °F)-37.6 °C (99.7 °F)] 36 °C (96.8 °F)  Pulse:  [] 105  Resp:  [16-23] 23  BP: ()/(42-88) 76/42  SpO2:  [90 %-97 %] 97 %    Physical Exam  Vitals and nursing note reviewed.   Constitutional:       General: He is not in acute distress.     Appearance: He is ill-appearing.      Interventions: He is restrained.   HENT:      Head: Normocephalic and atraumatic.      Nose:      Comments: NG tube in place     Mouth/Throat:      Mouth: Mucous membranes are moist.      Pharynx: Oropharynx is clear. No oropharyngeal exudate.   Eyes:      General: No scleral icterus.        Right eye: No discharge.         Left eye: No discharge.      Conjunctiva/sclera: Conjunctivae normal.   Cardiovascular:      Rate and Rhythm: Normal rate and regular rhythm.      Pulses: Normal pulses.      Heart sounds: Normal heart sounds. No murmur heard.  Pulmonary:      Effort: Pulmonary effort is normal. No respiratory distress.      Breath sounds: Normal breath sounds.   Abdominal:      General: Abdomen is flat. Bowel sounds are normal. There is no distension.      Palpations: Abdomen is soft.   Genitourinary:     Comments: Schwartz catheter with dark urine.  Musculoskeletal:         General: No swelling.      Cervical back: Neck supple. No tenderness.      Right lower leg: No edema.      Left lower leg: No edema.   Skin:     General: Skin is warm and dry.      Coloration: Skin is pale.   Neurological:      Mental Status: He is alert. Mental status is at baseline. He is disoriented.      Motor: No weakness.      Comments: Alert and oriented x1.  Moving all extremities intermittently to command.   Psychiatric:      Comments: Unable to assess         Fluids    Intake/Output Summary (Last 24 hours) at 2/13/2024 1431  Last data filed at 2/13/2024 0805  Gross per 24 hour   Intake 750 ml   Output 950 ml   Net -200 ml       Laboratory  Recent Labs     02/13/24  0015   WBC 7.1   RBC 3.48*    HEMOGLOBIN 10.3*   HEMATOCRIT 32.2*   MCV 92.5   MCH 29.6   MCHC 32.0*   RDW 49.4   PLATELETCT 263   MPV 11.1     Recent Labs     02/13/24  0015 02/13/24  0547 02/13/24  1215   SODIUM 144 143 142   POTASSIUM 3.7 3.6 4.0   CHLORIDE 109 108 108   CO2 25 24 25   GLUCOSE 150* 160* 143*   BUN 23* 27* 34*   CREATININE 1.36 1.48* 1.74*   CALCIUM 9.5 9.4 9.0                     Imaging  DX-CHEST-PORTABLE (1 VIEW)   Final Result      1.  Mildly improved aeration with resolved interstitial opacity/edema.   2.  Small, nodular opacities in the right upper lung could be developing pneumonitis.      DX-ABDOMEN FOR TUBE PLACEMENT   Final Result      Enteric feeding tube extending to about the level of the gastric antrum or pylorus.      DX-ABDOMEN FOR TUBE PLACEMENT   Final Result      Feeding tube tip at the proximal stomach.      DX-CHEST-PORTABLE (1 VIEW)   Final Result         1.  Interstitial pulmonary parenchymal prominence suggest chronic underlying lung disease, component of interstitial edema and/or infiltrates not excluded.   2.  Atherosclerosis      DX-ABDOMEN FOR TUBE PLACEMENT   Final Result      1.  Enteric tube overlies the gastric body.      DX-ABDOMEN FOR TUBE PLACEMENT   Final Result      Feeding tube tip overlies the duodenum.      DX-CHEST-PORTABLE (1 VIEW)   Final Result      No acute process.      DX-CHEST-PORTABLE (1 VIEW)   Final Result      1.  Interval extubation.   2.  Mild interstitial pulmonary edema.   3.  Possible minimal/trace left pleural effusion.      DX-CHEST-LIMITED (1 VIEW)   Final Result      Stable appearance of the chest.      MR-BRAIN-W/O   Final Result         No acute intracranial process.      Age-related volume loss and chronic microvascular ischemic changes.      Postoperative changes in the right cerebellum with resection cavity and hemosiderin staining.      Bilateral mastoid effusion.      DX-CHEST-PORTABLE (1 VIEW)   Final Result         1.  Interstitial pulmonary parenchymal  prominence suggest chronic underlying lung disease, component of interstitial edema and/or infiltrates not excluded.   2.  Cardiomegaly   3.  Atherosclerosis      DX-CHEST-PORTABLE (1 VIEW)   Final Result         1.  Pulmonary edema and/or infiltrates are identified, which are stable since the prior exam.   2.  Cardiomegaly   3.  Atherosclerosis      DX-CHEST-PORTABLE (1 VIEW)   Final Result         1.  Pulmonary edema and/or infiltrates are identified, which are stable since the prior exam.   2.  Cardiomegaly   3.  Atherosclerosis      DX-CHEST-LIMITED (1 VIEW)   Final Result         1.  Pulmonary edema and/or infiltrates are identified, which appear somewhat increased since the prior exam.   2.  Cardiomegaly   3.  Atherosclerosis      DX-ABDOMEN FOR TUBE PLACEMENT   Final Result         Feeding tube with tip projecting over the expected area of the distal stomach.      DX-ABDOMEN FOR TUBE PLACEMENT   Final Result      The nasogastric tube terminates over the antrum of the stomach.      DX-CHEST-PORTABLE (1 VIEW)   Final Result         Ill-defined hazy opacities in the right mid lung and left lower lobe are similar to prior.      IR-MIDLINE CATHETER INSERTION WO GUIDANCE > AGE 3   Final Result                  Ultrasound-guided midline placement performed by qualified nursing staff    as above.          DX-CHEST-PORTABLE (1 VIEW)   Final Result      Improved interstitial opacities may represent improving edema and/or pneumonitis.      Mild nonspecific right basilar opacity and questionable tiny left upper lobe opacity. Correlate clinically for infection.      DX-CHEST-LIMITED (1 VIEW)   Final Result      1.  Slightly increased BILATERAL lung disease. This could be pulmonary edema or pneumonia.   2.  Possible small BILATERAL pleural effusions      DX-CHEST-PORTABLE (1 VIEW)   Final Result         1.  Mild pulmonary edema and/or infiltrates   2.  Atherosclerosis      DX-ABDOMEN FOR TUBE PLACEMENT   Final Result       NG tube tip projects over the stomach.      DX-ABDOMEN FOR TUBE PLACEMENT   Final Result      1.  Enteric tube projects over the proximal stomach.      DX-CHEST-PORTABLE (1 VIEW)   Final Result      Lines and tubes appear adequate. Stable bibasilar airspace disease.      DX-CHEST-PORTABLE (1 VIEW)   Final Result         1.  Pulmonary edema and/or infiltrates are identified, which appear somewhat increased since the prior exam.   2.  Cardiomegaly      DX-CHEST-PORTABLE (1 VIEW)   Final Result      1.  New small left pleural effusion with associated atelectasis.   2.  Increased right basilar opacity most likely atelectasis however correlate clinically for infection.      DX-CHEST-PORTABLE (1 VIEW)   Final Result         1.  Pulmonary edema and/or infiltrates are identified, which are stable since the prior exam.   2.  Cardiomegaly      DX-CHEST-PORTABLE (1 VIEW)   Final Result      1.  No significant change.      MR-MRA HEAD-W/O   Final Result         MRA OF THE [place] WITHIN NORMAL LIMITS.      MR-BRAIN-W/O   Final Result      1.  Redemonstrated is scattered supratentorial and infratentorial areas of ischemia. No new lesions. There is increased conspicuity of the RIGHT frontal lobe lesion which could be due to recurrent ischemia or seizure activity.   2.  Unchanged RIGHT cerebellar hematoma evacuation cavity. As previously noted underlying mass is not excluded and follow-up MRI without and with contrast in approximately weeks is recommended.   3.  No new hemorrhage   4.  Atrophy   5.  White matter changes   6.  Small BILATERAL mastoid effusions      DX-CHEST-PORTABLE (1 VIEW)   Final Result      DX-ABDOMEN FOR TUBE PLACEMENT   Final Result      Enteric feeding tube terminates in the left upper quadrant projecting over the expected location of the stomach.      DX-CHEST-LIMITED (1 VIEW)   Final Result      1.  Interval extubation   2.  Lower lung volumes with increased atelectasis superimposed on pulmonary  edema or pneumonia      DX-CHEST-PORTABLE (1 VIEW)   Final Result         1.  Pulmonary edema and/or infiltrates are identified, which are somewhat decreased since the prior exam.   2.  Atherosclerosis      US-EXTREMITY VENOUS LOWER BILAT   Final Result      DX-CHEST-PORTABLE (1 VIEW)   Final Result         1.  Scattered hazy bilateral pulmonary infiltrates, slightly increased since prior study.   2.  Atherosclerosis      MR-BRAIN-W/O   Final Result      1.  A few punctate areas of acute infarcts in the right frontal and bilateral parietal lobes and left cerebellum.   2.  There is no brainstem infarct or diffuse anoxic injury.   3.  Mixed signal abnormality in the right cerebellum with the previous surgical intervention in keeping with the clinical diagnosis of right cerebellar hemorrhage evacuation. Follow-up study with contrast in 6 weeks is recommended to rule out any    underlying pathology.   4.  Mild cerebral volume loss.      DX-CHEST-PORTABLE (1 VIEW)   Final Result      CT-CTA NECK WITH & W/O-POST PROCESSING   Final Result      1.  No acute arterial abnormality detected. No significant arterial stenosis.      CT-CTA HEAD WITH & W/O-POST PROCESS   Final Result      CT angiogram of the [place] within normal limits.      CT-CEREBRAL PERFUSION ANALYSIS   Final Result      1.  Cerebral blood flow less than 30% likely representing completed infarct = 0 mL.      2.  T Max more than 6 seconds likely representing combination of completed infarct and ischemia = 0 mL.      3.  Mismatched volume likely representing ischemic brain/penumbra = None      4.  Please note that the cerebral perfusion was performed on the limited brain tissue around the basal ganglia region. Infarct/ischemia outside the CT perfusion sections can be missed in this study.      CT-HEAD W/O   Final Result      1.  No evidence of acute territorial infarct, intracranial hemorrhage or mass lesion.   2.  Mild diffuse cerebral substance loss.    3.  Mild microangiopathic ischemic change versus demyelination or gliosis.   4.  Status post right suboccipital cranioplasty with underlying cerebellar hypoattenuation likely on the basis of evolving encephalomalacia.         DX-CHEST-PORTABLE (1 VIEW)   Final Result      1.  Intubation.   2.  Bilateral pulmonary infiltrates.      DX-CHEST-LIMITED (1 VIEW)   Final Result      DX-ABDOMEN FOR TUBE PLACEMENT   Final Result      Enteric feeding tube terminates with the tip projecting over the expected location of the distal stomach.      CT-CTA CHEST PULMONARY ARTERY W/ RECONS   Final Result         1.  No pulmonary embolus appreciated.   2.  Consolidations in bilateral lung bases an scattered hazy right pulmonary opacities, compatible with atelectasis with component of infiltrate.   3.  Indeterminate left adrenal nodule, follow-up adrenal protocol CT for further characterization as clinically appropriate.   4.  Atherosclerosis and atherosclerotic coronary artery disease.      DX-ABDOMEN FOR TUBE PLACEMENT   Final Result         1.  Nonspecific bowel gas pattern in the upper abdomen.   2.  Cardiomegaly   3.  Atherosclerosis   4.  Bibasilar atelectasis   5.  Nasogastric tube tip terminates overlying the expected location of the pylorus or first duodenal segment.      DX-ABDOMEN FOR TUBE PLACEMENT   Final Result         1.  Nonspecific bowel gas pattern in the upper abdomen.   2.  Nasogastric tube tip terminates overlying the expected location of the gastric antrum.   3.  Linear densities the bilateral lung bases favor atelectasis, component of infiltrate not excluded.      DX-ABDOMEN FOR TUBE PLACEMENT   Final Result      NG tube extends below the diaphragm with tip at the level of the gastric fundus. Side port is at the level of the distal esophagus.      DX-ABDOMEN FOR TUBE PLACEMENT   Final Result      NG tube is noted with tip projecting over the right lower lung.      These findings were transmitted with GLEN  NATHALIE on 01/07/2024. Tube was subsequently adjusted and is now present below the diaphragm.      DX-ABDOMEN FOR TUBE PLACEMENT   Final Result      DHT tip overlies the second portion of the duodenum      DX-ABDOMEN FOR TUBE PLACEMENT   Final Result      Feeding tube tip projects in the distal stomach or first portion of the duodenum.      DX-ABDOMEN FOR TUBE PLACEMENT   Final Result      Removal of nasogastric tube.      Feeding tube tip projects in the distal stomach.      No visualized thorax.      DX-ABDOMEN FOR TUBE PLACEMENT   Final Result         Gastric drainage tube with tip projecting over the expected area of the right lower lobe airway. Recommend removal.            CRITICAL RESULT READ BACK: Preliminary findings discussed with and critical read back performed by Dr. GLEN ZELAYA via telephone on 1/5/2024 6:22 PM      DX-ABDOMEN FOR TUBE PLACEMENT   Final Result      1.  Malpositioned enteric sump catheter which is folded back upon itself over the left paramedian distal one third mediastinum probably beyond the left mainstem bronchus and within the esophagus.   2.  A Voalte message was sent to GLEN ZELAYA at 1641 hours. Immediate response received.   3.  Per discussion, the patient has already pulled out the tube.      DX-CHEST-PORTABLE (1 VIEW)   Final Result      1.  Hypoinflation and pulmonary vascular congestion with mild bibasilar atelectasis.   2.  No lobar pneumonia or overt pulmonary edema.      CT-HEAD W/O   Final Result      1.  Diffuse atrophy and white matter changes.   2.  No acute intracranial hemorrhage or territorial infarct.   3.  RIGHT occipital craniotomy with underlying cerebellar encephalomalacia.              Assessment/Plan  * Acute respiratory failure with hypoxia (HCC)- (present on admission)  Assessment & Plan  Intubated date: 1/9/2024-1/11, 1/13-1/20, 1/25-2/1  Recurrent aspiration pneumonitis    Empiric aspiration PNA coverage was given  Remains at risk for aspiration and  reintubation - ongoing goals of care, daughter wants him to remain full code    His overall prognosis is quite poor given his recurrent aspirations that of which are unlikely to change with a PEG tube.  This was discussed with his daughter on 2/8/2024 and she will discuss with her sister.    He had been on IV Lasix which has been stopped  If he can follow commands with incentive spirometry this will help with atelectasis    He is still high risk aspiration and to develop respiratory failure     2/13  1 LPM oxygen mask  Stable  No Lasix.  Tolerating TF  Continuous pulse oximetry monitoring.      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  Even G tube or PEG tube placment will not slove his aspiration problem as he is high risk of aspirating his own secretion       2/13  Appears improving/stable.  Continuous pulse oximetry.    Acute kidney failure (HCC)- (present on admission)  Assessment & Plan  Ischemic ATN   Resolved    2/13  Worsening yesterday.  Repeat creatinine 1.74  Start IV fluids        Dysphagia- (present on admission)  Assessment & Plan  Speech pathology continues to follow him.  He has had multiple aspiration events.  He has nasogastric feeding.  Gastroenterology was consulted for a PEG tube but he was on high flow oxygen and they may be reconsulted once his oxygen requirements are down.  His daughter will check with her sister and they will come up with a plan as to if they want another attempt at a PEG tube.  Still holding on peg/g tube as per daughter decision   Cont on trickling tube feed oly 10 ml/hr   Will consider to  slowly advance tomorrow     2/12: unruly increase rate to 20 ml/hr today     Debility- (present on admission)  Assessment & Plan  Prolonged hospitalization, prior cerebral insult  He will need lifelong placement    A-fib (HCC)  Assessment & Plan  LVH on 12/27 ECHO with diastolic dysfunction  Weight-based Lovenox prophylaxis for CVA  Continue cardiac monitoring.    Encephalopathy- (present on admission)  Assessment & Plan  Toxic metabolic + ICU hypoactive delirium  This has persisted  MRI brain 1/29/24 was unremarkable for acute processes.    2/13  On going    Urinary retention- (present on admission)  Assessment & Plan  Schwartz was replaced on 2/8 due to 1.2 liters of urine retention.  Start terazosin     Hypernatremia- (present on admission)  Assessment & Plan  Stopped IV Lasix  Increase free water via NG tube to 250 ml q6 hours.   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 Patten  Repeat imaging including MRI show changes consistent with known bleed but no new findings  Still needs a f/u MRI in 3-4 wks to confirm no underlying lesion as etiology of initial bleed        VTE prophylaxis: Lovenox ppx       I have performed a physical exam and reviewed and updated ROS and Plan today (2/13/2024). In review of yesterday's note (2/12/2024), there are no changes except as documented above.

## 2024-02-14 NOTE — PROGRESS NOTES
Monitor Summary:  Rate:   Rhythm: SR/ST  Ectopy: PVC (R)  Measurements: 0.19/0.08/0.37      Pt is now medical/comfort care

## 2024-02-14 NOTE — CARE PLAN
The patient is Unstable - High likelihood or risk of patient condition declining or worsening    Shift Goals  Clinical Goals: Isolation precautions, SBP >100  Patient Goals: DULCE MARIA  Family Goals: dulce maria    Progress made toward(s) clinical / shift goals: Rapid response initiated on pt in the afternoon d/t increasing oxygen demands, suspected aspiration, HOTN. Interventions provided by RRT per MD orders. Pt transitioned to comfort care measures only once daughter at bedside.       Problem: Pain - Standard  Goal: Alleviation of pain or a reduction in pain to the patient’s comfort goal  Outcome: Progressing     Problem: Safety - Medical Restraint  Goal: Remains free of injury from restraints (Restraint for Interference with Medical Device)  Outcome: Met  Goal: Remains free of injury from restraints (Restraint for Interference with Medical Device)  Outcome: Met       Patient is not progressing towards the following goals:

## 2024-02-15 PROBLEM — I51.89 DIASTOLIC DYSFUNCTION: Status: ACTIVE | Noted: 2024-02-15

## 2024-02-15 PROBLEM — R13.12 OROPHARYNGEAL DYSPHAGIA: Status: ACTIVE | Noted: 2024-02-15

## 2024-02-15 PROBLEM — I48.0 PAROXYSMAL ATRIAL FIBRILLATION (HCC): Status: ACTIVE | Noted: 2024-01-01

## 2024-02-15 PROBLEM — I50.32 CHRONIC HEART FAILURE WITH PRESERVED EJECTION FRACTION (HCC): Status: ACTIVE | Noted: 2024-02-15

## 2024-02-15 PROBLEM — N17.0 ACUTE RENAL FAILURE WITH TUBULAR NECROSIS (HCC): Status: ACTIVE | Noted: 2020-12-09

## 2024-02-15 PROBLEM — I47.10 SVT (SUPRAVENTRICULAR TACHYCARDIA) (HCC): Status: ACTIVE | Noted: 2024-02-15

## 2024-02-15 NOTE — DISCHARGE PLANNING
Received message that Dr. Gipson received call from daughter requesting mortuary list. Called daughter, Jaymie  and left  message requesting return call.

## 2024-02-16 NOTE — DISCHARGE SUMMARY
Death Summary    Cause of Death  Cardiopulmonary arrest due to circulatory shock due to acute respiratory failure with hypoxia due to recurrent aspiration pneumonia due to oropharyngeal dysphagia due to history of cerebellar hemorrhage      Comorbid Conditions at the Time of Death  Principal Problem:    Acute respiratory failure with hypoxia (HCC) (POA: Yes)  Active Problems:    Acute renal failure with tubular necrosis (HCC) (POA: Yes)    Aspiration pneumonia (HCC) (POA: Yes)    Acute metabolic encephalopathy (POA: Yes)    Coma (HCC) (POA: No)    Shock circulatory (HCC) (POA: Yes)    Oropharyngeal dysphagia (POA: Yes)    Dyslipidemia (POA: Yes)    History of cerebellar hemorrhage (POA: Yes)    Hypernatremia (POA: Yes)    Urinary retention (POA: Yes)    Paroxysmal atrial fibrillation (HCC) (POA: Yes)    Debility (POA: Yes)    SVT (supraventricular tachycardia) (POA: Yes)    Chronic heart failure with preserved ejection fraction (HCC) (POA: Yes)    Grade II diastolic dysfunction (POA: Yes)    Comfort measures only status (POA: Yes)  Resolved Problems:    Respiratory failure with hypoxia (HCC) (POA: Yes)    Hyponatremia (POA: Yes)    High anion gap metabolic acidosis (POA: Unknown)    Advance care planning (POA: Unknown)    Hypoxic respiratory failure (HCC) (POA: Yes)    Septic shock (HCC) (POA: Unknown)    Delirium (POA: Unknown)      History of Presenting Illness and Hospital Course  Lewis Mohr is a 78 y.o. male admitted 1/4/2024 with acute respiratory failure with hypoxia due to aspiration pneumonia in setting of recent cerebral hemorrhage.  This is a pleasant gentleman who presented with confusion and low oxygen levels from [facility] nursing [facility] when he was found to be unresponsive.  He was recently hospitalized following a fall with cerebral hemorrhage, for which he underwent a suboccipital craniectomy on 12/27/2023.  He was subsequently discharged to [facility] on 1/2/2024.   Worsening slurred speech.  Head CT was unremarkable.  He was ultimately discharged back to skilled nursing facility.  He was brought to the emergency room after being found unresponsive and hypoxic.  In the emergency room, patient was noted to be hypoxic down to 80% on room air and was noted to be hypotensive.  Head CT again showed no significant change or acute process.  Chest x-ray showed possible pulmonary vascular.  White count elevated 0.6.  Viral panel was negative.  Patient was admitted for IV fluid hydration presumed aspiration pneumonia.  He was noted to have hyponatremia with dehydration and acute kidney injury as well as urinary tension.  He was found to be slightly aspirating.  2024, he went into atrial fibrillation with RVR with worsening hypoxia and mental status change.  A stroke code was called.  While the patient was in the CT scanner, the patient was not protecting his airway and was therefore intubated.  He had a PEA cardiac arrest shortly afterwards.  He was transferred to the intensive care unit and was extubated on 1/11/2024 and subsequently required high flow oxygen by nasal cannula.  He was reintubated on 1/13/2024 and remained intubated until 1/20/2024 again extubated to high flow oxygen.  He was subsequently transferred to the intermediate care unit on 125 for.  On the evening of 01/26/2024, the critical care team was reconsulted due to worsening tachycardia and hypertension.  He was given IV fluids and dose of phenylephrine.  He was not protecting his airway was again requiring emergent intubation and bronchoscopy and transferred back to the intensive care unit.  During his ICU stay, he was intermittently agitated and required pressor support.  Remains significantly encephalopathic.  He made gradual improvements and was extubated again to high flow oxygen.  He had difficulty clearing his secretions and protecting his airway.    Patient was subsequently transferred out of the intensive care  unit to the intermediate care unit and then subsequently to the telemetry floor.  He had a nasogastric tube placed for feedings and failed a swallow study for evaluation by speech pathology.    Due to ongoing high risk of recurrent aspiration and consequential pneumonia and respiratory failure, recommendation was made for the patient to transitions to hospice/comfort care measures.    On the evening of 2/13/2024, patient had sudden acute deterioration in respiratory status following aspiration event associated with nausea vomiting.  He had sudden decrease in his blood pressures down to the 60s systolic requiring aggressive fluid resuscitation and phenylephrine push as well as administration of stress dosing hydrocortisone, after which the patient stabilized.  The patient's daughter was notified and upon arrival, patient was transition to comfort care measures only due to his poor prognosis.  Patient passed peacefully overnight.        Death Date: 02/14/24   Death Time: 0346         Pronounced By (RN1): Alyx Schmitz  Pronounced By (RN2): Jessie Garcia

## 2024-03-02 LAB
MYCOBACTERIUM SPEC CULT: NORMAL
MYCOBACTERIUM SPEC CULT: NORMAL
RHODAMINE-AURAMINE STN SPEC: NORMAL
RHODAMINE-AURAMINE STN SPEC: NORMAL
SIGNIFICANT IND 70042: NORMAL
SIGNIFICANT IND 70042: NORMAL
SITE SITE: NORMAL
SITE SITE: NORMAL
SOURCE SOURCE: NORMAL
SOURCE SOURCE: NORMAL

## (undated) DEVICE — SPONGE GAUZESTER 4 X 4 4PLY - (128PK/CA)

## (undated) DEVICE — PERFORATER DISP TIP DGR-0

## (undated) DEVICE — TUBING CLEARLINK DUO-VENT - C-FLO (48EA/CA)

## (undated) DEVICE — SURGIFOAM (SIZE 100) - (6EA/CA)

## (undated) DEVICE — SET EXTENSION WITH 2 PORTS (48EA/CA) ***PART #2C8610 IS A SUBSTITUTE*****

## (undated) DEVICE — GLOVE BIOGEL INDICATOR SZ 7SURGICAL PF LTX - (50/BX 4BX/CA)

## (undated) DEVICE — ELECTRODE DUAL RETURN W/ CORD - (50/PK)

## (undated) DEVICE — SUTURE 0 VICRYL PLUS CT-1 - 8 X 18 INCH (12/BX)

## (undated) DEVICE — BLADE SURGICAL CLIPPER - (50EA/CA)

## (undated) DEVICE — COVER LIGHT HANDLE ALC PLUS DISP (18EA/BX)

## (undated) DEVICE — SET LEADWIRE 5 LEAD BEDSIDE DISPOSABLE ECG (1SET OF 5/EA)

## (undated) DEVICE — CLIP SM INTNL HRZN TI ESCP LGT - (24EA/PK 25PK/BX)

## (undated) DEVICE — HEMOSTAT SURG ABSORBABLE - 4 X 8 IN SURGICEL (24EA/CA)

## (undated) DEVICE — CHLORAPREP 26 ML APPLICATOR - ORANGE TINT(25/CA)

## (undated) DEVICE — GOWN WARMING STANDARD FLEX - (30/CA)

## (undated) DEVICE — DRAPE T CRANIOTOMY W/POUCH - (9/CA)

## (undated) DEVICE — TUBE CONNECT SUCTION CLEAR 120 X 1/4" (50EA/CA)"

## (undated) DEVICE — PACK CRANI - (1EA/CA)

## (undated) DEVICE — TOWEL STOP TIMEOUT SAFETY FLAG (40EA/CA)

## (undated) DEVICE — SUTURE GENERAL

## (undated) DEVICE — BOVIE BLADE SHORT - (150EA/CT)

## (undated) DEVICE — KIT SURGIFLO W/OUT THROMBIN - (6EA/CA)

## (undated) DEVICE — TOOL MR8 2.3CM F2/7CM TAPER (1/EA)

## (undated) DEVICE — TUBING C&T SET FLYING LEADS DRAIN TUBING (10EA/BX)

## (undated) DEVICE — GLOVE BIOGEL ECLIPSE  PF LATEX SIZE 6.5 (50PR/BX)

## (undated) DEVICE — CANISTER SUCTION 3000ML MECHANICAL FILTER AUTO SHUTOFF MEDI-VAC NONSTERILE LF DISP  (40EA/CA)

## (undated) DEVICE — MIDAS LUBRICATOR DIFFUSER PACK (4EA/CA)

## (undated) DEVICE — SLEEVE, VASO, THIGH, MED

## (undated) DEVICE — SODIUM CHL IRRIGATION 0.9% 1000ML (12EA/CA)

## (undated) DEVICE — DRESSING XEROFORM 1X8 - (50/BX 4BX/CA)

## (undated) DEVICE — SUTURE 4-0 NUROLON CR/8 TF - (12/BX) ETHICON

## (undated) DEVICE — SUCTION INSTRUMENT YANKAUER BULBOUS TIP W/O VENT (50EA/CA)

## (undated) DEVICE — CLEANER ELECTRO-SURGICAL TIP - (25/BX 4BX/CA)

## (undated) DEVICE — LACTATED RINGERS INJ. 500 ML - (24EA/CA)

## (undated) DEVICE — LACTATED RINGERS INJ 1000 ML - (14EA/CA 60CA/PF)

## (undated) DEVICE — CORETEMP DRAPE FORM-FITTED EASY DROPANDGO DRAPE FOR USE ON THE CORETEMP FLUID MANAGEMENT 56IN X 56IN

## (undated) DEVICE — COVER FOOT UNIVERSAL DISP. - (25EA/CA)

## (undated) DEVICE — SUTURE 2-0 VICRYL PLUS CT-1 - 8 X 18 INCH(12/BX)

## (undated) DEVICE — BLADE CLIPPER FITS 2501 CLIPPER (BLUE)  (20EA/CA)

## (undated) DEVICE — GLOVE BIOGEL PI ORTHO SZ 7.5 PF LF (40PR/BX)

## (undated) DEVICE — SYRINGE EAR/NOSE 3 OZ STERILE (50/CA